# Patient Record
Sex: FEMALE | Race: WHITE | NOT HISPANIC OR LATINO | Employment: UNEMPLOYED | URBAN - METROPOLITAN AREA
[De-identification: names, ages, dates, MRNs, and addresses within clinical notes are randomized per-mention and may not be internally consistent; named-entity substitution may affect disease eponyms.]

---

## 2018-01-29 ENCOUNTER — TELEPHONE (OUTPATIENT)
Dept: ENDOCRINOLOGY | Facility: CLINIC | Age: 53
End: 2018-01-29

## 2018-01-29 NOTE — TELEPHONE ENCOUNTER
Reason for call:  Other   Patient called regarding (reason for call): appointment  Additional comments: She is wanting to know if she can get in sooner then April as all appointments are scheduled for 60 minutes, she was informed of this. This would be a 2 month follow up and is wondering if she would need a 60 minute appointment.    Phone number to reach patient:  Home number on file [phone] (home)    Best Time:  anytime    Can we leave a detailed message on this number?  YES

## 2018-01-29 NOTE — TELEPHONE ENCOUNTER
Message noted.  Let's move up her appt.  Since I have limited new patient appointment slots in next few weeks, I recommend using one of my Administrative slots (for a 30 min appt at 1pm) at [facility]... On a Monday, Tuesday, or Friday.... Next week or afterwards.  Thanks.  .

## 2018-02-16 NOTE — TELEPHONE ENCOUNTER
Called 2nd attempt pt lm to call and schedule appt with Dr Christianson. She does not have one already scheduled. See previous messages for approved time slots.

## 2018-02-20 ENCOUNTER — RECORDS - HEALTHEAST (OUTPATIENT)
Dept: LAB | Facility: HOSPITAL | Age: 53
End: 2018-02-20

## 2018-02-21 LAB
MEV IGG SER IA-ACNC: NORMAL
MUV IGG SER QL IA: NORMAL
RUBV IGG SERPL QL IA: NORMAL
VZV IGG SER QL IA: NORMAL

## 2018-02-23 LAB
QTF INTERPRETATION: NORMAL
QTF MITOGEN - NIL: >10 IU/ML
QTF NIL: 0.02 IU/ML
QTF RESULT: NEGATIVE
QTF TB ANTIGEN - NIL: 0.01 IU/ML

## 2018-03-07 NOTE — TELEPHONE ENCOUNTER
3rd attempt to contact pt.  Called pt unable to lm as vm was not allowing me to do so.  Closing encounter.

## 2018-03-09 RX ORDER — LEVOTHYROXINE, LIOTHYRONINE 38; 9 UG/1; UG/1
TABLET ORAL
Qty: 60 TABLET | Refills: 0 | OUTPATIENT
Start: 2018-03-09

## 2018-03-09 NOTE — TELEPHONE ENCOUNTER
"Requested Prescriptions   Pending Prescriptions Disp Refills     NP THYROID 60 MG tablet [Pharmacy Med Name: NP THYROID 60MG TABS] 60 tablet 0     Sig: TAKE 2 TABLETS BY MOUTH EVERY DAY    Thyroid Protocol Failed    3/9/2018  6:51 AM       Failed - Recent (12 mo) or future (30 days) visit within the authorizing provider's specialty    Patient had office visit in the last year or has a visit in the next 30 days with authorizing provider.  See \"Patient Info\" tab in inbasket, or \"Choose Columns\" in Meds & Orders section of the refill encounter.            Failed - Normal TSH on file in past 12 months    No lab results found.          Passed - Patient is 12 years or older       Passed - No active pregnancy on record    If patient is pregnant or has had a positive pregnancy test, please check TSH.         Passed - No positive pregnancy test in past 12 months    If patient is pregnant or has had a positive pregnancy test, please check TSH.            "

## 2018-03-09 NOTE — TELEPHONE ENCOUNTER
See telephone encounter dated 1/29.  Patient has not est care/made appt with Dr. faustin as of yet.  TC unable to reach patient after 2 attempts.  Alyssia Booth RN

## 2018-03-26 ENCOUNTER — OFFICE VISIT (OUTPATIENT)
Dept: ENDOCRINOLOGY | Facility: CLINIC | Age: 53
End: 2018-03-26
Payer: COMMERCIAL

## 2018-03-26 VITALS
WEIGHT: 246 LBS | SYSTOLIC BLOOD PRESSURE: 135 MMHG | DIASTOLIC BLOOD PRESSURE: 79 MMHG | HEIGHT: 64 IN | HEART RATE: 72 BPM | BODY MASS INDEX: 42 KG/M2

## 2018-03-26 DIAGNOSIS — E03.9 ACQUIRED HYPOTHYROIDISM: ICD-10-CM

## 2018-03-26 DIAGNOSIS — E11.9 TYPE 2 DIABETES MELLITUS WITHOUT COMPLICATION, WITHOUT LONG-TERM CURRENT USE OF INSULIN (H): Primary | ICD-10-CM

## 2018-03-26 PROCEDURE — 99214 OFFICE O/P EST MOD 30 MIN: CPT | Performed by: INTERNAL MEDICINE

## 2018-03-26 RX ORDER — GLIMEPIRIDE 4 MG/1
4 TABLET ORAL
Qty: 30 TABLET | Refills: 11 | Status: SHIPPED | OUTPATIENT
Start: 2018-03-26 | End: 2018-05-15

## 2018-03-26 RX ORDER — VALACYCLOVIR HYDROCHLORIDE 500 MG/1
500 TABLET, FILM COATED ORAL DAILY PRN
COMMUNITY
Start: 2018-01-16 | End: 2024-04-05

## 2018-03-26 RX ORDER — LEVOTHYROXINE SODIUM 25 UG/1
25 TABLET ORAL DAILY
Qty: 30 TABLET | Refills: 11 | Status: SHIPPED | OUTPATIENT
Start: 2018-03-26 | End: 2018-10-26

## 2018-03-26 RX ORDER — THYROID 90 MG/1
TABLET ORAL
Qty: 60 TABLET | Refills: 11 | Status: SHIPPED | OUTPATIENT
Start: 2018-03-26 | End: 2019-04-26

## 2018-03-26 RX ORDER — FLUCONAZOLE 100 MG/1
TABLET ORAL
Refills: 0 | Status: ON HOLD | COMMUNITY
Start: 2018-03-15 | End: 2018-10-10

## 2018-03-26 NOTE — MR AVS SNAPSHOT
"              After Visit Summary   3/26/2018    Sudheer Montiel    MRN: [id number]           Patient Information     Date Of Birth          1965        Visit Information        Provider Department      3/26/2018 12:30 PM Kirk Christianson MD [facility]        Today's Diagnoses     Type 2 diabetes mellitus without complication, without long-term current use of insulin (H)    -  1    Acquired hypothyroidism           Follow-ups after your visit        Follow-up notes from your care team     Return in about 2 months (around 2018).      Who to contact     If you have questions or need follow up information about today's clinic visit or your schedule please contact [facility] directly at [phone].  Normal or non-critical lab and imaging results will be communicated to you by MyChart, letter or phone within 4 business days after the clinic has received the results. If you do not hear from us within 7 days, please contact the clinic through [employer] or phone. If you have a critical or abnormal lab result, we will notify you by phone as soon as possible.  Submit refill requests through [employer] or call your pharmacy and they will forward the refill request to us. Please allow 3 business days for your refill to be completed.          Additional Information About Your Visit        MyChart Information     [employer] lets you send messages to your doctor, view your test results, renew your prescriptions, schedule appointments and more. To sign up, go to [email or web address] . Click on \"Log in\" on the left side of the screen, which will take you to the Welcome page. Then click on \"Sign up Now\" on the right side of the page.     You will be asked to enter the access code listed below, as well as some personal information. Please follow the directions to create your username and password.     Your access code is: [email or web address]  Expires: 2018  7:28 PM     Your access code will  in 90 " "days. If you need help or a new code, please call your [place] clinic or [phone].        Care EveryWhere ID     This is your Care EveryWhere ID. This could be used by other organizations to access your [place] medical records  [id number]        Your Vitals Were     Pulse Height BMI (Body Mass Index)             72 5' 4\" (1.626 m) 42.23 kg/m2          Blood Pressure from Last 3 Encounters:   03/26/18 135/79   10/21/15 137/83   11/04/14 128/84    Weight from Last 3 Encounters:   03/26/18 246 lb (111.6 kg)   10/21/15 250 lb (113.4 kg)   11/04/14 253 lb 6.4 oz (114.9 kg)              Today, you had the following     No orders found for display       Primary Care Provider Office Phone # Fax #    [facility] [phone]       [address] [place] [address], [id number]  [facility] [address]        Equal Access to Services     ANSELMO OBRIEN : Hadii aad ku hadasho Soomaali, waaxda luqadaha, qaybta kaalmada adeegyada, waxay chantellein mode sage . So [facility] [phone].    ATENCIÓN: Si habla español, tiene a morin disposición servicios gratuitos de asistencia lingüística. [facility] [phone].    We comply with applicable federal civil rights laws and [place] laws. We do not discriminate on the basis of race, color, national origin, age, disability, sex, sexual orientation, or gender identity.            Thank you!     Thank you for choosing [facility]  for your care. Our goal is always to provide you with excellent care. Hearing back from our patients is one way we can continue to improve our services. Please take a few minutes to complete the written survey that you may receive in the mail after your visit with us. Thank you!             Your Updated Medication List - Protect others around you: Learn how to safely use, store and throw away your medicines at www.disposemymeds.org.          This list is accurate as of 3/26/18  7:28 PM.  Always use your most recent med " list.                   Brand Name Dispense Instructions for use Diagnosis    ACCU-CHEK ROHITH Kit           ACCU-CHEK ROHITH test strip   Generic drug:  blood glucose monitoring           blood glucose calibration solution      1 Bottle.        blood glucose monitoring lancets           fluconazole 100 MG tablet    DIFLUCAN     TK 1 T PO D        glimepiride 2 MG tablet    AMARYL     Take 2 mg by mouth every morning (before breakfast)        metFORMIN 750 MG 24 hr tablet    GLUCOPHAGE-XR     Take 750 mg by mouth 2 times daily (with meals).        SYNTHROID 200 MCG tablet   Generic drug:  levothyroxine      Take  by mouth daily.        valACYclovir 500 MG tablet    VALTREX          VICTOZA SC

## 2018-03-26 NOTE — NURSING NOTE
"Chief Complaint   Patient presents with     Diabetes       Initial /79 (BP Location: Right arm, Cuff Size: Adult Regular)  Pulse 72  Ht 5' 4\" (1.626 m)  Wt 246 lb (111.6 kg)  BMI 42.23 kg/m2 Estimated body mass index is 42.23 kg/(m^2) as calculated from the following:    Height as of this encounter: 5' 4\" (1.626 m).    Weight as of this encounter: 246 lb (111.6 kg).  Medication Reconciliation: complete         Sona Best      "

## 2018-03-26 NOTE — PROGRESS NOTES
Name: Sudheer Montiel is a 52 year old, self-referred for evaluation of diabetes mellitus and hypothyroidism  Previously seen by me at my former clinic ([facility]), here to establish care with [facility].    HPI:  Recent issues:  Job change, now works for [employer] as   Reports having another thrush infection recently        Diabetes:  ~2010. Initial diagnosis approx age 44  ...Has taken several DM meds including metformin, Byetta, glimeparide, Invokana, and Victoza   Current DM meds:   Metformin 750 mg BID   Victoza 1.8 mg sq QAM   glimeparide 4 mg 1-tab po QAM   Jardiance 10 mg 1 po QAM  Previous problems with thrush and yeast infections  Has Accucheck meter, not available today.  No BG meter battery  DM Complications:  None known  Last eye exam 1 month ago, no DR      Hypothyroidism:  Had taken Synthroid  Changed to [place] thyroid medication, then [place] + levothyroxine combo dose plan  Had taken [place] thyroid 2-tablets daily... Possibly 60 mg 2-tabs QD  Early-mid 3/2018. Ran out of [place] thyroid medication  Current dose (verified by call to pharmacy):   NP Thyroid 60 mg 2-tabs po QAM   Levothyroxine 0.025 mg 1-tab QAM    Sees Beth CASTRO/[facility] for gen med evaluations  Also sees Dr. Frantz Guillen?/[facility]    PMH/PSH:  Past Medical History:   Diagnosis Date     DM (diabetes mellitus) (H) 2011     Hypothyroid 80's     Past Surgical History:   Procedure Laterality Date     C REPAIR CRUCIATE LIGAMENT,KNEE        SECTION       COLONOSCOPY N/A 10/21/2015    Procedure: COLONOSCOPY;  Surgeon: Jaky Arredondo MD;  [facility]      due to anemia       Family Hx:  No family history on file.      Social Hx:  Social History     Social History     Marital status:      Spouse name: N/A     Number of children: 2     Years of education: N/A     Occupational History     surgery  " [employer]      Social History Main Topics     Smoking status: Former Smoker     Quit date: 2/21/2011     Smokeless tobacco: Never Used     Alcohol use No     Drug use: No     Sexual activity: Not on file     Other Topics Concern     Not on file     Social History Narrative          MEDICATIONS:  has a current medication list which includes the following prescription(s): fluconazole, valacyclovir, liraglutide, glimepiride, metformin, levothyroxine, blood glucose calibration, accu-chek christophe, accu-chek christophe, and blood glucose monitoring.    ROS:     ROS: 10 point ROS neg other than the symptoms noted above in the HPI.    GENERAL: no weight changes, fatigue, fevers, chills, night sweats.   HEENT: inner mouth soreness; no dysphagia, odonophagia, diplopia, neck pain  THYROID:  no apparent hyper or hypothyroid symptoms  CV: no chest pain, pressure, palpitations  LUNGS: no SOB, HAWKINS, cough, wheezing   ABDOMEN: no diarrhea, indigestion, constipation, abdominal pain  EXTREMITIES: no rashes, ulcers, edema  NEUROLOGY: no headaches, denies changes in vision, tingling, extremitiy numbness   MSK: some low back pain; no muscle aches or pains, weakness  SKIN: no rashes or lesions  : no menses?!, nocturia 1x/night  PSYCH:  stable mood, no significant anxiety or depression  ENDOCRINE: no heat or cold intolerance      Physical Exam   VS: /79 (BP Location: Right arm, Cuff Size: Adult Regular)  Pulse 72  Ht 5' 4\" (1.626 m)  Wt 246 lb (111.6 kg)  BMI 42.23 kg/m2  GENERAL: AXOX3, NAD, well dressed, answering questions appropriately, appears stated age.  THYROID:  normal gland, no apparent nodules or goiter  HEENT: neck non-tender, no exopthalmous, EOMI  CV: RRR, no rubs, gallops, no murmurs  LUNGS: CTAB, no wheezes, rales, or ronchi  ABDOMEN: obese, soft, nontender, nondistended  EXTREMITIES: no edema, +pedal pulses, no lesions  NEUROLOGY: CN grossly intact, no tremors  MSK: grossly intact  SKIN: no rashes, no " lesions    LABS:    All pertinent notes, labs, and images personally reviewed by me.     A/P:  Encounter Diagnoses   Name Primary?     Type 2 diabetes mellitus without complication, without long-term current use of insulin (H) Yes     Acquired hypothyroidism        Comments:  Reviewed health issues, diabetes and thyroid management.  Difficult to assess glycemic control without BG meter information.  Fatigue and wt gain relates to low thyroid levels    Plan:  Discussed general issues with the diabetes diagnosis and management  We discussed the hgbA1c test which reflects previous overall glucose levels or control  Discussed the importance of blood glucose (BG) testing to assess glucose trends  Provided general overview of the diabetes medication options and medication treatment plan.    Recommend:  Continue current diabetes medication treatment plan at this time.  Goal target BG  mg/dl  Gave her sample Accucheck Guide meter for use  No lab tests ordered for today.  Keep focus on diet, exercise, weight management.  She will contact our office when ready for DM med Rx's.    Will update her glimeparide Rx     Patient's preferred pharmacy is [facility]    Need to clarify the names/doses of her thyroid medications.  I spoke with her [facility] pharmacy ([phone]) to confirm.  She needs to restart the thyroid medication plan ASAP  F/u with Beth CASTRO for the thrush problem    Addressed patient questions today    Labs ordered today: No orders of the defined types were placed in this encounter.    Radiology/Consults ordered today: None    More than 50% of the time spent with Ms. Montiel on counseling / coordinating her care.  Total appointment time was 25 minutes.    Follow-up:  2 mo    Kirk Christianson MD  Endocrinology  [place]    \

## 2018-05-15 ENCOUNTER — TELEPHONE (OUTPATIENT)
Dept: FAMILY MEDICINE | Facility: CLINIC | Age: 53
End: 2018-05-15

## 2018-05-15 DIAGNOSIS — E11.9 TYPE 2 DIABETES MELLITUS WITHOUT COMPLICATION, WITHOUT LONG-TERM CURRENT USE OF INSULIN (H): Primary | ICD-10-CM

## 2018-05-15 DIAGNOSIS — E11.9 TYPE 2 DIABETES MELLITUS WITHOUT COMPLICATION, WITHOUT LONG-TERM CURRENT USE OF INSULIN (H): ICD-10-CM

## 2018-05-15 NOTE — TELEPHONE ENCOUNTER
Please do not close this encounter until this has been addressed.  (prior auth approved/denied, prescriber refusal to complete prior auth or medication changed/discontinued)    +MUST USE PREFERRED BRANDS FARXIGA,  INVOKANA OR MED NECESSITY PA ONLY    Prior Authorization needed on: Jardiance 10 mg   Drug NDC: [address]-73     Insurance: [employer]  Member ID: [id number]   Insurance phone #: [phone]    Pharmacy NPI: [id number]  Pharmacy Phone #: [phone]  Pharmacy Fax #: [phone]    Please let us know if the PA gets approved or denied or if medication is changed    Thank You!  Kassi Armendariz  [facility]

## 2018-05-15 NOTE — TELEPHONE ENCOUNTER
metFORMIN (GLUCOPHAGE-XR) 750 MG 24 hr tablet     --      Sig: Take 750 mg by mouth 2 times daily (with meals).     Class: Historical     glimepiride (AMARYL) 4 MG tablet 30 tablet 11 3/26/2018     Liraglutide (VICTOZA SC)     --      Class: Historical     ACCU-CHEK ROHITH strip   3/23/2011  --      Class: Historical       Last office visit: 03/26/2018:     Future Office Visit:  Unknown    Requested Prescriptions   Pending Prescriptions Disp Refills     metFORMIN (GLUCOPHAGE-XR) 750 MG 24 hr tablet 30 tablet      Sig: Take 1 tablet (750 mg) by mouth 2 times daily (with meals)    Biguanide Agents Failed    5/15/2018  2:13 PM       Failed - Patient has documented LDL within the past 12 mos.    Recent Labs   Lab Test 03/10/11   LDL  137@            Failed - Patient has had a Microalbumin in the past 12 mos.    No lab results found.         Failed - Patient has documented A1c within the specified period of time.    If HgbA1C is 8 or greater, it needs to be on file within the past 3 months.  If less than 8, must be on file within the past 6 months.     Recent Labs   Lab Test 03/10/11   A1C  7.3@            Passed - Blood pressure less than 140/90 in past 6 months    BP Readings from Last 3 Encounters:   03/26/18 135/79   10/21/15 137/83   11/04/14 128/84                Passed - Patient is age 10 or older       Passed - Patient's CR is NOT>1.4 OR Patient's EGFR is NOT<45 within past 12 mos.    Recent Labs   Lab Test  06/28/13 1903   GFRESTIMATED  62   GFRESTBLACK  75       Recent Labs   Lab Test  06/28/13 1903   CR  0.96            Passed - Patient does NOT have a diagnosis of CHF.       Passed - Patient is not pregnant       Passed - Patient has not had a positive pregnancy test within the past 12 mos.        Passed - Recent (6 mo) or future (30 days) visit within the authorizing provider's specialty    Patient had office visit in the last 6 months or has a visit in the next 30 days with authorizing provider  "or within the authorizing provider's specialty.  See \"Patient Info\" tab in inbasket, or \"Choose Columns\" in Meds & Orders section of the refill encounter.            glimepiride (AMARYL) 4 MG tablet 30 tablet 11     Sig: Take 1 tablet (4 mg) by mouth every morning (before breakfast)    Sulfonylurea Agents Failed    5/15/2018  2:13 PM       Failed - Patient has documented LDL within the past 12 mos.    Recent Labs   Lab Test 03/10/11   LDL  137@            Failed - Patient has had a Microalbumin in the past 12 mos.    No lab results found.         Failed - Patient has documented A1c within the specified period of time.    If HgbA1C is 8 or greater, it needs to be on file within the past 3 months.  If less than 8, must be on file within the past 6 months.     Recent Labs   Lab Test 03/10/11   A1C  7.3@            Failed - Patient has a recent creatinine (normal) within the past 12 mos.    Recent Labs   Lab Test  06/28/13   1903   CR  0.96            Passed - Blood pressure less than 140/90 in past 6 months    BP Readings from Last 3 Encounters:   03/26/18 135/79   10/21/15 137/83   11/04/14 128/84                Passed - Patient is age 18 or older       Passed - No active pregnancy on record       Passed - Patient has not had a positive pregnancy test within the past 12 mos.       Passed - Recent (6 mo) or future (30 days) visit within the authorizing provider's specialty    Patient had office visit in the last 6 months or has a visit in the next 30 days with authorizing provider or within the authorizing provider's specialty.  See \"Patient Info\" tab in inbasket, or \"Choose Columns\" in Meds & Orders section of the refill encounter.            liraglutide (VICTOZA PEN) 18 MG/3ML soln      GLP-1 Agonists Protocol Failed    5/15/2018  2:13 PM       Failed - LDL on file in past 12 months    Recent Labs   Lab Test 03/10/11   LDL  137@            Failed - Microalbumin on file in past 12 months    No lab results found.      " "   Failed - HgbA1C in past 3 or 6 months    If HgbA1C is 8 or greater, it needs to be on file within the past 3 months.  If less than 8, must be on file within the past 6 months.     Recent Labs   Lab Test 03/10/11   A1C  7.3@            Failed - Normal serum creatinine on file in past 12 months    Recent Labs   Lab Test  06/28/13   1903   CR  0.96            Passed - Blood pressure less than 140/90 in past 6 months    BP Readings from Last 3 Encounters:   03/26/18 135/79   10/21/15 137/83   11/04/14 128/84                Passed - Patient is age 18 or older       Passed - No active pregnancy on record       Passed - No positive pregnancy test in past 12 months       Passed - Recent (6 mo) or future (30 days) visit within the authorizing provider's specialty    Patient had office visit in the last 6 months or has a visit in the next 30 days with authorizing provider.  See \"Patient Info\" tab in inbasket, or \"Choose Columns\" in Meds & Orders section of the refill encounter.            blood glucose monitoring (ACCU-CHEK ROHITH) test strip      Diabetic Supplies Protocol Passed    5/15/2018  2:13 PM       Passed - Patient is 18 years of age or older       Passed - Recent (6 mo) or future (30 days) visit within the authorizing provider's specialty    Patient had office visit in the last 6 months or has a visit in the next 30 days with authorizing provider.  See \"Patient Info\" tab in inbasket, or \"Choose Columns\" in Meds & Orders section of the refill encounter.              "

## 2018-05-16 NOTE — TELEPHONE ENCOUNTER
Central Prior Authorization Team   Phone: [phone]    In order for the PA team to do a PA we need a current prescription in patient's chart written by a [facility] provider.  If provider would like a PA done on this medication please route back to PA team once there is a script in patient's chart and we will start the PA.

## 2018-05-17 RX ORDER — METFORMIN HYDROCHLORIDE 750 MG/1
750 TABLET, EXTENDED RELEASE ORAL 2 TIMES DAILY WITH MEALS
Qty: 60 TABLET | Refills: 11 | Status: SHIPPED | OUTPATIENT
Start: 2018-05-17 | End: 2019-06-13

## 2018-05-17 RX ORDER — GLIMEPIRIDE 4 MG/1
4 TABLET ORAL
Qty: 30 TABLET | Refills: 11 | Status: SHIPPED | OUTPATIENT
Start: 2018-05-17 | End: 2019-06-13

## 2018-05-17 RX ORDER — LIRAGLUTIDE 6 MG/ML
1.8 INJECTION SUBCUTANEOUS DAILY
Qty: 9 ML | Refills: 11 | Status: ON HOLD | OUTPATIENT
Start: 2018-05-17 | End: 2018-10-10

## 2018-05-17 NOTE — TELEPHONE ENCOUNTER
Hi Dr Christianson,    Patient will need the Jardiance refilled through our [facility] system before our PA team can work on the insurance issue. I have a script pending for this. Please also see the 5/15/18 refill encounter for several other diabetic meds.    I actually just started the PA for the Jardiance 10 mg tablets from my end so that we will have an approval later today.        Thanks,    Junior Oleary, CMA

## 2018-05-17 NOTE — TELEPHONE ENCOUNTER
"To Dr. Christianson,  Please review refills for patient.  How often do you want patient to check BG?  Thank you.  Alyssia Booth RN    Copied from 3/26/2018 Office visit with Dr. Christianson  \"...Current DM meds:                        Metformin 750 mg BID                        Victoza 1.8 mg sq QAM                        glimeparide 4 mg 1-tab po QAM                        Jardiance 10 mg 1 po QAM...\"  "

## 2018-10-08 ENCOUNTER — TRANSFERRED RECORDS (OUTPATIENT)
Dept: HEALTH INFORMATION MANAGEMENT | Facility: CLINIC | Age: 53
End: 2018-10-08

## 2018-10-08 LAB — INR PPP: 1.5 (ref 1–1.2)

## 2018-10-09 ENCOUNTER — TELEPHONE (OUTPATIENT)
Dept: UROLOGY | Facility: CLINIC | Age: 53
End: 2018-10-09

## 2018-10-09 LAB
HBA1C MFR BLD: 9.4 % (ref 0–5.7)
TSH SERPL-ACNC: 0.04 UIU/ML (ref 0.36–3.74)

## 2018-10-09 NOTE — TELEPHONE ENCOUNTER
[facility] Call Center    Phone Message    May a detailed message be left on voicemail: yes    Reason for Call: Other: Pt is transferring from [facility] either today or tomorrow and needs to establish care with a Urologist prior to transfer.  Please call Alexandra back to help facilitate this as soon as possible.     Action Taken: Message routed to:  [facility] ([facility]

## 2018-10-09 NOTE — TELEPHONE ENCOUNTER
Patient is a hospital to hospital transfer.   On-call number given.    April Juarez, RN, BSN  Urology Patient Care Supervisor

## 2018-10-09 NOTE — TELEPHONE ENCOUNTER
Message routed to April Juarez clinic supervisor. Temitope Chen ,do you know which Urologist I can place this patient with (ASAP-per call). She saw Dr. Cooper in 2012 for incontinence.     Nico Garcia MA

## 2018-10-10 ENCOUNTER — HOSPITAL ENCOUNTER (INPATIENT)
Facility: CLINIC | Age: 53
LOS: 5 days | Discharge: HOME HEALTH | DRG: 674 | End: 2018-10-15
Attending: SURGERY | Admitting: SURGERY
Payer: COMMERCIAL

## 2018-10-10 DIAGNOSIS — E11.9 TYPE 2 DIABETES MELLITUS WITHOUT COMPLICATION, WITHOUT LONG-TERM CURRENT USE OF INSULIN (H): ICD-10-CM

## 2018-10-10 DIAGNOSIS — M79.89 NECROTIZING SOFT TISSUE INFECTION: Primary | ICD-10-CM

## 2018-10-10 PROBLEM — L08.9 SOFT TISSUE INFECTION: Status: ACTIVE | Noted: 2018-10-10

## 2018-10-10 LAB
ANION GAP SERPL CALCULATED.3IONS-SCNC: 8 MMOL/L (ref 3–14)
BUN SERPL-MCNC: 7 MG/DL (ref 7–30)
CALCIUM SERPL-MCNC: 7.6 MG/DL (ref 8.5–10.1)
CHLORIDE SERPL-SCNC: 105 MMOL/L (ref 94–109)
CO2 SERPL-SCNC: 25 MMOL/L (ref 20–32)
CREAT SERPL-MCNC: 0.57 MG/DL (ref 0.55–1.02)
CREAT SERPL-MCNC: 0.58 MG/DL (ref 0.52–1.04)
ERYTHROCYTE [DISTWIDTH] IN BLOOD BY AUTOMATED COUNT: 13.2 % (ref 10–15)
GFR SERPL CREATININE-BSD FRML MDRD: >60 ML/MIN/1.73M2
GFR SERPL CREATININE-BSD FRML MDRD: >90 ML/MIN/1.7M2
GLUCOSE BLDC GLUCOMTR-MCNC: 188 MG/DL (ref 70–99)
GLUCOSE SERPL-MCNC: 179 MG/DL (ref 70–99)
GLUCOSE SERPL-MCNC: 69 MG/DL (ref 74–106)
HCT VFR BLD AUTO: 36.1 % (ref 35–47)
HGB BLD-MCNC: 11.5 G/DL (ref 11.7–15.7)
MAGNESIUM SERPL-MCNC: 2.3 MG/DL (ref 1.6–2.3)
MCH RBC QN AUTO: 29.3 PG (ref 26.5–33)
MCHC RBC AUTO-ENTMCNC: 31.9 G/DL (ref 31.5–36.5)
MCV RBC AUTO: 92 FL (ref 78–100)
PHOSPHATE SERPL-MCNC: 2.9 MG/DL (ref 2.5–4.5)
PLATELET # BLD AUTO: 302 10E9/L (ref 150–450)
POTASSIUM SERPL-SCNC: 2.8 MMOL/L (ref 3.5–5.1)
POTASSIUM SERPL-SCNC: 3.6 MMOL/L (ref 3.4–5.3)
RBC # BLD AUTO: 3.92 10E12/L (ref 3.8–5.2)
SODIUM SERPL-SCNC: 139 MMOL/L (ref 133–144)
WBC # BLD AUTO: 9.3 10E9/L (ref 4–11)

## 2018-10-10 PROCEDURE — 85027 COMPLETE CBC AUTOMATED: CPT | Performed by: SURGERY

## 2018-10-10 PROCEDURE — 00000146 ZZHCL STATISTIC GLUCOSE BY METER IP

## 2018-10-10 PROCEDURE — 25000128 H RX IP 250 OP 636: Performed by: STUDENT IN AN ORGANIZED HEALTH CARE EDUCATION/TRAINING PROGRAM

## 2018-10-10 PROCEDURE — 12000008 ZZH R&B INTERMEDIATE UMMC

## 2018-10-10 PROCEDURE — 84100 ASSAY OF PHOSPHORUS: CPT | Performed by: SURGERY

## 2018-10-10 PROCEDURE — 83735 ASSAY OF MAGNESIUM: CPT | Performed by: SURGERY

## 2018-10-10 PROCEDURE — 80048 BASIC METABOLIC PNL TOTAL CA: CPT | Performed by: SURGERY

## 2018-10-10 PROCEDURE — 36415 COLL VENOUS BLD VENIPUNCTURE: CPT | Performed by: SURGERY

## 2018-10-10 RX ORDER — LEVOTHYROXINE SODIUM 100 UG/1
200 TABLET ORAL DAILY
Status: DISCONTINUED | OUTPATIENT
Start: 2018-10-10 | End: 2018-10-10

## 2018-10-10 RX ORDER — SODIUM CHLORIDE, SODIUM LACTATE, POTASSIUM CHLORIDE, CALCIUM CHLORIDE 600; 310; 30; 20 MG/100ML; MG/100ML; MG/100ML; MG/100ML
1000 INJECTION, SOLUTION INTRAVENOUS CONTINUOUS
Status: DISCONTINUED | OUTPATIENT
Start: 2018-10-10 | End: 2018-10-12

## 2018-10-10 RX ORDER — NICOTINE POLACRILEX 4 MG
15-30 LOZENGE BUCCAL
Status: DISCONTINUED | OUTPATIENT
Start: 2018-10-10 | End: 2018-10-13

## 2018-10-10 RX ORDER — ONDANSETRON 2 MG/ML
4 INJECTION INTRAMUSCULAR; INTRAVENOUS EVERY 6 HOURS PRN
Status: DISCONTINUED | OUTPATIENT
Start: 2018-10-10 | End: 2018-10-15 | Stop reason: HOSPADM

## 2018-10-10 RX ORDER — PIPERACILLIN SODIUM, TAZOBACTAM SODIUM 3; .375 G/15ML; G/15ML
3.38 INJECTION, POWDER, LYOPHILIZED, FOR SOLUTION INTRAVENOUS EVERY 6 HOURS
Status: DISCONTINUED | OUTPATIENT
Start: 2018-10-10 | End: 2018-10-13

## 2018-10-10 RX ORDER — LEVOTHYROXINE SODIUM 25 UG/1
25 TABLET ORAL DAILY
Status: DISCONTINUED | OUTPATIENT
Start: 2018-10-11 | End: 2018-10-15 | Stop reason: HOSPADM

## 2018-10-10 RX ORDER — HYDROMORPHONE HYDROCHLORIDE 1 MG/ML
.3-.5 INJECTION, SOLUTION INTRAMUSCULAR; INTRAVENOUS; SUBCUTANEOUS
Status: DISCONTINUED | OUTPATIENT
Start: 2018-10-10 | End: 2018-10-14

## 2018-10-10 RX ORDER — DEXTROSE MONOHYDRATE 25 G/50ML
25-50 INJECTION, SOLUTION INTRAVENOUS
Status: DISCONTINUED | OUTPATIENT
Start: 2018-10-10 | End: 2018-10-13

## 2018-10-10 RX ORDER — NALOXONE HYDROCHLORIDE 0.4 MG/ML
.1-.4 INJECTION, SOLUTION INTRAMUSCULAR; INTRAVENOUS; SUBCUTANEOUS
Status: DISCONTINUED | OUTPATIENT
Start: 2018-10-10 | End: 2018-10-15 | Stop reason: HOSPADM

## 2018-10-10 RX ORDER — ONDANSETRON 4 MG/1
4 TABLET, ORALLY DISINTEGRATING ORAL EVERY 6 HOURS PRN
Status: DISCONTINUED | OUTPATIENT
Start: 2018-10-10 | End: 2018-10-15 | Stop reason: HOSPADM

## 2018-10-10 RX ORDER — METFORMIN HYDROCHLORIDE 750 MG/1
750 TABLET, EXTENDED RELEASE ORAL 2 TIMES DAILY WITH MEALS
Status: DISCONTINUED | OUTPATIENT
Start: 2018-10-10 | End: 2018-10-10

## 2018-10-10 RX ORDER — ACETAMINOPHEN 325 MG/1
650 TABLET ORAL EVERY 4 HOURS PRN
Status: DISCONTINUED | OUTPATIENT
Start: 2018-10-10 | End: 2018-10-12

## 2018-10-10 RX ORDER — OXYCODONE HYDROCHLORIDE 5 MG/1
5-10 TABLET ORAL EVERY 4 HOURS PRN
Status: DISCONTINUED | OUTPATIENT
Start: 2018-10-10 | End: 2018-10-10

## 2018-10-10 RX ORDER — GLIMEPIRIDE 4 MG/1
4 TABLET ORAL
Status: DISCONTINUED | OUTPATIENT
Start: 2018-10-11 | End: 2018-10-13

## 2018-10-10 RX ADMIN — VANCOMYCIN HYDROCHLORIDE 1750 MG: 10 INJECTION, POWDER, LYOPHILIZED, FOR SOLUTION INTRAVENOUS at 18:48

## 2018-10-10 RX ADMIN — SODIUM CHLORIDE, POTASSIUM CHLORIDE, SODIUM LACTATE AND CALCIUM CHLORIDE 1000 ML: 600; 310; 30; 20 INJECTION, SOLUTION INTRAVENOUS at 17:12

## 2018-10-10 ASSESSMENT — PAIN DESCRIPTION - DESCRIPTORS: DESCRIPTORS: DULL

## 2018-10-10 ASSESSMENT — ACTIVITIES OF DAILY LIVING (ADL): ADLS_ACUITY_SCORE: 9

## 2018-10-10 NOTE — H&P
[facility]    History and Physical  General Surgery     Date of Admission:  10/10/2018    Assessment & Plan   Sudheer Montiel is a 53 year old female with h/o T2DM and hypothyroidism, who presents from OSH on POD#2 s/p I&D lower abdomen for abscess and necrotizing soft tissue infection tracking to previous bladder sling mesh. Abdominal wound is open, with a penrose drain, packed with Kerlix, and covered with ABD pads.     - Admit to general surgery service  - Consult urology  - NPO, IVF  - Vanco, Zosyn (started at OSH)  - BID wet-to-dry with Kerlix    Patient discussed with chief resident, Dr. Carmona, and staff, Dr. Aguilera.    Timbo Dumont MD (PGY-1)  General Surgery      Code Status   Full Code    Chief Complaint   Abdominal pain with associated fever/chills    History is obtained from the patient and from OSH records    History of Present Illness   Sudheer Montiel is a 53 year old female with h/o T2DM (non-insulin dependent), hypothyroidism, and a mesh bladder sling for urinary incontinence in 2004 who had 1-week of intermittent and increasing lower abdominal pain that progressed to constant pain two days ago. Associated fevers, chills, diaphoresis, loss of appetite, nausea. Skin overlying her lower abdomen became indurated but no skin changes present. She presented to [facility] on 10/8/18 with a WBC 23 and CT scan showed a necrotizing soft tissue infection with subfascial abscess tracking down to her bladder sling mesh. Wound and blood cultures pending at OSH. She was started on IV vancomycin and zosyn. Patient underwent an I&D of the inferior abdomen with washout, penrose drain placement, and packing. Her WBC decreased to 18.5-->11.2. Patient was transferred to [facility] on 10/10/18 for definitive surgical management with a multidisciplinary team including general surgery and urology. She arrived in stable condition. No chest pain, SOB, cough, nausea/vomiting,  diarrhea/constipation. She does endorse some urinary symptoms including dysuria and foul-smelling urine.    Past Medical History    T2DM  Hypothyroidism    Past Surgical History   Bladder sling    Myomectomy for fibroids  ACL repair    Prior to Admission Medications   Prior to Admission Medications   Prescriptions Last Dose Informant Patient Reported? Taking?   ACCU-CHEK MULTICLIX LANCETS MISC Unknown at Unknown time  Yes No   Blood Glucose Calibration (ACCU-CHEK ROHITH) SOLN Unknown at Unknown time  Yes No   Si Bottle.   Blood Glucose Monitoring Suppl (ACCU-CHEK ROHITH) KIT Unknown at Unknown time  Yes No   Thyroid (NP THYROID) 90 MG TABS Unknown at Unknown time  No No   Sig: Take 2-tablets by mouth each morning   blood glucose monitoring (ACCU-CHEK ROHITH) test strip Unknown at Unknown time  No No   Si strip by In Vitro route 2 times daily   empagliflozin (JARDIANCE) 10 MG TABS tablet Unknown at Unknown time  No No   Sig: Take 1 tablet (10 mg) by mouth daily   fluconazole (DIFLUCAN) 100 MG tablet Unknown at Unknown time  Yes No   Sig: TK 1 T PO D   glimepiride (AMARYL) 4 MG tablet Unknown at Unknown time  No No   Sig: Take 1 tablet (4 mg) by mouth every morning (before breakfast)   levothyroxine (SYNTHROID) 200 MCG tablet Unknown at Unknown time  Yes No   Sig: Take  by mouth daily.   levothyroxine (SYNTHROID/LEVOTHROID) 25 MCG tablet Unknown at Unknown time  No No   Sig: Take 1 tablet (25 mcg) by mouth daily   metFORMIN (GLUCOPHAGE-XR) 750 MG 24 hr tablet Unknown at Unknown time  No No   Sig: Take 1 tablet (750 mg) by mouth 2 times daily (with meals)   valACYclovir (VALTREX) 500 MG tablet Unknown at Unknown time  Yes No      Facility-Administered Medications: None     Allergies   Allergies   Allergen Reactions     Cephalexin      Morphine      Other reaction(s): Other  Irritability, disorientation     Penicillins Itching     face     Amoxicillin Rash and Itching     Cefprozil Rash     Ceftazidime  Itching and Rash     Cefzil Rash     Ciprofloxacin Rash     Ciprofloxacin Itching and Rash     Percocet [Oxycodone-Acetaminophen] Nausea and Vomiting       Social History   Quit smoking in 2011  No EtOH    Family History   No pertinent family history    Review of Systems   The 10 point Review of Systems is negative other than noted in the HPI.    Physical Exam   Temp: 97.2  F (36.2  C) Temp src: Oral BP: 117/50 Pulse: 83   Resp: 18 SpO2: 95 % O2 Device: Nasal cannula Oxygen Delivery: 2 LPM  Vital Signs with Ranges  Temp:  [97.2  F (36.2  C)] 97.2  F (36.2  C)  Pulse:  [83] 83  Resp:  [18] 18  BP: (117)/(50) 117/50  SpO2:  [90 %-95 %] 95 %  0 lbs 0 oz    Awake, Alert, Oriented, does not appear in any acute distress  nonlabored breathing on 2L NC  RRR, WWP  Abdomen soft, nondistended, very tender near lower abdominal wound  Wound is open, packed with Kerlix, penrose drain x1 (not seen); erythema surrounding wound  No edema    Data   Results for orders placed or performed during the hospital encounter of 10/10/18 (from the past 24 hour(s))   Glucose by meter   Result Value Ref Range    Glucose 188 (H) 70 - 99 mg/dL

## 2018-10-10 NOTE — IP AVS SNAPSHOT
[address] [address]    Phone:  [phone]                                       After Visit Summary   10/10/2018    Sudheer Montiel    MRN: [id number]           After Visit Summary Signature Page     I have received my discharge instructions, and my questions have been answered. I have discussed any challenges I see with this plan with the nurse or doctor.    ..........................................................................................................................................  Patient/Patient Representative Signature      ..........................................................................................................................................  Patient Representative Print Name and Relationship to Patient    ..................................................               ................................................  Date                                   Time    ..........................................................................................................................................  Reviewed by Signature/Title    ...................................................              ..............................................  Date                                               Time          22EPIC Rev 08/18

## 2018-10-10 NOTE — IP AVS SNAPSHOT
MRN:[id number]                      After Visit Summary   10/10/2018    Sudheer Montiel    MRN: [id number]           Thank you!     Thank you for choosing [facility] for your care. Our goal is always to provide you with excellent care. Hearing back from our patients is one way we can continue to improve our services. Please take a few minutes to complete the written survey that you may receive in the mail after you visit with us. Thank you!        Patient Information     Date Of Birth          1965        Designated Caregiver       Most Recent Value    Caregiver    Will someone help with your care after discharge? yes    Name of designated caregiver Becca [Mom]    Phone number of caregiver [phone]    Caregiver address [place]      About your hospital stay     You were admitted on:  October 10, 2018 You last received care in the:  [address] [facility]    You were discharged on:  October 15, 2018        Reason for your hospital stay       At an outside hospital, you were found to have a necrotizing soft tissue infection in your lower abdomen. When they initially operated to drain your abscess and clear out the infection, they thought your bladder mesh might be involved and so you were transferred to the [facility]. You were taken to the OR here and had your abdominal wound washed out, and there did not look to be any residual infected or dead tissue. A small piece of mesh was clipped and sent for culture. Urology evaluated your wound in the OR and determined that the mesh was not infected, and so no procedures from them were needed. Your infection was treated with IV antibiotics, and you were transitioned to oral antibiotics and had a vac placed over your wound.                  Who to Call     For medical emergencies, please call [phone].  For non-urgent questions about your medical care, please call your primary care provider or clinic, [phone]  For questions related to your surgery,  please call your surgery clinic        Attending Provider     Provider Specialty    Brendon Grimm MD General Surgery    Darlene Hogue MD General Surgery       Primary Care Provider Office Phone # Fax #    Beth Luis A Cantu PA-C [phone]      After Care Instructions     Activity       Your activity upon discharge: activity as tolerated            Diet       Follow this diet upon discharge: Orders Placed This Encounter      Regular Diet Adult                  Follow-up Appointments     Adult [facility] Follow-up and recommended labs and tests       Follow up with primary care provider, [facility], within 7 days to evaluate after surgery and for hospital follow- up.  No follow up labs or test are needed.    Follow up with Dr. Moore , with Urology, within 1-2 weeks  to evaluate after surgery and for hospital follow- up. No follow up labs or test are needed.    Appointments on [place] and/or [facility] (with [facility] or [facility] provider or service). Call [phone] if you haven't heard regarding these appointments within 7 days of discharge.                  Additional Services     Home care nursing referral       _______________________  [facility]  Phone  [phone]  Fax  [phone]  ______________________    RN skilled nursing visit. RN to assess vital signs and weight, respiratory and cardiac status, pain level and activity tolerance, incision for signs/symptoms of infection, hydration, nutrition and bowel status and home safety.  RN to teach medication management.  RN to provide wound vac dressing changes every M/W/F.    Your provider has ordered home care nursing services. If you have not been contacted within 2 days of your discharge please call the inpatient department phone number at [phone] .                  Pending Results     Date and Time Order Name Status Description    10/12/2018 1244 Miscellaneous Culture Aerobic  "Bacterial Preliminary     10/12/2018 1244 Anaerobic bacterial culture Preliminary     10/12/2018 1244 Surgical pathology exam In process             Admission Information     Date & Time Provider Department Dept. Phone    10/10/2018 Darlene Hogue MD [address] [facility] [phone]      Your Vitals Were     Blood Pressure Pulse Temperature Respirations Weight Pulse Oximetry    138/56 (BP Location: Left arm) 54 96.7  F (35.9  C) (Oral) 18 111.6 kg (246 lb) 95%    BMI (Body Mass Index)                   42.23 kg/m2           [employer] Information     [employer] lets you send messages to your doctor, view your test results, renew your prescriptions, schedule appointments and more. To sign up, go to [email or web address] . Click on \"Log in\" on the left side of the screen, which will take you to the Welcome page. Then click on \"Sign up Now\" on the right side of the page.     You will be asked to enter the access code listed below, as well as some personal information. Please follow the directions to create your username and password.     Your access code is: [email or web address]  Expires: 2019 11:06 AM     Your access code will  in 90 days. If you need help or a new code, please call your [facility] clinic or [phone].        Care EveryWhere ID     This is your Care EveryWhere ID. This could be used by other organizations to access your [facility] medical records  [id number]        Equal Access to Services     [facility] HospitalST[facility]IA : Hadii ryan ross hadasho Sobonitaali, waaxda luqadaha, qaybta kaalmada adeegyada, waxay idiin hayaan adeeg kharash la'aan . So [facility] [phone].    ATENCIÓN: Si habla español, tiene a morin disposición servicios gratuitos de asistencia lingüística. Llame al [phone].    We comply with applicable federal civil rights laws and [place] laws. We do not discriminate on the basis of race, color, national origin, age, disability, sex, sexual orientation, or gender identity.             "   Review of your medicines      START taking        Dose / Directions    acetaminophen 325 MG tablet   Commonly known as:  TYLENOL        Dose:  975 mg   Take 3 tablets (975 mg) by mouth every 8 hours as needed for mild pain   Quantity:  100 tablet   Refills:  0       clindamycin 300 MG capsule   Commonly known as:  CLEOCIN   Indication:  Infection of the Skin and/or Related Soft Tissue        Dose:  300 mg   Take 1 capsule (300 mg) by mouth every 6 hours for 5 days   Quantity:  20 capsule   Refills:  0       gabapentin 100 MG capsule   Commonly known as:  NEURONTIN        Dose:  100 mg   Take 1 capsule (100 mg) by mouth 2 times daily   Quantity:  90 capsule   Refills:  0       HYDROmorphone 2 MG tablet   Commonly known as:  DILAUDID        Dose:  2 mg   Take 1 tablet (2 mg) by mouth 3 times daily as needed for moderate to severe pain   Quantity:  6 tablet   Refills:  0       hydrOXYzine 10 MG tablet   Commonly known as:  ATARAX        Dose:  10 mg   Take 1 tablet (10 mg) by mouth 3 times daily as needed for itching   Quantity:  20 tablet   Refills:  0       sulfamethoxazole-trimethoprim 800-160 MG per tablet   Commonly known as:  BACTRIM DS/SEPTRA DS   Indication:  Infection of the Skin and/or Related Soft Tissue        Dose:  1 tablet   Take 1 tablet by mouth 2 times daily for 5 days   Quantity:  10 tablet   Refills:  0         CONTINUE these medicines which have NOT CHANGED        Dose / Directions    ACCU-CHEK ROHITH Kit        Refills:  0       blood glucose calibration solution        Dose:  1 Bottle   1 Bottle.   Refills:  0       blood glucose monitoring lancets        Refills:  0       blood glucose monitoring test strip   Commonly known as:  ACCU-CHEK ROHITH   Used for:  Type 2 diabetes mellitus without complication, without long-term current use of insulin (H)        Dose:  1 strip   1 strip by In Vitro route 2 times daily   Quantity:  200 strip   Refills:  3       glimepiride 4 MG tablet   Commonly known  as:  AMARYL   Used for:  Type 2 diabetes mellitus without complication, without long-term current use of insulin (H)        Dose:  4 mg   Take 1 tablet (4 mg) by mouth every morning (before breakfast)   Quantity:  30 tablet   Refills:  11       levothyroxine 25 MCG tablet   Commonly known as:  SYNTHROID/LEVOTHROID   Used for:  Acquired hypothyroidism        Dose:  25 mcg   Take 1 tablet (25 mcg) by mouth daily   Quantity:  30 tablet   Refills:  11       metFORMIN 750 MG 24 hr tablet   Commonly known as:  GLUCOPHAGE-XR   Used for:  Type 2 diabetes mellitus without complication, without long-term current use of insulin (H)        Dose:  750 mg   Take 1 tablet (750 mg) by mouth 2 times daily (with meals)   Quantity:  60 tablet   Refills:  11       Thyroid 90 MG Tabs   Commonly known as:  NP THYROID   Used for:  Acquired hypothyroidism        Take 2-tablets by mouth each morning   Quantity:  60 tablet   Refills:  11       valACYclovir 500 MG tablet   Commonly known as:  VALTREX        Dose:  500 mg   Take 500 mg by mouth daily   Refills:  0            Where to get your medicines      These medications were sent to [facility] [place], [place] - [address]  [address] [address]     Phone:  [phone]     acetaminophen 325 MG tablet    clindamycin 300 MG capsule    gabapentin 100 MG capsule    hydrOXYzine 10 MG tablet    sulfamethoxazole-trimethoprim 800-160 MG per tablet         Some of these will need a paper prescription and others can be bought over the counter. Ask your nurse if you have questions.     Bring a paper prescription for each of these medications     HYDROmorphone 2 MG tablet                Protect others around you: Learn how to safely use, store and throw away your medicines at www.disposemymeds.org.        ANTIBIOTIC INSTRUCTION     You've Been Prescribed an Antibiotic - Now What?  Your healthcare team thinks that you or your loved one might have an  infection. Some infections can be treated with antibiotics, which are powerful, life-saving drugs. Like all medications, antibiotics have side effects and should only be used when necessary. There are some important things you should know about your antibiotic treatment.      Your healthcare team may run tests before you start taking an antibiotic.    Your team may take samples (e.g., from your blood, urine or other areas) to run tests to look for bacteria. These test can be important to determine if you need an antibiotic at all and, if you do, which antibiotic will work best.      Within a few days, your healthcare team might change or even stop your antibiotic.    Your team may start you on an antibiotic while they are working to find out what is making you sick.    Your team might change your antibiotic because test results show that a different antibiotic would be better to treat your infection.    In some cases, once your team has more information, they learn that you do not need an antibiotic at all. They may find out that you don't have an infection, or that the antibiotic you're taking won't work against your infection. For example, an infection caused by a virus can't be treated with antibiotics. Staying on an antibiotic when you don't need it is more likely to be harmful than helpful.      You may experience side effects from your antibiotic.    Like all medications, antibiotics have side effects. Some of these can be serious.    Let you healthcare team know if you have any known allergies when you are admitted to the hospital.    One significant side effect of nearly all antibiotics is the risk of severe and sometimes deadly diarrhea caused by Clostridium difficile (C. Difficile). This occurs when a person takes antibiotics because some good germs are destroyed. Antibiotic use allows C. diificile to take over, putting patients at high risk for this serious infection.    As a patient or caregiver, it is  important to understand your or your loved one's antibiotic treatment. It is especially important for caregivers to speak up when patients can't speak for themselves. Here are some important questions to ask your healthcare team.    What infection is this antibiotic treating and how do you know I have that infection?    What side effects might occur from this antibiotic?    How long will I need to take this antibiotic?    Is it safe to take this antibiotic with other medications or supplements (e.g., vitamins) that I am taking?     Are there any special directions I need to know about taking this antibiotic? For example, should I take it with food?    How will I be monitored to know whether my infection is responding to the antibiotic?    What tests may help to make sure the right antibiotic is prescribed for me?      Information provided by:  www.cdc.gov/getsmart  U.S. Department of Health and Human Services  Centers for disease Control and Prevention  National Center for Emerging and Zoonotic Infectious Diseases  Division of Healthcare Quality Promotion        Information about OPIOIDS     PRESCRIPTION OPIOIDS: WHAT YOU NEED TO KNOW   We gave you an opioid (narcotic) pain medicine. It is important to manage your pain, but opioids are not always the best choice. You should first try all the other options your care team gave you. Take this medicine for as short a time (and as few doses) as possible.    Some activities can increase your pain, such as bandage changes or therapy sessions. It may help to take your pain medicine 30 to 60 minutes before these activities. Reduce your stress by getting enough sleep, working on hobbies you enjoy and practicing relaxation or meditation. Talk to your care team about ways to manage your pain beyond prescription opioids.    These medicines have risks:    DO NOT drive when on new or higher doses of pain medicine. These medicines can affect your alertness and reaction times, and  you could be arrested for driving under the influence (DUI). If you need to use opioids long-term, talk to your care team about driving.    DO NOT operate heavy machinery    DO NOT do any other dangerous activities while taking these medicines.    DO NOT drink any alcohol while taking these medicines.     If the opioid prescribed includes acetaminophen, DO NOT take with any other medicines that contain acetaminophen. Read all labels carefully. Look for the word  acetaminophen  or  Tylenol.  Ask your pharmacist if you have questions or are unsure.    You can get addicted to pain medicines, especially if you have a history of addiction (chemical, alcohol or substance dependence). Talk to your care team about ways to reduce this risk.    All opioids tend to cause constipation. Drink plenty of water and eat foods that have a lot of fiber, such as fruits, vegetables, prune juice, apple juice and high-fiber cereal. Take a laxative (Miralax, milk of magnesia, Colace, Senna) if you don t move your bowels at least every other day. Other side effects include upset stomach, sleepiness, dizziness, throwing up, tolerance (needing more of the medicine to have the same effect), physical dependence and slowed breathing.    Store your pills in a secure place, locked if possible. We will not replace any lost or stolen medicine. If you don t finish your medicine, please throw away (dispose) as directed by your pharmacist. The [place] Pollution Control Agency has more information about safe disposal: https://www.pca.[facility].[place].us/living-green/managing-unwanted-medications             Medication List: This is a list of all your medications and when to take them. Check marks below indicate your daily home schedule. Keep this list as a reference.      Medications           Morning Afternoon Evening Bedtime As Needed    ACCU-CHEK ROHITH Kit                                acetaminophen 325 MG tablet   Commonly known as:  TYLENOL   Take 3  tablets (975 mg) by mouth every 8 hours as needed for mild pain   Last time this was given:  975 mg on 10/15/2018  2:37 PM                                blood glucose calibration solution   1 Bottle.                                blood glucose monitoring lancets                                blood glucose monitoring test strip   Commonly known as:  ACCU-CHEK ROHITH   1 strip by In Vitro route 2 times daily                                clindamycin 300 MG capsule   Commonly known as:  CLEOCIN   Take 1 capsule (300 mg) by mouth every 6 hours for 5 days   Last time this was given:  300 mg on 10/15/2018 12:54 PM                                gabapentin 100 MG capsule   Commonly known as:  NEURONTIN   Take 1 capsule (100 mg) by mouth 2 times daily   Last time this was given:  200 mg on 10/15/2018  8:09 AM                                glimepiride 4 MG tablet   Commonly known as:  AMARYL   Take 1 tablet (4 mg) by mouth every morning (before breakfast)   Last time this was given:  4 mg on 10/13/2018  9:38 AM                                HYDROmorphone 2 MG tablet   Commonly known as:  DILAUDID   Take 1 tablet (2 mg) by mouth 3 times daily as needed for moderate to severe pain   Last time this was given:  2 mg on 10/15/2018 10:05 AM                                hydrOXYzine 10 MG tablet   Commonly known as:  ATARAX   Take 1 tablet (10 mg) by mouth 3 times daily as needed for itching                                levothyroxine 25 MCG tablet   Commonly known as:  SYNTHROID/LEVOTHROID   Take 1 tablet (25 mcg) by mouth daily   Last time this was given:  25 mcg on 10/15/2018  8:09 AM                                metFORMIN 750 MG 24 hr tablet   Commonly known as:  GLUCOPHAGE-XR   Take 1 tablet (750 mg) by mouth 2 times daily (with meals)                                sulfamethoxazole-trimethoprim 800-160 MG per tablet   Commonly known as:  BACTRIM DS/SEPTRA DS   Take 1 tablet by mouth 2 times daily for 5 days   Last  time this was given:  1 tablet on 10/15/2018  8:09 AM                                Thyroid 90 MG Tabs   Commonly known as:  NP THYROID   Take 2-tablets by mouth each morning                                valACYclovir 500 MG tablet   Commonly known as:  VALTREX   Take 500 mg by mouth daily

## 2018-10-10 NOTE — CONSULTS
Urology Consult    Name: Sudheer Montiel    MRN: [id number]   YOB: 1965               Chief Complaint:   Abdominal wall abscess, possible mesh infection     History is obtained from the patient and chart review          History of Present Illness:   Sudheer Montiel is a 53 year old female with PMH of MRSA, DMII and hypothyroidism who presents as direct admit to general surgery following and I&D performed on 10/8/18 at [facility]. Relevant PSH includes history of hysterectomy, pelvic mesh insertion and removal and autologous sling with mesh in 2004. The patient had originally presented that morning with a 4 day history of lower abdominal pain which worsened prompting her presentation. She had reported associated subjective fever, chills, diaphoresis and decreased appetite. Work up in the ED included CT which demonstrated subcutaneous and and fluid measuring up to 8.7 x 4.3 cm anterior to the rectus and an additional extraperitoneal fluid collection adjacent to the bladder. She also had associated leukocytosis to 23. Per discussion with Dr. Multani who performed the I&D, the fluid collection anterior to the rectus was washed out, packed with wet gauze and left open. They identified some of the mesh intra-operatively and made an opening in the mesh through which a penrose drain was passed in order to drain the fluid collection anterior to the bladder. The penrose drain is sutured in place per report.     The patient did well post-operatively and was  subsequently transferred to [facility] for complex cares. On presentation patient is in stable condition. She denies CP, SOB, cough, N/V however she continues to endorse dysuria and foul smelling urine. Her leukocytosis has resolved, she is afebrile and HDS. She remains on vanc/zosyn with OSH cultures pending. She presently reports moderate pain control.          Past Medical History:     Past Medical History:   Diagnosis Date     DM (diabetes mellitus) (H)  2011     Hypothyroid 80's            Past Surgical History:     Past Surgical History:   Procedure Laterality Date     C REPAIR CRUCIATE LIGAMENT,KNEE  1986      SECTION       COLONOSCOPY N/A 10/21/2015    Procedure: COLONOSCOPY;  Surgeon: Jaky Arredondo MD;  Location: [facility]      due to anemia            Social History:     Social History   Substance Use Topics     Smoking status: Former Smoker     Quit date: 2011     Smokeless tobacco: Never Used     Alcohol use No            Family History:   No family history on file.         Allergies:     Allergies   Allergen Reactions     Cephalexin      Morphine      Other reaction(s): Other  Irritability, disorientation     Penicillins Itching     face     Amoxicillin Rash and Itching     Cefprozil Rash     Ceftazidime Itching and Rash     Cefzil Rash     Ciprofloxacin Rash     Ciprofloxacin Itching and Rash     Percocet [Oxycodone-Acetaminophen] Nausea and Vomiting            Medications:     Current Facility-Administered Medications   Medication     acetaminophen (TYLENOL) tablet 650 mg     glucose gel 15-30 g    Or     dextrose 50 % injection 25-50 mL    Or     glucagon injection 1 mg     [START ON 10/11/2018] glimepiride (AMARYL) tablet 4 mg     lactated ringers infusion     [START ON 10/11/2018] levothyroxine (SYNTHROID/LEVOTHROID) tablet 25 mcg     metFORMIN (GLUCOPHAGE-XR) 24 hr tablet 750 mg     naloxone (NARCAN) injection 0.1-0.4 mg     ondansetron (ZOFRAN-ODT) ODT tab 4 mg    Or     ondansetron (ZOFRAN) injection 4 mg     piperacillin-tazobactam (ZOSYN) 3.375 g vial to attach to  mL bag     vancomycin (VANCOCIN) 1,750 mg in sodium chloride 0.9 % 500 mL intermittent infusion             Review of Systems:    ROS: 10 point ROS neg other than the symptoms noted above in the HPI           Physical Exam:   VS:  T: 97.2    HR: 83    BP: 117/50    RR: 18   GEN:  AOx3.  NAD.    CV:  RRR  LUNGS: Non-labored breathing.    BACK:  No midline or CVA tenderness.  ABD:  Soft. Open Horizontal incision across lower abdomen packed with wet gauze and covered with ABD. Exam limited by pain, unable to visualize penrose drain. Dressing changed this AM.   EXT:  Warm, well perfused.  No edema.  SKIN:  Warm.  Dry.  No rashes.  NEURO:  CN grossly intact.            Data:   All laboratory data reviewed, awaiting cultures from OSH    All pertinent imaging reviewed:         Impression and Plan:   Impression:   Sudheer Montiel is a 53 year old female with PMH of MRSA, DMII and hypothyroidism who presented to OSH with abdominal wall and extraperitoneal abscess who is POD # 2 s/p I&D. The patient has history of autologous sling placement with mesh which remains in place. She was transferred to [facility] for further cares and is currently doing well. Abdominal wall incision is open and packed with gauze. Exam was limited by pain. Urology consulted to provide recs re: retained mesh.       Plan:     - Based on length of time from mesh placement the etiology of her abscesses is unlikely to be related to mesh. She is currently clinically stable and as such there is no strong indication to remove mesh or further explore patient from Urology perspective. No urological intervention is indicated at this time.    - If general surgery decides to return to the OR for further exploration, Urology will be available to assist intra-operatively.     - Urology will continue to follow peripherally. Please contact resident/PA on call with any questions or concerns.         This patient's exam findings, labs, and imaging discussed with urology staff surgeon Dr. Onofre, who developed the treatment plan.    Rocky Griffith MD  Urology Resident         Patient was discussed with the resident and examined by me.  I agree with the plan of care

## 2018-10-10 NOTE — PHARMACY-VANCOMYCIN DOSING SERVICE
Pharmacy Vancomycin Initial Note  Date of Service October 10, 2018  Patient's  1965  53 year old, female    Indication: Abscess    Current estimated CrCl = CrCl cannot be calculated (Patient's most recent sCr result is older than the maximum 90 days allowed.).    Creatinine for last 3 days  No results found for requested labs within last 72 hours.    Recent Vancomycin Level(s) for last 3 days  No results found for requested labs within last 72 hours.      Vancomycin IV Administrations (past 72 hours)      No vancomycin orders with administrations in past 72 hours.                Nephrotoxins and other renal medications (Future)    Start     Dose/Rate Route Frequency Ordered Stop    10/10/18 1700  vancomycin (VANCOCIN) 1,750 mg in sodium chloride 0.9 % 500 mL intermittent infusion      1,750 mg  over 2 Hours Intravenous EVERY 12 HOURS 10/10/18 1607      10/10/18 1630  piperacillin-tazobactam (ZOSYN) 3.375 g vial to attach to  mL bag      3.375 g  over 30 Minutes Intravenous EVERY 6 HOURS 10/10/18 1607            Contrast Orders - past 72 hours     None                Plan:  1.  Patient was receiving 1500 mg IV q12h at [facility] with last dose given at 0550. A trough level was drawn before that dose and was 11.4. Will increase vancomycin to 1750 mg IV q12h.   2.  Goal Trough Level: 15-20 mg/L   3.  Pharmacy will check trough levels as appropriate in 1-3 Days.    4. Serum creatinine levels will be ordered daily for the first week of therapy and at least twice weekly for subsequent weeks.    5. [place]d method utilized to dose vancomycin therapy: Based on pharmacokinetics.    Matilde Stout, PharmD

## 2018-10-10 NOTE — PHARMACY-ADMISSION MEDICATION HISTORY
"Admission medication history interview status for the 10/10/2018 admission is complete. See Epic admission navigator for allergy information, pharmacy, prior to admission medications and immunization status.     Medication history interview sources:  Patient, [facility]    Changes made to PTA medication list (reason)  Added: none  Deleted:   -empagliflozin (not taking per patient, not on active medication list in [facility])  -fluconazole (not taking per patient, assume therapy complete)  -levothyroxine 200 mcg tablet (not on profile in [facility], not taking per patient)  Changed: valacyclovir 500 mg tablet --> added directions, take one tablet by mouth once daily (per patient and [facility])    Additional medication history information (including reliability of information, actions taken by pharmacist):  -Patient was resting and had her eyes closed during interview and did not know doses of all medications. Stated she takes \"the lowest dose\" of levothyroxine. Also stated she takes metformin 500 mg twice daily, however the only prescription active in [facility] is for metformin 750 mg XR tablets with instructions to take 750 mg by mouth twice daily. Patient stated she only fills with [facility] and no other pharmacies.      Prior to Admission medications    Medication Sig Last Dose Taking? Auth Provider   glimepiride (AMARYL) 4 MG tablet Take 1 tablet (4 mg) by mouth every morning (before breakfast) Past Week at Unknown time Yes Kirk Christianson MD   levothyroxine (SYNTHROID/LEVOTHROID) 25 MCG tablet Take 1 tablet (25 mcg) by mouth daily 10/9/2018 at Unknown time Yes Kirk Christianson MD   metFORMIN (GLUCOPHAGE-XR) 750 MG 24 hr tablet Take 1 tablet (750 mg) by mouth 2 times daily (with meals) Past Week at Unknown time Yes Kirk Christianson MD   Thyroid (NP THYROID) 90 MG TABS Take 2-tablets by mouth each morning Past Week at Unknown time Yes Kirk Christianson MD   valACYclovir (VALTREX) 500 MG " tablet Take 500 mg by mouth daily  Past Week at Unknown time Yes Reported, Patient   ACCU-CHEK MULTICLIX LANCETS MISC  Unknown at Unknown time  Reported, Patient   Blood Glucose Calibration (ACCU-CHEK ROHITH) SOLN 1 Bottle. Unknown at Unknown time  Reported, Patient   blood glucose monitoring (ACCU-CHEK ROHITH) test strip 1 strip by In Vitro route 2 times daily Unknown at Unknown time  Kirk Christianson MD   Blood Glucose Monitoring Suppl (ACCU-CHEK ROHITH) KIT  Unknown at Unknown time  Reported, Patient         Medication history completed by:   Taina Long, Pharm.D.  PGY-1 Pharmacy Practice Resident

## 2018-10-10 NOTE — PLAN OF CARE
Problem: Patient Care Overview  Goal: Plan of Care/Patient Progress Review  Outcome: No Change  VSS. 2 L NC. Patient arrived at 1440 via EMS. General surgery paged. Pt pain is tolerable. Admission almost completed. abd wound covered. To be looked at with oncoming RN. Crystal in place from  ml output. L PIV infusing zosyn from OSH. Pt able to turn.

## 2018-10-11 LAB
ALBUMIN SERPL-MCNC: 1.5 G/DL (ref 3.4–5)
ALP SERPL-CCNC: 184 U/L (ref 40–150)
ALT SERPL W P-5'-P-CCNC: 26 U/L (ref 0–50)
ANION GAP SERPL CALCULATED.3IONS-SCNC: 8 MMOL/L (ref 3–14)
AST SERPL W P-5'-P-CCNC: 45 U/L (ref 0–45)
BILIRUB SERPL-MCNC: 0.6 MG/DL (ref 0.2–1.3)
BUN SERPL-MCNC: 6 MG/DL (ref 7–30)
CALCIUM SERPL-MCNC: 7.8 MG/DL (ref 8.5–10.1)
CHLORIDE SERPL-SCNC: 107 MMOL/L (ref 94–109)
CO2 SERPL-SCNC: 25 MMOL/L (ref 20–32)
CREAT SERPL-MCNC: 0.58 MG/DL (ref 0.52–1.04)
ERYTHROCYTE [DISTWIDTH] IN BLOOD BY AUTOMATED COUNT: 13.4 % (ref 10–15)
GFR SERPL CREATININE-BSD FRML MDRD: >90 ML/MIN/1.7M2
GLUCOSE BLDC GLUCOMTR-MCNC: 140 MG/DL (ref 70–99)
GLUCOSE BLDC GLUCOMTR-MCNC: 176 MG/DL (ref 70–99)
GLUCOSE BLDC GLUCOMTR-MCNC: 93 MG/DL (ref 70–99)
GLUCOSE SERPL-MCNC: 96 MG/DL (ref 70–99)
HCT VFR BLD AUTO: 36.3 % (ref 35–47)
HGB BLD-MCNC: 11.5 G/DL (ref 11.7–15.7)
MAGNESIUM SERPL-MCNC: 2.1 MG/DL (ref 1.6–2.3)
MCH RBC QN AUTO: 29 PG (ref 26.5–33)
MCHC RBC AUTO-ENTMCNC: 31.7 G/DL (ref 31.5–36.5)
MCV RBC AUTO: 92 FL (ref 78–100)
PHOSPHATE SERPL-MCNC: 2.9 MG/DL (ref 2.5–4.5)
PLATELET # BLD AUTO: 268 10E9/L (ref 150–450)
POTASSIUM SERPL-SCNC: 3.6 MMOL/L (ref 3.4–5.3)
PROT SERPL-MCNC: 5.8 G/DL (ref 6.8–8.8)
RBC # BLD AUTO: 3.96 10E12/L (ref 3.8–5.2)
SODIUM SERPL-SCNC: 141 MMOL/L (ref 133–144)
WBC # BLD AUTO: 8.7 10E9/L (ref 4–11)

## 2018-10-11 PROCEDURE — 25000128 H RX IP 250 OP 636: Performed by: STUDENT IN AN ORGANIZED HEALTH CARE EDUCATION/TRAINING PROGRAM

## 2018-10-11 PROCEDURE — 25000132 ZZH RX MED GY IP 250 OP 250 PS 637: Performed by: STUDENT IN AN ORGANIZED HEALTH CARE EDUCATION/TRAINING PROGRAM

## 2018-10-11 PROCEDURE — 84100 ASSAY OF PHOSPHORUS: CPT | Performed by: SURGERY

## 2018-10-11 PROCEDURE — 36415 COLL VENOUS BLD VENIPUNCTURE: CPT | Performed by: SURGERY

## 2018-10-11 PROCEDURE — 83735 ASSAY OF MAGNESIUM: CPT | Performed by: SURGERY

## 2018-10-11 PROCEDURE — 85027 COMPLETE CBC AUTOMATED: CPT | Performed by: SURGERY

## 2018-10-11 PROCEDURE — 00000146 ZZHCL STATISTIC GLUCOSE BY METER IP

## 2018-10-11 PROCEDURE — 80053 COMPREHEN METABOLIC PANEL: CPT | Performed by: SURGERY

## 2018-10-11 PROCEDURE — 12000008 ZZH R&B INTERMEDIATE UMMC

## 2018-10-11 RX ORDER — HYDROMORPHONE HYDROCHLORIDE 1 MG/ML
0.3 INJECTION, SOLUTION INTRAMUSCULAR; INTRAVENOUS; SUBCUTANEOUS ONCE
Status: DISCONTINUED | OUTPATIENT
Start: 2018-10-11 | End: 2018-10-14 | Stop reason: CLARIF

## 2018-10-11 RX ORDER — IBUPROFEN 400 MG/1
400 TABLET, FILM COATED ORAL EVERY 6 HOURS PRN
Status: DISCONTINUED | OUTPATIENT
Start: 2018-10-11 | End: 2018-10-15 | Stop reason: HOSPADM

## 2018-10-11 RX ORDER — GABAPENTIN 100 MG/1
100 CAPSULE ORAL 2 TIMES DAILY
Status: DISCONTINUED | OUTPATIENT
Start: 2018-10-11 | End: 2018-10-14

## 2018-10-11 RX ADMIN — PIPERACILLIN SODIUM,TAZOBACTAM SODIUM 3.38 G: 3; .375 INJECTION, POWDER, FOR SOLUTION INTRAVENOUS at 09:44

## 2018-10-11 RX ADMIN — Medication 0.5 MG: at 13:58

## 2018-10-11 RX ADMIN — GLIMEPIRIDE 4 MG: 4 TABLET ORAL at 09:13

## 2018-10-11 RX ADMIN — Medication 0.5 MG: at 05:56

## 2018-10-11 RX ADMIN — PIPERACILLIN SODIUM,TAZOBACTAM SODIUM 3.38 G: 3; .375 INJECTION, POWDER, FOR SOLUTION INTRAVENOUS at 16:41

## 2018-10-11 RX ADMIN — GABAPENTIN 100 MG: 100 CAPSULE ORAL at 20:41

## 2018-10-11 RX ADMIN — PIPERACILLIN SODIUM,TAZOBACTAM SODIUM 3.38 G: 3; .375 INJECTION, POWDER, FOR SOLUTION INTRAVENOUS at 00:00

## 2018-10-11 RX ADMIN — LEVOTHYROXINE SODIUM 25 MCG: 25 TABLET ORAL at 09:13

## 2018-10-11 RX ADMIN — VANCOMYCIN HYDROCHLORIDE 1750 MG: 10 INJECTION, POWDER, LYOPHILIZED, FOR SOLUTION INTRAVENOUS at 17:41

## 2018-10-11 RX ADMIN — PIPERACILLIN SODIUM,TAZOBACTAM SODIUM 3.38 G: 3; .375 INJECTION, POWDER, FOR SOLUTION INTRAVENOUS at 05:04

## 2018-10-11 RX ADMIN — Medication 0.5 MG: at 21:36

## 2018-10-11 RX ADMIN — VANCOMYCIN HYDROCHLORIDE 1750 MG: 10 INJECTION, POWDER, LYOPHILIZED, FOR SOLUTION INTRAVENOUS at 05:56

## 2018-10-11 RX ADMIN — Medication 0.3 MG: at 00:14

## 2018-10-11 RX ADMIN — IBUPROFEN 400 MG: 400 TABLET ORAL at 09:44

## 2018-10-11 RX ADMIN — IBUPROFEN 400 MG: 400 TABLET ORAL at 18:00

## 2018-10-11 RX ADMIN — PIPERACILLIN SODIUM,TAZOBACTAM SODIUM 3.38 G: 3; .375 INJECTION, POWDER, FOR SOLUTION INTRAVENOUS at 23:10

## 2018-10-11 ASSESSMENT — ACTIVITIES OF DAILY LIVING (ADL)
ADLS_ACUITY_SCORE: 9
ADLS_ACUITY_SCORE: 9
ADLS_ACUITY_SCORE: 11
ADLS_ACUITY_SCORE: 11
ADLS_ACUITY_SCORE: 9
ADLS_ACUITY_SCORE: 11

## 2018-10-11 NOTE — PROGRESS NOTES
General Surgery Progress Note  October 11, 2018    S: Patient arrived from [place] last night. Afebrile. Sitting up in chair. Appears well. Had dressing change last night and this morning. Will change again this afternoon ~2pm. No pain at rest, but TTP and when moving from bed to chair. Regular diet started this morning.     O: Blood pressure 119/50, pulse 79, temperature 99.1  F (37.3  C), temperature source Oral, resp. rate 16, SpO2 94 %.    Labs reviewed:  WBC 8.7 (9.3 yesterday; 23 on admission at OSH 10/8)    A/P: 54 yo F with T2DM and h/o bladder sling mesh placement in 2004 with subsequent complications including erosion into the vagina, now POD#3 s/p I&D lower abdomen for NSTI and possible mesh infection, done at OSH. Abdominal wound is open, with penrose drain x1, packed with Kerlix.    - BID wet-to-dry with Kerlix; will having dressing change this afternoon ~2PM; if needed, will add on for I&D for OR tomorrow  - Regular diet for now  - Aleve and dilaudid (pt doesn't use Tylenol or oxy)  - Vancomycin, Zosyn    Patient seen with staff, Dr. Hogue.    Timbo Dumont MD (PGY-1)  General Surgery

## 2018-10-11 NOTE — PLAN OF CARE
/55 (BP Location: Left arm)  Pulse 74  Temp 97  F (36.1  C) (Oral)  Resp 16  SpO2 96%     Neuro: A&O X 4  Cardiac: VSS  Respiratory: lung sounds clear bilaterally, 96% on RA  GI/: AUOP from rose, 1 medium bm, +bs, +gas  Diet/Appetite: tolerating regular diet, NPO midnight tonight, no n/v reported  Skin: wet to dry dressing change completed with general surgery  LDA: peripheral IV infusing maintenance fluid @ 100 mL  Activity: ambulated in room with A X 1  Pain: dilaudid given prior to dressing change with some relief  Plan: OR tomorrow, pain control, progress towards baseline

## 2018-10-11 NOTE — PLAN OF CARE
Problem: Patient Care Overview  Goal: Plan of Care/Patient Progress Review  Outcome: No Change  /50 (BP Location: Left arm)  Pulse 79  Temp 99.1  F (37.3  C) (Oral)  Resp 16  SpO2 94%  Patient Afebrile, VSS. Patient on 2l of 02 sating 94. Patients pain controlled with dilaudid x 2. Patients topical dressing changed x2. Patients urine output adequate. Patient tolerate regular diet during evening shift. Patient declined dressing change due to no pain medication. Md called and pain med ordered. Patient NPO after midnight.Patient appear to rest between cares. Cont with POC.

## 2018-10-11 NOTE — PLAN OF CARE
"Problem: Patient Care Overview  Goal: Plan of Care/Patient Progress Review  Pt admitted to 7B from OSH, pt is here with soft tissue infection,\"POD#2 s/p I&D lower abdomen for abscess and necrotizing soft tissue infection tracking to previous bladder sling mesh\". Pt came with  abdominal wound covered with ABD, surgery team is going to look into wound.Urology consult is ordered. Pt is sleepy, but awakes with verbal stimulation. C/O abdominal pain which is tolerable and pt declined intervention so far.Pt came to floor with a rose from OSH, adequate output.LR started at 100 cc/hr, pt is currently receiving IV vancomycin.Pt will be NPO after MN.Continue with plan of care.      "

## 2018-10-11 NOTE — PHARMACY-CONSULT NOTE
Pharmacy Delirium Chart Review    Upon chart review, the following medications may contribute to possible patient delirium: valacyclovir (<1%).  Please consult unit pharmacist with further questions.    Gayle Gutierrez, LucyD

## 2018-10-12 ENCOUNTER — ANESTHESIA EVENT (OUTPATIENT)
Dept: SURGERY | Facility: CLINIC | Age: 53
DRG: 674 | End: 2018-10-12
Payer: COMMERCIAL

## 2018-10-12 ENCOUNTER — ANESTHESIA (OUTPATIENT)
Dept: SURGERY | Facility: CLINIC | Age: 53
DRG: 674 | End: 2018-10-12
Payer: COMMERCIAL

## 2018-10-12 LAB
ALBUMIN SERPL-MCNC: 1.7 G/DL (ref 3.4–5)
ALP SERPL-CCNC: 217 U/L (ref 40–150)
ALT SERPL W P-5'-P-CCNC: 27 U/L (ref 0–50)
ANION GAP SERPL CALCULATED.3IONS-SCNC: 7 MMOL/L (ref 3–14)
AST SERPL W P-5'-P-CCNC: 42 U/L (ref 0–45)
BASOPHILS # BLD AUTO: 0.1 10E9/L (ref 0–0.2)
BASOPHILS NFR BLD AUTO: 0.7 %
BILIRUB SERPL-MCNC: 0.7 MG/DL (ref 0.2–1.3)
BUN SERPL-MCNC: 4 MG/DL (ref 7–30)
CALCIUM SERPL-MCNC: 8.1 MG/DL (ref 8.5–10.1)
CHLORIDE SERPL-SCNC: 106 MMOL/L (ref 94–109)
CO2 SERPL-SCNC: 27 MMOL/L (ref 20–32)
CREAT SERPL-MCNC: 0.55 MG/DL (ref 0.52–1.04)
DIFFERENTIAL METHOD BLD: NORMAL
EOSINOPHIL # BLD AUTO: 0.2 10E9/L (ref 0–0.7)
EOSINOPHIL NFR BLD AUTO: 2.6 %
ERYTHROCYTE [DISTWIDTH] IN BLOOD BY AUTOMATED COUNT: 13.3 % (ref 10–15)
GFR SERPL CREATININE-BSD FRML MDRD: >90 ML/MIN/1.7M2
GLUCOSE BLDC GLUCOMTR-MCNC: 102 MG/DL (ref 70–99)
GLUCOSE BLDC GLUCOMTR-MCNC: 153 MG/DL (ref 70–99)
GLUCOSE BLDC GLUCOMTR-MCNC: 185 MG/DL (ref 70–99)
GLUCOSE BLDC GLUCOMTR-MCNC: 84 MG/DL (ref 70–99)
GLUCOSE SERPL-MCNC: 102 MG/DL (ref 70–99)
HCT VFR BLD AUTO: 43.5 % (ref 35–47)
HGB BLD-MCNC: 13.7 G/DL (ref 11.7–15.7)
IMM GRANULOCYTES # BLD: 0.2 10E9/L (ref 0–0.4)
IMM GRANULOCYTES NFR BLD: 2.8 %
LYMPHOCYTES # BLD AUTO: 1.2 10E9/L (ref 0.8–5.3)
LYMPHOCYTES NFR BLD AUTO: 13.6 %
MCH RBC QN AUTO: 29.5 PG (ref 26.5–33)
MCHC RBC AUTO-ENTMCNC: 31.5 G/DL (ref 31.5–36.5)
MCV RBC AUTO: 94 FL (ref 78–100)
MONOCYTES # BLD AUTO: 0.6 10E9/L (ref 0–1.3)
MONOCYTES NFR BLD AUTO: 7 %
NEUTROPHILS # BLD AUTO: 6.3 10E9/L (ref 1.6–8.3)
NEUTROPHILS NFR BLD AUTO: 73.3 %
NRBC # BLD AUTO: 0 10*3/UL
NRBC BLD AUTO-RTO: 0 /100
PLATELET # BLD AUTO: 318 10E9/L (ref 150–450)
POTASSIUM SERPL-SCNC: 3.5 MMOL/L (ref 3.4–5.3)
PROT SERPL-MCNC: 6.5 G/DL (ref 6.8–8.8)
RBC # BLD AUTO: 4.64 10E12/L (ref 3.8–5.2)
SODIUM SERPL-SCNC: 140 MMOL/L (ref 133–144)
WBC # BLD AUTO: 8.6 10E9/L (ref 4–11)

## 2018-10-12 PROCEDURE — 87075 CULTR BACTERIA EXCEPT BLOOD: CPT | Performed by: SURGERY

## 2018-10-12 PROCEDURE — 27210794 ZZH OR GENERAL SUPPLY STERILE: Performed by: SURGERY

## 2018-10-12 PROCEDURE — 25000128 H RX IP 250 OP 636: Performed by: STUDENT IN AN ORGANIZED HEALTH CARE EDUCATION/TRAINING PROGRAM

## 2018-10-12 PROCEDURE — 87186 SC STD MICRODIL/AGAR DIL: CPT | Performed by: SURGERY

## 2018-10-12 PROCEDURE — 25000132 ZZH RX MED GY IP 250 OP 250 PS 637: Performed by: STUDENT IN AN ORGANIZED HEALTH CARE EDUCATION/TRAINING PROGRAM

## 2018-10-12 PROCEDURE — 37000009 ZZH ANESTHESIA TECHNICAL FEE, EACH ADDTL 15 MIN: Performed by: SURGERY

## 2018-10-12 PROCEDURE — 36000059 ZZH SURGERY LEVEL 3 EA 15 ADDTL MIN UMMC: Performed by: SURGERY

## 2018-10-12 PROCEDURE — 37000008 ZZH ANESTHESIA TECHNICAL FEE, 1ST 30 MIN: Performed by: SURGERY

## 2018-10-12 PROCEDURE — 80053 COMPREHEN METABOLIC PANEL: CPT | Performed by: STUDENT IN AN ORGANIZED HEALTH CARE EDUCATION/TRAINING PROGRAM

## 2018-10-12 PROCEDURE — 12000008 ZZH R&B INTERMEDIATE UMMC

## 2018-10-12 PROCEDURE — 25000125 ZZHC RX 250: Performed by: STUDENT IN AN ORGANIZED HEALTH CARE EDUCATION/TRAINING PROGRAM

## 2018-10-12 PROCEDURE — 87077 CULTURE AEROBIC IDENTIFY: CPT | Performed by: SURGERY

## 2018-10-12 PROCEDURE — 87070 CULTURE OTHR SPECIMN AEROBIC: CPT | Performed by: SURGERY

## 2018-10-12 PROCEDURE — 88300 SURGICAL PATH GROSS: CPT | Performed by: SURGERY

## 2018-10-12 PROCEDURE — 40000171 ZZH STATISTIC PRE-PROCEDURE ASSESSMENT III: Performed by: SURGERY

## 2018-10-12 PROCEDURE — 00000146 ZZHCL STATISTIC GLUCOSE BY METER IP

## 2018-10-12 PROCEDURE — 40000556 ZZH STATISTIC PERIPHERAL IV START W US GUIDANCE

## 2018-10-12 PROCEDURE — 36415 COLL VENOUS BLD VENIPUNCTURE: CPT | Performed by: STUDENT IN AN ORGANIZED HEALTH CARE EDUCATION/TRAINING PROGRAM

## 2018-10-12 PROCEDURE — 0W9J0ZZ DRAINAGE OF PELVIC CAVITY, OPEN APPROACH: ICD-10-PCS | Performed by: SURGERY

## 2018-10-12 PROCEDURE — 25000125 ZZHC RX 250: Performed by: SURGERY

## 2018-10-12 PROCEDURE — 85025 COMPLETE CBC W/AUTO DIFF WBC: CPT | Performed by: STUDENT IN AN ORGANIZED HEALTH CARE EDUCATION/TRAINING PROGRAM

## 2018-10-12 PROCEDURE — 36000057 ZZH SURGERY LEVEL 3 1ST 30 MIN - UMMC: Performed by: SURGERY

## 2018-10-12 PROCEDURE — 71000014 ZZH RECOVERY PHASE 1 LEVEL 2 FIRST HR: Performed by: SURGERY

## 2018-10-12 RX ORDER — SCOLOPAMINE TRANSDERMAL SYSTEM 1 MG/1
PATCH, EXTENDED RELEASE TRANSDERMAL PRN
Status: DISCONTINUED | OUTPATIENT
Start: 2018-10-12 | End: 2018-10-12

## 2018-10-12 RX ORDER — ACETAMINOPHEN 325 MG/1
975 TABLET ORAL EVERY 8 HOURS
Status: DISCONTINUED | OUTPATIENT
Start: 2018-10-12 | End: 2018-10-15 | Stop reason: HOSPADM

## 2018-10-12 RX ORDER — FENTANYL CITRATE 50 UG/ML
INJECTION, SOLUTION INTRAMUSCULAR; INTRAVENOUS PRN
Status: DISCONTINUED | OUTPATIENT
Start: 2018-10-12 | End: 2018-10-12

## 2018-10-12 RX ORDER — SODIUM CHLORIDE, SODIUM LACTATE, POTASSIUM CHLORIDE, CALCIUM CHLORIDE 600; 310; 30; 20 MG/100ML; MG/100ML; MG/100ML; MG/100ML
INJECTION, SOLUTION INTRAVENOUS CONTINUOUS
Status: DISCONTINUED | OUTPATIENT
Start: 2018-10-12 | End: 2018-10-12 | Stop reason: HOSPADM

## 2018-10-12 RX ORDER — SODIUM CHLORIDE, SODIUM LACTATE, POTASSIUM CHLORIDE, CALCIUM CHLORIDE 600; 310; 30; 20 MG/100ML; MG/100ML; MG/100ML; MG/100ML
INJECTION, SOLUTION INTRAVENOUS CONTINUOUS PRN
Status: DISCONTINUED | OUTPATIENT
Start: 2018-10-12 | End: 2018-10-12

## 2018-10-12 RX ORDER — ONDANSETRON 2 MG/ML
INJECTION INTRAMUSCULAR; INTRAVENOUS PRN
Status: DISCONTINUED | OUTPATIENT
Start: 2018-10-12 | End: 2018-10-12

## 2018-10-12 RX ORDER — MAGNESIUM HYDROXIDE 1200 MG/15ML
LIQUID ORAL PRN
Status: DISCONTINUED | OUTPATIENT
Start: 2018-10-12 | End: 2018-10-12 | Stop reason: HOSPADM

## 2018-10-12 RX ORDER — PROPOFOL 10 MG/ML
INJECTION, EMULSION INTRAVENOUS CONTINUOUS PRN
Status: DISCONTINUED | OUTPATIENT
Start: 2018-10-12 | End: 2018-10-12

## 2018-10-12 RX ORDER — OXYCODONE HYDROCHLORIDE 5 MG/1
5-10 TABLET ORAL EVERY 4 HOURS PRN
Status: DISCONTINUED | OUTPATIENT
Start: 2018-10-12 | End: 2018-10-14 | Stop reason: ALTCHOICE

## 2018-10-12 RX ORDER — LIDOCAINE 40 MG/G
CREAM TOPICAL
Status: DISCONTINUED | OUTPATIENT
Start: 2018-10-12 | End: 2018-10-12 | Stop reason: HOSPADM

## 2018-10-12 RX ADMIN — GABAPENTIN 100 MG: 100 CAPSULE ORAL at 20:33

## 2018-10-12 RX ADMIN — ONDANSETRON 4 MG: 2 INJECTION INTRAMUSCULAR; INTRAVENOUS at 12:49

## 2018-10-12 RX ADMIN — FENTANYL CITRATE 25 MCG: 50 INJECTION, SOLUTION INTRAMUSCULAR; INTRAVENOUS at 12:14

## 2018-10-12 RX ADMIN — PIPERACILLIN SODIUM,TAZOBACTAM SODIUM 3.38 G: 3; .375 INJECTION, POWDER, FOR SOLUTION INTRAVENOUS at 06:03

## 2018-10-12 RX ADMIN — SODIUM CHLORIDE, POTASSIUM CHLORIDE, SODIUM LACTATE AND CALCIUM CHLORIDE: 600; 310; 30; 20 INJECTION, SOLUTION INTRAVENOUS at 11:36

## 2018-10-12 RX ADMIN — Medication 0.3 MG: at 09:11

## 2018-10-12 RX ADMIN — PIPERACILLIN SODIUM,TAZOBACTAM SODIUM 3.38 G: 3; .375 INJECTION, POWDER, FOR SOLUTION INTRAVENOUS at 12:38

## 2018-10-12 RX ADMIN — FENTANYL CITRATE 50 MCG: 50 INJECTION, SOLUTION INTRAMUSCULAR; INTRAVENOUS at 12:20

## 2018-10-12 RX ADMIN — PIPERACILLIN SODIUM,TAZOBACTAM SODIUM 3.38 G: 3; .375 INJECTION, POWDER, FOR SOLUTION INTRAVENOUS at 18:29

## 2018-10-12 RX ADMIN — GABAPENTIN 100 MG: 100 CAPSULE ORAL at 08:57

## 2018-10-12 RX ADMIN — FENTANYL CITRATE 25 MCG: 50 INJECTION, SOLUTION INTRAMUSCULAR; INTRAVENOUS at 12:09

## 2018-10-12 RX ADMIN — VANCOMYCIN HYDROCHLORIDE 1750 MG: 10 INJECTION, POWDER, LYOPHILIZED, FOR SOLUTION INTRAVENOUS at 16:13

## 2018-10-12 RX ADMIN — LEVOTHYROXINE SODIUM 25 MCG: 25 TABLET ORAL at 08:57

## 2018-10-12 RX ADMIN — VANCOMYCIN HYDROCHLORIDE 1750 MG: 10 INJECTION, POWDER, LYOPHILIZED, FOR SOLUTION INTRAVENOUS at 07:00

## 2018-10-12 RX ADMIN — FENTANYL CITRATE 50 MCG: 50 INJECTION, SOLUTION INTRAMUSCULAR; INTRAVENOUS at 12:28

## 2018-10-12 RX ADMIN — SCOPALAMINE 1 PATCH: 1 PATCH, EXTENDED RELEASE TRANSDERMAL at 11:36

## 2018-10-12 RX ADMIN — MIDAZOLAM 2 MG: 1 INJECTION INTRAMUSCULAR; INTRAVENOUS at 11:36

## 2018-10-12 RX ADMIN — FENTANYL CITRATE 50 MCG: 50 INJECTION, SOLUTION INTRAMUSCULAR; INTRAVENOUS at 11:59

## 2018-10-12 RX ADMIN — PROPOFOL 50 MCG/KG/MIN: 10 INJECTION, EMULSION INTRAVENOUS at 11:49

## 2018-10-12 ASSESSMENT — ACTIVITIES OF DAILY LIVING (ADL)
ADLS_ACUITY_SCORE: 11

## 2018-10-12 NOTE — ANESTHESIA PREPROCEDURE EVALUATION
Anesthesia Evaluation     . Pt has had prior anesthetic. Type: General           ROS/MED HX    ENT/Pulmonary:     (+)RON risk factors obese, , . .    Neurologic:  - neg neurologic ROS     Cardiovascular:  - neg cardiovascular ROS       METS/Exercise Tolerance:     Hematologic:  - neg hematologic  ROS       Musculoskeletal:  - neg musculoskeletal ROS       GI/Hepatic:  - neg GI/hepatic ROS       Renal/Genitourinary:  - ROS Renal section negative       Endo:         Psychiatric:  - neg psychiatric ROS       Infectious Disease:   (+) Recent Fever, MRSA,       Malignancy:      - no malignancy   Other:                     Physical Exam  Normal systems: cardiovascular, pulmonary and dental    Airway   Mallampati: II  TM distance: >3 FB  Neck ROM: full    Dental     Cardiovascular       Pulmonary                     Anesthesia Plan      History & Physical Review  History and physical reviewed and following examination; no interval change.    ASA Status:  2 .    NPO Status:  > 8 hours    Plan for MAC with Propofol induction. Maintenance will be TIVA.  Reason for MAC:  Deep or markedly invasive procedure (G8)  PONV prophylaxis:  Ondansetron (or other 5HT-3) and Scopolamine patch       Postoperative Care  Postoperative pain management:  IV analgesics.      Consents  Anesthetic plan, risks, benefits and alternatives discussed with:  Patient and Parent (Mother and/or Father).  Use of blood products discussed: Yes.   Use of blood products discussed with Patient and Parent (Mother and/or Father).  Consented to blood products.  .                          .

## 2018-10-12 NOTE — PLAN OF CARE
Problem: Patient Care Overview  Goal: Individualization & Mutuality  Outcome: No Change  Vitals:    10/12/18 1405 10/12/18 1420 10/12/18 1435 10/12/18 1450   BP: 133/64 136/45 134/53 120/61   BP Location: Left arm      Pulse:       Resp: 26      Temp: 99  F (37.2  C)      TempSrc: Oral      SpO2: 92% 95% 96% 99%     TMax 100.6, other VSS, pain managed with current regime & denies N/V. Crystal with good urine OP & last BM this morning. Pt on vancomycin & zosyn. Post procedure, abd dressing intact & VSS. A&o times 4. Family by bedside. Continue POC.

## 2018-10-12 NOTE — ANESTHESIA CARE TRANSFER NOTE
Patient: Sudheer Montiel    Procedure(s):  Irrigation and Debridement of Lower Abdomen, removal of piece of mesh. - Wound Class: III-Contaminated    Diagnosis: Necrotizing Tissue Infection   Diagnosis Additional Information: No value filed.    Anesthesia Type:   No value filed.     Note:  Airway :Face Mask  Patient transferred to:PACU  Handoff Report: Identifed the Patient, Identified the Reponsible Provider, Reviewed the pertinent medical history, Discussed the surgical course, Reviewed Intra-OP anesthesia mangement and issues during anesthesia, Set expectations for post-procedure period and Allowed opportunity for questions and acknowledgement of understanding      Vitals: (Last set prior to Anesthesia Care Transfer)    CRNA VITALS  10/12/2018 1225 - 10/12/2018 1306      10/12/2018             Pulse: 86    SpO2: 96 %                Electronically Signed By: Zehra Weaver MD  October 12, 2018  1:06 PM

## 2018-10-12 NOTE — PROGRESS NOTES
POST OP CHECK     Pain well controlled, denies CP/SOB. Denies nausea, flatus, BM. Rose catheter in, producing adequate light yellow urine.      Vitals:    10/12/18 1420 10/12/18 1435 10/12/18 1450 10/12/18 1549   BP: 136/45 134/53 120/61 126/61   BP Location:    Left arm   Pulse:       Resp:    30   Temp:    97.5  F (36.4  C)   TempSrc:    Oral   SpO2: 95% 96% 99% 92%       I/O last 3 completed shifts:  In: 3975 [P.O.:425; I.V.:3550]  Out: 1640 [Urine:1625; Blood:15]      Alert and oriented  Non labored breathing   Non cyanotic   Soft abdomen, dressing on lower abdomen wound, surrounding skin without erythema, appropriately tender to palpation in lower abdomen, no peritonitic signs  Warm BLE, no edema     A/P:   - TWICE A DAY wet to dry wound dressing with kerlex  - continue gabapentin, tylenol, advil, dilaudid for pain control  - Continue plan of cares   - plan for possible OR on Mon/Tues for wound re-exploration  - continue IV vanc zosyn tonight  - regular diet as tolerated  - continue rose tonight    Ayaz Pruitt DO

## 2018-10-12 NOTE — BRIEF OP NOTE
[facility], [place]    Brief Operative Note    Pre-operative diagnosis: Abdominal wound   Post-operative diagnosis Same  Procedure: Procedure(s):  Irrigation and Debridement of Lower Abdomen, removal of piece of mesh. - Wound Class: III-Contaminated  Surgeon: Surgeon(s) and Role:     * Darlene Hogue MD - Primary     * Jenny Carmona MD - Resident - Assisting     * Amy Onofre MD - Assisting        Júnior Archer MD - Assisting  Anesthesia: None   Estimated blood loss: Less than 10 ml  Drains: None  Specimens:   ID Type Source Tests Collected by Time Destination   1 : Mesh In Superpubic area Other (specify in comments) Other ANAEROBIC BACTERIAL CULTURE, MISCELLANEOUS CULTURE AEROBIC BACTERIAL Darlene Hogue MD 10/12/2018 12:42 PM    A : Mesh in Superapubic area Other (specify in comments) Other SURGICAL PATHOLOGY EXAM Darlene Hogue MD 10/12/2018 12:31 PM      Findings:   Loosely adherent piece of mesh. Wound with fibrinous exudate, no pus. Minimal necrotic tissue that was debrided. Wound was packed with 2 pieces of kerlix and covered with an abd.  Complications: None.  Implants: None.    --  Kelvin Archer  General Surgery PGY2

## 2018-10-12 NOTE — PROGRESS NOTES
Antimicrobial Stewardship Team Note    Antimicrobial Stewardship Program - A joint venture between [facility] and  Physicians to optimize antibiotic management.  NOT a formal consult - Restricted Antimicrobial Review     Patient: Sudheer Montiel  MRN: [id number]  Allergies: Cephalexin; Morphine; Penicillins; Amoxicillin; Cefprozil; Ceftazidime; Cefzil; Ciprofloxacin; Ciprofloxacin; and Percocet [oxycodone-acetaminophen]    Brief Summary:   Sudheer Montiel is a 53 year old woman with prior medical history of type II diabetes, hypothyroidism, and mesh bladder sling for urinary incontinence (in 2004) who presents from outside hospital to [facility] on 10/10 for further management, she is post-op day 2 after I&D with drain placement of a lower abdomen abscess and necrotizing soft tissue infection tracking to bladder sling mesh. The patient reported 1 week of intermittent and increased lower abdomen pain before OSH presentation. The patient's WBC on presentation was 23.6 and subsequently decreased to 11.2.  The CT at the OSH showed necrotizing soft tissue infection with subfascial abscess tracking down to the bladder sling mesh. Vancomycin, piperacillin-tazobactam, and clindamycin were started at the OSH on 10/9.    The patient arrives to [facility] on 10/10 with no chest pain, shortness of breath, nausea/vomiting/ diarrhea/constipation, but does complain of foul smelling urine and dysuria. The patient has been afebrile and normotensive with a WBC of 9.3. There has been no imaging done here to date. The patient continues to be on vancomycin and piperacillin-tazobactam. Urology was consulted and was not concerned about performing a urological intervention at this time as the etiology of the abscesses is likely unrelated to the mesh. OR is planned for today for repeat I&D. Per the surgical notes, there was minimal necrotic tissue present, and intra-operative cultures were obtained.     Per microbiology lab from the  OSH, blood cultures from 10/8 are still negative growth to date. The gram stain from the wound drainage culture (10/8) reported many gram negative bacilli, few gram negative cocci and the occasional gram positive cocci. The only organism that was isolated (on the anaerobic plate) was Strep intermedius and sensitivities are not reported for this organism.           Active Anti-infective Medications                Start     Stop      10/10/18 1700  vancomycin (VANCOCIN) injection  1,750 mg,   Intravenous,   EVERY 12 HOURS     Abscess, Skin and Soft Tissue Infection        --    10/10/18 1630  piperacillin-tazobactam  3.375 g,   Intravenous,   EVERY 6 HOURS     Abscess, Skin and Soft Tissue Infection        --        Clindamycin (10/8-10/10)  Vanco  (10/9-  piperacillin-tazobactam (10/9-    Assessment: Post operative infection with subcutaneous abscess with possible necrotizing SSTI tracking to mesh  The patient has been improving from an infectious standpoint and is currently afebrile with a normalizing white count. The patient is undergoing procedures for more adequate source control. Due to radiographic and culture information, therapy could  be narrowed to target individual organisms (Strep intermedius) using ceftriaxone. The patient does report many allergies to antibiotics, however, only rash and itching with no notes suggesting anaphylaxis. Due to low cross reactivity, use of ceftriaxone would be appropriate. Due to the likely presence of anaerobes, coverage should still include anaerobic organisms until culture results can be obtained, either using broad coverage with piperacillin-tazobactam or the addition of metronidazole (if choose to use ceftriaxone). However, due to culture obtainment today, it would be reasonable to continue broad spectrum antibiotics until specific culture data is present. Due to the lack of necrotizing tissue found, and the improving clinical status of the patient, clindamycin does not  need to be re-started unless the patient becomes clinically unstable or there are positive necrotizing findings in the OR. The patient does not have a history of multi drug resistant organisms or recent IV antibiotic use/hospitalization leading to decreased risk of possible MRSA infection. De-escalation of broad spectrum antibiotics would be appropriate in 48 hours with no growth on intra-operative cultures.    Recommendations:  1. Continue vancomycin and piperacillin-tazobactam and follow culture data to narrow as appropriate.       Discussed with ID Staff: Eva Toscano, LucyD and Dr. Sherri Dunham, PharmD student    Vital Signs/Clinical Features:  Vitals       10/10 0700  -  10/11 0659 10/11 0700  -  10/12 0659 10/12 0700  -  10/12 1440   Most Recent    Temp ( F) 96.9 -  99.7    97 -  99.8    99 -  100.6     99 (37.2)    Pulse 71 -  83    74 -  85      79     79    Heart Rate     73 -  82     74    Resp 16 -  18      16    16 -  26     26    /54 -  126/66    114/48 -  145/61    122/71 -  142/67     134/53    SpO2 (%) 90 -  95    94 -  97    92 -  99     96          Labs  CrCl cannot be calculated (Unknown ideal weight.).  Recent Labs   Lab Test  04/18/12   0650  04/20/12   0700  06/28/13   1903  10/10/18   1715  10/11/18   0736  10/12/18   0729   CR  0.65  0.65  0.96  0.58  0.58  0.55       Recent Labs   Lab Test  04/17/12   2150  04/18/12   0650  06/28/13   1903  10/10/18   1715  10/11/18   0736  10/12/18   0729   WBC  7.1  6.1  6.9  9.3  8.7  8.6   ANEU  3.9  3.7  4.0   --    --   6.3   ALYM  2.3  1.7  1.9   --    --   1.2   GERMAN  0.6  0.5  0.8   --    --   0.6   AEOS  0.2  0.2  0.2   --    --   0.2   HGB  14.5  14.6  13.9  11.5*  11.5*  13.7   HCT  42.8  43.4  41.4  36.1  36.3  43.5   MCV  87  87  88  92  92  94   PLT  234  237  213  302  268  318       Recent Labs   Lab Test  10/11/18   0736  10/12/18   0729   BILITOTAL  0.6  0.7   ALKPHOS  184*  217*   ALBUMIN  1.5*  1.7*   AST  45  42    ALT  26  27       Recent Labs   Lab Test  04/17/12   2150   CRP  17.2*   SED  11       Recent Labs   Lab Test  04/19/12   0646   VANCOMYCIN  9.9       Culture Results:  7-Day Micro Results     Procedure Component Value Units Date/Time    Anaerobic bacterial culture [[id number]] Collected:  10/12/18 1242    Order Status:  Completed Lab Status:  In process Updated:  10/12/18 1332    Specimen:  Other (specify in comments) from Other     Miscellaneous Culture Aerobic Bacterial [[id number]] Collected:  10/12/18 1242    Order Status:  Completed Lab Status:  In process Updated:  10/12/18 1332    Specimen:  Other (specify in comments) from Other           Recent Labs   Lab Test  08/27/12   1312   URINEPH  5.5   NITRITE  Negative   LEUKEST  Large*   WBCU  25-50*                         Imaging: No results found.

## 2018-10-12 NOTE — PLAN OF CARE
Problem: Patient Care Overview  Goal: Plan of Care/Patient Progress Review  Outcome: No Change  /48 (BP Location: Left arm)  Pulse 85  Temp 99.8  F (37.7  C) (Oral)  Resp 16  SpO2 95%  Patient afebrile, VSS. Patients A&O x4. Pts 02 Sats 95% 2L NC. Pt c/o abdominal pain but decline pain meds. NPO after midnite.LR infusing at 100ml/hr. Iv abx infused per order. Abdominal dressing  ABD reinforced. Crystal with adequate UOP. +Flatus. No BM this shift. Plan for OR today. Patient appear to rest between cares.. Continue POC

## 2018-10-12 NOTE — PROGRESS NOTES
zachery seen in preop, with her mother at bedside.  I attempted to examine wound at bedside yesterday, was not able to adequately assess due to pain.  Therefore plan for exam under MAC today, possible conversion to general anesthesia.  I have discussed case with Dr. Multani of [facility] as well as Dr. Bennett of Urology.  Dr. Hermosillo of Urology spoke with patient last night regarding her mesh.  At this time, will plan on exam under anesthesia and debridement of any necrotic tissue.  Will assess the mesh if possible and Urology will be available to help with any operative decisions about the mesh.  She is understanding of discussion and agreeable to proceed.

## 2018-10-12 NOTE — ANESTHESIA POSTPROCEDURE EVALUATION
Patient: Sudheer Montiel    Procedure(s):  Irrigation and Debridement of Lower Abdomen, removal of piece of mesh. - Wound Class: III-Contaminated    Diagnosis:Necrotizing Tissue Infection   Diagnosis Additional Information: No value filed.    Anesthesia Type:  No value filed.    Note:  Anesthesia Post Evaluation    Patient location during evaluation: PACU  Patient participation: Able to fully participate in evaluation  Level of consciousness: awake and alert  Pain management: satisfactory to patient  Airway patency: patent  Cardiovascular status: blood pressure returned to baseline and hemodynamically stable  Respiratory status: room air  Hydration status: euvolemic  PONV: none     Anesthetic complications: None          Last vitals:  Vitals:    10/12/18 0801 10/12/18 1300 10/12/18 1315   BP: 142/67 130/67 130/67   Pulse: 79     Resp: 16 22 24   Temp: 37.3  C (99.1  F) 38.1  C (100.6  F)    SpO2: 92% 99% 96%         Electronically Signed By: Idris Carl MD  October 12, 2018  1:26 PM

## 2018-10-12 NOTE — PROGRESS NOTES
Care Coordinator Progress Note    Admission Date/Time:  10/10/2018  Attending MD:  Darlene Hogue*    Data  Chart reviewed, discussed with interdisciplinary team.   Patient was admitted for: Data Unavailable.    Concerns with insurance coverage for discharge needs: None.  Current Living Situation: Patient lives with an roommate.  Support System: Supportive and Involved  Services Involved: no services involved prior to admission  Transportation at Discharge: TBD  Transportation to Medical Appointments:   - TBD  Barriers to Discharge: medical clearance    Assessment  Patient is a 53 year old female with past medical history of type 2 diabetes, and hypothyroidism, who is admitted with lower abdominal abscess and necrotizing soft tissue infection.  Patient is s/p I&D and currently has wet to dry dressing changes BID and is on IV abx.  PT/OT consulted, awaiting recommendations.  Patients daughter, Jessie, and mom, Becca, requesting to speak with the CC/SW to discuss discharge planning.   Patient gave verbal consent for this writer to talk with her family.  Patients momBecca, is in town from [place], [place] and she plans on returning home Monday.  Patients daughter lives in [place].  Patient lives in a house in [place], [place] with a male roommate.  She does not have anyone who would be available to be her caregiver at time of discharge to assist with the dressing changes.  Due to where the incision is located patient would not be able to do the dressing change independently.  Per MD team patient will continue on IV abx for now, discharge abx plan is unknown.  Explained to the patients mom and daughter that based on discharge needs and what is recommended will determine where we will recommend she discharges to.  RNCC/SW will f/u and continue to assist with discharge planning.         Plan  Anticipated Discharge Date: TBD   Anticipated Discharge Plan:  TBD    JOSEPH Rayo  [phone]

## 2018-10-13 PROBLEM — M79.89 NECROTIZING SOFT TISSUE INFECTION: Status: ACTIVE | Noted: 2018-10-10

## 2018-10-13 LAB
ANION GAP SERPL CALCULATED.3IONS-SCNC: 8 MMOL/L (ref 3–14)
BASOPHILS # BLD AUTO: 0 10E9/L (ref 0–0.2)
BASOPHILS NFR BLD AUTO: 0.4 %
BUN SERPL-MCNC: 5 MG/DL (ref 7–30)
CALCIUM SERPL-MCNC: 7.7 MG/DL (ref 8.5–10.1)
CHLORIDE SERPL-SCNC: 105 MMOL/L (ref 94–109)
CO2 SERPL-SCNC: 27 MMOL/L (ref 20–32)
CREAT SERPL-MCNC: 0.62 MG/DL (ref 0.52–1.04)
DIFFERENTIAL METHOD BLD: ABNORMAL
EOSINOPHIL # BLD AUTO: 0.2 10E9/L (ref 0–0.7)
EOSINOPHIL NFR BLD AUTO: 1.7 %
ERYTHROCYTE [DISTWIDTH] IN BLOOD BY AUTOMATED COUNT: 13.6 % (ref 10–15)
GFR SERPL CREATININE-BSD FRML MDRD: >90 ML/MIN/1.7M2
GLUCOSE BLDC GLUCOMTR-MCNC: 142 MG/DL (ref 70–99)
GLUCOSE BLDC GLUCOMTR-MCNC: 148 MG/DL (ref 70–99)
GLUCOSE BLDC GLUCOMTR-MCNC: 151 MG/DL (ref 70–99)
GLUCOSE BLDC GLUCOMTR-MCNC: 158 MG/DL (ref 70–99)
GLUCOSE SERPL-MCNC: 153 MG/DL (ref 70–99)
HCT VFR BLD AUTO: 35.6 % (ref 35–47)
HGB BLD-MCNC: 11.2 G/DL (ref 11.7–15.7)
IMM GRANULOCYTES # BLD: 0.4 10E9/L (ref 0–0.4)
IMM GRANULOCYTES NFR BLD: 3.2 %
LYMPHOCYTES # BLD AUTO: 1.7 10E9/L (ref 0.8–5.3)
LYMPHOCYTES NFR BLD AUTO: 16 %
MCH RBC QN AUTO: 28.9 PG (ref 26.5–33)
MCHC RBC AUTO-ENTMCNC: 31.5 G/DL (ref 31.5–36.5)
MCV RBC AUTO: 92 FL (ref 78–100)
MONOCYTES # BLD AUTO: 1.2 10E9/L (ref 0–1.3)
MONOCYTES NFR BLD AUTO: 11.5 %
NEUTROPHILS # BLD AUTO: 7.2 10E9/L (ref 1.6–8.3)
NEUTROPHILS NFR BLD AUTO: 67.2 %
NRBC # BLD AUTO: 0 10*3/UL
NRBC BLD AUTO-RTO: 0 /100
PLATELET # BLD AUTO: 284 10E9/L (ref 150–450)
POTASSIUM SERPL-SCNC: 3.1 MMOL/L (ref 3.4–5.3)
POTASSIUM SERPL-SCNC: 3.7 MMOL/L (ref 3.4–5.3)
RBC # BLD AUTO: 3.88 10E12/L (ref 3.8–5.2)
SODIUM SERPL-SCNC: 139 MMOL/L (ref 133–144)
VANCOMYCIN SERPL-MCNC: 13.8 MG/L
WBC # BLD AUTO: 10.8 10E9/L (ref 4–11)

## 2018-10-13 PROCEDURE — 25000128 H RX IP 250 OP 636: Performed by: STUDENT IN AN ORGANIZED HEALTH CARE EDUCATION/TRAINING PROGRAM

## 2018-10-13 PROCEDURE — 80048 BASIC METABOLIC PNL TOTAL CA: CPT | Performed by: SURGERY

## 2018-10-13 PROCEDURE — 25000131 ZZH RX MED GY IP 250 OP 636 PS 637: Performed by: STUDENT IN AN ORGANIZED HEALTH CARE EDUCATION/TRAINING PROGRAM

## 2018-10-13 PROCEDURE — 85025 COMPLETE CBC W/AUTO DIFF WBC: CPT | Performed by: SURGERY

## 2018-10-13 PROCEDURE — 80202 ASSAY OF VANCOMYCIN: CPT | Performed by: SURGERY

## 2018-10-13 PROCEDURE — 36415 COLL VENOUS BLD VENIPUNCTURE: CPT | Performed by: SURGERY

## 2018-10-13 PROCEDURE — 25000132 ZZH RX MED GY IP 250 OP 250 PS 637: Performed by: STUDENT IN AN ORGANIZED HEALTH CARE EDUCATION/TRAINING PROGRAM

## 2018-10-13 PROCEDURE — 84132 ASSAY OF SERUM POTASSIUM: CPT | Performed by: SURGERY

## 2018-10-13 PROCEDURE — 12000008 ZZH R&B INTERMEDIATE UMMC

## 2018-10-13 PROCEDURE — 00000146 ZZHCL STATISTIC GLUCOSE BY METER IP

## 2018-10-13 RX ORDER — LIDOCAINE 40 MG/G
CREAM TOPICAL
Status: DISCONTINUED | OUTPATIENT
Start: 2018-10-13 | End: 2018-10-15 | Stop reason: HOSPADM

## 2018-10-13 RX ORDER — POTASSIUM CHLORIDE 7.45 MG/ML
10 INJECTION INTRAVENOUS
Status: DISCONTINUED | OUTPATIENT
Start: 2018-10-13 | End: 2018-10-15

## 2018-10-13 RX ORDER — CLINDAMYCIN HCL 300 MG
300 CAPSULE ORAL EVERY 6 HOURS SCHEDULED
Status: DISCONTINUED | OUTPATIENT
Start: 2018-10-13 | End: 2018-10-15 | Stop reason: HOSPADM

## 2018-10-13 RX ORDER — POTASSIUM CL/LIDO/0.9 % NACL 10MEQ/0.1L
10 INTRAVENOUS SOLUTION, PIGGYBACK (ML) INTRAVENOUS
Status: DISCONTINUED | OUTPATIENT
Start: 2018-10-13 | End: 2018-10-13 | Stop reason: RX

## 2018-10-13 RX ORDER — BENZOCAINE/MENTHOL 6 MG-10 MG
LOZENGE MUCOUS MEMBRANE 2 TIMES DAILY
Status: DISCONTINUED | OUTPATIENT
Start: 2018-10-13 | End: 2018-10-15 | Stop reason: HOSPADM

## 2018-10-13 RX ORDER — POTASSIUM CHLORIDE 750 MG/1
20-40 TABLET, EXTENDED RELEASE ORAL
Status: DISCONTINUED | OUTPATIENT
Start: 2018-10-13 | End: 2018-10-15

## 2018-10-13 RX ORDER — SULFAMETHOXAZOLE/TRIMETHOPRIM 800-160 MG
1 TABLET ORAL 2 TIMES DAILY
Status: DISCONTINUED | OUTPATIENT
Start: 2018-10-13 | End: 2018-10-15 | Stop reason: HOSPADM

## 2018-10-13 RX ORDER — POTASSIUM CHLORIDE 1.5 G/1.58G
20-40 POWDER, FOR SOLUTION ORAL
Status: DISCONTINUED | OUTPATIENT
Start: 2018-10-13 | End: 2018-10-15

## 2018-10-13 RX ORDER — DEXTROSE MONOHYDRATE 25 G/50ML
25-50 INJECTION, SOLUTION INTRAVENOUS
Status: DISCONTINUED | OUTPATIENT
Start: 2018-10-13 | End: 2018-10-15 | Stop reason: HOSPADM

## 2018-10-13 RX ORDER — GABAPENTIN 100 MG/1
100 CAPSULE ORAL 2 TIMES DAILY
Qty: 90 CAPSULE | Refills: 0 | Status: SHIPPED | OUTPATIENT
Start: 2018-10-13 | End: 2019-07-09

## 2018-10-13 RX ORDER — ACETAMINOPHEN 325 MG/1
975 TABLET ORAL EVERY 8 HOURS PRN
Qty: 100 TABLET | Refills: 0 | Status: SHIPPED | OUTPATIENT
Start: 2018-10-13 | End: 2019-02-01

## 2018-10-13 RX ORDER — POTASSIUM CHLORIDE 29.8 MG/ML
20 INJECTION INTRAVENOUS
Status: DISCONTINUED | OUTPATIENT
Start: 2018-10-13 | End: 2018-10-15

## 2018-10-13 RX ORDER — NICOTINE POLACRILEX 4 MG
15-30 LOZENGE BUCCAL
Status: DISCONTINUED | OUTPATIENT
Start: 2018-10-13 | End: 2018-10-15 | Stop reason: HOSPADM

## 2018-10-13 RX ADMIN — CLINDAMYCIN HYDROCHLORIDE 300 MG: 300 CAPSULE ORAL at 18:24

## 2018-10-13 RX ADMIN — GLIMEPIRIDE 4 MG: 4 TABLET ORAL at 09:38

## 2018-10-13 RX ADMIN — OXYCODONE HYDROCHLORIDE 5 MG: 5 TABLET ORAL at 14:12

## 2018-10-13 RX ADMIN — ENOXAPARIN SODIUM 40 MG: 40 INJECTION SUBCUTANEOUS at 12:04

## 2018-10-13 RX ADMIN — Medication 0.5 MG: at 20:35

## 2018-10-13 RX ADMIN — GABAPENTIN 100 MG: 100 CAPSULE ORAL at 09:38

## 2018-10-13 RX ADMIN — VANCOMYCIN HYDROCHLORIDE 1750 MG: 10 INJECTION, POWDER, LYOPHILIZED, FOR SOLUTION INTRAVENOUS at 04:20

## 2018-10-13 RX ADMIN — GABAPENTIN 100 MG: 100 CAPSULE ORAL at 20:23

## 2018-10-13 RX ADMIN — PIPERACILLIN SODIUM,TAZOBACTAM SODIUM 3.38 G: 3; .375 INJECTION, POWDER, FOR SOLUTION INTRAVENOUS at 00:24

## 2018-10-13 RX ADMIN — PIPERACILLIN SODIUM,TAZOBACTAM SODIUM 3.38 G: 3; .375 INJECTION, POWDER, FOR SOLUTION INTRAVENOUS at 11:45

## 2018-10-13 RX ADMIN — IBUPROFEN 400 MG: 400 TABLET ORAL at 00:36

## 2018-10-13 RX ADMIN — INSULIN ASPART 1 UNITS: 100 INJECTION, SOLUTION INTRAVENOUS; SUBCUTANEOUS at 13:04

## 2018-10-13 RX ADMIN — INSULIN ASPART 1 UNITS: 100 INJECTION, SOLUTION INTRAVENOUS; SUBCUTANEOUS at 18:23

## 2018-10-13 RX ADMIN — POTASSIUM CHLORIDE 20 MEQ: 750 TABLET, EXTENDED RELEASE ORAL at 11:45

## 2018-10-13 RX ADMIN — ACETAMINOPHEN 975 MG: 325 TABLET, FILM COATED ORAL at 13:06

## 2018-10-13 RX ADMIN — Medication 0.5 MG: at 12:04

## 2018-10-13 RX ADMIN — Medication 0.5 MG: at 06:27

## 2018-10-13 RX ADMIN — POTASSIUM CHLORIDE 40 MEQ: 750 TABLET, EXTENDED RELEASE ORAL at 09:38

## 2018-10-13 RX ADMIN — LEVOTHYROXINE SODIUM 25 MCG: 25 TABLET ORAL at 09:38

## 2018-10-13 RX ADMIN — SULFAMETHOXAZOLE AND TRIMETHOPRIM 1 TABLET: 800; 160 TABLET ORAL at 20:24

## 2018-10-13 RX ADMIN — IBUPROFEN 400 MG: 400 TABLET ORAL at 06:34

## 2018-10-13 RX ADMIN — CLINDAMYCIN HYDROCHLORIDE 300 MG: 300 CAPSULE ORAL at 14:12

## 2018-10-13 RX ADMIN — PIPERACILLIN SODIUM,TAZOBACTAM SODIUM 3.38 G: 3; .375 INJECTION, POWDER, FOR SOLUTION INTRAVENOUS at 07:02

## 2018-10-13 ASSESSMENT — ACTIVITIES OF DAILY LIVING (ADL)
ADLS_ACUITY_SCORE: 11

## 2018-10-13 NOTE — PROGRESS NOTES
Brief Urology Note    Discussed further management plan with patient. She will need to follow up with Dr Cooper after 1 month discharge for discussion of further surgery for complete mesh removal. Our clinic will contact her to schedule this appointment. She does not need any further interventions or surgical care from urology in the interim. Patient is in agreement with the plan.     Elaine Arriaza MD  Urology PGY2    Attestation:  This patient was seen and evaluated by me, with the house staff team or resident(s).  I have reviewed the note above and agree.      Dawit Obrien MD  Department of Urology  [facility]

## 2018-10-13 NOTE — PLAN OF CARE
/65 (BP Location: Left arm)  Pulse 66  Temp 97.2  F (36.2  C) (Oral)  Resp 18  Wt 111.6 kg (246 lb 1.6 oz)  SpO2 93%  BMI 42.24 kg/m2     Neuro: A&O X 4  Cardiac: VSS  Respiratory: lung sounds clear bilaterally, 93% on RA  GI/: rose removed, AUOP not saving, bm early am, +gas, +bs  Diet/Appetite: tolerating regular diet, BG monitoring  Skin: dressing reinforced by RN midday, MD changed dressing this am  Activity: ambulated in room with SBA  Pain: c/o. Dilaudid, tylenol, ibuprofen, gabapentin provided with relief  Plan: possible OR on Monday for washout/wound vac placement, abx, continue to monitor and follow POC    K replaced for K of 3.1.  Rechecks scheduled.

## 2018-10-13 NOTE — PLAN OF CARE
Problem: Patient Care Overview  Goal: Plan of Care/Patient Progress Review    Pt had irrigation and debridement of lower abdomen, removal of piece of mesh today; abdominal dressing is covered,dressing on left lower abdomen is moist with serous fluid, dressing reinforced.Order is in place that first dressing change to be done by surgery Md. A&O x4. T max 100.6 this morning.Pt sat on a chair for few hours and pt went back to bed with assist of one around 9 pm. Pt reported pain is controlled comfortably, declined scheduled tylenol stating that it does not help.Crystal with adequate output.Plan for possible OR on Mon/Tues for wound re-exploration per surgery Md note.

## 2018-10-13 NOTE — PLAN OF CARE
Problem: Patient Care Overview  Goal: Individualization & Mutuality  Outcome: Improving  Pt up in chair since surgery, loose BM this afternoon, denies pain. Vancomycin & zosyn per order. Good PO intake. Continue POC.

## 2018-10-13 NOTE — PHARMACY-VANCOMYCIN DOSING SERVICE
Pharmacy Vancomycin Note  Date of Service 2018  Patient's  1965   53 year old, female    Indication: Abscess  Goal Trough Level: 15-20 mg/L  Day of Therapy: 6  Current Vancomycin regimen: 1750 mg IV q12h    Current estimated CrCl = Estimated Creatinine Clearance: 126.9 mL/min (based on Cr of 0.62).  Weight = 111.6 kg    Creatinine for last 3 days  10/10/2018:  5:15 PM Creatinine 0.58 mg/dL  10/11/2018:  7:36 AM Creatinine 0.58 mg/dL  10/12/2018:  7:29 AM Creatinine 0.55 mg/dL  10/13/2018:  3:03 AM Creatinine 0.62 mg/dL    Recent Vancomycin Levels (past 3 days)  10/13/2018:  3:03 AM Vancomycin Level 13.8 mg/L - 10.8-hour trough     Vancomycin IV Administrations (past 72 hours)                   vancomycin (VANCOCIN) 1,750 mg in sodium chloride 0.9 % 500 mL intermittent infusion (mg) 1,750 mg New Bag 10/13/18 0420     1,750 mg New Bag 10/12/18 1613     1,750 mg New Bag  0700     1,750 mg New Bag 10/11/18 1741     1,750 mg New Bag  0556     1,750 mg New Bag 10/10/18 1848                Nephrotoxins and other renal medications (Future)    Start     Dose/Rate Route Frequency Ordered Stop    10/11/18 0913  ibuprofen (ADVIL/MOTRIN) tablet 400 mg      400 mg Oral EVERY 6 HOURS PRN 10/11/18 0914      10/10/18 1700  vancomycin (VANCOCIN) 1,750 mg in sodium chloride 0.9 % 500 mL intermittent infusion      1,750 mg  over 2 Hours Intravenous EVERY 12 HOURS 10/10/18 1607      10/10/18 1630  piperacillin-tazobactam (ZOSYN) 3.375 g vial to attach to  mL bag      3.375 g  over 30 Minutes Intravenous EVERY 6 HOURS 10/10/18 1607               Contrast Orders - past 72 hours     None          Interpretation of levels and current regimen:  Trough level is  Subtherapeutic    Has serum creatinine changed > 50% in last 72 hours: No    Urine output:  unable to determine    Renal Function: Stable    Plan:  1.  Increase Dose to 2000 mg (~18 mg/kg) IV q12h  2.  Pharmacy will check trough levels as appropriate in 1-3  Days.    3. Serum creatinine levels will be ordered daily for the first week of therapy and at least twice weekly for subsequent weeks.      Savannah Lang, PharmD

## 2018-10-13 NOTE — OP NOTE
General Surgery Operative note:     DOS: 10/12/2018    Pre-operative diagnosis: abdominal wound  Post-operative diagnosis: same    Procedure:   Irrigation and debridement of lower abdominal wound   Partial excision of mesh     Surgeon: Darlene Hogue MD  Intra-operative consulting surgeon: Dr. Onofre    Residents: Jenny Carmona MD and Kelvin Archer MD PhD    Anesthesia: MAC  EBL: 10cc.     Indications: Sudheer Montiel is a 53 year-old female who had necrotizing soft tissue infection of her lower abdominal wall. She was debrided at an outside hospital, where upon mesh from a prior bladder sling was found in the abdominal wound. She was transferred to the [facility] for further urologic cares. She was recommended to have the above stated procedure in the operating room due to the need for closer inspection of the deep abdominal wound with associated intra-abdominal cavity. The risks, benefits and alternative of the procedure were discussed with the patient, and she elected to proceed.     Finding: There was mesh protruding from the deep intra-abdominal cavity. This was excised. No further pockets of purulent drainage.     Details: The patient was brought to the operating room and placed in supine position. After successful induction of MAC, her abdomen was prepped and draped in the usual sterile fashion with PCMX. Her abdominal wound was inspected and there was no areas of further necrotizing soft tissue infection. There was no remaining pockets of purulent drainage. We noted that there were previously placed prolene stitches and a penrose drain that marked that area where surgeons at the outside hospital noted intra-abdominal mesh. At this point, our Urology colleagues were consulted. Please see their note for details. With their assistance, the intra-abdominal mesh was partly excised, having pulled the mesh up as much as possible and cutting it at its entrance into deeper tissues.     Her wound was  then washed with PCMX, and irrigated with 1L normal saline. A wet Kerlix dressing was placed, and ABD pads were secured.     She was awoken and transferred to PACU in stable condition. There were no immediate complications. All sponge and needle counts were reported as corrected.     Jenny Carmona MD  PGY-5    Attending attestation:  I was present for the entirety of the procedure.  I agree with the note.

## 2018-10-13 NOTE — DISCHARGE SUMMARY
[facility] Discharge Summary    Sudheer Montiel MRN: [id number]   YOB: 1965 Age: 53 year old     Date of Admission:  10/10/2018  Date of Discharge::  ***  Admitting Physician:  Darlene Hogue MD  Discharge Physician:    Primary Care Physician:         [facility]          Discharge Diagnosis:   Necrotizing soft tissue infection         Procedures:   I&D with washout  I&D with washout  Wound vac placement ***          Consultations:   PHARMACY TO [facility]  UROLOGY IP CONSULT  MEDICATION HISTORY IP PHARMACY CONSULT  PHARMACY IP CONSULT  VASCULAR ACCESS CARE ADULT IP CONSULT  WOUND OSTOMY CONTINENCE NURSE  IP CONSULT          HPI (from H&P):   Sudheer Montiel is a 53 year old female with h/o T2DM (non-insulin dependent), hypothyroidism, and a mesh bladder sling for urinary incontinence in 2004 who had 1-week of intermittent and increasing lower abdominal pain that progressed to constant pain two days ago. Associated fevers, chills, diaphoresis, loss of appetite, nausea. Skin overlying her lower abdomen became indurated but no skin changes present. She presented to [facility] on 10/8/18 with a WBC 23 and CT scan showed a necrotizing soft tissue infection with subfascial abscess tracking down to her bladder sling mesh. Wound and blood cultures pending at OSH. She was started on IV vancomycin and zosyn. Patient underwent an I&D of the inferior abdomen with washout, penrose drain placement, and packing. Her WBC decreased to 18.5-->11.2. Patient was transferred to [facility] on 10/10/18 for definitive surgical management with a multidisciplinary team including general surgery and urology. She arrived in stable condition. No chest pain, SOB, cough, nausea/vomiting, diarrhea/constipation. She does endorse some urinary symptoms including dysuria and foul-smelling urine.           Hospital Course:   The patient underwent listed procedure(s) above, which she  tolerated without complications. A small piece of bladder mesh was removed and submitted for culture. Urology came in during the initial I&D on 10/12 and determined no further obvious mesh involvement, and that no further urologic procedures were required on this admission. They determined she was appropriate to follow up with urology as an outpatient upon discharge. On 10/15 the patient returned to the OR for repeat I&D with washout, and placement of a wound vac. She tolerated this without complication. She was managed on IV Vanc and zosyn, and POD#1 from her first I&D at [facility] she was transitioned to PO antibiotics, as her leukocytosis had resolved. Overall unremarkable hospitalization.  At the time of discharge, she was tolerating PO intake, ambulating, voiding spontaneously without difficulty, and pain was controlled with oral pain medications. The patient was discharged home*** in stable and improved condition.         Final Pathology Result:   Pending at time of discharge         Pertinent Imaging Results:   CT ABD PELVIS WITH ORAL AND IV CONTRAST   IMPRESSION:     1.  Marked inflammatory changes within the subcutaneous tissues of the right lower abdomen both anterior and posterior to the right inferior rectus abdominis muscle. Unencapsulated subcutaneous air and fluid measuring up to 8.7 x 4.3 cm anterior to the rectus abdominis.  2.  Collection appears to be extraperitoneal anterior to the right aspect of the bladder measuring 5.6 x 5.2 cm.  3.  While loops of bowel approach this area there is no clear source of infection such as diverticulitis or incarcerated small bowel.  4.  There is fairly extensive sigmoid diverticulosis.  5.  Normal appendix.  6.  Gallbladder is distended without localized inflammation.  7.  Limited window for percutaneous drainage of deep collection.  8.  Splenomegaly.    Results were called to Dr. Acosta on 10/8/2018 1:59 PM.    REPORT SIGNED BY DR. Idris Vallejo          Medications Prior to Admission:     Prescriptions Prior to Admission   Medication Sig Dispense Refill Last Dose     glimepiride (AMARYL) 4 MG tablet Take 1 tablet (4 mg) by mouth every morning (before breakfast) 30 tablet 11 Past Week at Unknown time     levothyroxine (SYNTHROID/LEVOTHROID) 25 MCG tablet Take 1 tablet (25 mcg) by mouth daily 30 tablet 11 10/9/2018 at Unknown time     metFORMIN (GLUCOPHAGE-XR) 750 MG 24 hr tablet Take 1 tablet (750 mg) by mouth 2 times daily (with meals) 60 tablet 11 Past Week at Unknown time     Thyroid (NP THYROID) 90 MG TABS Take 2-tablets by mouth each morning 60 tablet 11 Past Week at Unknown time     valACYclovir (VALTREX) 500 MG tablet Take 500 mg by mouth daily    Past Week at Unknown time     ACCU-CHEK MULTICLIX LANCETS MISC    Unknown at Unknown time     Blood Glucose Calibration (ACCU-CHEK ROHITH) SOLN 1 Bottle.   Unknown at Unknown time     blood glucose monitoring (ACCU-CHEK ROHITH) test strip 1 strip by In Vitro route 2 times daily 200 strip 3 Unknown at Unknown time     Blood Glucose Monitoring Suppl (ACCU-CHEK ROHITH) KIT    Unknown at Unknown time            Discharge Medications:     Current Discharge Medication List      START taking these medications    Details   acetaminophen (TYLENOL) 325 MG tablet Take 3 tablets (975 mg) by mouth every 8 hours as needed for mild pain  Qty: 100 tablet, Refills: 0    Associated Diagnoses: Necrotizing soft tissue infection      gabapentin (NEURONTIN) 100 MG capsule Take 1 capsule (100 mg) by mouth 2 times daily  Qty: 90 capsule, Refills: 0    Associated Diagnoses: Necrotizing soft tissue infection         CONTINUE these medications which have NOT CHANGED    Details   glimepiride (AMARYL) 4 MG tablet Take 1 tablet (4 mg) by mouth every morning (before breakfast)  Qty: 30 tablet, Refills: 11    Associated Diagnoses: Type 2 diabetes mellitus without complication, without long-term current use of insulin (H)      levothyroxine  (SYNTHROID/LEVOTHROID) 25 MCG tablet Take 1 tablet (25 mcg) by mouth daily  Qty: 30 tablet, Refills: 11    Associated Diagnoses: Acquired hypothyroidism      metFORMIN (GLUCOPHAGE-XR) 750 MG 24 hr tablet Take 1 tablet (750 mg) by mouth 2 times daily (with meals)  Qty: 60 tablet, Refills: 11    Associated Diagnoses: Type 2 diabetes mellitus without complication, without long-term current use of insulin (H)      Thyroid (NP THYROID) 90 MG TABS Take 2-tablets by mouth each morning  Qty: 60 tablet, Refills: 11    Associated Diagnoses: Acquired hypothyroidism      valACYclovir (VALTREX) 500 MG tablet Take 500 mg by mouth daily       ACCU-CHEK MULTICLIX LANCETS MISC       Blood Glucose Calibration (ACCU-CHEK ROHITH) SOLN 1 Bottle.      blood glucose monitoring (ACCU-CHEK ROHITH) test strip 1 strip by In Vitro route 2 times daily  Qty: 200 strip, Refills: 3    Associated Diagnoses: Type 2 diabetes mellitus without complication, without long-term current use of insulin (H)      Blood Glucose Monitoring Suppl (ACCU-CHEK ROHITH) KIT                   Day of Discharge Physical Exam:   Temp:  [96.4  F (35.8  C)-100.5  F (38.1  C)] 97.2  F (36.2  C)  Pulse:  [66-68] 66  Heart Rate:  [62-80] 80  Resp:  [16-30] 18  BP: (116-136)/(45-72) 135/65  SpO2:  [92 %-99 %] 93 %  General: A&O, NAD, lying comfortably in bed  Chest: NLB on RA  Abd: Soft, moderately tender ***, ND, low transverse abdominal wound with clean edges, no exudate or necrosis, wound vac in place ***  Extremities: WWP  Neuro: Moving all extremities spontaneously without apparent deficit         Discharge Instructions and Follow-Up:     Reason for your hospital stay   At an outside hospital, you were found to have a necrotizing soft tissue infection in your lower abdomen. When they initially operated to drain your abscess and clear out the infection, they thought your bladder mesh might be involved and so you were transferred to the [facility]. You were taken to the OR here and  had your abdominal wound washed out, and there did not look to be any residual infected or dead tissue. A small piece of mesh was clipped and sent for culture. Urology evaluated your wound in the OR and determined that the mesh was not infected, and so no procedures from them were needed. Your infection was treated with IV antibiotics, and you were transitioned to oral antibiotics and had a vac placed over your wound.     Adult [facility] Follow-up and recommended labs and tests   Follow up with primary care provider, [facility], within 7 days to evaluate after surgery and for hospital follow- up.  No follow up labs or test are needed.    Follow up with Dr. Moore , with Urology, within 1-2 weeks  to evaluate after surgery and for hospital follow- up. No follow up labs or test are needed.    Appointments on [place] and/or [facility] (with [facility] or [facility] provider or service). Call [phone] if you haven't heard regarding these appointments within 7 days of discharge.     Activity   Your activity upon discharge: activity as tolerated     Full Code     Diet   Follow this diet upon discharge: Orders Placed This Encounter     Regular Diet Adult         Condition at discharge: Stable      - - - - - - - - - - - - - - - - - -  ***

## 2018-10-13 NOTE — PROGRESS NOTES
General Surgery Progress Note  October 13, 2018    S: Pain under better control with dilaudid after surgery yesterday. No fevers, shortness of breath. Tolerating regular diet. BM x2 yesterday. Crystal catheter in place.    O:    Temp:  [96.4  F (35.8  C)-100.6  F (38.1  C)] 96.6  F (35.9  C)  Pulse:  [67-68] 68  Heart Rate:  [62-82] 80  Resp:  [16-30] 18  BP: (116-136)/(45-72) 130/65  SpO2:  [92 %-99 %] 93 %      Intake/Output Summary (Last 24 hours) at 10/13/18 0941  Last data filed at 10/13/18 0700   Gross per 24 hour   Intake             2341 ml   Output             2690 ml   Net             -349 ml       Gen: NAD, lying comfortably in bed  Chest: nonlabored breathing  Abdomen: soft, nondistended, severely tender but able to tolerate manipulation of her abdomen, low transverse wound clean with minimal drainage, no necrotic areas  Extremities: without cyanosis or edema    10/13 Labs:  Lab Results   Component Value Date    WBC 10.8 10/13/2018     Lab Results   Component Value Date    RBC 3.88 10/13/2018     Lab Results   Component Value Date    HGB 11.2 10/13/2018     Lab Results   Component Value Date    HCT 35.6 10/13/2018     Lab Results   Component Value Date    MCV 92 10/13/2018     Lab Results   Component Value Date    MCH 28.9 10/13/2018     Lab Results   Component Value Date    MCHC 31.5 10/13/2018     Lab Results   Component Value Date    RDW 13.6 10/13/2018     Lab Results   Component Value Date     10/13/2018       Last Basic Metabolic Panel:  Lab Results   Component Value Date     10/13/2018      Lab Results   Component Value Date    POTASSIUM 3.1 10/13/2018     Lab Results   Component Value Date    CHLORIDE 105 10/13/2018     Lab Results   Component Value Date    JOANN 7.7 10/13/2018     Lab Results   Component Value Date    CO2 27 10/13/2018     Lab Results   Component Value Date    BUN 5 10/13/2018     Lab Results   Component Value Date    CR 0.62 10/13/2018     Lab Results   Component Value  Date     10/13/2018         A/P:  54 yo F with T2DM and h/o bladder sling mesh placement in 2004 with subsequent complications including erosion into the vagina, now POD#5 s/p I&D at OSH lower abdomen for NSTI and possible mesh infection. Now POD#1 s/p I&D with partial mesh excision.    - appreciate urology recs re: bladder mesh management this hospitalization  - continue IV abx  - possible return to the OR Mon/Tues for irrigation and wound vac placement if no further urology management  - continue dressing changes bid  - regular diet  - holding oral diabetic meds, sliding scale insulin for now  - lovenox ppx, SCDs    Seen with chief , discussed with staff Dr. Ora Pruitt,

## 2018-10-13 NOTE — PLAN OF CARE
Problem: Pain, Acute (Adult)  Goal: Acceptable Pain Control/Comfort Level  Patient will demonstrate the desired outcomes by discharge/transition of care.   Outcome: No Change  Vitals:    10/12/18 1549 10/12/18 2047 10/13/18 0011 10/13/18 0400   BP: 126/61  130/72 116/49   BP Location: Left arm  Left arm Left arm   Pulse:    67   Resp: 30  20 16   Temp: 97.5  F (36.4  C)  97.8  F (36.6  C) 96.4  F (35.8  C)   TempSrc: Oral  Oral Oral   SpO2: 92%  93% 93%   Weight:  111.6 kg (246 lb 1.6 oz)     Patient with moderate amt serosanguinous drainage from abd wound, reinforced dressing.  MD to do first post op dressing change this am.  Abd pain relieved with ibuprofen and dilaudid x1.  Lungs clear.  UV per rose 1700cc this shift.  Given zosyn and vanco per orders.  Glc check 151.  Denies nausea.  Good po intake.  Continue with POC.

## 2018-10-14 LAB
ANION GAP SERPL CALCULATED.3IONS-SCNC: 6 MMOL/L (ref 3–14)
BASOPHILS # BLD AUTO: 0 10E9/L (ref 0–0.2)
BASOPHILS NFR BLD AUTO: 0.4 %
BUN SERPL-MCNC: 5 MG/DL (ref 7–30)
CALCIUM SERPL-MCNC: 7.9 MG/DL (ref 8.5–10.1)
CHLORIDE SERPL-SCNC: 106 MMOL/L (ref 94–109)
CO2 SERPL-SCNC: 28 MMOL/L (ref 20–32)
CREAT SERPL-MCNC: 0.9 MG/DL (ref 0.52–1.04)
DIFFERENTIAL METHOD BLD: ABNORMAL
EOSINOPHIL # BLD AUTO: 0.4 10E9/L (ref 0–0.7)
EOSINOPHIL NFR BLD AUTO: 4 %
ERYTHROCYTE [DISTWIDTH] IN BLOOD BY AUTOMATED COUNT: 13.5 % (ref 10–15)
GFR SERPL CREATININE-BSD FRML MDRD: 66 ML/MIN/1.7M2
GLUCOSE BLDC GLUCOMTR-MCNC: 105 MG/DL (ref 70–99)
GLUCOSE BLDC GLUCOMTR-MCNC: 107 MG/DL (ref 70–99)
GLUCOSE BLDC GLUCOMTR-MCNC: 115 MG/DL (ref 70–99)
GLUCOSE BLDC GLUCOMTR-MCNC: 122 MG/DL (ref 70–99)
GLUCOSE BLDC GLUCOMTR-MCNC: 154 MG/DL (ref 70–99)
GLUCOSE BLDC GLUCOMTR-MCNC: 91 MG/DL (ref 70–99)
GLUCOSE SERPL-MCNC: 99 MG/DL (ref 70–99)
HCT VFR BLD AUTO: 38.2 % (ref 35–47)
HGB BLD-MCNC: 11.8 G/DL (ref 11.7–15.7)
IMM GRANULOCYTES # BLD: 0.3 10E9/L (ref 0–0.4)
IMM GRANULOCYTES NFR BLD: 2.9 %
LYMPHOCYTES # BLD AUTO: 1.5 10E9/L (ref 0.8–5.3)
LYMPHOCYTES NFR BLD AUTO: 14.9 %
MCH RBC QN AUTO: 29.2 PG (ref 26.5–33)
MCHC RBC AUTO-ENTMCNC: 30.9 G/DL (ref 31.5–36.5)
MCV RBC AUTO: 95 FL (ref 78–100)
MONOCYTES # BLD AUTO: 0.7 10E9/L (ref 0–1.3)
MONOCYTES NFR BLD AUTO: 7.1 %
NEUTROPHILS # BLD AUTO: 7.2 10E9/L (ref 1.6–8.3)
NEUTROPHILS NFR BLD AUTO: 70.7 %
NRBC # BLD AUTO: 0 10*3/UL
NRBC BLD AUTO-RTO: 0 /100
PLATELET # BLD AUTO: 293 10E9/L (ref 150–450)
POTASSIUM SERPL-SCNC: 3.6 MMOL/L (ref 3.4–5.3)
RBC # BLD AUTO: 4.04 10E12/L (ref 3.8–5.2)
SODIUM SERPL-SCNC: 139 MMOL/L (ref 133–144)
WBC # BLD AUTO: 10.1 10E9/L (ref 4–11)

## 2018-10-14 PROCEDURE — 25000132 ZZH RX MED GY IP 250 OP 250 PS 637: Performed by: STUDENT IN AN ORGANIZED HEALTH CARE EDUCATION/TRAINING PROGRAM

## 2018-10-14 PROCEDURE — 36415 COLL VENOUS BLD VENIPUNCTURE: CPT | Performed by: STUDENT IN AN ORGANIZED HEALTH CARE EDUCATION/TRAINING PROGRAM

## 2018-10-14 PROCEDURE — 85025 COMPLETE CBC W/AUTO DIFF WBC: CPT | Performed by: STUDENT IN AN ORGANIZED HEALTH CARE EDUCATION/TRAINING PROGRAM

## 2018-10-14 PROCEDURE — 25000128 H RX IP 250 OP 636: Performed by: STUDENT IN AN ORGANIZED HEALTH CARE EDUCATION/TRAINING PROGRAM

## 2018-10-14 PROCEDURE — 12000008 ZZH R&B INTERMEDIATE UMMC

## 2018-10-14 PROCEDURE — 00000146 ZZHCL STATISTIC GLUCOSE BY METER IP

## 2018-10-14 PROCEDURE — 80048 BASIC METABOLIC PNL TOTAL CA: CPT | Performed by: STUDENT IN AN ORGANIZED HEALTH CARE EDUCATION/TRAINING PROGRAM

## 2018-10-14 RX ORDER — HYDROMORPHONE HYDROCHLORIDE 2 MG/1
2 TABLET ORAL 3 TIMES DAILY PRN
Status: DISCONTINUED | OUTPATIENT
Start: 2018-10-14 | End: 2018-10-15 | Stop reason: HOSPADM

## 2018-10-14 RX ORDER — DIPHENHYDRAMINE HCL 25 MG
25 CAPSULE ORAL EVERY 6 HOURS PRN
Status: DISCONTINUED | OUTPATIENT
Start: 2018-10-14 | End: 2018-10-15 | Stop reason: HOSPADM

## 2018-10-14 RX ORDER — GABAPENTIN 100 MG/1
200 CAPSULE ORAL 2 TIMES DAILY
Status: DISCONTINUED | OUTPATIENT
Start: 2018-10-14 | End: 2018-10-15 | Stop reason: HOSPADM

## 2018-10-14 RX ORDER — HYDROXYZINE HYDROCHLORIDE 10 MG/1
10 TABLET, FILM COATED ORAL 3 TIMES DAILY PRN
Status: DISCONTINUED | OUTPATIENT
Start: 2018-10-14 | End: 2018-10-15 | Stop reason: HOSPADM

## 2018-10-14 RX ORDER — HYDROMORPHONE HYDROCHLORIDE 1 MG/ML
.3-.5 INJECTION, SOLUTION INTRAMUSCULAR; INTRAVENOUS; SUBCUTANEOUS
Status: DISCONTINUED | OUTPATIENT
Start: 2018-10-14 | End: 2018-10-15

## 2018-10-14 RX ADMIN — SULFAMETHOXAZOLE AND TRIMETHOPRIM 1 TABLET: 800; 160 TABLET ORAL at 07:58

## 2018-10-14 RX ADMIN — SULFAMETHOXAZOLE AND TRIMETHOPRIM 1 TABLET: 800; 160 TABLET ORAL at 19:56

## 2018-10-14 RX ADMIN — HYDROCORTISONE: 10 CREAM TOPICAL at 00:19

## 2018-10-14 RX ADMIN — LEVOTHYROXINE SODIUM 25 MCG: 25 TABLET ORAL at 07:58

## 2018-10-14 RX ADMIN — CLINDAMYCIN HYDROCHLORIDE 300 MG: 300 CAPSULE ORAL at 18:19

## 2018-10-14 RX ADMIN — Medication 0.5 MG: at 06:34

## 2018-10-14 RX ADMIN — ACETAMINOPHEN 975 MG: 325 TABLET, FILM COATED ORAL at 22:23

## 2018-10-14 RX ADMIN — ACETAMINOPHEN 975 MG: 325 TABLET, FILM COATED ORAL at 13:15

## 2018-10-14 RX ADMIN — HYDROMORPHONE HYDROCHLORIDE 2 MG: 2 TABLET ORAL at 12:01

## 2018-10-14 RX ADMIN — CLINDAMYCIN HYDROCHLORIDE 300 MG: 300 CAPSULE ORAL at 00:20

## 2018-10-14 RX ADMIN — GABAPENTIN 100 MG: 100 CAPSULE ORAL at 07:58

## 2018-10-14 RX ADMIN — CLINDAMYCIN HYDROCHLORIDE 300 MG: 300 CAPSULE ORAL at 12:01

## 2018-10-14 RX ADMIN — HYDROMORPHONE HYDROCHLORIDE 2 MG: 2 TABLET ORAL at 19:56

## 2018-10-14 RX ADMIN — IBUPROFEN 400 MG: 400 TABLET ORAL at 14:39

## 2018-10-14 RX ADMIN — ENOXAPARIN SODIUM 40 MG: 40 INJECTION SUBCUTANEOUS at 00:20

## 2018-10-14 RX ADMIN — HYDROCORTISONE: 10 CREAM TOPICAL at 08:00

## 2018-10-14 RX ADMIN — CLINDAMYCIN HYDROCHLORIDE 300 MG: 300 CAPSULE ORAL at 23:49

## 2018-10-14 RX ADMIN — CLINDAMYCIN HYDROCHLORIDE 300 MG: 300 CAPSULE ORAL at 06:34

## 2018-10-14 RX ADMIN — ENOXAPARIN SODIUM 40 MG: 40 INJECTION SUBCUTANEOUS at 11:00

## 2018-10-14 RX ADMIN — IBUPROFEN 400 MG: 400 TABLET ORAL at 07:58

## 2018-10-14 RX ADMIN — GABAPENTIN 200 MG: 100 CAPSULE ORAL at 19:56

## 2018-10-14 RX ADMIN — IBUPROFEN 400 MG: 400 TABLET ORAL at 00:35

## 2018-10-14 RX ADMIN — ENOXAPARIN SODIUM 40 MG: 40 INJECTION SUBCUTANEOUS at 22:23

## 2018-10-14 ASSESSMENT — ACTIVITIES OF DAILY LIVING (ADL)
ADLS_ACUITY_SCORE: 11

## 2018-10-14 ASSESSMENT — PAIN DESCRIPTION - DESCRIPTORS: DESCRIPTORS: SORE

## 2018-10-14 NOTE — PROGRESS NOTES
General Surgery Progress Note  October 14, 2018    S: tolerating regular diet, pain well controlled. Itching red rash with hives on back from oxycodone, slight relief with hydrocortisone cream last night. Tolerating regular diet, BM and flatus  Mesh cx from 10/12 negative thus far.    O:    Temp:  [95.7  F (35.4  C)-99  F (37.2  C)] 96.4  F (35.8  C)  Pulse:  [60-95] 60  Resp:  [16-18] 16  BP: (100-137)/(47-65) 109/56  SpO2:  [91 %-94 %] 94 %      Intake/Output Summary (Last 24 hours) at 10/14/18 0906  Last data filed at 10/14/18 0751   Gross per 24 hour   Intake             1980 ml   Output             3100 ml   Net            -1120 ml       Gen: NAD, lying comfortably in bed  Chest: nonlabored breathing  CV: RRR  Abdomen: soft, nondistended, severly tender lower abdomen around wound. Wound with granulation tissue present, minimal exudate, no necrosis.   Extremities: without cyanosis or edema    10/14Labs:  Lab Results   Component Value Date    WBC 10.1 10/14/2018     Lab Results   Component Value Date    RBC 4.04 10/14/2018     Lab Results   Component Value Date    HGB 11.8 10/14/2018     Lab Results   Component Value Date    HCT 38.2 10/14/2018     Lab Results   Component Value Date    MCV 95 10/14/2018     Lab Results   Component Value Date    MCH 29.2 10/14/2018     Lab Results   Component Value Date    MCHC 30.9 10/14/2018     Lab Results   Component Value Date    RDW 13.5 10/14/2018     Lab Results   Component Value Date     10/14/2018       Last Basic Metabolic Panel:  Lab Results   Component Value Date     10/14/2018      Lab Results   Component Value Date    POTASSIUM 3.6 10/14/2018     Lab Results   Component Value Date    CHLORIDE 106 10/14/2018     Lab Results   Component Value Date    JOANN 7.9 10/14/2018     Lab Results   Component Value Date    CO2 28 10/14/2018     Lab Results   Component Value Date    BUN 5 10/14/2018     Lab Results   Component Value Date    CR 0.90 10/14/2018     Lab  Results   Component Value Date    GLC 99 10/14/2018         A/P: 52 yo F with T2DM and h/o bladder sling mesh placement in 2004 with subsequent complications including erosion into the vagina, now POD#6 s/p I&D at OSH lower abdomen for NSTI and possible mesh infection. Now POD#2 s/p I&D with partial mesh excision.    - continue po bactrim and clinda for 10 day course  - continue bid dressing changes  - plan for return to or for washout and wound vac placement tomorrow  - wound vac education needed for discharge  - likely discharge Monday if continued progress  - regular adult diet  - bowel reg  - lovenox  - added as needed atarax and benadryl for oxycodone rash    Pain:  - transition iv dilaudid to 2 mg dilaudid po tid as needed, to be used to pre-medicate one hour before dressing changes. Will leave 1 dose daily as needed 0.3-0.5 as needed dilaudid as breakthrough just in case. Will likely need discharge on oral opioids for dressing changes at least.  - increase gabapentin to 200 mg bid   - continue tylenol and ibuprofen doses as is      Seen with chief , discussed with staff Dr. Ora Pruitt, DO

## 2018-10-14 NOTE — PLAN OF CARE
Problem: Pain, Acute (Adult)  Goal: Acceptable Pain Control/Comfort Level  Patient will demonstrate the desired outcomes by discharge/transition of care.   Outcome: No Change  Vitals:    10/13/18 1536 10/13/18 1930 10/13/18 2208 10/14/18 0349   BP: 103/57 108/47 137/65 100/57   BP Location: Left arm Left arm Left arm Left arm   Pulse: 95 67 66 63   Resp: 18 18 18 16   Temp: 97.8  F (36.6  C) 96.6  F (35.9  C) 99  F (37.2  C) 95.7  F (35.4  C)   TempSrc: Oral Oral Oral Axillary   SpO2: 91% 94% 93% 94%   Weight:       Patient with abd dressing-CDI.  Voiding large amts urine -over 2000cc this shift.  Good po intake, tolerating regular diet.  Abd soft, positive bowel sounds and flatus, no bm this shift.  Lungs clear/diminished, had some HAWKINS with ambulation.  On po cleocin.  Taking ibuprofen for pain, dilaudid before wound changes with adequate relief.  Glc 115.  Sleeping on and off.  Continue with POC.

## 2018-10-14 NOTE — PLAN OF CARE
Problem: Patient Care Overview  Goal: Plan of Care/Patient Progress Review  Outcome: Improving  Pt up ambulating in cat with family an tolerating well. Mentioned rash on back that was itching--pin point and over most of back. On-call surgery notified and hydrocortisone cream ordered. Abdominal dressing moist with serosanguinous drainage. Medicated with Dilaudid 0.5mg prior to dressing change. S.L. Oral antibiotics.

## 2018-10-14 NOTE — PLAN OF CARE
/55 (BP Location: Right arm)  Pulse 62  Temp 96.8  F (36  C) (Oral)  Resp 16  Wt 111.6 kg (246 lb 1.6 oz)  SpO2 93%  BMI 42.24 kg/m2     Neuro: A&O X 4  Cardiac: VSS except for one hypotensive episode of116/35.  Recheck 101/55  Respiratory: lung sounds clear bilaterally, 93% on RA  GI/: AUOP not saving, no bm, +gas, +bs  Diet/Appetite: tolerating regular diet, good intake, BG 91 and 105.  No coverage needed  Skin: LE edema, encouraged elevation of LE and ambulation.  ABD pads on wound reinforced.  Dressing changed this am with MD.  Rash on back, hydrocortisone applied.  Benadryl available but not needed  Activity: ambulated in room independently  Pain: c/o pain, dilaudid PO, tylenol, gabapentin, ibuprofen given with relief  Plan: OR tomorrow for wound, continue to monitor and follow POC

## 2018-10-15 VITALS
BODY MASS INDEX: 42.23 KG/M2 | DIASTOLIC BLOOD PRESSURE: 56 MMHG | WEIGHT: 246 LBS | RESPIRATION RATE: 18 BRPM | SYSTOLIC BLOOD PRESSURE: 138 MMHG | TEMPERATURE: 96.7 F | OXYGEN SATURATION: 95 % | HEART RATE: 54 BPM

## 2018-10-15 LAB
BASOPHILS # BLD AUTO: 0.1 10E9/L (ref 0–0.2)
BASOPHILS NFR BLD AUTO: 0.7 %
DIFFERENTIAL METHOD BLD: ABNORMAL
EOSINOPHIL # BLD AUTO: 0.3 10E9/L (ref 0–0.7)
EOSINOPHIL NFR BLD AUTO: 4 %
ERYTHROCYTE [DISTWIDTH] IN BLOOD BY AUTOMATED COUNT: 13.6 % (ref 10–15)
GLUCOSE BLDC GLUCOMTR-MCNC: 107 MG/DL (ref 70–99)
GLUCOSE BLDC GLUCOMTR-MCNC: 118 MG/DL (ref 70–99)
GLUCOSE BLDC GLUCOMTR-MCNC: 97 MG/DL (ref 70–99)
HCT VFR BLD AUTO: 39.2 % (ref 35–47)
HGB BLD-MCNC: 12.1 G/DL (ref 11.7–15.7)
IMM GRANULOCYTES # BLD: 0.4 10E9/L (ref 0–0.4)
IMM GRANULOCYTES NFR BLD: 4.3 %
LYMPHOCYTES # BLD AUTO: 1.7 10E9/L (ref 0.8–5.3)
LYMPHOCYTES NFR BLD AUTO: 20.1 %
MCH RBC QN AUTO: 29.1 PG (ref 26.5–33)
MCHC RBC AUTO-ENTMCNC: 30.9 G/DL (ref 31.5–36.5)
MCV RBC AUTO: 94 FL (ref 78–100)
MONOCYTES # BLD AUTO: 0.6 10E9/L (ref 0–1.3)
MONOCYTES NFR BLD AUTO: 6.8 %
NEUTROPHILS # BLD AUTO: 5.2 10E9/L (ref 1.6–8.3)
NEUTROPHILS NFR BLD AUTO: 64.1 %
NRBC # BLD AUTO: 0 10*3/UL
NRBC BLD AUTO-RTO: 0 /100
PLATELET # BLD AUTO: 282 10E9/L (ref 150–450)
RBC # BLD AUTO: 4.16 10E12/L (ref 3.8–5.2)
WBC # BLD AUTO: 8.2 10E9/L (ref 4–11)

## 2018-10-15 PROCEDURE — 25000132 ZZH RX MED GY IP 250 OP 250 PS 637: Performed by: STUDENT IN AN ORGANIZED HEALTH CARE EDUCATION/TRAINING PROGRAM

## 2018-10-15 PROCEDURE — 85025 COMPLETE CBC W/AUTO DIFF WBC: CPT | Performed by: STUDENT IN AN ORGANIZED HEALTH CARE EDUCATION/TRAINING PROGRAM

## 2018-10-15 PROCEDURE — 97605 NEG PRS WND THER DME<=50SQCM: CPT

## 2018-10-15 PROCEDURE — 25000128 H RX IP 250 OP 636: Performed by: STUDENT IN AN ORGANIZED HEALTH CARE EDUCATION/TRAINING PROGRAM

## 2018-10-15 PROCEDURE — E2402 NEG PRESS WOUND THERAPY PUMP: HCPCS

## 2018-10-15 PROCEDURE — 90682 RIV4 VACC RECOMBINANT DNA IM: CPT | Performed by: SURGERY

## 2018-10-15 PROCEDURE — 00000146 ZZHCL STATISTIC GLUCOSE BY METER IP

## 2018-10-15 PROCEDURE — 25000128 H RX IP 250 OP 636: Performed by: SURGERY

## 2018-10-15 PROCEDURE — 36415 COLL VENOUS BLD VENIPUNCTURE: CPT | Performed by: STUDENT IN AN ORGANIZED HEALTH CARE EDUCATION/TRAINING PROGRAM

## 2018-10-15 RX ORDER — CLINDAMYCIN HCL 300 MG
300 CAPSULE ORAL EVERY 6 HOURS
Qty: 20 CAPSULE | Refills: 0 | Status: SHIPPED | OUTPATIENT
Start: 2018-10-15 | End: 2019-02-01

## 2018-10-15 RX ORDER — PANTOPRAZOLE SODIUM 40 MG/1
40 TABLET, DELAYED RELEASE ORAL
Status: DISCONTINUED | OUTPATIENT
Start: 2018-10-15 | End: 2018-10-15 | Stop reason: HOSPADM

## 2018-10-15 RX ORDER — SULFAMETHOXAZOLE/TRIMETHOPRIM 800-160 MG
1 TABLET ORAL 2 TIMES DAILY
Qty: 10 TABLET | Refills: 0 | Status: SHIPPED | OUTPATIENT
Start: 2018-10-15 | End: 2019-02-01

## 2018-10-15 RX ORDER — HYDROXYZINE HYDROCHLORIDE 10 MG/1
10 TABLET, FILM COATED ORAL 3 TIMES DAILY PRN
Qty: 20 TABLET | Refills: 0 | Status: SHIPPED | OUTPATIENT
Start: 2018-10-15 | End: 2019-07-09

## 2018-10-15 RX ORDER — HYDROMORPHONE HYDROCHLORIDE 1 MG/ML
0.5 INJECTION, SOLUTION INTRAMUSCULAR; INTRAVENOUS; SUBCUTANEOUS ONCE
Status: COMPLETED | OUTPATIENT
Start: 2018-10-15 | End: 2018-10-15

## 2018-10-15 RX ORDER — HYDROMORPHONE HYDROCHLORIDE 2 MG/1
2 TABLET ORAL 3 TIMES DAILY PRN
Qty: 6 TABLET | Refills: 0 | Status: SHIPPED | OUTPATIENT
Start: 2018-10-15 | End: 2018-10-17

## 2018-10-15 RX ADMIN — CLINDAMYCIN HYDROCHLORIDE 300 MG: 300 CAPSULE ORAL at 12:54

## 2018-10-15 RX ADMIN — ENOXAPARIN SODIUM 40 MG: 40 INJECTION SUBCUTANEOUS at 10:05

## 2018-10-15 RX ADMIN — ACETAMINOPHEN 975 MG: 325 TABLET, FILM COATED ORAL at 14:37

## 2018-10-15 RX ADMIN — INFLUENZA A VIRUS A/MICHIGAN/45/2015 (H1N1) RECOMBINANT HEMAGGLUTININ ANTIGEN, INFLUENZA A VIRUS A/SINGAPORE/INFIMH-16-0019/2016 (H3N2) RECOMBINANT HEMAGGLUTININ ANTIGEN, INFLUENZA B VIRUS B/MARYLAND/15/2016 RECOMBINANT HEMAGGLUTININ ANTIGEN, AND INFLUENZA B VIRUS B/PHUKET/3073/2013 RECOMBINANT HEMAGGLUTININ ANTIGEN 0.5 ML: 45; 45; 45; 45 INJECTION INTRAMUSCULAR at 16:41

## 2018-10-15 RX ADMIN — IBUPROFEN 400 MG: 400 TABLET ORAL at 08:09

## 2018-10-15 RX ADMIN — GABAPENTIN 200 MG: 100 CAPSULE ORAL at 08:09

## 2018-10-15 RX ADMIN — SULFAMETHOXAZOLE AND TRIMETHOPRIM 1 TABLET: 800; 160 TABLET ORAL at 08:09

## 2018-10-15 RX ADMIN — Medication 0.5 MG: at 11:03

## 2018-10-15 RX ADMIN — HYDROMORPHONE HYDROCHLORIDE 2 MG: 2 TABLET ORAL at 10:05

## 2018-10-15 RX ADMIN — ACETAMINOPHEN 975 MG: 325 TABLET, FILM COATED ORAL at 06:25

## 2018-10-15 RX ADMIN — CLINDAMYCIN HYDROCHLORIDE 300 MG: 300 CAPSULE ORAL at 06:25

## 2018-10-15 RX ADMIN — LEVOTHYROXINE SODIUM 25 MCG: 25 TABLET ORAL at 08:09

## 2018-10-15 RX ADMIN — PANTOPRAZOLE SODIUM 40 MG: 40 TABLET, DELAYED RELEASE ORAL at 14:34

## 2018-10-15 ASSESSMENT — ACTIVITIES OF DAILY LIVING (ADL)
ADLS_ACUITY_SCORE: 11

## 2018-10-15 NOTE — PROGRESS NOTES
"[facility] Nurse Inpatient Wound Assessment with Negative Pressure Wound Therapy     Assessment   Initial Assessment of wound(s) on pt's: inferior abdomen surgical wound     Status:Initial assessment     Treatment Plan  Abdominal wound: Negative Pressure Wound Therapy (NPWT) to be changed by [facility] RN every Mon/Wed/Fri with medium black foam.   Staff RN to assess pump settings set to -125 and dressing integrity every shift.     Remove foam dressing and replace with BID normal saline moist gauze dressing if:  -a dressing failure which cannot be repaired within 2 hours  -patient is discharging to home without a home pump  -patient is discharging to a facility outside the local area  -if a dressing is a \"Silver Foam\", remove before Radiation Therapy or MRI    Nursing to notify the Provider(s) and re-consult the [facility] Nurse if wound(s) deteriorate(s) or if necessary to reevaluate the plan.      Patient History    According to medical record: 54 y/o F w/ pmhx of T2DM and h/o bladder sling mesh placement in  with subsequent complications including erosion into the vagina, now POD#7 s/p I&D at OSH lower abdomen for NSTI and possible mesh infection. Now POD#3 s/p I&D with partial mesh excision    Objective Data  Current support surface: Standard , Atmos Air mattress  Current diet/nutrition:   Active Diet Order      Regular Diet Adult      Diet  Output: I/O last 3 completed shifts:  In: 1970 [P.O.:1970]  Out: 2300 [Urine:2300]  David: David Score  Av.1  Min: 18  Max: 21  Labs:   Recent Labs   Lab Test  10/15/18   0627   10/12/18   0729   12   2150 03/10/11   ALBUMIN   --    --   1.7*   < >   --    --    HGB  12.1   < >  13.7   < >  14.5   --    WBC  8.2   < >  8.6   < >  7.1   --    A1C   --    --    --    --    --   7.3@   CRP   --    --    --    --   17.2*   --     < > = values in this interval not displayed.       Physical Exam  Wound Location: abdomen, immediately " superior to pannus fold         Wound History: POD#7 s/p I&D at OSH lower abdomen for NSTI and possible mesh infection. Now POD#3 (10/12/18) s/p I&D with partial mesh excision    Surgical date: 10/12/18  Date Negative Pressure Wound Therapy initiated: 10/15/18  Measurements: (length x width x depth in cm): 0.5 x 29.5 x 5.8 cm  Tunneling: N/A  Undermining: up to 6.5 cm On left superior side of wound     Wound Base: 40% moist adherent yellow fibrin in base, 60% red granulation, majority on wound walls   Palpation of the wound bed:  normal  Periwound Skin: intact and erythema on right side   Temperature  normal   Drainage: Amount: light  Color: serosanguinous   Odor: mild  Pain:  Moderate, majority is when palpating right side of wound    Was patient premedicated prior to dressing change? Yes   Medication(s) used:  Hydromorphone 2 mg PO.  1 x IV dose of hydromorphone given during dressing change after pt requested additional pain medication       Intervention:     Visual inspection of wound(s):  Wound was assessed.  Wound Care: was done:  Cleansed with microklenz spray and gauze.  No-sting to periwound.  Deficit packed with black granufoam sponge   Teaching:  Taught pt how to patch dressing if leaks found.    Also teaching on troubleshooting  Orders:  reviewed  Supplies:  Ordered NPWT pump, black granufoam and canister  Discussed plan of care with: pt, family, RN, Care coordinator

## 2018-10-15 NOTE — PROGRESS NOTES
General Surgery Progress Note    Pain is adequately controlled. Tolerating regular diet. No n/v, f/c. Passing flatus, last BM 10/13.    /48 (BP Location: Right arm)  Pulse 54  Temp 97.2  F (36.2  C) (Oral)  Resp 16  Wt 111.6 kg (246 lb)  SpO2 97%  BMI 42.23 kg/m2    Well appearing in nad  Nlb on ra  Abd s/nt/nd  Wound has good granulation tissue, no pus or erythema. Minimal discharge.    CBC wnl    UOP 3600 / 24 hrs    A/P: 52 y/o F w/ pmhx of T2DM and h/o bladder sling mesh placement in 2004 with subsequent complications including erosion into the vagina, now POD#7 s/p I&D at OSH lower abdomen for NSTI and possible mesh infection. Now POD#3 s/p I&D with partial mesh excision. Had minimal necrotic tissue and wound has looked good on dressing changes.  - Plan to place a wound vac today and discharge home with outpatient urology follow up for possible mesh explantation in future  - C/w clindamycin and bactrim for 10 day course  - Scheduled tylenol, PO dilaudid for breakthrough pain and dressing changes. Will stop IV dilaudid.  - Anticipate discharge issues with wound vac given no insurance on file. Likely discharge tomorrow but medically ready today    Evaluated w/ chief, Dr Richey    --  Kelvin Archer  General Surgery PGY2

## 2018-10-15 NOTE — PROGRESS NOTES
[facility]  Met with pt to discuss plans for HC.  Pt to be discharged home 10/15 or 10/16  and has agreed to have [facility] follow with services of SN and wound nurse. Patient care support center processing referral.  Pt verbalized understanding that initial visit is scheduled for  Wednesday 10/17.   Pt has 24 hour phone number for [facility] for any questions or concerns.    Thank you  Kelley Joyner RN, BSN  [facility] Liaison  [facility]  [phone]

## 2018-10-15 NOTE — PLAN OF CARE
Problem: Pain, Acute (Adult)  Goal: Acceptable Pain Control/Comfort Level  Patient will demonstrate the desired outcomes by discharge/transition of care.   Outcome: No Change  Vitals:    10/14/18 1555 10/14/18 2005 10/14/18 2254 10/15/18 0400   BP:  108/65 111/58 110/55   BP Location:  Right arm Right arm Right arm   Pulse:  58 58 54   Resp:  16 16 16   Temp:  96.6  F (35.9  C) 96.7  F (35.9  C) 96.3  F (35.7  C)   TempSrc:  Oral Oral Oral   SpO2:  94% 92% 95%   Weight: 111.6 kg (246 lb)      Patient denies need for pain medication.  Abd wound packed, intact.  LS clear,sats 90's on room air.  Voiding adequate amts urine.  Positive bowel sounds and flatus, no stool since 10/13.  Patient has been NPO for possible washout/wound vac placement today.  Has BLE edema, have been elevating legs on pillows when in bed.  Sleeping off and on.  Glc check 107.  Continue with POC.

## 2018-10-15 NOTE — PROGRESS NOTES
Care Coordinator - Discharge Planning    Admission Date/Time:  10/10/2018  Attending MD:  Darlene Hogue*     Data  Date of initial CC assessment:  10/12/2018  Chart reviewed, discussed with interdisciplinary team.   Patient was admitted for:   1. Necrotizing soft tissue infection    2. Type 2 diabetes mellitus without complication, without long-term current use of insulin (H)         Assessment   Full assessment completed in previous note    Coordination of Care and Referrals: Provided patient/family with options for Home Care.    Per MD team wound vac to be placed today and then she is medically ready for discharge to home.  Met with patient, daughter Jsesie, and Mom to discuss discharge planning.  Patient interested in having home care arranged for wound vac dressing changes.  After giving choice of agency patient chose [facility](P: [phone], F: [phone]), referral made and orders placed.  Application for home wound vac completed on [employer] and was approved.  [employer] home vac delivered to the patient.  Patients daughter, Jessie, plans to transport her to home.  Patient had no outstanding questions or concerns.        Plan  Anticipated Discharge Date:  10/15/2018  Anticipated Discharge Plan:  Home with [facility] for skilled RN visits to assist with wound vac dressing changes    Mohini Rivas, RNCC  [phone]

## 2018-10-15 NOTE — DISCHARGE SUMMARY
General Surgery Discharge Summary    Sudheer Montiel MRN# [id number]   YOB: 1965 Age: 53 year old     Date of Admission:  10/10/2018  Date of Discharge::  10/15/2018  5:36 PM  Admitting Physician:  Darlene Hogue MD  Discharge Physician:  Dolores Julien MD  Primary Care Physician:        Beth Cantu          Admission Diagnoses:   necrotizing fasciitis  Soft tissue infection  Necrotizing Tissue Infection           Discharge Diagnosis:   Same         Procedures:   Irrigation and debridement with a small amount of mesh explantation by Dr. Hogue. Intraoperative consult with urology.        Non-operative procedures:   Wound VAC placement          Consultations:   PHARMACY TO DOSE VANCO  UROLOGY IP CONSULT  MEDICATION HISTORY IP PHARMACY CONSULT  PHARMACY IP CONSULT  VASCULAR ACCESS CARE ADULT IP CONSULT  WOUND OSTOMY CONTINENCE NURSE  IP CONSULT           Medications Prior to Admission:     No prescriptions prior to admission.            Discharge Medications:      Sudheer Montiel   Home Medication Instructions LUÍS:[id number]    Printed on:10/15/18 0681   Medication Information                      ACCU-CHEK MULTICLIX LANCETS MISC               acetaminophen (TYLENOL) 325 MG tablet  Take 3 tablets (975 mg) by mouth every 8 hours as needed for mild pain             Blood Glucose Calibration (ACCU-CHEK ROHITH) SOLN  1 Bottle.             blood glucose monitoring (ACCU-CHEK ROHITH) test strip  1 strip by In Vitro route 2 times daily             Blood Glucose Monitoring Suppl (ACCU-CHEK ROHITH) KIT               clindamycin (CLEOCIN) 300 MG capsule  Take 1 capsule (300 mg) by mouth every 6 hours for 5 days             gabapentin (NEURONTIN) 100 MG capsule  Take 1 capsule (100 mg) by mouth 2 times daily             glimepiride (AMARYL) 4 MG tablet  Take 1 tablet (4 mg) by mouth every morning (before breakfast)             HYDROmorphone (DILAUDID) 2 MG tablet  Take 1 tablet (2 mg) by  "mouth 3 times daily as needed for moderate to severe pain             hydrOXYzine (ATARAX) 10 MG tablet  Take 1 tablet (10 mg) by mouth 3 times daily as needed for itching             levothyroxine (SYNTHROID/LEVOTHROID) 25 MCG tablet  Take 1 tablet (25 mcg) by mouth daily             metFORMIN (GLUCOPHAGE-XR) 750 MG 24 hr tablet  Take 1 tablet (750 mg) by mouth 2 times daily (with meals)             sulfamethoxazole-trimethoprim (BACTRIM DS/SEPTRA DS) 800-160 MG per tablet  Take 1 tablet by mouth 2 times daily for 5 days             Thyroid (NP THYROID) 90 MG TABS  Take 2-tablets by mouth each morning             valACYclovir (VALTREX) 500 MG tablet  Take 500 mg by mouth daily                        Day of Discharge Exam   /56 (BP Location: Left arm)  Pulse 54  Temp 96.7  F (35.9  C) (Oral)  Resp 18  Wt 111.6 kg (246 lb)  SpO2 95%  BMI 42.23 kg/m2    General:  A&Ox3, NAD  Cardio:   RRR  Chest:   Non labored breathing on RA  Abd:   Soft, non-distended, appropriately TTP, incision c/d/i.  Wound has good granulation tissue, no pus or erythema. Minimal discharge.  Ext:   WWP          Brief History of Illness:   \"Sudheer Montiel is a 53 year old female with h/o T2DM (non-insulin dependent), hypothyroidism, and a mesh bladder sling for urinary incontinence in 2004 who had 1-week of intermittent and increasing lower abdominal pain that progressed to constant pain two days ago. Associated fevers, chills, diaphoresis, loss of appetite, nausea. Skin overlying her lower abdomen became indurated but no skin changes present. She presented to [facility] on 10/8/18 with a WBC 23 and CT scan showed a necrotizing soft tissue infection with subfascial abscess tracking down to her bladder sling mesh. Wound and blood cultures pending at OSH. She was started on IV vancomycin and zosyn. Patient underwent an I&D of the inferior abdomen with washout, penrose drain placement, and packing. Her WBC decreased to " "18.5-->11.2. Patient was transferred to [facility] on 10/10/18 for definitive surgical management with a multidisciplinary team including general surgery and urology. She arrived in stable condition. No chest pain, SOB, cough, nausea/vomiting, diarrhea/constipation. She does endorse some urinary symptoms including dysuria and foul-smelling urine.\"           Hospital Course:   The patient was transferred from [facility] after I&D of a suspected necrotizing soft tissue infection. The decision was made to take the patient back to the operating room on hospital day 2 for exam under anesthesia. There was some concern for infection of mesh put in place for a urologic sling procedure. An intraoperative urology consult was placed. They believed that some mesh not accessible through the current wound would need to be explanted on an elective basis. Post operatively the patient had dressing changes with wet to dries and was discharged on hospital day 5 after having a wound vac placed. She will take a 10 day course of antibiotics, discharged on bactrim and clindamycin due to drug allergies. She will follow up outpatient with urology.          Antibiotics Prescribed at Discharge:   Clindamycin and bactrim, Duration: 5 days         Imaging Studies:   None performed  Results for orders placed or performed in visit on 10/02/15   XR Knee Bilateral 3 vw    Narrative    Exam: Bilateral knees, 3 views each. 10/2/2015.    Comparison: None.    Clinical history: Pain.    Findings: AP, lateral, and tangential views of bilateral knees were  obtained. Post surgical changes of a surgical screw in the left distal  femur. Osteoarthrosis in the bilateral knees, greatest in the  patellofemoral and medial femorotibial joint compartments, where there  is complete joint space narrowing in the medial femorotibial joint  compartment.      Impression    Impression: Tricompartmental osteoarthrosis in the bilateral knees,  greatest in the medial " femorotibial and patellofemoral joint  compartments.    WILLIAM SEWELL MD            Final Pathology Result:   Pending at time of discharge         Discharge Instructions:     - Do not shower and get the wound vac wet  - Please call [facility] at  if you have questions and ask for the General Surgery or the Urology resident on call  - You will follow up with Urology         Follow-Up:     Urology        Home Health Care:   Not needed           Discharge Disposition:   Discharged to home      Condition at discharge: Stable      --  Kelvin Archer  General Surgery PGY2

## 2018-10-15 NOTE — PLAN OF CARE
"Problem: Patient Care Overview  Goal: Plan of Care/Patient Progress Review  Outcome: Improving  Pt concerned with increased swelling in lower legs, ankles and feet. \"The tops of my feet hurt from the pressure\".  Weight tonight is the same as admission. No dyspnea noted. Pt walked in cat without shortness of breath. Pt also stated that she was very thirsty and drank about 2 L of H2O today. Voiding in good amounts. General surgery team notified about new edema that started yesterday and worse today. Encourage to keep feet elevated.       "

## 2018-10-15 NOTE — PLAN OF CARE
/56 (BP Location: Left arm)  Pulse 54  Temp 96.7  F (35.9  C) (Oral)  Resp 18  Wt 111.6 kg (246 lb)  SpO2 95%  BMI 42.23 kg/m2     Neuro: A&O X 4  Cardiac: VSS  Respiratory: lung sounds clear bilaterally, 95% on RA  GI/: AUOP not saving, no bm, +gas, +bs  Diet/Appetite: tolerating regular diet, no n/v reported  Activity: ambulated in room independently  Pain: c/o heart burn, protonix given with relief, c/o pain, dilaudid IV X 1 given before vac placement, and dilaudid PO given with relief  Plan: Pt discharge to home with home care for wound vac changes.  Home vac set up and connected to pt.  IV removed.  Discharge paperwork discussed.  Pt verbalized understanding.

## 2018-10-16 ENCOUNTER — TELEPHONE (OUTPATIENT)
Dept: SURGERY | Facility: CLINIC | Age: 53
End: 2018-10-16

## 2018-10-16 ENCOUNTER — TELEPHONE (OUTPATIENT)
Dept: UROLOGY | Facility: CLINIC | Age: 53
End: 2018-10-16

## 2018-10-16 ENCOUNTER — CARE COORDINATION (OUTPATIENT)
Dept: SURGERY | Facility: CLINIC | Age: 53
End: 2018-10-16

## 2018-10-16 LAB
BACTERIA SPEC CULT: ABNORMAL
SPECIMEN SOURCE: ABNORMAL

## 2018-10-16 NOTE — TELEPHONE ENCOUNTER
Spoke with Crissy at  Home Care. Given verbal orders for Home Care (per discontinue Summary and orders in EPIC).

## 2018-10-16 NOTE — PROGRESS NOTES
Care Coordinator - Discharge Planning    Admission Date/Time:  10/10/2018  Attending MD:  No att. providers found     Data  Date of initial CC assessment:    Chart reviewed, discussed with interdisciplinary team.   Patient was admitted for:   1. Necrotizing soft tissue infection    2. Type 2 diabetes mellitus without complication, without long-term current use of insulin (H)         Notified by KEVIN Benson Liajam that final discharge orders were not signed off by provider.  Reviewed discharge orders.  Noted final approval statement not completed.  Text paged Dr. Dolores Julien to review/sign discharge orders.      1430: Received email f/u from KEVIN Benson Liaison that final discharge order has not yet been signed.  Reviewed discharged orders in the system.  Paged Dr. Pruitt, Resident requesting orders be signed.   1510: Dr. Kelvin Archer, reviewed and signed final discharge order for this patient.  [facility] updated via email.    Joanne Gonzalez RN BSN, PHSHANNAN Monahan RN Care Coordinator covering for JOSEPH Rayo  [email or web address]  Pager [phone]  10/16/2018 10:43 AM

## 2018-10-16 NOTE — TELEPHONE ENCOUNTER
Health Call Center    Phone Message    May a detailed message be left on voicemail: yes    Reason for Call: Other: Crissy would like a call back with a verbal authorization from Dr. Julien or a member of her care team to start [facility]. The orders that were sent with the Pt was not signed. Please call Crissy back.     Action Taken: Message routed to:  [facility] ([facility]):  general surger

## 2018-10-16 NOTE — TELEPHONE ENCOUNTER
[facility] Call Center    Phone Message    May a detailed message be left on voicemail: yes    Reason for Call: Other: Ashly from the surgery office calling. Pt needs a 3-4 week hospital follow up. She was discharged yesterday and was told to follow up with Dr. Cooper for a wound vac and infected mesh. Please call pt.      Action Taken: Message routed to:  [facility] ([facility]): Urology

## 2018-10-16 NOTE — PROGRESS NOTES
RN Post-Op/Post-Discharge Care Coordination Note    Ms. Sudheer Montiel is a 53 year old female who underwent wound debridement/VAC with discharge from the hospital yesterday. Spoke with Patient.    Support  Home care- wound VAC     Health Status  Fevers/chills: Patient denies any fever or chills.  Nausea/Vomiting: Patient denies nausea/vomiting.  Eating/drinking: Patient is able to eat and drink without any complaints.  Bowel habits: Patient reports having a normal bowel movement.  Drains (BRAD): N/A  Incisions: Patient denies any signs and symptoms of infection..  Wound closure:  VAC   Pain: Improving and manageable with Ibuprofen per package instructions.  Patient plans to take Dilaudid 30-45 minutes prior to wound VAC change.  New Medications:  Dilaudid, Bactrim/Cleocin (taking)    Activity/Restrictions  Other No lifting in excess of 10 pounds until further notice    Equipment  Wound VAC    Forms/Letters  Yes- completed and await review/signature.  Dr. Alvarado will complete forms until care is transferred to Dr. Cooper    All of her questions were answered.  She will call this office if she has any further questions and/or concerns.      Post discharge appointment not needed at this time unless patient/home care has concerns.  Patient is to see Dr. Cooper in Urology in 3-4 weeks and she will then transfer care to that team.  General Surgery will follow patient concerns/forms until seen by Urology.  Call placed to Dr. Cooper's office to arrange follow up; however, no appointments available until December.  Asked that they contact the patient directly for appointment in 3-4 weeks.  Patient will call this office if she is not contacted within next 72 hours.    Whom and When to Call  Patient acknowledges understanding of how to manage any medication changes and   when to seek medical care.     Patient advised that if after hour medical concerns arise to please call [phone] and choose option 4 to speak to the  physician on call.     A copy of this note was routed to the primary surgeon.

## 2018-10-17 ENCOUNTER — TELEPHONE (OUTPATIENT)
Dept: SURGERY | Facility: CLINIC | Age: 53
End: 2018-10-17

## 2018-10-17 DIAGNOSIS — M79.89 NECROTIZING SOFT TISSUE INFECTION: ICD-10-CM

## 2018-10-17 RX ORDER — HYDROMORPHONE HYDROCHLORIDE 2 MG/1
2 TABLET ORAL 3 TIMES DAILY PRN
Qty: 12 TABLET | Refills: 0 | Status: SHIPPED | OUTPATIENT
Start: 2018-10-17 | End: 2019-02-01

## 2018-10-17 NOTE — TELEPHONE ENCOUNTER
[facility] Call Center    Phone Message    May a detailed message be left on voicemail: yes    Reason for Call: Other: Skilled Nursing 2x a week for 1 week, 3x a week for 8 weeks, 3 PRN.  Per [facility] PT was sent home with pain medications     Action Taken: Message routed to:  [facility] General Surgery

## 2018-10-17 NOTE — TELEPHONE ENCOUNTER
Called and spoke with [facility] and provided VO for skilled nursing care. [facility] states patient did NOT receive pain medications upon discharge. Per her med list, a script was printed out for Dilaudid for the patient. Per discharge pharmacy they didn't fill a script and never had one tubed to them. RN will need to speak with Dr. Julien about providing patient a new script. She will have to  the script from the [facility] or have a family member pick it up. Will call patient with information once available.

## 2018-10-17 NOTE — TELEPHONE ENCOUNTER
Spoke with Dr. Julien and she has agreed to signing a script for 12 pills of Dilaudid. Called and spoke with patient and informed her of this. She states she will be able to get a ride to the [facility] pharmacy to  the prescription. She is aware the prescription won't be ready until around 4 pm today. She is aware to bring her ID to  the prescription. She will call with any further questions.

## 2018-10-18 ENCOUNTER — TELEPHONE (OUTPATIENT)
Dept: UROLOGY | Facility: CLINIC | Age: 53
End: 2018-10-18

## 2018-10-18 LAB — COPATH REPORT: NORMAL

## 2018-10-18 NOTE — TELEPHONE ENCOUNTER
----- Message from MATTHEW Thakur sent at 10/15/2018  8:39 AM CDT -----  Regarding: mesh infection  This patient developed abdominal necrotizing fasciitis and was taken to the OR for exploration.  A portion of her mesh sling was removed.      Can she get an appt with Dr. Cooper in 1 month to followup?  Dr. Cooper is aware of this patient.     Thanks!  Emma

## 2018-10-19 LAB
BACTERIA SPEC CULT: NORMAL
Lab: NORMAL
SPECIMEN SOURCE: NORMAL

## 2018-11-01 ENCOUNTER — PRE VISIT (OUTPATIENT)
Dept: UROLOGY | Facility: CLINIC | Age: 53
End: 2018-11-01

## 2018-11-01 NOTE — TELEPHONE ENCOUNTER
Patient with history of abdominal necrotizing fasciitis and was taken to the OR for exploration.  A portion of her mesh sling was removed. Patient coming in for follow up after this. Patient chart reviewed, no need for call, all records available and ready for appointment.

## 2018-11-05 ENCOUNTER — PATIENT OUTREACH (OUTPATIENT)
Dept: SURGERY | Facility: CLINIC | Age: 53
End: 2018-11-05

## 2018-11-05 DIAGNOSIS — S31.109D OPEN WOUND OF ABDOMINAL WALL, SUBSEQUENT ENCOUNTER: Primary | ICD-10-CM

## 2018-11-05 NOTE — PROGRESS NOTES
Patient calling the office regarding open abdominal wound.  She needs disability paperwork updated- she will have papers faxed to this office.  Home Care changing VAC three times a week.  By report, wound healing and estimating 3 weeks remaining with VAC.    Appointment arranged with Dr. Hogue and [facility] 11/21 at 1330.  Patient will bring VAC supplies.

## 2018-11-06 ENCOUNTER — DOCUMENTATION ONLY (OUTPATIENT)
Dept: CARE COORDINATION | Facility: CLINIC | Age: 53
End: 2018-11-06

## 2018-11-06 NOTE — PROGRESS NOTES
[facility] now requests orders and shares plan of care/discharge summaries for some patients through [facility].  Please REPLY TO THIS MESSAGE OR ROUTE BACK TO THE AUTHOR in order to give authorization for orders when needed.  This is considered a verbal order, you will still receive a faxed copy of orders for signature.  Thank you for your assistance in improving collaboration for our patients.    ORDER    Wound nurse assessment, as vac causing trauma to periwound skin.  Due to wound improving significantly and no longer any depth to pack in majority of wound, black foam migrating off wound tissue, and nursing feels wound no longer benefiting from wound vac, as majority of wound has now granulated up to surface, epithelialization occurring in center of wound.  Pt does cont to have 4cm of very narrow tunneling at 9 oclock area, improving.  Recommend moist gauze packing daily to cont to aid in moist wound healing.  Pt would also benefit from daily SN visits for 1 week, then 3x/wk once CG has been educated on wound care by nursing.  Please call primary nurse, Felecia, with verbal orders at [phone] if possible.  Thank you,   Bonnie Robles, RN, CWON

## 2018-11-07 ENCOUNTER — TELEPHONE (OUTPATIENT)
Dept: UROLOGY | Facility: CLINIC | Age: 53
End: 2018-11-07

## 2018-11-07 ENCOUNTER — OFFICE VISIT (OUTPATIENT)
Dept: UROLOGY | Facility: CLINIC | Age: 53
End: 2018-11-07
Payer: COMMERCIAL

## 2018-11-07 VITALS
OXYGEN SATURATION: 96 % | DIASTOLIC BLOOD PRESSURE: 75 MMHG | BODY MASS INDEX: 33.32 KG/M2 | HEIGHT: 65 IN | WEIGHT: 200 LBS | SYSTOLIC BLOOD PRESSURE: 131 MMHG | HEART RATE: 73 BPM

## 2018-11-07 DIAGNOSIS — K65.9 ABDOMINAL INFECTION (H): Primary | ICD-10-CM

## 2018-11-07 DIAGNOSIS — R35.0 FREQUENCY OF URINATION: ICD-10-CM

## 2018-11-07 DIAGNOSIS — R39.15 URINARY URGENCY: ICD-10-CM

## 2018-11-07 DIAGNOSIS — N95.2 ATROPHIC VAGINITIS: ICD-10-CM

## 2018-11-07 DIAGNOSIS — N39.3 STRESS INCONTINENCE: ICD-10-CM

## 2018-11-07 DIAGNOSIS — N39.41 URGE INCONTINENCE OF URINE: ICD-10-CM

## 2018-11-07 RX ORDER — ESTRADIOL 0.1 MG/G
2 CREAM VAGINAL
Status: DISCONTINUED | OUTPATIENT
Start: 2018-11-07 | End: 2018-11-07 | Stop reason: CLARIF

## 2018-11-07 RX ORDER — ESTRADIOL 0.1 MG/G
2 CREAM VAGINAL
Qty: 42.5 G | Refills: 11 | Status: SHIPPED | OUTPATIENT
Start: 2018-11-08 | End: 2020-09-18

## 2018-11-07 ASSESSMENT — PAIN SCALES - GENERAL: PAINLEVEL: NO PAIN (0)

## 2018-11-07 NOTE — NURSING NOTE
Chief Complaint   Patient presents with     Surgical Followup     wound check -mesh follow up   .    Nico Garcia MA

## 2018-11-07 NOTE — PROGRESS NOTES
Reason for Visit:  F/u on abdominal debridement on 10/8/18 and then again on 10/12/18.    Clinical Data:  Ms. Weems is a 52 yo female with a hx of a BioArc sling using her rectus fascia along with a hysterectomy in June of 2003.  Shortly after that she had an extrusion in the vaginal area and this was trimmed by me on 8/27/12 in the office.  It was a segment less than 1 cm squared.  The patient was to use estrogen cream and f/u for an exam.  She did not f/u.    Of note The BioArc utilizes a polypropylene mesh for fixation and a biologic for the suburethral support. Tensioning and loosening sutures maintain mesh integrity during placement and allow for intra-operative tensioning refinement    She then presented to [facility] with necrotizing fascitis of the abdomen and was debrided all the way down to her sling.  She was then transferred to the [facility] where she underwent additional debridement where Dr. Onofre removed any visible mesh in the abdomen.      On exam:  The suprapubic wound is dressed and surrounding tissues are without erythema  Vaginal exam shows no extrusion.  We could not see anything on speculum examination and nothing could be felt on palpation.    A/P:  54 y/o female with necrotizing fascitis of the lower abdomen throught to have been secondary to mesh sling in the area although it was 15 years it may have been a small strand extruding into the vagina that could have contaminated the right arm.  She has never had any problems with the left arm.  We discussed observation as well as adding estrogen vaginal cream.  We also discussed options to treat her mixed incontinence but she would like to hold off for now.  -start estrogen cream  -could consider medcation or pelvic floor PT in the future  -f/u in 3 months to reassess.

## 2018-11-07 NOTE — MR AVS SNAPSHOT
After Visit Summary   11/7/2018    Sudheer Montiel    MRN: [id number]           Patient Information     Date Of Birth          1965        Visit Information        Provider Department      11/7/2018 1:15 PM Chloe Cooper MD [facility] Urology and [facility] for Prostate and Urologic Cancers        Today's Diagnoses     Abdominal infection (H)    -  1    Stress incontinence        Urinary urgency        Urge incontinence of urine        Frequency of urination        Atrophic vaginitis          Care Instructions    Follow up with Dr. Cooper in three months in [place].      It was a pleasure meeting with you today.  Thank you for allowing me and my team the privilege of caring for you today.  YOU are the reason we are here, and I truly hope we provided you with the excellent service you deserve.  Please let us know if there is anything else we can do for you so that we can be sure you are leaving completely satisfied with your care experience.                  Follow-ups after your visit        Follow-up notes from your care team     Return in about 3 months (around 2/7/2019).      Your next 10 appointments already scheduled     Nov 21, 2018  1:30 PM CST   (Arrive by 1:15 PM)   Return Visit with Darlene Hogue MD   [facility] General Surgery ([facility])    [address] [place] [address] [phone]            Nov 21, 2018  1:30 PM CST   NEW WOUND NURSE VISIT with Yanique Flores RN   [facility] Wound Ostomy ([facility])    [address] [place] [address] [phone]            Feb 04, 2019  3:30 PM CST   Return Visit with Chloe Cooper MD   [facility] ([facility])    [address] [address] [place] [address] [phone]              Who to contact     Please call your clinic at [phone] to:    Ask questions about your  "health    Make or cancel appointments    Discuss your medicines    Learn about your test results    Speak to your doctor            Additional Information About Your Visit        Care EveryWhere ID     This is your Care EveryWhere ID. This could be used by other organizations to access your [facility] medical records  [id number]        Your Vitals Were     Pulse Height Pulse Oximetry BMI (Body Mass Index)          73 1.651 m (5' 5\") 96% 33.28 kg/m2         Blood Pressure from Last 3 Encounters:   11/07/18 131/75   10/15/18 138/56   03/26/18 135/79    Weight from Last 3 Encounters:   11/07/18 90.7 kg (200 lb)   10/14/18 111.6 kg (246 lb)   03/26/18 111.6 kg (246 lb)              Today, you had the following     No orders found for display       Primary Care Provider Office Phone # Fax #    Beth Luis A Cantu PA-C [phone]       [facility] [address]  [facility] [address]        Equal Access to Services     MILENA [employer]ACIA : Hadii aad ku hadasho Soomaali, waaxda luqadaha, qaybta kaalmada adeegyada, waxay chantellein hayjaci sage . So [facility] [phone].    ATENCIÓN: Si habla español, tiene a morin disposición servicios gratuitos de asistencia lingüística. Wallyame al [phone].    We comply with applicable federal civil rights laws and [place] laws. We do not discriminate on the basis of race, color, national origin, age, disability, sex, sexual orientation, or gender identity.            Thank you!     Thank you for choosing [facility] UROLOGY AND [facility] FOR PROSTATE AND UROLOGIC CANCERS  for your care. Our goal is always to provide you with excellent care. Hearing back from our patients is one way we can continue to improve our services. Please take a few minutes to complete the written survey that you may receive in the mail after your visit with us. Thank you!             Your Updated Medication List - Protect others around you: Learn how to safely use, store and throw " away your medicines at www.disposemymeds.org.          This list is accurate as of 11/7/18  2:19 PM.  Always use your most recent med list.                   Brand Name Dispense Instructions for use Diagnosis    ACCU-CHEK ROHITH Kit           acetaminophen 325 MG tablet    TYLENOL    100 tablet    Take 3 tablets (975 mg) by mouth every 8 hours as needed for mild pain    Necrotizing soft tissue infection       blood glucose calibration solution      1 Bottle.        blood glucose monitoring lancets           blood glucose monitoring test strip    ACCU-CHEK ROHITH    200 strip    1 strip by In Vitro route 2 times daily    Type 2 diabetes mellitus without complication, without long-term current use of insulin (H)       gabapentin 100 MG capsule    NEURONTIN    90 capsule    Take 1 capsule (100 mg) by mouth 2 times daily    Necrotizing soft tissue infection       glimepiride 4 MG tablet    AMARYL    30 tablet    Take 1 tablet (4 mg) by mouth every morning (before breakfast)    Type 2 diabetes mellitus without complication, without long-term current use of insulin (H)       HYDROmorphone 2 MG tablet    DILAUDID    12 tablet    Take 1 tablet (2 mg) by mouth 3 times daily as needed for moderate to severe pain    Necrotizing soft tissue infection       hydrOXYzine 10 MG tablet    ATARAX    20 tablet    Take 1 tablet (10 mg) by mouth 3 times daily as needed for itching    Necrotizing soft tissue infection       levothyroxine 25 MCG tablet    SYNTHROID/LEVOTHROID    30 tablet    TAKE ONE TABLET BY MOUTH EVERY DAY    Acquired hypothyroidism       metFORMIN 750 MG 24 hr tablet    GLUCOPHAGE-XR    60 tablet    Take 1 tablet (750 mg) by mouth 2 times daily (with meals)    Type 2 diabetes mellitus without complication, without long-term current use of insulin (H)       Thyroid 90 MG Tabs    NP THYROID    60 tablet    Take 2-tablets by mouth each morning    Acquired hypothyroidism       valACYclovir 500 MG tablet    VALTREX     Take  500 mg by mouth daily

## 2018-11-07 NOTE — PATIENT INSTRUCTIONS
Follow up with Dr. Cooper in three months in [place].      It was a pleasure meeting with you today.  Thank you for allowing me and my team the privilege of caring for you today.  YOU are the reason we are here, and I truly hope we provided you with the excellent service you deserve.  Please let us know if there is anything else we can do for you so that we can be sure you are leaving completely satisfied with your care experience.

## 2018-11-07 NOTE — TELEPHONE ENCOUNTER
[facility] Prior Authorization Team Request    Medication: Estrace 0.1mg/gm Cream  Dosing: Place 2 grams vaginally twice weekly  NDC (required for Medicaid members):     Insurance   BIN: [id number]  PCN:[id number]  Grp:  ID:[id number]    CoverMyMeds Key (if applicable):     Additional documentation:     Filling [facility]: [facility]  Phone Number:[phone]  Contact: ANNITA PHARM JAMEL PA (P [id number]) please send all responses to this pool.  Pharmacy NPI (required for Medicaid members):

## 2018-11-07 NOTE — LETTER
11/7/2018       RE: Sudheer Montiel  [address] [address]     Dear Colleague,    Thank you for referring your patient, Sudheer Montiel, to the [facility] UROLOGY AND INST FOR PROSTATE AND UROLOGIC CANCERS at [facility]. Please see a copy of my visit note below.    Reason for Visit:  F/u on abdominal debridement on 10/8/18 and then again on 10/12/18.    Clinical Data:  Ms. Weems is a 54 yo female with a hx of a BioArc sling using her rectus fascia along with a hysterectomy in June of 2003.  Shortly after that she had an extrusion in the vaginal area and this was trimmed by me on 8/27/12 in the office.  It was a segment less than 1 cm squared.  The patient was to use estrogen cream and f/u for an exam.  She did not f/u.    Of note The BioArc utilizes a polypropylene mesh for fixation and a biologic for the suburethral support. Tensioning and loosening sutures maintain mesh integrity during placement and allow for intra-operative tensioning refinement    She then presented to [facility] with necrotizing fascitis of the abdomen and was debrided all the way down to her sling.  She was then transferred to the [facility] where she underwent additional debridement where Dr. Onofre removed any visible mesh in the abdomen.      On exam:  The suprapubic wound is dressed and surrounding tissues are without erythema  Vaginal exam shows no extrusion.  We could not see anything on speculum examination and nothing could be felt on palpation.    A/P:  54 y/o female with necrotizing fascitis of the lower abdomen throught to have been secondary to mesh sling in the area although it was 15 years it may have been a small strand extruding into the vagina that could have contaminated the right arm.  She has never had any problems with the left arm.  We discussed observation as well as adding estrogen vaginal cream.  We also discussed options to treat her mixed incontinence but she  would like to hold off for now.  -start estrogen cream  -could consider medcation or pelvic floor PT in the future  -f/u in 3 months to reassess.    Again, thank you for allowing me to participate in the care of your patient.      Sincerely,    Chloe Cooper MD

## 2018-11-08 ENCOUNTER — TELEPHONE (OUTPATIENT)
Dept: SURGERY | Facility: CLINIC | Age: 53
End: 2018-11-08

## 2018-11-08 ENCOUNTER — PATIENT OUTREACH (OUTPATIENT)
Dept: SURGERY | Facility: CLINIC | Age: 53
End: 2018-11-08

## 2018-11-08 NOTE — TELEPHONE ENCOUNTER
[facility] Call Center    Phone Message    May a detailed message be left on voicemail: yes    Reason for Call: Other: Felecia calling to say that she is taking over the pt care while Bonnie is out on Maternity leave. Bonnie left a note in pt chart for orders. Please call Felecia to discuss further. she needs orders soon as PRN's are going quickly.     Action Taken: Message routed to:  [facility]): General Surgery

## 2018-11-08 NOTE — TELEPHONE ENCOUNTER
Spoke with DOUGIE Izquierdo.  Orders approved.      Ideally, Dr. Hogue would like BID wet to dry dressing changes, if possible.    Wound nurse assessment, as vac causing trauma to periwound skin.  Due to wound improving significantly and no longer any depth to pack in majority of wound, black foam migrating off wound tissue, and nursing feels wound no longer benefiting from wound vac, as majority of wound has now granulated up to surface, epithelialization occurring in center of wound.  Pt does cont to have 4cm of very narrow tunneling at 9 oclock area, improving.  Recommend moist gauze packing daily to cont to aid in moist wound healing.  Pt would also benefit from daily SN visits for 1 week, then 3x/wk once CG has been educated on wound care by nursing.

## 2018-11-08 NOTE — PROGRESS NOTES
Received a call from [facility] RN, Felecia. She states patient recently received orders for BID wet/dry dressing changes now that her wvac has been removed. She wanted to know if it was ok for patient to shower. Discussed with Felecia that patient may shower, ok to get wound wet with soap and water. The dressing should be removed prior to showering. Felecia was appreciative of the information.

## 2018-11-12 NOTE — TELEPHONE ENCOUNTER
PA Initiation    Medication: Estrace 0.1mg/gm Cream -   Insurance Company: [employer] - Phone [phone] Fax [phone]  Pharmacy Filling the Rx: [facility] [place], [place] - [address] [place] [address] [phone]  Filling Pharmacy Phone: [phone]  Filling Pharmacy Fax:    Start Date: 11/12/2018    Tracking Number: [id number]

## 2018-11-13 ENCOUNTER — PATIENT OUTREACH (OUTPATIENT)
Dept: SURGERY | Facility: CLINIC | Age: 53
End: 2018-11-13

## 2018-11-13 NOTE — PROGRESS NOTES
Spoke with M nurse and wound is healing.  They would like orders to extend SN visits daily for another 2 weeks.  Orders approved.  Will fax orders to Dr. oHgue for signature.

## 2018-11-14 NOTE — TELEPHONE ENCOUNTER
Prior Authorization Not Needed per Insurance    Medication: Estrace 0.1mg/gm Cream - NOT NEEDED  Insurance Company: [employer] - Phone [phone] Fax [phone]  Expected CoPay:      Pharmacy Filling the Rx: [facility] [place], [place] - [address] [place] [address] [id number]  Pharmacy Notified: Yes  Patient Notified: Comment:  **Pharmacy will notify patient when script is ready for .**

## 2018-11-19 ENCOUNTER — PATIENT OUTREACH (OUTPATIENT)
Dept: CARE COORDINATION | Facility: CLINIC | Age: 53
End: 2018-11-19

## 2018-11-20 ENCOUNTER — TELEPHONE (OUTPATIENT)
Dept: SURGERY | Facility: CLINIC | Age: 53
End: 2018-11-20

## 2018-11-20 NOTE — TELEPHONE ENCOUNTER
Established Patient Telephone Reminder Call    Date of call:  11/20/18  Phone numbers:  Home number on file [phone] (home)    Reached patient/confirmed appointment:  Yes  Appointment with:   Dr. Darlene Hogue  Reason for visit:  Post op  Other: Confirmed WOCN appointment with patient.

## 2018-11-21 ENCOUNTER — OFFICE VISIT (OUTPATIENT)
Dept: SURGERY | Facility: CLINIC | Age: 53
End: 2018-11-21
Payer: COMMERCIAL

## 2018-11-21 ENCOUNTER — OFFICE VISIT (OUTPATIENT)
Dept: WOUND CARE | Facility: CLINIC | Age: 53
End: 2018-11-21
Payer: COMMERCIAL

## 2018-11-21 VITALS
DIASTOLIC BLOOD PRESSURE: 79 MMHG | BODY MASS INDEX: 37.05 KG/M2 | TEMPERATURE: 98.1 F | OXYGEN SATURATION: 96 % | WEIGHT: 222.4 LBS | SYSTOLIC BLOOD PRESSURE: 135 MMHG | HEART RATE: 71 BPM | HEIGHT: 65 IN

## 2018-11-21 DIAGNOSIS — Z09 FOLLOW-UP EXAMINATION FOLLOWING SURGERY: Primary | ICD-10-CM

## 2018-11-21 DIAGNOSIS — S31.109D OPEN WOUND OF ABDOMINAL WALL, SUBSEQUENT ENCOUNTER: ICD-10-CM

## 2018-11-21 RX ORDER — LIRAGLUTIDE 6 MG/ML
INJECTION SUBCUTANEOUS
COMMUNITY
Start: 2017-10-27 | End: 2019-02-02

## 2018-11-21 ASSESSMENT — PAIN SCALES - GENERAL: PAINLEVEL: NO PAIN (0)

## 2018-11-21 NOTE — MR AVS SNAPSHOT
"              After Visit Summary   2018    Sudheer Montiel    MRN: [id number]           Patient Information     Date Of Birth          1965        Visit Information        Provider Department      2018 1:30 PM Darlene Hogue MD [facility] General Surgery        Care Instructions    You met with Dr. Darlene Hogue.      Today's visit instructions:    Return to see Dr. Hogue on an as needed basis.  Please follow up with Yanique as recommended.      If you have questions please contact Steve RN or Ashly RN during regular clinic hours, Monday through Friday 7:30 AM - 4:00 PM, or you can contact us via Preggers at anytime.       If you have urgent needs after-hours, weekends, or holidays please call the hospital at [phone] and ask to speak with our on-call General Surgery Team.    Appointment [phone], option #1   Nurse Advice (Steve or Ashly): [phone]   Surgery Scheduler ([place]): [phone]  Fax: [phone]                      Follow-ups after your visit        Your next 10 appointments already scheduled     2019  3:30 PM CST   Return Visit with Chloe Cooper MD   [facility]    [address] [place] [address] [phone]              Who to contact     Please call your clinic at [phone] to:    Ask questions about your health    Make or cancel appointments    Discuss your medicines    Learn about your test results    Speak to your doctor            Additional Information About Your Visit        Care EveryWhere ID     This is your Care EveryWhere ID. This could be used by other organizations to access your [place] medical records  [id number]        Your Vitals Were     Pulse Temperature Height Pulse Oximetry BMI (Body Mass Index)       71 98.1  F (36.7  C) (Oral) 1.651 m (5' 5\") 96% 37.01 kg/m2        Blood Pressure from Last 3 Encounters:   18 135/79   18 " 131/75   10/15/18 138/56    Weight from Last 3 Encounters:   11/21/18 100.9 kg (222 lb 6.4 oz)   11/07/18 90.7 kg (200 lb)   10/14/18 111.6 kg (246 lb)              Today, you had the following     No orders found for display       Primary Care Provider Office Phone # Fax #    Beth Luis A Cantu PA-C [phone]       [facility] [address] [facility] [address]  [facility] [address]        Equal Access to Services     [facility]: Hadii aad ku hadasho Soomaali, waaxda luqadaha, qaybta kaalmada adeegyada, waxay issac hayirvingn rubi sage . So [facility] [phone].    ATENCIÓN: Si habla español, tiene a morin disposición servicios gratuitos de asistencia lingüística. [facility] [phone].    We comply with applicable federal civil rights laws and [place] laws. We do not discriminate on the basis of race, color, national origin, age, disability, sex, sexual orientation, or gender identity.            Thank you!     Thank you for choosing [facility] SURGERY  for your care. Our goal is always to provide you with excellent care. Hearing back from our patients is one way we can continue to improve our services. Please take a few minutes to complete the written survey that you may receive in the mail after your visit with us. Thank you!             Your Updated Medication List - Protect others around you: Learn how to safely use, store and throw away your medicines at www.disposemymeds.org.          This list is accurate as of 11/21/18  2:04 PM.  Always use your most recent med list.                   Brand Name Dispense Instructions for use Diagnosis    ACCU-CHEK ROHITH Kit           acetaminophen 325 MG tablet    TYLENOL    100 tablet    Take 3 tablets (975 mg) by mouth every 8 hours as needed for mild pain    Necrotizing soft tissue infection       blood glucose calibration solution      1 Bottle.        blood glucose monitoring lancets           blood glucose monitoring test strip     ACCU-CHEK ROHITH    200 strip    1 strip by In Vitro route 2 times daily    Type 2 diabetes mellitus without complication, without long-term current use of insulin (H)       estradiol 0.1 MG/GM cream    ESTRACE VAGINAL    42.5 g    Place 2 g vaginally twice a week    Atrophic vaginitis       gabapentin 100 MG capsule    NEURONTIN    90 capsule    Take 1 capsule (100 mg) by mouth 2 times daily    Necrotizing soft tissue infection       glimepiride 4 MG tablet    AMARYL    30 tablet    Take 1 tablet (4 mg) by mouth every morning (before breakfast)    Type 2 diabetes mellitus without complication, without long-term current use of insulin (H)       HYDROmorphone 2 MG tablet    DILAUDID    12 tablet    Take 1 tablet (2 mg) by mouth 3 times daily as needed for moderate to severe pain    Necrotizing soft tissue infection       hydrOXYzine 10 MG tablet    ATARAX    20 tablet    Take 1 tablet (10 mg) by mouth 3 times daily as needed for itching    Necrotizing soft tissue infection       levothyroxine 25 MCG tablet    SYNTHROID/LEVOTHROID    30 tablet    TAKE ONE TABLET BY MOUTH EVERY DAY    Acquired hypothyroidism       metFORMIN 750 MG 24 hr tablet    GLUCOPHAGE-XR    60 tablet    Take 1 tablet (750 mg) by mouth 2 times daily (with meals)    Type 2 diabetes mellitus without complication, without long-term current use of insulin (H)       Thyroid 90 MG Tabs    NP THYROID    60 tablet    Take 2-tablets by mouth each morning    Acquired hypothyroidism       valACYclovir 500 MG tablet    VALTREX     Take 500 mg by mouth daily        VICTOZA PEN 18 MG/3ML solution   Generic drug:  liraglutide

## 2018-11-21 NOTE — PROGRESS NOTES
"Patient comes to wound clinic for wound assessment per request of dr. Hogue. She has history of a Open surgical wound due to Irrigation and debridement of lower abdominal wound and Partial excision of mesh  On 10/12/18  Clean gloves are donned and current dressing removed. Previous treatment includes: dressing changed  1 dayago    Objective findings:      Location: pfannenstiel incision line wounds on the abdomen    Measured.: L 3 cm x, W 0.1 cm x, D 0.1  cm, left side 1 cm x 1 cm 1 0.5 cm  right side 3 cm x 1 cm x 0.1 cm     Wound description: no sign of infection    Tenderness:no    Odor: none     Drainage is scant, serosanguinous     Tunneling:  N/A      Undermining: N/A    Wound Base: moist granulation    Periwound Skin: intact,      Color: normal and consistent with surrounding tissue     Subjective finding:     Pt states: glad not to have wound vac    Patient is assessed for discomfort which is: absent    Today's status of the wound: granulating    Treatment: Removal of existing dressing, Visual inspection, Cleansing with Microklenz spray, Moist gauze and tape TREATMENT CHANGE:[id number]::\"No changes to treatment at this time\"}        Pt received the following instructions:    Cleansing with Microklenz. Primary Dressing moist gauze . Secure with: Tape. Frequency:Twice daily.   Signs and symptoms of infection taught.  Patient needs to be seen prn. Dr. Root saw pt and  was available for supervision of care if needed or if questions should arise and regarding plan of care. Yanique Flores RN, CWON                "

## 2018-11-21 NOTE — PATIENT INSTRUCTIONS
You met with Dr. Darlene Hogue.      Today's visit instructions:    Return to see Dr. Hogue on an as needed basis.       If you have questions please contact Steve RN or Ashly RN during regular clinic hours, Monday through Friday 7:30 AM - 4:00 PM, or you can contact us via Flythegap at anytime.       If you have urgent needs after-hours, weekends, or holidays please call the hospital at [phone] and ask to speak with our on-call General Surgery Team.    Appointment [phone], option #1   Nurse Advice (Steve or Ashly): [phone]   Surgery Scheduler ([place]): [phone]  Fax: [phone]

## 2018-11-21 NOTE — PROGRESS NOTES
patinet here for f/u.  Doing well overall.  Wounds examined with Beatris Flores, wound care RN, please see her note for full details.  Wounds appear healthy and shallow.  No sign of active infection.  Continue current wound management.  Expect the wounds to heal in next few weeks.  F/u with us PRN.  F/u with urology with respect to further discussion of the mesh.

## 2018-11-21 NOTE — LETTER
November 21, 2018      TO: Sudheer Montiel  [address] [facility] [address]         To Whom It May Concern,    Sudheer Montiel is under my professional care after undergoing an unplanned surgical procedure for a soft tissue infection on October 12, 2018. Ms. Montiel was hospitalized October 10 - 15, 2018.  She required a period of recovery and the assistance of her family member for support.        Sincerely,          Darlene Hogue MD

## 2018-11-21 NOTE — NURSING NOTE
"Chief Complaint   Patient presents with     Consult     Open Wound Vac       Vitals:    11/21/18 1337   BP: 135/79   Pulse: 71   Temp: 98.1  F (36.7  C)   TempSrc: Oral   SpO2: 96%   Weight: 222 lb 6.4 oz   Height: 5' 5\"       Body mass index is 37.01 kg/(m^2).    Nakul Damico on 11/21/2018 at 1:42 PM                          "

## 2018-11-21 NOTE — MR AVS SNAPSHOT
After Visit Summary   11/21/2018    Sudheer Montiel    MRN: [id number]           Patient Information     Date Of Birth          1965        Visit Information        Provider Department      11/21/2018 1:30 PM Yanique Flores RN [facility] Wound Ostomy        Today's Diagnoses     Open wound of abdominal wall, subsequent encounter           Follow-ups after your visit        Your next 10 appointments already scheduled     Feb 04, 2019  3:30 PM CST   [facility]   [facility]    [address] [place] [address] [phone]              Who to contact     Please call your clinic at [phone] to:    Ask questions about your health    Make or cancel appointments    Discuss your medicines    Learn about your test results    Speak to your doctor            Additional Information About Your Visit        Care EveryWhere ID     This is your Care EveryWhere ID. This could be used by other organizations to access your [place] medical records  [id number]         Blood Pressure from Last 3 Encounters:   11/21/18 135/79   11/07/18 131/75   10/15/18 138/56    Weight from Last 3 Encounters:   11/21/18 100.9 kg (222 lb 6.4 oz)   11/07/18 90.7 kg (200 lb)   10/14/18 111.6 kg (246 lb)              We Performed the Following     WOUND CARE REFERRAL        Primary Care Provider Office Phone # Fax #    Beth Luis A Cantu PA-C [phone]       [facility] [address] [facility] [address]  [facility] [address]        Equal Access to Services     [facility] AH: Hadii aad ku hadasho Soomaali, waaxda luqadaha, qaybta kaalmada adeegyada, waxay chantellein hayirvingn rubi whittaker'jaci . So [facility] [phone].    ATENCIÓN: Si habla español, tiene a morin disposición servicios gratuitos de asistencia lingüística. Llame al [phone].    We comply with applicable federal civil rights laws and [place] laws. We do not  discriminate on the basis of race, color, national origin, age, disability, sex, sexual orientation, or gender identity.            Thank you!     Thank you for choosing [facility] WOUND OSTOMY  for your care. Our goal is always to provide you with excellent care. Hearing back from our patients is one way we can continue to improve our services. Please take a few minutes to complete the written survey that you may receive in the mail after your visit with us. Thank you!             Your Updated Medication List - Protect others around you: Learn how to safely use, store and throw away your medicines at www.disposemymeds.org.          This list is accurate as of 11/21/18  3:14 PM.  Always use your most recent med list.                   Brand Name Dispense Instructions for use Diagnosis    ACCU-CHEK ROHITH Kit           acetaminophen 325 MG tablet    TYLENOL    100 tablet    Take 3 tablets (975 mg) by mouth every 8 hours as needed for mild pain    Necrotizing soft tissue infection       blood glucose calibration solution      1 Bottle.        blood glucose monitoring lancets           blood glucose monitoring test strip    ACCU-CHEK ROHITH    200 strip    1 strip by In Vitro route 2 times daily    Type 2 diabetes mellitus without complication, without long-term current use of insulin (H)       estradiol 0.1 MG/GM cream    ESTRACE VAGINAL    42.5 g    Place 2 g vaginally twice a week    Atrophic vaginitis       gabapentin 100 MG capsule    NEURONTIN    90 capsule    Take 1 capsule (100 mg) by mouth 2 times daily    Necrotizing soft tissue infection       glimepiride 4 MG tablet    AMARYL    30 tablet    Take 1 tablet (4 mg) by mouth every morning (before breakfast)    Type 2 diabetes mellitus without complication, without long-term current use of insulin (H)       HYDROmorphone 2 MG tablet    DILAUDID    12 tablet    Take 1 tablet (2 mg) by mouth 3 times daily as needed for moderate to severe pain    Necrotizing soft  tissue infection       hydrOXYzine 10 MG tablet    ATARAX    20 tablet    Take 1 tablet (10 mg) by mouth 3 times daily as needed for itching    Necrotizing soft tissue infection       levothyroxine 25 MCG tablet    SYNTHROID/LEVOTHROID    30 tablet    TAKE ONE TABLET BY MOUTH EVERY DAY    Acquired hypothyroidism       metFORMIN 750 MG 24 hr tablet    GLUCOPHAGE-XR    60 tablet    Take 1 tablet (750 mg) by mouth 2 times daily (with meals)    Type 2 diabetes mellitus without complication, without long-term current use of insulin (H)       Thyroid 90 MG Tabs    NP THYROID    60 tablet    Take 2-tablets by mouth each morning    Acquired hypothyroidism       valACYclovir 500 MG tablet    VALTREX     Take 500 mg by mouth daily        VICTOZA PEN 18 MG/3ML solution   Generic drug:  liraglutide

## 2018-11-27 ENCOUNTER — PATIENT OUTREACH (OUTPATIENT)
Dept: SURGERY | Facility: CLINIC | Age: 53
End: 2018-11-27

## 2018-11-27 NOTE — PROGRESS NOTES
Patient returned call. She states she returned to work today, 11/27. Updated patients paperwork and faxed to the number provided.

## 2018-11-27 NOTE — PROGRESS NOTES
Received paperwork for patients STD. Attempted to contact patient to see when she is planning on returning to work. LM asking patient to call to discuss when able. GS number provided.

## 2018-12-12 ENCOUNTER — OFFICE VISIT (OUTPATIENT)
Dept: WOUND CARE | Facility: CLINIC | Age: 53
End: 2018-12-12
Payer: COMMERCIAL

## 2018-12-12 DIAGNOSIS — S31.109D OPEN WOUND OF ABDOMINAL WALL, SUBSEQUENT ENCOUNTER: Primary | ICD-10-CM

## 2018-12-12 NOTE — PROGRESS NOTES
Patient comes to wound clinic for wound assessment per request of dr. Hogue. She has history of a Open surgical wound due to Irrigation and debridement of lower abdominal wound and Partial excision of mesh  On 10/12/18  Clean gloves are donned and current dressing removed. Previous treatment includes: dressing last changed 4 days ago. No dressing since then    Objective findings: Wound healed  Subjective finding:     Pt states: Home care had asked her to go to clinic for final chaeck. Pt states she is doing great: Very appreciative of Dr Hogue (She has been very impressed with his kindness) and his team Volodymyr.     Patient is assessed for discomfort which is: absent    Today's status of the wound: healed    Treatment: Assessment of the area revealed a well healed scar  Pt received the following instructions:     Dr. Lawrence  was available for supervision of care if needed or if questions should arise and regarding plan of care. Yanique Flores RN, CWON

## 2019-02-01 ENCOUNTER — OFFICE VISIT (OUTPATIENT)
Dept: ENDOCRINOLOGY | Facility: CLINIC | Age: 54
End: 2019-02-01
Payer: COMMERCIAL

## 2019-02-01 VITALS
WEIGHT: 221.7 LBS | DIASTOLIC BLOOD PRESSURE: 65 MMHG | SYSTOLIC BLOOD PRESSURE: 123 MMHG | BODY MASS INDEX: 36.94 KG/M2 | HEART RATE: 79 BPM | HEIGHT: 65 IN

## 2019-02-01 DIAGNOSIS — E11.65 UNCONTROLLED TYPE 2 DIABETES MELLITUS WITH HYPERGLYCEMIA (H): ICD-10-CM

## 2019-02-01 DIAGNOSIS — E66.01 MORBID OBESITY (H): ICD-10-CM

## 2019-02-01 DIAGNOSIS — E03.9 ACQUIRED HYPOTHYROIDISM: ICD-10-CM

## 2019-02-01 DIAGNOSIS — E11.9 TYPE 2 DIABETES MELLITUS WITHOUT COMPLICATION, WITHOUT LONG-TERM CURRENT USE OF INSULIN (H): Primary | ICD-10-CM

## 2019-02-01 LAB
ALT SERPL W P-5'-P-CCNC: 27 U/L (ref 0–50)
ANION GAP SERPL CALCULATED.3IONS-SCNC: 4 MMOL/L (ref 3–14)
BUN SERPL-MCNC: 10 MG/DL (ref 7–30)
CALCIUM SERPL-MCNC: 9.3 MG/DL (ref 8.5–10.1)
CHLORIDE SERPL-SCNC: 104 MMOL/L (ref 94–109)
CO2 SERPL-SCNC: 28 MMOL/L (ref 20–32)
CREAT SERPL-MCNC: 0.56 MG/DL (ref 0.52–1.04)
GFR SERPL CREATININE-BSD FRML MDRD: >90 ML/MIN/{1.73_M2}
GLUCOSE SERPL-MCNC: 219 MG/DL (ref 70–99)
HBA1C MFR BLD: 8.9 % (ref 0–5.6)
POTASSIUM SERPL-SCNC: 3.9 MMOL/L (ref 3.4–5.3)
SODIUM SERPL-SCNC: 136 MMOL/L (ref 133–144)
T3FREE SERPL-MCNC: 8.9 PG/ML (ref 2.3–4.2)
T4 FREE SERPL-MCNC: 1.67 NG/DL (ref 0.76–1.46)
TSH SERPL DL<=0.005 MIU/L-ACNC: <0.01 MU/L (ref 0.4–4)

## 2019-02-01 PROCEDURE — 84439 ASSAY OF FREE THYROXINE: CPT | Performed by: INTERNAL MEDICINE

## 2019-02-01 PROCEDURE — 36415 COLL VENOUS BLD VENIPUNCTURE: CPT | Performed by: INTERNAL MEDICINE

## 2019-02-01 PROCEDURE — 99215 OFFICE O/P EST HI 40 MIN: CPT | Performed by: INTERNAL MEDICINE

## 2019-02-01 PROCEDURE — 80048 BASIC METABOLIC PNL TOTAL CA: CPT | Performed by: INTERNAL MEDICINE

## 2019-02-01 PROCEDURE — 84481 FREE ASSAY (FT-3): CPT | Performed by: INTERNAL MEDICINE

## 2019-02-01 PROCEDURE — 84443 ASSAY THYROID STIM HORMONE: CPT | Performed by: INTERNAL MEDICINE

## 2019-02-01 PROCEDURE — 84460 ALANINE AMINO (ALT) (SGPT): CPT | Performed by: INTERNAL MEDICINE

## 2019-02-01 PROCEDURE — 83036 HEMOGLOBIN GLYCOSYLATED A1C: CPT | Performed by: INTERNAL MEDICINE

## 2019-02-01 RX ORDER — CELECOXIB 200 MG/1
200 CAPSULE ORAL DAILY
Refills: 1 | Status: ON HOLD | COMMUNITY
Start: 2018-12-18 | End: 2023-08-20

## 2019-02-01 RX ORDER — OMEPRAZOLE 10 MG/1
10 CAPSULE, DELAYED RELEASE ORAL DAILY
COMMUNITY
Start: 2016-03-19 | End: 2024-04-05

## 2019-02-01 ASSESSMENT — MIFFLIN-ST. JEOR: SCORE: 1606.5

## 2019-02-01 NOTE — PROGRESS NOTES
Name: Sudheer Montiel      HPI:  Recent issues:  Here for f/u diabetes and thyroid evaluation  Had abdominal abcess illness 10/2018, hospitalization and abdominal surgery.    Feeling better now, but recent symptoms with frequent urination, increased thirst   Success with weight loss; denies abdominal pain, nausea, fever        Diabetes:  ~4/2010. Initial diagnosis approx age 44  ...Has taken several DM meds including metformin, Byetta, glimeparide, Invokana, and Victoza   In 2018, she had been taking metformin, Victoza, glimeparide, and Jardiance  She stopped Jardiance, also ran out of Victoza last year  10/2018. Abdominal abcess illness, hospitalizations at [facility] and then [facility]   2-surgeries for abcess, thought related to the hysterectomy bladder sling mesh from prior surgery    Current DM meds:   Metformin 750 mg BID   glimeparide 4 mg 1-tab po QAM    Has Accucheck meter, also another cheaper? Meter   No recent BG testing, no data available today  Recent [facility] labs include:  Lab Results   Component Value Date    A1C 9.4 (H) 10/09/2018     10/14/2018    POTASSIUM 3.6 10/14/2018    CHLORIDE 106 10/14/2018    CO2 28 10/14/2018    ANIONGAP 6 10/14/2018    GLC 99 10/14/2018    BUN 5 (L) 10/14/2018    CR 0.90 10/14/2018    GFRESTIMATED 66 10/14/2018    GFRESTBLACK 79 10/14/2018    JOANN 7.9 (L) 10/14/2018    CHOL 210@ 03/10/2011    TRIG 127@ 03/10/2011    HDL 48@ 03/10/2011    @ 03/10/2011     Last eye exam 2018?, results not available  DM Complications:  None known        Hypothyroidism:  Had taken Synthroid  Changed to Hestand thyroid medication, then Hestand + levothyroxine combo dose plan  Had taken Hestand thyroid 2-tablets daily... Possibly 60 mg 2-tabs QD  Early-mid 3/2018. 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 Frantz Guillen?/[facility]    PMH/PSH:  Past Medical History:   Diagnosis Date     DM (diabetes mellitus) (H) 2011     Hypothyroid 80's     Necrotizing fasciitis (H)      Soft tissue infection      Past Surgical History:   Procedure Laterality Date     C REPAIR CRUCIATE LIGAMENT,KNEE        SECTION       COLONOSCOPY N/A 10/21/2015    Procedure: COLONOSCOPY;  Surgeon: Jaky Arredondo MD;  Location:  GI     IRRIGATION AND DEBRIDEMENT ABDOMEN WASHOUT, COMBINED N/A 10/12/2018    Procedure: COMBINED IRRIGATION AND DEBRIDEMENT ABDOMEN WASHOUT;  Irrigation and Debridement of Lower Abdomen, removal of piece of mesh.;  Surgeon: Darlene Hogue MD;  Location:  OR     marisol/bso      due to anemia       Family Hx:  No family history on file.      Social Hx:  Social History     Socioeconomic History     Marital status:      Spouse name: Not on file     Number of children: 2     Years of education: Not on file     Highest education level: Not on file   Social Needs     Financial resource strain: Not on file     Food insecurity - worry: Not on file     Food insecurity - inability: Not on file     Transportation needs - medical: Not on file     Transportation needs - non-medical: Not on file   Occupational History     Occupation: surgery scheduler [employer]   Tobacco Use     Smoking status: Former Smoker     Last attempt to quit: 2011     Years since quittin.9     Smokeless tobacco: Never Used   Substance and Sexual Activity     Alcohol use: No     Drug use: No     Sexual activity: Not on file   Other Topics Concern     Not on file   Social History Narrative     Not on file          MEDICATIONS:  has a current medication list which includes the following prescription(s): celecoxib, estradiol, glimepiride, insulin degludec,  "insulin pen needle, levothyroxine, metformin, omeprazole, thyroid, valacyclovir, blood glucose monitoring, blood glucose calibration, blood glucose, accu-chek christophe, gabapentin, hydroxyzine, and liraglutide.    ROS:     ROS: 10 point ROS neg other than the symptoms noted above in the HPI.    GENERAL: some fatigue, wt loss 25#/10 mo; denies fevers, chills, night sweats.   HEENT:  no dysphagia, odonophagia, diplopia, neck pain  THYROID:  no apparent hyper or hypothyroid symptoms  CV: no chest pain, pressure, palpitations  LUNGS: no SOB, HAWKINS, cough, wheezing   ABDOMEN: no abdominal pain; denies diarrhea, indigestion, constipation  EXTREMITIES: no rashes, ulcers, edema  NEUROLOGY: no headaches, denies changes in vision, tingling, extremitiy numbness   MSK: occasional low back pain; no muscle aches or pains, weakness  SKIN: no rashes or lesions  : increased thirst, frequent urination; no menses?!  PSYCH:  stable mood, no significant anxiety or depression  ENDOCRINE: no heat or cold intolerance      Physical Exam   VS: /65   Pulse 79   Ht 1.651 m (5' 5\")   Wt 100.6 kg (221 lb 11.2 oz)   BMI 36.89 kg/m    GENERAL: AXOX3, NAD, well dressed, answering questions appropriately, appears stated age.  THYROID:  normal gland, no apparent nodules or goiter  HEENT: neck non-tender, no exopthalmous, EOMI  CV: RRR, no rubs, gallops, no murmurs  LUNGS: CTAB, no wheezes, rales, or ronchi  ABDOMEN: obese, soft, nontender, nondistended  EXTREMITIES: no edema, no lesions  NEUROLOGY: CN grossly intact, no tremors  MSK: grossly intact  SKIN: vertical hypogastrium and low abd horizontal scars; no rashes, no lesions    LABS:    All pertinent notes, labs, and images personally reviewed by me.     A/P:  Encounter Diagnoses   Name Primary?     Type 2 diabetes mellitus without complication, without long-term current use of insulin (H) Yes     Uncontrolled type 2 diabetes mellitus with hyperglycemia (H)      Acquired hypothyroidism      " Morbid obesity (H)        Comments:  Reviewed health issues, diabetes and thyroid management.  Difficult to assess glycemic control without BG meter information, though uncontrolled T2DM  Unclear what health issues reviewed at her PCP evaluation Fall 2018... No records available here    Plan:  Discussed general issues with the diabetes diagnosis and management  We discussed the hgbA1c test which reflects previous overall glucose levels or control  Discussed the importance of blood glucose (BG) testing to assess glucose trends  Provided general overview of the diabetes medication options and medication treatment plan.    Recommend:  Needs more aggressive diabetes med treatment and more frequent f/u evaluations  She is open-minded to using insulin, as used in the hospital Fall 2018  Plan to continue the current metformin and glimeparide at this time   Add basal insulin daily and plan to add rapid-acting insulin correction scale soon   Discussed use of Tresiba, plan to start Tresiba 15U subcutaneous daily   New Tresiba pen and pen needle Rx's sent to pharmacy  Goal target BG  mg/dl  Resume testing F[facility] daily  Needs to see CDE, placed order for [facility] Diabetes Education Referral   Review recent BG trends, use of basal insulin medication   Start Novolog or Humalog correction scale   Consider dose increase with glimeparide   Discuss, start LibrePersonal CGMS   Remind her to schedule eye exam, likely overdue    Keep focus on diet, exercise, weight management.  Continue current thyroid med dose plan    Addressed patient questions today    Patient Instructions   Need more aggressive diabetes management plan    Continue current metformin 750 mg twice per day, glimeparide 4 mg once per day  Add long-acting insulin as Tresiba 15 units daily with pen injection   New Rx sent to pharmacy, though do not  Rx if not covered by insurance   Resume use of BG meter with fasting testing daily    Plan to see [place] Diabetes  Educator (GIOVANA), at [facility] or [facility] diabetes education  will call to arrange the appointment   Bring BG meter to appointment   Discuss use of shortacting insulin as correction scale, also LibrePersonal glucose sensor    Continue current [place] thyroid and levothyroxine dose plan at this time.  See Dr. Christianson in 4-6 weeks for f/u evaluation      Labs ordered today:   Orders Placed This Encounter   Procedures     TSH     T3 Free     T4 free     Hemoglobin A1c     Basic metabolic panel     ALT     DIABETES EDUCATOR REFERRAL     Radiology/Consults ordered today: DIABETES EDUCATOR REFERRAL    More than 50% of the time spent with Ms. Montiel on counseling / coordinating her care.  Total appointment time was 45 minutes.    Follow-up:  6 weeks    Kirk Christianson MD  Endocrinology  [facility]/[place]    \

## 2019-02-01 NOTE — PATIENT INSTRUCTIONS
Need more aggressive diabetes management plan    Continue current metformin 750 mg twice per day, glimeparide 4 mg once per day  Add long-acting insulin as Tresiba 15 units daily with pen injection   New Rx sent to pharmacy, though do not  Rx if not covered by insurance   Resume use of BG meter with fasting testing daily    Plan to see [place] Diabetes Educator (CDE), at [facility] or [facility] diabetes education  will call to arrange the appointment   Bring BG meter to appointment   Discuss use of shortacting insulin as correction scale, also LibrePersonal glucose sensor    Continue current [place] thyroid and levothyroxine dose plan at this time.  See Dr. Christianson in 4-6 weeks for f/u evaluation

## 2019-02-07 ENCOUNTER — TELEPHONE (OUTPATIENT)
Dept: ENDOCRINOLOGY | Facility: CLINIC | Age: 54
End: 2019-02-07

## 2019-02-07 NOTE — TELEPHONE ENCOUNTER
Reason for call:  Patient reporting a symptom    Symptom or request: BS are consistently running at 185 or higher.  Yesterday were over 200 all day.    Duration (how long have symptoms been present): over a week.    Have you been treated for this before? Yes    Additional comments: patient recently started on insulin, but BS still running high.  Wondering if she should increase her dosage of:  insulin degludec (TRESIBA) 100 UNIT/ML pen?    Please call patient to advise.    Phone Number patient can be reached at:  Home number on file [phone] (home)    Best Time:  asap    Can we leave a detailed message on this number:  YES    Call taken on 2/7/2019 at 8:33 AM by Rahel Lora.t

## 2019-02-07 NOTE — TELEPHONE ENCOUNTER
Message noted.  This patient was recently started on Tresiba insulin 15 units daily, also advised to see the  Diabetes Educator (though hasn't made appt yet).    I recommend she raise the Tresiba dose to 18 units daily dose.  She should ALSO call to make her Diab Ed appt... call their scheduling office at [phone] to make an appointment.  Please relay message, thanks.    LAKESHIA Christianson MD  Endocrinology   [facility]

## 2019-02-07 NOTE — TELEPHONE ENCOUNTER
Pt notified.    States the Diabetic edu - have not reached out.    # given to pt.    Will call back if still having high BG's.    Sushma Samuel MA

## 2019-06-13 DIAGNOSIS — E03.9 ACQUIRED HYPOTHYROIDISM: ICD-10-CM

## 2019-06-13 DIAGNOSIS — E11.9 TYPE 2 DIABETES MELLITUS WITHOUT COMPLICATION, WITHOUT LONG-TERM CURRENT USE OF INSULIN (H): ICD-10-CM

## 2019-06-13 NOTE — TELEPHONE ENCOUNTER
metFORMIN (GLUCOPHAGE-XR) 750 MG 24 hr tablet  Last Written Prescription Date:  5/17/18  Last Fill Quantity: 60,  # refills: 11   Last office visit: 2/1/2019 with prescribing provider:  Ramin    Future Office Visit:   Next 5 appointments (look out 90 days)    Jul 09, 2019 11:30 AM CDT  [facility] [address] [place] [id number]  [phone]         levothyroxine (SYNTHROID/LEVOTHROID) 25 MCG tablet  Last Written Prescription Date:  10/28/18  Last Fill Quantity: 30,  # refills: 1   Last office visit: 2/1/2019 with prescribing provider:  Ramin    Future Office Visit:   Next 5 appointments (look out 90 days)    Jul 09, 2019 11:30 AM CDT  [facility] [address] [place] [id number]  [phone]           glimepiride (AMARYL) 4 MG tablet  Last Written Prescription Date:  5/17/18  Last Fill Quantity: 30,  # refills: 11  Last office visit: 2/1/2019 with prescribing provider:  ramin   Future Office Visit:   Next 5 appointments (look out 90 days)    Jul 09, 2019 11:30 AM CDT  [facility] [address] [place] [id number]  [phone]                 Requested Prescriptions   Pending Prescriptions Disp Refills     metFORMIN (GLUCOPHAGE-XR) 750 MG 24 hr tablet [Pharmacy Med Name: METFORMIN HCL ER 750MG TB24] 60 tablet 3     Sig: TAKE ONE TABLET BY MOUTH TWICE A DAY WITH MEALS       Biguanide Agents Failed - 6/13/2019  1:32 PM        Failed - Patient has documented LDL within the past 12 mos.     No lab results found.          Failed - Patient has had a Microalbumin in the past 15 mos.     No lab results found.          Failed - Patient has documented A1c within the specified period of time.     If HgbA1C is 8 or greater,  "it needs to be on file within the past 3 months.  If less than 8, must be on file within the past 6 months.     Recent Labs   Lab Test 02/01/19  0938   A1C 8.9*             Passed - Blood pressure less than 140/90 in past 6 months     BP Readings from Last 3 Encounters:   02/01/19 123/65   11/21/18 135/79   11/07/18 131/75                 Passed - Patient is age 10 or older        Passed - Patient's CR is NOT>1.4 OR Patient's EGFR is NOT<45 within past 12 mos.     Recent Labs   Lab Test 02/01/19  0938   GFRESTIMATED >90   GFRESTBLACK >90       Recent Labs   Lab Test 02/01/19  0938   CR 0.56             Passed - Patient does NOT have a diagnosis of CHF.        Passed - Medication is active on med list        Passed - Patient is not pregnant        Passed - Patient has not had a positive pregnancy test within the past 12 mos.         Passed - Recent (6 mo) or future (30 days) visit within the authorizing provider's specialty     Patient had office visit in the last 6 months or has a visit in the next 30 days with authorizing provider or within the authorizing provider's specialty.  See \"Patient Info\" tab in inbasket, or \"Choose Columns\" in Meds & Orders section of the refill encounter.            levothyroxine (SYNTHROID/LEVOTHROID) 25 MCG tablet [Pharmacy Med Name: LEVOTHYROXINE SODIUM 25MCG TABS] 30 tablet 1     Sig: TAKE ONE TABLET BY MOUTH EVERY DAY       Thyroid Protocol Failed - 6/13/2019  1:32 PM        Failed - Normal TSH on file in past 12 months     Recent Labs   Lab Test 02/01/19  0938   TSH <0.01*              Passed - Patient is 12 years or older        Passed - Recent (12 mo) or future (30 days) visit within the authorizing provider's specialty     Patient had office visit in the last 12 months or has a visit in the next 30 days with authorizing provider or within the authorizing provider's specialty.  See \"Patient Info\" tab in inbasket, or \"Choose Columns\" in Meds & Orders section of the refill " "encounter.              Passed - Medication is active on med list        Passed - No active pregnancy on record     If patient is pregnant or has had a positive pregnancy test, please check TSH.          Passed - No positive pregnancy test in past 12 months     If patient is pregnant or has had a positive pregnancy test, please check TSH.          glimepiride (AMARYL) 4 MG tablet [Pharmacy Med Name: GLIMEPIRIDE 4MG TABS] 30 tablet 3     Sig: TAKE ONE TABLET BY MOUTH EVERY MORNING BEFORE BREAKFAST       Sulfonylurea Agents Failed - 6/13/2019  1:32 PM        Failed - Patient has documented LDL within the past 12 mos.     No lab results found.          Failed - Patient has had a Microalbumin in the past 15 mos.     No lab results found.          Failed - Patient has documented A1c within the specified period of time.     If HgbA1C is 8 or greater, it needs to be on file within the past 3 months.  If less than 8, must be on file within the past 6 months.     Recent Labs   Lab Test 02/01/19  0938   A1C 8.9*             Passed - Blood pressure less than 140/90 in past 6 months     BP Readings from Last 3 Encounters:   02/01/19 123/65   11/21/18 135/79   11/07/18 131/75                 Passed - Medication is active on med list        Passed - Patient is age 18 or older        Passed - No active pregnancy on record        Passed - Patient has a recent creatinine (normal) within the past 12 mos.     Recent Labs   Lab Test 02/01/19  0938   CR 0.56             Passed - Patient has not had a positive pregnancy test within the past 12 mos.        Passed - Recent (6 mo) or future (30 days) visit within the authorizing provider's specialty     Patient had office visit in the last 6 months or has a visit in the next 30 days with authorizing provider or within the authorizing provider's specialty.  See \"Patient Info\" tab in inbasket, or \"Choose Columns\" in Meds & Orders section of the refill encounter.              "

## 2019-06-14 RX ORDER — LEVOTHYROXINE SODIUM 25 UG/1
TABLET ORAL
Qty: 30 TABLET | Refills: 0 | Status: SHIPPED | OUTPATIENT
Start: 2019-06-14 | End: 2019-07-17

## 2019-06-14 RX ORDER — METFORMIN HYDROCHLORIDE 750 MG/1
TABLET, EXTENDED RELEASE ORAL
Qty: 60 TABLET | Refills: 0 | Status: SHIPPED | OUTPATIENT
Start: 2019-06-14 | End: 2019-07-17

## 2019-06-14 RX ORDER — GLIMEPIRIDE 4 MG/1
TABLET ORAL
Qty: 30 TABLET | Refills: 0 | Status: SHIPPED | OUTPATIENT
Start: 2019-06-14 | End: 2019-07-17

## 2019-07-09 ENCOUNTER — OFFICE VISIT (OUTPATIENT)
Dept: ENDOCRINOLOGY | Facility: CLINIC | Age: 54
End: 2019-07-09
Payer: COMMERCIAL

## 2019-07-09 VITALS
DIASTOLIC BLOOD PRESSURE: 75 MMHG | BODY MASS INDEX: 36.39 KG/M2 | SYSTOLIC BLOOD PRESSURE: 120 MMHG | HEIGHT: 65 IN | WEIGHT: 218.4 LBS | HEART RATE: 86 BPM

## 2019-07-09 DIAGNOSIS — E03.9 ACQUIRED HYPOTHYROIDISM: ICD-10-CM

## 2019-07-09 DIAGNOSIS — E11.9 TYPE 2 DIABETES MELLITUS WITHOUT COMPLICATION, WITHOUT LONG-TERM CURRENT USE OF INSULIN (H): Primary | ICD-10-CM

## 2019-07-09 DIAGNOSIS — E66.01 MORBID OBESITY (H): ICD-10-CM

## 2019-07-09 PROCEDURE — 99214 OFFICE O/P EST MOD 30 MIN: CPT | Performed by: INTERNAL MEDICINE

## 2019-07-09 ASSESSMENT — MIFFLIN-ST. JEOR: SCORE: 1586.54

## 2019-07-09 NOTE — PROGRESS NOTES
Name: Sudheer Montiel      HPI:  Recent issues:  Here for f/u diabetes and thyroid evaluation  Had abdominal abcess illness 10/2018, hospitalization and abdominal surgery.    Following a ketogenic diet, some wt loss  Did not followup with CDE evaluation as planned        Diabetes:  ~4/2010. Initial diagnosis approx age 44  ...Has taken several DM meds including metformin, Byetta, glimeparide, Invokana, and Victoza   In 2018, she had been taking metformin, Victoza, glimeparide, and Jardiance  She stopped Jardiance, also ran out of Victoza last year  10/2018. Abdominal abcess illness, hospitalizations at [facility] and then [facility]   2-surgeries for abcess, thought related to the hysterectomy bladder sling mesh from prior surgery    Current DM meds:   Metformin 750 mg   Morning and evening   glimeparide 4 mg 1-tab  Morning   Tresiba 18U    Morning (daily)    Has Glucocard Vital BG meter   Infrequent BG testing, 239 mg/dl this morning    Recent [facility] labs include:  Lab Results   Component Value Date    A1C 8.9 (H) 02/01/2019     02/01/2019    POTASSIUM 3.9 02/01/2019    CHLORIDE 104 02/01/2019    CO2 28 02/01/2019    ANIONGAP 4 02/01/2019     (H) 02/01/2019    BUN 10 02/01/2019    CR 0.56 02/01/2019    GFRESTIMATED >90 02/01/2019    GFRESTBLACK >90 02/01/2019    JOANN 9.3 02/01/2019    CHOL 210@ 03/10/2011    TRIG 127@ 03/10/2011    HDL 48@ 03/10/2011    @ 03/10/2011     Last eye exam 2018?, results not available  DM Complications:  None known        Hypothyroidism:  Had taken Synthroid  Changed to [place] thyroid medication, then [place] + levothyroxine combo dose plan  Had taken [place] thyroid 2-tablets daily... Possibly 60 mg 2-tabs QD  Early-mid 3/2018. 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 Frantz Bacon/[facility]    PMH/PSH:  Past Medical History:   Diagnosis Date     Hypothyroid 80's     Necrotizing fasciitis (H)      Soft tissue infection      Type 2 diabetes mellitus (H)      Past Surgical History:   Procedure Laterality Date     C REPAIR CRUCIATE LIGAMENT,KNEE        SECTION       COLONOSCOPY N/A 10/21/2015    Procedure: COLONOSCOPY;  Surgeon: Jaky Arredondo MD;  Location:  GI     IRRIGATION AND DEBRIDEMENT ABDOMEN WASHOUT, COMBINED N/A 10/12/2018    Procedure: COMBINED IRRIGATION AND DEBRIDEMENT ABDOMEN WASHOUT;  Irrigation and Debridement of Lower Abdomen, removal of piece of mesh.;  Surgeon: Darlene Hogue MD;  Location:  OR     marisol/bso      due to anemia       Family Hx:  No family history on file.      Social Hx:  Social History     Socioeconomic History     Marital status:      Spouse name: Not on file     Number of children: 2     Years of education: Not on file     Highest education level: Not on file   Occupational History     Occupation: surgery scheduler [employer]   Social Needs     Financial resource strain: Not on file     Food insecurity:     Worry: Not on file     Inability: Not on file     Transportation needs:     Medical: Not on file     Non-medical: Not on file   Tobacco Use     Smoking status: Former Smoker     Last attempt to quit: 2011     Years since quittin.3     Smokeless tobacco: Never Used   Substance and Sexual Activity     Alcohol use: No     Drug use: No     Sexual activity: Not on file   Lifestyle     Physical activity:     Days per week: Not on file     Minutes per session: Not on file     Stress: Not on file   Relationships     Social connections:     Talks on phone: Not on file     Gets together: Not on file     Attends  "Buddhism service: Not on file     Active member of club or organization: Not on file     Attends meetings of clubs or organizations: Not on file     Relationship status: Not on file     Intimate partner violence:     Fear of current or ex partner: Not on file     Emotionally abused: Not on file     Physically abused: Not on file     Forced sexual activity: Not on file   Other Topics Concern     Not on file   Social History Narrative     Not on file          MEDICATIONS:  has a current medication list which includes the following prescription(s): celecoxib, estradiol, glimepiride, insulin degludec, insulin pen needle, levothyroxine, metformin, omeprazole, thyroid, and valacyclovir.    ROS:     ROS: 10 point ROS neg other than the symptoms noted above in the HPI.    GENERAL: some fatigue, wt loss; denies fevers, chills, night sweats.   HEENT:  no dysphagia, odonophagia, diplopia, neck pain  THYROID:  no apparent hyper or hypothyroid symptoms  CV:  Denies chest pain, pressure or palpitations  LUNGS:  Denies SOB, dyspnea, cough, wheezing  ABDOMEN: no abdominal pain; denies diarrhea, indigestion, constipation  EXTREMITIES: no rashes, ulcers, edema  NEUROLOGY: no headaches, denies changes in vision, tingling, extremitiy numbness   MSK: occasional low back pain; no muscle aches or pains, weakness  SKIN: no rashes or lesions  : increased thirst, frequent urination; no menses?!  PSYCH:  stable mood, no significant anxiety or depression  ENDOCRINE: no heat or cold intolerance      Physical Exam   VS: /75   Pulse 86   Ht 1.651 m (5' 5\")   Wt 99.1 kg (218 lb 6.4 oz)   BMI 36.34 kg/m    GENERAL: AXOX3, NAD, eyeglasses, well dressed, answering questions appropriately, appears stated age.  THYROID:  normal gland, no apparent nodules or goiter  ABDOMEN: obese, soft, nontender, nondistended  EXTREMITIES: no edema, no lesions  NEUROLOGY: CN grossly intact, no tremors  MSK: grossly intact  SKIN: vertical hypogastrium and " low abd horizontal scars; no rashes, no lesions    LABS:    All pertinent notes, labs, and images personally reviewed by me.     A/P:  Encounter Diagnoses   Name Primary?     Type 2 diabetes mellitus without complication, without long-term current use of insulin (H) Yes     Acquired hypothyroidism      Morbid obesity (H)        Comments:  Reviewed health issues, diabetes and thyroid management.  Difficult to assess glycemic control without BG meter information, though uncontrolled T2DM    Plan:  Discussed general issues with the diabetes diagnosis and management  We discussed the hgbA1c test which reflects previous overall glucose levels or control  Discussed the importance of blood glucose (BG) testing to assess glucose trends  Provided general overview of the diabetes medication options and medication treatment plan.    Recommend:  Needs more aggressive diabetes med treatment and more frequent f/u evaluations  Continue metformin, glimeparide, Tresiba med dosing, as noted  Discussed Tresiba dose titration strategy, also idea of rapid acting insulin sscale    Goal target -150 mg/dl  Resume testing FBG daily  Consider [facility] Diabetes Education Referral  Request copy of most recent eye exam report to be faxed to our office  Keep focus on diet, exercise, weight management.  Continue current thyroid medication dose plan  Repeat lab tests next visit    Addressed patient questions today    Patient Instructions   Continue current diabetes meds as:   Metformin 750 mg   Morning and evening   glimeparide 4 mg 1-tab  Morning   Tresiba 24U    Morning (daily)    Use Glucocard Vital meter, test morning glucose level    Daily or every other day, goal target 100-150 mg/dl    Change dose of Tresiba every 5 days, depending on fasting glucose:   If glucose >150 mg/dl, raise dose by 2 units   If glucose 100-150 mg/dl, keep same dose    Continue current thyroid med dosing  Message Dr. Christianson by [facility] in 1-2 weeks  Followup  evaluation in 4-6 weeks      Labs ordered today:   No orders of the defined types were placed in this encounter.    Radiology/Consults ordered today: None    More than 50% of the time spent with Ms. Montiel on counseling / coordinating her care.  Total appointment time was 25 minutes.    Follow-up:  4-6 weeks    Kirk Christianson MD  Endocrinology  [place] [facility]    \

## 2019-07-09 NOTE — PATIENT INSTRUCTIONS
Continue current diabetes meds as:   Metformin 750 mg   Morning and evening   glimeparide 4 mg 1-tab  Morning   Tresiba 24U    Morning (daily)    Use Glucocard Vital meter, test morning glucose level    Daily or every other day, goal target 100-150 mg/dl    Change dose of Tresiba every 5 days, depending on fasting glucose:   If glucose >150 mg/dl, raise dose by 2 units   If glucose 100-150 mg/dl, keep same dose    Continue current thyroid med dosing  Message Dr. Christianson by [facility] in 1-2 weeks  Followup evaluation in 4-6 weeks

## 2019-07-17 DIAGNOSIS — E11.9 TYPE 2 DIABETES MELLITUS WITHOUT COMPLICATION, WITHOUT LONG-TERM CURRENT USE OF INSULIN (H): ICD-10-CM

## 2019-07-17 DIAGNOSIS — E03.9 ACQUIRED HYPOTHYROIDISM: ICD-10-CM

## 2019-07-18 RX ORDER — GLIMEPIRIDE 4 MG/1
TABLET ORAL
Qty: 90 TABLET | Refills: 0 | Status: SHIPPED | OUTPATIENT
Start: 2019-07-18 | End: 2019-10-30

## 2019-07-18 RX ORDER — LEVOTHYROXINE SODIUM 25 UG/1
TABLET ORAL
Qty: 30 TABLET | Refills: 0 | Status: SHIPPED | OUTPATIENT
Start: 2019-07-18 | End: 2019-08-12 | Stop reason: DRUGHIGH

## 2019-07-18 RX ORDER — METFORMIN HYDROCHLORIDE 750 MG/1
TABLET, EXTENDED RELEASE ORAL
Qty: 180 TABLET | Refills: 0 | Status: SHIPPED | OUTPATIENT
Start: 2019-07-18 | End: 2019-10-30

## 2019-07-18 NOTE — TELEPHONE ENCOUNTER
glimepiride (AMARYL) 4 MG tablet  Last Written Prescription Date:  6/14/19  Last Fill Quantity: 30 tablet,  # refills: 0   Last office visit: 7/9/2019 with prescribing provider:  Jah Barrera Office Visit:     levothyroxine (SYNTHROID/LEVOTHROID) 25 MCG tablet  Last Written Prescription Date:  6/14/19  Last Fill Quantity: 30 tablet,  # refills: 0   Last office visit: 7/9/2019 with prescribing provider:  Jah Barrera Office Visit:     metFORMIN (GLUCOPHAGE-XR) 750 MG 24 hr tablet  Last Written Prescription Date:  6/14/19  Last Fill Quantity: 60 tablet,  # refills: 0   Last office visit: 7/9/2019 with prescribing provider:  Jah Barrera Office Visit:   Next 5 appointments (look out 90 days)    Aug 12, 2019  1:00 PM CDT  Return Visit with Kirk Christianson MD  [facility] ([address] [address] [phone]           Requested Prescriptions   Pending Prescriptions Disp Refills     glimepiride (AMARYL) 4 MG tablet [Pharmacy Med Name: GLIMEPIRIDE 4MG TABS] 30 tablet 3     Sig: TAKE ONE TABLET BY MOUTH EVERY MORNING BEFORE BREAKFAST       Sulfonylurea Agents Failed - 7/17/2019 12:39 PM        Failed - Patient has documented LDL within the past 12 mos.     No lab results found.          Failed - Patient has had a Microalbumin in the past 15 mos.     No lab results found.          Failed - Patient has documented A1c within the specified period of time.     If HgbA1C is 8 or greater, it needs to be on file within the past 3 months.  If less than 8, must be on file within the past 6 months.     Recent Labs   Lab Test 02/01/19  0938   A1C 8.9*             Passed - Blood pressure less than 140/90 in past 6 months     BP Readings from Last 3 Encounters:   07/09/19 120/75   02/01/19 123/65   11/21/18 135/79                 Passed - Medication is active on med list        Passed - Patient is age 18 or older        Passed - No active pregnancy on  "record        Passed - Patient has a recent creatinine (normal) within the past 12 mos.     Recent Labs   Lab Test 02/01/19  0938   CR 0.56             Passed - Patient has not had a positive pregnancy test within the past 12 mos.        Passed - Recent (6 mo) or future (30 days) visit within the authorizing provider's specialty     Patient had office visit in the last 6 months or has a visit in the next 30 days with authorizing provider or within the authorizing provider's specialty.  See \"Patient Info\" tab in inbasket, or \"Choose Columns\" in Meds & Orders section of the refill encounter.            levothyroxine (SYNTHROID/LEVOTHROID) 25 MCG tablet [Pharmacy Med Name: LEVOTHYROXINE SODIUM 25MCG TABS] 30 tablet 0     Sig: TAKE ONE TABLET BY MOUTH ONCE DAILY       Thyroid Protocol Failed - 7/17/2019 12:39 PM        Failed - Normal TSH on file in past 12 months     Recent Labs   Lab Test 02/01/19  0938   TSH <0.01*              Passed - Patient is 12 years or older        Passed - Recent (12 mo) or future (30 days) visit within the authorizing provider's specialty     Patient had office visit in the last 12 months or has a visit in the next 30 days with authorizing provider or within the authorizing provider's specialty.  See \"Patient Info\" tab in inbasket, or \"Choose Columns\" in Meds & Orders section of the refill encounter.              Passed - Medication is active on med list        Passed - No active pregnancy on record     If patient is pregnant or has had a positive pregnancy test, please check TSH.          Passed - No positive pregnancy test in past 12 months     If patient is pregnant or has had a positive pregnancy test, please check TSH.          metFORMIN (GLUCOPHAGE-XR) 750 MG 24 hr tablet [Pharmacy Med Name: METFORMIN HCL ER 750MG TB24] 60 tablet 0     Sig: TAKE ONE TABLET BY MOUTH TWICE A DAY WITH MEALS       Biguanide Agents Failed - 7/17/2019 12:39 PM        Failed - Patient has documented LDL " "within the past 12 mos.     No lab results found.          Failed - Patient has had a Microalbumin in the past 15 mos.     No lab results found.          Failed - Patient has documented A1c within the specified period of time.     If HgbA1C is 8 or greater, it needs to be on file within the past 3 months.  If less than 8, must be on file within the past 6 months.     Recent Labs   Lab Test 02/01/19  0938   A1C 8.9*             Passed - Blood pressure less than 140/90 in past 6 months     BP Readings from Last 3 Encounters:   07/09/19 120/75   02/01/19 123/65   11/21/18 135/79                 Passed - Patient is age 10 or older        Passed - Patient's CR is NOT>1.4 OR Patient's EGFR is NOT<45 within past 12 mos.     Recent Labs   Lab Test 02/01/19  0938   GFRESTIMATED >90   GFRESTBLACK >90       Recent Labs   Lab Test 02/01/19  0938   CR 0.56             Passed - Patient does NOT have a diagnosis of CHF.        Passed - Medication is active on med list        Passed - Patient is not pregnant        Passed - Patient has not had a positive pregnancy test within the past 12 mos.         Passed - Recent (6 mo) or future (30 days) visit within the authorizing provider's specialty     Patient had office visit in the last 6 months or has a visit in the next 30 days with authorizing provider or within the authorizing provider's specialty.  See \"Patient Info\" tab in inbasket, or \"Choose Columns\" in Meds & Orders section of the refill encounter.              "

## 2019-07-26 ENCOUNTER — TRANSFERRED RECORDS (OUTPATIENT)
Dept: HEALTH INFORMATION MANAGEMENT | Facility: CLINIC | Age: 54
End: 2019-07-26

## 2019-07-26 LAB
ALT SERPL-CCNC: 15 IU/L (ref 0–32)
AST SERPL-CCNC: 23 IU/L (ref 0–40)
CHOLEST SERPL-MCNC: 195 MG/DL (ref 100–199)
CREAT SERPL-MCNC: 0.77 MG/DL (ref 0.57–1)
GFR SERPL CREATININE-BSD FRML MDRD: 87 ML/MIN/1.73
GLUCOSE SERPL-MCNC: 187 MG/DL (ref 65–99)
HBA1C MFR BLD: 9.3 % (ref 4–6)
HDLC SERPL-MCNC: 43 MG/DL
LDLC SERPL CALC-MCNC: 122 MG/DL (ref 0–99)
POTASSIUM SERPL-SCNC: 4.2 MMOL/L (ref 3.5–5.2)
TRIGL SERPL-MCNC: 152 MG/DL (ref 0–149)
TSH SERPL-ACNC: 0.01 UIU/ML (ref 0.45–4.5)

## 2019-08-02 ENCOUNTER — HOSPITAL ENCOUNTER (OUTPATIENT)
Dept: MAMMOGRAPHY | Facility: CLINIC | Age: 54
Discharge: HOME | End: 2019-08-02
Attending: PHYSICIAN ASSISTANT | Admitting: PHYSICIAN ASSISTANT
Payer: COMMERCIAL

## 2019-08-02 ENCOUNTER — HOSPITAL ENCOUNTER (OUTPATIENT)
Dept: MAMMOGRAPHY | Facility: CLINIC | Age: 54
End: 2019-08-02
Attending: PHYSICIAN ASSISTANT
Payer: COMMERCIAL

## 2019-08-02 DIAGNOSIS — N63.0 BREAST LUMP: ICD-10-CM

## 2019-08-02 PROCEDURE — 77066 DX MAMMO INCL CAD BI: CPT

## 2019-08-02 PROCEDURE — 76642 ULTRASOUND BREAST LIMITED: CPT | Mod: RT

## 2019-08-02 PROCEDURE — G0279 TOMOSYNTHESIS, MAMMO: HCPCS

## 2019-08-12 ENCOUNTER — OFFICE VISIT (OUTPATIENT)
Dept: ENDOCRINOLOGY | Facility: CLINIC | Age: 54
End: 2019-08-12
Payer: COMMERCIAL

## 2019-08-12 VITALS
HEIGHT: 65 IN | BODY MASS INDEX: 36.46 KG/M2 | DIASTOLIC BLOOD PRESSURE: 66 MMHG | HEART RATE: 71 BPM | WEIGHT: 218.8 LBS | SYSTOLIC BLOOD PRESSURE: 118 MMHG

## 2019-08-12 DIAGNOSIS — E66.01 MORBID OBESITY (H): ICD-10-CM

## 2019-08-12 DIAGNOSIS — E03.9 ACQUIRED HYPOTHYROIDISM: ICD-10-CM

## 2019-08-12 DIAGNOSIS — E11.9 TYPE 2 DIABETES MELLITUS WITHOUT COMPLICATION, WITHOUT LONG-TERM CURRENT USE OF INSULIN (H): Primary | ICD-10-CM

## 2019-08-12 PROCEDURE — 99214 OFFICE O/P EST MOD 30 MIN: CPT | Performed by: INTERNAL MEDICINE

## 2019-08-12 RX ORDER — FLUCONAZOLE 100 MG/1
TABLET ORAL
COMMUNITY
Start: 2019-03-13 | End: 2020-09-18

## 2019-08-12 RX ORDER — THYROID 90 MG/1
TABLET ORAL
Qty: 30 TABLET | Refills: 5 | Status: SHIPPED | OUTPATIENT
Start: 2019-08-12 | End: 2020-05-21

## 2019-08-12 RX ORDER — LEVOTHYROXINE SODIUM 175 UG/1
175 TABLET ORAL DAILY
Qty: 30 TABLET | Refills: 5 | Status: SHIPPED | OUTPATIENT
Start: 2019-08-12 | End: 2019-09-21

## 2019-08-12 ASSESSMENT — MIFFLIN-ST. JEOR: SCORE: 1588.35

## 2019-08-12 NOTE — PROGRESS NOTES
Name: Sudheer Montiel      HPI:  Recent issues:  Here for f/u diabetes and thyroid evaluation  Following a ketogenic diet, some wt loss  Had recent fasting labs done at her primary care clinic, though management details not available        Diabetes:  ~4/2010. Initial diagnosis approx age 44  ...Has taken several DM meds including metformin, Byetta, glimeparide, Invokana, and Victoza   In 2018, she had been taking metformin, Victoza, glimeparide, and Jardiance  She stopped Jardiance, also ran out of Victoza last year  10/2018. Abdominal abcess illness, hospitalizations at [facility] and then [facility]   2-surgeries for abcess, thought related to the hysterectomy bladder sling mesh from prior surgery    Current DM meds:   Metformin 750 mg   Morning and evening   glimeparide 4 mg 1-tab  Morning   Tresiba 18U    Morning (daily)    Has Glucocard Vital BG meter   Infrequent BG testing   No recent glucose trend data available    Recent [facility] labs include:  7/26/19 hgbA1c 9.3%, t.chol 195, , , FT4 1.46, FT3 8.7, Cr 0.77    Recent [facility] labs include:  Lab Results   Component Value Date    A1C 9.3 (A) 07/26/2019     02/01/2019    POTASSIUM 4.2 07/26/2019    CHLORIDE 104 02/01/2019    CO2 28 02/01/2019    ANIONGAP 4 02/01/2019     (A) 07/26/2019    BUN 10 02/01/2019    CR 0.77 07/26/2019    GFRESTIMATED 87 07/26/2019    GFRESTBLACK 101 07/26/2019    JOANN 9.3 02/01/2019    CHOL 195 07/26/2019    TRIG 152 (A) 07/26/2019    HDL 43 07/26/2019     (A) 07/26/2019     Last eye exam 2018?, results not available  DM Complications:  None known      Hypothyroidism:  Had taken Synthroid  Changed to [place] thyroid medication, then [place] + levothyroxine combo dose plan  Had taken [place] thyroid 2-tablets daily... Possibly 60 mg 2-tabs QD  Early-mid 3/2018. Ran out of [place] thyroid medication  Lab Results   Component Value Date    TSH 0.006 (A) 07/26/2019    T4 1.67 (H) 02/01/2019    FT3 8.9  (H) 2019     Current dose (verified by call to pharmacy):   [place] thyroid 90 mg 2-tabs po QAM   Levothyroxine 0.025 mg 1-tab QAM      Single, works as HE/FV triage    Sees Beth CASTRO/KHARI for gen med evaluations  Also sees Dr. Frantz Guillen?/[facility]    PMH/PSH:  Past Medical History:   Diagnosis Date     Hypothyroid 80's     Necrotizing fasciitis (H)      Soft tissue infection      Type 2 diabetes mellitus (H)      Past Surgical History:   Procedure Laterality Date     C REPAIR CRUCIATE LIGAMENT,KNEE        SECTION       COLONOSCOPY N/A 10/21/2015    Procedure: COLONOSCOPY;  Surgeon: Jaky Arredondo MD;  Location:  GI     IRRIGATION AND DEBRIDEMENT ABDOMEN WASHOUT, COMBINED N/A 10/12/2018    Procedure: COMBINED IRRIGATION AND DEBRIDEMENT ABDOMEN WASHOUT;  Irrigation and Debridement of Lower Abdomen, removal of piece of mesh.;  Surgeon: Darlene Hogue MD;  Location: [facility] OR     marisol/bso      due to anemia       Family Hx:  No family history on file.      Social Hx:  Social History     Socioeconomic History     Marital status:      Spouse name: Not on file     Number of children: 2     Years of education: Not on file     Highest education level: Not on file   Occupational History     Occupation: surgery scheduler [employer]   Social Needs     Financial resource strain: Not on file     Food insecurity:     Worry: Not on file     Inability: Not on file     Transportation needs:     Medical: Not on file     Non-medical: Not on file   Tobacco Use     Smoking status: Former Smoker     Last attempt to quit: 2011     Years since quittin.4     Smokeless tobacco: Never Used   Substance and Sexual Activity     Alcohol use: No     Drug use: No     Sexual activity: Not on file   Lifestyle     Physical activity:     Days per week: Not on file     Minutes per session: Not on file     Stress: Not on file   Relationships     Social  "connections:     Talks on phone: Not on file     Gets together: Not on file     Attends Yazidi service: Not on file     Active member of club or organization: Not on file     Attends meetings of clubs or organizations: Not on file     Relationship status: Not on file     Intimate partner violence:     Fear of current or ex partner: Not on file     Emotionally abused: Not on file     Physically abused: Not on file     Forced sexual activity: Not on file   Other Topics Concern     Not on file   Social History Narrative     Not on file          MEDICATIONS:  has a current medication list which includes the following prescription(s): calcium, celecoxib, estradiol, fluconazole, glimepiride, insulin degludec, levothyroxine, metformin, omeprazole, thyroid, valacyclovir, and insulin pen needle.    ROS:     ROS: 10 point ROS neg other than the symptoms noted above in the HPI.    GENERAL: some fatigue, wt loss; denies fevers, chills, night sweats.   HEENT:  no dysphagia, odonophagia, diplopia, neck pain  THYROID:  no apparent hyper or hypothyroid symptoms  CV:  Denies chest pain, pressure or palpitations  LUNGS:  Denies SOB, dyspnea, cough, wheezing  ABDOMEN: no abdominal pain; denies diarrhea, indigestion, constipation  EXTREMITIES: no rashes, ulcers, edema  NEUROLOGY: no headaches, denies changes in vision, tingling, extremitiy numbness   MSK: occasional low back pain; no muscle aches or pains, weakness  SKIN: no rashes or lesions  : increased thirst, frequent urination; no menses?!  PSYCH:  stable mood, no significant anxiety or depression  ENDOCRINE: no heat or cold intolerance      Physical Exam   VS: /66   Pulse 71   Ht 1.651 m (5' 5\")   Wt 99.2 kg (218 lb 12.8 oz)   BMI 36.41 kg/m    GENERAL: AXOX3, NAD, eyeglasses, well dressed, answering questions appropriately, appears stated age.  THYROID:  normal gland, no apparent nodules or goiter  CV:  RRR without murmurs  LUNGS:  CTA posteriorly  ABDOMEN: obese, " soft, nontender, nondistended  EXTREMITIES: no edema, no lesions  NEUROLOGY: CN grossly intact, no tremors  MSK: grossly intact  SKIN: vertical hypogastrium and low abd horizontal scars; no rashes, no lesions    LABS:    All pertinent notes, labs, and images personally reviewed by me.     A/P:  Encounter Diagnoses   Name Primary?     Type 2 diabetes mellitus without complication, without long-term current use of insulin (H) Yes     Acquired hypothyroidism      Morbid obesity (H)        Comments:  Reviewed health issues, diabetes and thyroid management.  Difficult to assess glycemic control without BG meter information, though uncontrolled T2DM    Plan:  Discussed general issues with the diabetes diagnosis and management  We discussed the hgbA1c test which reflects previous overall glucose levels or control  Discussed the importance of blood glucose (BG) testing to assess glucose trends  Provided general overview of the diabetes medication options and medication treatment plan.    Recommend:  Continue metformin, glimeparide, Tresiba med dosing, as noted  Needs more aggressive diabetes med treatment and more frequent f/u evaluations  Discussed basal insulin vs retrying GLP1RA medication, also idea of rapid acting insulin sscale  Goal target -150 mg/dl  Resume testing FBG daily  Ordered another  Diabetes Education Referral, reminded her to call to confirm the appt  Keep focus on diet, exercise, weight management.  Needs f/u on the mild hypercholesterolemia, consider adding statin medication for treatment    Change thyroid medication by lowering [place] thyroid, raising levothyroxine doses, as noted  Addressed patient questions today    Patient Instructions   Adjust the diabetes medications as:   Metformin 750 mg   2- tabs each morning   Glimeparide 4 mg   1-tab each morning   Tresiba 23 units  Daily    Goal target blood glucose  mg/dl  Plan to see the  Diabetes Educator at the  [facility]  Call  [phone] to make an appointment   Wear the diagnostic Chandni sensor, then f/u visit with them   Consider adjustments with diabetes medications also    Adjust thyroid medications:   [place] thyroid 90 mg  1-tab daily (not 2)   Levothyroxine 0.175 mg 1-tab daily    Will check with Beth about the cholesterol testing, med use  Arrange another followup evaluation with Dr. Christianson in 4 weeks    Patient Instructions   Adjust the diabetes medications as:   Metformin 750 mg   2- tabs each morning   Glimeparide 4 mg   1-tab each morning   Tresiba 23 units  Daily    Goal target blood glucose  mg/dl  Plan to see the  Diabetes Educator at the [facility]  Call [phone] to make an appointment   Wear the diagnostic Chandni sensor, then f/u visit with them   Consider adjustments with diabetes medications also    Adjust thyroid medications:   [place] thyroid 90 mg  1-tab daily (not 2)   Levothyroxine 0.175 mg 1-tab daily    Will check with Beth about the cholesterol testing, med use  Arrange another followup evaluation with Dr. Christianson in 4 weeks      Labs ordered today:   Orders Placed This Encounter   Procedures     DIABETES EDUCATOR REFERRAL     Radiology/Consults ordered today: DIABETES EDUCATOR REFERRAL    More than 50% of the time spent with Ms. Montiel on counseling / coordinating her care.  Total appointment time was 30 minutes.    Follow-up:  4-6 weeks    Kirk Christianson MD  [facility]

## 2019-08-12 NOTE — PATIENT INSTRUCTIONS
Adjust the diabetes medications as:   Metformin 750 mg   2- tabs each morning   Glimeparide 4 mg   1-tab each morning   Tresiba 23 units  Daily    Goal target blood glucose  mg/dl  Plan to see the  Diabetes Educator at the  [facility]  Call [phone] to make an appointment   Wear the diagnostic Chandni sensor, then f/u visit with them   Consider adjustments with diabetes medications also    Adjust thyroid medications:   [place] thyroid 90 mg  1-tab daily (not 2)   Levothyroxine 0.175 mg 1-tab daily    Will check with Beth about the cholesterol testing, med use  Arrange another followup evaluation with Dr. Christianson in 4 weeks

## 2019-08-13 ENCOUNTER — TELEPHONE (OUTPATIENT)
Dept: FAMILY MEDICINE | Facility: CLINIC | Age: 54
End: 2019-08-13

## 2019-08-13 NOTE — TELEPHONE ENCOUNTER
Diabetes Education Scheduling Outreach #1:    Call to patient to schedule. Left message with phone number to call to schedule.    Plan for 2nd outreach attempt within 1 week.    Tamara Hernandez  [facility]  Diabetes and Nutrition Scheduling

## 2019-08-19 DIAGNOSIS — E11.9 TYPE 2 DIABETES MELLITUS WITHOUT COMPLICATION, WITHOUT LONG-TERM CURRENT USE OF INSULIN (H): ICD-10-CM

## 2019-08-20 RX ORDER — GLIMEPIRIDE 4 MG/1
TABLET ORAL
Qty: 30 TABLET | Refills: 3 | OUTPATIENT
Start: 2019-08-20

## 2019-08-20 NOTE — TELEPHONE ENCOUNTER
glimepiride (AMARYL) 4 MG tablet 90 tablet 0 7/18/2019     Last Written Prescription Date:  7/18/19  Last Fill Quantity: 90,  # refills: 0   Last office visit: 8/12/2019 with prescribing provider:  Edita   Future Office Visit:   Next 5 appointments (look out 90 days)    Sep 09, 2019  3:00 PM CDT  Return Visit with Kirk Christianson MD  [facility] ([address] [address] [phone]         Requested Prescriptions   Pending Prescriptions Disp Refills     glimepiride (AMARYL) 4 MG tablet [Pharmacy Med Name: GLIMEPIRIDE 4MG TABS] 30 tablet 3     Sig: TAKE ONE TABLET BY MOUTH EVERY MORNING BEFORE BREAKFAST       Sulfonylurea Agents Failed - 8/19/2019  3:26 PM        Failed - Patient has had a Microalbumin in the past 15 mos.     No lab results found.          Passed - Blood pressure less than 140/90 in past 6 months     BP Readings from Last 3 Encounters:   08/12/19 118/66   07/09/19 120/75   02/01/19 123/65                 Passed - Patient has documented LDL within the past 12 mos.     Recent Labs   Lab Test 07/26/19   *             Passed - Patient has documented A1c within the specified period of time.     If HgbA1C is 8 or greater, it needs to be on file within the past 3 months.  If less than 8, must be on file within the past 6 months.     Recent Labs   Lab Test 07/26/19   A1C 9.3*             Passed - Medication is active on med list        Passed - Patient is age 18 or older        Passed - No active pregnancy on record        Passed - Patient has a recent creatinine (normal) within the past 12 mos.     Recent Labs   Lab Test 07/26/19   CR 0.77             Passed - Patient has not had a positive pregnancy test within the past 12 mos.        Passed - Recent (6 mo) or future (30 days) visit within the authorizing provider's specialty     Patient had office visit in the last 6 months or has a visit in the next 30 days with authorizing  "provider or within the authorizing provider's specialty.  See \"Patient Info\" tab in inbasket, or \"Choose Columns\" in Meds & Orders section of the refill encounter.            No flowsheet data found.    "

## 2019-08-21 NOTE — TELEPHONE ENCOUNTER
Diabetes Education Scheduling Outreach #2:    Call to patient to schedule. No answer/busy.    [facility]  Diabetes and Nutrition Scheduling

## 2019-09-09 ENCOUNTER — OFFICE VISIT (OUTPATIENT)
Dept: ENDOCRINOLOGY | Facility: CLINIC | Age: 54
End: 2019-09-09
Payer: COMMERCIAL

## 2019-09-09 VITALS
HEIGHT: 65 IN | WEIGHT: 222.8 LBS | HEART RATE: 81 BPM | BODY MASS INDEX: 37.12 KG/M2 | DIASTOLIC BLOOD PRESSURE: 77 MMHG | SYSTOLIC BLOOD PRESSURE: 115 MMHG

## 2019-09-09 DIAGNOSIS — E03.9 ACQUIRED HYPOTHYROIDISM: ICD-10-CM

## 2019-09-09 DIAGNOSIS — E11.9 TYPE 2 DIABETES MELLITUS WITHOUT COMPLICATION, WITHOUT LONG-TERM CURRENT USE OF INSULIN (H): Primary | ICD-10-CM

## 2019-09-09 DIAGNOSIS — E66.01 MORBID OBESITY (H): ICD-10-CM

## 2019-09-09 LAB — T3FREE SERPL-MCNC: 3.9 PG/ML (ref 2.3–4.2)

## 2019-09-09 PROCEDURE — 84443 ASSAY THYROID STIM HORMONE: CPT | Performed by: INTERNAL MEDICINE

## 2019-09-09 PROCEDURE — 99213 OFFICE O/P EST LOW 20 MIN: CPT | Performed by: INTERNAL MEDICINE

## 2019-09-09 PROCEDURE — 84439 ASSAY OF FREE THYROXINE: CPT | Performed by: INTERNAL MEDICINE

## 2019-09-09 PROCEDURE — 84481 FREE ASSAY (FT-3): CPT | Performed by: INTERNAL MEDICINE

## 2019-09-09 PROCEDURE — 36415 COLL VENOUS BLD VENIPUNCTURE: CPT | Performed by: INTERNAL MEDICINE

## 2019-09-09 ASSESSMENT — MIFFLIN-ST. JEOR: SCORE: 1606.49

## 2019-09-09 NOTE — PROGRESS NOTES
Name: Sudheer Montiel      HPI:  Recent issues:  Here for f/u diabetes and thyroid evaluation  Has fatigue symptom, but working extra house  Difficulty following the (ketogenic) diet plan, some wt loss.  Less hair loss noted.        Diabetes:  ~4/2010. Initial diagnosis approx age 44  ...Has taken several DM meds including metformin, Byetta, glimeparide, Invokana, and Victoza   In 2018, she had been taking metformin, Victoza, glimeparide, and Jardiance  She stopped Jardiance, also ran out of Victoza last year  10/2018. Abdominal abcess illness, hospitalizations at [facility] and then [facility]   2-surgeries for abcess, thought related to the hysterectomy bladder sling mesh from prior surgery    Current DM meds:   Metformin 750 mg   2-tabs each morning   glimeparide 4 mg 1-tab  1-tab each morning    Tresiba 23U    Subcutaneous morning (daily)    Has Glucocard Vital BG meter   Infrequent BG testing, recent levels 145-201 mg/dl    Recent [facility] labs include:  7/26/19 hgbA1c 9.3%, t.chol 195, , , FT4 1.46, FT3 8.7, Cr 0.77    Recent [facility] labs include:  Lab Results   Component Value Date    A1C 9.3 (A) 07/26/2019     02/01/2019    POTASSIUM 4.2 07/26/2019    CHLORIDE 104 02/01/2019    CO2 28 02/01/2019    ANIONGAP 4 02/01/2019     (A) 07/26/2019    BUN 10 02/01/2019    CR 0.77 07/26/2019    GFRESTIMATED 87 07/26/2019    GFRESTBLACK 101 07/26/2019    JONAN 9.3 02/01/2019    CHOL 195 07/26/2019    TRIG 152 (A) 07/26/2019    HDL 43 07/26/2019     (A) 07/26/2019     Last eye exam 2018?, results not available  DM Complications:  None known      Hypothyroidism:  Had taken Synthroid  Changed to [place] thyroid medication, then [place] + levothyroxine combo dose plan  Had taken [place] thyroid 2-tablets daily... Possibly 60 mg 2-tabs QD  Early-mid 3/2018. Ran out of [place] thyroid medication  Lab Results   Component Value Date    TSH 0.006 (A) 07/26/2019    T4 1.67 (H) 02/01/2019    FT3  8.9 (H) 2019     Previously took [place] thyroid 90 mg 2-tabs daily and low dose levothyroxine 0.025 mg daily  2019. 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 Frantz Guillen?/[facility]    PMH/PSH:  Past Medical History:   Diagnosis Date     Hypothyroid 80's     Necrotizing fasciitis (H)      Soft tissue infection      Type 2 diabetes mellitus (H)      Past Surgical History:   Procedure Laterality Date     C REPAIR CRUCIATE LIGAMENT,KNEE        SECTION       COLONOSCOPY N/A 10/21/2015    Procedure: COLONOSCOPY;  Surgeon: Jaky Arredondo MD;  Location:  GI     IRRIGATION AND DEBRIDEMENT ABDOMEN WASHOUT, COMBINED N/A 10/12/2018    Procedure: COMBINED IRRIGATION AND DEBRIDEMENT ABDOMEN WASHOUT;  Irrigation and Debridement of Lower Abdomen, removal of piece of mesh.;  Surgeon: Darlene Hogue MD;  Location:  OR     marisol/bso      due to anemia       Family Hx:  No family history on file.      Social Hx:  Social History     Socioeconomic History     Marital status:      Spouse name: Not on file     Number of children: 2     Years of education: Not on file     Highest education level: Not on file   Occupational History     Occupation: surgery scheduler [employer]   Social Needs     Financial resource strain: Not on file     Food insecurity:     Worry: Not on file     Inability: Not on file     Transportation needs:     Medical: Not on file     Non-medical: Not on file   Tobacco Use     Smoking status: Former Smoker     Last attempt to quit: 2011     Years since quittin.5     Smokeless tobacco: Never Used   Substance and Sexual Activity     Alcohol use: No     Drug use: No     Sexual activity: Not on file  "  Lifestyle     Physical activity:     Days per week: Not on file     Minutes per session: Not on file     Stress: Not on file   Relationships     Social connections:     Talks on phone: Not on file     Gets together: Not on file     Attends Anabaptism service: Not on file     Active member of club or organization: Not on file     Attends meetings of clubs or organizations: Not on file     Relationship status: Not on file     Intimate partner violence:     Fear of current or ex partner: Not on file     Emotionally abused: Not on file     Physically abused: Not on file     Forced sexual activity: Not on file   Other Topics Concern     Not on file   Social History Narrative     Not on file          MEDICATIONS:  has a current medication list which includes the following prescription(s): calcium, celecoxib, cyanocobalamin, estradiol, glimepiride, insulin degludec, levothyroxine, metformin, omeprazole, thyroid, fluconazole, insulin pen needle, and valacyclovir.    ROS:     ROS: 10 point ROS neg other than the symptoms noted above in the HPI.    GENERAL: some fatigue, wt loss; denies fevers, chills, night sweats.   HEENT:  no dysphagia, odonophagia, diplopia, neck pain  THYROID:  no apparent hyper or hypothyroid symptoms  CV:  Denies chest pain, pressure or palpitations  LUNGS:  denies SOB, dyspnea, cough, wheezing  ABDOMEN: no abdominal pain; denies diarrhea, indigestion, constipation  EXTREMITIES: no rashes, ulcers, edema  NEUROLOGY: no headaches, denies changes in vision, tingling, extremitiy numbness   MSK: occasional low back pain; no muscle aches or pains, weakness  SKIN: no rashes or lesions  : increased thirst, frequent urination; no menses?!  PSYCH:  stable mood, no significant anxiety or depression  ENDOCRINE: no heat or cold intolerance      Physical Exam   VS: /77   Pulse 81   Ht 1.651 m (5' 5\")   Wt 101.1 kg (222 lb 12.8 oz)   BMI 37.08 kg/m    GENERAL: AXOX3, NAD, eyeglasses, well dressed, " answering questions appropriately, appears stated age.  THYROID:  normal gland, no apparent nodules or goiter  ABDOMEN: obese, soft, nontender, nondistended  EXTREMITIES: no edema, no lesions  NEUROLOGY: CN grossly intact, no tremors  MSK: grossly intact  SKIN: vertical hypogastrium and low abd horizontal scars; no rashes, no lesions    LABS:    All pertinent notes, labs, and images personally reviewed by me.     A/P:  Encounter Diagnoses   Name Primary?     Type 2 diabetes mellitus without complication, without long-term current use of insulin (H) Yes     Acquired hypothyroidism      Morbid obesity (H)        Comments:  Reviewed health issues, diabetes and thyroid management.  Difficult to assess glycemic control without BG meter information, though uncontrolled T2DM  Cause for the sweet craving unclear    Plan:  Discussed general issues with the diabetes diagnosis and management  We discussed the hgbA1c test which reflects previous overall glucose levels or control  Discussed the importance of blood glucose (BG) testing to assess glucose trends  Provided general overview of the diabetes medication options and medication treatment plan.    Recommend:  Needs more aggressive diabetes med treatment and more frequent f/u evaluations  Continue metformin, glimeparide, Tresiba med dosing, as noted   Raise Tresiba as 25U daily   Continue current glimeparide and metformin medications  Consider restarting a GLP1RA medication, such as a weekly injection with Ozempic vs Trulicity  She has not been successful obtaining a Diab Ed appt yet   I decided to help her schedule this appt, placed another [facility] Diabetes Education Referral   Spoke with  Tamara today by phone, we scheduled the appt for  [place] office 9/25/19 with TE   Plan to wear diagnostic Chandni CGMS, review insulin dosing and med use... Also patient dietary questions  Goal target -150 mg/dl  Resume testing FBG daily  Keep focus on diet, exercise, weight  management.  Needs f/u on the mild hypercholesterolemia, consider adding statin medication for treatment    Continue the [place] thyroid + levothyroxine dose plan  Check thyroid levels today  Addressed patient questions today    Labs ordered today:   Orders Placed This Encounter   Procedures     TSH     T4 free     T3 Free     Basic metabolic panel     ALT     DIABETES EDUCATOR REFERRAL     Radiology/Consults ordered today: DIABETES EDUCATOR REFERRAL    More than 50% of the time spent with Ms. Montiel on counseling / coordinating her care.  Total appointment time was 20 minutes.    Follow-up:  3 mo.    Kirk Christianson MD  [facility]

## 2019-09-10 LAB
T4 FREE SERPL-MCNC: 1.88 NG/DL (ref 0.76–1.46)
TSH SERPL DL<=0.005 MIU/L-ACNC: <0.01 MU/L (ref 0.4–4)

## 2019-09-19 ENCOUNTER — MYC MEDICAL ADVICE (OUTPATIENT)
Dept: ENDOCRINOLOGY | Facility: CLINIC | Age: 54
End: 2019-09-19

## 2019-09-21 DIAGNOSIS — E03.9 ACQUIRED HYPOTHYROIDISM: ICD-10-CM

## 2019-09-21 RX ORDER — LEVOTHYROXINE SODIUM 150 UG/1
150 TABLET ORAL DAILY
Qty: 30 TABLET | Refills: 5 | Status: SHIPPED | OUTPATIENT
Start: 2019-09-21 | End: 2020-04-30

## 2019-09-25 ENCOUNTER — ALLIED HEALTH/NURSE VISIT (OUTPATIENT)
Dept: EDUCATION SERVICES | Facility: CLINIC | Age: 54
End: 2019-09-25
Payer: COMMERCIAL

## 2019-09-25 DIAGNOSIS — E11.9 TYPE 2 DIABETES MELLITUS WITHOUT COMPLICATION, WITHOUT LONG-TERM CURRENT USE OF INSULIN (H): Primary | ICD-10-CM

## 2019-09-25 PROCEDURE — 95250 CONT GLUC MNTR PHYS/QHP EQP: CPT

## 2019-09-25 PROCEDURE — G0108 DIAB MANAGE TRN  PER INDIV: HCPCS

## 2019-09-25 NOTE — PROGRESS NOTES
Diabetes Self-Management Education & Support      Diabetes Self-Management Education & Support - Professional CGM Insertion    SUBJECTIVE/OBJECTIVE  Presents for: Individual review  Accompanied by: Self  Focus of Visit: CGM, Healthy Eating  Diabetes type: Type 2  Date of diagnosis: about 9 years ago  How confident are you filling out medical forms by yourself:: Not Assessed  Diabetes management related comments/concerns: Wants to lose weight to improve her diabetes.   Transportation concerns: No  Other concerns:: None  Cultural Influences/Ethnic Background:  American    Patient seen today for Professional CGM Insertion:    Professional CGM Insertion  Sensor Type: LibrePro  Lot #: [id number]  Serial #: [id number]  Expiration Date: 01/31/20  Indication(s) for CGM Study: Difficult to manage hypoglycemia and/or hyperglycemia       Healthy Eating  Healthy Eating Assessed Today: Yes  Cultural/Presybeterian diet restrictions?: No  Meals include: Breakfast, Lunch, Dinner, Snacks  Breakfast: coffee with creamer and sun flower seeds  Lunch: egg salad sandwich and chips  Dinner: chicken and dumplings    Code Red diet for ~4 weeks and lost ~15 lbs about 2 months ago.     Being Active  Being Active Assessed Today: Yes  Exercise:: Currently not exercising    Monitoring  Monitoring Assessed Today: No    Taking Medications  Diabetes Medication(s)     Biguanides       metFORMIN (GLUCOPHAGE-XR) 750 MG 24 hr tablet    TAKE ONE TABLET BY MOUTH TWICE A DAY WITH MEALS    Insulin       insulin degludec (TRESIBA) 100 UNIT/ML pen    Inject 15 Units Subcutaneous daily     Patient taking differently:  Inject 18 Units Subcutaneous daily     Sulfonylureas       glimepiride (AMARYL) 4 MG tablet    TAKE ONE TABLET BY MOUTH EVERY MORNING BEFORE BREAKFAST          Taking Medication Assessed Today: Yes  Current Treatments: Insulin Injections, Oral Agent (dual therapy)  Problems taking diabetes medications regularly?: No  Diabetes medication side  effects?: No  Treatment Compliance: All of the time    Problem Solving  Problem Solving Assessed Today: No    Reducing Risks  Reducing Risks Assessed Today: No    Healthy Coping  Healthy Coping Assessed Today: Yes  Emotional response to diabetes: Ready to learn, Concern for health and well-being  Stage of change: PREPARATION (Decided to change - considering how)  Patient Activation Measure Survey Score:  No flowsheet data found.      ASSESSMENT  Pt is open to the librepro placement today.  Wants to improve her blood sugars and wants to continue to lose weight. Reports she had some success with a keto like diet for a month but then regained the weight the past month which is frustrating. Might benefit from seeing the  weight loss clinic which we discussed today as they have multiple weight loss options (surgery, meds, diet follow up). She will think about this.       INTERVENTION:   Diabetes knowledge and skills assessment:   Patient is knowledgeable in diabetes management concepts related to: Healthy Eating, Being Active, Monitoring, Problem Solving, Reducing Risks and Healthy Coping  Patient needs further education on the following diabetes management concepts: Taking Medication  Based on learning assessment above, most appropriate setting for further diabetes education would be: Individual setting.    Education provided today on:  AADE Self-Care Behaviors:  Healthy Eating: weight reduction and portion control  Taking Medication: Discussed option of potential GLP1's to try such as Ozempic and benefits of these (weight loss, less risk for hypoglycemia).     WRITTEN AND VERBAL INFORMATION GIVEN TO SUPPORT UNDERSTANDING OF:   LibrePro CGM: Sensor insertion, intention of monitoring for 14 days. Keep records of BG, food intake, exercise, and medication dosing during wear.     Opportunities for ongoing education and support in diabetes-self management were discussed.    Pt verbalized understanding of concepts discussed  and recommendations provided today.       Education Materials Provided:  Food Log      PLAN  See Patient Instructions for co-developed, patient-stated behavior change goals.  Consider bariatric referral at next visit. Pt will think about this.   Sudheer will keep a food log while wearing her sensor the next 2 weeks.   Follow up arranged in 3 weeks to review her evelyn data.   AVS printed and provided to patient today. See Follow-Up section for recommended follow-up.    ALLIE Benson CDE    Time spent in DSMT: 30 minutes  Time spent in CGM insertion: 10 minutes    Encounter Type: Individual    Any diabetes medication dose changes were made via the CDE Protocol and Collaborative Practice Agreement with the patient's endocrinology provider. A copy of this encounter was shared with the provider.

## 2019-09-25 NOTE — PATIENT INSTRUCTIONS
1. Plan to wear the LibrePro sensor for 14 days. It is okay to shower, bathe, and swim (up to 3 feet deep for 30 minutes)    2. Continue with your usual diabetes care plan - check blood sugars and take medicines, as prescribed.    3. Keep a log of what you eat and drink, when you take your medications and how much you take, and exercise you do while you are wearing the sensor.    3. Do not cover the sensor with extra adhesive (the small hole in the center of the sensor must remain uncovered)    4. Use a little extra care, especially when getting dressed or exercising, to avoid accidentally loosening or removing the sensor.     5. Remove the sensor if you need to have an MRI or CT scan.       [facility] for the [facility] Area:  For Your Diabetes Education and Nutrition Appointments Call:  [phone]   For Diabetes Education or Nutrition Related Questions:   Phone: [phone]  E-mail: [email or web address]  Fax: [phone]   If you need a medication refill please contact your pharmacy. Please allow 3 business days for your refills to be completed.    Instructions for emailing the Diabetes Educators    If you need to communicate a non-urgent message to a Diabetes Educator via email, please send to [email or web address].    Please follow the following email guidelines:    Subject line: Secure: your clinic name (example: Secure: [facility])  In the email please include: First name, middle initial, last name and date of birth.    We will be in touch with you within one (1) business day.

## 2019-11-06 ENCOUNTER — HEALTH MAINTENANCE LETTER (OUTPATIENT)
Age: 54
End: 2019-11-06

## 2019-11-12 ENCOUNTER — ALLIED HEALTH/NURSE VISIT (OUTPATIENT)
Dept: EDUCATION SERVICES | Facility: CLINIC | Age: 54
End: 2019-11-12
Payer: COMMERCIAL

## 2019-11-12 DIAGNOSIS — E11.9 TYPE 2 DIABETES MELLITUS WITHOUT COMPLICATION, WITHOUT LONG-TERM CURRENT USE OF INSULIN (H): Primary | ICD-10-CM

## 2019-11-12 PROCEDURE — G0108 DIAB MANAGE TRN  PER INDIV: HCPCS

## 2019-11-12 NOTE — PROGRESS NOTES
Diabetes Self-Management Education & Support      Diabetes Self-Management Education & Support - Professional CGM Insertion (not enough data (<72 hrs) received on previous sensor so replaced the sensor today)    SUBJECTIVE/OBJECTIVE  Presents for: Follow-up  Accompanied by: Self  Focus of Visit: CGM, Healthy Eating  Diabetes type: Type 2  Date of diagnosis: about 9 years ago  How confident are you filling out medical forms by yourself:: Not Assessed  Diabetes management related comments/concerns: Here for her evelyn follow up. Reports she snagged it though and then taped over it.  Transportation concerns: No  Other concerns:: None  Cultural Influences/Ethnic Background:  American    Patient seen today for Professional CGM Insertion:    Professional CGM Insertion  Sensor Type: LibrePro  Lot #: [id number]  Serial #: [id number]  Expiration Date: 20  Indication(s) for CGM Study: Difficult to manage hypoglycemia and/or hyperglycemia       Healthy Eating  Healthy Eating Assessed Today: Yes  Cultural/Mu-ism diet restrictions?: No  Meals include: Breakfast, Lunch, Dinner, Snacks  Breakfast: coffee with creamer and sun flower seeds  Lunch: egg salad sandwich and chips  Dinner: chicken and dumplings  Trying to limit potatoes, rice, bread. Reports when she works she cheats sometimes (candy, chips, popcorn, etc).  Finds aiming for 25 gm carb unrealistic for her and is wondering how many carbs she should eat per meal. Would like to lose weight.     Being Active  Being Active Assessed Today: Yes  Exercise:: Currently not exercising    Monitoring  Monitoring Assessed Today: Yes  Did patient bring glucose meter to appointment? : Yes  Home Glucose (Sugar) Monitorin-2 times per week, has not checked her BG since our last visit on .     Taking Medications  Diabetes Medication(s)     Biguanides       metFORMIN (GLUCOPHAGE-XR) 750 MG 24 hr tablet    TAKE ONE TABLET BY MOUTH TWICE A DAY, WITH MEALS    Insulin        "insulin degludec (TRESIBA) 100 UNIT/ML pen    Inject 15 Units Subcutaneous daily     Patient taking differently:  Inject 18 Units Subcutaneous daily     Sulfonylureas       glimepiride (AMARYL) 4 MG tablet    TAKE ONE TABLET BY MOUTH EVERY MORNING, BEFORE BREAKFAST          Taking Medication Assessed Today: Yes  Current Treatments: Insulin Injections, Oral Agent (dual therapy)  Problems taking diabetes medications regularly?: No  Diabetes medication side effects?: No  Treatment Compliance: All of the time    Problem Solving  Problem Solving Assessed Today: No    Reducing Risks  Reducing Risks Assessed Today: No    Healthy Coping  Healthy Coping Assessed Today: Yes  Emotional response to diabetes: Ready to learn, Concern for health and well-being  Stage of change: PREPARATION (Decided to change - considering how)  Patient Activation Measure Survey Score:  No flowsheet data found.      ASSESSMENT  Pt brought in the sensor but there was not enough data to review and interpret.  Therefore, placed a new sensor today and pt will keep a food log.     She reports she \"cheats\" when she eats sometimes and is wondering how many carbs to eat at meals.       INTERVENTION:   Diabetes knowledge and skills assessment:   Patient is knowledgeable in diabetes management concepts related to: Being Active, Monitoring, Taking Medication, Problem Solving and Reducing Risks  Patient needs further education on the following diabetes management concepts: Healthy Eating and Healthy Coping  Based on learning assessment above, most appropriate setting for further diabetes education would be: Individual setting.    Education provided today on:  AADE Self-Care Behaviors:  Healthy Eating: carbohydrate counting, consistency in amount, composition, and timing of food intake, weight reduction and portion control. Educated on aiming for 30-45 grams of carb per meal (as a general guideline for weight loss). If that is too restrictive, discussed aiming " for 45-60 gm carb at meals.     WRITTEN AND VERBAL INFORMATION GIVEN TO SUPPORT UNDERSTANDING OF:   LibrePro CGM: Sensor insertion, intention of monitoring for 14 days. Keep records of BG, food intake, exercise, and medication dosing during wear.     Opportunities for ongoing education and support in diabetes-self management were discussed.    Pt verbalized understanding of concepts discussed and recommendations provided today.       Education Materials Provided:  No new materials provided today      PLAN  See Patient Instructions for co-developed, patient-stated behavior change goals.  Chandni pro sensor placed today (replaced the previous one as not enough data to interpret).  Will f/u in 2 weeks for chandni review.   Pt to keep a food log while wearing the sensor.   AVS printed and provided to patient today. See Follow-Up section for recommended follow-up.    ALLIE Benson CDE    Time Spent: 30 minutes  Encounter Type: Individual    Any diabetes medication dose changes were made via the CDE Protocol and Collaborative Practice Agreement with the patient's endocrinology provider. A copy of this encounter was shared with the provider.

## 2020-02-16 ENCOUNTER — HEALTH MAINTENANCE LETTER (OUTPATIENT)
Age: 55
End: 2020-02-16

## 2020-03-30 ENCOUNTER — TELEPHONE (OUTPATIENT)
Dept: ENDOCRINOLOGY | Facility: CLINIC | Age: 55
End: 2020-03-30

## 2020-03-30 DIAGNOSIS — E11.9 TYPE 2 DIABETES MELLITUS WITHOUT COMPLICATION, WITHOUT LONG-TERM CURRENT USE OF INSULIN (H): ICD-10-CM

## 2020-03-30 RX ORDER — GLIMEPIRIDE 4 MG/1
4 TABLET ORAL
Qty: 30 TABLET | Refills: 1 | Status: SHIPPED | OUTPATIENT
Start: 2020-03-30 | End: 2020-06-03

## 2020-03-30 NOTE — TELEPHONE ENCOUNTER
Reason for Call:  Medication or medication refill:    Do you use a [facility]?  Name of the pharmacy and phone number for the current request:       [facility] [place], [place] - [address]      Name of the medication requested:   glimepiride (AMARYL) 4 MG tablet  30 tablet  1  12/13/2019   No    Sig - Route: Take 1 tablet (4 mg) by mouth every morning (before breakfast) - Oral    Sent to pharmacy as: glimepiride (AMARYL) 4 MG tablet    Class: E-Prescribe    Order: [id number]    E-Prescribing Status: Receipt confirmed by pharmacy (12/13/2019  4:56 PM CST)      insulin degludec (TRESIBA) 100 UNIT/ML pen  15 mL  11  2/1/2019   --    Sig - Route: Inject 15 Units Subcutaneous daily - Subcutaneous    Patient taking differently: Inject 18 Units Subcutaneous daily         Sent to pharmacy as: insulin degludec (TRESIBA) 100 UNIT/ML pen    Class: E-Prescribe    Order: [id number]    E-Prescribing Status: Receipt confirmed by pharmacy (2/1/2019  9:42 AM CST)          Other request:   Pt is scheduled for an appt via telephone on 4/06/2020.  She is requesting these two fills.  The others will be mentioned at appt.      Can we leave a detailed message on this number? Yes      Phone number patient can be reached at: Home number on file [phone] (home)    Best Time: Any     Call taken on 3/30/2020 at 1:24 PM by Edilia Rodriguez

## 2020-04-06 NOTE — PROGRESS NOTES
"Sudheer Montiel is a 54 year old female who is being evaluated via a billable video visit.      The patient has been notified of following:     \"This video visit will be conducted via a call between you and your physician/provider. We have found that certain health care needs can be provided without the need for an in-person physical exam.  This service lets us provide the care you need with a video conversation.  If a prescription is necessary we can send it directly to your pharmacy.  If lab work is needed we can place an order for that and you can then stop by our lab to have the test done at a later time.    If during the course of the call the physician/provider feels a video visit is not appropriate, you will not be charged for this service.\"     Patient has given verbal consent for Video visit? Yes    Patient would like the video invitation sent by: Text to cell phone: [phone]    Video Start Time: 12:40 pm    Sudheer Montiel complains of    Chief Complaint   Patient presents with     Diabetes     I have reviewed and updated the patient's Past Medical History, Social History, Family History and Medication List.    ALLERGIES  Cephalexin; Morphine; Penicillins; Amoxicillin; Cefprozil; Ceftazidime; Cefzil; Ciprofloxacin; Ciprofloxacin; and Percocet [oxycodone-acetaminophen]    Additional provider notes:       Recent issues:  Followup diabetes and thyroid evaluation  Ran out of the Tresiba, then restarted 4 days ago  Patient feeling well overall, reports following virus precautions           Diabetes:  ~4/2010. Initial diagnosis approx age 44  ...Has taken several DM meds including metformin, Byetta, glimeparide, Invokana, and Victoza   In 2018, she had been taking metformin, Victoza, glimeparide, and Jardiance  She stopped Jardiance, also ran out of Victoza last year  10/2018. Abdominal abcess illness, hospitalizations at [facility] and then [facility] hospital              2-surgeries for abcess, " thought related to the hysterectomy bladder sling mesh from prior surgery     Current DM meds:  Metformin 750 mg                   2-tabs each morning  Glimeparide 4 mg  1-tab each morning  Tresiba 28U                            Subcutaneous morning (daily)     Has Glucocard Vital BG meter              Infrequent BG testing, recent near 150 mg/dl     Recent [facility] labs include:  7/26/19 hgbA1c 9.3%, t.chol 195, , , FT4 1.46, FT3 8.7, Cr 0.77     Recent [facility] labs include:  Lab Results   Component Value Date    A1C 9.3 (A) 07/26/2019     02/01/2019    POTASSIUM 4.2 07/26/2019    CHLORIDE 104 02/01/2019    CO2 28 02/01/2019    ANIONGAP 4 02/01/2019     (A) 07/26/2019    BUN 10 02/01/2019    CR 0.77 07/26/2019    GFRESTIMATED 87 07/26/2019    GFRESTBLACK 101 07/26/2019    JOANN 9.3 02/01/2019    CHOL 195 07/26/2019    TRIG 152 (A) 07/26/2019    HDL 43 07/26/2019     (A) 07/26/2019     Last eye exam 2018?, results not available  DM Complications:  None known        Hypothyroidism:  Had taken Synthroid  Changed to [place] thyroid medication, then [place] + levothyroxine combo dose plan  Had taken [place] thyroid 2-tablets daily... Possibly 60 mg 2-tabs QD  Early-mid 3/2018. Ran out of [place] thyroid medication  Previously took [place] thyroid 90 mg 2-tabs daily and low dose levothyroxine 0.025 mg daily  8/2019. 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 Frantz Guillen?/[facility]    ROS:      ROS: 10 point ROS neg other than the symptoms noted above in the HPI.     GENERAL: some fatigue,  wt loss; denies fevers, chills, night sweats.   HEENT:  no dysphagia, odonophagia, diplopia, neck pain  THYROID:  no apparent hyper or hypothyroid symptoms  CV:  Denies chest pain, pressure or palpitations  LUNGS:  denies SOB, dyspnea, cough, wheezing  ABDOMEN: no abdominal pain; denies diarrhea, indigestion, constipation  EXTREMITIES: no rashes, ulcers, edema  NEUROLOGY: no headaches, denies changes in vision, tingling, extremitiy numbness   MSK: occasional low back pain; no muscle aches or pains, weakness  SKIN: no rashes or lesions  : increased thirst, frequent urination; no menses?!  PSYCH:  stable mood, no significant anxiety or depression  ENDOCRINE: no heat or cold intolerance        Physical Exam (visual exam)  VS:  no vital signs taken for video visit  GENERAL: AXOX3, NAD, eyeglasses, well dressed, answering questions appropriately, appears stated age.  THYROID:  no apparent nodules or goiter  HEENT: no exopthalmous, no proptosis, EOMI  CV: deferred  LUNGS: deferred  ABDOMEN: obese abdomen  EXTREMITIES: not examined  NEUROLOGY: CN grossly intact, no tremors  MSK: grossly intact  SKIN: no skin exam       LABS:     All pertinent notes, labs, and images personally reviewed by me.      A/P:       Encounter Diagnoses   Name Primary?     Type 2 diabetes mellitus without complication, without long-term current use of insulin (H) Yes     Acquired hypothyroidism       Morbid obesity (H)          Comments:  Reviewed health issues, diabetes and thyroid management.  Difficult to assess glycemic control without BG meter information, though uncontrolled T2DM     Plan:  Continue metformin, glimeparide, Tresiba med dose plan  Discussed lag effect with Tresiba since recent re-start  Consider restarting a GLP1RA medication, such as a weekly injection with Ozempic vs Trulicity  Goal target -150 mg/dl  Consider wearing LibrePro vs LibrePersonal CGMS  Keep focus on diet, exercise, weight management.  Needs f/u on the  mild hypercholesterolemia, consider adding statin medication for treatment     Continue the [place] thyroid + levothyroxine dose plan  F/u evaluation 5/13/20, and non-fasting preappt labs 1-week before    Addressed patient questions today        More than 50% of the time spent with Ms. Montiel on counseling / coordinating her care.  Total appointment time was 15 minutes.     Follow-up:  5/13/20     Kirk Christianson MD  [facility]        Video-Visit Details    Type of service:  Video Visit    Video End Time (time video stopped): 12:55 pm    Originating Location (pt. Location): Home    Distant Location (provider location):  [facility]     Mode of Communication:  Video Conference via [employer]

## 2020-04-07 ENCOUNTER — VIRTUAL VISIT (OUTPATIENT)
Dept: ENDOCRINOLOGY | Facility: CLINIC | Age: 55
End: 2020-04-07
Payer: COMMERCIAL

## 2020-04-07 DIAGNOSIS — E03.9 ACQUIRED HYPOTHYROIDISM: Primary | ICD-10-CM

## 2020-04-07 DIAGNOSIS — E11.9 TYPE 2 DIABETES MELLITUS WITHOUT COMPLICATION, WITHOUT LONG-TERM CURRENT USE OF INSULIN (H): ICD-10-CM

## 2020-04-07 PROCEDURE — 99213 OFFICE O/P EST LOW 20 MIN: CPT | Mod: GT | Performed by: INTERNAL MEDICINE

## 2020-04-07 RX ORDER — FLURBIPROFEN SODIUM 0.3 MG/ML
SOLUTION/ DROPS OPHTHALMIC
Qty: 100 EACH | Refills: 3 | Status: SHIPPED | OUTPATIENT
Start: 2020-04-07

## 2020-04-30 ENCOUNTER — TELEPHONE (OUTPATIENT)
Dept: ENDOCRINOLOGY | Facility: CLINIC | Age: 55
End: 2020-04-30

## 2020-04-30 DIAGNOSIS — E03.9 ACQUIRED HYPOTHYROIDISM: ICD-10-CM

## 2020-04-30 RX ORDER — LEVOTHYROXINE SODIUM 150 UG/1
150 TABLET ORAL DAILY
Qty: 30 TABLET | Refills: 1 | Status: SHIPPED | OUTPATIENT
Start: 2020-04-30 | End: 2020-08-25

## 2020-04-30 NOTE — TELEPHONE ENCOUNTER
Reason for Call:  Medication or medication refill:    Do you use a [facility]?  Name of the pharmacy and phone number for the current request:       [facility] [place], [place] - [address]      Name of the medication requested:   levothyroxine (SYNTHROID/LEVOTHROID) 150 MCG tablet  30 tablet          Other request: pharmacy called to say initial request for medication denied because doctor not affiliated with clinic    Can we leave a detailed message on this number? YES    Phone number patient can be reached at: Other phone number:  [phone]    Best Time: any      Call taken on 4/30/2020 at 3:21 PM by Ember Schaefer

## 2020-05-20 DIAGNOSIS — E03.9 ACQUIRED HYPOTHYROIDISM: ICD-10-CM

## 2020-05-21 RX ORDER — THYROID,PORK 90 MG
TABLET ORAL
Qty: 30 TABLET | Refills: 5 | OUTPATIENT
Start: 2020-05-21

## 2020-05-21 NOTE — TELEPHONE ENCOUNTER
Routing refill request to provider for review/approval because:  Labs out of range:  TSH  Please authorize if appropriate.  Thanks,  Sanjuana Bronson RN

## 2020-07-20 ENCOUNTER — TELEPHONE (OUTPATIENT)
Dept: ENDOCRINOLOGY | Facility: CLINIC | Age: 55
End: 2020-07-20

## 2020-07-20 DIAGNOSIS — E03.9 ACQUIRED HYPOTHYROIDISM: ICD-10-CM

## 2020-07-20 NOTE — TELEPHONE ENCOUNTER
"ARMOUR THYROID 90 MG tablet    Last Written Prescription Date:  5/21/2020  Last Fill Quantity: 60,  # refills: 0   Last office visit: 4/7/2020 with prescribing provider:  Sammy   Future Office Visit: unknown       Requested Prescriptions   Pending Prescriptions Disp Refills     ARMOUR THYROID 90 MG tablet [Pharmacy Med Name: ARMOUR THYROID 90MG TABS] 60 tablet 0     Sig: TAKE TWO TABLETS BY MOUTH EVERY MORNING       Thyroid Protocol Failed - 7/20/2020  1:31 PM        Failed - Normal TSH on file in past 12 months     Recent Labs   Lab Test 09/09/19  1512   TSH <0.01*              Passed - Patient is 12 years or older        Passed - Recent (12 mo) or future (30 days) visit within the authorizing provider's specialty     Patient has had an office visit with the authorizing provider or a provider within the authorizing providers department within the previous 12 mos or has a future within next 30 days. See \"Patient Info\" tab in inbasket, or \"Choose Columns\" in Meds & Orders section of the refill encounter.              Passed - Medication is active on med list        Passed - No active pregnancy on record     If patient is pregnant or has had a positive pregnancy test, please check TSH.          Passed - No positive pregnancy test in past 12 months     If patient is pregnant or has had a positive pregnancy test, please check TSH.               "

## 2020-07-22 RX ORDER — THYROID,PORK 90 MG
TABLET ORAL
Qty: 30 TABLET | Refills: 2 | Status: SHIPPED | OUTPATIENT
Start: 2020-07-22 | End: 2020-10-29

## 2020-07-22 NOTE — TELEPHONE ENCOUNTER
Routing refill request to provider for review/approval because:  Labs out of range:  TSH    Please review and authorize if appropriate.    Thank you,   Rom CONROY RN

## 2020-07-24 DIAGNOSIS — E11.9 TYPE 2 DIABETES MELLITUS WITHOUT COMPLICATION, WITHOUT LONG-TERM CURRENT USE OF INSULIN (H): ICD-10-CM

## 2020-07-24 NOTE — TELEPHONE ENCOUNTER
Medication Question or Refill  Who is calling: Kelley from [facility]  What medication are you calling about (include dose and sig)?: TRESIBA FLEXTOUCH 100 UNIT/ML pen  Controlled Substance Agreement on file:   Who prescribed the medication?: Dr. Christianson  Do you need a refill? Yes: TRESIBA FLEXTOUCH 100 UNIT/ML pen  When did you use the medication last? Last refill 6/10  Patient offered an appointment? No  Do you have any questions or concerns?  No  Requested Pharmacy:    [place], [place] - [address]  Okay to leave a detailed message?: Yes at Other phone number:  [phone]

## 2020-07-24 NOTE — TELEPHONE ENCOUNTER
Kelley from [facility] called to check on this prescription. She is concerned about when the refill will be completed because she has given the PT an emergency supply to last them until this is filled.

## 2020-07-27 NOTE — TELEPHONE ENCOUNTER
Routing refill request to provider for review/approval because:  Labs not current:  A1c    Please review and authorize if appropriate.    Thank you,   Rom CONROY RN

## 2020-08-09 DIAGNOSIS — E03.9 ACQUIRED HYPOTHYROIDISM: ICD-10-CM

## 2020-08-09 DIAGNOSIS — E11.9 TYPE 2 DIABETES MELLITUS WITHOUT COMPLICATION, WITHOUT LONG-TERM CURRENT USE OF INSULIN (H): Primary | ICD-10-CM

## 2020-08-19 DIAGNOSIS — E11.9 TYPE 2 DIABETES MELLITUS WITHOUT COMPLICATION, WITHOUT LONG-TERM CURRENT USE OF INSULIN (H): ICD-10-CM

## 2020-08-20 NOTE — TELEPHONE ENCOUNTER
TC's   Patient due for appt with Dr Christianson  Please schedule    Once scheduled, please route this TE back to CS Refill  Thank you,  Corie CHRISTINA RN

## 2020-08-22 DIAGNOSIS — E03.9 ACQUIRED HYPOTHYROIDISM: ICD-10-CM

## 2020-08-25 RX ORDER — LEVOTHYROXINE SODIUM 150 UG/1
150 TABLET ORAL DAILY
Qty: 30 TABLET | Refills: 1 | Status: SHIPPED | OUTPATIENT
Start: 2020-08-25 | End: 2020-11-27

## 2020-08-25 NOTE — TELEPHONE ENCOUNTER
Routing refill request to provider for review/approval because:  Labs out of range:  TSH    Tamara ANDERSON RN

## 2020-08-26 ENCOUNTER — TRANSFERRED RECORDS (OUTPATIENT)
Dept: HEALTH INFORMATION MANAGEMENT | Facility: CLINIC | Age: 55
End: 2020-08-26

## 2020-08-26 LAB — HBA1C MFR BLD: 11.4 % (ref 4–6)

## 2020-08-27 DIAGNOSIS — E11.9 TYPE 2 DIABETES MELLITUS WITHOUT COMPLICATION, WITHOUT LONG-TERM CURRENT USE OF INSULIN (H): ICD-10-CM

## 2020-08-28 RX ORDER — GLIMEPIRIDE 4 MG/1
TABLET ORAL
Qty: 30 TABLET | Refills: 1 | Status: SHIPPED | OUTPATIENT
Start: 2020-08-28 | End: 2020-10-12

## 2020-08-28 RX ORDER — GLIMEPIRIDE 4 MG/1
TABLET ORAL
Qty: 30 TABLET | Refills: 1 | OUTPATIENT
Start: 2020-08-28

## 2020-08-28 NOTE — TELEPHONE ENCOUNTER
Routing refill request to provider for review/approval because:  Medication is reported/historical    Please review and authorize if appropriate.    Thank you,   Rom CONROY RN

## 2020-08-29 RX ORDER — FLUCONAZOLE 100 MG/1
TABLET ORAL
OUTPATIENT
Start: 2020-08-29

## 2020-08-29 NOTE — TELEPHONE ENCOUNTER
This medication should be prescribed by her PCP or gynecologist.  She has seen Beth CASTRO/[facility] and Dr. Franzt Bacon/[facility].    JOJO Christianson MD, MS  Endocrinology  [facility]

## 2020-09-11 ENCOUNTER — RESULTS ONLY (OUTPATIENT)
Dept: LAB | Age: 55
End: 2020-09-11

## 2020-09-11 ENCOUNTER — OFFICE VISIT (OUTPATIENT)
Dept: URGENT CARE | Facility: URGENT CARE | Age: 55
End: 2020-09-11
Payer: COMMERCIAL

## 2020-09-11 DIAGNOSIS — Z53.9 ERRONEOUS ENCOUNTER--DISREGARD: Primary | ICD-10-CM

## 2020-09-12 ENCOUNTER — NURSE TRIAGE (OUTPATIENT)
Dept: NURSING | Facility: CLINIC | Age: 55
End: 2020-09-12

## 2020-09-12 LAB
SARS-COV-2 RNA SPEC QL NAA+PROBE: NOT DETECTED
SPECIMEN SOURCE: NORMAL

## 2020-09-12 NOTE — TELEPHONE ENCOUNTER
Requesting COVID test results from 9/11. Advised results still in-process. Reviewed recommendations for self-isolation.    Margo Vizcarra, RN  [facility]      Additional Information    General information question, no triage required and triager able to answer question    Protocols used: INFORMATION ONLY CALL-A-AH

## 2020-09-18 ENCOUNTER — VIRTUAL VISIT (OUTPATIENT)
Dept: ENDOCRINOLOGY | Facility: CLINIC | Age: 55
End: 2020-09-18
Payer: COMMERCIAL

## 2020-09-18 DIAGNOSIS — E11.65 UNCONTROLLED TYPE 2 DIABETES MELLITUS WITH HYPERGLYCEMIA (H): Primary | ICD-10-CM

## 2020-09-18 DIAGNOSIS — E03.9 ACQUIRED HYPOTHYROIDISM: ICD-10-CM

## 2020-09-18 PROCEDURE — 99214 OFFICE O/P EST MOD 30 MIN: CPT | Mod: 95 | Performed by: INTERNAL MEDICINE

## 2020-09-18 NOTE — PROGRESS NOTES
"Sudheer Montiel is a 55 year old female who is being evaluated via a billable video visit.      The patient has been notified of following:     \"This video visit will be conducted via a call between you and your physician/provider. We have found that certain health care needs can be provided without the need for an in-person physical exam.  This service lets us provide the care you need with a video conversation.  If a prescription is necessary we can send it directly to your pharmacy.  If lab work is needed we can place an order for that and you can then stop by our lab to have the test done at a later time.    Video visits are billed at different rates depending on your insurance coverage.  Please reach out to your insurance provider with any questions.    If during the course of the call the physician/provider feels a video visit is not appropriate, you will not be charged for this service.\"    Patient has given verbal consent for Video visit? Yes  How would you like to obtain your AVS? Verbally Reviewed   If you are dropped from the video visit, the video invite should be resent to: Text to cell phone: [phone]  Will anyone else be joining your video visit? No          Recent issues:  Followup diabetes and thyroid evaluation  Had difficulty getting levothyroxine medication from the [facility] pharmacy for approx 1 month, restarted it 2 weeks ago  Success with weight loss (recently stable near 218#), but recent [facility] labs showed very high              Diabetes:  ~4/2010. Initial diagnosis approx age 44  ...Has taken several DM meds including metformin, Byetta, glimeparide, Invokana, and Victoza   In 2018, she had been taking metformin, Victoza, glimeparide, and Jardiance  She stopped Jardiance, also ran out of Victoza last year  10/2018. Abdominal abcess illness, hospitalizations at [facility] and then [facility] hospital              2-surgeries for abcess, thought related to the hysterectomy bladder " sling mesh from prior surgery     Current DM meds:  Metformin 750 mg                   2-tabs each morning  Glimeparide 4 mg                   1-tab each morning  Tresiba 40U                            Subcutaneous morning (daily)     Has Glucocard Vital BG meter              Infrequent BG testing, last test approx 230 mg/dl     Recent [facility] labs include:  7/26/19 hgbA1c 9.3%, t.chol 195, , TSH 0.06, FT4 1.46, FT3 8.7, Cr 0.77  8/26/20 hgbA1c 11.4%     Recent [facility] labs include:  Lab Results   Component Value Date    A1C 11.4 (A) 08/26/2020     02/01/2019    POTASSIUM 4.2 07/26/2019    CHLORIDE 104 02/01/2019    CO2 28 02/01/2019    ANIONGAP 4 02/01/2019     (A) 07/26/2019    BUN 10 02/01/2019    CR 0.77 07/26/2019    GFRESTIMATED 87 07/26/2019    GFRESTBLACK 101 07/26/2019    JOANN 9.3 02/01/2019    CHOL 195 07/26/2019    TRIG 152 (A) 07/26/2019    HDL 43 07/26/2019     (A) 07/26/2019     Last eye exam 2018?, results not available  DM Complications:  None known        Hypothyroidism:  Had taken Synthroid  Changed to [place] thyroid medication, then [place] + levothyroxine combo dose plan  Had taken [place] thyroid 2-tablets daily... Possibly 60 mg 2-tabs QD  Early-mid 3/2018. Ran out of Pre[place] thyroid medication  Previously took [place] thyroid 90 mg 2-tabs daily and low dose levothyroxine 0.025 mg daily  8/2019. 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 Frantz Guillen?/[facility]        ROS: 10 point ROS neg other than the symptoms noted above in the  HPI.     GENERAL: some fatigue, wt loss; denies fevers, chills, night sweats.   HEENT:  no dysphagia, odonophagia, diplopia, neck pain  THYROID:  no apparent hyper or hypothyroid symptoms  CV:  Denies chest pain, pressure or palpitations  LUNGS:  denies SOB, dyspnea, cough, wheezing  ABDOMEN: no abdominal pain; denies diarrhea, indigestion, constipation  EXTREMITIES: no rashes, ulcers, edema  NEUROLOGY: no headaches, denies changes in vision, tingling, extremitiy numbness   MSK: occasional low back pain; no muscle aches or pains, weakness  SKIN: no rashes or lesions  : increased thirst, frequent urination; no menses?!  PSYCH:  stable mood, no significant anxiety or depression  ENDOCRINE: no heat or cold intolerance        Physical Exam (visual exam)  VS:  no vital signs taken for video visit  GENERAL: AXOX3, NAD, eyeglasses, well dressed, answering questions appropriately, appears stated age.  THYROID:  no apparent nodules or goiter  HEENT: no exopthalmous, no proptosis, EOMI  CV: deferred  LUNGS: deferred  ABDOMEN: obese abdomen  EXTREMITIES: not examined  NEUROLOGY: CN grossly intact, no tremors  MSK: grossly intact  SKIN: no skin exam        LABS:     All pertinent notes, labs, and images personally reviewed by me.      A/P:          Encounter Diagnoses   Name      Type 2 diabetes mellitus without complication, without long-term current use of insulin (H)      Acquired hypothyroidism      Morbid obesity (H)          Comments:  Reviewed health issues, diabetes and thyroid management.  Difficult to assess glycemic control without BG meter information, though uncontrolled T2DM     Plan:  Continue metformin, glimeparide, Tresiba med dose plan at this time  Discussed need for mealtime insulin with treatment plan  Discussed options of VGo patch vs insulin pumps- Tandem, Medtronic, or OmniPod  Advised new plan to stop Tresiba and switch to the VGo40 after CDE appt  Goal target -150 mg/dl  Recommend use of  Freestyle Chandni CGMS sensor, Rx sent  Needs CDE appt to review the Chandni start and the VGo start   Skip Tresiba day before VGo start   Start wit mealtime 6U (3 clicks) and correction scale 2U per 50 mg/dl over 200 mg/dl   Will check to see if sample VGo40 pods available   Diabetes Education Referral placed, OK to do video visit  Keep focus on diet, exercise, weight management.  Needs f/u on the mild hypercholesterolemia, consider adding statin medication for treatment     Continue the [place] thyroid + levothyroxine dose plan, with regular daily dosing  F/u thyroid lab tests in early 11/2020.     Addressed patient questions today        More than 50% of the time spent with Ms. Montiel on counseling / coordinating her care.  Total appointment time was 25 minutes.     Follow-up:  2 mo.     JOJO Christianson MD, MS  Endocrinology  [facility]        Video-Visit Details    Type of service:  Video Visit    Video Start Time: 1:10 pm  Video End Time: 1:35 pm    Originating Location (pt. Location): workplace    Distant Location (provider location):  [facility]/[place]     Platform used for Video Visit: Desktime

## 2020-09-18 NOTE — LETTER
"    9/18/2020         RE: Sudheer Montiel  [address]  [place] [address]        Dear Colleague,    Thank you for referring your patient, Sudheer Montiel, to the [facility]. Please see a copy of my visit note below.    Sudheer Montiel is a 55 year old female who is being evaluated via a billable video visit.      The patient has been notified of following:     \"This video visit will be conducted via a call between you and your physician/provider. We have found that certain health care needs can be provided without the need for an in-person physical exam.  This service lets us provide the care you need with a video conversation.  If a prescription is necessary we can send it directly to your pharmacy.  If lab work is needed we can place an order for that and you can then stop by our lab to have the test done at a later time.    Video visits are billed at different rates depending on your insurance coverage.  Please reach out to your insurance provider with any questions.    If during the course of the call the physician/provider feels a video visit is not appropriate, you will not be charged for this service.\"    Patient has given verbal consent for Video visit? Yes  How would you like to obtain your AVS? Verbally Reviewed   If you are dropped from the video visit, the video invite should be resent to: Text to cell phone: [phone]  Will anyone else be joining your video visit? No          Recent issues:  Followup diabetes and thyroid evaluation  Had difficulty getting levothyroxine medication from the [facility] pharmacy for approx 1 month, restarted it 2 weeks ago  Success with weight loss (recently stable near 218#), but recent [facility] labs showed very high              Diabetes:  ~4/2010. Initial diagnosis approx age 44  ...Has taken several DM meds including metformin, Byetta, glimeparide, Invokana, and Victoza   In 2018, she had been taking metformin, Victoza, glimeparide, and " Jardiance  She stopped Jardiance, also ran out of [employer] last year  10/2018. Abdominal abcess illness, hospitalizations at [facility] and then [facility] hospital              2-surgeries for abcess, thought related to the hysterectomy bladder sling mesh from prior surgery     Current DM meds:  Metformin 750 mg                   2-tabs each morning  Glimeparide 4 mg                   1-tab each morning  Tresiba 40U                            Subcutaneous morning (daily)     Has Glucocard Vital BG meter              Infrequent BG testing, last test approx 230 mg/dl     Recent [facility] labs include:  7/26/19 hgbA1c 9.3%, t.chol 195, , TSH 0.06, FT4 1.46, FT3 8.7, Cr 0.77  8/26/20 hgbA1c 11.4%     Recent [facility] labs include:  Lab Results   Component Value Date    A1C 11.4 (A) 08/26/2020     02/01/2019    POTASSIUM 4.2 07/26/2019    CHLORIDE 104 02/01/2019    CO2 28 02/01/2019    ANIONGAP 4 02/01/2019     (A) 07/26/2019    BUN 10 02/01/2019    CR 0.77 07/26/2019    GFRESTIMATED 87 07/26/2019    GFRESTBLACK 101 07/26/2019    JOANN 9.3 02/01/2019    CHOL 195 07/26/2019    TRIG 152 (A) 07/26/2019    HDL 43 07/26/2019     (A) 07/26/2019     Last eye exam 2018?, results not available  DM Complications:  None known        Hypothyroidism:  Had taken Synthroid  Changed to [place] thyroid medication, then [place] + levothyroxine combo dose plan  Had taken [place] thyroid 2-tablets daily... Possibly 60 mg 2-tabs QD  Early-mid 3/2018. Ran out of [place] thyroid medication  Previously took [place] thyroid 90 mg 2-tabs daily and low dose levothyroxine 0.025 mg daily  8/2019. 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 Frantz Guillen?/[facility]        ROS: 10 point ROS neg other than the symptoms noted above in the HPI.     GENERAL: some fatigue, wt loss; denies fevers, chills, night sweats.   HEENT:  no dysphagia, odonophagia, diplopia, neck pain  THYROID:  no apparent hyper or hypothyroid symptoms  CV:  Denies chest pain, pressure or palpitations  LUNGS:  denies SOB, dyspnea, cough, wheezing  ABDOMEN: no abdominal pain; denies diarrhea, indigestion, constipation  EXTREMITIES: no rashes, ulcers, edema  NEUROLOGY: no headaches, denies changes in vision, tingling, extremitiy numbness   MSK: occasional low back pain; no muscle aches or pains, weakness  SKIN: no rashes or lesions  : increased thirst, frequent urination; no menses?!  PSYCH:  stable mood, no significant anxiety or depression  ENDOCRINE: no heat or cold intolerance        Physical Exam (visual exam)  VS:  no vital signs taken for video visit  GENERAL: AXOX3, NAD, eyeglasses, well dressed, answering questions appropriately, appears stated age.  THYROID:  no apparent nodules or goiter  HEENT: no exopthalmous, no proptosis, EOMI  CV: deferred  LUNGS: deferred  ABDOMEN: obese abdomen  EXTREMITIES: not examined  NEUROLOGY: CN grossly intact, no tremors  MSK: grossly intact  SKIN: no skin exam        LABS:     All pertinent notes, labs, and images personally reviewed by me.      A/P:          Encounter Diagnoses   Name      Type 2 diabetes mellitus without complication, without long-term current use of insulin (H)      Acquired hypothyroidism      Morbid obesity (H)          Comments:  Reviewed health issues, diabetes and thyroid management.  Difficult to assess glycemic control without BG meter information, though uncontrolled T2DM     Plan:  Continue metformin, glimeparide, Tresiba med dose plan at this time  Discussed need for  mealtime insulin with treatment plan  Discussed options of VGo patch vs insulin pumps- Tandem, Medtronic, or OmniPod  Advised new plan to stop Tresiba and switch to the VGo40 after CDE appt  Goal target -150 mg/dl  Recommend use of Freestyle Chandni CGMS sensor, Rx sent  Needs CDE appt to review the Chandni start and the VGo start   Skip Tresiba day before VGo start   Start wit mealtime 6U (3 clicks) and correction scale 2U per 50 mg/dl over 200 mg/dl   Will check to see if sample VGo40 pods available   Diabetes Education Referral placed, OK to do video visit  Keep focus on diet, exercise, weight management.  Needs f/u on the mild hypercholesterolemia, consider adding statin medication for treatment     Continue the [place] thyroid + levothyroxine dose plan, with regular daily dosing  F/u thyroid lab tests in early 11/2020.     Addressed patient questions today        More than 50% of the time spent with Ms. Montiel on counseling / coordinating her care.  Total appointment time was 25 minutes.     Follow-up:  2 mo.     JJOO Christianson MD, MS  Endocrinology  [facility]        Video-Visit Details    Type of service:  Video Visit    Video Start Time: 1:10 pm  Video End Time: 1:35 pm    Originating Location (pt. Location): workplace    Distant Location (provider location):  [facility]/home     Platform used for Video Visit: Adan        Again, thank you for allowing me to participate in the care of your patient.        Sincerely,        Kirk Christianson MD

## 2020-09-19 RX ORDER — FLASH GLUCOSE SENSOR
KIT MISCELLANEOUS
Qty: 2 EACH | Refills: 11 | Status: SHIPPED | OUTPATIENT
Start: 2020-09-19 | End: 2021-08-05

## 2020-09-19 RX ORDER — FLASH GLUCOSE SENSOR
1 KIT MISCELLANEOUS CONTINUOUS PRN
Qty: 1 DEVICE | Refills: 0 | Status: SHIPPED | OUTPATIENT
Start: 2020-09-19 | End: 2020-10-01

## 2020-09-30 ENCOUNTER — MYC MEDICAL ADVICE (OUTPATIENT)
Dept: ENDOCRINOLOGY | Facility: CLINIC | Age: 55
End: 2020-09-30

## 2020-10-01 ENCOUNTER — TELEPHONE (OUTPATIENT)
Dept: EDUCATION SERVICES | Facility: CLINIC | Age: 55
End: 2020-10-01

## 2020-10-01 DIAGNOSIS — E11.9 TYPE 2 DIABETES MELLITUS WITHOUT COMPLICATION, WITHOUT LONG-TERM CURRENT USE OF INSULIN (H): Primary | ICD-10-CM

## 2020-10-01 DIAGNOSIS — E11.65 UNCONTROLLED TYPE 2 DIABETES MELLITUS WITH HYPERGLYCEMIA (H): ICD-10-CM

## 2020-10-01 RX ORDER — FLASH GLUCOSE SENSOR
1 KIT MISCELLANEOUS CONTINUOUS PRN
Qty: 1 DEVICE | Refills: 0 | Status: ON HOLD | OUTPATIENT
Start: 2020-10-01 | End: 2023-08-12

## 2020-10-01 NOTE — TELEPHONE ENCOUNTER
Hi  ,     Please note if you approve of this plan or indicate an alternate plan.     1.  Send VGO 40 monthly prescription to [facility]     2.  Switch Novolog prescription to 3 vials for next fill.  VGO 40 ends up at 2.28vials/month, so FDA labeling allows 3 vials for one copay.  She will sign up for the Novolog copay card      3.  Do you want her to continue on the 4mg of glimepiride with starting the VGO?     4. For the correction scale of 1 click for every 50 > 200.    Is 3 tiers okay?  I gave her:   200-251: 1 click  251-300: 2 clicks   301+: 3 clicks.       Thanks!  Gina Reyez RD, LD, CDE  Diabetes Education

## 2020-10-01 NOTE — TELEPHONE ENCOUNTER
Dr Garcia -see below     Pt requesting insulin to go with his V-Go device    Please advise     His med list only has Olivia ANDERSON RN

## 2020-10-01 NOTE — TELEPHONE ENCOUNTER
Message noted.  I spoke with patient at 11am today and then sent the (Novolog vial) Rx to her local [facility] pharmacy.    JOJO Christianson MD, MS  Endocrinology  [facility]

## 2020-10-01 NOTE — TELEPHONE ENCOUNTER
Writer spoke with Dr. Christianson on the phone.  Hold the Glimepiride tomorrow morning.  Will update prescriptions to send to [facility].  Continue with current correction scale. Mychart sent to patient.     Novolog will need to be updated to 30mL, 3 vials per FDA labeling.  Will update once pharmacy is determined.     Nevin eRyez RD, LD, CDE  Diabetes Education

## 2020-10-02 ENCOUNTER — MYC MEDICAL ADVICE (OUTPATIENT)
Dept: EDUCATION SERVICES | Facility: CLINIC | Age: 55
End: 2020-10-02

## 2020-10-02 ENCOUNTER — TELEPHONE (OUTPATIENT)
Dept: EDUCATION SERVICES | Facility: CLINIC | Age: 55
End: 2020-10-02

## 2020-10-02 NOTE — TELEPHONE ENCOUNTER
Hi Dr. Christianson,     I called Sudheer this morning and she had no issues with putting the VGO on.   She did not read her mychart so she took the 4mg Glimepiride today.  She will hold it from now on.  Is aware to scan the evelyn frequently and watch for low blood sugars and set an alarm overnight to check blood sugar.      Today woke up early at 190, which increased to 360 without eating.  Took 5 clicks with breakfast and now is trending back down to 269.     I advised her to update you later today to check in.     Thanks!  Gina Reyez RD, LD, CDE   Diabetes Education

## 2020-10-05 ENCOUNTER — MYC MEDICAL ADVICE (OUTPATIENT)
Dept: ENDOCRINOLOGY | Facility: CLINIC | Age: 55
End: 2020-10-05

## 2020-10-06 ENCOUNTER — MYC MEDICAL ADVICE (OUTPATIENT)
Dept: EDUCATION SERVICES | Facility: CLINIC | Age: 55
End: 2020-10-06

## 2020-10-06 NOTE — TELEPHONE ENCOUNTER
Diabetes Follow-up    Subjective/Objective:    Called out to Sudheer Montiel 3:45 PM 10/6/2020 to review blood sugars    Diabetes is being managed with   Lifestyle (diet/activity), Diabetes Medications   Diabetes Medication(s)     Biguanides       metFORMIN (GLUCOPHAGE-XR) 750 MG 24 hr tablet    TAKE ONE TABLET BY MOUTH TWICE A DAY WITH MEALS    Insulin       insulin aspart (NOVOLOG VIAL) 100 UNITS/ML vial    Use with VGo insulin patch device, basal insulin and 2-8 units subcutaneous with meals, total daily dose approx 55 units     TRESIBA FLEXTOUCH 100 UNIT/ML pen    INJECT 18 UNITS UNDER THE SKIN ONCE DAILY    Sulfonylureas       glimepiride (AMARYL) 4 MG tablet    TAKE ONE TABLET BY MOUTH EVERY MORNING BEFORE BREAKFAST          Assessment/Plan/Response:  100s 4-5am, spikes up to 200 and rising after the shower (has had a strong ebony phenomenon)    Last night was 93 going to bed and then at 11pm was 91 took carbohydrates, woke up low 100s.   Today was running 121 to 128 all day, 143 this afternoon .   Had 3 clicks with breakfast and lunch.   Re added glimeperide over the weekend     [place] the weekend was horrible, feels like she ate really high carbohydrate and lots of different foods, that caused blood sugars to spike more.  Had not been checking blood sugars prior and now was seeing them all on the chandni.   Feels much better being in the 100s now.     Recommend making sure dinner has carbohydrates and hs snack with carbohydrates; keeping blood sugars mid 100s is fine for right now overnight, continue to set alarm and check blood sugar overnight.  Patient went very low carbohydrate, recommend increasing carbohydrates at meals, discussed a post prandial rise is expected.  Continue with experiment with small snacks with carbohydrates in between meals to see if they effect the sensor glucose graph.   Discussed novolog action in relation to clicks and comparing the IOB to the FreeStyle Chandni sensor glucose arrows.      Could try reducing the Glimepiride to 2mg instead of cutting out completely, Spoke with Dr. Christianson 4:23 PM who is in agreement with this plan and updated on all of the above.  Sent mychart to patient.  She will set up librToolWire and librelink tonight.     Nevin Reyez RD, LD, CDE  Diabetes Education

## 2020-10-07 NOTE — TELEPHONE ENCOUNTER
Diabetes Education follow up     Called out to Sudheer Montiel 10:36 AM 10/7/2020; added carbohydrates back in 30gm for dinner last night instead of eliminating carbohydrates.  130 at bed and woke up at 159 --> 186 ebony phenomenon and then after beakfast 179.  Will try 2mg of Glimepiride tomorrow.  No concerns, feeling better, things are going well.  Will set up libreview tonight.     Nevin Reyez RD, LD, CDE  Diabetes Education

## 2020-10-08 ENCOUNTER — PATIENT OUTREACH (OUTPATIENT)
Dept: EDUCATION SERVICES | Facility: CLINIC | Age: 55
End: 2020-10-08
Payer: COMMERCIAL

## 2020-10-08 DIAGNOSIS — E11.9 TYPE 2 DIABETES MELLITUS WITHOUT COMPLICATION, WITHOUT LONG-TERM CURRENT USE OF INSULIN (H): ICD-10-CM

## 2020-10-08 PROCEDURE — 98967 PH1 ASSMT&MGMT NQHP 11-20: CPT | Mod: TEL | Performed by: FAMILY MEDICINE

## 2020-10-08 NOTE — PROGRESS NOTES
Diabetes Follow-up    Subjective/Objective:  Patient verbally consented to the telephone visit service today: yes    Diabetes is being managed with   Lifestyle (diet/activity), Diabetes Medications   Diabetes Medication(s)     Biguanides       metFORMIN (GLUCOPHAGE-XR) 750 MG 24 hr tablet    TAKE ONE TABLET BY MOUTH TWICE A DAY WITH MEALS    Insulin       insulin aspart (NOVOLOG VIAL) 100 UNITS/ML vial    Use with VGo insulin patch device, basal insulin and 2-8 units subcutaneous with meals, total daily dose approx 55 units     TRESIBA FLEXTOUCH 100 UNIT/ML pen    INJECT 18 UNITS UNDER THE SKIN ONCE DAILY    Sulfonylureas       glimepiride (AMARYL) 4 MG tablet    TAKE ONE TABLET BY MOUTH EVERY MORNING BEFORE BREAKFAST        VGO 40   3 clicks per meal  (changed to 1-3 clicks today for meals depending on meal size)     1 click for every 50>200 up to 3 clicks max  (rarely using now being < 200 for most meals)    2mg amaryl     Tresiba is for back up to VGO      Assessment/Plan/Response:  Today is going very well on the VGO per Suhdeer, only take 2mg of the Glimepiride now.      Last night was shaky at 81 after dinner (was on 4mg on Glimepiride yesterday).  Took vanilla wafers 21gm carbohydrate after dinner to increase blood sugars.   Same scenario at after lunch yesterday, dropped to 110 and then was shaky. Reviewed options - add in more carbohydrate to the meal and or start reducing clicks if desiring to eat a smaller meal (try 1 or 2 clicks depending on meal size).      10pm: 181 at bedtime, then 123 waking up and then up to 160.  174 before lunch now.      Blood sugars much improved and discussed time in target and goal ranges.  Reviewed running a little higher at night and while driving for safety. Reviewed carbohydrate sources to keep with at all times, hypoglycemia treatment etc.   Going to bed 150-200 is OK for now until overnight patterns and dinner clicks can be reviewed in more detail on the continuous glucose  monitor reports.      Sudheer will set up Libreview and endocrinology is reaching out to patient to set up a follow up as well.     Nevin Reyez RD, LD, CDE  Diabetes Education  Time spent: 19 minutes

## 2020-10-09 ENCOUNTER — TELEPHONE (OUTPATIENT)
Dept: ENDOCRINOLOGY | Facility: CLINIC | Age: 55
End: 2020-10-09

## 2020-10-09 NOTE — TELEPHONE ENCOUNTER
Ms MEJIA just started the VGo insulin patch (pod) and the Chandni sensor.  Would you please help her create a followup video visit appt with me in about 10-14 days?  Thanks.

## 2020-10-09 NOTE — TELEPHONE ENCOUNTER
Left vm - please help pt schedule appt (10-14 after starting Chandni Sensor)    Ok to use work in slot 10am or 3pm     Thanks!    Sushma Samuel MA

## 2020-10-12 ENCOUNTER — MYC MEDICAL ADVICE (OUTPATIENT)
Dept: EDUCATION SERVICES | Facility: CLINIC | Age: 55
End: 2020-10-12

## 2020-10-12 DIAGNOSIS — E11.9 TYPE 2 DIABETES MELLITUS WITHOUT COMPLICATION, WITHOUT LONG-TERM CURRENT USE OF INSULIN (H): ICD-10-CM

## 2020-10-12 RX ORDER — GLIMEPIRIDE 4 MG/1
TABLET ORAL
Qty: 30 TABLET | Refills: 1 | Status: SHIPPED | OUTPATIENT
Start: 2020-10-12 | End: 2020-11-27

## 2020-10-28 DIAGNOSIS — E11.9 TYPE 2 DIABETES MELLITUS WITHOUT COMPLICATION, WITHOUT LONG-TERM CURRENT USE OF INSULIN (H): ICD-10-CM

## 2020-10-28 RX ORDER — METFORMIN HYDROCHLORIDE 750 MG/1
750 TABLET, EXTENDED RELEASE ORAL 2 TIMES DAILY WITH MEALS
Qty: 180 TABLET | Refills: 3 | Status: SHIPPED | OUTPATIENT
Start: 2020-10-28 | End: 2021-11-02

## 2020-11-02 ENCOUNTER — MYC MEDICAL ADVICE (OUTPATIENT)
Dept: EDUCATION SERVICES | Facility: CLINIC | Age: 55
End: 2020-11-02

## 2020-11-02 NOTE — TELEPHONE ENCOUNTER
Patient plans to call today to make a follow-up with Dr. Christianson.    Glenys Maharaj RN, [facility]

## 2020-11-16 ENCOUNTER — TELEPHONE (OUTPATIENT)
Dept: ENDOCRINOLOGY | Facility: CLINIC | Age: 55
End: 2020-11-16

## 2020-11-16 ENCOUNTER — MYC MEDICAL ADVICE (OUTPATIENT)
Dept: ENDOCRINOLOGY | Facility: CLINIC | Age: 55
End: 2020-11-16

## 2020-11-16 NOTE — TELEPHONE ENCOUNTER
Reason for Call:  Medication or medication refill:    Do you use a [facility]?  Name of the pharmacy and phone number for the current request:       [facility] [place], [place] - [address]      Name of the medication requested: insulin aspart (NOVOLOG VIAL) 100 UNITS/ML vial [77280] (Order [id number])      Other request: pt is running out early of this again and needs early refill approved. She wants to increase the amount on the Rx so that she doesn't keep running into the issue with the pharmacy not giving her more when she is out    Can we leave a detailed message on this number? YES    Phone number patient can be reached at: Cell number on file:    Telephone Information:   Mobile [phone]       Best Time: any    Call taken on 11/16/2020 at 11:01 AM by John Mcclelland

## 2020-11-29 ENCOUNTER — HEALTH MAINTENANCE LETTER (OUTPATIENT)
Age: 55
End: 2020-11-29

## 2020-12-02 ENCOUNTER — VIRTUAL VISIT (OUTPATIENT)
Dept: ENDOCRINOLOGY | Facility: CLINIC | Age: 55
End: 2020-12-02
Payer: COMMERCIAL

## 2020-12-02 DIAGNOSIS — E03.9 ACQUIRED HYPOTHYROIDISM: ICD-10-CM

## 2020-12-02 DIAGNOSIS — E11.9 TYPE 2 DIABETES MELLITUS WITHOUT COMPLICATION, WITHOUT LONG-TERM CURRENT USE OF INSULIN (H): Primary | ICD-10-CM

## 2020-12-02 PROCEDURE — 99214 OFFICE O/P EST MOD 30 MIN: CPT | Mod: TEL | Performed by: INTERNAL MEDICINE

## 2020-12-02 NOTE — PROGRESS NOTES
"Sudheer Montiel is a 55 year old female who is being evaluated via a billable telephone visit.      The patient has been notified of following:     \"This telephone visit will be conducted via a call between you and your physician/provider. We have found that certain health care needs can be provided without the need for a physical exam.  This service lets us provide the care you need with a short phone conversation.  If a prescription is necessary we can send it directly to your pharmacy.  If lab work is needed we can place an order for that and you can then stop by our lab to have the test done at a later time.    Telephone visits are billed at different rates depending on your insurance coverage. During this emergency period, for some insurers they may be billed the same as an in-person visit.  Please reach out to your insurance provider with any questions.    If during the course of the call the physician/provider feels a telephone visit is not appropriate, you will not be charged for this service.\"    Patient has given verbal consent for Telephone visit?  Yes, since unable to do video visit    What phone number would you like to be contacted at? Cell phone    How would you like to obtain your AVS? Reviewed verbally        Recent issues:  Followup diabetes and thyroid evaluation  Now using the VGo pod device and the Freestyle Chandni sensor  Reports feeling much better and dramatic improvement with glucose levels           Diabetes:  ~4/2010. Initial diagnosis approx age 44  ...Has taken several DM meds including metformin, Byetta, glimeparide, Invokana, and Victoza   In 2018, she had been taking metformin, Victoza, glimeparide, and Jardiance  She stopped Jardiance, also ran out of Victoza last year  10/2018. Abdominal abcess illness, hospitalizations at [facility] and then [facility] hospital              2-surgeries for abcess, thought related to the hysterectomy bladder sling mesh from prior " surgery     Previously taking metformin 750 mg BID, glimeparide 4 mg every day, Tresiba 40U every day   10/2020. Changed to VGo40 pods management  Continue low dose glimeparide, but low SG levels and glimeparide discontinued, then restarted   Current DM meds:   Novolog   in VGo      Small carb meal 2 clicks      Large carb meal 3 clicks     Novolog sscale: -250 1-click      -300 2 clicks   Metformin 750 mg 1-tab morning and evening   Glimeparide 4 mg 1/2-tab daily    Has Glucocard Vital BG meter              Infrequent BG testing  10/2020. Started [employer]Mee CGMS with [place]    Recent SG  mg/dl    7d avg 133 mg/dl     Recent [facility] labs include:  7/26/19 hgbA1c 9.3%, t.chol 195, , TSH 0.06, FT4 1.46, FT3 8.7, Cr 0.77  8/26/20 hgbA1c 11.4%     Recent [facility] labs include:          Lab Results   Component Value Date    A1C 11.4 (A) 08/26/2020     02/01/2019    POTASSIUM 4.2 07/26/2019    CHLORIDE 104 02/01/2019    CO2 28 02/01/2019    ANIONGAP 4 02/01/2019     (A) 07/26/2019    BUN 10 02/01/2019    CR 0.77 07/26/2019    GFRESTIMATED 87 07/26/2019    GFRESTBLACK 101 07/26/2019    JOANN 9.3 02/01/2019    CHOL 195 07/26/2019    TRIG 152 (A) 07/26/2019    HDL 43 07/26/2019     (A) 07/26/2019     Last eye exam 2018?, results not available  DM Complications:  None known        Hypothyroidism:  Had taken Synthroid  Changed to [place] thyroid medication, then [place] + levothyroxine combo dose plan  Had taken [place] thyroid 2-tablets daily... Possibly 60 mg 2-tabs QD  Early-mid 3/2018. Ran out of [place] thyroid medication  Previously took [place] thyroid 90 mg 2-tabs daily and low dose levothyroxine 0.025 mg daily  8/2019. 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 Frantz Guillen?/[facility]        ROS: 10 point ROS neg other than the symptoms noted above in the HPI.     GENERAL: some fatigue, wt loss; denies fevers, chills, night sweats.   HEENT:  no dysphagia, odonophagia, diplopia, neck pain  THYROID:  no apparent hyper or hypothyroid symptoms  CV:  Denies chest pain, pressure or palpitations  LUNGS:  denies SOB, dyspnea, cough, wheezing  ABDOMEN: no abdominal pain; denies diarrhea, indigestion, constipation  EXTREMITIES: no rashes, ulcers, edema  NEUROLOGY: no headaches, denies changes in vision, tingling, extremitiy numbness   MSK: occasional low back pain; no muscle aches or pains, weakness  SKIN: no rashes or lesions  : increased thirst, frequent urination; no menses?!  PSYCH:  stable mood, no significant anxiety or depression  ENDOCRINE: no heat or cold intolerance     Physical Exam (visual exam)  VS:  no vital signs taken for video visit  CONSTITUTIONAL: healthy, alert and NAD, well dressed, answering questions appropriately  ENT: no nose swelling or nasal discharge, mouth redness or gum changes.  EYES: eyes grossly normal to inspection, conjunctivae and sclerae normal, no exophthalmos or proptosis  THYROID:  no apparent nodules or goiter  LUNGS: no audible wheeze, cough or visible cyanosis, no visible retractions or increased work of breathing  ABDOMEN: abdomen obese  EXTREMITIES: no hand tremors, limited exam  NEUROLOGY: CN grossly intact, mentation intact and speech normal   SKIN:  no apparent skin lesions, rash, or edema with visualized skin appearance  PSYCH: mentation appears normal, affect normal/bright, judgement and insight intact,   normal speech and appearance well groomed          LABS:     All pertinent notes, labs,  and images personally reviewed by me.      A/P:          Encounter Diagnoses   Name       Type 2 diabetes mellitus without complication, without long-term current use of insulin (H)       Acquired hypothyroidism       Morbid obesity (H)           Comments:  Reviewed health issues, diabetes and thyroid management.  Recent glucose control much better since changing to the VGo pod device     Plan:  Continue the current VGo40, metformin, and glimeparide plan at this time  Reviewed dosing options using the VGo clicks for small and large carb meals  Updated her Novolog vial Rx, needs 3-vials/month quantity  Goal target -150 mg/dl  Continue use of the 14-day Freestyle Chandni CGMS sensor   Scan sensor 3-4x/day  Plan repeat lab testing in late 12/2020   Check diabetes and thyroid tests   Consider lab appt at [facility] or clinic, lab orders placed  Keep focus on diet, exercise, weight management.  Needs f/u on the mild hypercholesterolemia, consider adding statin medication for treatment  Continue the [place] thyroid + levothyroxine dose plan, with regular daily dosing     Addressed patient questions today       Total time of call between patient and provider was 25 minutes     This telephone visit chart note is the equivalent of a 95998 office visit billing code      JOJO Christianson MD, MS  Endocrinology  [facility]

## 2021-01-05 DIAGNOSIS — E03.9 ACQUIRED HYPOTHYROIDISM: ICD-10-CM

## 2021-01-05 DIAGNOSIS — E11.9 TYPE 2 DIABETES MELLITUS WITHOUT COMPLICATION, WITHOUT LONG-TERM CURRENT USE OF INSULIN (H): ICD-10-CM

## 2021-01-05 LAB
ANION GAP SERPL CALCULATED.3IONS-SCNC: 7 MMOL/L (ref 3–14)
BUN SERPL-MCNC: 13 MG/DL (ref 7–30)
CALCIUM SERPL-MCNC: 9.2 MG/DL (ref 8.5–10.1)
CHLORIDE SERPL-SCNC: 108 MMOL/L (ref 94–109)
CHOLEST SERPL-MCNC: 151 MG/DL
CO2 SERPL-SCNC: 28 MMOL/L (ref 20–32)
CREAT SERPL-MCNC: 0.52 MG/DL (ref 0.52–1.04)
CREAT UR-MCNC: 86 MG/DL
GFR SERPL CREATININE-BSD FRML MDRD: >90 ML/MIN/{1.73_M2}
GLUCOSE SERPL-MCNC: 74 MG/DL (ref 70–99)
HBA1C MFR BLD: 7.2 % (ref 0–5.6)
HDLC SERPL-MCNC: 41 MG/DL
LDLC SERPL CALC-MCNC: 76 MG/DL
MICROALBUMIN UR-MCNC: 19 MG/L
MICROALBUMIN/CREAT UR: 22.31 MG/G CR (ref 0–25)
NONHDLC SERPL-MCNC: 110 MG/DL
POTASSIUM SERPL-SCNC: 3.6 MMOL/L (ref 3.4–5.3)
SODIUM SERPL-SCNC: 143 MMOL/L (ref 133–144)
T3 SERPL-MCNC: 196 NG/DL (ref 60–181)
T4 SERPL-MCNC: 21.9 UG/DL (ref 4.5–13.9)
TRIGL SERPL-MCNC: 171 MG/DL
TSH SERPL DL<=0.005 MIU/L-ACNC: <0.01 MU/L (ref 0.4–4)

## 2021-01-05 PROCEDURE — 83036 HEMOGLOBIN GLYCOSYLATED A1C: CPT | Performed by: INTERNAL MEDICINE

## 2021-01-05 PROCEDURE — 84436 ASSAY OF TOTAL THYROXINE: CPT | Performed by: INTERNAL MEDICINE

## 2021-01-05 PROCEDURE — 84443 ASSAY THYROID STIM HORMONE: CPT | Performed by: INTERNAL MEDICINE

## 2021-01-05 PROCEDURE — 36415 COLL VENOUS BLD VENIPUNCTURE: CPT | Performed by: INTERNAL MEDICINE

## 2021-01-05 PROCEDURE — 80048 BASIC METABOLIC PNL TOTAL CA: CPT | Performed by: INTERNAL MEDICINE

## 2021-01-05 PROCEDURE — 80061 LIPID PANEL: CPT | Performed by: INTERNAL MEDICINE

## 2021-01-05 PROCEDURE — 82043 UR ALBUMIN QUANTITATIVE: CPT | Performed by: INTERNAL MEDICINE

## 2021-01-05 PROCEDURE — 84480 ASSAY TRIIODOTHYRONINE (T3): CPT | Performed by: INTERNAL MEDICINE

## 2021-03-05 DIAGNOSIS — E11.9 TYPE 2 DIABETES MELLITUS WITHOUT COMPLICATION, WITHOUT LONG-TERM CURRENT USE OF INSULIN (H): ICD-10-CM

## 2021-03-05 DIAGNOSIS — E03.9 ACQUIRED HYPOTHYROIDISM: ICD-10-CM

## 2021-03-05 RX ORDER — GLIMEPIRIDE 4 MG/1
TABLET ORAL
Qty: 30 TABLET | Refills: 1 | Status: SHIPPED | OUTPATIENT
Start: 2021-03-05 | End: 2021-05-06

## 2021-03-05 RX ORDER — THYROID,PORK 90 MG
TABLET ORAL
Qty: 30 TABLET | Refills: 1 | Status: SHIPPED | OUTPATIENT
Start: 2021-03-05 | End: 2021-05-13

## 2021-05-06 DIAGNOSIS — E11.9 TYPE 2 DIABETES MELLITUS WITHOUT COMPLICATION, WITHOUT LONG-TERM CURRENT USE OF INSULIN (H): ICD-10-CM

## 2021-05-06 RX ORDER — GLIMEPIRIDE 4 MG/1
TABLET ORAL
Qty: 30 TABLET | Refills: 0 | Status: SHIPPED | OUTPATIENT
Start: 2021-05-06 | End: 2021-06-02

## 2021-05-30 ENCOUNTER — TELEPHONE (OUTPATIENT)
Dept: ENDOCRINOLOGY | Facility: CLINIC | Age: 56
End: 2021-05-30

## 2021-05-30 DIAGNOSIS — E11.9 TYPE 2 DIABETES MELLITUS WITHOUT COMPLICATION, WITHOUT LONG-TERM CURRENT USE OF INSULIN (H): ICD-10-CM

## 2021-06-02 RX ORDER — GLIMEPIRIDE 4 MG/1
TABLET ORAL
Qty: 30 TABLET | Refills: 0 | Status: SHIPPED | OUTPATIENT
Start: 2021-06-02 | End: 2021-07-16

## 2021-07-14 ENCOUNTER — TELEPHONE (OUTPATIENT)
Dept: ENDOCRINOLOGY | Facility: CLINIC | Age: 56
End: 2021-07-14

## 2021-07-14 DIAGNOSIS — E11.9 TYPE 2 DIABETES MELLITUS WITHOUT COMPLICATION, WITHOUT LONG-TERM CURRENT USE OF INSULIN (H): ICD-10-CM

## 2021-07-14 DIAGNOSIS — E03.9 ACQUIRED HYPOTHYROIDISM: ICD-10-CM

## 2021-07-15 RX ORDER — THYROID,PORK 90 MG
TABLET ORAL
Qty: 30 TABLET | Refills: 1 | OUTPATIENT
Start: 2021-07-15

## 2021-07-15 RX ORDER — GLIMEPIRIDE 4 MG/1
TABLET ORAL
Qty: 30 TABLET | Refills: 0 | OUTPATIENT
Start: 2021-07-15

## 2021-07-16 ENCOUNTER — TELEPHONE (OUTPATIENT)
Dept: ENDOCRINOLOGY | Facility: CLINIC | Age: 56
End: 2021-07-16

## 2021-07-16 DIAGNOSIS — E03.9 ACQUIRED HYPOTHYROIDISM: ICD-10-CM

## 2021-07-16 DIAGNOSIS — E11.9 TYPE 2 DIABETES MELLITUS WITHOUT COMPLICATION, WITHOUT LONG-TERM CURRENT USE OF INSULIN (H): ICD-10-CM

## 2021-07-16 RX ORDER — GLIMEPIRIDE 4 MG/1
TABLET ORAL
Qty: 30 TABLET | Refills: 0 | Status: SHIPPED | OUTPATIENT
Start: 2021-07-16 | End: 2022-01-23

## 2021-07-16 RX ORDER — THYROID,PORK 90 MG
1 TABLET ORAL DAILY
Qty: 30 TABLET | Refills: 0 | Status: SHIPPED | OUTPATIENT
Start: 2021-07-16 | End: 2021-08-17

## 2021-07-16 NOTE — TELEPHONE ENCOUNTER
[facility] Call Center    Phone Message    May a detailed message be left on voicemail: yes     Reason for Call: Medication Refill Request    Has the patient contacted the pharmacy for the refill? Yes   Name of medication being requested:glimepiride (AMARYL) 4 MG tablet & ARMOUR THYROID 90 MG tablet   Provider who prescribed the medication: Sammy  Pharmacy: [place], [place] - [address]  Date medication is needed:Pt scheduled next available appt with Dr. Christianson.  Please submit refills.  Call pt when sent please.     Action Taken: Message routed to:  Other: CS ENDO    Travel Screening: Not Applicable

## 2021-07-25 ENCOUNTER — HEALTH MAINTENANCE LETTER (OUTPATIENT)
Age: 56
End: 2021-07-25

## 2021-07-28 ENCOUNTER — VIRTUAL VISIT (OUTPATIENT)
Dept: ENDOCRINOLOGY | Facility: CLINIC | Age: 56
End: 2021-07-28
Payer: COMMERCIAL

## 2021-07-28 ENCOUNTER — TELEPHONE (OUTPATIENT)
Dept: ENDOCRINOLOGY | Facility: CLINIC | Age: 56
End: 2021-07-28

## 2021-07-28 DIAGNOSIS — E11.9 TYPE 2 DIABETES MELLITUS WITHOUT COMPLICATION, WITHOUT LONG-TERM CURRENT USE OF INSULIN (H): Primary | ICD-10-CM

## 2021-07-28 DIAGNOSIS — E66.01 MORBID OBESITY (H): ICD-10-CM

## 2021-07-28 DIAGNOSIS — E03.9 ACQUIRED HYPOTHYROIDISM: ICD-10-CM

## 2021-07-28 PROCEDURE — 99214 OFFICE O/P EST MOD 30 MIN: CPT | Mod: 95 | Performed by: INTERNAL MEDICINE

## 2021-07-28 RX ORDER — ROSUVASTATIN CALCIUM 10 MG/1
10 TABLET, COATED ORAL DAILY
COMMUNITY
Start: 2021-06-22 | End: 2022-05-26

## 2021-07-28 NOTE — LETTER
7/28/2021         RE: Sudheer Montiel  [address]  [place] [address]        Dear Colleague,    Thank you for referring your patient, Sudheer Montiel, to the [facility]. Please see a copy of my visit note below.    Patient is being evaluated via a billable video visit.      How would you like to obtain your AVS? Reviewed verbally  If the video visit is dropped, the invitation should be resent by cell phone  Will anyone else be joining your video visit? no      Video Start Time: 8:10 am    Video-Visit Details    Type of service:  Video Visit    Video End Time: 8:35 am    Originating Location (pt. Location): home    Distant Location (provider location):  [facility]/home    Platform used for Video Visit:  [employer]        Recent issues:  Followup diabetes and thyroid evaluation  Using the VGo pod device and the Freestyle Chandni sensor, reports higher glucose levels and stress eating  Taking the lower thyroid med doses of [place] 90 mg and levothyroxine 0.15 mg 1/2-tab daily           Diabetes:  ~4/2010. Initial diagnosis approx age 44  ...Has taken several DM meds including metformin, Byetta, glimeparide, Invokana, and Victoza   In 2018, she had been taking metformin, Victoza, glimeparide, and Jardiance  She stopped Jardiance, also ran out of Victoza last year  10/2018. Abdominal abcess illness, hospitalizations at [facility] and then [facility]              2-surgeries for abcess, thought related to the hysterectomy bladder sling mesh from prior surgery     Previously taking metformin 750 mg BID, glimeparide 4 mg every day, Tresiba 40U every day   10/2020. Changed to VGo40 pods management  Continue low dose glimeparide, but low SG levels and glimeparide discontinued, then restarted   Current DM meds:  Novolog in VGo40      Small carb meal 3 clicks      Large carb meal 4 clicks    Novolog sscale:  -250  1-click      -300  2  clicks  Metformin 750 mg 1-tab morning and evening  Glimeparide 4 mg 1-tab in morning    Has Glucocard Vital BG meter              Infrequent BG testing  10/2020. Started [place] Chandni CGMS with [place]    Recent SG trends 120-223 mg/dl    7d avg 208 mg/dl     Recent [facility] labs include:  7/26/19 hgbA1c 9.3%, t.chol 195, , TSH 0.06, FT4 1.46, FT3 8.7, Cr 0.77  8/26/20 hgbA1c 11.4%     Recent [facility] labs include:          Lab Results   Component Value Date    A1C 7.2 (H) 01/05/2021     01/05/2021    POTASSIUM 3.6 01/05/2021    CHLORIDE 108 01/05/2021    CO2 28 01/05/2021    ANIONGAP 7 01/05/2021    GLC 74 01/05/2021    BUN 13 01/05/2021    CR 0.52 01/05/2021    GFRESTIMATED >90 01/05/2021    GFRESTBLACK >90 01/05/2021    JOANN 9.2 01/05/2021    CHOL 151 01/05/2021    TRIG 171 (H) 01/05/2021    HDL 41 (L) 01/05/2021    LDL 76 01/05/2021    NHDL 110 01/05/2021    UCRR 86 01/05/2021    MICROL 19 01/05/2021    UMALCR 22.31 01/05/2021     Last eye exam 2018?, results not available  DM Complications:  None known        Hypothyroidism:  Had taken Synthroid  Changed to [place] thyroid medication, then [place] + levothyroxine combo dose plan  Had taken [place] thyroid 2-tablets daily... Possibly 60 mg 2-tabs QD  Early-mid 3/2018. Ran out of [place] thyroid medication  Previously took [place] thyroid 90 mg 2-tabs daily and low dose levothyroxine 0.025 mg daily  8/2019. 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 Frantz Guillen?/[facility]        ROS: 10 point ROS neg other than the symptoms noted above in the HPI.     GENERAL: some fatigue, wt gain; denies fevers, chills, night sweats.   HEENT:  no dysphagia, odonophagia, diplopia, neck pain  THYROID:  no apparent hyper or hypothyroid symptoms  CV:  Denies chest pain, pressure or palpitations  LUNGS:  denies SOB, dyspnea, cough, wheezing  ABDOMEN: no abdominal pain; denies diarrhea, indigestion, constipation  EXTREMITIES: no rashes, ulcers, edema  NEUROLOGY: no headaches, denies changes in vision, tingling, extremitiy numbness   MSK: occasional low back pain; no muscle aches or pains, weakness  SKIN: no rashes or lesions  : increased thirst, frequent urination; no menses?!  PSYCH:  stable mood, no significant anxiety or depression  ENDOCRINE: no heat or cold intolerance     Physical Exam (visual exam)  VS:  no vital signs taken for video visit  CONSTITUTIONAL: healthy, alert and NAD, well dressed, answering questions appropriately  ENT: no nose swelling or nasal discharge, mouth redness or gum changes.  EYES: eyes grossly normal to inspection, conjunctivae and sclerae normal, no exophthalmos or proptosis  THYROID:  no apparent nodules or goiter  LUNGS: no audible wheeze, cough or visible cyanosis, no visible retractions or increased work of breathing  ABDOMEN: abdomen obese  EXTREMITIES: no hand tremors, limited exam  NEUROLOGY: CN grossly intact, mentation intact and speech normal   SKIN:  no apparent skin lesions, rash, or edema with visualized skin appearance  PSYCH: mentation appears normal, affect normal/bright, judgement and insight intact,   normal speech and appearance well groomed     LABS:     All pertinent notes, labs, and images personally reviewed by me.      A/P:  Encounter Diagnoses   Name Primary?     Type 2 diabetes mellitus without complication, without long-term current use of insulin (H) Yes     Acquired hypothyroidism      Morbid obesity (H)         Comments:  Reviewed health issues, diabetes and thyroid management.  Recent glucose levels higher per patient  Reviewed and interpreted tests that I previously ordered.   Ordered appropriate tests for the endocrinology disease management.    Management options discussed and implemented after shared medical decision making with the patient.  T2DM problem is chronic with exacerbation progression      Plan:  Continue the current VGo40, metformin, and glimeparide plan at this time  Discussed idea of restarting a GLP1RA medication, using a long-acting med   Advised starting Ozempic, discussed this med, pen dosing, potential benefits and SE's   New Ozempic Rx sent to pharmacy, start 0.25 mg subcutaneous weekly   When Ozempic med started, discontinue use of the glimeparide medication    Reviewed dosing options using the VGo clicks for small and large carb meals  Goal target -150 mg/dl  Continue use of the 14-day Freest3Nod Chandni CGMS sensor   Scan sensor 3-4x/day  Plan repeat non-fasting lab tests soon   Check diabetes and thyroid tests   Consider lab appt at [facility] or clinic, lab orders placed  Keep focus on diet, exercise, weight management.  Needs f/u on the mild hypercholesterolemia, consider adding statin medication for treatment  Continue the [place] thyroid + levothyroxine dose plan, with regular daily dosing  Will offer additional feedback on the thyroid med dosing after reviewing the repeat thyroid tests soon     Addressed patient questions today     There are no Patient Instructions on file for this visit.    Future labs ordered today:   Orders Placed This Encounter   Procedures     Hemoglobin A1c     Basic metabolic panel     TSH     T4 free     T3 Free     Radiology/Consults ordered today: None    Total time spent in with the patient evaluation:  20 min  Additional time spent reviewing pertinent lab tests and chart notes, and documentation:  10 min    Follow-up:  11/2021    JOJO Christianson  MD, MS  Endocrinology  [facility]                        Again, thank you for allowing me to participate in the care of your patient.        Sincerely,        Kirk Christianson MD

## 2021-07-28 NOTE — PROGRESS NOTES
Patient is being evaluated via a billable video visit.      How would you like to obtain your AVS? Reviewed verbally  If the video visit is dropped, the invitation should be resent by cell phone  Will anyone else be joining your video visit? no      Video Start Time: 8:10 am    Video-Visit Details    Type of service:  Video Visit    Video End Time: 8:35 am    Originating Location (pt. Location): home    Distant Location (provider location):  [facility] [place]    Platform used for Video Visit:  [facility]        Recent issues:  Followup diabetes and thyroid evaluation  Using the VGo pod device and the Freestyle Chandni sensor, reports higher glucose levels and stress eating  Taking the lower thyroid med doses of [place] 90 mg and levothyroxine 0.15 mg 1/2-tab daily           Diabetes:  ~4/2010. Initial diagnosis approx age 44  ...Has taken several DM meds including metformin, Byetta, glimeparide, Invokana, and Victoza   In 2018, she had been taking metformin, Victoza, glimeparide, and Jardiance  She stopped Jardiance, also ran out of Victoza last year  10/2018. Abdominal abcess illness, hospitalizations at [facility] and then [facility]              2-surgeries for abcess, thought related to the hysterectomy bladder sling mesh from prior surgery     Previously taking metformin 750 mg BID, glimeparide 4 mg every day, Tresiba 40U every day   10/2020. Changed to VGo40 pods management  Continue low dose glimeparide, but low SG levels and glimeparide discontinued, then restarted   Current DM meds:  Novolog in VGo40      Small carb meal 3 clicks      Large carb meal 4 clicks    Novolog sscale:  -250  1-click      -300  2 clicks  Metformin 750 mg 1-tab morning and evening  Glimeparide 4 mg 1-tab in morning    Has Glucocard Vital BG meter              Infrequent BG testing  10/2020. Started Freestyle Chandni CGMS with [place]    Recent SG trends 120-223 mg/dl    7d avg 208  mg/dl     Recent [facility] labs include:  7/26/19 hgbA1c 9.3%, t.chol 195, , TSH 0.06, FT4 1.46, FT3 8.7, Cr 0.77  8/26/20 hgbA1c 11.4%     Recent [facility] labs include:          Lab Results   Component Value Date    A1C 7.2 (H) 01/05/2021     01/05/2021    POTASSIUM 3.6 01/05/2021    CHLORIDE 108 01/05/2021    CO2 28 01/05/2021    ANIONGAP 7 01/05/2021    GLC 74 01/05/2021    BUN 13 01/05/2021    CR 0.52 01/05/2021    GFRESTIMATED >90 01/05/2021    GFRESTBLACK >90 01/05/2021    JOANN 9.2 01/05/2021    CHOL 151 01/05/2021    TRIG 171 (H) 01/05/2021    HDL 41 (L) 01/05/2021    LDL 76 01/05/2021    NHDL 110 01/05/2021    UCRR 86 01/05/2021    MICROL 19 01/05/2021    UMALCR 22.31 01/05/2021     Last eye exam 2018?, results not available  DM Complications:  None known        Hypothyroidism:  Had taken Synthroid  Changed to [place] thyroid medication, then [place] + levothyroxine combo dose plan  Had taken [place] thyroid 2-tablets daily... Possibly 60 mg 2-tabs QD  Early-mid 3/2018. Ran out of [place] thyroid medication  Previously took [place] thyroid 90 mg 2-tabs daily and low dose levothyroxine 0.025 mg daily  8/2019. 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 Frantz Guillen?/[facility]        ROS: 10 point ROS neg other than the symptoms noted above in the HPI.     GENERAL: some fatigue, wt gain; denies fevers, chills, night sweats.   HEENT:  no dysphagia, odonophagia, diplopia, neck pain  THYROID:  no apparent hyper or  hypothyroid symptoms  CV:  Denies chest pain, pressure or palpitations  LUNGS:  denies SOB, dyspnea, cough, wheezing  ABDOMEN: no abdominal pain; denies diarrhea, indigestion, constipation  EXTREMITIES: no rashes, ulcers, edema  NEUROLOGY: no headaches, denies changes in vision, tingling, extremitiy numbness   MSK: occasional low back pain; no muscle aches or pains, weakness  SKIN: no rashes or lesions  : increased thirst, frequent urination; no menses?!  PSYCH:  stable mood, no significant anxiety or depression  ENDOCRINE: no heat or cold intolerance     Physical Exam (visual exam)  VS:  no vital signs taken for video visit  CONSTITUTIONAL: healthy, alert and NAD, well dressed, answering questions appropriately  ENT: no nose swelling or nasal discharge, mouth redness or gum changes.  EYES: eyes grossly normal to inspection, conjunctivae and sclerae normal, no exophthalmos or proptosis  THYROID:  no apparent nodules or goiter  LUNGS: no audible wheeze, cough or visible cyanosis, no visible retractions or increased work of breathing  ABDOMEN: abdomen obese  EXTREMITIES: no hand tremors, limited exam  NEUROLOGY: CN grossly intact, mentation intact and speech normal   SKIN:  no apparent skin lesions, rash, or edema with visualized skin appearance  PSYCH: mentation appears normal, affect normal/bright, judgement and insight intact,   normal speech and appearance well groomed     LABS:     All pertinent notes, labs, and images personally reviewed by me.      A/P:  Encounter Diagnoses   Name Primary?     Type 2 diabetes mellitus without complication, without long-term current use of insulin (H) Yes     Acquired hypothyroidism      Morbid obesity (H)        Comments:  Reviewed health issues, diabetes and thyroid management.  Recent glucose levels higher per patient  Reviewed and interpreted tests that I previously ordered.   Ordered appropriate tests for the endocrinology disease management.    Management options  discussed and implemented after shared medical decision making with the patient.  T2DM problem is chronic with exacerbation progression      Plan:  Continue the current VGo40, metformin, and glimeparide plan at this time  Discussed idea of restarting a GLP1RA medication, using a long-acting med   Advised starting Ozempic, discussed this med, pen dosing, potential benefits and SE's   New Ozempic Rx sent to pharmacy, start 0.25 mg subcutaneous weekly   When Ozempic med started, discontinue use of the glimeparide medication    Reviewed dosing options using the VGo clicks for small and large carb meals  Goal target -150 mg/dl  Continue use of the 14-day Freestyle [place] CGMS sensor   Scan sensor 3-4x/day  Plan repeat non-fasting lab tests soon   Check diabetes and thyroid tests   Consider lab appt at [facility] or clinic, lab orders placed  Keep focus on diet, exercise, weight management.  Needs f/u on the mild hypercholesterolemia, consider adding statin medication for treatment  Continue the [place] thyroid + levothyroxine dose plan, with regular daily dosing  Will offer additional feedback on the thyroid med dosing after reviewing the repeat thyroid tests soon     Addressed patient questions today     There are no Patient Instructions on file for this visit.    Future labs ordered today:   Orders Placed This Encounter   Procedures     Hemoglobin A1c     Basic metabolic panel     TSH     T4 free     T3 Free     Radiology/Consults ordered today: None    Total time spent in with the patient evaluation:  20 min  Additional time spent reviewing pertinent lab tests and chart notes, and documentation:  10 min    Follow-up:  11/2021    JOJO Christianson MD, MS  Endocrinology  [facility]

## 2021-07-28 NOTE — TELEPHONE ENCOUNTER
I called the pharmacy, they are unsure why someone called, as they have the medication and the pt's insurance covers the medication.

## 2021-07-28 NOTE — TELEPHONE ENCOUNTER
[facility] Call Center    Phone Message    May a detailed message be left on voicemail: no    Reason for Call: Medication Question or concern regarding medication   Prescription Clarification  Name of Medication: Semaglutide,0.25 or 0.5MG/DOS, 2 MG/1.5ML SOPN     Prescribing Provider: Dr. Christianson   Pharmacy: [facility]   What on the order needs clarification? Pharmacy called and stated they do not carry this medication, requesting call back to discuss alternative or if it should be sent to another pharmacy          Action Taken: Message routed to:  [facility] francisco

## 2021-08-04 ENCOUNTER — TELEPHONE (OUTPATIENT)
Dept: ENDOCRINOLOGY | Facility: CLINIC | Age: 56
End: 2021-08-04

## 2021-08-04 DIAGNOSIS — E03.9 ACQUIRED HYPOTHYROIDISM: ICD-10-CM

## 2021-08-04 NOTE — TELEPHONE ENCOUNTER
Pt requesting refill of levothyroxine. Last filled 5/7/21 for a 90 day supply.    Dee Armendariz PharmD  [facility]  Phone: [phone]

## 2021-08-05 ENCOUNTER — TELEPHONE (OUTPATIENT)
Dept: ENDOCRINOLOGY | Facility: CLINIC | Age: 56
End: 2021-08-05

## 2021-08-05 DIAGNOSIS — E11.65 UNCONTROLLED TYPE 2 DIABETES MELLITUS WITH HYPERGLYCEMIA (H): ICD-10-CM

## 2021-08-05 RX ORDER — FLASH GLUCOSE SENSOR
KIT MISCELLANEOUS
Qty: 6 EACH | Refills: 1 | Status: SHIPPED | OUTPATIENT
Start: 2021-08-05 | End: 2022-01-19

## 2021-08-05 RX ORDER — LEVOTHYROXINE SODIUM 75 UG/1
75 TABLET ORAL DAILY
Qty: 90 TABLET | Refills: 1 | Status: SHIPPED | OUTPATIENT
Start: 2021-08-05 | End: 2022-01-23

## 2021-08-05 NOTE — TELEPHONE ENCOUNTER
Left a detailed message for patient to call ([phone]) to schedule F/U visit in November with Dr Christianson and get labs done prior to visit.

## 2021-08-09 NOTE — TELEPHONE ENCOUNTER
Left 2nd message for patient to call ([phone]) to schedule F/U visit in November with Dr Christianson and get labs done prior to visit.

## 2021-08-13 NOTE — TELEPHONE ENCOUNTER
Left 3rd and final message for patient to call ([phone]) to schedule F/U visit with Dr Christianson in November and get Labs done prior to visit.

## 2021-09-09 ENCOUNTER — TELEPHONE (OUTPATIENT)
Dept: ENDOCRINOLOGY | Facility: CLINIC | Age: 56
End: 2021-09-09

## 2021-09-09 NOTE — TELEPHONE ENCOUNTER
PA Initiation    Medication: wearable insulin delivery device (V-GO 40) kit   Insurance Company: [employer] - Phone [phone] Fax [phone]  Pharmacy Filling the Rx: [facility] - [place], [place] - [facility] [address]  Filling Pharmacy Phone: [phone]  Filling Pharmacy Fax: [phone]  Start Date: 9/9/2021

## 2021-09-14 NOTE — TELEPHONE ENCOUNTER
Contacted insurance plan to follow up on request.  Per insurance rep she doesn't see a case for it and states it appears no PA is needed. She said they typically ignore cases that are sent in that do not need a PA.  Will send the request in 1 more time to make sure it doesn't actually need a PA

## 2021-09-14 NOTE — TELEPHONE ENCOUNTER
Prior Authorization Not Needed per Insurance    Medication: wearable insulin delivery device (V-GO 40) kit--NO PA NEEDED  Insurance Company: [employer] - Phone [phone] Fax [phone]  Expected CoPay:      Pharmacy Filling the Rx: [facility] - [place], [place] - [address] [place] [address] AVE   Pharmacy Notified: Yes  Patient Notified: Yes **Instructed pharmacy to notify patient when script is ready to /ship.**    Received phone call from [employer] that code  doesn't require a PA.  They will cancel case.

## 2021-09-19 ENCOUNTER — HEALTH MAINTENANCE LETTER (OUTPATIENT)
Age: 56
End: 2021-09-19

## 2021-10-05 ENCOUNTER — APPOINTMENT (OUTPATIENT)
Dept: URGENT CARE | Facility: URGENT CARE | Age: 56
End: 2021-10-05
Payer: COMMERCIAL

## 2021-10-05 ENCOUNTER — LAB REQUISITION (OUTPATIENT)
Dept: LAB | Facility: CLINIC | Age: 56
End: 2021-10-05

## 2021-10-05 PROCEDURE — U0005 INFEC AGEN DETEC AMPLI PROBE: HCPCS | Performed by: INTERNAL MEDICINE

## 2021-10-06 LAB — SARS-COV-2 RNA RESP QL NAA+PROBE: NEGATIVE

## 2021-10-13 DIAGNOSIS — E11.9 TYPE 2 DIABETES MELLITUS WITHOUT COMPLICATION, WITHOUT LONG-TERM CURRENT USE OF INSULIN (H): ICD-10-CM

## 2021-10-13 NOTE — TELEPHONE ENCOUNTER
Routing refill request to provider for review/approval because:  Drug not on the [facility], [facility] or [facility] refill protocol or controlled substance

## 2021-10-14 ENCOUNTER — VIRTUAL VISIT (OUTPATIENT)
Dept: ENDOCRINOLOGY | Facility: CLINIC | Age: 56
End: 2021-10-14
Payer: COMMERCIAL

## 2021-10-14 DIAGNOSIS — E03.9 ACQUIRED HYPOTHYROIDISM: ICD-10-CM

## 2021-10-14 DIAGNOSIS — E11.9 TYPE 2 DIABETES MELLITUS WITHOUT COMPLICATION, WITHOUT LONG-TERM CURRENT USE OF INSULIN (H): Primary | ICD-10-CM

## 2021-10-14 PROCEDURE — 99214 OFFICE O/P EST MOD 30 MIN: CPT | Mod: 95 | Performed by: INTERNAL MEDICINE

## 2021-10-14 NOTE — LETTER
10/14/2021         RE: Sudheer Montiel  [address]  [place] [address]        Dear Colleague,    Thank you for referring your patient, Sudheer Montiel, to the [facility]. Please see a copy of my visit note below.    Patient is being evaluated via a billable video visit.      How would you like to obtain your AVS? Reviewed verbally  If the video visit is dropped, the invitation should be resent by cell phone  Will anyone else be joining your video visit? no      Video Start Time:  3:10 pm    Video-Visit Details    Type of service:  Video Visit    Video End Time: 3:35 pm    Originating Location (pt. Location): home    Distant Location (provider location):  [facility]/home    Platform used for Video Visit:  [employer]        Recent issues:  Followup diabetes and thyroid evaluation  Using the VGo pod device and the Freestyle Chandni sensor, reports higher glucose levels and stress eating  Ran out of thyroid meds for few weeks, then restarted [place] 90 mg and levothyroxine 0.075 mg daily past 1 weeks  Continues on Ozempic medication, now on 0.5 mg subcutaneous weekly and tolerating well now  Insurance issues with her VGO40 pods also, none for 2-3 weeks in 8/2021 then restarted approx 1 mo ago           Diabetes:  ~4/2010. Initial diagnosis approx age 44  ...Has taken several DM meds including metformin, Byetta, glimeparide, Invokana, and Victoza   In 2018, she had been taking metformin, Victoza, glimeparide, and Jardiance  She stopped Jardiance, also ran out of Victoza last year  10/2018. Abdominal abcess illness, hospitalizations at [facility] and then [facility] hospital              2-surgeries for abcess, thought related to the hysterectomy bladder sling mesh from prior surgery     Previously taking metformin 750 mg BID, glimeparide 4 mg every day, Tresiba 40U every day   10/2020. Changed to VGo40 pods management  Continue low dose glimeparide,  but low SG levels and glimeparide discontinued, then restarted   Current DM meds:  Novolog in VGo40      Small carb meal 2 clicks      Large carb meal 2 clicks    Novolog sscale:  -250  1-click      -300  2 clicks  Metformin 750 mg 1-tab morning and evening  Ozempic 0.5 mg  Subcutaneous weekly    Has Glucocard Vital BG meter              Infrequent BG testing  10/2020. Started [employer] Chandni CGMS with [place]    Data not available today    Reports range 77- 187 mg/dl     Recent [facility] labs include:  7/26/19 hgbA1c 9.3%, t.chol 195, , TSH 0.06, FT4 1.46, FT3 8.7, Cr 0.77  8/26/20 hgbA1c 11.4%     Recent [facility] labs include:          Lab Results   Component Value Date    A1C 7.2 (H) 01/05/2021     01/05/2021    POTASSIUM 3.6 01/05/2021    CHLORIDE 108 01/05/2021    CO2 28 01/05/2021    ANIONGAP 7 01/05/2021    GLC 74 01/05/2021    BUN 13 01/05/2021    CR 0.52 01/05/2021    GFRESTIMATED >90 01/05/2021    GFRESTBLACK >90 01/05/2021    JOANN 9.2 01/05/2021    CHOL 151 01/05/2021    TRIG 171 (H) 01/05/2021    HDL 41 (L) 01/05/2021    LDL 76 01/05/2021    NHDL 110 01/05/2021    UCRR 86 01/05/2021    MICROL 19 01/05/2021    UMALCR 22.31 01/05/2021     Last eye exam 2018?, results not available  DM Complications:  None known        Hypothyroidism:  Had taken Synthroid  Changed to [place] thyroid medication, then [place] + levothyroxine combo dose plan  Had taken [place] thyroid 2-tablets daily... Possibly 60 mg 2-tabs QD  Early-mid 3/2018. Ran out of [place] thyroid medication  Previously took [place] thyroid 90 mg 2-tabs daily and low dose levothyroxine 0.025 mg daily  8/2019. 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 Frantz Guillen?/[facility]        ROS: 10 point ROS neg other than the symptoms noted above in the HPI.     GENERAL: some fatigue, wt loss?; denies fevers, chills, night sweats.   HEENT:  no dysphagia, odonophagia, diplopia, neck pain  THYROID:  no apparent hyper or hypothyroid symptoms  CV:  some palpitations; denies chest pain, pressure  LUNGS:  denies SOB, dyspnea, cough, wheezing  ABDOMEN: no abdominal pain; denies diarrhea, indigestion, constipation  EXTREMITIES: no rashes, ulcers, edema  NEUROLOGY: no headaches, denies changes in vision, tingling, extremitiy numbness   MSK: occasional low back pain; no muscle aches or pains, weakness  SKIN: no rashes or lesions  : no increased thirst and urination, nocturia; no menses since hysterectomy ~age 49  PSYCH:  stable mood, no significant anxiety or depression  ENDOCRINE: no heat or cold intolerance     Physical Exam (visual exam)  VS:  no vital signs taken for video visit  CONSTITUTIONAL: healthy, alert and NAD, well dressed, answering questions appropriately  ENT: no nose swelling or nasal discharge, mouth redness or gum changes.  EYES: eyes grossly normal to inspection, conjunctivae and sclerae normal, no exophthalmos or proptosis  THYROID:  no apparent nodules or goiter  LUNGS: no audible wheeze, cough or visible cyanosis, no visible retractions or increased work of breathing  ABDOMEN: abdomen obese  EXTREMITIES: no hand tremors, limited exam  NEUROLOGY: CN grossly intact, mentation intact and speech normal   SKIN:  no apparent skin lesions, rash, or edema with visualized skin appearance  PSYCH: mentation appears normal, affect normal/bright, judgement and insight intact,   normal speech and appearance well groomed     LABS:     All pertinent notes, labs, and images  personally reviewed by me.      A/P:  Encounter Diagnoses   Name Primary?     Type 2 diabetes mellitus without complication, without long-term current use of insulin (H) Yes     Acquired hypothyroidism      Comments:  Reviewed health issues, diabetes and thyroid management.  Recent glucose levels good per patient  Reviewed and interpreted tests that I previously ordered.   Ordered appropriate tests for the endocrinology disease management.    Management options discussed and implemented after shared medical decision making with the patient.  T2DM and hypothyroidism problems are chronic-stable     Plan:  Continue the current VGo40, metformin, and Ozempic med plan at this time  Reviewed dosing options using the VGo clicks for small and large carb meals  Consider doing some pre-clicks to VGo pod before filling, to shrink the reservoir and utilize less insulin/day  Goal target -150 mg/dl  Continue use of the 14-day [employer] CGMS sensor    Scan sensor 3-4x/day    Track the overnight SG levels, may need change to VGo30 if nocturnal decline    Use SG value for final decision on mealtime VGo clicks  Plan repeat non-fasting lab tests soon    Check diabetes and thyroid tests    Consider lab appt at [facility] or clinic    Lab orders placed  Keep focus on diet, exercise, weight management.  Needs f/u on the mild hypercholesterolemia, consider adding statin medication for treatment  Continue the [place] thyroid + levothyroxine dose plan, with regular daily dosing    Reviewed recent issues with the med Rx refills done by our clinic staff    Consider dose reduction of levothyroxine if elevated FT4 and/or low TSH levels  Will offer additional feedback on the thyroid med dosing after reviewing the repeat lab tests soon     Addressed patient questions today     There are no Patient Instructions on file for this visit.    Future labs ordered today:   Orders Placed This Encounter   Procedures     T4 free     TSH      Hemoglobin A1c     Radiology/Consults ordered today: None    Total time spent in with the patient evaluation:  25 min  Additional time spent reviewing pertinent lab tests and chart notes, and documentation:  5 min    Follow-up:  2/2022    JOJO Christianson MD, MS  Endocrinology  [facility]                                      Again, thank you for allowing me to participate in the care of your patient.        Sincerely,        Kirk Christianson MD

## 2021-10-14 NOTE — PROGRESS NOTES
Patient is being evaluated via a billable video visit.      How would you like to obtain your AVS? Reviewed verbally  If the video visit is dropped, the invitation should be resent by cell phone  Will anyone else be joining your video visit? no      Video Start Time:  3:10 pm    Video-Visit Details    Type of service:  Video Visit    Video End Time: 3:35 pm    Originating Location (pt. Location): home    Distant Location (provider location):  [facility] [place]    Platform used for Video Visit:  F[employer]        Recent issues:  Followup diabetes and thyroid evaluation  Using the VGo pod device and the Freestyle Chandni sensor, reports higher glucose levels and stress eating  Ran out of thyroid meds for few weeks, then restarted [place] 90 mg and levothyroxine 0.075 mg daily past 1 weeks  Continues on Ozempic medication, now on 0.5 mg subcutaneous weekly and tolerating well now  Insurance issues with her VGO40 pods also, none for 2-3 weeks in 8/2021 then restarted approx 1 mo ago           Diabetes:  ~4/2010. Initial diagnosis approx age 44  ...Has taken several DM meds including metformin, Byetta, glimeparide, Invokana, and Victoza   In 2018, she had been taking metformin, Victoza, glimeparide, and Jardiance  She stopped Jardiance, also ran out of Victoza last year  10/2018. Abdominal abcess illness, hospitalizations at [facility] and then [facility]              2-surgeries for abcess, thought related to the hysterectomy bladder sling mesh from prior surgery     Previously taking metformin 750 mg BID, glimeparide 4 mg every day, Tresiba 40U every day   10/2020. Changed to VGo40 pods management  Continue low dose glimeparide, but low SG levels and glimeparide discontinued, then restarted   Current DM meds:  Novolog in VGo40      Small carb meal 2 clicks      Large carb meal 2 clicks    Novolog sscale:  -250  1-click      -300  2 clicks  Metformin 750 mg 1-tab morning  and evening  Ozempic 0.5 mg  Subcutaneous weekly    Has Glucocard Vital BG meter              Infrequent BG testing  10/2020. Started [employer] [place] [employer] CGMS with [place]    Data not available today    Reports range 77- 187 mg/dl     Recent [facility] labs include:  7/26/19 hgbA1c 9.3%, t.chol 195, , TSH 0.06, FT4 1.46, FT3 8.7, Cr 0.77  8/26/20 hgbA1c 11.4%     Recent  labs include:          Lab Results   Component Value Date    A1C 7.2 (H) 01/05/2021     01/05/2021    POTASSIUM 3.6 01/05/2021    CHLORIDE 108 01/05/2021    CO2 28 01/05/2021    ANIONGAP 7 01/05/2021    GLC 74 01/05/2021    BUN 13 01/05/2021    CR 0.52 01/05/2021    GFRESTIMATED >90 01/05/2021    GFRESTBLACK >90 01/05/2021    JOANN 9.2 01/05/2021    CHOL 151 01/05/2021    TRIG 171 (H) 01/05/2021    HDL 41 (L) 01/05/2021    LDL 76 01/05/2021    NHDL 110 01/05/2021    UCRR 86 01/05/2021    MICROL 19 01/05/2021    UMALCR 22.31 01/05/2021     Last eye exam 2018?, results not available  DM Complications:  None known        Hypothyroidism:  Had taken Synthroid  Changed to [place] thyroid medication, then [place] + levothyroxine combo dose plan  Had taken [place] thyroid 2-tablets daily... Possibly 60 mg 2-tabs QD  Early-mid 3/2018. Ran out of [place] thyroid medication  Previously took [place] thyroid 90 mg 2-tabs daily and low dose levothyroxine 0.025 mg daily  8/2019. 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 Frantz Guillen?/[facility]        ROS: 10 point ROS neg  other than the symptoms noted above in the HPI.     GENERAL: some fatigue, wt loss?; denies fevers, chills, night sweats.   HEENT:  no dysphagia, odonophagia, diplopia, neck pain  THYROID:  no apparent hyper or hypothyroid symptoms  CV:  some palpitations; denies chest pain, pressure  LUNGS:  denies SOB, dyspnea, cough, wheezing  ABDOMEN: no abdominal pain; denies diarrhea, indigestion, constipation  EXTREMITIES: no rashes, ulcers, edema  NEUROLOGY: no headaches, denies changes in vision, tingling, extremitiy numbness   MSK: occasional low back pain; no muscle aches or pains, weakness  SKIN: no rashes or lesions  : no increased thirst and urination, nocturia; no menses since hysterectomy ~age 49  PSYCH:  stable mood, no significant anxiety or depression  ENDOCRINE: no heat or cold intolerance     Physical Exam (visual exam)  VS:  no vital signs taken for video visit  CONSTITUTIONAL: healthy, alert and NAD, well dressed, answering questions appropriately  ENT: no nose swelling or nasal discharge, mouth redness or gum changes.  EYES: eyes grossly normal to inspection, conjunctivae and sclerae normal, no exophthalmos or proptosis  THYROID:  no apparent nodules or goiter  LUNGS: no audible wheeze, cough or visible cyanosis, no visible retractions or increased work of breathing  ABDOMEN: abdomen obese  EXTREMITIES: no hand tremors, limited exam  NEUROLOGY: CN grossly intact, mentation intact and speech normal   SKIN:  no apparent skin lesions, rash, or edema with visualized skin appearance  PSYCH: mentation appears normal, affect normal/bright, judgement and insight intact,   normal speech and appearance well groomed     LABS:     All pertinent notes, labs, and images personally reviewed by me.      A/P:  Encounter Diagnoses   Name Primary?     Type 2 diabetes mellitus without complication, without long-term current use of insulin (H) Yes     Acquired hypothyroidism      Comments:  Reviewed health issues, diabetes and  thyroid management.  Recent glucose levels good per patient  Reviewed and interpreted tests that I previously ordered.   Ordered appropriate tests for the endocrinology disease management.    Management options discussed and implemented after shared medical decision making with the patient.  T2DM and hypothyroidism problems are chronic-stable     Plan:  Continue the current VGo40, metformin, and Ozempic med plan at this time  Reviewed dosing options using the VGo clicks for small and large carb meals  Consider doing some pre-clicks to VGo pod before filling, to shrink the reservoir and utilize less insulin/day  Goal target -150 mg/dl  Continue use of the 14-day Freestyle Chandni CGMS sensor    Scan sensor 3-4x/day    Track the overnight SG levels, may need change to VGo30 if nocturnal decline    Use SG value for final decision on mealtime VGo clicks  Plan repeat non-fasting lab tests soon    Check diabetes and thyroid tests    Consider [facility] appt at [facility] or clinic    Lab orders placed  Keep focus on diet, exercise, weight management.  Needs f/u on the mild hypercholesterolemia, consider adding statin medication for treatment  Continue the [place] thyroid + levothyroxine dose plan, with regular daily dosing    Reviewed recent issues with the med Rx refills done by our clinic staff    Consider dose reduction of levothyroxine if elevated FT4 and/or low TSH levels  Will offer additional feedback on the thyroid med dosing after reviewing the repeat lab tests soon     Addressed patient questions today     There are no Patient Instructions on file for this visit.    Future labs ordered today:   Orders Placed This Encounter   Procedures     T4 free     TSH     Hemoglobin A1c     Radiology/Consults ordered today: None    Total time spent in with the patient evaluation:  25 min  Additional time spent reviewing pertinent lab tests and chart notes, and documentation:  5 min    Follow-up:  2/2022    JOJO  Sammy PUGA, MS  Endocrinology  [facility]

## 2021-10-15 ENCOUNTER — TRANSFERRED RECORDS (OUTPATIENT)
Dept: HEALTH INFORMATION MANAGEMENT | Facility: CLINIC | Age: 56
End: 2021-10-15

## 2021-10-15 LAB
ALBUMIN (URINE) MG/SPEC: 1.6 MG/DL
CHOLESTEROL (EXTERNAL): 130 MG/DL
HBA1C MFR BLD: 6.6 % (ref 4–6)
HDLC SERPL-MCNC: 43 MG/DL
HPV ABSTRACT: ABNORMAL
LDL CHOLESTEROL (EXTERNAL): 60 MG/DL
NON HDL CHOLESTEROL (EXTERNAL): 87 MG/DL
PAP-ABSTRACT: ABNORMAL
TRIGLYCERIDES (EXTERNAL): 194 MG/DL

## 2021-10-22 ENCOUNTER — TELEPHONE (OUTPATIENT)
Dept: ENDOCRINOLOGY | Facility: CLINIC | Age: 56
End: 2021-10-22

## 2021-10-22 DIAGNOSIS — E11.9 TYPE 2 DIABETES MELLITUS WITHOUT COMPLICATION, WITHOUT LONG-TERM CURRENT USE OF INSULIN (H): ICD-10-CM

## 2021-10-22 NOTE — TELEPHONE ENCOUNTER
[facility] Call Center    Phone Message    May a detailed message be left on voicemail: yes     Reason for Call: Other: [facility] pharmacy called regarding mutual patient, they state they will need an urgent call back to discuss mutual patient. Please call [phone]     Action Taken: Message routed to:  [facility] ([facility] Endo    Travel Screening: Not Applicable

## 2021-11-02 DIAGNOSIS — E11.9 TYPE 2 DIABETES MELLITUS WITHOUT COMPLICATION, WITHOUT LONG-TERM CURRENT USE OF INSULIN (H): ICD-10-CM

## 2021-11-02 RX ORDER — METFORMIN HYDROCHLORIDE 750 MG/1
TABLET, EXTENDED RELEASE ORAL
Qty: 180 TABLET | Refills: 1 | Status: SHIPPED | OUTPATIENT
Start: 2021-11-02 | End: 2022-02-24

## 2021-11-05 DIAGNOSIS — E03.9 ACQUIRED HYPOTHYROIDISM: ICD-10-CM

## 2021-11-05 RX ORDER — THYROID,PORK 90 MG
TABLET ORAL
Qty: 30 TABLET | Refills: 0 | Status: SHIPPED | OUTPATIENT
Start: 2021-11-05 | End: 2022-01-23

## 2021-11-05 NOTE — TELEPHONE ENCOUNTER
Requested Prescriptions   Pending Prescriptions Disp Refills     thyroid (ARMOUR THYROID) 90 MG tablet 30 tablet 0       There is no refill protocol information for this order          Last Written Prescription Date:  10/04/21  Last Fill Quantity: 30,  # refills: 0   Last office visit: Visit date not found with prescribing provider:  Dr. Christianson   Future Office Visit:  02/14/22

## 2021-11-14 ENCOUNTER — HEALTH MAINTENANCE LETTER (OUTPATIENT)
Age: 56
End: 2021-11-14

## 2021-11-14 DIAGNOSIS — E11.9 TYPE 2 DIABETES MELLITUS WITHOUT COMPLICATION, WITHOUT LONG-TERM CURRENT USE OF INSULIN (H): ICD-10-CM

## 2021-12-10 DIAGNOSIS — E03.9 ACQUIRED HYPOTHYROIDISM: ICD-10-CM

## 2021-12-13 RX ORDER — THYROID,PORK 90 MG
TABLET ORAL
Qty: 30 TABLET | Refills: 0 | OUTPATIENT
Start: 2021-12-13

## 2022-01-09 ENCOUNTER — HEALTH MAINTENANCE LETTER (OUTPATIENT)
Age: 57
End: 2022-01-09

## 2022-01-19 DIAGNOSIS — E11.65 UNCONTROLLED TYPE 2 DIABETES MELLITUS WITH HYPERGLYCEMIA (H): ICD-10-CM

## 2022-01-19 RX ORDER — FLASH GLUCOSE SENSOR
KIT MISCELLANEOUS
Qty: 2 EACH | Refills: 1 | Status: SHIPPED | OUTPATIENT
Start: 2022-01-19 | End: 2022-03-15

## 2022-01-21 ENCOUNTER — MYC MEDICAL ADVICE (OUTPATIENT)
Dept: ENDOCRINOLOGY | Facility: CLINIC | Age: 57
End: 2022-01-21
Payer: COMMERCIAL

## 2022-01-21 DIAGNOSIS — E11.9 TYPE 2 DIABETES MELLITUS WITHOUT COMPLICATION, WITHOUT LONG-TERM CURRENT USE OF INSULIN (H): ICD-10-CM

## 2022-01-21 DIAGNOSIS — E03.9 ACQUIRED HYPOTHYROIDISM: ICD-10-CM

## 2022-01-23 RX ORDER — THYROID,PORK 90 MG
TABLET ORAL
Qty: 30 TABLET | Refills: 1 | Status: SHIPPED | OUTPATIENT
Start: 2022-01-23 | End: 2022-02-24

## 2022-01-23 RX ORDER — LEVOTHYROXINE SODIUM 75 UG/1
75 TABLET ORAL DAILY
Qty: 30 TABLET | Refills: 1 | Status: SHIPPED | OUTPATIENT
Start: 2022-01-23 | End: 2022-02-24

## 2022-01-24 ENCOUNTER — TELEPHONE (OUTPATIENT)
Dept: ENDOCRINOLOGY | Facility: CLINIC | Age: 57
End: 2022-01-24
Payer: COMMERCIAL

## 2022-01-24 DIAGNOSIS — E11.9 TYPE 2 DIABETES MELLITUS WITHOUT COMPLICATION, WITHOUT LONG-TERM CURRENT USE OF INSULIN (H): ICD-10-CM

## 2022-01-24 NOTE — TELEPHONE ENCOUNTER
Since we do not generally change insulin to a different insulin . Shall we PA this prescription for Novolog?

## 2022-01-24 NOTE — TELEPHONE ENCOUNTER
Spoke with pharmacist - says directions on Ozempic does not make sense ?    Per insurance Novolog no longer covered -preferred covered insulin is Humalog.     please advise.    Sushma Samuel MA

## 2022-01-24 NOTE — TELEPHONE ENCOUNTER
Health Call Center    Phone Message    May a detailed message be left on voicemail: yes     Reason for Call: Medication Question or concern regarding medication   Prescription Clarification  Name of Medication: insulin aspart (NOVOLOG VIAL) 100 UNITS/ML vial    semaglutide (OZEMPIC) 2 MG/1.5ML SOPN pen  Prescribing Provider: Sammy   Pharmacy: [place], [place] - [address]   What on the order needs clarification?     Steve called from pharmacy asking if they can switch Novolog to Humalog, since that is what insurance would cover. Also states they need a new prescription for Ozempic that the directions do not make sense. Requesting new prescriptions. Gave phone #[phone] for questions or concerns.           Action Taken: Other: Endo    Travel Screening: Not Applicable

## 2022-01-24 NOTE — TELEPHONE ENCOUNTER
Message noted, called the [facility] pharmacy and spoke with pharmacist.  She told me the Novolog vial insulin not covered by insurance but the Humalog or Lyumjev are covered. There were no issues with the Ozempic Rx.    I thanked her for the update and then sent a new eRx for the Humalog vial insulin.    JOJO Christianson MD, MS  Endocrinology  [facility]

## 2022-02-14 ENCOUNTER — VIRTUAL VISIT (OUTPATIENT)
Dept: ENDOCRINOLOGY | Facility: CLINIC | Age: 57
End: 2022-02-14
Payer: COMMERCIAL

## 2022-02-14 DIAGNOSIS — E11.9 TYPE 2 DIABETES MELLITUS WITHOUT COMPLICATION, WITHOUT LONG-TERM CURRENT USE OF INSULIN (H): Primary | ICD-10-CM

## 2022-02-14 DIAGNOSIS — E03.9 ACQUIRED HYPOTHYROIDISM: ICD-10-CM

## 2022-02-14 PROCEDURE — 99214 OFFICE O/P EST MOD 30 MIN: CPT | Mod: 95 | Performed by: INTERNAL MEDICINE

## 2022-02-14 NOTE — PROGRESS NOTES
Patient is being evaluated via a billable video visit.      How would you like to obtain your AVS? Reviewed verbally  If the video visit is dropped, the invitation should be resent by cell phone  Will anyone else be joining your video visit? no      Video Start Time:  8:02 am    Video-Visit Details    Type of service:  Video Visit    Video End Time: 8:22 am    Originating Location (pt. Location): home    Distant Location (provider location):  [facility] [place]/home    Platform used for Video Visit:  [employer]        Recent issues:  Followup diabetes and thyroid evaluation  Using the metformin, Ozempic, VGo pod device with Novolog and the Freestyle Chandni sensor  Ozempic helpful to decreased appetite and lower/better glucose levels.  Unable to access her Chandni data today however.  Ran out of the [place] thyroid meds from mid 12/2021 to early 1/2022, then restarted.  Hoping to have (right) knee surgery this year           Diabetes:  ~4/2010. Initial diagnosis approx age 44  ...Has taken several DM meds including metformin, Byetta, glimeparide, Invokana, and Victoza   In 2018, she had been taking metformin, Victoza, glimeparide, and Jardiance  She stopped Jardiance, also ran out of Victoza last year  10/2018. Abdominal abcess illness, hospitalizations at [facility] and then [facility] hospital              2-surgeries for abcess, thought related to the hysterectomy bladder sling mesh from prior surgery     Previously taking metformin 750 mg BID, glimeparide 4 mg every day, Tresiba 40U every day   10/2020. Changed to VGo40 pods management  Continue low dose glimeparide, but low SG levels and glimeparide discontinued, then restarted   Current DM meds:  Novolog in VGo40      Small carb meal 1-2 clicks      Large carb meal 2 clicks    Novolog sscale:  -250  1-click      -300  2 clicks  Metformin 750 mg 1-tab morning and evening  Ozempic 0.5 mg  Subcutaneous weekly    Has Glucocard Vital  BG meter              Infrequent BG testing  10/2020. Started Freestyle Chandni CGM with [place]    Data not available today    Reports range 77- 187 mg/dl     Recent [facility] labs include:  7/26/19 hgbA1c 9.3%, t.chol 195, , TSH 0.06, FT4 1.46, FT3 8.7, Cr 0.77  8/26/20 hgbA1c 11.4%  10/15/21 hgbA1c 6.6%, LDL 60 mg/dl     Recent [facility] labs include:          Lab Results   Component Value Date    A1C 7.2 (H) 01/05/2021     01/05/2021    POTASSIUM 3.6 01/05/2021    CHLORIDE 108 01/05/2021    CO2 28 01/05/2021    ANIONGAP 7 01/05/2021    GLC 74 01/05/2021    BUN 13 01/05/2021    CR 0.52 01/05/2021    GFRESTIMATED >90 01/05/2021    GFRESTBLACK >90 01/05/2021    JOANN 9.2 01/05/2021    CHOL 151 01/05/2021    TRIG 171 (H) 01/05/2021    HDL 41 (L) 01/05/2021    LDL 76 01/05/2021    NHDL 110 01/05/2021    UCRR 86 01/05/2021    MICROL 19 01/05/2021    UMALCR 22.31 01/05/2021     Last eye exam 2018?, results not available  DM Complications:  None known        Hypothyroidism:  Had taken Synthroid  Changed to [place] thyroid medication, then [place] + levothyroxine combo dose plan  Had taken [place] thyroid 2-tablets daily... Possibly 60 mg 2-tabs QD  Early-mid 3/2018. Ran out of [place] thyroid medication  Previously took [place] thyroid 90 mg 2-tabs daily and low dose levothyroxine 0.025 mg daily  8/2019. Changed to lower dose [place] and higher dose levothyroxine  Subsequent dose reductions with levothyroxine medication  Mid 12/2021 to early 1/2022. 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 Landry  Arleen/[facility]       PMH/PSH:  Past Medical History:   Diagnosis Date     Hypothyroid 80's     Necrotizing fasciitis (H)      Primary osteoarthritis of both knees      Soft tissue infection      Type 2 diabetes mellitus (H)      Past Surgical History:   Procedure Laterality Date      SECTION       COLONOSCOPY N/A 10/21/2015    Procedure: COLONOSCOPY;  Surgeon: Jaky Arredondo MD;  Location:  GI     IRRIGATION AND DEBRIDEMENT ABDOMEN WASHOUT, COMBINED N/A 10/12/2018    Procedure: COMBINED IRRIGATION AND DEBRIDEMENT ABDOMEN WASHOUT;  Irrigation and Debridement of Lower Abdomen, removal of piece of mesh.;  Surgeon: Darlene Hogue MD;  Location: [facility] OR     marisol/bso      due to anemia     ZZC REPAIR CRUCIATE LIGAMENT,KNEE         Family Hx:  No family history on file.      Social Hx:  Social History     Socioeconomic History     Marital status:      Spouse name: Not on file     Number of children: 2     Years of education: Not on file     Highest education level: Not on file   Occupational History     Occupation: surgery scheduler [employer]   Tobacco Use     Smoking status: Former Smoker     Quit date: 2011     Years since quitting: 10.9     Smokeless tobacco: Never Used   Substance and Sexual Activity     Alcohol use: No     Drug use: No     Sexual activity: Not on file   Other Topics Concern     Not on file   Social History Narrative     Not on file     Social Determinants of Health     Financial Resource Strain: Not on file   Food Insecurity: Not on file   Transportation Needs: Not on file   Physical Activity: Not on file   Stress: Not on file   Social Connections: Not on file   Intimate Partner Violence: Not on file   Housing Stability: Not on file          MEDICATIONS:  has a current medication list which includes the following prescription(s): celecoxib, freestyle evelyn reader, freestyle evelyn 14 day sensor, insulin lispro, levothyroxine,  metformin, omeprazole, rosuvastatin, semaglutide, armour thyroid, valacyclovir, wearable insulin delivery device, blood glucose, b-d u/f, and tresiba flextouch.     ROS: 10 point ROS neg other than the symptoms noted above in the HPI.     GENERAL: some fatigue, wt loss?; denies fevers, chills, night sweats.   HEENT:  no dysphagia, odonophagia, diplopia, neck pain  THYROID:  no apparent hyper or hypothyroid symptoms  CV:  some palpitations; denies chest pain, pressure  LUNGS:  denies SOB, dyspnea, cough, wheezing  ABDOMEN: no abdominal pain; denies diarrhea, indigestion, constipation  EXTREMITIES: no rashes, ulcers, edema  NEUROLOGY: no headaches, denies changes in vision, tingling, extremitiy numbness   MSK: knee and some low back pain; no muscle aches or pains, weakness  SKIN: no rashes or lesions  : no increased thirst and urination, nocturia; no menses since hysterectomy ~age 49  PSYCH:  stable mood, no significant anxiety or depression  ENDOCRINE: no heat or cold intolerance     Physical Exam (visual exam)  VS:  no vital signs taken for video visit  CONSTITUTIONAL: healthy, alert and NAD, well dressed, answering questions appropriately  ENT: no nose swelling or nasal discharge, mouth redness or gum changes.  EYES: eyes grossly normal to inspection, conjunctivae and sclerae normal, no exophthalmos or proptosis  THYROID:  no apparent nodules or goiter  LUNGS: no audible wheeze, cough or visible cyanosis, no visible retractions or increased work of breathing  ABDOMEN: abdomen obese  EXTREMITIES: no hand tremors, limited exam  NEUROLOGY: CN grossly intact, mentation intact and speech normal   SKIN:  no apparent skin lesions, rash, or edema with visualized skin appearance  PSYCH: mentation appears normal, affect normal/bright, judgement and insight intact,   normal speech and appearance well groomed     LABS:     All pertinent notes, labs, and images personally reviewed by me.      A/P:  Encounter Diagnoses   Name  Primary?     Type 2 diabetes mellitus without complication, without long-term current use of insulin (H) Yes     Acquired hypothyroidism        Comments:  Reviewed health issues, diabetes and thyroid management.  Recent glucose levels good per patient  Reviewed and interpreted tests that I previously ordered.   Ordered appropriate tests for the endocrinology disease management.    Management options discussed and implemented after shared medical decision making with the patient.  T2DM and hypothyroidism problems are chronic-stable     Plan:  Continue the current VGo40, metformin, and Ozempic med plan at this time  We have reviewed dosing options using the VGo clicks for small and large carb meals  Consider doing some pre-clicks to VGo pod before filling, to shrink the reservoir and utilize less insulin/day  Goal target -150 mg/dl  Continue use of the 14-day Freestyle Chandni CGM sensor    Scan sensor 3-4x/day    Use SG value for final decision on mealtime VGo clicks    Important to share or download Chandni data for endocrinology appointments  Plan repeat non-fasting lab tests soon    Check diabetes and thyroid tests    Consider lab appt at [facility] or clinic    Lab orders placed  Keep focus on diet, exercise, weight management.  Needs f/u on the mild hypercholesterolemia, consider adding statin medication for treatment  Continue the [place] thyroid + levothyroxine dose plan, with regular daily dosing  Plan to have the diabetes and thyroid tests done at a [facility] facility, not the (non-[facility]) primary care clinic     Addressed patient questions today     There are no Patient Instructions on file for this visit.    Future labs ordered today:   Orders Placed This Encounter   Procedures     Hemoglobin A1c     Basic metabolic panel     TSH     T4 free     T3 Free     Radiology/Consults ordered today: None    Total time spent in with the patient evaluation:  20 min  Additional time spent reviewing pertinent lab  tests and chart notes, and documentation:  11 min    Follow-up:  2/24/22 at 9am, Return    JOJO Christianson MD, MS  Endocrinology  [facility]

## 2022-02-14 NOTE — LETTER
2/14/2022         RE: Sudheer Montiel  [address]  [place] [address]        Dear Colleague,    Thank you for referring your patient, Sudheer Montiel, to the [facility]. Please see a copy of my visit note below.    Patient is being evaluated via a billable video visit.      How would you like to obtain your AVS? Reviewed verbally  If the video visit is dropped, the invitation should be resent by cell phone  Will anyone else be joining your video visit? no      Video Start Time:  8:02 am    Video-Visit Details    Type of service:  Video Visit    Video End Time: 8:22 am    Originating Location (pt. Location): home    Distant Location (provider location):  [facility]/home    Platform used for Video Visit:  [employer]        Recent issues:  Followup diabetes and thyroid evaluation  Using the metformin, Ozempic, VGo pod device with Novolog and the Freestyle Chandni sensor  Ozempic helpful to decreased appetite and lower/better glucose levels.  Unable to access her Chandni data today however.  Ran out of the [place] thyroid meds from mid 12/2021 to early 1/2022, then restarted.  Hoping to have (right) knee surgery this year           Diabetes:  ~4/2010. Initial diagnosis approx age 44  ...Has taken several DM meds including metformin, Byetta, glimeparide, Invokana, and Victoza   In 2018, she had been taking metformin, Victoza, glimeparide, and Jardiance  She stopped Jardiance, also ran out of Victoza last year  10/2018. Abdominal abcess illness, hospitalizations at [facility] and then [facility] hospital              2-surgeries for abcess, thought related to the hysterectomy bladder sling mesh from prior surgery     Previously taking metformin 750 mg BID, glimeparide 4 mg every day, Tresiba 40U every day   10/2020. Changed to VGo40 pods management  Continue low dose glimeparide, but low SG levels and glimeparide discontinued, then restarted   Current DM  meds:  Novolog in VGo40      Small carb meal 1-2 clicks      Large carb meal 2 clicks    Novolog sscale:  -250  1-click      -300  2 clicks  Metformin 750 mg 1-tab morning and evening  Ozempic 0.5 mg  Subcutaneous weekly    Has Glucocard Vital BG meter              Infrequent BG testing  10/2020. Started Freestyle Chandni CGM with [place]    Data not available today    Reports range 77- 187 mg/dl     Recent [facility] labs include:  7/26/19 hgbA1c 9.3%, t.chol 195, , TSH 0.06, FT4 1.46, FT3 8.7, Cr 0.77  8/26/20 hgbA1c 11.4%  10/15/21 hgbA1c 6.6%, LDL 60 mg/dl     Recent  labs include:          Lab Results   Component Value Date    A1C 7.2 (H) 01/05/2021     01/05/2021    POTASSIUM 3.6 01/05/2021    CHLORIDE 108 01/05/2021    CO2 28 01/05/2021    ANIONGAP 7 01/05/2021    GLC 74 01/05/2021    BUN 13 01/05/2021    CR 0.52 01/05/2021    GFRESTIMATED >90 01/05/2021    GFRESTBLACK >90 01/05/2021    JOANN 9.2 01/05/2021    CHOL 151 01/05/2021    TRIG 171 (H) 01/05/2021    HDL 41 (L) 01/05/2021    LDL 76 01/05/2021    NHDL 110 01/05/2021    UCRR 86 01/05/2021    MICROL 19 01/05/2021    UMALCR 22.31 01/05/2021     Last eye exam 2018?, results not available  DM Complications:  None known        Hypothyroidism:  Had taken Synthroid  Changed to [place] thyroid medication, then [place] + levothyroxine combo dose plan  Had taken [place] thyroid 2-tablets daily... Possibly 60 mg 2-tabs QD  Early-mid 3/2018. Ran out of [place] thyroid medication  Previously took [place] thyroid 90 mg 2-tabs daily and low dose levothyroxine 0.025 mg daily  8/2019. Changed to lower dose [place] and higher dose levothyroxine  Subsequent dose reductions with levothyroxine medication  Mid 12/2021 to early 1/2022. 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 Frantz Guillen?/[facility]       PMH/PSH:  Past Medical History:   Diagnosis Date     Hypothyroid 80's     Necrotizing fasciitis (H)      Primary osteoarthritis of both knees      Soft tissue infection      Type 2 diabetes mellitus (H)      Past Surgical History:   Procedure Laterality Date      SECTION       COLONOSCOPY N/A 10/21/2015    Procedure: COLONOSCOPY;  Surgeon: Jaky Arredondo MD;  Location:  GI     IRRIGATION AND DEBRIDEMENT ABDOMEN WASHOUT, COMBINED N/A 10/12/2018    Procedure: COMBINED IRRIGATION AND DEBRIDEMENT ABDOMEN WASHOUT;  Irrigation and Debridement of Lower Abdomen, removal of piece of mesh.;  Surgeon: Darlene Hogue MD;  Location: [facility] OR     marisol/bso      due to anemia     ZZC REPAIR CRUCIATE LIGAMENT,KNEE         Family Hx:  No family history on file.      Social Hx:  Social History     Socioeconomic History     Marital status:      Spouse name: Not on file     Number of children: 2     Years of education: Not on file     Highest education level: Not on file   Occupational History     Occupation: surgery scheduler [employer]   Tobacco Use     Smoking status: Former Smoker     Quit date: 2011     Years since quitting: 10.9     Smokeless tobacco: Never Used   Substance and Sexual Activity     Alcohol use: No     Drug use: No     Sexual activity: Not on file   Other Topics Concern     Not on file   Social History Narrative     Not on file     Social Determinants of Health     Financial Resource Strain: Not on file   Food Insecurity: Not on file   Transportation Needs: Not on file   Physical Activity: Not on file   Stress: Not on file   Social Connections: Not on file   Intimate Partner  Violence: Not on file   Housing Stability: Not on file          MEDICATIONS:  has a current medication list which includes the following prescription(s): celecoxib, freestyle evelyn reader, freestyle evelyn 14 day sensor, insulin lispro, levothyroxine, metformin, omeprazole, rosuvastatin, semaglutide, armour thyroid, valacyclovir, wearable insulin delivery device, blood glucose, b-d u/f, and tresiba flextouch.     ROS: 10 point ROS neg other than the symptoms noted above in the HPI.     GENERAL: some fatigue, wt loss?; denies fevers, chills, night sweats.   HEENT:  no dysphagia, odonophagia, diplopia, neck pain  THYROID:  no apparent hyper or hypothyroid symptoms  CV:  some palpitations; denies chest pain, pressure  LUNGS:  denies SOB, dyspnea, cough, wheezing  ABDOMEN: no abdominal pain; denies diarrhea, indigestion, constipation  EXTREMITIES: no rashes, ulcers, edema  NEUROLOGY: no headaches, denies changes in vision, tingling, extremitiy numbness   MSK: knee and some low back pain; no muscle aches or pains, weakness  SKIN: no rashes or lesions  : no increased thirst and urination, nocturia; no menses since hysterectomy ~age 49  PSYCH:  stable mood, no significant anxiety or depression  ENDOCRINE: no heat or cold intolerance     Physical Exam (visual exam)  VS:  no vital signs taken for video visit  CONSTITUTIONAL: healthy, alert and NAD, well dressed, answering questions appropriately  ENT: no nose swelling or nasal discharge, mouth redness or gum changes.  EYES: eyes grossly normal to inspection, conjunctivae and sclerae normal, no exophthalmos or proptosis  THYROID:  no apparent nodules or goiter  LUNGS: no audible wheeze, cough or visible cyanosis, no visible retractions or increased work of breathing  ABDOMEN: abdomen obese  EXTREMITIES: no hand tremors, limited exam  NEUROLOGY: CN grossly intact, mentation intact and speech normal   SKIN:  no apparent skin lesions, rash, or edema with visualized skin  appearance  PSYCH: mentation appears normal, affect normal/bright, judgement and insight intact,   normal speech and appearance well groomed     LABS:     All pertinent notes, labs, and images personally reviewed by me.      A/P:  Encounter Diagnoses   Name Primary?     Type 2 diabetes mellitus without complication, without long-term current use of insulin (H) Yes     Acquired hypothyroidism        Comments:  Reviewed health issues, diabetes and thyroid management.  Recent glucose levels good per patient  Reviewed and interpreted tests that I previously ordered.   Ordered appropriate tests for the endocrinology disease management.    Management options discussed and implemented after shared medical decision making with the patient.  T2DM and hypothyroidism problems are chronic-stable     Plan:  Continue the current VGo40, metformin, and Ozempic med plan at this time  We have reviewed dosing options using the VGo clicks for small and large carb meals  Consider doing some pre-clicks to VGo pod before filling, to shrink the reservoir and utilize less insulin/day  Goal target -150 mg/dl  Continue use of the 14-day Freestyle Chandni CGM sensor    Scan sensor 3-4x/day    Use SG value for final decision on mealtime VGo clicks    Important to share or download Chandni data for endocrinology appointments  Plan repeat non-fasting lab tests soon    Check diabetes and thyroid tests    Consider lab appt at [facility] or clinic    Lab orders placed  Keep focus on diet, exercise, weight management.  Needs f/u on the mild hypercholesterolemia, consider adding statin medication for treatment  Continue the [place] thyroid + levothyroxine dose plan, with regular daily dosing  Plan to have the diabetes and thyroid tests done at a [facility] facility, not the (non-[facility]) primary care clinic     Addressed patient questions today     There are no Patient Instructions on file for this visit.    Future labs ordered today: No orders of  the defined types were placed in this encounter.    Radiology/Consults ordered today: None    Total time spent in with the patient evaluation:  20 min  Additional time spent reviewing pertinent lab tests and chart notes, and documentation:  11 min    Follow-up:  2/24/22 at 9am, Return    JOJO Christianson MD, [facility]                                        Again, thank you for allowing me to participate in the care of your patient.        Sincerely,        Kirk Christianson MD

## 2022-02-24 ENCOUNTER — OFFICE VISIT (OUTPATIENT)
Dept: ENDOCRINOLOGY | Facility: CLINIC | Age: 57
End: 2022-02-24
Payer: COMMERCIAL

## 2022-02-24 VITALS
SYSTOLIC BLOOD PRESSURE: 144 MMHG | HEART RATE: 78 BPM | BODY MASS INDEX: 39.41 KG/M2 | DIASTOLIC BLOOD PRESSURE: 86 MMHG | WEIGHT: 236.8 LBS

## 2022-02-24 DIAGNOSIS — E11.9 TYPE 2 DIABETES MELLITUS WITHOUT COMPLICATION, WITHOUT LONG-TERM CURRENT USE OF INSULIN (H): Primary | ICD-10-CM

## 2022-02-24 DIAGNOSIS — E03.9 ACQUIRED HYPOTHYROIDISM: ICD-10-CM

## 2022-02-24 PROCEDURE — 95251 CONT GLUC MNTR ANALYSIS I&R: CPT | Performed by: INTERNAL MEDICINE

## 2022-02-24 PROCEDURE — 99214 OFFICE O/P EST MOD 30 MIN: CPT | Performed by: INTERNAL MEDICINE

## 2022-02-24 RX ORDER — METFORMIN HYDROCHLORIDE 750 MG/1
750 TABLET, EXTENDED RELEASE ORAL 2 TIMES DAILY WITH MEALS
Qty: 180 TABLET | Refills: 1 | Status: SHIPPED | OUTPATIENT
Start: 2022-02-24 | End: 2022-12-23

## 2022-02-24 RX ORDER — LEVOTHYROXINE SODIUM 75 UG/1
75 TABLET ORAL DAILY
Qty: 30 TABLET | Refills: 1 | Status: SHIPPED | OUTPATIENT
Start: 2022-02-24 | End: 2022-06-23

## 2022-02-24 RX ORDER — THYROID,PORK 90 MG
TABLET ORAL
Qty: 30 TABLET | Refills: 1 | Status: SHIPPED | OUTPATIENT
Start: 2022-02-24 | End: 2022-04-27

## 2022-02-24 NOTE — PROGRESS NOTES
Recent issues:  Followup diabetes and thyroid evaluation  Using the metformin, Ozempic, VGo pod device with Novolog and the Freestyle Chandni sensor  Taking the [place] thyroid and levothyroxine meds regularly  Hoping to have (right) knee surgery this year           Diabetes:  ~4/2010. Initial diagnosis approx age 44  ...Has taken several DM meds including metformin, Byetta, glimeparide, Invokana, and Victoza   In 2018, she had been taking metformin, Victoza, glimeparide, and Jardiance  She stopped Jardiance, also ran out of Victoza last year  10/2018. Abdominal abcess illness, hospitalizations at [facility] and then [facility]              2-surgeries for abcess, thought related to the hysterectomy bladder sling mesh from prior surgery     Previously taking metformin 750 mg BID, glimeparide 4 mg every day, Tresiba 40U every day   10/2020. Changed to VGo40 pods management  Continue low dose glimeparide, but low SG levels and glimeparide discontinued, then restarted   Current DM meds:  Novolog in VGo40      Small carb meal 1-2 clicks      Large carb meal 2 clicks    Novolog    sscale:  -250  1-click      -300  2 clicks  Metformin 750 mg 1-tab morning and evening  Ozempic 0.5 mg  Subcutaneous weekly    Has Glucocard Vital BG meter              Infrequent BG testing  10/2020. Started Freestyle Chandni CGM with [employer]  Recent Chandni data:           Recent [facility] labs include:  7/26/19 hgbA1c 9.3%, t.chol 195, , TSH 0.06, FT4 1.46, FT3 8.7, Cr 0.77  8/26/20 hgbA1c 11.4%  10/15/21 hgbA1c 6.6%, LDL 60 mg/dl     Recent [facility] labs include:  Lab Results   Component Value Date    A1C 7.2 (H) 01/05/2021     01/05/2021    POTASSIUM 3.6 01/05/2021    CHLORIDE 108 01/05/2021    CO2 28 01/05/2021    ANIONGAP 7 01/05/2021    GLC 74 01/05/2021    BUN 13 01/05/2021    CR 0.52 01/05/2021    GFRESTIMATED >90 01/05/2021    GFRESTBLACK >90 01/05/2021    JOANN 9.2 01/05/2021    CHOL 151 01/05/2021    TRIG  171 (H) 2021    HDL 41 (L) 2021    LDL 76 2021    NHDL 110 2021    UCRR 86 2021    MICROL 19 2021    UMALCR 22.31 2021     Last eye exam ?, results not available  DM Complications:  None known        Hypothyroidism:  Had taken Synthroid  Changed to [place] thyroid medication, then [place] + levothyroxine combo dose plan  Had taken [place] thyroid 2-tablets daily... Possibly 60 mg 2-tabs QD  Early-mid 3/2018. Ran out of [place] thyroid medication  Previously took [place] thyroid 90 mg 2-tabs daily and low dose levothyroxine 0.025 mg daily  2019. Changed to lower dose [place] and higher dose levothyroxine  Subsequent dose reductions with levothyroxine medication  Mid 2021 to early 2022. 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 Frantz Guillen?/[facility]       PMH/PSH:  Past Medical History:   Diagnosis Date     Hypothyroid 80's     Necrotizing fasciitis (H)      Primary osteoarthritis of both knees      Soft tissue infection      Type 2 diabetes mellitus (H)      Past Surgical History:   Procedure Laterality Date      SECTION       COLONOSCOPY N/A 10/21/2015    Procedure: COLONOSCOPY;  Surgeon: Jaky Arredondo MD;  Location:  GI     IRRIGATION AND DEBRIDEMENT ABDOMEN WASHOUT, COMBINED N/A 10/12/2018    Procedure: COMBINED IRRIGATION AND DEBRIDEMENT ABDOMEN WASHOUT;  Irrigation and Debridement of Lower Abdomen, removal of piece of mesh.;  Surgeon: Darlene Hogue MD;  Location:  OR     marisol/bso       due to anemia     ZZC REPAIR CRUCIATE LIGAMENT,KNEE         Family Hx:  No family history on file.      Social Hx:  Social History     Socioeconomic History     Marital status:      Spouse name: Not on file     Number of children: 2     Years of education: Not on file     Highest education level: Not on file   Occupational History     Occupation: surgery scheduler [employer]   Tobacco Use     Smoking status: Former Smoker     Quit date: 2011     Years since quittin.0     Smokeless tobacco: Never Used   Substance and Sexual Activity     Alcohol use: No     Drug use: No     Sexual activity: Not on file   Other Topics Concern     Not on file   Social History Narrative     Not on file     Social Determinants of Health     Financial Resource Strain: Not on file   Food Insecurity: Not on file   Transportation Needs: Not on file   Physical Activity: Not on file   Stress: Not on file   Social Connections: Not on file   Intimate Partner Violence: Not on file   Housing Stability: Not on file          MEDICATIONS:  has a current medication list which includes the following prescription(s): celecoxib, freestyle evelyn reader, freestyle evelyn 14 day sensor, insulin lispro, levothyroxine, metformin, omeprazole, rosuvastatin, semaglutide, armour thyroid, valacyclovir, wearable insulin delivery device, blood glucose, and b-d u/f.     ROS: 10 point ROS neg other than the symptoms noted above in the HPI.     GENERAL: some fatigue, wt loss?; denies fevers, chills, night sweats.   HEENT:  no dysphagia, odonophagia, diplopia, neck pain  THYROID:  no apparent hyper or hypothyroid symptoms  CV:  some palpitations; denies chest pain, pressure  LUNGS:  denies SOB, dyspnea, cough, wheezing  ABDOMEN: no abdominal pain; denies diarrhea, indigestion, constipation  EXTREMITIES: no rashes, ulcers, edema  NEUROLOGY: no headaches, denies changes in vision, tingling, extremitiy numbness   MSK: knee and some low back pain;  no muscle aches or pains, weakness  SKIN: no rashes or lesions  : no increased thirst and urination, nocturia; no menses since hysterectomy ~age 49  PSYCH:  stable mood, no significant anxiety or depression  ENDOCRINE: no heat or cold intolerance     Physical Exam (visual exam)  VS:  no vital signs taken for video visit  CONSTITUTIONAL: healthy, alert and NAD, well dressed, answering questions appropriately  ENT: no nose swelling or nasal discharge, mouth redness or gum changes.  EYES: eyes grossly normal to inspection, conjunctivae and sclerae normal, no exophthalmos or proptosis  THYROID:  no apparent nodules or goiter  LUNGS: no audible wheeze, cough or visible cyanosis, no visible retractions or increased work of breathing  ABDOMEN: abdomen obese  EXTREMITIES: no hand tremors, limited exam  NEUROLOGY: CN grossly intact, mentation intact and speech normal   SKIN:  no apparent skin lesions, rash, or edema with visualized skin appearance  PSYCH: mentation appears normal, affect normal/bright, judgement and insight intact,   normal speech and appearance well groomed     LABS:     All pertinent notes, labs, and images personally reviewed by me.      A/P:  Encounter Diagnoses   Name Primary?     Type 2 diabetes mellitus without complication, without long-term current use of insulin (H) Yes     Acquired hypothyroidism        Comments:  Reviewed health issues, diabetes and thyroid management.  Recent glucose levels good per patient  Reviewed and interpreted tests that I previously ordered.   Ordered appropriate tests for the endocrinology disease management.    Management options discussed and implemented after shared medical decision making with the patient.  T2DM and hypothyroidism problems are chronic-stable    Plan:  Discussed general issues with the diabetes diagnosis and management  We discussed the hgbA1c test which reflects previous overall glucose levels or control  Discussed the importance of blood glucose  (BG) testing to assess glucose trends  Provided general overview of the diabetes medication options and medication treatment plan  Reviewed recent Chandni CGM glucose trend data, in detail.    Recommend:  Continue the current VGo40, metformin, and Ozempic med plan at this time  We have reviewed dosing options using the VGo clicks for small and large carb meals  Consider change to the OmniPod pump, discussed    Gave her OmniPod pamphlet, application form, and local rep (PP) phone number  Goal target -150 mg/dl  Continue use of the 14-day Freestyle Chandni CGM sensor    Scan sensor 3-4x/day... more often    Use SG value for final decision on mealtime VGo clicks    Important to share or download Chandni data for endocrinology appointments  Plan repeat non-fasting lab tests ASAP    Check diabetes and thyroid tests    Consider lab appt at [facility] or clinic    Lab orders have been  Keep focus on diet, exercise, weight management.  Needs f/u on the mild hypercholesterolemia, consider adding statin medication for treatment  Continue the [place] thyroid + levothyroxine dose plan, with regular daily dosing  Plan to have the diabetes and thyroid tests done at a [facility] facility, not the (non-[facility]) primary care clinic     Addressed patient questions today     There are no Patient Instructions on file for this visit.    Future labs ordered today:   Orders Placed This Encounter   Procedures     GLUCOSE MONITOR, 72 HOUR, PHYS INTERP     Radiology/Consults ordered today: None    Total time spent in with the patient evaluation:  16 min  Additional time spent reviewing pertinent lab tests and chart notes, and documentation:  5 min    Follow-up:  6/2022, Return    JOJO Christianson MD, MS  Endocrinology  [facility]

## 2022-02-24 NOTE — LETTER
2/24/2022         RE: Sudheer Montiel  [address]  [place] [address]        Dear Colleague,    Thank you for referring your patient, Sudheer Montiel, to the [facility] [place]. Please see a copy of my visit note below.        Recent issues:  Followup diabetes and thyroid evaluation  Using the metformin, Ozempic, VGo pod device with Novolog and the Freestyle Chandni sensor  Taking the [place] thyroid and levothyroxine meds regularly  Hoping to have (right) knee surgery this year           Diabetes:  ~4/2010. Initial diagnosis approx age 44  ...Has taken several DM meds including metformin, Byetta, glimeparide, Invokana, and Victoza   In 2018, she had been taking metformin, Victoza, glimeparide, and Jardiance  She stopped Jardiance, also ran out of Victoza last year  10/2018. Abdominal abcess illness, hospitalizations at [facility] and then [facility]              2-surgeries for abcess, thought related to the hysterectomy bladder sling mesh from prior surgery     Previously taking metformin 750 mg BID, glimeparide 4 mg every day, Tresiba 40U every day   10/2020. Changed to VGo40 pods management  Continue low dose glimeparide, but low SG levels and glimeparide discontinued, then restarted   Current DM meds:  Novolog in VGo40      Small carb meal 1-2 clicks      Large carb meal 2 clicks    Novolog    sscale:  -250  1-click      -300  2 clicks  Metformin 750 mg 1-tab morning and evening  Ozempic 0.5 mg  Subcutaneous weekly    Has Glucocard Vital BG meter              Infrequent BG testing  10/2020. Started Freestyle Chandni CGM with [place]  Recent Chandni data:           Recent [facility] labs include:  7/26/19 hgbA1c 9.3%, t.chol 195, , TSH 0.06, FT4 1.46, FT3 8.7, Cr 0.77  8/26/20 hgbA1c 11.4%  10/15/21 hgbA1c 6.6%, LDL 60 mg/dl     Recent  labs include:  Lab Results   Component Value Date    A1C 7.2 (H) 01/05/2021     01/05/2021    POTASSIUM 3.6  2021    CHLORIDE 108 2021    CO2 28 2021    ANIONGAP 7 2021    GLC 74 2021    BUN 13 2021    CR 0.52 2021    GFRESTIMATED >90 2021    GFRESTBLACK >90 2021    JOANN 9.2 2021    CHOL 151 2021    TRIG 171 (H) 2021    HDL 41 (L) 2021    LDL 76 2021    NHDL 110 2021    UCRR 86 2021    MICROL 19 2021    UMALCR 22.31 2021     Last eye exam 2018?, results not available  DM Complications:  None known        Hypothyroidism:  Had taken Synthroid  Changed to [place] thyroid medication, then [place] + levothyroxine combo dose plan  Had taken [place] thyroid 2-tablets daily... Possibly 60 mg 2-tabs QD  Early-mid 3/2018. Ran out of [place] thyroid medication  Previously took [place] thyroid 90 mg 2-tabs daily and low dose levothyroxine 0.025 mg daily  2019. Changed to lower dose [place] and higher dose levothyroxine  Subsequent dose reductions with levothyroxine medication  Mid 2021 to early 2022. 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 Frantz Guillen?/[facility]       PMH/PSH:  Past Medical History:   Diagnosis Date     Hypothyroid 80's     Necrotizing fasciitis (H)      Primary osteoarthritis of both knees      Soft tissue infection      Type 2 diabetes mellitus (H)      Past Surgical History:   Procedure Laterality Date      SECTION       COLONOSCOPY N/A 10/21/2015    Procedure: COLONOSCOPY;  Surgeon: Jaky Arredondo MD;  Location:  GI     IRRIGATION AND DEBRIDEMENT  ABDOMEN WASHOUT, COMBINED N/A 10/12/2018    Procedure: COMBINED IRRIGATION AND DEBRIDEMENT ABDOMEN WASHOUT;  Irrigation and Debridement of Lower Abdomen, removal of piece of mesh.;  Surgeon: Darlene Hogue MD;  Location:  OR     marisol/o  2004    due to anemia     ZZC REPAIR CRUCIATE LIGAMENT,KNEE  1986       Family Hx:  No family history on file.      Social Hx:  Social History     Socioeconomic History     Marital status:      Spouse name: Not on file     Number of children: 2     Years of education: Not on file     Highest education level: Not on file   Occupational History     Occupation: surgery scheduler [employer]   Tobacco Use     Smoking status: Former Smoker     Quit date: 2011     Years since quittin.0     Smokeless tobacco: Never Used   Substance and Sexual Activity     Alcohol use: No     Drug use: No     Sexual activity: Not on file   Other Topics Concern     Not on file   Social History Narrative     Not on file     Social Determinants of Health     Financial Resource Strain: Not on file   Food Insecurity: Not on file   Transportation Needs: Not on file   Physical Activity: Not on file   Stress: Not on file   Social Connections: Not on file   Intimate Partner Violence: Not on file   Housing Stability: Not on file          MEDICATIONS:  has a current medication list which includes the following prescription(s): celecoxib, freestyle evelyn reader, freestyle evelyn 14 day sensor, insulin lispro, levothyroxine, metformin, omeprazole, rosuvastatin, semaglutide, armour thyroid, valacyclovir, wearable insulin delivery device, blood glucose, and b-d u/f.     ROS: 10 point ROS neg other than the symptoms noted above in the HPI.     GENERAL: some fatigue, wt loss?; denies fevers, chills, night sweats.   HEENT:  no dysphagia, odonophagia, diplopia, neck pain  THYROID:  no apparent hyper or hypothyroid symptoms  CV:  some palpitations; denies chest pain,  pressure  LUNGS:  denies SOB, dyspnea, cough, wheezing  ABDOMEN: no abdominal pain; denies diarrhea, indigestion, constipation  EXTREMITIES: no rashes, ulcers, edema  NEUROLOGY: no headaches, denies changes in vision, tingling, extremitiy numbness   MSK: knee and some low back pain; no muscle aches or pains, weakness  SKIN: no rashes or lesions  : no increased thirst and urination, nocturia; no menses since hysterectomy ~age 49  PSYCH:  stable mood, no significant anxiety or depression  ENDOCRINE: no heat or cold intolerance     Physical Exam (visual exam)  VS:  no vital signs taken for video visit  CONSTITUTIONAL: healthy, alert and NAD, well dressed, answering questions appropriately  ENT: no nose swelling or nasal discharge, mouth redness or gum changes.  EYES: eyes grossly normal to inspection, conjunctivae and sclerae normal, no exophthalmos or proptosis  THYROID:  no apparent nodules or goiter  LUNGS: no audible wheeze, cough or visible cyanosis, no visible retractions or increased work of breathing  ABDOMEN: abdomen obese  EXTREMITIES: no hand tremors, limited exam  NEUROLOGY: CN grossly intact, mentation intact and speech normal   SKIN:  no apparent skin lesions, rash, or edema with visualized skin appearance  PSYCH: mentation appears normal, affect normal/bright, judgement and insight intact,   normal speech and appearance well groomed     LABS:     All pertinent notes, labs, and images personally reviewed by me.      A/P:  Encounter Diagnoses   Name Primary?     Type 2 diabetes mellitus without complication, without long-term current use of insulin (H) Yes     Acquired hypothyroidism        Comments:  Reviewed health issues, diabetes and thyroid management.  Recent glucose levels good per patient  Reviewed and interpreted tests that I previously ordered.   Ordered appropriate tests for the endocrinology disease management.    Management options discussed and implemented after shared medical decision  making with the patient.  T2DM and hypothyroidism problems are chronic-stable    Plan:  Discussed general issues with the diabetes diagnosis and management  We discussed the hgbA1c test which reflects previous overall glucose levels or control  Discussed the importance of blood glucose (BG) testing to assess glucose trends  Provided general overview of the diabetes medication options and medication treatment plan  Reviewed recent Chandni CGM glucose trend data, in detail.    Recommend:  Continue the current VGo40, metformin, and Ozempic med plan at this time  We have reviewed dosing options using the VGo clicks for small and large carb meals  Consider change to the OmniPod pump, discussed    Gave her OmniPod pamphlet, application form, and local rep (PP) phone number  Goal target -150 mg/dl  Continue use of the 14-day Freestyle Chandni CGM sensor    Scan sensor 3-4x/day... more often    Use SG value for final decision on mealtime VGo clicks    Important to share or download Chandni data for endocrinology appointments  Plan repeat non-fasting lab tests ASAP    Check diabetes and thyroid tests    Consider lab appt at [facility] or clinic    Lab orders have been  Keep focus on diet, exercise, weight management.  Needs f/u on the mild hypercholesterolemia, consider adding statin medication for treatment  Continue the [place] thyroid + levothyroxine dose plan, with regular daily dosing  Plan to have the diabetes and thyroid tests done at a [facility] facility, not the (non-[facility]) primary care clinic     Addressed patient questions today     There are no Patient Instructions on file for this visit.    Future labs ordered today:   Orders Placed This Encounter   Procedures     GLUCOSE MONITOR, 72 HOUR, PHYS INTERP     Radiology/Consults ordered today: None    Total time spent in with the patient evaluation:  16 min  Additional time spent reviewing pertinent lab tests and chart notes, and documentation:  5  min    Follow-up:  6/2022, Return    JOJO Christianson MD, MS  Endocrinology  [facility]                                          Again, thank you for allowing me to participate in the care of your patient.        Sincerely,        Kirk Christianson MD

## 2022-03-14 DIAGNOSIS — E11.65 UNCONTROLLED TYPE 2 DIABETES MELLITUS WITH HYPERGLYCEMIA (H): ICD-10-CM

## 2022-03-15 RX ORDER — FLASH GLUCOSE SENSOR
KIT MISCELLANEOUS
Qty: 2 EACH | Refills: 1 | Status: SHIPPED | OUTPATIENT
Start: 2022-03-15 | End: 2022-10-14

## 2022-04-03 NOTE — LETTER
11/21/2018       RE: Sudheer Montiel  [address] [address]     Dear Colleague,    Thank you for referring your patient, Sudheer Montiel, to the [facility] at [facility]. Please see a copy of my visit note below.    patinet here for f/u.  Doing well overall.  Wounds examined with Beatris Flores, wound care RN, please see her note for full details.  Wounds appear healthy and shallow.  No sign of active infection.  Continue current wound management.  Expect the wounds to heal in next few weeks.  F/u with us PRN.  F/u with urology with respect to further discussion of the mesh.      Again, thank you for allowing me to participate in the care of your patient.      Sincerely,    Darlene Hogue MD       Low Risk (score 7-11)

## 2022-04-27 DIAGNOSIS — E03.9 ACQUIRED HYPOTHYROIDISM: ICD-10-CM

## 2022-04-27 RX ORDER — THYROID,PORK 90 MG
TABLET ORAL
Qty: 30 TABLET | Refills: 1 | Status: SHIPPED | OUTPATIENT
Start: 2022-04-27 | End: 2022-06-23 | Stop reason: ALTCHOICE

## 2022-05-01 ENCOUNTER — HEALTH MAINTENANCE LETTER (OUTPATIENT)
Age: 57
End: 2022-05-01

## 2022-05-25 ENCOUNTER — APPOINTMENT (OUTPATIENT)
Dept: ULTRASOUND IMAGING | Facility: CLINIC | Age: 57
End: 2022-05-25
Attending: PHYSICIAN ASSISTANT
Payer: COMMERCIAL

## 2022-05-25 ENCOUNTER — HOSPITAL ENCOUNTER (OUTPATIENT)
Facility: CLINIC | Age: 57
Setting detail: OBSERVATION
Discharge: HOME | End: 2022-05-26
Attending: PHYSICIAN ASSISTANT | Admitting: HOSPITALIST
Payer: COMMERCIAL

## 2022-05-25 ENCOUNTER — APPOINTMENT (OUTPATIENT)
Dept: CT IMAGING | Facility: CLINIC | Age: 57
End: 2022-05-25
Attending: PHYSICIAN ASSISTANT
Payer: COMMERCIAL

## 2022-05-25 DIAGNOSIS — K81.0 ACUTE CHOLECYSTITIS: ICD-10-CM

## 2022-05-25 LAB
ALBUMIN SERPL-MCNC: 3 G/DL (ref 3.4–5)
ALP SERPL-CCNC: 188 U/L (ref 40–150)
ALT SERPL W P-5'-P-CCNC: 33 U/L (ref 0–50)
ANION GAP SERPL CALCULATED.3IONS-SCNC: 5 MMOL/L (ref 3–14)
AST SERPL W P-5'-P-CCNC: 54 U/L (ref 0–45)
BASOPHILS # BLD AUTO: 0 10E3/UL (ref 0–0.2)
BASOPHILS NFR BLD AUTO: 1 %
BILIRUB SERPL-MCNC: 0.9 MG/DL (ref 0.2–1.3)
BUN SERPL-MCNC: 15 MG/DL (ref 7–30)
CALCIUM SERPL-MCNC: 8.9 MG/DL (ref 8.5–10.1)
CHLORIDE BLD-SCNC: 106 MMOL/L (ref 94–109)
CO2 SERPL-SCNC: 27 MMOL/L (ref 20–32)
CREAT SERPL-MCNC: 0.5 MG/DL (ref 0.52–1.04)
EOSINOPHIL # BLD AUTO: 0.1 10E3/UL (ref 0–0.7)
EOSINOPHIL NFR BLD AUTO: 2 %
ERYTHROCYTE [DISTWIDTH] IN BLOOD BY AUTOMATED COUNT: 13 % (ref 10–15)
GFR SERPL CREATININE-BSD FRML MDRD: >90 ML/MIN/1.73M2
GLUCOSE BLD-MCNC: 151 MG/DL (ref 70–99)
GLUCOSE BLDC GLUCOMTR-MCNC: 234 MG/DL (ref 70–99)
HCT VFR BLD AUTO: 45.6 % (ref 35–47)
HGB BLD-MCNC: 15.1 G/DL (ref 11.7–15.7)
IMM GRANULOCYTES # BLD: 0 10E3/UL
IMM GRANULOCYTES NFR BLD: 0 %
LIPASE SERPL-CCNC: 237 U/L (ref 73–393)
LYMPHOCYTES # BLD AUTO: 1.1 10E3/UL (ref 0.8–5.3)
LYMPHOCYTES NFR BLD AUTO: 22 %
MCH RBC QN AUTO: 30.4 PG (ref 26.5–33)
MCHC RBC AUTO-ENTMCNC: 33.1 G/DL (ref 31.5–36.5)
MCV RBC AUTO: 92 FL (ref 78–100)
MONOCYTES # BLD AUTO: 0.5 10E3/UL (ref 0–1.3)
MONOCYTES NFR BLD AUTO: 9 %
NEUTROPHILS # BLD AUTO: 3.4 10E3/UL (ref 1.6–8.3)
NEUTROPHILS NFR BLD AUTO: 66 %
NRBC # BLD AUTO: 0 10E3/UL
NRBC BLD AUTO-RTO: 0 /100
PLATELET # BLD AUTO: 116 10E3/UL (ref 150–450)
POTASSIUM BLD-SCNC: 3.5 MMOL/L (ref 3.4–5.3)
PROT SERPL-MCNC: 7.3 G/DL (ref 6.8–8.8)
RADIOLOGIST FLAGS: NORMAL
RBC # BLD AUTO: 4.97 10E6/UL (ref 3.8–5.2)
SARS-COV-2 RNA RESP QL NAA+PROBE: NEGATIVE
SODIUM SERPL-SCNC: 138 MMOL/L (ref 133–144)
TROPONIN I SERPL HS-MCNC: 6 NG/L
WBC # BLD AUTO: 5.2 10E3/UL (ref 4–11)

## 2022-05-25 PROCEDURE — 99285 EMERGENCY DEPT VISIT HI MDM: CPT | Mod: 25

## 2022-05-25 PROCEDURE — 250N000011 HC RX IP 250 OP 636: Performed by: PHYSICIAN ASSISTANT

## 2022-05-25 PROCEDURE — 99220 PR INITIAL OBSERVATION CARE,LEVEL III: CPT | Performed by: PHYSICIAN ASSISTANT

## 2022-05-25 PROCEDURE — 96368 THER/DIAG CONCURRENT INF: CPT

## 2022-05-25 PROCEDURE — G0378 HOSPITAL OBSERVATION PER HR: HCPCS

## 2022-05-25 PROCEDURE — 250N000012 HC RX MED GY IP 250 OP 636 PS 637: Performed by: PHYSICIAN ASSISTANT

## 2022-05-25 PROCEDURE — 74177 CT ABD & PELVIS W/CONTRAST: CPT

## 2022-05-25 PROCEDURE — 96372 THER/PROPH/DIAG INJ SC/IM: CPT | Performed by: PHYSICIAN ASSISTANT

## 2022-05-25 PROCEDURE — 96365 THER/PROPH/DIAG IV INF INIT: CPT | Mod: 59

## 2022-05-25 PROCEDURE — U0003 INFECTIOUS AGENT DETECTION BY NUCLEIC ACID (DNA OR RNA); SEVERE ACUTE RESPIRATORY SYNDROME CORONAVIRUS 2 (SARS-COV-2) (CORONAVIRUS DISEASE [COVID-19]), AMPLIFIED PROBE TECHNIQUE, MAKING USE OF HIGH THROUGHPUT TECHNOLOGIES AS DESCRIBED BY CMS-2020-01-R: HCPCS | Performed by: PHYSICIAN ASSISTANT

## 2022-05-25 PROCEDURE — 36415 COLL VENOUS BLD VENIPUNCTURE: CPT | Performed by: PHYSICIAN ASSISTANT

## 2022-05-25 PROCEDURE — 80053 COMPREHEN METABOLIC PANEL: CPT | Performed by: PHYSICIAN ASSISTANT

## 2022-05-25 PROCEDURE — 93005 ELECTROCARDIOGRAM TRACING: CPT

## 2022-05-25 PROCEDURE — 83690 ASSAY OF LIPASE: CPT | Performed by: PHYSICIAN ASSISTANT

## 2022-05-25 PROCEDURE — 84484 ASSAY OF TROPONIN QUANT: CPT | Performed by: PHYSICIAN ASSISTANT

## 2022-05-25 PROCEDURE — 76705 ECHO EXAM OF ABDOMEN: CPT

## 2022-05-25 PROCEDURE — 85004 AUTOMATED DIFF WBC COUNT: CPT | Performed by: PHYSICIAN ASSISTANT

## 2022-05-25 PROCEDURE — 96366 THER/PROPH/DIAG IV INF ADDON: CPT

## 2022-05-25 PROCEDURE — 82962 GLUCOSE BLOOD TEST: CPT

## 2022-05-25 PROCEDURE — C9803 HOPD COVID-19 SPEC COLLECT: HCPCS

## 2022-05-25 RX ORDER — NICOTINE POLACRILEX 4 MG
15-30 LOZENGE BUCCAL
Status: DISCONTINUED | OUTPATIENT
Start: 2022-05-25 | End: 2022-05-26 | Stop reason: HOSPADM

## 2022-05-25 RX ORDER — SODIUM CHLORIDE, SODIUM LACTATE, POTASSIUM CHLORIDE, CALCIUM CHLORIDE 600; 310; 30; 20 MG/100ML; MG/100ML; MG/100ML; MG/100ML
INJECTION, SOLUTION INTRAVENOUS CONTINUOUS
Status: DISCONTINUED | OUTPATIENT
Start: 2022-05-26 | End: 2022-05-26 | Stop reason: HOSPADM

## 2022-05-25 RX ORDER — HYDROXYZINE HYDROCHLORIDE 50 MG/1
50 TABLET, FILM COATED ORAL EVERY 6 HOURS PRN
Status: DISCONTINUED | OUTPATIENT
Start: 2022-05-25 | End: 2022-05-26 | Stop reason: HOSPADM

## 2022-05-25 RX ORDER — IOPAMIDOL 755 MG/ML
500 INJECTION, SOLUTION INTRAVASCULAR ONCE
Status: COMPLETED | OUTPATIENT
Start: 2022-05-25 | End: 2022-05-25

## 2022-05-25 RX ORDER — HYDROXYZINE HYDROCHLORIDE 25 MG/1
25 TABLET, FILM COATED ORAL EVERY 6 HOURS PRN
Status: DISCONTINUED | OUTPATIENT
Start: 2022-05-25 | End: 2022-05-26 | Stop reason: HOSPADM

## 2022-05-25 RX ORDER — LEVOFLOXACIN 5 MG/ML
500 INJECTION, SOLUTION INTRAVENOUS EVERY 24 HOURS
Status: DISCONTINUED | OUTPATIENT
Start: 2022-05-25 | End: 2022-05-26 | Stop reason: HOSPADM

## 2022-05-25 RX ORDER — METRONIDAZOLE 500 MG/100ML
500 INJECTION, SOLUTION INTRAVENOUS EVERY 8 HOURS
Status: DISCONTINUED | OUTPATIENT
Start: 2022-05-25 | End: 2022-05-26 | Stop reason: HOSPADM

## 2022-05-25 RX ORDER — DEXTROSE MONOHYDRATE 25 G/50ML
25-50 INJECTION, SOLUTION INTRAVENOUS
Status: DISCONTINUED | OUTPATIENT
Start: 2022-05-25 | End: 2022-05-26 | Stop reason: HOSPADM

## 2022-05-25 RX ORDER — OXYCODONE HYDROCHLORIDE 5 MG/1
5 TABLET ORAL EVERY 4 HOURS PRN
Status: DISCONTINUED | OUTPATIENT
Start: 2022-05-25 | End: 2022-05-26 | Stop reason: HOSPADM

## 2022-05-25 RX ORDER — ONDANSETRON 2 MG/ML
4 INJECTION INTRAMUSCULAR; INTRAVENOUS EVERY 6 HOURS PRN
Status: DISCONTINUED | OUTPATIENT
Start: 2022-05-25 | End: 2022-05-26 | Stop reason: HOSPADM

## 2022-05-25 RX ORDER — ACETAMINOPHEN 650 MG/1
650 SUPPOSITORY RECTAL EVERY 6 HOURS PRN
Status: DISCONTINUED | OUTPATIENT
Start: 2022-05-25 | End: 2022-05-26 | Stop reason: HOSPADM

## 2022-05-25 RX ORDER — KETOROLAC TROMETHAMINE 30 MG/ML
30 INJECTION, SOLUTION INTRAMUSCULAR; INTRAVENOUS EVERY 6 HOURS PRN
Status: DISCONTINUED | OUTPATIENT
Start: 2022-05-25 | End: 2022-05-26 | Stop reason: HOSPADM

## 2022-05-25 RX ORDER — ONDANSETRON 4 MG/1
4 TABLET, ORALLY DISINTEGRATING ORAL EVERY 6 HOURS PRN
Status: DISCONTINUED | OUTPATIENT
Start: 2022-05-25 | End: 2022-05-26 | Stop reason: HOSPADM

## 2022-05-25 RX ORDER — ACETAMINOPHEN 325 MG/1
650 TABLET ORAL EVERY 6 HOURS PRN
Status: DISCONTINUED | OUTPATIENT
Start: 2022-05-25 | End: 2022-05-26 | Stop reason: HOSPADM

## 2022-05-25 RX ADMIN — INSULIN ASPART 1 UNITS: 100 INJECTION, SOLUTION INTRAVENOUS; SUBCUTANEOUS at 23:28

## 2022-05-25 RX ADMIN — IOPAMIDOL 100 ML: 755 INJECTION, SOLUTION INTRAVENOUS at 18:10

## 2022-05-25 RX ADMIN — INSULIN HUMAN 8 UNITS: 100 INJECTION, SUSPENSION SUBCUTANEOUS at 23:28

## 2022-05-25 RX ADMIN — LEVOFLOXACIN 500 MG: 500 INJECTION, SOLUTION INTRAVENOUS at 23:10

## 2022-05-25 ASSESSMENT — ENCOUNTER SYMPTOMS
NAUSEA: 1
DIARRHEA: 1
ABDOMINAL PAIN: 1
APPETITE CHANGE: 1

## 2022-05-25 NOTE — ED PROVIDER NOTES
History     Chief Complaint:  Abdominal Pain       HPI   Sudheer Montiel is a 56 year old female who presents to the ED for evaluation of abdominal pain.  Patient reports onset of RUQ/epigastric abdominal pain that began about 1 month ago.  He has been intermittent since onset, however worsening over the last 2 days.  She describes it as sharp and stabbing when present, and more recently lasting up to 4-5 hours in duration.  This pain is nonradiating.  Currently she rates her pain as 8/10 in severity.  She is not taking anything for her symptoms and declines anything here at this time.  She notes that eating seems to make her pain worse, specifically eating fatty foods.  She notes associated nausea, decreased appetite, diarrhea.  She denies any fevers, chills, chest pain, dyspnea, vomiting, urinary symptoms, rhinorrhea, congestion, sore throat, cough.    ROS:  Review of Systems   Constitutional: Positive for appetite change.   Gastrointestinal: Positive for abdominal pain, diarrhea and nausea.   All other systems reviewed and are negative.      Allergies:  Cephalexin  Morphine  Penicillins  Amoxicillin  Cefprozil  Ceftazidime  Cefzil  Ciprofloxacin  Ciprofloxacin  Percocet [Oxycodone-Acetaminophen]     Medications:    blood glucose (NO BRAND SPECIFIED) test strip  celecoxib (CELEBREX) 200 MG capsule  Continuous Blood Gluc  (FREESTYLE DIMITRIOS READER) BEBE  Continuous Blood Gluc Sensor (FREESTYLE DIMITRIOS 14 DAY SENSOR) MISC  insulin lispro (HUMALOG VIAL) 100 UNIT/ML vial  insulin pen needle (B-D U/F) 31G X 5 MM miscellaneous  levothyroxine (SYNTHROID/LEVOTHROID) 75 MCG tablet  metFORMIN (GLUCOPHAGE-XR) 750 MG 24 hr tablet  omeprazole (PRILOSEC) 10 MG DR capsule  rosuvastatin (CRESTOR) 10 MG tablet  semaglutide (OZEMPIC) 2 MG/1.5ML SOPN pen  thyroid (ARMOUR THYROID) 90 MG tablet  valACYclovir (VALTREX) 500 MG tablet  wearable insulin delivery device (V-GO 40) kit        Past Medical History:    Past Medical  History:   Diagnosis Date     Hypothyroid 80's     Necrotizing fasciitis (H)      Primary osteoarthritis of both knees      Soft tissue infection      Type 2 diabetes mellitus (H)        Past Surgical History:    Past Surgical History:   Procedure Laterality Date      SECTION       COLONOSCOPY N/A 10/21/2015    Procedure: COLONOSCOPY;  Surgeon: Jaky Arredondo MD;  Location:  GI     IRRIGATION AND DEBRIDEMENT ABDOMEN WASHOUT, COMBINED N/A 10/12/2018    Procedure: COMBINED IRRIGATION AND DEBRIDEMENT ABDOMEN WASHOUT;  Irrigation and Debridement of Lower Abdomen, removal of piece of mesh.;  Surgeon: Darlene Hogue MD;  Location: [facility] OR     marisol/bso      due to anemia     ZZC REPAIR CRUCIATE LIGAMENT,KNEE        Social History:   reports that she quit smoking about 11 years ago. She has never used smokeless tobacco. She reports that she does not drink alcohol and does not use drugs.    PCP: Beth Cantu     Physical Exam     Patient Vitals for the past 24 hrs:   BP Temp Temp src Pulse Resp SpO2 Weight   22 2149 116/42 97.5  F (36.4  C) Oral 74 20 92 % --   22 1830 121/66 -- -- 73 -- 94 % --   22 1815 116/62 -- -- -- -- -- --   22 1745 -- -- -- 71 21 95 % --   22 1730 115/88 -- -- 71 -- 97 % --   22 1645 133/73 -- -- 71 14 97 % --   22 1615 -- -- -- 71 -- 96 % --   22 1600 118/71 -- -- 71 15 97 % --   22 1545 122/75 -- -- -- -- -- --   22 1416 129/77 97.5  F (36.4  C) Temporal 73 18 97 % 98.9 kg (218 lb)        Physical Exam  Constitutional: Pleasant. Cooperative.   Eyes: Pupils equally round and reactive  HENT: Head is normal in appearance. Oropharynx is normal with moist mucus membranes.  Cardiovascular: Regular rate and rhythm and without murmurs.  Respiratory: Normal respiratory effort, lungs are clear bilaterally.  GI: RUQ/epigastric TTP, otherwise non-tender, soft, non-distended. No guarding, rebound, or  rigidity.  Musculoskeletal: No asymmetry of the lower extremities, no tenderness to palpation.   Skin: Normal, without rash.  Neurologic: Cranial nerves grossly intact, normal cognition, no focal deficits. Alert and oriented x 3.   Psychiatric: Normal affect.  Nursing notes and vital signs reviewed.      Emergency Department Course   ECG:  ECG results from 05/25/22   EKG 12 lead     Value    Systolic Blood Pressure     Diastolic Blood Pressure     Ventricular Rate 68    Atrial Rate 68    IL Interval 128    QRS Duration 88        QTc 465    P Axis     R AXIS -2    T Axis 58    Interpretation ECG      Sinus rhythm  Normal ECG  No previous ECGs available         Imaging:  CT Abdomen Pelvis w Contrast   Final Result   IMPRESSION:    1.  Distended gallbladder with mild gallbladder wall thickening and pericholecystic stranding, suspicious for acute cholecystitis. Consider HIDA as clinically warranted.      2.  Morphologic changes of cirrhosis. Few subcentimeter hepatic lesions are indeterminate. Given underlying liver disease, consider liver protocol MRI in 3-6 months for further evaluation.      3.  Splenomegaly. No ascites.         [Consider Follow Up: Few subcentimeter indeterminate hepatic lesions]      This report will be copied to the [facility] to ensure a provider acknowledges the finding.       US Abdomen Limited (RUQ)   Final Result   IMPRESSION:   1.  Hepatic steatosis which limits sonographic visualization of the   liver.   2.  No gallstones or evidence for cholecystitis. No biliary ductal   dilatation.      JENNYFER ROCA MD            SYSTEM ID:  [id number]         Laboratory:  Labs Ordered and Resulted from Time of ED Arrival to Time of ED Departure   COMPREHENSIVE METABOLIC PANEL - Abnormal       Result Value    Sodium 138      Potassium 3.5      Chloride 106      Carbon Dioxide (CO2) 27      Anion Gap 5      Urea Nitrogen 15      Creatinine 0.50 (*)     Calcium 8.9      Glucose 151 (*)      Alkaline Phosphatase 188 (*)     AST 54 (*)     ALT 33      Protein Total 7.3      Albumin 3.0 (*)     Bilirubin Total 0.9      GFR Estimate >90     CBC WITH PLATELETS AND DIFFERENTIAL - Abnormal    WBC Count 5.2      RBC Count 4.97      Hemoglobin 15.1      Hematocrit 45.6      MCV 92      MCH 30.4      MCHC 33.1      RDW 13.0      Platelet Count 116 (*)     % Neutrophils 66      % Lymphocytes 22      % Monocytes 9      % Eosinophils 2      % Basophils 1      % Immature Granulocytes 0      NRBCs per 100 WBC 0      Absolute Neutrophils 3.4      Absolute Lymphocytes 1.1      Absolute Monocytes 0.5      Absolute Eosinophils 0.1      Absolute Basophils 0.0      Absolute Immature Granulocytes 0.0      Absolute NRBCs 0.0     GLUCOSE BY METER - Abnormal    GLUCOSE BY METER POCT 234 (*)    LIPASE - Normal    Lipase 237     TROPONIN I - Normal    Troponin I High Sensitivity 6     COVID-19 VIRUS (CORONAVIRUS) BY PCR - Normal    SARS CoV2 PCR Negative     GLUCOSE MONITOR NURSING POCT   GLUCOSE MONITOR NURSING POCT   LAB BLOOD MORPHOLOGY PATHOLOGIST REVIEW        Emergency Department Course:    Reviewed:  I reviewed nursing notes, vitals, past medical history and Care Everywhere    Assessments:   I obtained history and examined the patient as noted above.    I rechecked the patient and explained findings.     Consults:   Discussed case with hospitalist.    Interventions:  Patient declined    Disposition:  The patient was discharged to home.     Impression & Plan      Medical Decision Making:  Sudheer Montiel is a 56 year old female who presents to the ED for evaluation of RUQ/abdominal pain.  This has been ongoing for the past month, however worsening over the past few days.  It is only associated with eating fatty foods.  See HPI as above for additional details.  Vitals and physical exam as above.  Differential was broad and included biliary etiology, pancreatitis, PUD, GERD, atypical ACS, SBO, perforated viscus, among  others.  Strong suspicion for biliary etiology.  Initially, ultrasound and lab work obtained as above.  Ultrasound negative for acute abnormality, thus CT obtained as above.  CT with evidence concerning for cholecystitis.  In setting of pain consistent with biliary source as well as CT showing evidence for cholecystitis, felt that admission was indicated for IV antibiotics and general surgery consultation.  Given patient's multiple allergies, held on antibiotics until discussion with hospitalist.  Patient agreement for admission.  I discussed the case with the hospitalist, who agreed to admit the patient.  Hospitalist to initiate antibiotics. Patient declined pain medications throughout stay in the ED.  All questions answered.  Patient admitted in stable condition.    Diagnosis:    ICD-10-CM    1. Acute cholecystitis  K81.0         5/25/2022   Aj Lee PA-C     This record was created at least in part using electronic voice recognition software, so please excuse any typographical errors.       Aj Lee PA-C  05/25/22 [id number]       Aj Lee PA-C  05/25/22 234

## 2022-05-25 NOTE — ED TRIAGE NOTES
RUQ pain that began yesterday, associated with nausea and diarrhea. Denies chest pain or SOB. Pt still has gallbladder. ABCs intact. A&OX4.      Triage Assessment     Row Name 05/25/22 1416       Triage Assessment (Adult)    Airway WDL WDL       Respiratory WDL    Respiratory WDL WDL       Skin Circulation/Temperature WDL    Skin Circulation/Temperature WDL WDL       Cardiac WDL    Cardiac WDL WDL       Peripheral/Neurovascular WDL    Peripheral Neurovascular WDL WDL       Cognitive/Neuro/Behavioral WDL    Cognitive/Neuro/Behavioral WDL WDL

## 2022-05-26 ENCOUNTER — APPOINTMENT (OUTPATIENT)
Dept: NUCLEAR MEDICINE | Facility: CLINIC | Age: 57
End: 2022-05-26
Attending: INTERNAL MEDICINE
Payer: COMMERCIAL

## 2022-05-26 VITALS
OXYGEN SATURATION: 94 % | WEIGHT: 233.5 LBS | HEART RATE: 60 BPM | SYSTOLIC BLOOD PRESSURE: 117 MMHG | TEMPERATURE: 97.6 F | BODY MASS INDEX: 39.86 KG/M2 | HEIGHT: 64 IN | DIASTOLIC BLOOD PRESSURE: 56 MMHG | RESPIRATION RATE: 20 BRPM

## 2022-05-26 LAB
ALBUMIN SERPL-MCNC: 2.8 G/DL (ref 3.4–5)
ALP SERPL-CCNC: 181 U/L (ref 40–150)
ALT SERPL W P-5'-P-CCNC: 32 U/L (ref 0–50)
ANION GAP SERPL CALCULATED.3IONS-SCNC: 6 MMOL/L (ref 3–14)
AST SERPL W P-5'-P-CCNC: 54 U/L (ref 0–45)
ATRIAL RATE - MUSE: 68 BPM
BASOPHILS # BLD AUTO: 0 10E3/UL (ref 0–0.2)
BASOPHILS NFR BLD AUTO: 1 %
BILIRUB SERPL-MCNC: 1.2 MG/DL (ref 0.2–1.3)
BUN SERPL-MCNC: 15 MG/DL (ref 7–30)
CALCIUM SERPL-MCNC: 8.7 MG/DL (ref 8.5–10.1)
CHLORIDE BLD-SCNC: 105 MMOL/L (ref 94–109)
CO2 SERPL-SCNC: 24 MMOL/L (ref 20–32)
CREAT SERPL-MCNC: 0.57 MG/DL (ref 0.52–1.04)
DIASTOLIC BLOOD PRESSURE - MUSE: NORMAL MMHG
EOSINOPHIL # BLD AUTO: 0.1 10E3/UL (ref 0–0.7)
EOSINOPHIL NFR BLD AUTO: 1 %
ERYTHROCYTE [DISTWIDTH] IN BLOOD BY AUTOMATED COUNT: 13.2 % (ref 10–15)
ERYTHROCYTE [DISTWIDTH] IN BLOOD BY AUTOMATED COUNT: 13.2 % (ref 10–15)
GFR SERPL CREATININE-BSD FRML MDRD: >90 ML/MIN/1.73M2
GLUCOSE BLD-MCNC: 192 MG/DL (ref 70–99)
GLUCOSE BLDC GLUCOMTR-MCNC: 180 MG/DL (ref 70–99)
GLUCOSE BLDC GLUCOMTR-MCNC: 187 MG/DL (ref 70–99)
GLUCOSE BLDC GLUCOMTR-MCNC: 223 MG/DL (ref 70–99)
HCT VFR BLD AUTO: 45.3 % (ref 35–47)
HCT VFR BLD AUTO: 47.9 % (ref 35–47)
HGB BLD-MCNC: 14.7 G/DL (ref 11.7–15.7)
HGB BLD-MCNC: 15.2 G/DL (ref 11.7–15.7)
IMM GRANULOCYTES # BLD: 0 10E3/UL
IMM GRANULOCYTES NFR BLD: 1 %
INR PPP: 1.22 (ref 0.85–1.15)
INTERPRETATION ECG - MUSE: NORMAL
IRON SATN MFR SERPL: 30 % (ref 15–46)
IRON SERPL-MCNC: 78 UG/DL (ref 35–180)
LYMPHOCYTES # BLD AUTO: 1 10E3/UL (ref 0.8–5.3)
LYMPHOCYTES NFR BLD AUTO: 23 %
MCH RBC QN AUTO: 30.2 PG (ref 26.5–33)
MCH RBC QN AUTO: 30.6 PG (ref 26.5–33)
MCHC RBC AUTO-ENTMCNC: 31.7 G/DL (ref 31.5–36.5)
MCHC RBC AUTO-ENTMCNC: 32.5 G/DL (ref 31.5–36.5)
MCV RBC AUTO: 93 FL (ref 78–100)
MCV RBC AUTO: 97 FL (ref 78–100)
MONOCYTES # BLD AUTO: 0.4 10E3/UL (ref 0–1.3)
MONOCYTES NFR BLD AUTO: 8 %
NEUTROPHILS # BLD AUTO: 2.8 10E3/UL (ref 1.6–8.3)
NEUTROPHILS NFR BLD AUTO: 66 %
NRBC # BLD AUTO: 0 10E3/UL
NRBC BLD AUTO-RTO: 0 /100
P AXIS - MUSE: NORMAL DEGREES
PLATELET # BLD AUTO: 106 10E3/UL (ref 150–450)
PLATELET # BLD AUTO: 117 10E3/UL (ref 150–450)
POTASSIUM BLD-SCNC: 3.8 MMOL/L (ref 3.4–5.3)
PR INTERVAL - MUSE: 128 MS
PROT SERPL-MCNC: 7 G/DL (ref 6.8–8.8)
QRS DURATION - MUSE: 88 MS
QT - MUSE: 438 MS
QTC - MUSE: 465 MS
R AXIS - MUSE: -2 DEGREES
RBC # BLD AUTO: 4.86 10E6/UL (ref 3.8–5.2)
RBC # BLD AUTO: 4.96 10E6/UL (ref 3.8–5.2)
RETICS # AUTO: 0.11 10E6/UL (ref 0.03–0.1)
RETICS/RBC NFR AUTO: 2.3 % (ref 0.5–2)
SODIUM SERPL-SCNC: 135 MMOL/L (ref 133–144)
SYSTOLIC BLOOD PRESSURE - MUSE: NORMAL MMHG
T AXIS - MUSE: 58 DEGREES
TIBC SERPL-MCNC: 260 UG/DL (ref 240–430)
VENTRICULAR RATE- MUSE: 68 BPM
WBC # BLD AUTO: 4.2 10E3/UL (ref 4–11)
WBC # BLD AUTO: 5.4 10E3/UL (ref 4–11)

## 2022-05-26 PROCEDURE — 82390 ASSAY OF CERULOPLASMIN: CPT | Performed by: PHYSICIAN ASSISTANT

## 2022-05-26 PROCEDURE — 85027 COMPLETE CBC AUTOMATED: CPT | Performed by: PHYSICIAN ASSISTANT

## 2022-05-26 PROCEDURE — 99217 PR OBSERVATION CARE DISCHARGE: CPT | Performed by: INTERNAL MEDICINE

## 2022-05-26 PROCEDURE — 87340 HEPATITIS B SURFACE AG IA: CPT | Performed by: PHYSICIAN ASSISTANT

## 2022-05-26 PROCEDURE — 250N000011 HC RX IP 250 OP 636: Performed by: PHYSICIAN ASSISTANT

## 2022-05-26 PROCEDURE — G0378 HOSPITAL OBSERVATION PER HR: HCPCS

## 2022-05-26 PROCEDURE — 86706 HEP B SURFACE ANTIBODY: CPT | Performed by: PHYSICIAN ASSISTANT

## 2022-05-26 PROCEDURE — 250N000013 HC RX MED GY IP 250 OP 250 PS 637: Performed by: PHYSICIAN ASSISTANT

## 2022-05-26 PROCEDURE — 36415 COLL VENOUS BLD VENIPUNCTURE: CPT | Mod: TC | Performed by: PHYSICIAN ASSISTANT

## 2022-05-26 PROCEDURE — 36415 COLL VENOUS BLD VENIPUNCTURE: CPT | Performed by: PHYSICIAN ASSISTANT

## 2022-05-26 PROCEDURE — 86256 FLUORESCENT ANTIBODY TITER: CPT | Performed by: PHYSICIAN ASSISTANT

## 2022-05-26 PROCEDURE — 85045 AUTOMATED RETICULOCYTE COUNT: CPT | Performed by: PHYSICIAN ASSISTANT

## 2022-05-26 PROCEDURE — 96376 TX/PRO/DX INJ SAME DRUG ADON: CPT | Mod: 59

## 2022-05-26 PROCEDURE — 86038 ANTINUCLEAR ANTIBODIES: CPT | Performed by: PHYSICIAN ASSISTANT

## 2022-05-26 PROCEDURE — 99204 OFFICE O/P NEW MOD 45 MIN: CPT | Performed by: SURGERY

## 2022-05-26 PROCEDURE — 86704 HEP B CORE ANTIBODY TOTAL: CPT | Performed by: PHYSICIAN ASSISTANT

## 2022-05-26 PROCEDURE — 83550 IRON BINDING TEST: CPT | Performed by: PHYSICIAN ASSISTANT

## 2022-05-26 PROCEDURE — 82962 GLUCOSE BLOOD TEST: CPT

## 2022-05-26 PROCEDURE — 258N000003 HC RX IP 258 OP 636: Performed by: PHYSICIAN ASSISTANT

## 2022-05-26 PROCEDURE — 343N000001 HC RX 343: Performed by: PHYSICIAN ASSISTANT

## 2022-05-26 PROCEDURE — 80053 COMPREHEN METABOLIC PANEL: CPT | Performed by: PHYSICIAN ASSISTANT

## 2022-05-26 PROCEDURE — 86381 MITOCHONDRIAL ANTIBODY EACH: CPT | Performed by: PHYSICIAN ASSISTANT

## 2022-05-26 PROCEDURE — A9537 TC99M MEBROFENIN: HCPCS | Performed by: PHYSICIAN ASSISTANT

## 2022-05-26 PROCEDURE — 86709 HEPATITIS A IGM ANTIBODY: CPT | Performed by: PHYSICIAN ASSISTANT

## 2022-05-26 PROCEDURE — 96361 HYDRATE IV INFUSION ADD-ON: CPT

## 2022-05-26 PROCEDURE — 86015 ACTIN ANTIBODY EACH: CPT | Performed by: PHYSICIAN ASSISTANT

## 2022-05-26 PROCEDURE — 78227 HEPATOBIL SYST IMAGE W/DRUG: CPT

## 2022-05-26 PROCEDURE — 85610 PROTHROMBIN TIME: CPT | Performed by: PHYSICIAN ASSISTANT

## 2022-05-26 PROCEDURE — 86803 HEPATITIS C AB TEST: CPT | Performed by: PHYSICIAN ASSISTANT

## 2022-05-26 RX ORDER — KIT FOR THE PREPARATION OF TECHNETIUM TC 99M MEBROFENIN 45 MG/10ML
6 INJECTION, POWDER, LYOPHILIZED, FOR SOLUTION INTRAVENOUS ONCE
Status: COMPLETED | OUTPATIENT
Start: 2022-05-26 | End: 2022-05-26

## 2022-05-26 RX ORDER — ACETAMINOPHEN 325 MG/1
650 TABLET ORAL EVERY 8 HOURS PRN
COMMUNITY
Start: 2022-05-26 | End: 2024-04-17

## 2022-05-26 RX ADMIN — METRONIDAZOLE 500 MG: 500 INJECTION, SOLUTION INTRAVENOUS at 00:36

## 2022-05-26 RX ADMIN — SINCALIDE 2.1 MCG: 5 INJECTION, POWDER, LYOPHILIZED, FOR SOLUTION INTRAVENOUS at 10:49

## 2022-05-26 RX ADMIN — ACETAMINOPHEN 650 MG: 325 TABLET, FILM COATED ORAL at 02:21

## 2022-05-26 RX ADMIN — METRONIDAZOLE 500 MG: 500 INJECTION, SOLUTION INTRAVENOUS at 06:42

## 2022-05-26 RX ADMIN — MEBROFENIN 6.5 MILLICURIE: 45 INJECTION, POWDER, LYOPHILIZED, FOR SOLUTION INTRAVENOUS at 09:49

## 2022-05-26 RX ADMIN — SODIUM CHLORIDE, POTASSIUM CHLORIDE, SODIUM LACTATE AND CALCIUM CHLORIDE: 600; 310; 30; 20 INJECTION, SOLUTION INTRAVENOUS at 06:34

## 2022-05-26 NOTE — PROGRESS NOTES
Patient discharged home via private car, transported via wheelchair with volunteer services.  Patient verbalized and received copy of discharge instructions.  Patient received medications filled by discharge Pharmacy.  Personal belongings gathered and sent with patient.  VSS. IV removed prior to discharge.

## 2022-05-26 NOTE — PLAN OF CARE
"Goal Outcome Evaluation:    PRIMARY DIAGNOSIS: \"GENERIC\" NURSING Cholecystitis  OUTPATIENT/OBSERVATION GOALS TO BE MET BEFORE DISCHARGE:  ADLs back to baseline: Yes    Activity and level of assistance: Ambulating independently.    Pain status: Pain free.    Return to near baseline physical activity: Yes     Discharge Planner Nurse   Safe discharge environment identified: Yes  Barriers to discharge: Yes, pt. To have general surgery consult today.        Entered by: Rosa Pagan RN 05/26/2022 7:36 AM     Please review provider order for any additional goals.   Nurse to notify provider when observation goals have been met and patient is ready for discharge.    Pt. A&Ox4, up independently, Lung sounds clear, room air sat's at 95%. NPO at this time for general surgery consult. Pt. Given tylenol for headache that resolved. Abx for the cholecysitis. Pt. Denies any needs at this time. Will continue with POC.                    "

## 2022-05-26 NOTE — PROGRESS NOTES
PRIMARY DIAGNOSIS:  ACUTE CHOLECYSTITIS  OUTPATIENT/OBSERVATION GOALS TO BE MET BEFORE DISCHARGE:     1. Pain status: Improved-controlled with oral pain medications.  2. Stable vital signs and labs (if performed) at disposition: Yes  3. Tolerating adequate PO diet: Yes  4. Successful cholecystectomy or clear follow up plan with General Surgery team if immediate surgery not performed No  5. ADLs back to baseline?  Yes  6. Activity and level of assistance: Ambulating independently.  7. Barriers to discharge noted Yes     Discharge Planner Nurse   Safe discharge environment identified: Yes  Barriers to discharge: Yes       Entered by: Sarita Jensen RN 05/26/2022 12:42 AM   Pt. A&O, independent for mobility, denied pain, voiding adequately, BG-234, covered with insulin.    /42   Pulse 74   Temp 97.5  F (36.4  C) (Oral)   Resp 20   Wt 98.9 kg (218 lb)   SpO2 92%   BMI 36.28 kg/m       Please review provider order for any additional goals.   Nurse to notify provider when observation goals have been met and patient is ready for discharge.

## 2022-05-26 NOTE — H&P
[facility] Hospitalist Admission Note  Name: Sudheer Montiel    MRN: [id number]  YOB: 1965    Age: 56 year old  Date of admission: 5/25/2022  Primary care provider: Beth Cantu      Assessment & Plan   Sudheer Montiel is a 56 year old female with PMhx significant for Type II DM, hypothyroidism, osteoarthritis, I&D of abdominal abscess, who was admitted on 5/25/2022 after presenting with RUQ abdominal pain concerning for biliary colic.     Biliary Colic  Pt with intermittent sharp right upper quadrant abdominal pain with associated nausea episodic over the past month.  Associated with greasy foods. Suspicious for early cholecystitis. No visualized stones.  No fever or leukocytosis. Alk phos, AST mildly elevated with normal bilirubin. Distended gallbladder with mild gallbladder wall thickening and pericholecystic stranding by CT. No biliary dilation. RUQ US was negative common duct measuring 4 mm.  Remains uncomfortable but would like to avoid taking pain medication. Hx of abdominal abscess in 2018 found to have infected sling from bladder mesh and total abdominal hysterectomy in the 90s requiring surgical I&D.   No medical concerns at this time preventing surgery if indicated.  - Request general surgery consultation  - N.p.o. after midnight  - Given ongoing right upper quadrant tenderness we will start empiric antibiotics with Flagyl, levofloxacin after reviewing intolerances with patient she states that she tolerates Levaquin but not cipro.   - Trend labs   - will need Liver MRI to further eval lesions/nodular hepatic surface without ascites, liver MRI 3-6 mos    Type II DM  Last A1c of 6.6  Taking metformin, Ozempic, continuous short acting insulin via Vgo patch device and the Freestyle Chandni CGM  Blood sugars have been in the 150s  She reduced her continuous lispro insulin device as she was not eating much today, device has since been removed for CT scan and her next insulin pod is at  home  - cover with some basal insulin while short acting insulin device patches removed, will order about 8 units NPH for tonight to offer some coverage. This will need to be addressed in the AM  - sliding scale insulin prn   - POCT glucose checks     Hypothyroidism  - resume PTA [place] [employer] thyroid medication, levothyroxine     Splenomegaly  Incidental on CT scan.   Unknown etiology, asymptomatic  - add peripheral blood smear, further outpatient work up recommended  - consider avoid contact sports and weight-lifting to decrease the risk of splenic rupture.    Chronic appearing T12 compression Fracture  Multilevel DGD    DVT Prophylaxis: Pneumatic Compression Devices  Code Status: Full Code after discussion with patient   Expected Discharge/Location: 5/26/2022 to home       Su Simpson PA-C    Primary Care Physician   Beth Cantu    Chief Complaint   Abdominal pain    History is obtained from the patient   Services Used: No    History of Present Illness   Sudheer Montiel is a 56 year old female who presents with abdominal pain.   She awoke this morning noticing sharp abdominal pain located in the right upper quadrant. She had associated nausea with her pain and low appetite. Did not eat throughout the day with some improvement in her symptoms.  Presented for evaluation today due to persistence.  She had not taken any medications at home to try and treat her pain.   Yesterday she had similar abdominal pain while at work from 7:30 AM ~noon that was less severe but accompanied by loose stools. She had [employer] for dinner the night before her symptoms became severe.  Ms. Montiel has had similar episodes of abdominal pain intermittently over 1 month. One episode was associated with emesis.  She has not had fever or chills.  She had abdominal abscess in 2018 suspected secondary to a hysterectomy in the 90s with bladder sling.  She denies other abdominal surgeries.  Denies any history of blood clots or  bleeding problems. she has no reported cardiac diagnoses.     In the ED on presentation vital signs are stable, afebrile. CBC is notable for slight thrombocytopenia. High sensitivity troponin is not elevated.  Alk phos is elevated at 188, AST elevated 54 is greater than ALT, bilirubin is within normal range as is renal function and electrolytes.  Glucose 151.  EKG with NSR.   Imaging was pursued with an abdominal ultrasound that showed hepatic steatosis, normal gallbladder without fluid or wall thickening common duct measuring 4 mm.  CT abdomen pelvis was then pursued showing gb distention with mild gallbladder wall thickening and trace cholecystic stranding suspicious for acute cholecystitis, nodular hepatic surface without ascites, splenomegaly, left simple renal cyst, multilevel DGD of the spine with chronic appearing T12 compression fracture.     Discussed with Aj Lee PA-C in the ED, full chart review including lab work, imaging, and vital signs were reviewed. She received no treatments in the ED. Admission was requested to observation for further cares and monitoring.     She currently reports her pain is improving by not eating today but is now quite hungry.     Past Medical History    I have reviewed this patient's medical history and updated it with pertinent information if needed.   Past Medical History:   Diagnosis Date     Hypothyroid 80's     Necrotizing fasciitis (H)      Primary osteoarthritis of both knees      Soft tissue infection      Type 2 diabetes mellitus (H)        Past Surgical History   I have reviewed this patient's surgical history and updated it with pertinent information if needed.  Past Surgical History:   Procedure Laterality Date      SECTION       COLONOSCOPY N/A 10/21/2015    Procedure: COLONOSCOPY;  Surgeon: Jaky Arredondo MD;  Location:  GI     IRRIGATION AND DEBRIDEMENT ABDOMEN WASHOUT, COMBINED N/A 10/12/2018    Procedure: COMBINED IRRIGATION AND  DEBRIDEMENT ABDOMEN WASHOUT;  Irrigation and Debridement of Lower Abdomen, removal of piece of mesh.;  Surgeon: Darlene Hogue MD;  Location: [facility] OR     marisol/bso      due to anemia     ZZC REPAIR CRUCIATE LIGAMENT,KNEE         Prior to Admission Medications   Prior to Admission Medications   Prescriptions Last Dose Informant Patient Reported? Taking?   Continuous Blood Gluc  (FREESTYLE DIMITRIOS READER) BEBE   No No   Si Device continuous prn (replace annually if needed) Use to read blood glucose levels per  instructions   Continuous Blood Gluc Sensor (FREESTYLE DIMITRIOS 14 DAY SENSOR) [facility]   No No   Sig: APPLY ONE SENSOR TO THE SKIN AND CHANGE EVERY 14 DAYS AS DIRECTED   blood glucose (NO BRAND SPECIFIED) test strip   No No   Sig: Use to test blood sugar 1 times daily or as directed. Glucocard Vital   celecoxib (CELEBREX) 200 MG capsule   Yes No   Sig: TK 1 C PO D   insulin lispro (HUMALOG VIAL) 100 UNIT/ML vial   No No   Sig: Use with VGo patch device, total daily dose approx 75 units   insulin pen needle (B-D U/F) 31G X 5 MM miscellaneous   No No   Sig: Use 1 pen needles daily or as directed.   levothyroxine (SYNTHROID/LEVOTHROID) 75 MCG tablet   No No   Sig: Take 1 tablet (75 mcg) by mouth daily   metFORMIN (GLUCOPHAGE-XR) 750 MG 24 hr tablet   No No   Sig: Take 1 tablet (750 mg) by mouth 2 times daily (with meals)   omeprazole (PRILOSEC) 10 MG DR capsule   Yes No   Sig: Take 10 mg by mouth   rosuvastatin (CRESTOR) 10 MG tablet   Yes No   Sig: Take 10 mg by mouth daily   semaglutide (OZEMPIC) 2 MG/1.5ML SOPN pen   No No   Sig: Inject 0.5 mg weekly   thyroid (ARMOUR THYROID) 90 MG tablet   No No   Sig: TAKE ONE TABLET BY MOUTH EVERY MORNING AS DIRECTED   valACYclovir (VALTREX) 500 MG tablet   Yes No   Sig: Take 500 mg by mouth daily    wearable insulin delivery device (V-GO 40) kit   No No   Sig: INSULIN DELIVERY DEVICE TO BE CHANGED EVERY 24 HOURS      Facility-Administered  Medications: None     Allergies   Allergies   Allergen Reactions     Cephalexin      Morphine      Other reaction(s): Other  Irritability, disorientation     Penicillins Itching     face     Amoxicillin Rash and Itching     Cefprozil Rash     Ceftazidime Itching and Rash     Cefzil Rash     Ciprofloxacin Rash     Ciprofloxacin Itching and Rash     Percocet [Oxycodone-Acetaminophen] Nausea and Vomiting       Social History   I have reviewed this patient's social history and updated it with pertinent information if needed. 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 She  reports that she quit smoking about 11 years ago. She has never used smokeless tobacco. She reports that she does not drink alcohol and does not use drugs.    Family History   Family history of CVA.    Review of Systems   The 10 point Review of Systems is negative other than noted in the HPI or here.     Physical Exam   Temp: 97.5  F (36.4  C) Temp src: Temporal BP: 121/66 Pulse: 73   Resp: 21 SpO2: 94 % O2 Device: None (Room air)    Vital Signs with Ranges  Temp:  [97.5  F (36.4  C)] 97.5  F (36.4  C)  Pulse:  [71-73] 73  Resp:  [14-21] 21  BP: (115-133)/(62-88) 121/66  SpO2:  [94 %-97 %] 94 %  218 lbs 0 oz    Constitutional: Awake, alert,  no apparent distress.  Eyes: Conjunctiva and pupils examined and normal.  HEENT: Non traumatic. Moist mucous membranes, normal dentition.  Respiratory: Clear to auscultation bilaterally, no crackles or wheezing.  Cardiovascular: Regular rate and rhythm, normal S1 and S2, and no murmur noted.  GI: Soft, non-distended, tender to light palpation of right upper quadrant, bowel sounds present. No rebound tenderness or guarding.  Lymph/Hematologic: No anterior cervical or supraclavicular adenopathy.  Skin: Warm, dry. No edema.  Musculoskeletal: No gross deformities noted.  No erythema or tenderness. Moving all extremities.  Neurologic: No tremor. Speech is clear. Moving all extremities with  symmetrical strength. CN 2-12 grossly intact.  Coordination and sensation intact.   Psychiatric: Appropriate affect.    Data   Data reviewed today:      Imaging:   Recent Results (from the past 24 hour(s))   US Abdomen Limited (RUQ)    Narrative    US ABDOMEN LIMITED 5/25/2022 4:46 PM    CLINICAL HISTORY: RUQ pain  TECHNIQUE: Limited abdominal ultrasound.    COMPARISON: 2/11/2009    FINDINGS:    GALLBLADDER: The gallbladder is normal. No gallstones, wall  thickening, or pericholecystic fluid. Negative sonographic Grayson's  sign.    BILE DUCTS: There is no biliary dilatation. The common duct measures  4mm.    LIVER: Difficulty in sonographic penetration and visualization the  liver, consistent with hepatic steatosis..    RIGHT KIDNEY: No hydronephrosis.    PANCREAS: The visualized portions of the pancreas are normal.      Impression    IMPRESSION:  1.  Hepatic steatosis which limits sonographic visualization of the  liver.  2.  No gallstones or evidence for cholecystitis. No biliary ductal  dilatation.    JENNYFER ROCA MD         SYSTEM ID:  [id number]   CT Abdomen Pelvis w Contrast   Result Value    Radiologist flags Few subcentimeter indeterminate hepatic lesions    Narrative    EXAM: CT ABDOMEN PELVIS W CONTRAST  LOCATION: [facility]  DATE/TIME: 5/25/2022 5:56 PM    INDICATION: Right upper quadrant epigastric abdominal pain.  COMPARISON: 02/11/2009  TECHNIQUE: CT scan of the abdomen and pelvis was performed following injection of IV contrast. Multiplanar reformats were obtained. Dose reduction techniques were used.  CONTRAST: 100 mL Isovue 370    FINDINGS:   LOWER CHEST: Normal.    HEPATOBILIARY: Nodular hepatic surface contour. Few subcentimeter low-attenuation lesions, too small to characterize. Distended gallbladder with mild gallbladder wall thickening and pericholecystic stranding. No biliary dilatation.    PANCREAS: Normal.    SPLEEN: Splenomegaly measuring 16.2 cm in craniocaudal  dimension.    ADRENAL GLANDS: Normal.    KIDNEYS/BLADDER: Left renal midpole simple cyst measuring 1.1 cm; no follow-up required. Kidneys otherwise appear unremarkable. No hydronephrosis. Urinary bladder appears unremarkable.    BOWEL: Mild colonic diverticulosis. No obstruction or inflammatory change. Normal appendix. No evidence of acute appendicitis.    LYMPH NODES: No lymphadenopathy.    VASCULATURE: Moderate atherosclerotic calcifications. No abdominal aortic aneurysm.    PELVIC ORGANS: Status post hysterectomy.    MUSCULOSKELETAL: Multilevel degenerative changes of the spine. Chronic appearing T12 compression fracture.      Impression    IMPRESSION:   1.  Distended gallbladder with mild gallbladder wall thickening and pericholecystic stranding, suspicious for acute cholecystitis. Consider HIDA as clinically warranted.    2.  Morphologic changes of cirrhosis. Few subcentimeter hepatic lesions are indeterminate. Given underlying liver disease, consider liver protocol MRI in 3-6 months for further evaluation.    3.  Splenomegaly. No ascites.      [Consider Follow Up: Few subcentimeter indeterminate hepatic lesions]    This report will be copied to the [facility] to ensure a provider acknowledges the finding.        Recent Labs   Lab 05/25/22  1545   WBC 5.2   HGB 15.1   MCV 92   *      POTASSIUM 3.5   CHLORIDE 106   CO2 27   BUN 15   CR 0.50*   ANIONGAP 5   JOANN 8.9   *   ALBUMIN 3.0*   PROTTOTAL 7.3   BILITOTAL 0.9   ALKPHOS 188*   ALT 33   AST 54*   LIPASE 237       Su Simpson PA-C on 5/25/2022 at 7:34 PM

## 2022-05-26 NOTE — ED NOTES
RECEIVING UNIT ED HANDOFF REVIEW    Above ED Nurse Handoff Report was reviewed: Yes  Reviewed by: Elva Cifuentes RN on May 25, 2022 at 9:11 PM   [facility]  ED Nurse Handoff Report    Sudheer Montiel is a 56 year old female   ED Chief complaint: Abdominal Pain  . ED Diagnosis:   Final diagnoses:   Acute cholecystitis     Allergies:   Allergies   Allergen Reactions     Cephalexin      Morphine      Other reaction(s): Other  Irritability, disorientation     Penicillins Itching     face     Amoxicillin Rash and Itching     Cefprozil Rash     Ceftazidime Itching and Rash     Cefzil Rash     Ciprofloxacin Rash     Ciprofloxacin Itching and Rash     Percocet [Oxycodone-Acetaminophen] Nausea and Vomiting       Code Status: Full Code  Activity level - Baseline/Home:  Independent. Activity Level - Current:   Independent. Lift room needed: No. Bariatric: No   Needed: No   Isolation: No. Infection: Not Applicable.     Vital Signs:   Vitals:    05/25/22 1730 05/25/22 1745 05/25/22 1815 05/25/22 1830   BP: 115/88  116/62 121/66   Pulse: 71 71  73   Resp:  21     Temp:       TempSrc:       SpO2: 97% 95%  94%   Weight:           Cardiac Rhythm:  ,      Pain level:    Patient confused: No. Patient Falls Risk: No.   Elimination Status: Has voided   Patient Report - Initial Complaint: c/o RUQ since yesterday. Worse when eating fatty foods. Focused Assessment: CT showing possible acute cholecystitis.   Tests Performed:   Labs Ordered and Resulted from Time of ED Arrival to Time of ED Departure   COMPREHENSIVE METABOLIC PANEL - Abnormal       Result Value    Sodium 138      Potassium 3.5      Chloride 106      Carbon Dioxide (CO2) 27      Anion Gap 5      Urea Nitrogen 15      Creatinine 0.50 (*)     Calcium 8.9      Glucose 151 (*)     Alkaline Phosphatase 188 (*)     AST 54 (*)     ALT 33      Protein Total 7.3      Albumin 3.0 (*)     Bilirubin Total 0.9      GFR Estimate >90     CBC WITH PLATELETS AND  DIFFERENTIAL - Abnormal    WBC Count 5.2      RBC Count 4.97      Hemoglobin 15.1      Hematocrit 45.6      MCV 92      MCH 30.4      MCHC 33.1      RDW 13.0      Platelet Count 116 (*)     % Neutrophils 66      % Lymphocytes 22      % Monocytes 9      % Eosinophils 2      % Basophils 1      % Immature Granulocytes 0      NRBCs per 100 WBC 0      Absolute Neutrophils 3.4      Absolute Lymphocytes 1.1      Absolute Monocytes 0.5      Absolute Eosinophils 0.1      Absolute Basophils 0.0      Absolute Immature Granulocytes 0.0      Absolute NRBCs 0.0     LIPASE - Normal    Lipase 237     TROPONIN I - Normal    Troponin I High Sensitivity 6     COVID-19 VIRUS (CORONAVIRUS) BY PCR     CT Abdomen Pelvis w Contrast   Final Result   IMPRESSION:    1.  Distended gallbladder with mild gallbladder wall thickening and pericholecystic stranding, suspicious for acute cholecystitis. Consider HIDA as clinically warranted.      2.  Morphologic changes of cirrhosis. Few subcentimeter hepatic lesions are indeterminate. Given underlying liver disease, consider liver protocol MRI in 3-6 months for further evaluation.      3.  Splenomegaly. No ascites.         [Consider Follow Up: Few subcentimeter indeterminate hepatic lesions]      This report will be copied to the [facility] to ensure a provider acknowledges the finding.       US Abdomen Limited (RUQ)   Final Result   IMPRESSION:   1.  Hepatic steatosis which limits sonographic visualization of the   liver.   2.  No gallstones or evidence for cholecystitis. No biliary ductal   dilatation.      JENNYFER ROCA MD            SYSTEM ID:  [id number]        . Abnormal Results: see above results.   Treatments provided: iv, labs, US, CT  Family Comments: family gone home for noc  OBS brochure/video discussed/provided to patient:  Yes  ED Medications:   Medications   sodium chloride (PF) 0.9% PF flush 100 mL (65 mLs Intravenous Given 5/25/22 1810)   iopamidol (ISOVUE-370) solution  500 mL (100 mLs Intravenous Given 5/25/22 1810)     Drips infusing:  No  For the majority of the shift, the patient's behavior Green. Interventions performed were n/a.    Sepsis treatment initiated: No     Patient tested for COVID 19 prior to admission: YES    ED Nurse Name/Phone Number: Sameera Jimenez RN,   8:29 PM    RECEIVING UNIT ED HANDOFF REVIEW    Above ED Nurse Handoff Report was reviewed: Yes  Reviewed by: Rosa Pagan RN on May 26, 2022 at 1:35 AM

## 2022-05-26 NOTE — CONSULTS
Chief complaint:  Abdominal pain, epigastric    HPI:  This patient is a 56 year old female who presents with episodic abdominal pain over the last month or so.  She has had some nausea as well and developed an episode of fairly severe diarrhea yesterday.  This was followed by worsening abdominal pain, which prompted her to come to the emergency room.  Ultrasound in the emergency room revealed a normal-appearing gallbladder.  A CT scan was then performed, which revealed questionable gallbladder wall thickening, a dilated gallbladder and findings concerning for cirrhosis.  The patient has not previously been known to have liver disease.  The patient is feeling significantly better at this time, though she continues to have some discomfort in the right upper abdomen.  The patient has a history of an intra-abdominal abscess apparently related to a mesh sling placed many years ago.    Past Medical History:   has a past medical history of Hypothyroid ('s), Necrotizing fasciitis (H), Primary osteoarthritis of both knees, Soft tissue infection, and Type 2 diabetes mellitus (H).    Past Surgical History:  Past Surgical History:   Procedure Laterality Date      SECTION       COLONOSCOPY N/A 10/21/2015    Procedure: COLONOSCOPY;  Surgeon: Jaky Arredondo MD;  Location:  GI     IRRIGATION AND DEBRIDEMENT ABDOMEN WASHOUT, COMBINED N/A 10/12/2018    Procedure: COMBINED IRRIGATION AND DEBRIDEMENT ABDOMEN WASHOUT;  Irrigation and Debridement of Lower Abdomen, removal of piece of mesh.;  Surgeon: Darlene Hogue MD;  Location:  OR     marisol/bso      due to anemia     ZZC REPAIR CRUCIATE LIGAMENT,KNEE          Social History:  Social History     Socioeconomic History     Marital status:      Spouse name: Not on file     Number of children: 2     Years of education: Not on file     Highest education level: Not on file   Occupational History     Occupation: surgery scheduler [employer]   Tobacco Use     Smoking status: Former Smoker     Quit date: 2011     Years since quittin.2     Smokeless tobacco: Never Used   Substance and Sexual Activity     Alcohol use: No     Drug use: No     Sexual activity: Not on file   Other Topics Concern     Not on file   Social History Narrative     Not on file     Social Determinants of Health     Financial Resource Strain: Not on file   Food Insecurity: Not on file   Transportation Needs: Not on file   Physical Activity: Not on file   Stress: Not on file   Social Connections: Not on file   Intimate Partner Violence: Not on file   Housing Stability: Not on file        Family History:  No family history on file.    Review of Systems:  The 10 point Review of Systems is negative other than noted in the HPI and above.    Physical Exam:  General - This is a well developed, well nourished female in no apparent distress.  HEENT - Normocephalic, atraumatic.  No scleral icterus.  Neck - supple without masses  Lungs - clear to ascultation.    Heart - Regular rate & rhythm without murmur  Abdomen:   soft, non-distended with mild to moderate tenderness noted across the right upper abdomen.  This extends all the way from the lateral aspect to the epigastrium. no masses palpated. normal bowel sounds.  Extremities - warm without edema  Neurologic - nonfocal    Relevant labs:  Liver function test revealed total bilirubin of 1.2, alkaline phosphatase of 181, ALT of 32 and AST of 54.  Platelet count is 117.    Imaging:  CT scan shows a distended gallbladder with mild gallbladder wall thickening.  There is a nodular appearing liver consistent with cirrhosis.  There is splenomegaly.  Ultrasound shows a normal-appearing gallbladder.  HIDA scan was obtained which revealed rapid filling of the gallbladder.  An ejection fraction was obtained, which is of questionable utility in this circumstance.  This was moderately decreased at 22%.    Assessment and Plan:  This is a  patient with new liver changes consistent with cirrhosis.  She has had some pain in the right upper abdomen, but this is fairly broadly based and may be more consistent with liver tenderness.  The ultrasound showed no obvious abnormalities of the gallbladder.  The distention and mild wall thickening could be related to a liver issue as well.  At this point, I would be hesitant to recommend cholecystectomy.  The patient has been seen by the GI service and will be undergoing a liver work-up.  I would favor completing that before any further intervention.  With the finding of cirrhosis, it would probably make sense for the patient to be seen at the [facility] for any surgical intervention, given the potential for intraoperative difficulties related to cirrhosis.  No surgery is planned at this time.    Cristo Samuels MD  [facility], MATTHEW Samuels MD  [facility]

## 2022-05-26 NOTE — PROGRESS NOTES
PRIMARY DIAGNOSIS: ACUTE PAIN  OUTPATIENT/OBSERVATION GOALS TO BE MET BEFORE DISCHARGE:  1. Pain Status: Pain free.    2. Return to near baseline physical activity: Yes    3. Cleared for discharge by consultants (if involved): No    Discharge Planner Nurse   Safe discharge environment identified: Yes  Barriers to discharge: Yes Awaiting discharge plan       Entered by: Kirk Golden RN 05/26/2022 12:54 PM     Please review provider order for any additional goals.   Nurse to notify provider when observation goals have been met and patient is ready for discharge.

## 2022-05-26 NOTE — PLAN OF CARE
"PRIMARY DIAGNOSIS: \"GENERIC\" NURSING, Cholecystitis  OUTPATIENT/OBSERVATION GOALS TO BE MET BEFORE DISCHARGE:  ADLs back to baseline: Yes    Activity and level of assistance: Ambulating independently.    Pain status: Pain free.    Return to near baseline physical activity: Yes     Discharge Planner Nurse   Safe discharge environment identified: No  Barriers to discharge: Yes, pt. To have general surgery consult.        Entered by: Rosa Pagan RN 05/26/2022 5:05 AM     Please review provider order for any additional goals.   Nurse to notify provider when observation goals have been met and patient is ready for discharge.  Goal Outcome Evaluation:  Pt. To have general surgery consult in AM, continues on Flagyl and levaquin. Pt. Denies any needs at this time. Will continue with POC                    "

## 2022-05-26 NOTE — PHARMACY-ADMISSION MEDICATION HISTORY
Admission medication history interview status for this patient is complete. See [facility] admission navigator for allergy information, prior to admission medications and immunization status.     Medication history interview done, indicate source(s): Patient @ [phone]  Medication history resources (including written lists, pill bottles, clinic record):None      Changes made to PTA medication list:  Added: prilosec  Changed: metformin XR  Reported as Not Taking: none  Removed: none    Actions taken by pharmacist (provider contacted, etc):None     Additional medication history information:None    Medication reconciliation/reorder completed by provider prior to medication history?  n   (Y/N)     For patients on insulin therapy:   Patient is using a device called ([facility] 40)    Tried obtaining the settings of the device with no luck and patient unsure either :(      Prior to Admission medications    Medication Sig Last Dose Taking? Auth Provider   celecoxib (CELEBREX) 200 MG capsule TK 1 C PO D 5/25/2022 at am Yes Reported, Patient   insulin lispro (HUMALOG VIAL) 100 UNIT/ML vial Use with VGo patch device, total daily dose approx 75 units 5/25/2022 at am Yes Kirk Christianson MD   levothyroxine (SYNTHROID/LEVOTHROID) 75 MCG tablet Take 1 tablet (75 mcg) by mouth daily 5/25/2022 at am Yes Kirk Christianson MD   metFORMIN (GLUCOPHAGE-XR) 750 MG 24 hr tablet Take 1 tablet (750 mg) by mouth 2 times daily (with meals)  Patient taking differently: Take 1,500 mg by mouth every morning 5/25/2022 at Unknown time Yes Kirk Christianson MD   omeprazole (PRILOSEC) 10 MG DR capsule Take 10 mg by mouth daily 5/25/2022 at Unknown time Yes Reported, Patient   rosuvastatin (CRESTOR) 10 MG tablet Take 10 mg by mouth daily 5/25/2022 at Unknown time Yes Reported, Patient   semaglutide (OZEMPIC) 2 MG/1.5ML SOPN pen Inject 0.5 mg weekly Past Week at mon Yes Kirk Christianson MD   thyroid (ARMOUR THYROID) 90 MG tablet TAKE ONE TABLET BY MOUTH  EVERY MORNING AS DIRECTED 5/25/2022 at am Yes Kirk Christianson MD   valACYclovir (VALTREX) 500 MG tablet Take 500 mg by mouth daily as needed  Yes Reported, Patient   wearable insulin delivery device (V-GO 40) kit INSULIN DELIVERY DEVICE TO BE CHANGED EVERY 24 HOURS 5/25/2022 at am Yes Kirk Christianson MD   blood glucose (NO BRAND SPECIFIED) test strip Use to test blood sugar 1 times daily or as directed. Glucocard Vital   Kirk Christianson MD   Continuous Blood Gluc  (FREESTYLE DIMITRIOS READER) BEBE 1 Device continuous prn (replace annually if needed) Use to read blood glucose levels per  instructions   Kirk Christianson MD   Continuous Blood Gluc Sensor (FREESTYLE DIMITRIOS 14 DAY SENSOR) MISC APPLY ONE SENSOR TO THE SKIN AND CHANGE EVERY 14 DAYS AS DIRECTED   Kirk Christianson MD   insulin pen needle (B-D U/F) 31G X 5 MM miscellaneous Use 1 pen needles daily or as directed.   Kirk Christianson MD

## 2022-05-26 NOTE — CONSULTS
GASTROENTEROLOGY CONSULTATION      Sudheer Montiel  [address]  [place] [address]  56 year old female     Admission Date/Time: 5/25/2022  Primary Care Provider: Beth Cantu     We were asked to see the patient in consultation by Dr. Samuels for evaluation of liver cirrhosis.    CC: right upper quadrant abdominal pain     HPI:  Sudheer Montiel is a 56 year old female with past medical history significant for type 2 DM, hypothyroidism, osteoarthritis, admitted May 25 with right upper quadrant abdominal pain.    Patient reports that about a month ago she began having intermittent sharp right upper quadrant abdominal pain with radiation along her right flank.  She has had associated nonbloody diarrhea, which she described as a couple loose stools a day mainly after fatty meal.  Pain seems to be aggravated by eating and has been progressively worsening over the last month.  She denies any nausea, vomiting, fevers, chills, weight loss.  She has never had diarrhea issues in the past.    She denies alcohol use.  She denies any prior history of any knowledge of either elevated liver tests or liver disease.  She thinks her mother may have had some abnormalities with her liver but is unsure about any clear diagnosis.  She had a blood transfusion about 10 years ago.  She denies IV drug use or tattoos.    Labs on admission showed elevated alkaline phosphatase at 188, elevated AST at 54, normal total bilirubin, ALT, lipase.  CBC showed normal white blood cell count, hemoglobin, however platelets were low at 116.    Right upper quadrant ultrasound showed a normal gallbladder without any gallstones, or signs of cholecystitis, bile duct appeared normal in caliber at 4 mm, hepatic steatosis noted.  No other concerning liver lesions.    CT abdomen pelvis with IV contrast showed a nodular appearing surface contour of the liver, a few subcentimeter low attenuating lesions in the liver too small to characterize with  findings concerning for liver cirrhosis, splenomegaly, no ascites.  Gallbladder appeared distended with mild gallbladder wall thickening and pericholecystic stranding suspicious for acute cholecystitis.     She has been placed on IV antibiotics and a HIDA scan has been ordered.  She has not had any diarrhea today.  She remains afebrile and pain has improved.     PAST MEDICAL HISTORY:  Patient Active Problem List    Diagnosis Date Noted     Acute cholecystitis 05/25/2022     Priority: Medium     Obesity (BMI 35.0-39.9) with comorbidity (H) 02/01/2019     Priority: Medium     Atrophic vaginitis 11/07/2018     Priority: Medium     Frequency of urination 11/07/2018     Priority: Medium     Urge incontinence of urine 11/07/2018     Priority: Medium     Urinary urgency 11/07/2018     Priority: Medium     Stress incontinence 11/07/2018     Priority: Medium     Necrotizing soft tissue infection 10/10/2018     Priority: Medium     Type 2 diabetes mellitus without complication, without long-term current use of insulin (H) 03/26/2018     Priority: Medium     Obesity 09/02/2014     Priority: Medium     Problem list name updated by automated process. Provider to review       CARDIOVASCULAR SCREENING; LDL GOAL LESS THAN 100 01/21/2012     Priority: Medium     Hypothyroid      Priority: Medium          ROS: A comprehensive ten point review of systems was negative aside from those in mentioned in the HPI.       MEDICATIONS:   Prior to Admission medications    Medication Sig Start Date End Date Taking? Authorizing Provider   celecoxib (CELEBREX) 200 MG capsule TK 1 C PO D 12/18/18  Yes Reported, Patient   insulin lispro (HUMALOG VIAL) 100 UNIT/ML vial Use with VGo patch device, total daily dose approx 75 units 1/24/22  Yes Kirk Christianson MD   levothyroxine (SYNTHROID/LEVOTHROID) 75 MCG tablet Take 1 tablet (75 mcg) by mouth daily 2/24/22  Yes Kirk Christianson MD   metFORMIN (GLUCOPHAGE-XR) 750 MG 24 hr tablet Take 1 tablet  (750 mg) by mouth 2 times daily (with meals)  Patient taking differently: Take 1,500 mg by mouth every morning 2/24/22  Yes Kirk Christianson MD   omeprazole (PRILOSEC) 10 MG DR capsule Take 10 mg by mouth daily 3/19/16  Yes Reported, Patient   rosuvastatin (CRESTOR) 10 MG tablet Take 10 mg by mouth daily 6/22/21  Yes Reported, Patient   semaglutide (OZEMPIC) 2 MG/1.5ML SOPN pen Inject 0.5 mg weekly 2/24/22  Yes Kirk Christianson MD   thyroid (ARMOUR THYROID) 90 MG tablet TAKE ONE TABLET BY MOUTH EVERY MORNING AS DIRECTED 4/27/22  Yes Kirk Christianson MD   valACYclovir (VALTREX) 500 MG tablet Take 500 mg by mouth daily as needed 1/16/18  Yes Reported, Patient   wearable insulin delivery device (V-GO 40) kit INSULIN DELIVERY DEVICE TO BE CHANGED EVERY 24 HOURS 11/12/21  Yes Kirk Christianson MD   blood glucose (NO BRAND SPECIFIED) test strip Use to test blood sugar 1 times daily or as directed. Glucocard Vital 4/7/20   Kirk Christianson MD   Continuous Blood Gluc  (FREESTYLE DIMITRIOS READER) BEBE 1 Device continuous prn (replace annually if needed) Use to read blood glucose levels per  instructions 10/1/20   Kirk Christianson MD   Continuous Blood Gluc Sensor (FREESTYLE DIMITRIOS 14 DAY SENSOR) MISC APPLY ONE SENSOR TO THE SKIN AND CHANGE EVERY 14 DAYS AS DIRECTED 3/15/22   Kirk Christianson MD   insulin pen needle (B-D U/F) 31G X 5 MM miscellaneous Use 1 pen needles daily or as directed. 4/7/20   Kirk Christianson MD        ALLERGIES:   Allergies   Allergen Reactions     Cephalexin      Morphine      Other reaction(s): Other  Irritability, disorientation     Penicillins Itching     face     Amoxicillin Rash and Itching     Cefprozil Rash     Ceftazidime Itching and Rash     Cefzil Rash     Ciprofloxacin Rash     Ciprofloxacin Itching and Rash     Tolerates levaquin      Percocet [Oxycodone-Acetaminophen] Nausea and Vomiting        SOCIAL HISTORY:  Social History     Tobacco Use     Smoking  "status: Former Smoker     Quit date: 2011     Years since quittin.2     Smokeless tobacco: Never Used   Substance Use Topics     Alcohol use: No     Drug use: No        FAMILY HISTORY:  No family history on file.     PHYSICAL EXAM:   /56 (BP Location: Left arm)   Pulse 60   Temp 97.6  F (36.4  C) (Oral)   Resp 20   Ht 1.626 m (5' 4\")   Wt 105.9 kg (233 lb 8 oz)   SpO2 94%   BMI 40.08 kg/m       PHYSICAL EXAM:  General: alert, oriented, NAD  SKIN: no suspicious lesions, rashes, jaundice, or spider angiomas  HEAD: Normocephalic. No masses, lesions, tenderness or abnormalities  NECK: Neck supple. No adenopathy. Thyroid symmetric, normal size.  EYES: No scleral icterus  ENT: ENT exam normal, no neck nodes or sinus tenderness  RESPIRATORY: negative, Good diaphragmatic excursion. Lungs clear  CARDIOVASCULAR: negative, PMI normal. No lifts, heaves, or thrills. RRR. No murmurs, clicks gallops or rub  GASTROINTESTINAL: +BS, soft, NT, ND, no HSM, no masses/guarding/rebound  JOINT/EXTREMITIES: extremities normal- no gross deformities noted, gait normal and normal muscle tone  NEURO: Reflexes grossly normal and symmetric. Sensation grossly WNL.  PSYCH: no abnormal anxiety/depression  LYMPH: No anterior cervical, posterior cervical, or supraclavicular adenopathy     LABS:  I reviewed the patient's new clinical lab test results.   Recent Labs   Lab Test 2258 22  1545 10/15/18  0627 10/10/18  1715 10/08/18  0000   WBC 5.4 5.2 8.2   < >  --    HGB 15.2 15.1 12.1   < >  --    MCV 97 92 94   < >  --    * 116* 282   < >  --    INR  --   --   --   --  1.5*    < > = values in this interval not displayed.     Recent Labs   Lab Test 22  0658 22  1545 21  1420    138 143   POTASSIUM 3.8 3.5 3.6   CHLORIDE 105 106 108   CO2 24 27 28   BUN 15 15 13   ANIONGAP 6 5 7   JOANN 8.7 8.9 9.2     Recent Labs   Lab Test 22  0658 22  1545 19  0000 19  0938 " 10/12/18  0729   ALBUMIN 2.8* 3.0*  --   --  1.7*   BILITOTAL 1.2 0.9  --   --  0.7   ALT 32 33 15   < > 27   AST 54* 54* 23  --  42   ALKPHOS 181* 188*  --   --  217*   LIPASE  --  237  --   --   --     < > = values in this interval not displayed.        IMAGING  I personally reviewed the patient's new imaging results.    EXAM: CT ABDOMEN PELVIS W CONTRAST  LOCATION: [facility]  DATE/TIME: 5/25/2022 5:56 PM     INDICATION: Right upper quadrant epigastric abdominal pain.  COMPARISON: 02/11/2009  TECHNIQUE: CT scan of the abdomen and pelvis was performed following injection of IV contrast. Multiplanar reformats were obtained. Dose reduction techniques were used.  CONTRAST: 100 mL Isovue 370     FINDINGS:   LOWER CHEST: Normal.     HEPATOBILIARY: Nodular hepatic surface contour. Few subcentimeter low-attenuation lesions, too small to characterize. Distended gallbladder with mild gallbladder wall thickening and pericholecystic stranding. No biliary dilatation.     PANCREAS: Normal.     SPLEEN: Splenomegaly measuring 16.2 cm in craniocaudal dimension.     ADRENAL GLANDS: Normal.     KIDNEYS/BLADDER: Left renal midpole simple cyst measuring 1.1 cm; no follow-up required. Kidneys otherwise appear unremarkable. No hydronephrosis. Urinary bladder appears unremarkable.     BOWEL: Mild colonic diverticulosis. No obstruction or inflammatory change. Normal appendix. No evidence of acute appendicitis.     LYMPH NODES: No lymphadenopathy.     VASCULATURE: Moderate atherosclerotic calcifications. No abdominal aortic aneurysm.     PELVIC ORGANS: Status post hysterectomy.     MUSCULOSKELETAL: Multilevel degenerative changes of the spine. Chronic appearing T12 compression fracture.                                                                      IMPRESSION:   1.  Distended gallbladder with mild gallbladder wall thickening and pericholecystic stranding, suspicious for acute cholecystitis. Consider HIDA as  clinically warranted.     2.  Morphologic changes of cirrhosis. Few subcentimeter hepatic lesions are indeterminate. Given underlying liver disease, consider liver protocol MRI in 3-6 months for further evaluation.     3.  Splenomegaly. No ascites.     US ABDOMEN LIMITED 5/25/2022 4:46 PM     CLINICAL HISTORY: RUQ pain  TECHNIQUE: Limited abdominal ultrasound.     COMPARISON: 2/11/2009     FINDINGS:     GALLBLADDER: The gallbladder is normal. No gallstones, wall  thickening, or pericholecystic fluid. Negative sonographic Grayson's  sign.     BILE DUCTS: There is no biliary dilatation. The common duct measures  4mm.     LIVER: Difficulty in sonographic penetration and visualization the  liver, consistent with hepatic steatosis..     RIGHT KIDNEY: No hydronephrosis.     PANCREAS: The visualized portions of the pancreas are normal.                                                                      IMPRESSION:  1.  Hepatic steatosis which limits sonographic visualization of the  liver.  2.  No gallstones or evidence for cholecystitis. No biliary ductal  dilatation.    CONSULTATION ASSESSMENT AND PLAN:    56 year old female with past medical history significant for type 2 DM, hypothyroidism, osteoarthritis, admitted May 25 with right upper quadrant abdominal pain. Labs show elevated alk phos and AST with CT showing findings concerning for liver cirrhosis, distended gall bladder with pericholecystic stranding concerning for acute cholecystitis, no gall stones.     1. RUQ pain. Presenting symptoms most consistent with biliary colic with possible cholecystitis although no gall stones. Bile ducts appear normal with normal bili therefore not consistent with choledocholithiasis. No signs of gall stone pancreatitis. AST and alk phos stable today. On IV abx. No fevers. WBC normal.   --Surgery consulted and HIDA scan ordered.     2. Abnormal liver imaging. CT scan findings of nodular liver, low platelets, splenomegaly, and  elevated liver tests concerning for liver cirrhosis. Small liver lesions noted. Alk phos is elevated but bile ducts appear normal on imaging. Liver cirrhosis most likely secondary to nonalcoholic fatty liver disease and appears well compensated without ascites. She has risk factors for NAFLD with type 2 DM and central adiposity. No significant alcohol history.   --Will check for underlying causes for liver cirrhosis with hepatitis serologies, autoimmune hepatitis (VADIM, Factin), iron studies (hold off on ferritin as acute phase reactant), ceruloplasmin, and AMA.   --Check INR.   --Follow LFTs.  --Will need follow up imaging on liver lesions with liver MRI 3-6 months.   --Will arrange outpatient liver clinic follow up.     Will discuss further with Dr. Caldera.    Total time spent:  Approximately, 35 minutes was spent providing patient care, including patient evaluation, reviewing documentation/test results, and . Thank you for asking us to participate in the care of this patient.      Emely Jeffers, PAC  [facility]

## 2022-05-27 LAB
ANA PAT SER IF-IMP: ABNORMAL
ANA SER QL IF: POSITIVE
ANA TITR SER IF: ABNORMAL {TITER}
HAV IGM SERPL QL IA: NONREACTIVE
HBV CORE AB SERPL QL IA: NONREACTIVE
HBV SURFACE AB SERPL IA-ACNC: 0.04 M[IU]/ML
HBV SURFACE AG SERPL QL IA: NONREACTIVE
HCV AB SERPL QL IA: NONREACTIVE
MITOCHONDRIA M2 IGG SER-ACNC: 2 U/ML
PATH REPORT.COMMENTS IMP SPEC: NORMAL
PATH REPORT.FINAL DX SPEC: NORMAL
PATH REPORT.MICROSCOPIC SPEC OTHER STN: NORMAL
PATH REPORT.MICROSCOPIC SPEC OTHER STN: NORMAL

## 2022-05-27 PROCEDURE — 85060 BLOOD SMEAR INTERPRETATION: CPT | Performed by: PATHOLOGY

## 2022-05-28 LAB — SMA IGG SER-ACNC: 20 UNITS

## 2022-05-29 LAB — SMOOTH MUSCLE IGG TITR SER: ABNORMAL {TITER}

## 2022-06-02 LAB — CERULOPLASMIN SERPL-MCNC: 29 MG/DL (ref 20–60)

## 2022-06-09 NOTE — DISCHARGE SUMMARY
Physician Discharge Summary           [facility]  Hospitalist Discharge Summary-[facility]    Name: Sudheer Montiel    MRN: [id number]     YOB: 1965    Age: 57 year old                                                     Primary care provider: Beth Cantu    Admit date:  2022    Discharge date and time: 2022  2:19 PM    Discharge Physician: Sameer Briggs M.D., M.B.A.       Primary Discharge Diagnosis      Abdominal pain   Abnormal CT - Liver cirrhosis on CT                         - Splenomegaly   Suspected REYES     Secondary Diagnosis /chronic medical conditions     Past Medical History:   Diagnosis Date     Hypothyroid 80's     Necrotizing fasciitis (H)      Primary osteoarthritis of both knees      Soft tissue infection      Type 2 diabetes mellitus (H)      Past Surgical History:  Past Surgical History:   Procedure Laterality Date      SECTION       COLONOSCOPY N/A 10/21/2015    Procedure: COLONOSCOPY;  Surgeon: Jaky Arredondo MD;  Location:  GI     IRRIGATION AND DEBRIDEMENT ABDOMEN WASHOUT, COMBINED N/A 10/12/2018    Procedure: COMBINED IRRIGATION AND DEBRIDEMENT ABDOMEN WASHOUT;  Irrigation and Debridement of Lower Abdomen, removal of piece of mesh.;  Surgeon: Darlene Hogue MD;  Location: [facility] OR     marisol/bso  2004    due to anemia     ZZC REPAIR CRUCIATE LIGAMENT,KNEE  1986           Brief Summary of Hospital stay :       Please refer to  Admission H&P note  and subsequent progress notes in EMR for full details of patient care.    Reason for Hospitalization(C/C,HPI and brief patient summary):Abdominal pain       Significant findings(Primary diagnosis )Procedures and treatments provided(Hospital course ,consults, procedures):Please see below for details    Sudheer Montiel is a 56 year old female with PMhx significant for Type II DM, hypothyroidism, osteoarthritis, I&D of abdominal abscess, who was admitted on 2022  "after presenting with RUQ abdominal pain concerning for biliary colic.     Pt with intermittent sharp right upper quadrant abdominal pain with associated nausea episodic over the past month.  Associated with greasy foods. Suspicious for early cholecystitis. No visualized stones.  No fever or leukocytosis. Alk phos, AST mildly elevated with normal bilirubin. Distended gallbladder with mild gallbladder wall thickening and pericholecystic stranding by CT. No biliary dilation. RUQ US was negative common duct measuring 4 mm.  - General surgery consultation obtained and patient was seen by Dr Samuels who recommended liver disease evaluation rather than gall bladder disease.Patient was aware of the abnormal findings and recommendations to follow with GI( outpatient [facility] follow up  )as outpatient.She was also seen in house and she was suspected to have REYES cirrhosis based on clinical picture and medical history, which appears well compensated.     Type II DM  Last A1c of 6.6  Taking metformin, Ozempic, continuous short acting insulin via Vgo patch device and the Freestyle Chandni CGM     Hypothyroidism  - resume PTA [place] thyroid medication, levothyroxine    Chronic appearing T12 compression Fracture  Multilevel DGD      Consultations during hospital stay:       SURGERY GENERAL IP CONSULT  GASTROENTEROLOGY IP CONSULT      Patient discharge Condition:     stable    /56 (BP Location: Left arm)   Pulse 60   Temp 97.6  F (36.4  C) (Oral)   Resp 20   Ht 1.626 m (5' 4\")   Wt 105.9 kg (233 lb 8 oz)   SpO2 94%   BMI 40.08 kg/m       Discharge Instructions:       Patient/family instructions: Written discharge instruction given to patient/family    Discharge Medications:       Review of your medicines      START taking      Dose / Directions   acetaminophen 325 MG tablet  Commonly known as: TYLENOL      Dose: 650 mg  Take 2 tablets (650 mg) by mouth every 8 hours as needed for mild pain or other (and " adjunct with moderate or severe pain or per patient request)  Refills: 0        CONTINUE these medicines which may have CHANGED, or have new prescriptions. If we are uncertain of the size of tablets/capsules you have at home, strength may be listed as something that might have changed.      Dose / Directions   metFORMIN 750 MG 24 hr tablet  Commonly known as: GLUCOPHAGE-XR  This may have changed:     how much to take    when to take this  Used for: Type 2 diabetes mellitus without complication, without long-term current use of insulin (H)      Dose: 750 mg  Take 1 tablet (750 mg) by mouth 2 times daily (with meals)  Quantity: 180 tablet  Refills: 1        CONTINUE these medicines which have NOT CHANGED      Dose / Directions   Kayode Thyroid 90 MG tablet  Used for: Acquired hypothyroidism  Generic drug: thyroid      TAKE ONE TABLET BY MOUTH EVERY MORNING AS DIRECTED  Quantity: 30 tablet  Refills: 1     B-D U/F 31G X 5 MM miscellaneous  Used for: Type 2 diabetes mellitus without complication, without long-term current use of insulin (H)  Generic drug: insulin pen needle      Use 1 pen needles daily or as directed.  Quantity: 100 each  Refills: 3     blood glucose test strip  Commonly known as: NO BRAND SPECIFIED  Used for: Type 2 diabetes mellitus without complication, without long-term current use of insulin (H)      Use to test blood sugar 1 times daily or as directed. Glucocard Vital  Quantity: 200 strip  Refills: 3     celecoxib 200 MG capsule  Commonly known as: celeBREX      TK 1 C PO D  Refills: 1     FreeStyle Chandni 14 Day Sensor Misc  Used for: Uncontrolled type 2 diabetes mellitus with hyperglycemia (H)      APPLY ONE SENSOR TO THE SKIN AND CHANGE EVERY 14 DAYS AS DIRECTED  Quantity: 2 each  Refills: 1     FreeStyle Chandni [place] Arleen  Used for: Type 2 diabetes mellitus without complication, without long-term current use of insulin (H)      Dose: 1 Device  1 Device continuous prn (replace annually if needed)  Use to read blood glucose levels per  instructions  Quantity: 1 Device  Refills: 0     insulin lispro 100 UNIT/ML vial  Commonly known as: HumaLOG VIAL  Used for: Type 2 diabetes mellitus without complication, without long-term current use of insulin (H)      Use with VGo patch device, total daily dose approx 75 units  Quantity: 30 mL  Refills: 5     levothyroxine 75 MCG tablet  Commonly known as: SYNTHROID/LEVOTHROID  Used for: Acquired hypothyroidism      Dose: 75 mcg  Take 1 tablet (75 mcg) by mouth daily  Quantity: 30 tablet  Refills: 1     omeprazole 10 MG DR capsule  Commonly known as: priLOSEC      Dose: 10 mg  Take 10 mg by mouth daily  Refills: 0     semaglutide 2 MG/1.5ML Sopn pen  Commonly known as: OZEMPIC  Used for: Type 2 diabetes mellitus without complication, without long-term current use of insulin (H)      Inject 0.5 mg weekly  Quantity: 4.5 mL  Refills: 1     valACYclovir 500 MG tablet  Commonly known as: VALTREX      Dose: 500 mg  Take 500 mg by mouth daily as needed  Refills: 0     wearable insulin delivery device kit  Used for: Type 2 diabetes mellitus without complication, without long-term current use of insulin (H)      INSULIN DELIVERY DEVICE TO BE CHANGED EVERY 24 HOURS  Quantity: 30 each  Refills: 5        STOP taking    rosuvastatin 10 MG tablet  Commonly known as: CRESTOR                Discharge diet:Orders Placed This Encounter      Diet    diabetic diet      Discharge activity:Activity as tolerated      Discharge follow-up:    Follow up with primary care provider in 7 days or earlier if symptoms return or gets worse.    Follow up with consultant as instructed  with GI in next two weeks       Other instructions:    We discussed with patient/family about detail discharge instructions as well as discharge medications above including potential risks,side effects and benefits.Patient/family understood benefits and potential serious side effects of taking these medications and  need to follow up with PCP if the patient develops complications.  Patient is also advised to see a doctor immediately for severe symptoms.        Major procedure performed/  Significant Diagnostic Studies:     Results for orders placed or performed during the hospital encounter of 05/25/22   US Abdomen Limited (RUQ)    Narrative    US ABDOMEN LIMITED 5/25/2022 4:46 PM    CLINICAL HISTORY: RUQ pain  TECHNIQUE: Limited abdominal ultrasound.    COMPARISON: 2/11/2009    FINDINGS:    GALLBLADDER: The gallbladder is normal. No gallstones, wall  thickening, or pericholecystic fluid. Negative sonographic Grayson's  sign.    BILE DUCTS: There is no biliary dilatation. The common duct measures  4mm.    LIVER: Difficulty in sonographic penetration and visualization the  liver, consistent with hepatic steatosis..    RIGHT KIDNEY: No hydronephrosis.    PANCREAS: The visualized portions of the pancreas are normal.      Impression    IMPRESSION:  1.  Hepatic steatosis which limits sonographic visualization of the  liver.  2.  No gallstones or evidence for cholecystitis. No biliary ductal  dilatation.    JENNYFER ROCA MD         SYSTEM ID:  [id number]   CT Abdomen Pelvis w Contrast     Value    Radiologist flags Few subcentimeter indeterminate hepatic lesions    Narrative    EXAM: CT ABDOMEN PELVIS W CONTRAST  LOCATION: [facility]  DATE/TIME: 5/25/2022 5:56 PM    INDICATION: Right upper quadrant epigastric abdominal pain.  COMPARISON: 02/11/2009  TECHNIQUE: CT scan of the abdomen and pelvis was performed following injection of IV contrast. Multiplanar reformats were obtained. Dose reduction techniques were used.  CONTRAST: 100 mL Isovue 370    FINDINGS:   LOWER CHEST: Normal.    HEPATOBILIARY: Nodular hepatic surface contour. Few subcentimeter low-attenuation lesions, too small to characterize. Distended gallbladder with mild gallbladder wall thickening and pericholecystic stranding. No biliary  dilatation.    PANCREAS: Normal.    SPLEEN: Splenomegaly measuring 16.2 cm in craniocaudal dimension.    ADRENAL GLANDS: Normal.    KIDNEYS/BLADDER: Left renal midpole simple cyst measuring 1.1 cm; no follow-up required. Kidneys otherwise appear unremarkable. No hydronephrosis. Urinary bladder appears unremarkable.    BOWEL: Mild colonic diverticulosis. No obstruction or inflammatory change. Normal appendix. No evidence of acute appendicitis.    LYMPH NODES: No lymphadenopathy.    VASCULATURE: Moderate atherosclerotic calcifications. No abdominal aortic aneurysm.    PELVIC ORGANS: Status post hysterectomy.    MUSCULOSKELETAL: Multilevel degenerative changes of the spine. Chronic appearing T12 compression fracture.      Impression    IMPRESSION:   1.  Distended gallbladder with mild gallbladder wall thickening and pericholecystic stranding, suspicious for acute cholecystitis. Consider HIDA as clinically warranted.    2.  Morphologic changes of cirrhosis. Few subcentimeter hepatic lesions are indeterminate. Given underlying liver disease, consider liver protocol MRI in 3-6 months for further evaluation.    3.  Splenomegaly. No ascites.      [Consider Follow Up: Few subcentimeter indeterminate hepatic lesions]    This report will be copied to the [facility] to ensure a provider acknowledges the finding.    NM Hepatobiliary Scan w GB EF    Narrative    NUCLEAR MEDICINE HEPATOBILIARY SCAN WITH GALLBLADDER EJECTION FRACTION  5/26/2022 11:41 AM     HISTORY: Abdominal pain, upper, chronic, assess gallbladder motility.  Pain.    COMPARISON: None.    TECHNIQUE: The patient received Tc-99m mebrofenin intravenously.  Cholecystokinin injected to evaluate the gallbladder ejection  fraction.    MEDICATIONS AND/OR DOSE: 6.5mCi Tc99m Mebrofenin. 2.1mcg CCK.    FINDINGS: There is prompt clearance of the radionuclide from the blood  pool into the liver. Promptly there is clear visualization of the  intrahepatic ducts as  well as the upper common bile duct. Within the  normal time frame there is visualization of the gallbladder, distal  common bile duct, and the small bowel.     The gallbladder ejection fraction is  22%, abnormally low.      Impression    IMPRESSION: Normal hepatobiliary scan without evidence for acute  cholecystitis. Abnormal low gallbladder ejection fraction suggestive  of biliary dyskinesia.    PRABHAKAR ZACARIAS MD         SYSTEM ID:  [id number]       No results for input(s): WBC, HGB, HCT, MCV, PLT in the last 168 hours.  No results for input(s): CULT in the last 168 hours.  No results for input(s): NA, POTASSIUM, CHLORIDE, CO2, ANIONGAP, GLC, BUN, CR, GFRESTIMATED, GFRESTBLACK, JOANN, MAG, PHOS, PROTTOTAL, ALBUMIN, BILITOTAL, ALKPHOS, AST, ALT in the last 168 hours.        Pending Results:       Unresulted Labs Ordered in the Past 30 Days of this Admission     No orders found from 4/25/2022 to 5/26/2022.             Patient Allergies:       Allergies   Allergen Reactions     Cephalexin      Morphine      Other reaction(s): Other  Irritability, disorientation     Penicillins Itching     face     Amoxicillin Rash and Itching     Cefprozil Rash     Ceftazidime Itching and Rash     Cefzil Rash     Ciprofloxacin Rash     Ciprofloxacin Itching and Rash     Tolerates levaquin      Percocet [Oxycodone-Acetaminophen] Nausea and Vomiting         Disposition:     Disposition: home        I saw and evaluated the patient on day of discharge and  discharge instructions reviewed  and  all the patient's questions and concerns addressed. Over 30 minutes spent on discharge and coordination of discharge process for this patient.      Disclaimer: This note consists of symbols derived from keyboarding, dictation and/or voice recognition software. As a result, there may be errors in the script that have gone undetected. Please consider this when interpreting information found in this chart

## 2022-06-15 ENCOUNTER — TRANSFERRED RECORDS (OUTPATIENT)
Dept: HEALTH INFORMATION MANAGEMENT | Facility: CLINIC | Age: 57
End: 2022-06-15

## 2022-06-16 ENCOUNTER — PATIENT OUTREACH (OUTPATIENT)
Dept: CARE COORDINATION | Facility: CLINIC | Age: 57
End: 2022-06-16

## 2022-06-16 DIAGNOSIS — Z65.9 PSYCHOSOCIAL PROBLEM: Primary | ICD-10-CM

## 2022-06-16 NOTE — LETTER
[facility]        June 29, 2022    Sudheer Montiel  [address]  [place] [address]   [phone]          Dear Sudheer,    I am a clinic care coordinator who works with Beth Cantu PA-C at [facility]. I wanted to thank you for spending the time to talk with me.  Below is a description of clinic care coordination and how I can further assist you.     The goal of clinic care coordination is to help you manage your health and improve access to the health care system in the most efficient manner. The team can assist you in meeting your health care goals by providing education, coordinating services, strengthening the communication among your providers  and supporting you with any resource needs.    Please feel free to contact me at [phone], with any questions or concerns. We at [facility] are focused on providing you with the highest-quality healthcare experience possible and that all starts with you.     Sincerely,     AG[facility] Sunshine

## 2022-06-16 NOTE — LETTER
Patient Centered Plan of Care  About Me:        Patient Name:  Sudheer Montiel    YOB: 1965  Age:         57 year old   [facility] MRN:    [id number] Telephone Information:  Home Phone [phone]   Mobile [phone]   Home Phone [phone]       Address:  [address] [place] [address] [place] [address] Email address:  [email or web address]      Emergency Contact(s)    Name Relationship Lgl Grd Work Phone Home Phone Mobile Phone   1. FERNANDO RAMIREZ Daughter No  [phone]    2. IMANI FERREIRA Mother   [phone]   3. DEEPIKA MARCELINO* Other   [phone]   4. PAUL RAMIREZ Other   [phone]           Primary language:  English     needed? No   [facility] [employer]:  [phone] op. 1  Other communication barriers:None    Preferred Method of Communication:  Phone  Current living arrangement: I live in a private home    Mobility Status/ Medical Equipment: Independent        Health Maintenance  Health Maintenance Reviewed: Up to date      My Access Plan  Medical Emergency 911   Primary Clinic Line [facility] - [phone]   24 Hour Appointment Line [phone] or  [phone]) (toll-free)   24 Hour Nurse Line [phone] (toll-free)   Preferred Urgent Care Other     Preferred Hospital [facility]  [phone]     Preferred Pharmacy [facility] [place], [place] - [address], [address]     Behavioral Health Crisis Line The National Suicide Prevention Lifeline at 1-936.398.7915 or 911             My Care Team Members  Patient Care Team       Relationship Specialty Notifications Start End    Beth Cantu PA-C PCP - General Physician Assistant  10/10/18     Per pt.    Phone: [phone] Fax: [phone]         [facility] [address] [place] [address]    Renate Conner, RD Diabetes Educator Dietitian, Registered  9/25/19      Phone: [phone] Fax: [phone]         [facility] [address] [place] [address]    Kirk Christianson MD Assigned Endocrinology Provider   10/23/20     Phone: [phone] Fax: [phone] [address] [place] [address]    Neelam Hu LSW Lead Care Coordinator   6/16/22     Phone: [phone]                 My Care Plans  Self Management and Treatment Plan  Goals and (Comments)   Goals        General     1. Psychosocial (pt-stated)      Notes - Note created  6/29/2022  8:46 AM by Neelam Hu LSW     Goal Statement: I, along with my family, will access community resources over the next 6 months to ensure my wellbeing.   Date Goal set: 6/16/22  Barriers: unsure what benefits/services I may qualify for  Strengths: supportive family  Date to Achieve By: 12/22  Patient expressed understanding of goal: yes  Action steps to achieve this goal:  1. SW will email resources  2. I, and my family, will review and apply for benefits as able  3. SW will follow up monthly for check in                Action Plans on File:                       Advance Care Plans/Directives Type:   No data recorded    My Medical and Care Information  Problem List   Patient Active Problem List   Diagnosis     Hypothyroid     CARDIOVASCULAR SCREENING; LDL GOAL LESS THAN 100     Obesity     Type 2 diabetes mellitus without complication, without long-term current use of insulin (H)     Necrotizing soft tissue infection     Atrophic vaginitis     Frequency of urination     Urge incontinence of urine     Urinary urgency     Stress incontinence     Obesity (BMI 35.0-39.9) with comorbidity (H)     Acute cholecystitis      Current Medications and Allergies:  See printed Medication Report.    Care Coordination Start Date: No linked episodes   Frequency of Care Coordination: monthly     Form Last Updated: 06/29/2022

## 2022-06-23 ENCOUNTER — OFFICE VISIT (OUTPATIENT)
Dept: ENDOCRINOLOGY | Facility: CLINIC | Age: 57
End: 2022-06-23
Payer: COMMERCIAL

## 2022-06-23 VITALS
SYSTOLIC BLOOD PRESSURE: 136 MMHG | DIASTOLIC BLOOD PRESSURE: 81 MMHG | HEART RATE: 73 BPM | WEIGHT: 234.2 LBS | BODY MASS INDEX: 40.2 KG/M2

## 2022-06-23 DIAGNOSIS — E11.9 TYPE 2 DIABETES MELLITUS WITHOUT COMPLICATION, WITHOUT LONG-TERM CURRENT USE OF INSULIN (H): Primary | ICD-10-CM

## 2022-06-23 DIAGNOSIS — E03.9 ACQUIRED HYPOTHYROIDISM: ICD-10-CM

## 2022-06-23 DIAGNOSIS — E66.01 MORBID OBESITY (H): ICD-10-CM

## 2022-06-23 PROCEDURE — 99214 OFFICE O/P EST MOD 30 MIN: CPT | Performed by: INTERNAL MEDICINE

## 2022-06-23 RX ORDER — THYROID 60 MG/1
TABLET ORAL
Qty: 30 TABLET | Refills: 5 | Status: SHIPPED | OUTPATIENT
Start: 2022-06-23 | End: 2023-02-14

## 2022-06-23 RX ORDER — LEVOTHYROXINE SODIUM 88 UG/1
88 TABLET ORAL DAILY
Qty: 30 TABLET | Refills: 5 | Status: SHIPPED | OUTPATIENT
Start: 2022-06-23 | End: 2023-02-14

## 2022-06-23 NOTE — LETTER
6/23/2022         RE: Sudheer Montiel  [address]  [place] [address]        Dear Colleague,    Thank you for referring your patient, Sudheer Montiel, to the [facility] [place]. Please see a copy of my visit note below.        Recent issues:  Followup diabetes and thyroid evaluation, with daughter Jessie  Recent health issues with right abdomen pain, some skin itching, then hospitalized at [facility] 5/2022,    She reports being diagnosed with liver problem and suspicion for PBC, GI eval with Dr. Zuniga    Had head MRI, plan for future abdomen MRI and liver biopsy  Using the metformin, Ozempic, VGo pod device with Novolog and the Freestyle Chandni sensor  Taking the [place] thyroid and levothyroxine meds regularly  No longer employed, loses insurance 7/1/22?           Diabetes:  ~4/2010. Initial diagnosis approx age 44  ...Has taken several DM meds including metformin, Byetta, glimeparide, Invokana, and Victoza   In 2018, she had been taking metformin, Victoza, glimeparide, and Jardiance  She stopped Jardiance, also ran out of Victoza last year  10/2018. Abdominal abcess illness, hospitalizations at [facility] and then [facility] hospital              2-surgeries for abcess, thought related to the hysterectomy bladder sling mesh from prior surgery     Previously taking metformin 750 mg BID, glimeparide 4 mg every day, Tresiba 40U every day   10/2020. Changed to VGo40 pods management  Continue low dose glimeparide, but low SG levels and glimeparide discontinued, then restarted   Current DM meds:  Novolog in VGo40      Small carb meal 1-2 clicks      Large carb meal 2 clicks    Novolog    sscale:  -250  1-click      -300  2 clicks  Metformin 750 mg 2-tabs in morning  Ozempic 0.5 mg  Subcutaneous weekly    Has Glucocard Vital BG meter              Infrequent BG testing  10/2020. Started Freestyle Chandni CGM with [place]  Recent Chandni data: none available    Recent [facility] labs  include:  7/26/19 hgbA1c 9.3%, t.chol 195, , TSH 0.06, FT4 1.46, FT3 8.7, Cr 0.77  8/26/20 hgbA1c 11.4%  10/15/21 hgbA1c 6.6%, LDL 60 mg/dl     Recent [facility] labs include:  Lab Results   Component Value Date    A1C 7.2 (H) 01/05/2021     05/26/2022    POTASSIUM 3.8 05/26/2022    CHLORIDE 105 05/26/2022    CO2 24 05/26/2022    ANIONGAP 6 05/26/2022     (H) 05/26/2022    BUN 15 05/26/2022    CR 0.57 05/26/2022    GFRESTIMATED >90 05/26/2022    GFRESTBLACK >90 01/05/2021    JOANN 8.7 05/26/2022    CHOL 151 01/05/2021    TRIG 194 (A) 10/15/2021    HDL 41 (L) 01/05/2021    LDL 76 01/05/2021    NHDL 110 01/05/2021    UCRR 86 01/05/2021    MICROL 19 01/05/2021    UMALCR 22.31 01/05/2021     Last eye exam 2018?, results not available  DM Complications:  None known        Thyroid:  Had taken Synthroid  Changed to [place] thyroid medication, then [place] + levothyroxine combo dose plan  Had taken [place] thyroid 2-tablets daily... Possibly 60 mg 2-tabs QD  Early-mid 3/2018. Ran out of [place] thyroid medication  Previously took [place] thyroid 90 mg 2-tabs daily and low dose levothyroxine 0.025 mg daily  8/2019. Changed to lower dose [place] and higher dose levothyroxine  Subsequent dose reductions with levothyroxine medication  Mid 12/2021 to early 1/2022. 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 Frantz Guillen?/[facility]       PMH/PSH:  Past Medical History:   Diagnosis Date     Hypothyroid  80's     Necrotizing fasciitis (H)      Primary osteoarthritis of both knees      Soft tissue infection      Type 2 diabetes mellitus (H)      Past Surgical History:   Procedure Laterality Date      SECTION       COLONOSCOPY N/A 10/21/2015    Procedure: COLONOSCOPY;  Surgeon: Jaky Arredondo MD;  Location:  GI     IRRIGATION AND DEBRIDEMENT ABDOMEN WASHOUT, COMBINED N/A 10/12/2018    Procedure: COMBINED IRRIGATION AND DEBRIDEMENT ABDOMEN WASHOUT;  Irrigation and Debridement of Lower Abdomen, removal of piece of mesh.;  Surgeon: Darlene Hogue MD;  Location: [facility] OR     marisol/bso      due to anemia     ZZC REPAIR CRUCIATE LIGAMENT,KNEE         Family Hx:  No family history on file.      Social Hx:  Social History     Socioeconomic History     Marital status:      Spouse name: Not on file     Number of children: 2     Years of education: Not on file     Highest education level: Not on file   Occupational History     Occupation: surgery scheduler [employer]   Tobacco Use     Smoking status: Former Smoker     Quit date: 2011     Years since quittin.3     Smokeless tobacco: Never Used   Substance and Sexual Activity     Alcohol use: No     Drug use: No     Sexual activity: Not on file   Other Topics Concern     Not on file   Social History Narrative     Not on file     Social Determinants of Health     Financial Resource Strain: Not on file   Food Insecurity: Not on file   Transportation Needs: Not on file   Physical Activity: Not on file   Stress: Not on file   Social Connections: Not on file   Intimate Partner Violence: Not on file   Housing Stability: Not on file          MEDICATIONS:  has a current medication list which includes the following prescription(s): celecoxib, insulin lispro, levothyroxine, metformin, omeprazole, semaglutide, thyroid, valacyclovir, wearable insulin delivery device, acetaminophen, blood glucose, freestyle evelyn reader, freestyle  evelyn 14 day sensor, b-d u/f, and [DISCONTINUED] rosuvastatin.     ROS: 10 point ROS neg other than the symptoms noted above in the HPI.     GENERAL: some fatigue, wt loss?; denies fevers, chills, night sweats.   HEENT:  no dysphagia, odonophagia, diplopia, neck pain  THYROID:  no apparent hyper or hypothyroid symptoms  CV:  some palpitations; denies chest pain, pressure  LUNGS:  denies SOB, dyspnea, cough, wheezing  ABDOMEN:  Intermittent right abdomen pains; denies diarrhea, indigestion, constipation  EXTREMITIES: no rashes, ulcers, edema  NEUROLOGY: forgetfulness; no headaches, denies changes in vision, tingling, extremitiy numbness   MSK: knee and some low back pain; no muscle aches or pains, weakness  SKIN: no rashes or lesions  : no increased thirst and urination, nocturia; no menses since hysterectomy ~age 49  PSYCH:  stable mood, no significant anxiety or depression  ENDOCRINE: no heat or cold intolerance      Physical Exam   VS: /81   Pulse 73   Wt 106.2 kg (234 lb 3.2 oz)   BMI 40.20 kg/m    GENERAL: AXOX3, NAD, well dressed, answering questions appropriately, appears stated age.  ENT: no nose swelling or nasal discharge, mouth redness or gum changes.  EYES: eyes grossly normal to inspection, conjunctivae and sclerae normal, no exophthalmos or proptosis  THYROID:  no apparent nodules or goiter  LUNGS: no audible wheeze, cough or visible cyanosis, or increased work of breathing  ABDOMEN: abdomen size  EXTREMITIES: no edema noted  NEUROLOGY: CN grossly intact, no tremors  MSK: grossly intact  SKIN:  no apparent skin lesions, rash, or edema with visualized skin appearance  PSYCH: mentation appears normal, affect normal/bright, judgement and insight intact,   normal speech and appearance well groomed     LABS:     All pertinent notes, labs, and images personally reviewed by me.      A/P:  Encounter Diagnoses   Name Primary?     Type 2 diabetes mellitus without complication, without long-term current  use of insulin (H) Yes     Acquired hypothyroidism      Morbid obesity (H)        Comments:  Reviewed health issues, diabetes and thyroid management.  Difficult to assess glycemic control without BGM or SG data  Reviewed recent available lab results from [facility] clinic  Reviewed and interpreted tests that I previously ordered.   Ordered appropriate tests for the endocrinology disease management.    Management options discussed and implemented after shared medical decision making with the patient.  T2DM and hypothyroidism problems are chronic-stable    Plan:  Discussed general issues with the diabetes diagnosis and management  We discussed the hgbA1c test which reflects previous overall glucose levels or control  Discussed the importance of blood glucose (BG) testing to assess glucose trends  Provided general overview of the diabetes medication options and medication treatment plan  Reviewed recent Chandni CGM glucose trend data, in detail.    Recommend:  Continue the current VGo40, metformin, and Ozempic med plan at this time  We have reviewed dosing options using the VGo clicks for small and large carb meals  Consider change to the OmniPod pump alternative, if affordable  Goal target -150 mg/dl  Contact me if cost issues with Novolog, VGo, Ozempic, or other endo meds  Continue use of the 14-day Freestyle Chandni CGM sensor    Scan sensor 3-4x/day... more often    Use SG value for final decision on mealtime VGo clicks  Lab testing:    No lab tests ordered at this time    Needs repeat thyroid and diabetes lab testing in 8/2022 or 9/2022  Keep focus on diet, exercise, weight management.  Change to NP-Thyroid 60 mg daily and levothyroxine 88 daily dose plan    Sent updated Rx's to [facility]  Needs f/u on the mild hypercholesterolemia, consider adding statin medication for treatment  Keep focus on diet, exercise, weight management.  Advise having fasting lipid panel testing and dilated eye examination, at  least annually    Addressed patient questions today     There are no Patient Instructions on file for this visit.    Future labs ordered today: No orders of the defined types were placed in this encounter.    Radiology/Consults ordered today: None    Total time spent in with the patient evaluation:  25 min  Additional time spent reviewing pertinent lab tests and chart notes, and documentation:  10 min    Follow-up:  10/2022    JOJO Christianson MD, MS  [facility]                                            Again, thank you for allowing me to participate in the care of your patient.        Sincerely,        Kirk Christianson MD

## 2022-06-24 ENCOUNTER — TRANSFERRED RECORDS (OUTPATIENT)
Dept: HEALTH INFORMATION MANAGEMENT | Facility: CLINIC | Age: 57
End: 2022-06-24

## 2022-06-27 ENCOUNTER — MEDICAL CORRESPONDENCE (OUTPATIENT)
Dept: HEALTH INFORMATION MANAGEMENT | Facility: CLINIC | Age: 57
End: 2022-06-27

## 2022-06-27 ENCOUNTER — TRANSFERRED RECORDS (OUTPATIENT)
Dept: HEALTH INFORMATION MANAGEMENT | Facility: CLINIC | Age: 57
End: 2022-06-27

## 2022-06-28 ENCOUNTER — TRANSCRIBE ORDERS (OUTPATIENT)
Dept: OTHER | Age: 57
End: 2022-06-28

## 2022-06-28 DIAGNOSIS — R90.89 ABNORMAL BRAIN MRI: ICD-10-CM

## 2022-06-28 DIAGNOSIS — R41.3 MEMORY LOSS: Primary | ICD-10-CM

## 2022-06-29 NOTE — PROGRESS NOTES
Clinic Care Coordination Contact    Clinic Care Coordination Contact  OUTREACH    Referral Information:  Referral Source: PCP  Primary Diagnosis: Psychosocial - pt recently lost her job due to performance, however, she is also struggling with memory issues. PCP requested EMILY CLARK outreach to pt's daughter with resources for insurance following the loss of pt's job, as well as assess for any additional needs.    Chief Complaint   Patient presents with     Clinic Care Coordination - Initial        Universal Utilization:   Clinic Utilization: Primary Care with [facility].  Difficulty keeping appointments: No  Compliance Concerns: No  No-Show Concerns: No  No PCP office visit in Past Year: No  Utilization    Hospital Admissions  1             ED Visits  1             No Show Count (past year)  0                Current as of: 6/24/2022  3:48 AM            Clinical Concerns:  Current Medical Concerns:  Pt was recently hospitalized at [facility] with a diagnosis of acute cholecystitis. Pt also has a past medical history significant for Type II DM, hypothyroidism, osteoarthritis as well as others. Pt also recently reporting concerns of memory loss to PCP. Pt did not have symptoms but found to have UTI at PCP visit. PCP will treat but also feels an MRI and follow up on needed.     Current Behavioral Concerns: No specific concerns reported, however, per PCP and pt's daughter, pt lost her job due to performance issues likely tied to her new onset memory loss.     Education Provided to patient: EMILY CLARK spoke with pt's daughter. Identified role of EMILY CLARK and provided resources.     Medication Management:  Medication review status: Medications reviewed and no changes reported per patient.           Functional Status:  Dependent ADLs: Independent  Bed or wheelchair confined: No  Mobility Status: Independent    Living Situation:  Current living arrangement: I live in a private home    Lifestyle &  Psychosocial Needs:    Social Determinants of Health     Tobacco Use: Medium Risk     Smoking Tobacco Use: Former Smoker     Smokeless Tobacco Use: Never Used   Alcohol Use: Not on file   Financial Resource Strain: Not on file   Food Insecurity: Not on file   Transportation Needs: Not on file   Physical Activity: Not on file   Stress: Not on file   Social Connections: Not on file   Intimate Partner Violence: Not on file   Depression: Not on file   Housing Stability: Not on file        Transportation means: Regular car     Informal Support system: Family        Resources and Interventions:  Current Resources: EMILY CC discussed resources with pt's daughter Jessie. EMILY CC offered to send an email to Jessie with options.   Hi Jessie,     Thanks so much for talking with me today and hopefully some of these resources will be a good starting place for your Mom.     MNSure Application - [facility] / [facility]    [facility] - Making health care services possible for all [facility] Benefits Application - Landing Page ([place].gov)  (presentation with instructions/info - How To Apply For Benefits ([place]olph.[place].us)    Social Security Disability Info - Apply Online for Disability Benefits (ssa.gov)      Community resources that may help with more immediate financial needs -   Community Resources - [employer] ([employer])  [employer] -  [employer] in [place]  United [facility] [employer]      Please feel free to call or email with any questions!   ThanksNeelam   Social Work Care Coordinator   [phone]    Supplies Currently Used at Home: None  Equipment Currently Used at Home: none  Employment Status: unemployed     Referrals Placed: Community Resources       Goals:    Goals        General     1. Psychosocial (pt-stated)      Notes - Note created  6/29/2022  8:46 AM by Neelam Hu, ANTOINETTEW     Goal Statement: I, along with my family, will access community resources  over the next 6 months to ensure my wellbeing.   Date Goal set: 6/16/22  Barriers: unsure what benefits/services I may qualify for  Strengths: supportive family  Date to Achieve By: 12/22  Patient expressed understanding of goal: yes  Action steps to achieve this goal:  1. SW will email resources  2. I, and my family, will review and apply for benefits as able  3. SW will follow up monthly for check in               Patient/Caregiver understanding: Pt's daughter reports understanding and denies any additional questions or concerns at this times. SW CC engaged in AIDET communication during encounter.    Outreach Frequency: monthly      Plan: Pt's daughter, Jessie, will review resources with pt and family. They will reach out to options as needed. SW will follow up in 2-3 weeks for check in.     AGA Mijares   Social Work Care Coordinator  [phone]

## 2022-06-30 DIAGNOSIS — E11.9 TYPE 2 DIABETES MELLITUS WITHOUT COMPLICATION, WITHOUT LONG-TERM CURRENT USE OF INSULIN (H): ICD-10-CM

## 2022-07-01 NOTE — TELEPHONE ENCOUNTER
Last Written Prescription Date:  11/12/2021  Last Fill Quantity: 30,  # refills: 5   Last office visit: 6/23/2022 with prescribing provider:  Dr. Christianson    Future Office Visit:

## 2022-07-07 ENCOUNTER — PRE VISIT (OUTPATIENT)
Dept: NEUROLOGY | Facility: CLINIC | Age: 57
End: 2022-07-07

## 2022-07-07 ENCOUNTER — OFFICE VISIT (OUTPATIENT)
Dept: NEUROLOGY | Facility: CLINIC | Age: 57
End: 2022-07-07
Attending: PSYCHIATRY & NEUROLOGY
Payer: MEDICAID

## 2022-07-07 ENCOUNTER — TELEPHONE (OUTPATIENT)
Dept: NEUROLOGY | Facility: CLINIC | Age: 57
End: 2022-07-07

## 2022-07-07 VITALS
OXYGEN SATURATION: 100 % | HEIGHT: 64 IN | BODY MASS INDEX: 39.91 KG/M2 | WEIGHT: 233.8 LBS | HEART RATE: 86 BPM | SYSTOLIC BLOOD PRESSURE: 157 MMHG | DIASTOLIC BLOOD PRESSURE: 74 MMHG

## 2022-07-07 DIAGNOSIS — R41.89 COGNITIVE IMPAIRMENT: ICD-10-CM

## 2022-07-07 DIAGNOSIS — R90.82 WHITE MATTER ABNORMALITY ON MRI OF BRAIN: Primary | ICD-10-CM

## 2022-07-07 DIAGNOSIS — I63.9 SUBCORTICAL INFARCTION (H): ICD-10-CM

## 2022-07-07 PROCEDURE — 99205 OFFICE O/P NEW HI 60 MIN: CPT | Performed by: PSYCHIATRY & NEUROLOGY

## 2022-07-07 PROCEDURE — G0463 HOSPITAL OUTPT CLINIC VISIT: HCPCS

## 2022-07-07 ASSESSMENT — PAIN SCALES - GENERAL: PAINLEVEL: NO PAIN (0)

## 2022-07-07 NOTE — TELEPHONE ENCOUNTER
[facility] Call Center    Phone Message    May a detailed message be left on voicemail: yes     Reason for Call: Other: patient called to say someone from clinic called her today to offer appt with Dr. Rosario for today. Writer did no see anything in the chart notes. She has an upcoming appt with Dr. Moore in November. If someone did call, please contact patient again for sooner appt.     Action Taken: Message routed to:  [facility] neurology    Travel Screening: Not Applicable

## 2022-07-07 NOTE — LETTER
"7/7/2022       RE: Sudheer Montiel  [address] [address]     Dear Ms. Cantu,    Thank you for referring your patient, Sudheer Montiel, [facility]. Please see a copy of my visit note below.    Referral source:  Beth Cantu PA-C  [address], [address]  [place] [address]     Chief complaint: Memory loss and abnormal MRI scan of the brain.    History of the Present Illness: Ms. Sudheer Montiel is a 57-year-old right-handed woman who is evaluated in the Multiple Sclerosis Clinic today for an opinion regarding the etiology of the above issues.  It was suggested that the patient be seen in this clinic after a recent MRI was interpreted as suggesting demyelinating disease.    The patient's history, however, is primarily that of cognitive impairment.  The patient is accompanied by her daughter today, who provides collateral history.      Cognitively, a year ago her daughter relates that she would have perceived the patient's cognitive status as normal.  However, over the past several months, she has developed problems with forgetfulness and change in personality.  Her daughter relates that she has forgotten \"whole conversations.\" She seems more timid and scared than previously.  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  She was apparently forgetting to complete tasks.  This worsened to a point that she was actually dismissed from her job last month.    The patient's daughter notes that her mental status seems to fluctuate, at times seeming relatively normal and at other times clearly impaired.    I should also note that the patient was recently found to have imaging findings suggestive of cirrhosis of the liver.  She is being seen in an outside GI clinic, and the possibility of primary " biliary cirrhosis has been raised, apparently.  Nonalcoholic steatohepatitis was also felt to be within the differential diagnosis, from what I can tell.    Because of her cognitive symptoms, the patient's primary care provider ordered MRI scans of the brain that were completed initially on 2022 with contrast studies added on 2022.  I did review the images from these studies and the following is my independent interpretation of these images.  There are 3 discrete lesions in the bilateral frontal white matter with T2 hyperintensity surrounding a rim of T2 and T1 hypointensity, that to my eye appear at or near isointense to CSF.  These lesions do not directly contact the ventricular surface.  One of the lesions in the right posterior frontal lobe is bright on diffusion weighted sequences, although does not have corresponding signal abnormality on ADC map.  There may be subtle contrast enhancement of this lesion as well.  I find these lesions most suggestive of subcortical subacute to chronic cerebral infarctions of likely somewhat varying ages.      On neurologic review of systems, the patient denies any history of optic neuritis, transverse myelitis or other episodic neurologic symptoms suggestive of prior exacerbation of MS.  She does relate that many years ago, she did have an odd episode where she seemed to have a visual obscuration in the lower field of vision in both eyes that lasted for about a day and a half.  She endorses some difficulty with gait, which she primarily attributes to knee pathology.    Past Medical History:   1.  Hypothyroidism.  2.  Diabetes mellitus.  3.  Degenerative joint disease.  4.  Status post  section.  5.  Cirrhosis of the liver.    Medications:  1.  Insulin lispro via insulin pump.  2.  Metformin 750 mg extended release, 2 tablets in the morning.  3.  Levothyroxine 88 mcg p.o. daily.  4.  [place] thyroid 60 mg p.o. daily.  5.  Semaglutide 0.5 mg injected  subcutaneously weekly.    Family History: There is a family history of premature onset cerebrovascular disease with the patient's father dying of a stroke at age 48.  Her daughter also had a transient ischemic attack, apparently in the setting of COVID-19 infection.    Social History: The patient is a current cigarette smoker, currently smoking 1/2 pack of cigarettes per day.    PHYSICAL EXAMINATION:    VITAL SIGNS:  Blood pressure 157/74; pulse 86; oxygen saturation 100%; weight 106.1 kg; height 1.63 meters.  GENERAL:  Morbidly obese woman who presents to the examination accompanied by her daughter, awake and alert and in no acute distress.    MENTAL STATUS:  I administered the [place] Short Test of Mental Status to this 57 year old woman who has completed an Associate's degree, with scores on the sub-components as follows:  Orientation:  8/8.  Attention:  6/7 (best digit span is 6 forward).    Immediate recall:  4/4.    General Information:  4/4.  Abstraction:  3/3.  Calculation: 1/4.  Construction:  4/4  Delayed recall:  2/4 (4/4 with category prompts).    Total score on the [place] Short Test of Mental status is 31/38, with scores in this range most commonly associated with a mild cognitive impairment phenotype.    LANGUAGE: The patient did not have any gross impairment of spontaneous speech, although at times I get the sense that she might be having some difficulty coming up with a word.  She generally followed 2-step commands well, self-correcting an error on one occasion.  There was a minor error on repetition of a complex phrase.    CRANIAL NERVES:  Visual fields are full to confrontation.  Extraocular movements are intact with no internuclear ophthalmoplegia.  Facial strength is normal.  Palate elevation and tongue protrusion are normal.  POWER:  Strength is within normal limits in proximal and distal muscles in the upper and lower limbs throughout.  REFLEXES: Reflexes are symmetric and within normal limits  in the arms.  Reflexes are generally diminished in the lower limbs.  MOTOR AND CEREBELLAR:  There is no appendicular ataxia on finger-to-nose testing.  Rapid alternating movements are within normal limits in the hands and fingers.  There is no pronator drift in the arms.  GAIT:  The patient is able to ambulate independently on a flat, level surface with no gross loss of postural stability.    Assessment/plan:     1.  White matter abnormalities on MRI scan of the brain, suspect subacute to chronic ischemic strokes  The MRI imaging was interpreted by an outside radiologist as being suggestive of demyelinating disease.  I doubt this.  The patient does not present any history of episodic neurologic symptoms suggestive of MS relapse, nor does she have a progressive motor syndrome suggestive of primary progressive multiple sclerosis, which would be the more likely MS presentation in a patient in her age group.  A demyelinating process cannot be strictly excluded, but in the presence of numerous vascular risk factors including family history of premature onset cerebrovascular disease, ongoing cigarette smoking, and diabetes mellitus, I think it is likely that the lesions seen are due to cerebral ischemia.    Of interest, the patient was recently taken off of rosuvastatin during a hospitalization for right upper quadrant pain during which her liver disease was discovered, apparently due to a concern that this might contribute to liver dysfunction.    I do think that the patient should have a comprehensive stroke workup and I have placed orders for a transthoracic echocardiogram, CT angiogram of the head and neck and prolonged cardiac monitoring (ZioPatch) for 14 days.    Regarding treatment, I instructed her to take four 81 mg aspirin tablets today and then begin taking aspirin 81 mg daily.  I did consider dual-antiplatelet therapy, but given the indeterminate age of the lesions seen, I am not aware of any evidence that  would strongly justify such use in this specific context.    I also instructed her to speak with her gastroenterologist about whether it is safe for her to resume a statin.  If this is not felt to be contraindicated I would recommend treatment with a high-potency statin, such as rosuvastatin 20 mg daily, to reduce the risk of future cardiac and cerebrovascular events.    2.  Cognitive impairment  The history that I am getting is that of relatively abrupt onset of cognitive impairment within the past year.  This could also be secondary to a vascular etiology, although neurodegenerative and (significantly less likely) inflammatory neurologic conditions should be considered as well.  I think that a neuropsychological evaluation would be helpful in trying to sort out these various possibilities and have made that referral for her today.      I will see the patient back for review at the conclusion of the above studies to go over the results and determine whether further investigations or consultations are needed.    I spent 78 minutes on patient care activities related to this encounter on the date of service, including time spent in reviewing the chart, performing independent review of neuroimaging, obtaining collateral history from the patient's daughter, obtaining history and examination from the patient, and in counseling.    Again, thank you for allowing me to participate in the care of your patient.      Sincerely,    Nic Harrington MD   of Neurology  [facility]    Cc:  Kirk Christianson MD (Endocrinology)  Laz Johnson MD (Gastroenterology)  Patient

## 2022-07-07 NOTE — PATIENT INSTRUCTIONS
We will start aspirin 81 mg  Today-take four 81 mg tablets  Then, take one tablet by mouth daily    2. I want you to ask your gastroenterologist if it is safe for you to take rosuvastatin 20 mg daily    3. We will arrange the following tests to investigate for possible contributors to stroke:  -Transthoracic echocardiogram  -CT angiogram of the head and neck  -Prolonged cardiac monitor (ZioPatch)    4. We will arrange a neuropsychological evaluation to provide a baseline and hopefully shed some additional light on the cause of your memory issues

## 2022-07-07 NOTE — TELEPHONE ENCOUNTER
RECORDS RECEIVED FROM: external   REASON FOR VISIT: MS   Date of Appt: 7/7/22   NOTES (FOR ALL VISITS) STATUS DETAILS   OFFICE NOTE from referring provider Received Beth CASTRO @ [facility]:  6/24/22   MEDICATION LIST Internal    IMAGING  (FOR ALL VISITS)     MRI (HEAD, NECK, SPINE) Received [facility]:  MRI Brain 6/27/22  MRI Brain 6/22/22

## 2022-07-08 ENCOUNTER — ANCILLARY PROCEDURE (OUTPATIENT)
Dept: CT IMAGING | Facility: CLINIC | Age: 57
End: 2022-07-08
Attending: PSYCHIATRY & NEUROLOGY
Payer: MEDICAID

## 2022-07-08 DIAGNOSIS — I63.9 SUBCORTICAL INFARCTION (H): ICD-10-CM

## 2022-07-08 LAB
CREAT BLD-MCNC: 0.5 MG/DL (ref 0.5–1)
GFR SERPL CREATININE-BSD FRML MDRD: >60 ML/MIN/1.73M2

## 2022-07-08 PROCEDURE — 70496 CT ANGIOGRAPHY HEAD: CPT

## 2022-07-08 PROCEDURE — 255N000002 HC RX 255 OP 636: Performed by: PSYCHIATRY & NEUROLOGY

## 2022-07-08 PROCEDURE — 82565 ASSAY OF CREATININE: CPT

## 2022-07-08 RX ADMIN — IOHEXOL 75 ML: 350 INJECTION, SOLUTION INTRAVENOUS at 14:42

## 2022-07-08 NOTE — PROGRESS NOTES
"Date of service: July 7, 2022    Referral source:  Beth Cantu PA-C  [address], [address]  [place] [address]     Chief complaint: Memory loss and abnormal MRI scan of the brain.    History of the Present Illness: Ms. Sudheer Montiel is a 57-year-old right-handed woman who is evaluated in the Multiple Sclerosis Clinic today for an opinion regarding the etiology of the above issues.  It was suggested that the patient be seen in this clinic after a recent MRI was interpreted as suggesting demyelinating disease.    The patient's history, however, is primarily that of cognitive impairment.  The patient is accompanied by her daughter today, who provides collateral history.      Cognitively, a year ago her daughter relates that she would have perceived the patient's cognitive status as normal.  However, over the past several months, she has developed problems with forgetfulness and change in personality.  Her daughter relates that she has forgotten \"whole conversations.\" She seems more timid and scared than previously.  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  She was apparently forgetting to complete tasks.  This worsened to a point that she was actually dismissed from her job last month.    The patient's daughter notes that her mental status seems to fluctuate, at times seeming relatively normal and at other times clearly impaired.    I should also note that the patient was recently found to have imaging findings suggestive of cirrhosis of the liver.  She is being seen in an outside GI clinic, and the possibility of primary biliary cirrhosis has been raised, apparently.  Nonalcoholic steatohepatitis was also felt to be within the differential diagnosis, from what I can tell.    Because of her cognitive symptoms, the patient's primary care provider ordered MRI scans of the brain that were completed " initially on 2022 with contrast studies added on 2022.  I did review the images from these studies and the following is my independent interpretation of these images.  There are 3 discrete lesions in the bilateral frontal white matter with T2 hyperintensity surrounding a rim of T2 and T1 hypointensity, that to my eye appear at or near isointense to CSF.  These lesions do not directly contact the ventricular surface.  One of the lesions in the right posterior frontal lobe is bright on diffusion weighted sequences, although does not have corresponding signal abnormality on ADC map.  There may be subtle contrast enhancement of this lesion as well.  I find these lesions most suggestive of subcortical subacute to chronic cerebral infarctions of likely somewhat varying ages.      On neurologic review of systems, the patient denies any history of optic neuritis, transverse myelitis or other episodic neurologic symptoms suggestive of prior exacerbation of MS.  She does relate that many years ago, she did have an odd episode where she seemed to have a visual obscuration in the lower field of vision in both eyes that lasted for about a day and a half.  She endorses some difficulty with gait, which she primarily attributes to knee pathology.    Past Medical History:   1.  Hypothyroidism.  2.  Diabetes mellitus.  3.  Degenerative joint disease.  4.  Status post  section.  5.  Cirrhosis of the liver.    Medications:  1.  Insulin lispro via insulin pump.  2.  Metformin 750 mg extended release, 2 tablets in the morning.  3.  Levothyroxine 88 mcg p.o. daily.  4.  [place] thyroid 60 mg p.o. daily.  5.  Semaglutide 0.5 mg injected subcutaneously weekly.    Family History: There is a family history of premature onset cerebrovascular disease with the patient's father dying of a stroke at age 48.  Her daughter also had a transient ischemic attack, apparently in the setting of COVID-19 infection.    Social History: The  patient is a current cigarette smoker, currently smoking 1/2 pack of cigarettes per day.    PHYSICAL EXAMINATION:    VITAL SIGNS:  Blood pressure 157/74; pulse 86; oxygen saturation 100%; weight 106.1 kg; height 1.63 meters.  GENERAL:  Morbidly obese woman who presents to the examination accompanied by her daughter, awake and alert and in no acute distress.    MENTAL STATUS:  I administered the [place] Short Test of Mental Status to this 57 year old woman who has completed an Associate's degree, with scores on the sub-components as follows:  Orientation:  8/8.  Attention:  6/7 (best digit span is 6 forward).    Immediate recall:  4/4.    General Information:  4/4.  Abstraction:  3/3.  Calculation: 1/4.  Construction:  4/4  Delayed recall:  2/4 (4/4 with category prompts).    Total score on the [place] Short Test of Mental status is 31/38, with scores in this range most commonly associated with a mild cognitive impairment phenotype.    LANGUAGE: The patient did not have any gross impairment of spontaneous speech, although at times I get the sense that she might be having some difficulty coming up with a word.  She generally followed 2-step commands well, self-correcting an error on one occasion.  There was a minor error on repetition of a complex phrase.    CRANIAL NERVES:  Visual fields are full to confrontation.  Extraocular movements are intact with no internuclear ophthalmoplegia.  Facial strength is normal.  Palate elevation and tongue protrusion are normal.  POWER:  Strength is within normal limits in proximal and distal muscles in the upper and lower limbs throughout.  REFLEXES: Reflexes are symmetric and within normal limits in the arms.  Reflexes are generally diminished in the lower limbs.  MOTOR AND CEREBELLAR:  There is no appendicular ataxia on finger-to-nose testing.  Rapid alternating movements are within normal limits in the hands and fingers.  There is no pronator drift in the arms.  GAIT:  The patient  is able to ambulate independently on a flat, level surface with no gross loss of postural stability.    Assessment/plan:     1.  White matter abnormalities on MRI scan of the brain, suspect subacute to chronic ischemic strokes  The MRI imaging was interpreted by an outside radiologist as being suggestive of demyelinating disease.  I doubt this.  The patient does not present any history of episodic neurologic symptoms suggestive of MS relapse, nor does she have a progressive motor syndrome suggestive of primary progressive multiple sclerosis, which would be the more likely MS presentation in a patient in her age group.  A demyelinating process cannot be strictly excluded, but in the presence of numerous vascular risk factors including family history of premature onset cerebrovascular disease, ongoing cigarette smoking, and diabetes mellitus, I think it is likely that the lesions seen are due to cerebral ischemia.    Of interest, the patient was recently taken off of rosuvastatin during a hospitalization for right upper quadrant pain during which her liver disease was discovered, apparently due to a concern that this might contribute to liver dysfunction.    I do think that the patient should have a comprehensive stroke workup and I have placed orders for a transthoracic echocardiogram, CT angiogram of the head and neck and prolonged cardiac monitoring (ZioPatch) for 14 days.    Regarding treatment, I instructed her to take four 81 mg aspirin tablets today and then begin taking aspirin 81 mg daily.  I did consider dual-antiplatelet therapy, but given the indeterminate age of the lesions seen, I am not aware of any evidence that would strongly justify such use in this specific context.    I also instructed her to speak with her gastroenterologist about whether it is safe for her to resume a statin.  If this is not felt to be contraindicated I would recommend treatment with a high-potency statin, such as rosuvastatin 20  mg daily, to reduce the risk of future cardiac and cerebrovascular events.    2.  Cognitive impairment  The history that I am getting is that of relatively abrupt onset of cognitive impairment within the past year.  This could also be secondary to a vascular etiology, although neurodegenerative and (significantly less likely) inflammatory neurologic conditions should be considered as well.  I think that a neuropsychological evaluation would be helpful in trying to sort out these various possibilities and have made that referral for her today.      I will see the patient back for review at the conclusion of the above studies to go over the results and determine whether further investigations or consultations are needed.    I spent 78 minutes on patient care activities related to this encounter on the date of service, including time spent in reviewing the chart, performing independent review of neuroimaging, obtaining collateral history from the patient's daughter, obtaining history and examination from the patient, and in counseling.    ADDENDUM (2022): It was brought to my attention that the patient recently had an ammonia level drawn that was resulted at 121 umol/L. This was felt to be potentially spurious given recent cigarette smoking, but in light of known cirrhosis it is possible that this is genuine. If that is the case, some or all of the patient's cognitive complaints could relate to hepatic encephalopathy. We will recheck this value along with INR, basic metabolic panel, and hepatic panel at the patient's next visit.     Of additional note, CT angiogram of the head and neck last week was normal.    Nic Harrington MD        D: 2022   T: 2022   MT: ANTHONY    Name:     BHUPINDER LEES  MRN:      [id number]        Account:      [id number]   :      1965           Service Date: 2022       Document: [id number]

## 2022-07-13 DIAGNOSIS — K70.30 ALCOHOLIC CIRRHOSIS, UNSPECIFIED WHETHER ASCITES PRESENT (H): Primary | ICD-10-CM

## 2022-07-13 DIAGNOSIS — K74.60 HEPATIC CIRRHOSIS, UNSPECIFIED HEPATIC CIRRHOSIS TYPE, UNSPECIFIED WHETHER ASCITES PRESENT (H): ICD-10-CM

## 2022-07-20 ENCOUNTER — HOSPITAL ENCOUNTER (OUTPATIENT)
Dept: CARDIOLOGY | Facility: CLINIC | Age: 57
Discharge: HOME | End: 2022-07-20
Attending: PHYSICIAN ASSISTANT | Admitting: PHYSICIAN ASSISTANT
Payer: MEDICAID

## 2022-07-20 ENCOUNTER — PATIENT OUTREACH (OUTPATIENT)
Dept: CARE COORDINATION | Facility: CLINIC | Age: 57
End: 2022-07-20

## 2022-07-20 DIAGNOSIS — I63.9 SUBCORTICAL INFARCTION (H): ICD-10-CM

## 2022-07-20 PROCEDURE — 93248 EXT ECG>7D<15D REV&INTERPJ: CPT | Performed by: INTERNAL MEDICINE

## 2022-07-20 PROCEDURE — 93246 EXT ECG>7D<15D RECORDING: CPT

## 2022-07-20 NOTE — PROGRESS NOTES
Clinic Care Coordination Contact    Follow Up Progress Note      Assessment: EMILY CLARK reached pt's daughter, Jessie, for follow up. Jessie shared that they were able to get pt on [place] [employer] for insurance and have been able to follow up with recommended appointments without interruption. Pt has also applied for county benefits but they have not heard if pt will qualify for not. Discussed that even if pt qualified it's likely the amount will be small and pt will still need to access other community supports to ensure financial stability. Jessie confirms she received [facility] email with resources and can utilize them as needed.       Plan: Jessie denied any additional needs at this time. Jessie has [facility] email and phone number and was encouraged to reach out with any questions or concerns. No further outreaches planned.     AGA Mijares   Social Work Care Coordinator  [phone]

## 2022-08-03 ENCOUNTER — TELEPHONE (OUTPATIENT)
Dept: NEUROLOGY | Facility: CLINIC | Age: 57
End: 2022-08-03

## 2022-08-03 NOTE — TELEPHONE ENCOUNTER
Left M with Sudheer's daughter Cassie re Sudheer's follow up.  Sudheer needs and echo and labs drawn and neither have been completed. Asked Cassie to call back the clinic.  Also attempted to call Sudheer but was unable to get through/leave a message.    Clotilde Guerrero RN

## 2022-08-19 ENCOUNTER — HOSPITAL ENCOUNTER (OUTPATIENT)
Dept: CARDIOLOGY | Facility: CLINIC | Age: 57
Discharge: HOME | End: 2022-08-19
Attending: PSYCHIATRY & NEUROLOGY | Admitting: PSYCHIATRY & NEUROLOGY
Payer: MEDICAID

## 2022-08-19 DIAGNOSIS — I63.9 SUBCORTICAL INFARCTION (H): ICD-10-CM

## 2022-08-19 LAB — LVEF ECHO: NORMAL

## 2022-08-19 PROCEDURE — 93306 TTE W/DOPPLER COMPLETE: CPT | Mod: 26 | Performed by: INTERNAL MEDICINE

## 2022-08-19 PROCEDURE — 255N000002 HC RX 255 OP 636: Performed by: PSYCHIATRY & NEUROLOGY

## 2022-08-19 PROCEDURE — 999N000208 ECHOCARDIOGRAM COMPLETE

## 2022-08-19 RX ADMIN — HUMAN ALBUMIN MICROSPHERES AND PERFLUTREN 9 ML: 10; .22 INJECTION, SOLUTION INTRAVENOUS at 09:58

## 2022-08-29 ENCOUNTER — HOSPITAL ENCOUNTER (OUTPATIENT)
Dept: MRI IMAGING | Facility: CLINIC | Age: 57
Discharge: HOME | End: 2022-08-29
Payer: MEDICAID

## 2022-08-29 DIAGNOSIS — R93.2 ABNORMAL LIVER CT: ICD-10-CM

## 2022-08-29 DIAGNOSIS — K74.60 DIFFUSE NODULAR CIRRHOSIS OF LIVER (H): ICD-10-CM

## 2022-08-29 DIAGNOSIS — R10.13 DYSPEPSIA: ICD-10-CM

## 2022-08-29 PROCEDURE — A9585 GADOBUTROL INJECTION: HCPCS

## 2022-08-29 PROCEDURE — 74183 MRI ABD W/O CNTR FLWD CNTR: CPT

## 2022-08-29 PROCEDURE — 255N000002 HC RX 255 OP 636

## 2022-08-29 RX ORDER — GADOBUTROL 604.72 MG/ML
10 INJECTION INTRAVENOUS ONCE
Status: COMPLETED | OUTPATIENT
Start: 2022-08-29 | End: 2022-08-29

## 2022-08-29 RX ADMIN — GADOBUTROL 10 ML: 604.72 INJECTION INTRAVENOUS at 07:35

## 2022-10-03 DIAGNOSIS — E11.9 TYPE 2 DIABETES MELLITUS WITHOUT COMPLICATION, WITHOUT LONG-TERM CURRENT USE OF INSULIN (H): ICD-10-CM

## 2022-10-03 NOTE — TELEPHONE ENCOUNTER
LOV 6- TL, follow up labs due August/September (not done), follow up office visit October  No future OV scheduled    Pharm note given    Specialty : please contact patient or daughter to schedule return visit with Dr Christianson with repeat labs one week prior    [facility] 6- daughter Jessie     Mamie Wang, RT (R)

## 2022-10-03 NOTE — TELEPHONE ENCOUNTER
LVM for daughter Alen to call [phone] to schedule f/u appt with  Dr. Christianson, needs to be scheduled for refills.

## 2022-10-14 DIAGNOSIS — E11.65 UNCONTROLLED TYPE 2 DIABETES MELLITUS WITH HYPERGLYCEMIA (H): ICD-10-CM

## 2022-10-14 RX ORDER — FLASH GLUCOSE SENSOR
KIT MISCELLANEOUS
Qty: 2 EACH | Refills: 5 | Status: ON HOLD | OUTPATIENT
Start: 2022-10-14 | End: 2023-08-12

## 2022-11-07 ENCOUNTER — VIRTUAL VISIT (OUTPATIENT)
Dept: ENDOCRINOLOGY | Facility: CLINIC | Age: 57
End: 2022-11-07
Payer: COMMERCIAL

## 2022-11-07 DIAGNOSIS — E03.9 ACQUIRED HYPOTHYROIDISM: ICD-10-CM

## 2022-11-07 DIAGNOSIS — E11.9 TYPE 2 DIABETES MELLITUS WITHOUT COMPLICATION, WITHOUT LONG-TERM CURRENT USE OF INSULIN (H): Primary | ICD-10-CM

## 2022-11-07 PROCEDURE — 99214 OFFICE O/P EST MOD 30 MIN: CPT | Mod: 95 | Performed by: INTERNAL MEDICINE

## 2022-11-07 NOTE — Clinical Note
11/7/2022         RE: Sudheer Montiel  [address]  [place] [address]        Dear Colleague,    Thank you for referring your patient, Sudheer Montiel, to the [facility] [place]. Please see a copy of my visit note below.    Patient is being evaluated via a billable video visit.      How would you like to obtain your AVS? Verbally Reviewed  If the video visit is dropped, the invitation should be resent by: Cellphone  Will anyone else be joining your video visit? No  {If patient encounters technical issues they should call [phone] :[id number]}      Video-Visit Details    Video Start Time: 12:08 pm    Type of service:  Video Visit    Video End Time:   12:25 pm    Originating Location (pt. Location):  [place]    PROVIDER LOCATION On-site vs Off-site    Distant Location (provider location):  Home    Platform used for Video Visit: [employer]        Recent issues:  Followup diabetes and thyroid evaluation  Previous right abdomen pain, some skin itching, hospitalized at [facility] 5/2022,    diagnosed with liver problem suspicion for PBC, GI eval with Dr. Johnson  Had head MRI scan, apparent evidence for 5 previous strokes, then evaluation with Dr. YURIDIA Harrington/neuro  Using the metformin, Ozempic, VGo pod device with Novolog and the Freestyle Chandni sensor,     recent -150 mg/dl and 7d avg 189 mg/dl  Taking the [place] thyroid and levothyroxine meds regularly           Diabetes:  ~4/2010. Initial diagnosis approx age 44  ...Has taken several DM meds including metformin, Byetta, glimeparide, Invokana, and Victoza   In 2018, she had been taking metformin, Victoza, glimeparide, and Jardiance  She stopped Jardiance, also ran out of Victoza last year  10/2018. Abdominal abcess illness, hospitalizations at [facility] and then [facility] hospital              2-surgeries for abcess, thought related to the hysterectomy bladder sling mesh from prior surgery     Previously taking metformin 750  mg BID, glimeparide 4 mg every day, Tresiba 40U every day   10/2020. Changed to VGo40 pods management  Continue low dose glimeparide, but low SG levels and glimeparide discontinued, then restarted   Current DM meds:  Novolog in VGo40   Small carb meal 1-2 clicks (2-4U)   Large carb meal 2-3 clicks (4-6U)    Novolog    sscale:      -250 1-click    -300 2-clicks  Metformin 750 mg 2-tabs in morning  Ozempic 0.5 mg  Subcutaneous weekly    Has Glucocard Vital BG meter              Infrequent BG testing  10/2020. Started Freestyle Chandni CGM with [place]  Recent Chandni data:     none available, no glucose testing recently    Recent [facility] labs include:  7/26/19 hgbA1c 9.3%, t.chol 195, , TSH 0.06, FT4 1.46, FT3 8.7, Cr 0.77  8/26/20 hgbA1c 11.4%  10/15/21 hgbA1c 6.6%, LDL 60 mg/dl     Recent  labs include:  Lab Results   Component Value Date    A1C 7.2 (H) 01/05/2021     05/26/2022    POTASSIUM 3.8 05/26/2022    CHLORIDE 105 05/26/2022    CO2 24 05/26/2022    ANIONGAP 6 05/26/2022     (H) 05/26/2022    BUN 15 05/26/2022    CR 0.5 07/08/2022    GFRESTIMATED >60 07/08/2022    GFRESTBLACK >90 01/05/2021    JOANN 8.7 05/26/2022    CHOL 151 01/05/2021    TRIG 194 (A) 10/15/2021    HDL 41 (L) 01/05/2021    LDL 76 01/05/2021    NHDL 110 01/05/2021    UCRR 86 01/05/2021    MICROL 19 01/05/2021    UMALCR 22.31 01/05/2021     Last eye exam 2018?, results not available  DM Complications:  None known        Thyroid:  Had taken Synthroid  Changed to [place] thyroid medication, then [place] + levothyroxine combo dose plan  Had taken [place] thyroid 2-tablets daily... Possibly 60 mg 2-tabs QD  Early-mid 3/2018. Ran out of [place] thyroid medication  Previously took [place] thyroid 90 mg 2-tabs daily and low dose levothyroxine 0.025 mg daily  8/2019. Changed to lower dose [place] and higher dose levothyroxine  Subsequent dose reductions with levothyroxine medication  Mid 12/2021 to early 1/2022. 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 Frantz Guillen?/[facility]       PMH/PSH:  Past Medical History:   Diagnosis Date     Abnormal liver CT      Hypothyroid 80's     Necrotizing fasciitis (H)      Primary osteoarthritis of both knees      Soft tissue infection      Subcortical infarction (H)      Type 2 diabetes mellitus (H)      Past Surgical History:   Procedure Laterality Date      SECTION       COLONOSCOPY N/A 10/21/2015    Procedure: COLONOSCOPY;  Surgeon: Jaky Arredondo MD;  Location:  GI     IRRIGATION AND DEBRIDEMENT ABDOMEN WASHOUT, COMBINED N/A 10/12/2018    Procedure: COMBINED IRRIGATION AND DEBRIDEMENT ABDOMEN WASHOUT;  Irrigation and Debridement of Lower Abdomen, removal of piece of mesh.;  Surgeon: Darlene Hogue MD;  Location:  OR     marisol/bso      due to anemia     ZZC REPAIR CRUCIATE LIGAMENT,KNEE  1986       Family Hx:  No family history on file.      Social Hx:  Social History     Socioeconomic History     Marital status:      Spouse name: Not on file     Number of children: 2     Years of education: Not on file     Highest education level: Not on file   Occupational History     Occupation: surgery scheduler [employer]   Tobacco Use     Smoking status: Former     Types: Cigarettes     Quit date: 2011     Years since quittin.7     Smokeless tobacco: Never   Substance and Sexual Activity     Alcohol use: No     Drug use: No     Sexual  activity: Not on file   Other Topics Concern     Not on file   Social History Narrative     Not on file     Social Determinants of Health     Financial Resource Strain: Not on file   Food Insecurity: Not on file   Transportation Needs: Not on file   Physical Activity: Not on file   Stress: Not on file   Social Connections: Not on file   Intimate Partner Violence: Not on file   Housing Stability: Not on file          MEDICATIONS:  has a current medication list which includes the following prescription(s): acetaminophen, aspirin, celecoxib, freestyle evelyn reader, freestyle evelyn 14 day sensor, insulin lispro, levothyroxine, metformin, omeprazole, semaglutide, thyroid, valacyclovir, wearable insulin delivery device, blood glucose, b-d u/f, and [DISCONTINUED] rosuvastatin.     ROS: 10 point ROS neg other than the symptoms noted above in the HPI.     GENERAL: some fatigue, wt stable; denies fevers, chills, night sweats.   HEENT:  no dysphagia, odonophagia, diplopia, neck pain  THYROID:  no apparent hyper or hypothyroid symptoms  CV:  some palpitations; denies chest pain, pressure  LUNGS:  denies SOB, dyspnea, cough, wheezing  ABDOMEN:  Intermittent right abdomen pains; denies diarrhea, indigestion, constipation  EXTREMITIES: no rashes, ulcers, edema  NEUROLOGY: forgetfulness; no headaches, denies changes in vision, tingling, extremitiy numbness   MSK: knee and some low back pain; no muscle aches or pains, weakness  SKIN: no rashes or lesions  : no increased thirst and urination, nocturia; no menses since hysterectomy ~age 49  PSYCH:  stable mood, no significant anxiety or depression  ENDOCRINE: no heat or cold intolerance    Physical Exam (visual exam)  VS:  no vital signs taken for video visit  CONSTITUTIONAL: healthy, alert and NAD, well dressed, answering questions appropriately  ENT: no nose swelling or nasal discharge, mouth redness or gum changes.  EYES: eyes grossly normal to inspection, conjunctivae and  sclerae normal, no exophthalmos or proptosis  THYROID:  no apparent nodules or goiter  LUNGS: no audible wheeze, cough or visible cyanosis, no visible retractions or increased work of breathing  ABDOMEN: abdomen not evaluated  EXTREMITIES: no hand tremors, limited exam  NEUROLOGY: CN grossly intact, mentation intact and speech normal   SKIN:  no apparent skin lesions, rash, or edema with visualized skin appearance  PSYCH: mentation appears normal, affect normal/bright, judgement and insight intact,   normal speech and appearance well groomed       LABS:     All pertinent notes, labs, and images personally reviewed by me.      A/P:  Encounter Diagnoses   Name Primary?     Type 2 diabetes mellitus without complication, without long-term current use of insulin (H) Yes     Acquired hypothyroidism      Comments:  Reviewed health issues, diabetes and thyroid management.  Difficult to assess glycemic control without BGM or SG data  Reviewed and interpreted tests that I previously ordered.   Ordered appropriate tests for the endocrinology disease management.    Management options discussed and implemented after shared medical decision making with the patient.  T2DM and hypothyroidism problems are chronic-stable    Plan:  Discussed general issues with the diabetes diagnosis and management  We discussed the hgbA1c test which reflects previous overall glucose levels or control  Discussed the importance of blood glucose (BG) testing to assess glucose trends  Provided general overview of the diabetes medication options and medication treatment plan  Reviewed recent Chandni CGM glucose trend data, in detail.    Recommend:  Continue the current VGo40, metformin, and Ozempic med plan at this time  We have reviewed dosing options using the VGo clicks for small and large carb meals  Consider change to the OmniPod pump alternative, if affordable  Goal target -150 mg/dl  Contact me if cost issues with Novolog, VGo, Ozempic, or  other endo meds  Continue use of the 14-day Freestyle Chandni CGM sensor    Scan sensor 3-4x/day... more often    Use SG value for final decision on mealtime VGo clicks  Lab testing:    No lab tests ordered at this time    Needs repeat thyroid and diabetes lab testing in 8/2022 or 9/2022  Keep focus on diet, exercise, weight management.  Continue the NP-Thyroid 60 mg daily and levothyroxine 88 daily dose plan  Plan lab appointment soon    Lab orders placed  Needs f/u on the mild hypercholesterolemia, consider adding statin medication for treatment  Keep focus on diet, exercise, weight management.  Advise having fasting lipid panel testing and dilated eye examination, at least annually    Keep scheduled followup GI, neurology, and PCP appointments  Addressed patient questions today     There are no Patient Instructions on file for this visit.    Future labs ordered today:   Orders Placed This Encounter   Procedures     Hemoglobin A1c     Basic metabolic panel     TSH     T4 free     T3 Free     Albumin Random Urine Quantitative with Creat Ratio     Radiology/Consults ordered today: None    Total time spent on day of encounter:  ***    Follow-up:  2/2023 or 3/2023    JOJO Christianson MD, MS  Endocrinology  [facility]    CC: Palmer Johnson and YURIDIA Harrington                                              Again, thank you for allowing me to participate in the care of your patient.        Sincerely,        Kirk Christianson MD

## 2022-11-07 NOTE — PROGRESS NOTES
Patient is being evaluated via a billable video visit.      How would you like to obtain your AVS? Verbally Reviewed  If the video visit is dropped, the invitation should be resent by: Cellphone  Will anyone else be joining your video visit? No        Video-Visit Details    Video Start Time: 12:08 pm    Type of service:  Video Visit    Video End Time:   12:25 pm    Originating Location (pt. Location):  [place], [place]    PROVIDER LOCATION On-site vs Off-site    Distant Location (provider location):  Home    Platform used for Video Visit: [employer]        Recent issues:  Followup diabetes and thyroid evaluation  Previous right abdomen pain, some skin itching, hospitalized at [facility] 5/2022,    diagnosed with liver problem suspicion for PBC, GI eval with Dr. Johnson  Had head MRI scan, apparent evidence for 5 previous strokes, then evaluation with Dr. YURIDIA Harrington/neuro  Using the metformin, Ozempic, VGo pod device with Novolog and the Freestyle Chandni sensor,     recent -150 mg/dl and 7d avg 189 mg/dl  Taking the [place] thyroid and levothyroxine meds regularly           Diabetes:  ~4/2010. Initial diagnosis approx age 44  ...Has taken several DM meds including metformin, Byetta, glimeparide, Invokana, and Victoza   In 2018, she had been taking metformin, Victoza, glimeparide, and Jardiance  She stopped Jardiance, also ran out of Victoza last year  10/2018. Abdominal abcess illness, hospitalizations at [facility] and then [facility] hospital              2-surgeries for abcess, thought related to the hysterectomy bladder sling mesh from prior surgery     Previously taking metformin 750 mg BID, glimeparide 4 mg every day, Tresiba 40U every day   10/2020. Changed to VGo40 pods management  Continue low dose glimeparide, but low SG levels and glimeparide discontinued, then restarted   Current DM meds:  Novolog in VGo40   Small carb meal 1-2 clicks (2-4U)   Large carb meal 2-3 clicks  (4-6U)    Novolog    sscale:      -250 1-click    -300 2-clicks  Metformin 750 mg 2-tabs in morning  Ozempic 0.5 mg  Subcutaneous weekly    Has Glucocard Vital BG meter              Infrequent BG testing  10/2020. Started Freestyle Chandni CGM with [place]  Recent Chandni data:     none available, no glucose testing recently    Recent [facility] labs include:  7/26/19 hgbA1c 9.3%, t.chol 195, , TSH 0.06, FT4 1.46, FT3 8.7, Cr 0.77  8/26/20 hgbA1c 11.4%  10/15/21 hgbA1c 6.6%, LDL 60 mg/dl     Recent  labs include:  Lab Results   Component Value Date    A1C 7.2 (H) 01/05/2021     05/26/2022    POTASSIUM 3.8 05/26/2022    CHLORIDE 105 05/26/2022    CO2 24 05/26/2022    ANIONGAP 6 05/26/2022     (H) 05/26/2022    BUN 15 05/26/2022    CR 0.5 07/08/2022    GFRESTIMATED >60 07/08/2022    GFRESTBLACK >90 01/05/2021    JOANN 8.7 05/26/2022    CHOL 151 01/05/2021    TRIG 194 (A) 10/15/2021    HDL 41 (L) 01/05/2021    LDL 76 01/05/2021    NHDL 110 01/05/2021    UCRR 86 01/05/2021    MICROL 19 01/05/2021    UMALCR 22.31 01/05/2021     Last eye exam 2018?, results not available  DM Complications:  None known        Thyroid:  Had taken Synthroid  Changed to [place] thyroid medication, then [place] + levothyroxine combo dose plan  Had taken [place] thyroid 2-tablets daily... Possibly 60 mg 2-tabs QD  Early-mid 3/2018. Ran out of [place] thyroid medication  Previously took [place] thyroid 90 mg 2-tabs daily and low dose levothyroxine 0.025 mg daily  8/2019. Changed to lower dose [place] and higher dose levothyroxine  Subsequent dose reductions with levothyroxine medication  Mid 12/2021 to early 1/2022. 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 Frantz Guillen?/[facility]       PMH/PSH:  Past Medical History:   Diagnosis Date     Abnormal liver CT      Hypothyroid 80's     Necrotizing fasciitis (H)      Primary osteoarthritis of both knees      Soft tissue infection      Subcortical infarction (H)      Type 2 diabetes mellitus (H)      Past Surgical History:   Procedure Laterality Date      SECTION       COLONOSCOPY N/A 10/21/2015    Procedure: COLONOSCOPY;  Surgeon: Jaky Arredondo MD;  Location:  GI     IRRIGATION AND DEBRIDEMENT ABDOMEN WASHOUT, COMBINED N/A 10/12/2018    Procedure: COMBINED IRRIGATION AND DEBRIDEMENT ABDOMEN WASHOUT;  Irrigation and Debridement of Lower Abdomen, removal of piece of mesh.;  Surgeon: Darlene Hogue MD;  Location: [facility] OR     marisol/bso      due to anemia     ZZC REPAIR CRUCIATE LIGAMENT,KNEE         Family Hx:  No family history on file.      Social Hx:  Social History     Socioeconomic History     Marital status:      Spouse name: Not on file     Number of children: 2     Years of education: Not on file     Highest education level: Not on file   Occupational History     Occupation: surgery scheduler [employer]   Tobacco Use     Smoking status: Former     Types: Cigarettes     Quit date: 2011     Years since quittin.7     Smokeless tobacco: Never   Substance and Sexual Activity     Alcohol use: No     Drug use: No     Sexual activity: Not on file   Other Topics Concern     Not on file   Social History Narrative     Not on file     Social Determinants of Health     Financial Resource Strain: Not on file   Food Insecurity: Not on file   Transportation Needs: Not on file   Physical Activity: Not on file   Stress: Not on file    Social Connections: Not on file   Intimate Partner Violence: Not on file   Housing Stability: Not on file          MEDICATIONS:  has a current medication list which includes the following prescription(s): acetaminophen, aspirin, celecoxib, freestyle evelyn reader, freestyle evelyn 14 day sensor, insulin lispro, levothyroxine, metformin, omeprazole, semaglutide, thyroid, valacyclovir, wearable insulin delivery device, blood glucose, b-d u/f, and [DISCONTINUED] rosuvastatin.     ROS: 10 point ROS neg other than the symptoms noted above in the HPI.     GENERAL: some fatigue, wt stable; denies fevers, chills, night sweats.   HEENT:  no dysphagia, odonophagia, diplopia, neck pain  THYROID:  no apparent hyper or hypothyroid symptoms  CV:  some palpitations; denies chest pain, pressure  LUNGS:  denies SOB, dyspnea, cough, wheezing  ABDOMEN:  Intermittent right abdomen pains; denies diarrhea, indigestion, constipation  EXTREMITIES: no rashes, ulcers, edema  NEUROLOGY: forgetfulness; no headaches, denies changes in vision, tingling, extremitiy numbness   MSK: knee and some low back pain; no muscle aches or pains, weakness  SKIN: no rashes or lesions  : no increased thirst and urination, nocturia; no menses since hysterectomy ~age 49  PSYCH:  stable mood, no significant anxiety or depression  ENDOCRINE: no heat or cold intolerance    Physical Exam (visual exam)  VS:  no vital signs taken for video visit  CONSTITUTIONAL: healthy, alert and NAD, well dressed, answering questions appropriately  ENT: no nose swelling or nasal discharge, mouth redness or gum changes.  EYES: eyes grossly normal to inspection, conjunctivae and sclerae normal, no exophthalmos or proptosis  THYROID:  no apparent nodules or goiter  LUNGS: no audible wheeze, cough or visible cyanosis, no visible retractions or increased work of breathing  ABDOMEN: abdomen not evaluated  EXTREMITIES: no hand tremors, limited exam  NEUROLOGY: CN grossly intact,  mentation intact and speech normal   SKIN:  no apparent skin lesions, rash, or edema with visualized skin appearance  PSYCH: mentation appears normal, affect normal/bright, judgement and insight intact,   normal speech and appearance well groomed       LABS:     All pertinent notes, labs, and images personally reviewed by me.      A/P:  Encounter Diagnoses   Name Primary?     Type 2 diabetes mellitus without complication, without long-term current use of insulin (H) Yes     Acquired hypothyroidism      Comments:  Reviewed health issues, diabetes and thyroid management.  Difficult to assess glycemic control without BGM or SG data  Reviewed and interpreted tests that I previously ordered.   Ordered appropriate tests for the endocrinology disease management.    Management options discussed and implemented after shared medical decision making with the patient.  T2DM and hypothyroidism problems are chronic-stable    Plan:  Discussed general issues with the diabetes diagnosis and management  We discussed the hgbA1c test which reflects previous overall glucose levels or control  Discussed the importance of blood glucose (BG) testing to assess glucose trends  Provided general overview of the diabetes medication options and medication treatment plan  Reviewed recent Chandni CGM glucose trend data, in detail.    Recommend:  Continue the current VGo40, metformin, and Ozempic med plan at this time  We have reviewed dosing options using the VGo clicks for small and large carb meals  Consider change to the OmniPod pump alternative, if affordable  Goal target -150 mg/dl  Contact me if cost issues with Novolog, VGo, Ozempic, or other endo meds  Continue use of the 14-day Freestyle Chandni CGM sensor    Scan sensor 3-4x/day... more often    Use SG value for final decision on mealtime VGo clicks  Lab testing:    No lab tests ordered at this time    Needs repeat thyroid and diabetes lab testing in 8/2022 or 9/2022  Keep focus on  diet, exercise, weight management.  Continue the NP-Thyroid 60 mg daily and levothyroxine 88 daily dose plan  Plan lab appointment soon    Lab orders placed  Needs f/u on the mild hypercholesterolemia, consider adding statin medication for treatment  Keep focus on diet, exercise, weight management.  Advise having fasting lipid panel testing and dilated eye examination, at least annually    Keep scheduled followup GI, neurology, and PCP appointments  Addressed patient questions today     There are no Patient Instructions on file for this visit.    Future labs ordered today:   Orders Placed This Encounter   Procedures     Hemoglobin A1c     Basic metabolic panel     TSH     T4 free     T3 Free     Albumin Random Urine Quantitative with Creat Ratio     Radiology/Consults ordered today: None    Total time spent on day of encounter:  32 min    Follow-up:  2/2023 or 3/2023    JOJO Christianson MD, MS  Endocrinology  [facility]    CC: Palmer Johnson and YURIDIA Harrington

## 2022-11-21 ENCOUNTER — HEALTH MAINTENANCE LETTER (OUTPATIENT)
Age: 57
End: 2022-11-21

## 2022-12-23 DIAGNOSIS — E11.9 TYPE 2 DIABETES MELLITUS WITHOUT COMPLICATION, WITHOUT LONG-TERM CURRENT USE OF INSULIN (H): ICD-10-CM

## 2022-12-23 NOTE — TELEPHONE ENCOUNTER
Last Written Prescription Date: 7/1/22  Last Fill Quantity: 30,  # refills: 5   Last office visit: 11/7/2022 with prescribing provider: Dr. Christianson  Future Office Visit: 3/27/22    Next 5 appointments (look out 90 days)    Jan 12, 2023  3:00 PM  (Arrive by 2:45 PM)  Return Visit with Nic Harrington MD  [facility] ([facility] ) [address] [place] [id number] [phone]               Requested Prescriptions   Pending Prescriptions Disp Refills     wearable insulin delivery device (V-GO 40) kit [Pharmacy Med Name: V-GO 40  KIT]  5     Sig: CHANGE EVERY 24 HOURS       There is no refill protocol information for this order        Refills sent  Samantha Marie RN

## 2023-01-06 ENCOUNTER — OFFICE VISIT (OUTPATIENT)
Dept: NEUROPSYCHOLOGY | Facility: CLINIC | Age: 58
End: 2023-01-06
Attending: PSYCHIATRY & NEUROLOGY
Payer: COMMERCIAL

## 2023-01-06 DIAGNOSIS — F41.9 ANXIETY DISORDER, UNSPECIFIED TYPE: ICD-10-CM

## 2023-01-06 DIAGNOSIS — R41.844 EXECUTIVE FUNCTION DEFICIT: Primary | ICD-10-CM

## 2023-01-06 PROCEDURE — 96133 NRPSYC TST EVAL PHYS/QHP EA: CPT

## 2023-01-06 PROCEDURE — 96138 PSYCL/NRPSYC TECH 1ST: CPT

## 2023-01-06 PROCEDURE — 90791 PSYCH DIAGNOSTIC EVALUATION: CPT

## 2023-01-06 PROCEDURE — 96132 NRPSYC TST EVAL PHYS/QHP 1ST: CPT

## 2023-01-06 PROCEDURE — 96139 PSYCL/NRPSYC TST TECH EA: CPT

## 2023-01-06 NOTE — LETTER
2023       RE: Sudheer Montiel  [address]  [place] [address]     Dear Colleague,    Thank you for referring your patient, Sudheer Montiel, to the [facility] [place] at [facility]. Please see a copy of my visit note below.    The patient was seen for neuropsychological evaluation at the request of Nic Harrington MD for the purposes of diagnostic clarification and treatment planning. 240 minutes of test administration and scoring were provided by this writer. Please see Dr. Maricruz Vazquez's report for a full interpretation of the findings.    Myrna Smith   Psychometrist      NEUROPSYCHOLOGICAL EVALUATION  **CONFIDENTIAL**    Name: Sudheer Montiel Education: Associate degree (14 years)    (age): 1965 (57 years) HAWKINS:  2023   Referral: Nic Harrington MD  Department of Neurology Handedness:  MRN (Epic): Right  [id number]     IDENTIFYING INFORMATION AND REASON FOR REFERRAL   Ms. Montiel is a 57-year-old, right-handed, White female with a history of type 2 diabetes, hypothyroidism, subacute chronic cerebral infarctions, liver cirrhosis, and elevated ammonia level (2022) possibly suggestive of hepatic encephalopathy. This evaluation was requested to provide a comprehensive assessment of her current neuropsychological status to inform treatment planning.     IMPRESSION  See below for relevant background information and detailed test results. See separate abstract for supporting documentation including a list of neuropsychological measures and test scores.    Ms. Montiel s neuropsychological profile revealed impaired scores on tests of immediate auditory attention, processing speed, letter-cue verbal fluency, mental flexibility, and planning/organization. Variability was noted on tests of verbal learning and memory with best performance within expectation. Specifically, encoding of rote, unstructured  verbal information (i.e., word list) was exceptionally low with subsequent below average retrieval; however, she was able to recall more words after a delay than initially encoded with average recognition. In contrast, encoding and retrieval of contextualized verbal information in the form of short stories were average with high average recognition. Performance was within expectation on tests of visual learning and memory, naming, semantic verbal fluency, working memory, cognitive inhibition, abstract reasoning, and knowledge of health and safety. Assessment of current psychological and emotional status revealed severe symptoms of anxiety and the absence of clinically significant depressive mood symptoms.    Overall, Ms. Montiel exhibited impaired scores on select tests of attentional/executive functioning and processing speed, suggesting some frontal-subcortical dysfunction. In considering her history and presentation, cerebrovascular factors and history of cerebral infarctions are likely primary etiologic contributors to observed difficulties on cognitive testing; however, lab findings indicative of possible hepatic encephalopathy suggests this may also be an etiologic contributor to observed difficulties on cognitive testing. Severe anxiety may also exacerbate any cognitive difficulties. Observed cognitive findings appear generally mild and occur in the setting of other well preserved cognitive abilities. Her current neurocognitive profile is not suggestive of any underlying neurodegenerative process.     RECOMMENDATIONS  1. Ms. Montiel reported severe anxiety. Therefore, interventions to assist with coping with these mental health symptoms likely would be helpful. [facility] can be reached at [phone]. Additionally, a number of therapists can be found in your local community through [email or web address].  2. She may find the following book helpful: The Mindful Way through Anxiety: Break  Free from Chronic Worry and Reclaim Your Life by Neha Blandon, Ph.D. and Alondra Irene, Ph.D.  3. Ms. Montiel is encouraged to live a healthy lifestyle that promotes brain health including getting appropriate exercise, as advised by her healthcare providers, practicing good sleep hygiene, and eating healthy.    4. Ms. Montiel is encouraged to utilize the following behavioral strategies to maximize cognitive functioning in her daily life:   -Eliminate distraction. Eliminating environmental distracters can facilitate attention and memory performance. For example, turning off music or the television while having a conversation, so that all of your attention is focused on the task at hand.  -Work on decreasing interference from intrusive thoughts. When intrusive thoughts begin to interfere with your thinking, do not concentrate on them. Instead, observe them passively and calmly redirect your attention back to task.   -Engage in calming activities that increase focus on the present. Incorporating mindfulness techniques such as meditation, relaxation, yoga, and moderate cardiovascular exercise, into your daily routine will help keep you present-oriented and consequently improve attention and memory.  -Pay mindful attention. You may find that you need to make a conscious effort to pay attention to conversations or when learning new information. When attention is not focused, it is difficult for the brain to learn new information. Thus, making a mindful effort to pay attention as you go through daily tasks will assist and facilitate memory recall later on.  -Compartmentalize problem solving.  Executive function difficulties may interfere with your ability to make decisions and reason through complex and/or abstract situations in daily life. Break large overwhelming decisions into small manageable problems, and do not go on to the next step before accomplishing the previous one. Use written outlines to track your  progress.   -Allow for time. Take the time needed to learn and recall information. Some people tend to worry if they can't immediately recall something. Instead, you should take time to express a thought or complete a task rather than be critical or harsh on yourself.  -Use external aids to increase structure in daily life. Ongoing use of compensatory strategies such as notes, lists, and a centralized calendar are supported. Tools such as smart phones with alarms and reminders are helpful in bringing structure to daily activities.   5. The current evaluation will serve as an objective cognitive baseline against which results of future evaluations may be compared.  Ms. Montiel may be referred for a follow-up neuropsychological evaluation in 12-24 months to objectively assess neurocognitive change.     Thank you for the opportunity to participate in Ms. Montiel s care.  These findings and recommendations were reviewed with the patient in a separate feedback session on 1/10/2023 and all her questions were answered. If you have any questions regarding this evaluation, please do not hesitate to contact me.       Maricruz Vazquez, Ph.D.,   Clinical Neuropsychologist    RELEVANT BACKGROUND INFORMATION AND SUPPORTING DOCUMENTATION  Gathered from a clinical interview with the patient and reviews of electronic medical record.    History of Presenting Problem(s)  Ms. Montiel presented with a history of type 2 diabetes, liver cirrhosis, hypothyroidism, and subacute chronic cerebral infarctions. Per neurology note dated 7/8/2022, ammonia level was found to be elevated. This was felt to be potentially spurious given recent cigarette smoking, but in light of known cirrhosis it is possible that this is genuine. If that is the case, some or all of the patient's cognitive complaints could relate to hepatic encephalopathy. She reported cognitive complaints for the past 1-2 years characterized by difficulty completing tasks at  work resulting in losing her job in June of 2022. She described difficulties recalling details of conversations, repeating questions and conversations with no memory of the prior discussions, difficulty with focus/concentration, losing her train of thought, word finding difficulties, needing to reread things, trouble with multitasking, organization, and planning, as well as trouble with spatial orientation. She reported some difficulties managing medical appointments and specified although she writes appointments on her calendar, she may forget about them if she does not receive e-mail reminders. She reported forgetting to take her medications approximately once per week. She stated she previously enjoyed cooking, but she has been having more difficulties preparing complex meals and baking. For example, when doubling a batch of rum cake recently she forgot several ingredients. She denied difficulty preparing very simple meals for herself. She reported some difficulties with parking but otherwise denied any trouble with driving. Her daughter oversees management of her finances, but she denied making any mistakes. She denied any trouble with basic self-care but reported she often gets sidetracked when cleaning and doing other tasks around her home. She reported balance difficulties and shakiness in her hands when holding things, but she denied other physical complaints or sensorimotor disturbances such as falls, resting tremor, weakness, numbness, or tingling. She described more difficulty with hearing and stated her vision has not been checked in quite some time. Emotionally, she stated,  I think I feel fine.  She admitted to occasionally feeling down, depressed, and sad but stated she has more good days than bad. She reported significant anxiety that is worse now than in the past. She reported impulsiveness in terms of  edginess  and being  sharp with her words  as well as reduced socialization due to anxiety about  her cognitive complaints, but she denied other personality or behavioral changes.    Neurodiagnostic Findings   ? Brain MRI w/wo contrast (6/27/2022) INTERPRETATION: post contrast images demonstrate subtle suggestion of enhancement along the inferior component of the more anterior lesion on the right series nine image 21 no enhancement of the other two lesions which also demonstrate central T1 hypointense signal sagittal T2 flare post contrast demonstrates additional foci of T2/T2 flair signal abnormality better visualized on today's examination in the right frontal lobe left posterior temporal lobe series seven image 10 and 27 conclusion small focus of enhancement along the inferior margin of the right anterior corona radiata lesion in conjunction with non contrast MRI finding from 620 to 2022 given the small focus of enhancement findings are concerning for demyelinating process late subacute infarct of the more anterior position lesion and chronic lacunar infarcts of the other nonenhancing lesions could be an alternate consideration but is less favored recommend neurological consultation and follow up imaging to assess for stability slash change.   ? Per neurology note dated 7/8/2022 There are 3 discrete lesions in the bilateral frontal white matter with T2 hyperintensity surrounding a rim of T2 and T1 hypointensity, that to my eye appear at or near isointense to CSF.  These lesions do not directly contact the ventricular surface.  One of the lesions in the right posterior frontal lobe is bright on diffusion weighted sequences, although does not have corresponding signal abnormality on ADC map.  There may be subtle contrast enhancement of this lesion as well.  I find these lesions most suggestive of subcortical subacute to chronic cerebral infarctions of likely somewhat varying ages  A demyelinating process cannot be strictly excluded, but in the presence of numerous vascular risk factors including family history  of premature onset cerebrovascular disease, ongoing cigarette smoking, and diabetes mellitus, I think it is likely that the lesions seen are due to cerebral ischemia.  ? ADDENDUM (7-): It was brought to my attention that the patient recently had an ammonia level drawn that was resulted at 121 umol/L. This was felt to be potentially spurious given recent cigarette smoking, but in light of known cirrhosis it is possible that this is genuine. If that is the case, some or all of the patient's cognitive complaints could relate to hepatic encephalopathy. We will recheck this value along with INR, basic metabolic panel, and hepatic panel at the patient's next visit.    Medical History  ? Type 2 diabetes (A1c=6.6 on 10/15/2021)  ? Liver cirrhosis  ? Hypothyroidism  ? Subcortical subacute to chronic cerebral infarctions as noted above    Health Behaviors   ? Sleep: ~10 hours (9pm-7am) of cumulative sleep nightly; infrequent delay in onset; frequent waking to use the restroom; denied snoring, gasping arousals, or parasomnic behaviors  ? Exercise: None  ? Pain: Occasional sharp pain related to cysts on her liver; denied pain at the time of this evaluation    Psychiatric History  ? When asked about current mood Ms. Montiel stated,  I think I feel fine . She reported reduced interest in activities previously enjoyed because they are more difficult for her now (e.g., reading and cooking); she denied anhedonia and stated she has more good days than bad days  ? She reported anxiety characterized by excessive, difficult to control worry, feeling nervous, feeling as if she is hyperventilating, edginess, tension, and sleep disturbance. She described longstanding anxiety that is worse now.  ? She denied alteration of sensory perceptual processes or disordered thought.  ? Suicidality/ Self-harm: She reported history of passive death thoughts ~4 years ago; she denied any recent thoughts of suicide and denied any history of  suicidal ideation, intent, or past attempts.   ? Psychiatric treatment: None currently or in the past    Substance Use History  ? Alcohol: None  ? Nicotine: 1/2ppd   ? Cannabis: None  ? Problematic Substance Use: Denied    Current Medications (per EMR)    acetaminophen (TYLENOL) 325 MG tablet     ASPIRIN 81 PO     blood glucose (NO BRAND SPECIFIED) test strip     celecoxib (CELEBREX) 200 MG capsule     Continuous Blood Gluc  (FREESTYLE DIMITRIOS READER) BEBE     Continuous Blood Gluc Sensor (FREESTYLE DIMITRIOS 14 DAY SENSOR) MISC     insulin lispro (HUMALOG VIAL) 100 UNIT/ML vial     insulin pen needle (B-D U/F) 31G X 5 MM miscellaneous     levothyroxine (SYNTHROID/LEVOTHROID) 88 MCG tablet     metFORMIN (GLUCOPHAGE-XR) 750 MG 24 hr tablet     omeprazole (PRILOSEC) 10 MG DR capsule     semaglutide (OZEMPIC) 2 MG/1.5ML SOPN pen     thyroid (ARMOUR) 60 MG tablet     valACYclovir (VALTREX) 500 MG tablet     wearable insulin delivery device (V-GO 40) kit     Developmental, Educational, & Occupational History  ? Gestational: Full-term  ?  complications: None reported  ? Developmental milestones: Met at expected ages  ? Native Language: English  ? Education: Reportedly struggled with math and was enrolled in special education for math; graduated high school on time; obtained associate degree  ? Occupation: Patient coordinator    Psychosocial History  ? Born and raised in [place], [place]  ? Marital:   ? Children: 2 adult daughters  ? Housing: Renting the basement of her friend s home  ? Psychosocial support: Strong social support network of friends and family    Family History  ? Dementia (maternal grandfather, maternal grandmother, paternal grandmother - onset in late 70s/early 80s)  ? Strokes (father, uncle, aunt); family history of premature onset cerebrovascular disease (father); per EMR father  of stroke at age 48    RESULTS  See separate abstract for list of neuropsychological measures  and test scores. Descriptive ranges are based on American Academy of Clinical Neuropsychology (2020) consensus guidelines, or test manuals where appropriate.    PRE-MORBID ABILITY: Premorbid abilities were estimated within the average range based on single word reading ability and demographic factors including sex, ethnicity, education, occupation, and geographic region.  GENERAL COGNITIVE STATUS: Orientation was within expectation for general personal information, time, place, and cultural information. Within the practical domain, performance was low average on a measure of conceptual understanding of knowledge of health and safety. Her score was consistent with individuals functioning at a moderate level of independence in the community.   ATTENTION/EXECUTIVE FUNCTIONS: Immediate auditory attention and working memory performances were below average. Verbal abstract reasoning performance was low average. Visual reasoning through pattern identification was low average. Performances on a test of set-shifting/cognitive flexibility was below average with 2 errors. Response inhibition performance was below average but average when considering slowed verbal processing speed. Copy of a complex figure was exceptionally low and notable for a dysexecutive, piecemeal approach with poor planning and organization and inattention to detail.  Psychomotor processing speed performances were below average.  LANGUAGE: Confrontation naming performance was average. Letter-cue verbal fluency performance was exceptionally low. Semantic verbal fluency performance was low average.   VISUOSPATIAL PROCESSING: Performance on a spatial perception task requiring judgement of angled lines was average. Copy of increasingly intricate figures was below average. Copy of a complex figure was exceptionally low and notable for poor planning and inattention to detail characterized by extra lines, misplaced elements, and running out of space on the right  side of the page.  There was no evidence of spatial neglect.   LEARNING AND MEMORY: Initial encoding of a word list over multiple learning trials was exceptionally low. Delayed recall of the list was below average; however, she was able to recall more words than initially encoded. Recognition of the word list was average. Initial encoding of contextualized verbal information in the form of short stories was average and delayed retrieval was average. Recognition of story details was high average. Encoding of visual information was high average and retrieval was average. Recognition among distractors was average.    PSYCHOLOGICAL AND BEHAVIORAL: She endorsed severe symptoms of anxiety and minimal symptoms of depression on self-report questionnaires.   PERFORMANCE VALIDITY: Performance on neuropsychological tests is dependent upon a number of factors, including sufficient engagement and motivation, to reliably establish an examinee s level of cognitive functioning.  Based upon observations made throughout the evaluation, the patient did not appear to deliberately perform in a suboptimal manner and demonstrated good frustration tolerance on cognitively challenging tasks. Score on one imbedded index of performance validity was below established cutoffs; however, scores on dedicated indices of performance validity were in the valid range. Overall, test results are believed to accurately represent the patient s current neurocognitive status.    BEHAVIORAL OBSERVATIONS  ? Alert, oriented to self, time, place, and circumstance  ? Distractible while undergoing testing  ? Appearance: Well-groomed, casually dressed  ? Gait/Posture: No abnormalities noted  ? Sensorimotor: No abnormalities noted  ? Behavior: Impulsive at times during testing; following a restroom break she went to the lobby to get herself coffee instead of returning to the testing room and indicated she forgot she could not get back in on her own; cooperative and  pleasant  ? Speech: Fluent, articulate, normal rate, prosody, and volume; no conversational word finding difficulty  ? Thought process: Logical, linear, and goal-directed; often required task instructions repeated  ? 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   ? Affect: Empty; good eye contact  ? Mood: Anxious  ? Insight and Judgment: Intact  ? Approach to testing: Slow/sluggish, impulsive, doubtful; good frustration on cognitively demanding tasks  ? Rapport: Easily established and maintained      Activities included in this evaluation: CPT Code #Units   Psychiatric diagnostic interview [id number]   Test evaluation services by professional; first hour. [id number]   Test evaluation services by professional, additional hour (+) [id number]   Test administration and scoring by technician, first 30 mins [id number]   Test administration and scoring by technician, additional 30 mins (+) [id number]   Dx: R41.844; F41.9      Again, thank you for allowing me to participate in the care of your patient.      Sincerely,    ERNIE TIDWELL, PhD

## 2023-01-09 NOTE — PROGRESS NOTES
The patient was seen for neuropsychological evaluation at the request of Nic Harrington MD for the purposes of diagnostic clarification and treatment planning. 240 minutes of test administration and scoring were provided by this writer. Please see Dr. Maricruz Vazquez's report for a full interpretation of the findings.    Myrna Smith   Psychometrist

## 2023-01-10 NOTE — PROGRESS NOTES
Provider: CE      Tech: OK      Patient Name: Sudheer Montiel     : 63      MRN: [id number]      HAWKINS: 23      Age: 57      Education: 14      Ethnicity: C      Handedness: Right      Station: OP             NEUROPSYCHOLOGICAL TESTS RAW SCORE STANDARDIZED SCORE* DESCRIPTIVE RANGE**   PREMORBID ABILITY       WAIS-IV ACS Test of Premorbid Functioning (Estimated FSIQ) 43 SS= 101 Average     Estimated FSIQ = 103 Average          ATTENTION AND EXECUTIVE FUNCTIONS RAW SCORE STANDARDIZED SCORE* DESCRIPTIVE RANGE**   Wechsler Adult Intelligence Scale - 4th Edition (WAIS-IV)      Digit Span Forward 5 ss= 3 Exceptionally Low   Digit Span Backward 5 ss= 6 Low Average   Digit Span Sequencing 5 ss= 6 Low Average   Digit Span Total 15 ss= 4 Below Average   Coding 31 ss= 5 Below Average   Similarities 18 ss= 7 Low Average   Matrix Reasoning 8 ss= 6 Low Average   Trail Making Test (Time/errors)        Part A s,r,e 48/0 TS= 34 Below Average   Part B s,r,e 152/2 TS= 31 Below Average   Stroop       Word e 53 TS= 16 Exceptionally Low   Color e 40 TS= 21 Exceptionally Low   Color/Word e 23 TS= 34 Below Average   Interference e 23 TS= 51 Average   Independent Living Scales (ILS)       Health and Safety 31 TS= 39 Moderate          LANGUAGE RAW SCORE STANDARDIZED SCORE* DESCRIPTIVE RANGE**   Convent Naming Test s,r,e 56 TS= 47 Average   Letter-Cue Verbal Fluency s,r,e 7-4-5 (16) TS= 25 Exceptionally Low   Animal Fluency s,r,e 15 TS=  39 Low Average          VISUOSPATIAL PROCESSING RAW SCORE STANDARDIZED SCORE* DESCRIPTIVE RANGE**   Keenan Complex Figure Test (RCFT) Copy 26 %ile= <1 Exceptionally Low   RBANS; Form B       Line Orientation 18 %ile= 51-75 Average   WMS-IV Visual Reproduction Copy 39 %= 3-9 Below Average          LEARNING & MEMORY RAW SCORE STANDARDIZED SCORE* DESCRIPTIVE RANGE**   Wechsler Memory Scale-4th Edition (WMS-IV)      Logical Memory I 22 ss= 9 Average   Logical Memory II 21 ss= 10 Average   Logical Memory  Recognition 26 %= >75 High Average   Visual Reproduction I 38 ss= 12 High Average   Visual Reproduction II 19 ss= 9 Average   Visual Reproduction Recognition 5 %= 26-50 Average   Delaney Verbal Learning Test - Revised (HVLT-R; Form 1)     Total Trials 1-3 5-6-5 (16) TS= 23 Exceptionally Low   Delayed Recall 7 TS= 35 Below Average   Retention (%) 117% TS= 67 Above Average   Recognition Hits 12 --     Recognition False Alarms 1 --     Recognition Discriminability 11 TS= 52 Average          PSYCHOLOGICAL & BEHAVIORAL SYMPTOMS RAW SCORE STANDARDIZED SCORE* DESCRIPTIVE RANGE**   Ivan Anxiety Inventory (ARIANNA) 49 --  SEVERE   Ivan Depression Inventory - 2nd Edition (BDI-II) 12 --  MINIMAL          PERFORMANCE VALIDITY RAW SCORE STANDARDIZED SCORE* DESCRIPTIVE RANGE**   DCT  18 --  Valid Range   RDS 5   Indeterminate Range   ACS Word Choice 50 --  Valid Range          * All standardized scores are adjusted for age. Superscripts denote additional adjustment for (s)ex, (r)ace/ethnicity, (l)anguage, and/or (e)ducation.   ** Descriptive ranges are based on American Academy of Clinical Neuropsychology (2020) consensus guidelines, or test manuals where appropriate.    WNL = within normal limits. DC = discontinued due to patient s inability to complete the test.     Standardized scores: TS = T-score; mean = 50, standard deviation =10; z = z-score; mean = 0, standard deviation = 1; ss = scaled score; mean = 10, standard deviation = 3; MAS = Harrold Older Adult Normative Study age adjusted scaled score; mean = 10, standard deviation = 3; SS = standard score; mean = 100, standard deviation = 15.

## 2023-01-10 NOTE — PROGRESS NOTES
NEUROPSYCHOLOGICAL EVALUATION  **CONFIDENTIAL**    Name: Sudheer Montiel Education: Associate degree (14 years)    (age): 1965 (57 years) HAWKINS:  2023   Referral: Nic Harrington MD  Department of Neurology Handedness:  MRN (Epic): Right  [id number]     IDENTIFYING INFORMATION AND REASON FOR REFERRAL   Ms. Montiel is a 57-year-old, right-handed, White female with a history of type 2 diabetes, hypothyroidism, subacute chronic cerebral infarctions, liver cirrhosis, and elevated ammonia level (2022) possibly suggestive of hepatic encephalopathy. This evaluation was requested to provide a comprehensive assessment of her current neuropsychological status to inform treatment planning.     IMPRESSION  See below for relevant background information and detailed test results. See separate abstract for supporting documentation including a list of neuropsychological measures and test scores.    Ms. Montiel s neuropsychological profile revealed impaired scores on tests of immediate auditory attention, processing speed, letter-cue verbal fluency, mental flexibility, and planning/organization. Variability was noted on tests of verbal learning and memory with best performance within expectation. Specifically, encoding of rote, unstructured verbal information (i.e., word list) was exceptionally low with subsequent below average retrieval; however, she was able to recall more words after a delay than initially encoded with average recognition. In contrast, encoding and retrieval of contextualized verbal information in the form of short stories were average with high average recognition. Performance was within expectation on tests of visual learning and memory, naming, semantic verbal fluency, working memory, cognitive inhibition, abstract reasoning, and knowledge of health and safety. Assessment of current psychological and emotional status revealed severe symptoms of anxiety and the absence of clinically significant  depressive mood symptoms.    Overall, Ms. Montiel exhibited impaired scores on select tests of attentional/executive functioning and processing speed, suggesting some frontal-subcortical dysfunction. In considering her history and presentation, cerebrovascular factors and history of cerebral infarctions are likely primary etiologic contributors to observed difficulties on cognitive testing; however, lab findings indicative of possible hepatic encephalopathy suggests this may also be an etiologic contributor to observed difficulties on cognitive testing. Severe anxiety may also exacerbate any cognitive difficulties. Observed cognitive findings appear generally mild and occur in the setting of other well preserved cognitive abilities. Her current neurocognitive profile is not suggestive of any underlying neurodegenerative process.     RECOMMENDATIONS  1. Ms. Montiel reported severe anxiety. Therefore, interventions to assist with coping with these mental health symptoms likely would be helpful. [facility] can be reached at [phone]. Additionally, a number of therapists can be found in your local community through [email or web address].  2. She may find the following book helpful: The Mindful Way through Anxiety: Break Free from Chronic Worry and Reclaim Your Life by Neha Blandon, Ph.D. and Alondra Irene, Ph.D.  3. Ms. Montiel is encouraged to live a healthy lifestyle that promotes brain health including getting appropriate exercise, as advised by her healthcare providers, practicing good sleep hygiene, and eating healthy.    4. Ms. Montiel is encouraged to utilize the following behavioral strategies to maximize cognitive functioning in her daily life:   -Eliminate distraction. Eliminating environmental distracters can facilitate attention and memory performance. For example, turning off music or the television while having a conversation, so that all of your attention is focused on the  task at hand.  -Work on decreasing interference from intrusive thoughts. When intrusive thoughts begin to interfere with your thinking, do not concentrate on them. Instead, observe them passively and calmly redirect your attention back to task.   -Engage in calming activities that increase focus on the present. Incorporating mindfulness techniques such as meditation, relaxation, yoga, and moderate cardiovascular exercise, into your daily routine will help keep you present-oriented and consequently improve attention and memory.  -Pay mindful attention. You may find that you need to make a conscious effort to pay attention to conversations or when learning new information. When attention is not focused, it is difficult for the brain to learn new information. Thus, making a mindful effort to pay attention as you go through daily tasks will assist and facilitate memory recall later on.  -Compartmentalize problem solving.  Executive function difficulties may interfere with your ability to make decisions and reason through complex and/or abstract situations in daily life. Break large overwhelming decisions into small manageable problems, and do not go on to the next step before accomplishing the previous one. Use written outlines to track your progress.   -Allow for time. Take the time needed to learn and recall information. Some people tend to worry if they can't immediately recall something. Instead, you should take time to express a thought or complete a task rather than be critical or harsh on yourself.  -Use external aids to increase structure in daily life. Ongoing use of compensatory strategies such as notes, lists, and a centralized calendar are supported. Tools such as smart phones with alarms and reminders are helpful in bringing structure to daily activities.   5. The current evaluation will serve as an objective cognitive baseline against which results of future evaluations may be compared.  Ms. Montiel may  be referred for a follow-up neuropsychological evaluation in 12-24 months to objectively assess neurocognitive change.     Thank you for the opportunity to participate in Ms. Montiel s care.  These findings and recommendations were reviewed with the patient in a separate feedback session on 1/10/2023 and all her questions were answered. If you have any questions regarding this evaluation, please do not hesitate to contact me.       Maricruz Vazquez, Ph.D.,   Clinical Neuropsychologist    RELEVANT BACKGROUND INFORMATION AND SUPPORTING DOCUMENTATION  Gathered from a clinical interview with the patient and reviews of electronic medical record.    History of Presenting Problem(s)  Ms. Montiel presented with a history of type 2 diabetes, liver cirrhosis, hypothyroidism, and subacute chronic cerebral infarctions. Per neurology note dated 7/8/2022, ammonia level was found to be elevated. This was felt to be potentially spurious given recent cigarette smoking, but in light of known cirrhosis it is possible that this is genuine. If that is the case, some or all of the patient's cognitive complaints could relate to hepatic encephalopathy. She reported cognitive complaints for the past 1-2 years characterized by difficulty completing tasks at work resulting in losing her job in June of 2022. She described difficulties recalling details of conversations, repeating questions and conversations with no memory of the prior discussions, difficulty with focus/concentration, losing her train of thought, word finding difficulties, needing to reread things, trouble with multitasking, organization, and planning, as well as trouble with spatial orientation. She reported some difficulties managing medical appointments and specified although she writes appointments on her calendar, she may forget about them if she does not receive e-mail reminders. She reported forgetting to take her medications approximately once per week. She stated  she previously enjoyed cooking, but she has been having more difficulties preparing complex meals and baking. For example, when doubling a batch of rum cake recently she forgot several ingredients. She denied difficulty preparing very simple meals for herself. She reported some difficulties with parking but otherwise denied any trouble with driving. Her daughter oversees management of her finances, but she denied making any mistakes. She denied any trouble with basic self-care but reported she often gets sidetracked when cleaning and doing other tasks around her home. She reported balance difficulties and shakiness in her hands when holding things, but she denied other physical complaints or sensorimotor disturbances such as falls, resting tremor, weakness, numbness, or tingling. She described more difficulty with hearing and stated her vision has not been checked in quite some time. Emotionally, she stated,  I think I feel fine.  She admitted to occasionally feeling down, depressed, and sad but stated she has more good days than bad. She reported significant anxiety that is worse now than in the past. She reported impulsiveness in terms of  edginess  and being  sharp with her words  as well as reduced socialization due to anxiety about her cognitive complaints, but she denied other personality or behavioral changes.    Neurodiagnostic Findings   ? Brain MRI w/wo contrast (6/27/2022) INTERPRETATION: post contrast images demonstrate subtle suggestion of enhancement along the inferior component of the more anterior lesion on the right series nine image 21 no enhancement of the other two lesions which also demonstrate central T1 hypointense signal sagittal T2 flare post contrast demonstrates additional foci of T2/T2 flair signal abnormality better visualized on today's examination in the right frontal lobe left posterior temporal lobe series seven image 10 and 27 conclusion small focus of enhancement along the inferior  margin of the right anterior corona radiata lesion in conjunction with non contrast MRI finding from 620 to 2022 given the small focus of enhancement findings are concerning for demyelinating process late subacute infarct of the more anterior position lesion and chronic lacunar infarcts of the other nonenhancing lesions could be an alternate consideration but is less favored recommend neurological consultation and follow up imaging to assess for stability slash change.   ? Per neurology note dated 7/8/2022 There are 3 discrete lesions in the bilateral frontal white matter with T2 hyperintensity surrounding a rim of T2 and T1 hypointensity, that to my eye appear at or near isointense to CSF.  These lesions do not directly contact the ventricular surface.  One of the lesions in the right posterior frontal lobe is bright on diffusion weighted sequences, although does not have corresponding signal abnormality on ADC map.  There may be subtle contrast enhancement of this lesion as well.  I find these lesions most suggestive of subcortical subacute to chronic cerebral infarctions of likely somewhat varying ages  A demyelinating process cannot be strictly excluded, but in the presence of numerous vascular risk factors including family history of premature onset cerebrovascular disease, ongoing cigarette smoking, and diabetes mellitus, I think it is likely that the lesions seen are due to cerebral ischemia.  ? ADDENDUM (7-): It was brought to my attention that the patient recently had an ammonia level drawn that was resulted at 121 umol/L. This was felt to be potentially spurious given recent cigarette smoking, but in light of known cirrhosis it is possible that this is genuine. If that is the case, some or all of the patient's cognitive complaints could relate to hepatic encephalopathy. We will recheck this value along with INR, basic metabolic panel, and hepatic panel at the patient's next visit.    Medical  History  ? Type 2 diabetes (A1c=6.6 on 10/15/2021)  ? Liver cirrhosis  ? Hypothyroidism  ? Subcortical subacute to chronic cerebral infarctions as noted above    Health Behaviors   ? Sleep: ~10 hours (9pm-7am) of cumulative sleep nightly; infrequent delay in onset; frequent waking to use the restroom; denied snoring, gasping arousals, or parasomnic behaviors  ? Exercise: None  ? Pain: Occasional sharp pain related to cysts on her liver; denied pain at the time of this evaluation    Psychiatric History  ? When asked about current mood Ms. Montiel stated,  I think I feel fine . She reported reduced interest in activities previously enjoyed because they are more difficult for her now (e.g., reading and cooking); she denied anhedonia and stated she has more good days than bad days  ? She reported anxiety characterized by excessive, difficult to control worry, feeling nervous, feeling as if she is hyperventilating, edginess, tension, and sleep disturbance. She described longstanding anxiety that is worse now.  ? She denied alteration of sensory perceptual processes or disordered thought.  ? Suicidality/ Self-harm: She reported history of passive death thoughts ~4 years ago; she denied any recent thoughts of suicide and denied any history of suicidal ideation, intent, or past attempts.   ? Psychiatric treatment: None currently or in the past    Substance Use History  ? Alcohol: None  ? Nicotine: 1/2ppd   ? Cannabis: None  ? Problematic Substance Use: Denied    Current Medications (per EMR)    acetaminophen (TYLENOL) 325 MG tablet     ASPIRIN 81 PO     blood glucose (NO BRAND SPECIFIED) test strip     celecoxib (CELEBREX) 200 MG capsule     Continuous Blood Gluc  (FREESTYLE DIMITRIOS READER) BEBE     Continuous Blood Gluc Sensor (FREESTYLE DIMITRIOS 14 DAY SENSOR) MISC     insulin lispro (HUMALOG VIAL) 100 UNIT/ML vial     insulin pen needle (B-D U/F) 31G X 5 MM miscellaneous     levothyroxine (SYNTHROID/LEVOTHROID) 88  MCG tablet     metFORMIN (GLUCOPHAGE-XR) 750 MG 24 hr tablet     omeprazole (PRILOSEC) 10 MG DR capsule     semaglutide (OZEMPIC) 2 MG/1.5ML SOPN pen     thyroid (ARMOUR) 60 MG tablet     valACYclovir (VALTREX) 500 MG tablet     wearable insulin delivery device (V-GO 40) kit     Developmental, Educational, & Occupational History  ? Gestational: Full-term  ?  complications: None reported  ? Developmental milestones: Met at expected ages  ? Native Language: English  ? Education: Reportedly struggled with math and was enrolled in special education for math; graduated high school on time; obtained associate degree  ? Occupation: Patient coordinator    Psychosocial History  ? Born and raised in [place], [place]  ? Marital:   ? Children: 2 adult daughters  ? Housing: Renting the basement of her friend s home  ? Psychosocial support: Strong social support network of friends and family    Family History  ? Dementia (maternal grandfather, maternal grandmother, paternal grandmother - onset in late 70s/early 80s)  ? Strokes (father, uncle, aunt); family history of premature onset cerebrovascular disease (father); per EMR father  of stroke at age 48    RESULTS  See separate abstract for list of neuropsychological measures and test scores. Descriptive ranges are based on American Academy of Clinical Neuropsychology (2020) consensus guidelines, or test manuals where appropriate.    PRE-MORBID ABILITY: Premorbid abilities were estimated within the average range based on single word reading ability and demographic factors including sex, ethnicity, education, occupation, and geographic region.  GENERAL COGNITIVE STATUS: Orientation was within expectation for general personal information, time, place, and cultural information. Within the practical domain, performance was low average on a measure of conceptual understanding of knowledge of health and safety. Her score was consistent with individuals  functioning at a moderate level of independence in the community.   ATTENTION/EXECUTIVE FUNCTIONS: Immediate auditory attention and working memory performances were below average. Verbal abstract reasoning performance was low average. Visual reasoning through pattern identification was low average. Performances on a test of set-shifting/cognitive flexibility was below average with 2 errors. Response inhibition performance was below average but average when considering slowed verbal processing speed. Copy of a complex figure was exceptionally low and notable for a dysexecutive, piecemeal approach with poor planning and organization and inattention to detail.  Psychomotor processing speed performances were below average.  LANGUAGE: Confrontation naming performance was average. Letter-cue verbal fluency performance was exceptionally low. Semantic verbal fluency performance was low average.   VISUOSPATIAL PROCESSING: Performance on a spatial perception task requiring judgement of angled lines was average. Copy of increasingly intricate figures was below average. Copy of a complex figure was exceptionally low and notable for poor planning and inattention to detail characterized by extra lines, misplaced elements, and running out of space on the right side of the page.  There was no evidence of spatial neglect.   LEARNING AND MEMORY: Initial encoding of a word list over multiple learning trials was exceptionally low. Delayed recall of the list was below average; however, she was able to recall more words than initially encoded. Recognition of the word list was average. Initial encoding of contextualized verbal information in the form of short stories was average and delayed retrieval was average. Recognition of story details was high average. Encoding of visual information was high average and retrieval was average. Recognition among distractors was average.    PSYCHOLOGICAL AND BEHAVIORAL: She endorsed severe symptoms of  anxiety and minimal symptoms of depression on self-report questionnaires.   PERFORMANCE VALIDITY: Performance on neuropsychological tests is dependent upon a number of factors, including sufficient engagement and motivation, to reliably establish an examinee s level of cognitive functioning.  Based upon observations made throughout the evaluation, the patient did not appear to deliberately perform in a suboptimal manner and demonstrated good frustration tolerance on cognitively challenging tasks. Score on one imbedded index of performance validity was below established cutoffs; however, scores on dedicated indices of performance validity were in the valid range. Overall, test results are believed to accurately represent the patient s current neurocognitive status.    BEHAVIORAL OBSERVATIONS  ? Alert, oriented to self, time, place, and circumstance  ? Distractible while undergoing testing  ? Appearance: Well-groomed, casually dressed  ? Gait/Posture: No abnormalities noted  ? Sensorimotor: No abnormalities noted  ? Behavior: Impulsive at times during testing; following a restroom break she went to the lobby to get herself coffee instead of returning to the testing room and indicated she forgot she could not get back in on her own; cooperative and pleasant  ? Speech: Fluent, articulate, normal rate, prosody, and volume; no conversational word finding difficulty  ? Thought process: Logical, linear, and goal-directed; often required task instructions repeated  ? 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   ? Affect: Empty; good eye contact  ? Mood: Anxious  ? Insight and Judgment: Intact  ? Approach to testing: Slow/sluggish, impulsive, doubtful; good frustration on cognitively demanding tasks  ? Rapport: Easily established and maintained      Activities included in this evaluation: CPT Code #Units    Psychiatric diagnostic interview [id number]   Test evaluation services by professional; first hour. [id number] 1   Test evaluation services by professional, additional hour (+) [id number] 2   Test administration and scoring by technician, first 30 mins [id number] 1   Test administration and scoring by technician, additional 30 mins (+) [id number]   Dx: R41.844; F41.9

## 2023-01-12 ENCOUNTER — OFFICE VISIT (OUTPATIENT)
Dept: NEUROLOGY | Facility: CLINIC | Age: 58
End: 2023-01-12
Attending: PSYCHIATRY & NEUROLOGY
Payer: COMMERCIAL

## 2023-01-12 ENCOUNTER — LAB (OUTPATIENT)
Dept: LAB | Facility: CLINIC | Age: 58
End: 2023-01-12
Payer: COMMERCIAL

## 2023-01-12 VITALS
DIASTOLIC BLOOD PRESSURE: 74 MMHG | OXYGEN SATURATION: 96 % | HEART RATE: 69 BPM | SYSTOLIC BLOOD PRESSURE: 133 MMHG | WEIGHT: 240.5 LBS | BODY MASS INDEX: 41.28 KG/M2

## 2023-01-12 DIAGNOSIS — E78.5 HYPERLIPIDEMIA LDL GOAL <70: ICD-10-CM

## 2023-01-12 DIAGNOSIS — R90.82 WHITE MATTER ABNORMALITY ON MRI OF BRAIN: ICD-10-CM

## 2023-01-12 DIAGNOSIS — Z79.4 TYPE 2 DIABETES MELLITUS WITHOUT COMPLICATION, WITH LONG-TERM CURRENT USE OF INSULIN (H): ICD-10-CM

## 2023-01-12 DIAGNOSIS — R90.82 WHITE MATTER ABNORMALITY ON MRI OF BRAIN: Primary | ICD-10-CM

## 2023-01-12 DIAGNOSIS — E11.9 TYPE 2 DIABETES MELLITUS WITHOUT COMPLICATION, WITH LONG-TERM CURRENT USE OF INSULIN (H): ICD-10-CM

## 2023-01-12 DIAGNOSIS — F41.9 ANXIETY: ICD-10-CM

## 2023-01-12 DIAGNOSIS — I77.810 AORTIC ROOT DILATION (H): ICD-10-CM

## 2023-01-12 DIAGNOSIS — R41.89 COGNITIVE DECLINE: ICD-10-CM

## 2023-01-12 DIAGNOSIS — K74.60 CIRRHOSIS OF LIVER WITHOUT ASCITES, UNSPECIFIED HEPATIC CIRRHOSIS TYPE (H): ICD-10-CM

## 2023-01-12 LAB
ALBUMIN SERPL BCG-MCNC: 3.5 G/DL (ref 3.5–5.2)
ALP SERPL-CCNC: 191 U/L (ref 35–104)
ALT SERPL W P-5'-P-CCNC: 22 U/L (ref 10–35)
AMMONIA PLAS-SCNC: 71 UMOL/L (ref 11–51)
ANION GAP SERPL CALCULATED.3IONS-SCNC: 9 MMOL/L (ref 7–15)
AST SERPL W P-5'-P-CCNC: ABNORMAL U/L
BILIRUB DIRECT SERPL-MCNC: 0.38 MG/DL (ref 0–0.3)
BILIRUB SERPL-MCNC: 1 MG/DL
BUN SERPL-MCNC: 14.4 MG/DL (ref 6–20)
CALCIUM SERPL-MCNC: 8.9 MG/DL (ref 8.6–10)
CHLORIDE SERPL-SCNC: 103 MMOL/L (ref 98–107)
CHOLEST SERPL-MCNC: 216 MG/DL
CREAT SERPL-MCNC: 0.55 MG/DL (ref 0.51–0.95)
CREAT UR-MCNC: 131 MG/DL
DEPRECATED HCO3 PLAS-SCNC: 25 MMOL/L (ref 22–29)
GFR SERPL CREATININE-BSD FRML MDRD: >90 ML/MIN/1.73M2
GLUCOSE SERPL-MCNC: 243 MG/DL (ref 70–99)
HBA1C MFR BLD: 8.8 %
HDLC SERPL-MCNC: 48 MG/DL
INR PPP: 1.21 (ref 0.85–1.15)
LDLC SERPL CALC-MCNC: 136 MG/DL
MICROALBUMIN UR-MCNC: 15.8 MG/L
MICROALBUMIN/CREAT UR: 12.06 MG/G CR (ref 0–25)
NONHDLC SERPL-MCNC: 168 MG/DL
POTASSIUM SERPL-SCNC: 3.9 MMOL/L (ref 3.4–5.3)
PROT SERPL-MCNC: 7.4 G/DL (ref 6.4–8.3)
SODIUM SERPL-SCNC: 137 MMOL/L (ref 136–145)
T3FREE SERPL-MCNC: 2.7 PG/ML (ref 2–4.4)
T4 FREE SERPL-MCNC: 1.15 NG/DL (ref 0.9–1.7)
TRIGL SERPL-MCNC: 159 MG/DL
TSH SERPL DL<=0.005 MIU/L-ACNC: 9.76 UIU/ML (ref 0.3–4.2)

## 2023-01-12 PROCEDURE — 82140 ASSAY OF AMMONIA: CPT | Performed by: PATHOLOGY

## 2023-01-12 PROCEDURE — 82040 ASSAY OF SERUM ALBUMIN: CPT | Performed by: PATHOLOGY

## 2023-01-12 PROCEDURE — 83036 HEMOGLOBIN GLYCOSYLATED A1C: CPT | Performed by: PSYCHIATRY & NEUROLOGY

## 2023-01-12 PROCEDURE — 84075 ASSAY ALKALINE PHOSPHATASE: CPT | Performed by: PATHOLOGY

## 2023-01-12 PROCEDURE — 84443 ASSAY THYROID STIM HORMONE: CPT | Performed by: PATHOLOGY

## 2023-01-12 PROCEDURE — 84155 ASSAY OF PROTEIN SERUM: CPT | Performed by: PATHOLOGY

## 2023-01-12 PROCEDURE — 84460 ALANINE AMINO (ALT) (SGPT): CPT | Performed by: PATHOLOGY

## 2023-01-12 PROCEDURE — 36415 COLL VENOUS BLD VENIPUNCTURE: CPT | Performed by: PATHOLOGY

## 2023-01-12 PROCEDURE — 84439 ASSAY OF FREE THYROXINE: CPT | Performed by: PATHOLOGY

## 2023-01-12 PROCEDURE — 80061 LIPID PANEL: CPT | Performed by: PATHOLOGY

## 2023-01-12 PROCEDURE — 82247 BILIRUBIN TOTAL: CPT | Performed by: PATHOLOGY

## 2023-01-12 PROCEDURE — 85610 PROTHROMBIN TIME: CPT | Performed by: PATHOLOGY

## 2023-01-12 PROCEDURE — 99214 OFFICE O/P EST MOD 30 MIN: CPT | Performed by: PSYCHIATRY & NEUROLOGY

## 2023-01-12 PROCEDURE — 84481 FREE ASSAY (FT-3): CPT | Performed by: PSYCHIATRY & NEUROLOGY

## 2023-01-12 PROCEDURE — G0463 HOSPITAL OUTPT CLINIC VISIT: HCPCS

## 2023-01-12 PROCEDURE — 80048 BASIC METABOLIC PNL TOTAL CA: CPT | Performed by: PATHOLOGY

## 2023-01-12 PROCEDURE — 82248 BILIRUBIN DIRECT: CPT | Performed by: PATHOLOGY

## 2023-01-12 PROCEDURE — 82570 ASSAY OF URINE CREATININE: CPT | Performed by: PSYCHIATRY & NEUROLOGY

## 2023-01-12 RX ORDER — ROSUVASTATIN CALCIUM 20 MG/1
20 TABLET, COATED ORAL DAILY
Qty: 30 TABLET | Refills: 5 | Status: SHIPPED | OUTPATIENT
Start: 2023-01-12 | End: 2023-02-22 | Stop reason: DRUGHIGH

## 2023-01-12 RX ORDER — ESCITALOPRAM OXALATE 10 MG/1
10 TABLET ORAL DAILY
Qty: 30 TABLET | Refills: 5 | Status: SHIPPED | OUTPATIENT
Start: 2023-01-12 | End: 2023-12-12 | Stop reason: DRUGHIGH

## 2023-01-12 ASSESSMENT — PAIN SCALES - GENERAL: PAINLEVEL: NO PAIN (0)

## 2023-01-12 NOTE — NURSING NOTE
Chief Complaint   Patient presents with     MS     RECHECK     MS follow up      Vitals were taken and medications were reconciled.   Franko De La Rosa, EMT  2:57 PM

## 2023-01-12 NOTE — LETTER
1/12/2023      RE: Sudheer Montiel  [address] [facility] [address]     Referral source: Established patient    Chief complaint: White matter abnormalities on MRI scan of the brain.    History of the Present Illness: Ms. Sudheer Montiel is a 57-year-old woman who returns to the [facility] today to review the results of a stroke work-up and neuropsychological evaluation.    This is a patient whom I saw on one previous occasion in July of last year.  She had been referred to this clinic with suspicion of a demyelinating disease by her primary care provider based on MRI findings.  However, in talking to the patient and her daughter, I did not elicit any history that was suggestive of multiple sclerosis or other demyelinating process.  Her primary complaint was, rather, that of significant cognitive change over the past year, which was severe enough that the patient had actually been dismissed from her job due to performance concerns.  Potential contributing factors included incidental finding of cirrhosis of the liver with an elevated ammonia level.    As above, the patient had undergone MRI scans of the brain that were completed for investigation of her cognitive difficulties.  I noted 3 discrete lesions in the bilateral frontal white matter that I felt were most suggestive of subacute to chronic cerebral infarctions.  The patient did have multiple risk factors for stroke including diabetes mellitus and ongoing cigarette smoking.  I recommended that she undergo a stroke workup including a transthoracic echocardiogram, CT angiogram of the head and neck, and a prolonged cardiac monitor.    I also recommended a neuropsychological evaluation to better document her cognitive difficulties.  Additionally, I had recommended laboratory investigations including a basic metabolic panel, hepatic panel, INR and repeat ammonia level, but these were not completed prior to the appointment today.    Regarding  "management, I started the patient on aspirin at 81 mg daily.  I advised her to talk with her gastroenterologist about potential addition of a high-potency statin.  I advised her to quit smoking.    The patient returns today to review the above investigations.  She relates that she is doing, \"I think overall well\".  She has had somewhat frequent headaches over the past week.    She has not started a statin yet.  She does, unfortunately, continue to smoke cigarettes.    PHYSICAL EXAMINATION:    VITAL SIGNS:  Blood pressure 133/74; pulse 69; oxygen saturation 96%; weight 109.1 kg.  GENERAL:  Obese woman who presents to the evaluation accompanied by her daughter, awake and alert and in no acute distress.    Assessment/plan:     1.  White-matter abnormalities on MRI scan of the brain, suspect cerebral ischemia  I reviewed the results of work-up for secondary causes of stroke, including CT angiogram performed on 07/08/2022, which did not demonstrate any significant occlusion of the major arterial vessels in the head and neck.      An echocardiogram completed on 08/19/2022 showed normal left ventricular systolic function, no significant regional wall motion abnormalities, and no color Doppler evidence of an atrial shunt with negative bubble study.  The aortic root and ascending aorta were mildly dilated at 4.2 cm and 4.0 cm, respectively.      Zio Patch cardiac monitor was negative for any atrial fibrillation.  There were rare runs of supraventricular tachycardia that were perceived by the patient.    At this time, the patient will continue antiplatelet therapy with aspirin, having found no indication for anticoagulation.  I also again advised her to discuss with her gastroenterologist whether we can start her on a high-potency statin, and I did prescribe her rosuvastatin to begin at 20 mg daily as long as this is fine with her gastroenterologist.  We will need to follow her liver function tests after adding this " medication.    2.  Cognitive decline  I reviewed the results of the neuropsychological evaluation performed under the supervision of Dr. Maricruz Vazquez on 01/06/2023.  Overall, this demonstrated impairment on some tests of attention and executive functioning as well as processing speed, suggestive of subcortical frontal dysfunction.  This would correlate reasonably well with the findings seen on MRI, which again I find most consistent with a cerebrovascular etiology.  Other contributing factors include, possibly, a component of hepatic encephalopathy as well as reported severe anxiety symptoms, as discussed further below.    I discussed with the patient and her daughter that I think that her cognitive symptoms are likely multifactorial.  They asked about her potentially returning to work versus seeking disability.  I told them that I am concerned that she would have similar difficulties as before were she to attempt to re-engage in a competitive employment situation.  At the present time, I would concentrate on trying to optimize management of her liver disease and treat underlying anxiety symptoms.    I will obtain the laboratory studies previously recommended today and also add a lipid panel for future reference.    3.  Anxiety  The patient does feel that she is much more anxious than usual, as noted on the neuropsychological evaluation.  We discussed medications as well as a counseling referral.  She did accept a behavioral health referral for counseling and is also willing to start an SSRI medication.  I provided her with a prescription for escitalopram 10 mg daily.    4.  Cirrhosis of the liver  As above, we will check an ammonia level.  If this remains substantially elevated, we may consider addition of some lactulose to see if this is beneficial for her cognition.    5.  Aortic root dilation  At present, this is not of a severity for which any operative intervention would be considered but will need to be  followed regularly with imaging over time, initially on roughly annual basis.    I spent a total of 36 minutes on patient care activities related to this encounter on the date of service, including time spent in reviewing the chart, obtaining history from and in counseling the patient and her daughter, and in coordination of care via the electronic medical record.    ADDENDUM (1-): I reviewed the results of blood tests performed per orders above.     The patient's random glucose level was considerably elevated at 243 mg/dL, and hemoglobin A1c was 8.8% (up from 7.2% in January 2021), consistent with poorly controlled diabetes mellitus. She should follow up with her primary care provider to discuss treatment options to improve blood sugar control, which is a significant risk factor for vascular events.    Her ammonia level was moderately elevated at 71 umol/L. INR was minimally elevated at 1.21, which could reflect some degree of abnormality in liver synthetic function. I will not start lactulose therapy at this time but would have a low threshold for considering this if her mental status changes worsen.    Lipid panel showed elevated total and LDL cholesterol levels at 216 and 136 mg/dL, respectively. The patient communicated to us that her gastroenterologist recommended starting rosuvastatin at 10 mg only in light of her liver disease. Although high potency statin therapy (e.g., rosuvastatin >=20 mg/day or atorvastatin >=40 mg/day) is recommended for secondary stroke prevention, I think that it is reasonable to start at a lower dose. We will recheck her liver function tests in one month to make sure that she is tolerating the addition of statin. In the long term, the goal is an LDL level of 70 or less.    Nic Harrington MD   of Neurology  [facility]     Cc:  Beth Cantu PA-C (PCP)  Kirk Christianson MD (Endocrinology)  Laz Johnson MD  (Gastroenterology)  Patient

## 2023-01-12 NOTE — Clinical Note
1/12/2023       RE: Sudheer Montiel  [address]  [place] [address]     Dear Colleague,    Thank you for referring your patient, Sudheer Montiel, to the [facility] [place] at [facility]. Please see a copy of my visit note below.    Date of service: January 12, 2023    Referral source: Established patient    Chief complaint: White matter abnormalities on MRI scan of the brain.    History of the Present Illness: Ms. Sudheer Montiel is a 57-year-old woman who returns to the [facility] today to review the results of a stroke work-up and neuropsychological evaluation.    This is a patient whom I saw on one previous occasion in July of last year.  She had been referred to this clinic with suspicion of a demyelinating disease by her primary care provider based on MRI findings.  However, in talking to the patient and her daughter, I did not elicit any history that was suggestive of multiple sclerosis or other demyelinating process.  Her primary complaint was, rather, that of significant cognitive change over the past year, which was severe enough that the patient had actually been dismissed from her job due to performance concerns.  Potential contributing factors included incidental finding of cirrhosis of the liver with an elevated ammonia level.    As above, the patient had undergone MRI scans of the brain that were completed for investigation of her cognitive difficulties.  I noted 3 discrete lesions in the bilateral frontal white matter that I felt were most suggestive of subacute to chronic cerebral infarctions.  The patient did have multiple risk factors for stroke including diabetes mellitus and ongoing cigarette smoking.  I recommended that she undergo a stroke workup including a transthoracic echocardiogram, CT angiogram of the head and neck, and a prolonged cardiac monitor.    I also recommended a  "neuropsychological evaluation to better document her cognitive difficulties.  Additionally, I had recommended laboratory investigations including a basic metabolic panel, hepatic panel, INR and repeat ammonia level, but these were not completed prior to the appointment today.    Regarding management, I started the patient on aspirin at 81 mg daily.  I advised her to talk with her gastroenterologist about potential addition of a high-potency statin.  I advised her to quit smoking.    The patient returns today to review the above investigations.  She relates that she is doing, \"I think overall well\".  She has had somewhat frequent headaches over the past week.    She has not started a statin yet.  She does, unfortunately, continue to smoke cigarettes.    PHYSICAL EXAMINATION:    VITAL SIGNS:  Blood pressure 133/74; pulse 69; oxygen saturation 96%; weight 109.1 kg.  GENERAL:  Obese woman who presents to the evaluation accompanied by her daughter, awake and alert and in no acute distress.    Assessment/plan:     1.  White-matter abnormalities on MRI scan of the brain, suspect cerebral ischemia  I reviewed the results of work-up for secondary causes of stroke, including CT angiogram performed on 07/08/2022, which did not demonstrate any significant occlusion of the major arterial vessels in the head and neck.      An echocardiogram completed on 08/19/2022 showed normal left ventricular systolic function, no significant regional wall motion abnormalities, and no color Doppler evidence of an atrial shunt with negative bubble study.  The aortic root and ascending aorta were mildly dilated at 4.2 cm and 4.0 cm, respectively.      Zio Patch cardiac monitor was negative for any atrial fibrillation.  There were rare runs of supraventricular tachycardia that were perceived by the patient.    At this time, the patient will continue antiplatelet therapy with aspirin, having found no indication for anticoagulation.  I also again " advised her to discuss with her gastroenterologist whether we can start her on a high-potency statin, and I did prescribe her rosuvastatin to begin at 20 mg daily as long as this is fine with her gastroenterologist.  We will need to follow her liver function tests after adding this medication.    2.  Cognitive decline  I reviewed the results of the neuropsychological evaluation performed under the supervision of Dr. Maricruz Vazquez on 01/06/2023.  Overall, this demonstrated impairment on some tests of attention and executive functioning as well as processing speed, suggestive of subcortical frontal dysfunction.  This would correlate reasonably well with the findings seen on MRI, which again I find most consistent with a cerebrovascular etiology.  Other contributing factors include, possibly, a component of hepatic encephalopathy as well as reported severe anxiety symptoms, as discussed further below.    I discussed with the patient and her daughter that I think that her cognitive symptoms are likely multifactorial.  They asked about her potentially returning to work versus seeking disability.  I told them that I am concerned that she would have similar difficulties as before were she to attempt to re-engage in a competitive employment situation.  At the present time, I would concentrate on trying to optimize management of her liver disease and treat underlying anxiety symptoms.    I will obtain the laboratory studies previously recommended today and also add a lipid panel for future reference.    3.  Anxiety  The patient does feel that she is much more anxious than usual, as noted on the neuropsychological evaluation.  We discussed medications as well as a counseling referral.  She did accept a behavioral health referral for counseling and is also willing to start an SSRI medication.  I provided her with a prescription for escitalopram 10 mg daily.    4.  Cirrhosis of the liver  As above, we will check an ammonia  level.  If this remains substantially elevated, we may consider addition of some lactulose to see if this is beneficial for her cognition.    5.  Aortic root dilation  At present, this is not of a severity for which any operative intervention would be considered but will need to be followed regularly with imaging over time, initially on roughly annual basis.    I spent a total of 36 minutes on patient care activities related to this encounter on the date of service, including time spent in reviewing the chart, obtaining history from and in counseling the patient and her daughter, and in coordination of care via the electronic medical record.    ADDENDUM (1-): I reviewed the results of blood tests performed per orders above.     The patient's random glucose level was considerably elevated at 243 mg/dL, and hemoglobin A1c was 8.8% (up from 7.2% in January 2021), consistent with poorly controlled diabetes mellitus. She should follow up with her primary care provider to discuss treatment options to improve blood sugar control, which is a significant risk factor for vascular events.    Her ammonia level was moderately elevated at 71 umol/L. INR was minimally elevated at 1.21, which could reflect some degree of abnormality in liver synthetic function. I will not start lactulose therapy at this time but would have a low threshold for considering this if her mental status changes worsen.    Lipid panel showed elevated total and LDL cholesterol levels at 216 and 136 mg/dL, respectively. The patient communicated to us that her gastroenterologist recommended starting rosuvastatin at 10 mg only in light of her liver disease. Although high potency statin therapy (e.g., rosuvastatin >=20 mg/day or atorvastatin >=40 mg/day) is recommended for secondary stroke prevention, I think that it is reasonable to start at a lower dose. We will recheck her liver function tests in one month to make sure that she is tolerating the  addition of statin. In the long term, the goal is an LDL level of 70 or less.    Nic Harrington MD   of Neurology  [facility]       Again, thank you for allowing me to participate in the care of your patient.      Sincerely,    Nic Harrington MD

## 2023-01-12 NOTE — PATIENT INSTRUCTIONS
We are going to start the following medications:    A) For anxiety, escitalopram (Lexapro) 10 mg daily    B) For stroke prevention, rosuvastatin 20 mg daily: please confirm with your GI specialist that they have no objections to you being on this medication    2) Continue aspirin 81 mg daily    3. We placed a counseling referral for evaluation and management of anxiety    4. Blood and urine tests today    5.  Return to clinic in 6 months with an MRI scan of the brain prior to visit

## 2023-01-20 NOTE — PROGRESS NOTES
Date of service: January 12, 2023    Referral source: Established patient    Chief complaint: White matter abnormalities on MRI scan of the brain.    History of the Present Illness: Ms. Sudheer Montiel is a 57-year-old woman who returns to the [facility] today to review the results of a stroke work-up and neuropsychological evaluation.    This is a patient whom I saw on one previous occasion in July of last year.  She had been referred to this clinic with suspicion of a demyelinating disease by her primary care provider based on MRI findings.  However, in talking to the patient and her daughter, I did not elicit any history that was suggestive of multiple sclerosis or other demyelinating process.  Her primary complaint was, rather, that of significant cognitive change over the past year, which was severe enough that the patient had actually been dismissed from her job due to performance concerns.  Potential contributing factors included incidental finding of cirrhosis of the liver with an elevated ammonia level.    As above, the patient had undergone MRI scans of the brain that were completed for investigation of her cognitive difficulties.  I noted 3 discrete lesions in the bilateral frontal white matter that I felt were most suggestive of subacute to chronic cerebral infarctions.  The patient did have multiple risk factors for stroke including diabetes mellitus and ongoing cigarette smoking.  I recommended that she undergo a stroke workup including a transthoracic echocardiogram, CT angiogram of the head and neck, and a prolonged cardiac monitor.    I also recommended a neuropsychological evaluation to better document her cognitive difficulties.  Additionally, I had recommended laboratory investigations including a basic metabolic panel, hepatic panel, INR and repeat ammonia level, but these were not completed prior to the appointment today.    Regarding management, I started the patient on aspirin at  "81 mg daily.  I advised her to talk with her gastroenterologist about potential addition of a high-potency statin.  I advised her to quit smoking.    The patient returns today to review the above investigations.  She relates that she is doing, \"I think overall well\".  She has had somewhat frequent headaches over the past week.    She has not started a statin yet.  She does, unfortunately, continue to smoke cigarettes.    PHYSICAL EXAMINATION:    VITAL SIGNS:  Blood pressure 133/74; pulse 69; oxygen saturation 96%; weight 109.1 kg.  GENERAL:  Obese woman who presents to the evaluation accompanied by her daughter, awake and alert and in no acute distress.    Assessment/plan:     1.  White-matter abnormalities on MRI scan of the brain, suspect cerebral ischemia  I reviewed the results of work-up for secondary causes of stroke, including CT angiogram performed on 07/08/2022, which did not demonstrate any significant occlusion of the major arterial vessels in the head and neck.      An echocardiogram completed on 08/19/2022 showed normal left ventricular systolic function, no significant regional wall motion abnormalities, and no color Doppler evidence of an atrial shunt with negative bubble study.  The aortic root and ascending aorta were mildly dilated at 4.2 cm and 4.0 cm, respectively.      Zio Patch cardiac monitor was negative for any atrial fibrillation.  There were rare runs of supraventricular tachycardia that were perceived by the patient.    At this time, the patient will continue antiplatelet therapy with aspirin, having found no indication for anticoagulation.  I also again advised her to discuss with her gastroenterologist whether we can start her on a high-potency statin, and I did prescribe her rosuvastatin to begin at 20 mg daily as long as this is fine with her gastroenterologist.  We will need to follow her liver function tests after adding this medication.    2.  Cognitive decline  I reviewed the " results of the neuropsychological evaluation performed under the supervision of Dr. Maricruz Vazquez on 01/06/2023.  Overall, this demonstrated impairment on some tests of attention and executive functioning as well as processing speed, suggestive of subcortical frontal dysfunction.  This would correlate reasonably well with the findings seen on MRI, which again I find most consistent with a cerebrovascular etiology.  Other contributing factors include, possibly, a component of hepatic encephalopathy as well as reported severe anxiety symptoms, as discussed further below.    I discussed with the patient and her daughter that I think that her cognitive symptoms are likely multifactorial.  They asked about her potentially returning to work versus seeking disability.  I told them that I am concerned that she would have similar difficulties as before were she to attempt to re-engage in a competitive employment situation.  At the present time, I would concentrate on trying to optimize management of her liver disease and treat underlying anxiety symptoms.    I will obtain the laboratory studies previously recommended today and also add a lipid panel for future reference.    3.  Anxiety  The patient does feel that she is much more anxious than usual, as noted on the neuropsychological evaluation.  We discussed medications as well as a counseling referral.  She did accept a behavioral health referral for counseling and is also willing to start an SSRI medication.  I provided her with a prescription for escitalopram 10 mg daily.    4.  Cirrhosis of the liver  As above, we will check an ammonia level.  If this remains substantially elevated, we may consider addition of some lactulose to see if this is beneficial for her cognition.    5.  Aortic root dilation  At present, this is not of a severity for which any operative intervention would be considered but will need to be followed regularly with imaging over time, initially  on roughly annual basis.    I spent a total of 36 minutes on patient care activities related to this encounter on the date of service, including time spent in reviewing the chart, obtaining history from and in counseling the patient and her daughter, and in coordination of care via the electronic medical record.    ADDENDUM (1-): I reviewed the results of blood tests performed per orders above.     The patient's random glucose level was considerably elevated at 243 mg/dL, and hemoglobin A1c was 8.8% (up from 7.2% in January 2021), consistent with poorly controlled diabetes mellitus. She should follow up with her primary care provider to discuss treatment options to improve blood sugar control, which is a significant risk factor for vascular events.    Her ammonia level was moderately elevated at 71 umol/L. INR was minimally elevated at 1.21, which could reflect some degree of abnormality in liver synthetic function. I will not start lactulose therapy at this time but would have a low threshold for considering this if her mental status changes worsen.    Lipid panel showed elevated total and LDL cholesterol levels at 216 and 136 mg/dL, respectively. The patient communicated to us that her gastroenterologist recommended starting rosuvastatin at 10 mg only in light of her liver disease. Although high potency statin therapy (e.g., rosuvastatin >=20 mg/day or atorvastatin >=40 mg/day) is recommended for secondary stroke prevention, I think that it is reasonable to start at a lower dose. We will recheck her liver function tests in one month to make sure that she is tolerating the addition of statin. In the long term, the goal is an LDL level of 70 or less.    Nic Harrington MD   of Neurology  [facility]

## 2023-02-13 DIAGNOSIS — E03.9 ACQUIRED HYPOTHYROIDISM: ICD-10-CM

## 2023-02-14 RX ORDER — THYROID 60 MG/1
TABLET ORAL
Qty: 30 TABLET | Refills: 1 | Status: SHIPPED | OUTPATIENT
Start: 2023-02-14 | End: 2023-03-16

## 2023-02-14 RX ORDER — LEVOTHYROXINE SODIUM 88 UG/1
88 TABLET ORAL DAILY
Qty: 30 TABLET | Refills: 1 | Status: SHIPPED | OUTPATIENT
Start: 2023-02-14 | End: 2023-03-16

## 2023-02-14 NOTE — TELEPHONE ENCOUNTER
"Last Written Prescription Date:  6/23/22  Last Fill Quantity: 30,  # refills: 5   Last office visit: 11/7/2022 with prescribing provider: Dr. Christianson  Future Office Visit:  3/27/23        Requested Prescriptions   Pending Prescriptions Disp Refills     levothyroxine (SYNTHROID/LEVOTHROID) 88 MCG tablet 30 tablet 5     Sig: Take 1 tablet (88 mcg) by mouth daily       Thyroid Protocol Failed - 2/13/2023  4:20 PM        Failed - Normal TSH on file in past 12 months     Recent Labs   Lab Test 01/12/23  1716   TSH 9.76*              Passed - Patient is 12 years or older        Passed - Recent (12 mo) or future (30 days) visit within the authorizing provider's specialty     Patient has had an office visit with the authorizing provider or a provider within the authorizing providers department within the previous 12 mos or has a future within next 30 days. See \"Patient Info\" tab in [employer], or \"Choose Columns\" in Meds & Orders section of the refill encounter.              Passed - Medication is active on med list        Passed - No active pregnancy on record     If patient is pregnant or has had a positive pregnancy test, please check TSH.          Passed - No positive pregnancy test in past 12 months     If patient is pregnant or has had a positive pregnancy test, please check TSH.             thyroid (ARMOUR) 60 MG tablet 30 tablet 5     Sig: Take 1-tablet by mouth daily as directed       Thyroid Protocol Failed - 2/13/2023  4:20 PM        Failed - Normal TSH on file in past 12 months     Recent Labs   Lab Test 01/12/23  1716   TSH 9.76*              Passed - Patient is 12 years or older        Passed - Recent (12 mo) or future (30 days) visit within the authorizing provider's specialty     Patient has had an office visit with the authorizing provider or a provider within the authorizing providers department within the previous 12 mos or has a future within next 30 days. See \"Patient Info\" tab in [employer], or \"Choose " "Columns\" in Meds & Orders section of the refill encounter.              Passed - Medication is active on med list        Passed - No active pregnancy on record     If patient is pregnant or has had a positive pregnancy test, please check TSH.          Passed - No positive pregnancy test in past 12 months     If patient is pregnant or has had a positive pregnancy test, please check TSH.             Will route to Endo provider to review TSH.  It was last checked by neurology last month. Samantha Marie RN    "

## 2023-03-15 DIAGNOSIS — E03.9 ACQUIRED HYPOTHYROIDISM: ICD-10-CM

## 2023-03-15 NOTE — TELEPHONE ENCOUNTER
Thyroid Protocol Failed 03/15/2023 09:37 AM   Protocol Details  Normal TSH on file in past 12 months        TSH resulted 12 JAN 2023 to Dr Carter   Contains abnormal data TSH  Order: [id number]   Status: Final result      Visible to patient: Yes (seen)      Dx: White matter abnormality on MRI of brain      0 Result Notes     1 HM Topic          Component Ref Range & Units 2 mo ago    TSH 0.30 - 4.20 uIU/mL 9.76 High          T4 free  Order: [id number]   Status: Final result      Visible to patient: Yes (seen)      Dx: White matter abnormality on MRI of brain      0 Result Notes     1  Topic            Component Ref Range & Units 2 mo ago 3 yr ago 4 yr ago    Free T4 0.90 - 1.70 ng/dL 1.15

## 2023-03-15 NOTE — CONFIDENTIAL NOTE
Requested Prescriptions   Pending Prescriptions Disp Refills     thyroid (ARMOUR) 60 MG tablet 30 tablet 1     Sig: Take 1-tablet by mouth daily as directed       There is no refill protocol information for this order          Last Written Prescription Date:  02/14/2023  Last Fill Quantity: 30 tab,  # refills: 1   Last office visit: 6/23/2022 with prescribing provider:  Kirk Christianson MD.   Future Office Visit:  03/27/2023

## 2023-03-16 DIAGNOSIS — E03.9 ACQUIRED HYPOTHYROIDISM: ICD-10-CM

## 2023-03-16 RX ORDER — LEVOTHYROXINE SODIUM 100 UG/1
100 TABLET ORAL DAILY
Qty: 30 TABLET | Refills: 1 | Status: SHIPPED | OUTPATIENT
Start: 2023-03-16 | End: 2023-05-09

## 2023-03-16 RX ORDER — THYROID 60 MG/1
TABLET ORAL
Qty: 30 TABLET | Refills: 1 | Status: SHIPPED | OUTPATIENT
Start: 2023-03-16 | End: 2023-06-11

## 2023-03-16 RX ORDER — LEVOTHYROXINE SODIUM 100 UG/1
100 TABLET ORAL DAILY
Qty: 30 TABLET | Refills: 1 | Status: SHIPPED | OUTPATIENT
Start: 2023-03-16 | End: 2023-03-16

## 2023-03-27 ENCOUNTER — OFFICE VISIT (OUTPATIENT)
Dept: ENDOCRINOLOGY | Facility: CLINIC | Age: 58
End: 2023-03-27
Payer: COMMERCIAL

## 2023-03-27 VITALS
WEIGHT: 242.8 LBS | SYSTOLIC BLOOD PRESSURE: 138 MMHG | BODY MASS INDEX: 41.45 KG/M2 | DIASTOLIC BLOOD PRESSURE: 83 MMHG | HEIGHT: 64 IN | HEART RATE: 58 BPM

## 2023-03-27 DIAGNOSIS — E11.9 TYPE 2 DIABETES MELLITUS WITHOUT COMPLICATION, WITHOUT LONG-TERM CURRENT USE OF INSULIN (H): Primary | ICD-10-CM

## 2023-03-27 DIAGNOSIS — E03.9 ACQUIRED HYPOTHYROIDISM: ICD-10-CM

## 2023-03-27 PROCEDURE — 99214 OFFICE O/P EST MOD 30 MIN: CPT | Performed by: INTERNAL MEDICINE

## 2023-03-27 NOTE — LETTER
3/27/2023         RE: Sudheer Montiel  [address]  [place] [address]        Dear Colleague,    Thank you for referring your patient, Sudheer Montiel, to the [facility] [place]. Please see a copy of my visit note below.      Recent issues:  Followup diabetes and thyroid evaluation  Previous right abdomen pain, some skin itching, hospitalized at [facility] 5/2022,   Diagnosed with liver problem suspicion for PBC, also liver cysts    GI evaluation with Dr. Johnson  Had head MRI scan, apparent evidence for 5 previous strokes    Neuropsych eval, apparently no evidence of dementia    Neurology evaluation with Dr. YURIDIA Harrington  Using the metformin, Ozempic, VGo pod device with Novolog and the Freestyle [place]ni sensor,   Taking the [place] thyroid and levothyroxine meds regularly           Diabetes:  ~4/2010. Initial diagnosis approx age 44  ...Has taken several DM meds including metformin, Byetta, glimeparide, Invokana, and Victoza   In 2018, she had been taking metformin, Victoza, glimeparide, and Jardiance  She stopped Jardiance, also ran out of Victoza last year  10/2018. Abdominal abcess illness, hospitalizations at [facility] and then [facility]              2-surgeries for abcess, thought related to the hysterectomy bladder sling mesh from prior surgery     Previously taking metformin 750 mg BID, glimeparide 4 mg every day, Tresiba 40U every day   10/2020. Changed to VGo40 pods management  Continue low dose glimeparide, but low SG levels and glimeparide discontinued, then restarted   Current DM meds:  Novolog in VGo40   Small carb meal 1-2 clicks (2-4U)   Large carb meal 2-3 clicks (4-6U)    Novolog    sscale:      -250 1-click    -300 2-clicks  Metformin 750 mg 2-tabs in morning  Ozempic 0.5 mg  Subcutaneous weekly    Has Glucocard Vital BG meter    Infrequent BG testing  10/2020. Started Freestyle Chandni CGM with [facility]  Recent Chandni data:      No data available,  but higher trends at bedtime    Recent [facility] labs include:  7/26/19 hgbA1c 9.3%, t.chol 195, , TSH 0.06, FT4 1.46, FT3 8.7, Cr 0.77  8/26/20 hgbA1c 11.4%  10/15/21 hgbA1c 6.6%, LDL 60 mg/dl     Recent [facility] labs include:  Lab Results   Component Value Date    A1C 8.8 (H) 01/12/2023     01/12/2023    POTASSIUM 3.9 01/12/2023    CHLORIDE 103 01/12/2023    CO2 25 01/12/2023    ANIONGAP 9 01/12/2023     (H) 01/12/2023    BUN 14.4 01/12/2023    CR 0.55 01/12/2023    GFRESTIMATED >90 01/12/2023    GFRESTBLACK >90 01/05/2021    JOANN 8.9 01/12/2023    CHOL 216 (H) 01/12/2023    TRIG 159 (H) 01/12/2023    HDL 48 (L) 01/12/2023     (H) 01/12/2023    NHDL 168 (H) 01/12/2023    UCRR 131.0 01/12/2023    MICROL 15.8 01/12/2023    UMALCR 12.06 01/12/2023     Last eye exam 2018?, results not available  DM Complications:  None known        Thyroid:  Had taken Synthroid  Changed to [place] thyroid medication, then [place] + levothyroxine combo dose plan  Had taken [place] thyroid 2-tablets daily... Possibly 60 mg 2-tabs QD  Early-mid 3/2018. Ran out of [place] thyroid medication  Previously took [place] thyroid 90 mg 2-tabs daily and low dose levothyroxine 0.025 mg daily  8/2019. Changed to lower dose [place] and higher dose levothyroxine  Subsequent dose reductions with levothyroxine medication  Mid 12/2021 to early 1/2022. 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 Frantz Guillen?/[facility]       PMH/PSH:  Past  Medical History:   Diagnosis Date     Abnormal liver CT      Hypothyroid 80's     Necrotizing fasciitis (H)      Primary osteoarthritis of both knees      Soft tissue infection      Subcortical infarction (H)      Type 2 diabetes mellitus (H)      Past Surgical History:   Procedure Laterality Date      SECTION       COLONOSCOPY N/A 10/21/2015    Procedure: COLONOSCOPY;  Surgeon: Jaky Arredondo MD;  Location:  GI     IRRIGATION AND DEBRIDEMENT ABDOMEN WASHOUT, COMBINED N/A 10/12/2018    Procedure: COMBINED IRRIGATION AND DEBRIDEMENT ABDOMEN WASHOUT;  Irrigation and Debridement of Lower Abdomen, removal of piece of mesh.;  Surgeon: Darlene Hogue MD;  Location: [facility] OR     marisol/bso      due to anemia     ZZC REPAIR CRUCIATE LIGAMENT,KNEE         Family Hx:  No family history on file.      Social Hx:  Social History     Socioeconomic History     Marital status:      Spouse name: Not on file     Number of children: 2     Years of education: Not on file     Highest education level: Not on file   Occupational History     Occupation: surgery scheduler [employer]   Tobacco Use     Smoking status: Some Days     Types: Cigarettes     Last attempt to quit: 2011     Years since quittin.1     Smokeless tobacco: Never   Substance and Sexual Activity     Alcohol use: No     Drug use: No     Sexual activity: Not on file   Other Topics Concern     Not on file   Social History Narrative     Not on file     Social Determinants of Health     Financial Resource Strain: Not on file   Food Insecurity: Not on file   Transportation Needs: Not on file   Physical Activity: Not on file   Stress: Not on file   Social Connections: Not on file   Intimate Partner Violence: Not on file   Housing Stability: Not on file          MEDICATIONS:  has a current medication list which includes the following prescription(s): acetaminophen, aspirin, celecoxib, freestyle evelyn reader, freestyle  "evelyn 14 day sensor, escitalopram, insulin lispro, levothyroxine, metformin, omeprazole, rosuvastatin, semaglutide, thyroid, valacyclovir, wearable insulin delivery device, blood glucose, and b-d u/f.     ROS: 10 point ROS neg other than the symptoms noted above in the HPI.     GENERAL: some fatigue, wt stable; denies fevers, chills, night sweats.   HEENT:  no dysphagia, odonophagia, diplopia, neck pain  THYROID:  no apparent hyper or hypothyroid symptoms  CV:  some palpitations; denies chest pain, pressure  LUNGS:  denies SOB, dyspnea, cough, wheezing  ABDOMEN:  Intermittent right abdomen pains; denies diarrhea, indigestion, constipation  EXTREMITIES: no rashes, ulcers, edema  NEUROLOGY: forgetfulness; no headaches, denies changes in vision, tingling, extremitiy numbness   MSK: knee and some low back pain; no muscle aches or pains, weakness  SKIN: no rashes or lesions  : no increased thirst and urination, nocturia; no menses since hysterectomy ~age 49  PSYCH:  stable mood, no significant anxiety or depression  ENDOCRINE: no heat or cold intolerance      Physical Exam   VS: /83   Pulse 58   Ht 1.626 m (5' 4\")   Wt 110.1 kg (242 lb 12.8 oz)   BMI 41.68 kg/m    GENERAL: AXOX3, NAD, well dressed, answering questions appropriately, appears stated age.  ENT: no nose swelling or nasal discharge, mouth redness or gum changes.  EYES: eyes grossly normal to inspection, conjunctivae and sclerae normal, no exophthalmos or proptosis  THYROID:  no apparent nodules or goiter  LUNGS: no audible wheeze, cough or visible cyanosis, or increased work of breathing  ABDOMEN: abdomen obese size  EXTREMITIES: no edema noted  NEUROLOGY: CN grossly intact, no tremors  MSK: grossly intact  SKIN:  no apparent skin lesions, rash, or edema with visualized skin appearance  PSYCH: mentation appears normal, affect normal/bright, judgement and insight intact,   normal speech and appearance well groomed       LABS:     All pertinent " notes, labs, and images personally reviewed by me.      A/P:  Encounter Diagnoses   Name Primary?     Type 2 diabetes mellitus without complication, without long-term current use of insulin (H) Yes     Acquired hypothyroidism      Comments:  Reviewed health issues, diabetes and thyroid management.  Difficult to assess glycemic control without BGM or SG data  Reviewed and interpreted tests that I previously ordered.   Ordered appropriate tests for the endocrinology disease management.    Management options discussed and implemented after shared medical decision making with the patient.  T2DM and hypothyroidism problems are chronic-stable    Plan:  Discussed general issues with the diabetes diagnosis and management  We discussed the hgbA1c test which reflects previous overall glucose levels or control  Discussed the importance of blood glucose (BG) testing to assess glucose trends  Provided general overview of the diabetes medication options and medication treatment plan  Reviewed recent Chandni CGM glucose trend data, in detail.    Recommend:  Continue the current VGo40, metformin, and Ozempic med plan at this time  We have reviewed dosing options using the VGo clicks for small and large carb meals  Consider change to the OmniPod pump alternative, if affordable  Goal target -150 mg/dl  Contact me if cost issues with Novolog, VGo, Ozempic, or other endo meds  Continue use of the 14-day Freestyle Chandni CGM sensor    Scan sensor 3-4x/day... more often    Use SG value for final decision on mealtime VGo clicks  Lab testing:    No lab tests ordered at this time    Needs repeat thyroid and diabetes lab testing in 8/2022 or 9/2022  Keep focus on diet, exercise, weight management.  Continue the NP-Thyroid 60 mg daily and levothyroxine 88 daily dose plan  Plan lab appointment in late 5/2023    Lab orders placed  Needs f/u on the mild hypercholesterolemia, consider adding statin medication for treatment  Keep focus on diet,  exercise, weight management.  Advise having fasting lipid panel testing and dilated eye examination, at least annually    Keep scheduled followup GI, neurology, and PCP appointments  Addressed patient questions today     Patient Instructions   Diabetes medication plan:  Humalog in VGo40   Small carb meal 1-2 clicks (2-4U)   Large carb meal 4 clicks (8U)    Novolog    sscale:      -250 1-click    -300 2-clicks  Metformin 750 mg 2-tabs in morning  Ozempic 0.5 mg  Subcutaneous weekly      Thyroid medication plan:  [place] thyroid 60 mg              1-tab daily  Levothyroxine 0.1 mg  1-tab daily daily    Goal target premeal glucose level  mg/dl  Continue use of the Chandni glucose sensor, scan premeal and bedtime  Repeat non-fasting labs in late 5/2023, orders available    Next endocrinology office appt 7/17/23 at 8:30 am  ([address], [address], [place])      Future labs ordered today:   Orders Placed This Encounter   Procedures     Hemoglobin A1c     Basic metabolic panel     TSH     T4 free     T3 Free     Radiology/Consults ordered today: None    Total time spent on day of encounter:  20 min    Follow-up:  7/17/23, Return    JOJO Christianson MD, MS  Endocrinology  [facility]    CC: Care Team                                              Again, thank you for allowing me to participate in the care of your patient.        Sincerely,        Kirk Christianson MD

## 2023-03-27 NOTE — PATIENT INSTRUCTIONS
Diabetes medication plan:  Humalog in VGo40   Small carb meal 1-2 clicks (2-4U)   Large carb meal 4 clicks (8U)    Novolog    sscale:      -250 1-click    -300 2-clicks  Metformin 750 mg 2-tabs in morning  Ozempic 0.5 mg  Subcutaneous weekly      Thyroid medication plan:  [place] thyroid 60 mg              1-tab daily  Levothyroxine 0.1 mg  1-tab daily daily    Goal target premeal glucose level  mg/dl  Continue use of the Chandni glucose sensor, scan premeal and bedtime  Repeat non-fasting labs in late 5/2023, orders available    Next endocrinology office appt 7/17/23 at 8:30 am  ([address], [address], [place])

## 2023-03-27 NOTE — PROGRESS NOTES
Recent issues:  Followup diabetes and thyroid evaluation  Previous right abdomen pain, some skin itching, hospitalized at [facility] 5/2022,   Diagnosed with liver problem suspicion for PBC, also liver cysts    GI evaluation with Dr. Johnson  Had head MRI scan, apparent evidence for 5 previous strokes    Neuropsych eval, apparently no evidence of dementia    Neurology evaluation with Dr. YURIDIA Harrington  Using the metformin, Ozempic, VGo pod device with Novolog and the Freestyle Chandni sensor,   Taking the [place]uchy thyroid and levothyroxine meds regularly           Diabetes:  ~4/2010. Initial diagnosis approx age 44  ...Has taken several DM meds including metformin, Byetta, glimeparide, Invokana, and Victoza   In 2018, she had been taking metformin, Victoza, glimeparide, and Jardiance  She stopped Jardiance, also ran out of Victoza last year  10/2018. Abdominal abcess illness, hospitalizations at [facility] and then [facility]              2-surgeries for abcess, thought related to the hysterectomy bladder sling mesh from prior surgery     Previously taking metformin 750 mg BID, glimeparide 4 mg every day, Tresiba 40U every day   10/2020. Changed to VGo40 pods management  Continue low dose glimeparide, but low SG levels and glimeparide discontinued, then restarted   Current DM meds:  Novolog in VGo40   Small carb meal 1-2 clicks (2-4U)   Large carb meal 2-3 clicks (4-6U)    Novolog    sscale:      -250 1-click    -300 2-clicks  Metformin 750 mg 2-tabs in morning  Ozempic 0.5 mg  Subcutaneous weekly    Has Glucocard Vital BG meter    Infrequent BG testing  10/2020. Started Freestyle Chandni CGM with [place]  Recent Chandni data:      No data available, but higher trends at bedtime    Recent [facility] labs include:  7/26/19 hgbA1c 9.3%, t.chol 195, , TSH 0.06, FT4 1.46, FT3 8.7, Cr 0.77  8/26/20 hgbA1c 11.4%  10/15/21 hgbA1c 6.6%, LDL 60 mg/dl     Recent  labs include:  Lab Results   Component Value  Date    A1C 8.8 (H) 01/12/2023     01/12/2023    POTASSIUM 3.9 01/12/2023    CHLORIDE 103 01/12/2023    CO2 25 01/12/2023    ANIONGAP 9 01/12/2023     (H) 01/12/2023    BUN 14.4 01/12/2023    CR 0.55 01/12/2023    GFRESTIMATED >90 01/12/2023    GFRESTBLACK >90 01/05/2021    JOANN 8.9 01/12/2023    CHOL 216 (H) 01/12/2023    TRIG 159 (H) 01/12/2023    HDL 48 (L) 01/12/2023     (H) 01/12/2023    NHDL 168 (H) 01/12/2023    UCRR 131.0 01/12/2023    MICROL 15.8 01/12/2023    UMALCR 12.06 01/12/2023     Last eye exam 2018?, results not available  DM Complications:  None known        Thyroid:  Had taken Synthroid  Changed to Nickerson thyroid medication, then Nickerson + levothyroxine combo dose plan  Had taken Nickerson thyroid 2-tablets daily... Possibly 60 mg 2-tabs QD  Early-mid 3/2018. Ran out of Nickerson thyroid medication  Previously took Nickerson thyroid 90 mg 2-tabs daily and low dose levothyroxine 0.025 mg daily  8/2019. Changed to lower dose Nickerson and higher dose levothyroxine  Subsequent dose reductions with levothyroxine medication  Mid 12/2021 to early 1/2022. 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 Frantz Guillen?/[facility]       PMH/PSH:  Past Medical History:   Diagnosis Date     Abnormal liver CT      Hypothyroid 80's     Necrotizing fasciitis (H)      Primary osteoarthritis of both knees      Soft tissue infection      Subcortical infarction (H)      Type 2 diabetes mellitus (H)      Past  Surgical History:   Procedure Laterality Date      SECTION       COLONOSCOPY N/A 10/21/2015    Procedure: COLONOSCOPY;  Surgeon: Jaky Arredondo MD;  Location:  GI     IRRIGATION AND DEBRIDEMENT ABDOMEN WASHOUT, COMBINED N/A 10/12/2018    Procedure: COMBINED IRRIGATION AND DEBRIDEMENT ABDOMEN WASHOUT;  Irrigation and Debridement of Lower Abdomen, removal of piece of mesh.;  Surgeon: Darlene Hogue MD;  Location: [facility] OR     marisol/bso      due to anemia     ZZC REPAIR CRUCIATE LIGAMENT,KNEE         Family Hx:  No family history on file.      Social Hx:  Social History     Socioeconomic History     Marital status:      Spouse name: Not on file     Number of children: 2     Years of education: Not on file     Highest education level: Not on file   Occupational History     Occupation: surgery scheduler [employer]   Tobacco Use     Smoking status: Some Days     Types: Cigarettes     Last attempt to quit: 2011     Years since quittin.1     Smokeless tobacco: Never   Substance and Sexual Activity     Alcohol use: No     Drug use: No     Sexual activity: Not on file   Other Topics Concern     Not on file   Social History Narrative     Not on file     Social Determinants of Health     Financial Resource Strain: Not on file   Food Insecurity: Not on file   Transportation Needs: Not on file   Physical Activity: Not on file   Stress: Not on file   Social Connections: Not on file   Intimate Partner Violence: Not on file   Housing Stability: Not on file          MEDICATIONS:  has a current medication list which includes the following prescription(s): acetaminophen, aspirin, celecoxib, freestyle evelyn reader, freestyle evelyn 14 day sensor, escitalopram, insulin lispro, levothyroxine, metformin, omeprazole, rosuvastatin, semaglutide, thyroid, valacyclovir, wearable insulin delivery device, blood glucose, and b-d u/f.     ROS: 10 point ROS neg other than the symptoms noted  "above in the HPI.     GENERAL: some fatigue, wt stable; denies fevers, chills, night sweats.   HEENT:  no dysphagia, odonophagia, diplopia, neck pain  THYROID:  no apparent hyper or hypothyroid symptoms  CV:  some palpitations; denies chest pain, pressure  LUNGS:  denies SOB, dyspnea, cough, wheezing  ABDOMEN:  Intermittent right abdomen pains; denies diarrhea, indigestion, constipation  EXTREMITIES: no rashes, ulcers, edema  NEUROLOGY: forgetfulness; no headaches, denies changes in vision, tingling, extremitiy numbness   MSK: knee and some low back pain; no muscle aches or pains, weakness  SKIN: no rashes or lesions  : no increased thirst and urination, nocturia; no menses since hysterectomy ~age 49  PSYCH:  stable mood, no significant anxiety or depression  ENDOCRINE: no heat or cold intolerance      Physical Exam   VS: /83   Pulse 58   Ht 1.626 m (5' 4\")   Wt 110.1 kg (242 lb 12.8 oz)   BMI 41.68 kg/m    GENERAL: AXOX3, NAD, well dressed, answering questions appropriately, appears stated age.  ENT: no nose swelling or nasal discharge, mouth redness or gum changes.  EYES: eyes grossly normal to inspection, conjunctivae and sclerae normal, no exophthalmos or proptosis  THYROID:  no apparent nodules or goiter  LUNGS: no audible wheeze, cough or visible cyanosis, or increased work of breathing  ABDOMEN: abdomen obese size  EXTREMITIES: no edema noted  NEUROLOGY: CN grossly intact, no tremors  MSK: grossly intact  SKIN:  no apparent skin lesions, rash, or edema with visualized skin appearance  PSYCH: mentation appears normal, affect normal/bright, judgement and insight intact,   normal speech and appearance well groomed       LABS:     All pertinent notes, labs, and images personally reviewed by me.      A/P:  Encounter Diagnoses   Name Primary?     Type 2 diabetes mellitus without complication, without long-term current use of insulin (H) Yes     Acquired hypothyroidism      Comments:  Reviewed health " issues, diabetes and thyroid management.  Difficult to assess glycemic control without BGM or SG data  Reviewed and interpreted tests that I previously ordered.   Ordered appropriate tests for the endocrinology disease management.    Management options discussed and implemented after shared medical decision making with the patient.  T2DM and hypothyroidism problems are chronic-stable    Plan:  Discussed general issues with the diabetes diagnosis and management  We discussed the hgbA1c test which reflects previous overall glucose levels or control  Discussed the importance of blood glucose (BG) testing to assess glucose trends  Provided general overview of the diabetes medication options and medication treatment plan  Reviewed recent Chandni CGM glucose trend data, in detail.    Recommend:  Continue the current VGo40, metformin, and Ozempic med plan at this time  We have reviewed dosing options using the VGo clicks for small and large carb meals  Consider change to the OmniPod pump alternative, if affordable  Goal target -150 mg/dl  Contact me if cost issues with Novolog, VGo, Ozempic, or other endo meds  Continue use of the 14-day Freestyle Chnadni CGM sensor    Scan sensor 3-4x/day... more often    Use SG value for final decision on mealtime VGo clicks  Lab testing:    No lab tests ordered at this time    Needs repeat thyroid and diabetes lab testing in 8/2022 or 9/2022  Keep focus on diet, exercise, weight management.  Continue the NP-Thyroid 60 mg daily and levothyroxine 88 daily dose plan  Plan lab appointment in late 5/2023    Lab orders placed  Needs f/u on the mild hypercholesterolemia, consider adding statin medication for treatment  Keep focus on diet, exercise, weight management.  Advise having fasting lipid panel testing and dilated eye examination, at least annually    Keep scheduled followup GI, neurology, and PCP appointments  Addressed patient questions today     Patient Instructions   Diabetes  medication plan:  Humalog in VGo40   Small carb meal 1-2 clicks (2-4U)   Large carb meal 4 clicks (8U)    Novolog    sscale:      -250 1-click    -300 2-clicks  Metformin 750 mg 2-tabs in morning  Ozempic 0.5 mg  Subcutaneous weekly      Thyroid medication plan:  Lloyd thyroid 60 mg              1-tab daily  Levothyroxine 0.1 mg  1-tab daily daily    Goal target premeal glucose level  mg/dl  Continue use of the Chandni glucose sensor, scan premeal and bedtime  Repeat non-fasting labs in late 5/2023, orders available    Next endocrinology office appt 7/17/23 at 8:30 am  ([address], [address], [place])      Future labs ordered today:   Orders Placed This Encounter   Procedures     Hemoglobin A1c     Basic metabolic panel     TSH     T4 free     T3 Free     Radiology/Consults ordered today: None    Total time spent on day of encounter:  20 min    Follow-up:  7/17/23, Return    JOJO Christianson MD, MS  Endocrinology  [facility]    CC: Care Team

## 2023-04-03 DIAGNOSIS — E11.9 TYPE 2 DIABETES MELLITUS WITHOUT COMPLICATION, WITHOUT LONG-TERM CURRENT USE OF INSULIN (H): ICD-10-CM

## 2023-04-03 NOTE — TELEPHONE ENCOUNTER
Last Written Prescription Date:  10/4/2022  Last Fill Quantity: 30,  # refills: 1   Last office visit: 3/27/2023 ; last virtual visit: 11/7/2022 with prescribing provider:     Future Office Visit:   Next 5 appointments (look out 90 days)    Jun 22, 2023  1:30 PM  (Arrive by 1:15 PM)  Return Visit with Nic Harrington MD  [facility] [place] ([facility] ) [address] [place] [id number] [phone]

## 2023-04-03 NOTE — TELEPHONE ENCOUNTER
"Requested Prescriptions   Pending Prescriptions Disp Refills     insulin lispro (HUMALOG VIAL) 100 UNIT/ML vial 30 mL 1     Sig: Use with VGo patch device, total daily dose approx 75 units       Short Acting Insulin Protocol Passed - 4/3/2023  9:52 AM        Passed - Serum creatinine on file in past 12 months     Recent Labs   Lab Test 01/12/23  1716   CR 0.55       Ok to refill medication if creatinine is low          Passed - HgbA1C in past 3 or 6 months     If HgbA1C is 8 or greater, it needs to be on file within the past 3 months.  If less than 8, must be on file within the past 6 months.     Recent Labs   Lab Test 01/12/23  1716 10/15/21  1338   A1C 8.8*  --    61183  --  6.6*             Passed - Medication is active on med list        Passed - Patient is age 18 or older        Passed - Recent (6 mo) or future (30 days) visit within the authorizing provider's specialty     Patient had office visit in the last 6 months or has a visit in the next 30 days with authorizing provider or within the authorizing provider's specialty.  See \"Patient Info\" tab in inbasket, or \"Choose Columns\" in Meds & Orders section of the refill encounter.               Next appt 7/17/23.  Refills sent  Samantha Marie RN    "

## 2023-04-12 ENCOUNTER — LAB (OUTPATIENT)
Dept: LAB | Facility: CLINIC | Age: 58
End: 2023-04-12
Payer: COMMERCIAL

## 2023-04-12 DIAGNOSIS — K74.60 CIRRHOSIS OF LIVER WITHOUT ASCITES, UNSPECIFIED HEPATIC CIRRHOSIS TYPE (H): ICD-10-CM

## 2023-04-12 LAB
ALBUMIN SERPL BCG-MCNC: 3.4 G/DL (ref 3.5–5.2)
ALP SERPL-CCNC: 159 U/L (ref 35–104)
ALT SERPL W P-5'-P-CCNC: 22 U/L (ref 10–35)
AMMONIA PLAS-SCNC: 92 UMOL/L (ref 11–51)
AST SERPL W P-5'-P-CCNC: 45 U/L (ref 10–35)
BILIRUB DIRECT SERPL-MCNC: 0.43 MG/DL (ref 0–0.3)
BILIRUB SERPL-MCNC: 1.1 MG/DL
PROT SERPL-MCNC: 7.2 G/DL (ref 6.4–8.3)

## 2023-04-12 PROCEDURE — 82140 ASSAY OF AMMONIA: CPT

## 2023-04-12 PROCEDURE — 36415 COLL VENOUS BLD VENIPUNCTURE: CPT

## 2023-04-12 PROCEDURE — 80076 HEPATIC FUNCTION PANEL: CPT

## 2023-04-16 ENCOUNTER — HEALTH MAINTENANCE LETTER (OUTPATIENT)
Age: 58
End: 2023-04-16

## 2023-04-25 ENCOUNTER — TELEPHONE (OUTPATIENT)
Dept: ENDOCRINOLOGY | Facility: CLINIC | Age: 58
End: 2023-04-25

## 2023-04-25 ENCOUNTER — MYC MEDICAL ADVICE (OUTPATIENT)
Dept: ENDOCRINOLOGY | Facility: CLINIC | Age: 58
End: 2023-04-25
Payer: COMMERCIAL

## 2023-04-25 DIAGNOSIS — E11.9 TYPE 2 DIABETES MELLITUS WITHOUT COMPLICATION, WITHOUT LONG-TERM CURRENT USE OF INSULIN (H): Primary | ICD-10-CM

## 2023-04-25 RX ORDER — FLASH GLUCOSE SCANNING READER
EACH MISCELLANEOUS
Qty: 1 EACH | Refills: 0 | Status: ON HOLD | OUTPATIENT
Start: 2023-04-25 | End: 2023-08-12

## 2023-04-25 RX ORDER — BLOOD-GLUCOSE SENSOR
1 EACH MISCELLANEOUS CONTINUOUS PRN
Qty: 2 EACH | Refills: 5 | Status: SHIPPED | OUTPATIENT
Start: 2023-04-25 | End: 2023-07-17

## 2023-04-25 RX ORDER — FLASH GLUCOSE SCANNING READER
EACH MISCELLANEOUS
Qty: 1 EACH | Refills: 0 | Status: SHIPPED | OUTPATIENT
Start: 2023-04-25 | End: 2023-04-25

## 2023-04-25 NOTE — TELEPHONE ENCOUNTER
Spoke with Sudheer about a new Glucose monitor.  Freestyle Chandni [place] (original) is no longer available.  The new Freestyle Chandni 3, which doesn't use a reader would be preferred.  It would be covered through insurance, but does require prior authorization.  The current authorization expires 10/2023 according to the insurance. Will you please send new orders for the Freestyle Chandni 3 sensors?    Thank you  Sondra, Pharmacy Coordinator  [facility]  [address] [place] [address] [facility]  [phone] Phone  [phone] Fax

## 2023-04-26 ENCOUNTER — PATIENT OUTREACH (OUTPATIENT)
Dept: CARE COORDINATION | Facility: CLINIC | Age: 58
End: 2023-04-26

## 2023-04-26 ENCOUNTER — TRANSFERRED RECORDS (OUTPATIENT)
Dept: HEALTH INFORMATION MANAGEMENT | Facility: CLINIC | Age: 58
End: 2023-04-26
Payer: COMMERCIAL

## 2023-04-26 NOTE — TELEPHONE ENCOUNTER
Message reviewed, appreciated.  I have now sent the Freestyle Libre3 sensor Rx to her [facility] pharmacy.  Thanks.    JOJO Christianson MD, MS  Endocrinology  [facility]

## 2023-05-01 ENCOUNTER — OFFICE VISIT (OUTPATIENT)
Dept: NEUROLOGY | Facility: CLINIC | Age: 58
End: 2023-05-01
Payer: COMMERCIAL

## 2023-05-01 VITALS
HEART RATE: 61 BPM | BODY MASS INDEX: 41.13 KG/M2 | DIASTOLIC BLOOD PRESSURE: 71 MMHG | WEIGHT: 239.6 LBS | SYSTOLIC BLOOD PRESSURE: 119 MMHG

## 2023-05-01 DIAGNOSIS — K76.82 HEPATIC ENCEPHALOPATHY (H): Primary | ICD-10-CM

## 2023-05-01 DIAGNOSIS — I67.9 CEREBROVASCULAR DISEASE: ICD-10-CM

## 2023-05-01 LAB
ALBUMIN SERPL-MCNC: 3.2 G/DL (ref 3.4–5)
ALP SERPL-CCNC: 186 U/L (ref 40–150)
ALT SERPL W P-5'-P-CCNC: 32 U/L (ref 0–50)
ANION GAP SERPL CALCULATED.3IONS-SCNC: 5 MMOL/L (ref 3–14)
AST SERPL W P-5'-P-CCNC: 47 U/L (ref 0–45)
BASOPHILS # BLD AUTO: 0 10E3/UL (ref 0–0.2)
BASOPHILS NFR BLD AUTO: 1 %
BILIRUB SERPL-MCNC: 1.5 MG/DL (ref 0.2–1.3)
BUN SERPL-MCNC: 10 MG/DL (ref 7–30)
CALCIUM SERPL-MCNC: 8.5 MG/DL (ref 8.5–10.1)
CHLORIDE BLD-SCNC: 110 MMOL/L (ref 94–109)
CO2 SERPL-SCNC: 27 MMOL/L (ref 20–32)
CREAT SERPL-MCNC: 0.57 MG/DL (ref 0.52–1.04)
EOSINOPHIL # BLD AUTO: 0.1 10E3/UL (ref 0–0.7)
EOSINOPHIL NFR BLD AUTO: 1 %
ERYTHROCYTE [DISTWIDTH] IN BLOOD BY AUTOMATED COUNT: 13.9 % (ref 10–15)
GFR SERPL CREATININE-BSD FRML MDRD: >90 ML/MIN/1.73M2
GLUCOSE BLD-MCNC: 181 MG/DL (ref 70–99)
HCT VFR BLD AUTO: 46.8 % (ref 35–47)
HGB BLD-MCNC: 15.5 G/DL (ref 11.7–15.7)
IMM GRANULOCYTES # BLD: 0 10E3/UL
IMM GRANULOCYTES NFR BLD: 0 %
INR PPP: 1.2 (ref 0.85–1.15)
LYMPHOCYTES # BLD AUTO: 0.9 10E3/UL (ref 0.8–5.3)
LYMPHOCYTES NFR BLD AUTO: 20 %
MCH RBC QN AUTO: 31.1 PG (ref 26.5–33)
MCHC RBC AUTO-ENTMCNC: 33.1 G/DL (ref 31.5–36.5)
MCV RBC AUTO: 94 FL (ref 78–100)
MONOCYTES # BLD AUTO: 0.3 10E3/UL (ref 0–1.3)
MONOCYTES NFR BLD AUTO: 7 %
NEUTROPHILS # BLD AUTO: 3.2 10E3/UL (ref 1.6–8.3)
NEUTROPHILS NFR BLD AUTO: 71 %
NRBC # BLD AUTO: 0 10E3/UL
NRBC BLD AUTO-RTO: 0 /100
PLATELET # BLD AUTO: 92 10E3/UL (ref 150–450)
POTASSIUM BLD-SCNC: 3.6 MMOL/L (ref 3.4–5.3)
PROT SERPL-MCNC: 7.6 G/DL (ref 6.8–8.8)
RBC # BLD AUTO: 4.99 10E6/UL (ref 3.8–5.2)
SODIUM SERPL-SCNC: 142 MMOL/L (ref 133–144)
WBC # BLD AUTO: 4.5 10E3/UL (ref 4–11)

## 2023-05-01 PROCEDURE — 85025 COMPLETE CBC W/AUTO DIFF WBC: CPT | Performed by: PSYCHIATRY & NEUROLOGY

## 2023-05-01 PROCEDURE — 99215 OFFICE O/P EST HI 40 MIN: CPT | Performed by: PSYCHIATRY & NEUROLOGY

## 2023-05-01 PROCEDURE — 36415 COLL VENOUS BLD VENIPUNCTURE: CPT | Performed by: PSYCHIATRY & NEUROLOGY

## 2023-05-01 PROCEDURE — 80053 COMPREHEN METABOLIC PANEL: CPT | Performed by: PSYCHIATRY & NEUROLOGY

## 2023-05-01 PROCEDURE — 85610 PROTHROMBIN TIME: CPT | Performed by: PSYCHIATRY & NEUROLOGY

## 2023-05-01 NOTE — PROGRESS NOTES
Date of service: May 1, 2023    Referral source: Established patient    Chief complaint: Cognitive impairment    History of the Present Illness: The patient is a 57-year-old woman who returns to the [facility] today accompanied by her daughters, to discuss worsening cognitive symptoms.    The patient's history is as per my previous notes.  I initially saw her in July of last year after an MRI scan was performed to investigate cognitive changes. An outside radiologist had opined that the findings were suggestive of multiple sclerosis, but to my review the imaging was more suggestive of subacute and chronic cerebral infarctions in a patient with multiple vascular risk factors, including diabetes and ongoing cigarette smoking.  I did not elicit any history suggestive of multiple sclerosis at any time in the patient's life.  As noted, the primary complaint was that of significant cognitive change over the past year that was severe enough that the patient had been dismissed from her job due to performance concerns.    She had also been incidentally noted to have cirrhosis of the liver and elevated ammonia level.    A comprehensive stroke workup, including transthoracic echocardiogram, CT angiogram of the head and neck, and a prolonged cardiac monitor did not reveal any evidence of large vessel stenosis or indication for anticoagulation.  I recommended that the patient start 81 mg of aspirin daily, as well as high-potency statin, although as discussed further below, the latter has now been stopped.    When I last saw the patient in January, I had added escitalopram 10 mg for treatment of significant anxiety.    Today, the patient's daughters indicate that her cognitive function has been declining over the past 3 months.  They associate this with starting the escitalopram, although they also feel that this has been clearly beneficial regarding her anxiety symptoms.  There have been concerning issues such as  walking away from a lit burner on the stove and leaving it on for a prolonged period of time.  The patient seems more somnolent.  She will have conversations with her daughters and then have no recollection of what was discussed.    There are additional social stressors in that she is imminently losing her apartment because of inability to pay rent after she lost her job.  Her daughters have been in contact with a  who is helping them to apply for assistance through the [facility].  They are hoping to get her into a memory care facility.    Of concern, we were notified that an outside laboratory had drawn an ammonia level that returned at 179.  After we became aware of this, I had my nurse coordinator contact the patient's daughter and have her stop the statin, as well as begin lactulose at 20 grams 3 times daily    She has now started the lactulose.  She is noticing increased frequency of bowel movements.  They have not, as of yet, noted any particular improvement in her cognition.    PHYSICAL EXAMINATION:    VITAL SIGNS:  Blood pressure 119/71; pulse 61; weight 108.7 kg.    GENERAL:  Obese woman who presents to the evaluation accompanied by her daughters, awake and alert and in no acute distress.  There is no visible jaundice.    Assessment/plan:     1.  Cognitive impairment, suspect hepatic encephalopathy  I do think that the patient's recent cognitive decline is most likely related to worsening hepatic encephalopathy in the setting of a markedly elevated ammonia level.  Escitalopram is not particularly sedating and I doubt that this is a significant contributor to her cognition, and I would continue it as it does seem to be helpful for her anxiety symptoms.    Today, we will check laboratory studies, including complete blood counts, comprehensive metabolic panel, INR, and repeat ammonia level.  This is going to need to be followed regularly going forward in the future.  She does have an appointment with  her gastroenterology clinic later this month.    The patient expresses concern that her glucose levels have been higher since she started taking the lactulose and she should continue to follow regularly with her endocrinologist for management of diabetes.    2.  Cerebral ischemia  I do think that she should have repeat MRI imaging performed next month as scheduled for monitoring of the previously noted white matter abnormalities.  As above, I do not think these are reflective of demyelinating disease.  Unless there has been further accumulation of new abnormalities, I would not expect her cognition to be worsening at this point related to these now presumably chronic lesions.    I discontinued her statin recently out of concern that this could be contributing to worsening liver function.  She should remain on aspirin 81 daily and stop cigarette smoking.    3.  Housing concerns  The patient's daughters requested a letter indicating that she would benefit from placement in a memory care facility or a similar setting for the [place] and I did provide this for them today.  They are in contact with a  who is assisting with this process.      I spent a total of 46 minutes on patient care activities related to this encounter on the date of service, including time spent in reviewing the chart, obtaining history from the patient and collateral history from both of her daughters, and in counseling and documentation via the electronic medical record.    ADDENDUM (5-9-2023): Unfortunately, the blood specimen for ammonia lab was mishandled by the laboratory and could not be analyzed. We will contact the patient's family and ask them to redraw.    Other pertinent labs include bilirubin 1.5 mg/dL, AST 47 U/L, ALT 32 U/L, alkaline phosphatase 186 U/L, and creatinine 0.57 mg/dL. INR was 1.2.    Nic Harrington MD        D: 05/01/2023   T: 05/01/2023   MT: heike    Name:     BHUPINDER LEESDean  MRN:      [id number]         Account:    [id number]   :      1965           Visit Date: 2023     Document: [id number]

## 2023-05-01 NOTE — PATIENT INSTRUCTIONS
If you would like to establish primary care with an internal medicine physician at the [facility], you can call [phone] to schedule a visit    2.  Blood tests today    3.  Continue lactulose 30 mL three times daily    4.  We will proceed with MRI and follow up next month as scheduled

## 2023-05-01 NOTE — LETTER
5/1/2023      RE: Sudheer RAFIQ Montiel  [address]  [place] [address]     Referral source: Established patient    Chief complaint: Cognitive impairment    History of the Present Illness: The patient is a 57-year-old woman who [facility] to the [facility] today accompanied by her daughters, to discuss worsening cognitive symptoms.    The patient's history is as per my previous notes.  I initially saw her in July of last year after an MRI scan was performed to investigate cognitive changes. An outside radiologist had opined that the findings were suggestive of multiple sclerosis, but to my review the imaging was more suggestive of subacute and chronic cerebral infarctions in a patient with multiple vascular risk factors, including diabetes and ongoing cigarette smoking.  I did not elicit any history suggestive of multiple sclerosis at any time in the patient's life.  As noted, the primary complaint was that of significant cognitive change over the past year that was severe enough that the patient had been dismissed from her job due to performance concerns.    She had also been incidentally noted to have cirrhosis of the liver and elevated ammonia level.    A comprehensive stroke workup, including transthoracic echocardiogram, CT angiogram of the head and neck, and a prolonged cardiac monitor did not reveal any evidence of large vessel stenosis or indication for anticoagulation.  I recommended that the patient start 81 mg of aspirin daily, as well as high-potency statin, although as discussed further below, the latter has now been stopped.    When I last saw the patient in January, I had added escitalopram 10 mg for treatment of significant anxiety.    Today, the patient's daughters indicate that her cognitive function has been declining over the past 3 months.  They associate this with starting the escitalopram, although they also feel that this has been clearly beneficial regarding her anxiety  symptoms.  There have been concerning issues such as walking away from a lit burner on the stove and leaving it on for a prolonged period of time.  The patient seems more somnolent.  She will have conversations with her daughters and then have no recollection of what was discussed.    There are additional social stressors in that she is imminently losing her apartment because of inability to pay rent after she lost her job.  Her daughters have been in contact with a  who is helping them to apply for assistance through the [facility].  They are hoping to get her into a memory care facility.    Of concern, we were notified that an outside laboratory had drawn an ammonia level that returned at 179.  After we became aware of this, I had my nurse coordinator contact the patient's daughter and have her stop the statin, as well as begin lactulose at 20 grams 3 times daily    She has now started the lactulose.  She is noticing increased frequency of bowel movements.  They have not, as of yet, noted any particular improvement in her cognition.    PHYSICAL EXAMINATION:    VITAL SIGNS:  Blood pressure 119/71; pulse 61; weight 108.7 kg.    GENERAL:  Obese woman who presents to the evaluation accompanied by her daughters, awake and alert and in no acute distress.  There is no visible jaundice.    Assessment/plan:     1.  Cognitive impairment, suspect hepatic encephalopathy  I do think that the patient's recent cognitive decline is most likely related to worsening hepatic encephalopathy in the setting of a markedly elevated ammonia level.  Escitalopram is not particularly sedating and I doubt that this is a significant contributor to her cognition, and I would continue it as it does seem to be helpful for her anxiety symptoms.    Today, we will check laboratory studies, including complete blood counts, comprehensive metabolic panel, INR, and repeat ammonia level.  This is going to need to be followed regularly going  forward in the future.  She does have an appointment with her gastroenterology clinic later this month.    The patient expresses concern that her glucose levels have been higher since she started taking the lactulose and she should continue to follow regularly with her endocrinologist for management of diabetes.    2.  Cerebral ischemia  I do think that she should have repeat MRI imaging performed next month as scheduled for monitoring of the previously noted white matter abnormalities.  As above, I do not think these are reflective of demyelinating disease.  Unless there has been further accumulation of new abnormalities, I would not expect her cognition to be worsening at this point related to these now presumably chronic lesions.    I discontinued her statin recently out of concern that this could be contributing to worsening liver function.  She should remain on aspirin 81 daily and stop cigarette smoking.    3.  Housing concerns  The patient's daughters requested a letter indicating that she would benefit from placement in a memory care facility or a similar setting [facility] and I did provide this for them today.  They are in contact with a  who is assisting with this process.      I spent a total of 46 minutes on patient care activities related to this encounter on the date of service, including time spent in reviewing the chart, obtaining history from the patient and collateral history from both of her daughters, and in counseling and documentation via the electronic medical record.    ADDENDUM (5-9-2023): Unfortunately, the blood specimen for ammonia lab was mishandled by the laboratory and could not be analyzed. We will contact the patient's family and ask them to redraw.            Other pertinent labs include bilirubin 1.5 mg/dL, AST 47 U/L, ALT 32 U/L, alkaline phosphatase 186 U/L, and creatinine 0.57 mg/dL. INR was 1.2.    Nic Harrington MD   of  Neurology  [facility]     Cc:  Beth Cantu PA-C (PCP)  Kirk Christianson MD (Endocrinology)  Laz Johnson MD (Gastroenterology)  Patient

## 2023-05-08 ASSESSMENT — ANXIETY QUESTIONNAIRES
2. NOT BEING ABLE TO STOP OR CONTROL WORRYING: MORE THAN HALF THE DAYS
4. TROUBLE RELAXING: NOT AT ALL
7. FEELING AFRAID AS IF SOMETHING AWFUL MIGHT HAPPEN: MORE THAN HALF THE DAYS
GAD7 TOTAL SCORE: 10
1. FEELING NERVOUS, ANXIOUS, OR ON EDGE: NEARLY EVERY DAY
GAD7 TOTAL SCORE: 10
GAD7 TOTAL SCORE: 10
6. BECOMING EASILY ANNOYED OR IRRITABLE: NOT AT ALL
8. IF YOU CHECKED OFF ANY PROBLEMS, HOW DIFFICULT HAVE THESE MADE IT FOR YOU TO DO YOUR WORK, TAKE CARE OF THINGS AT HOME, OR GET ALONG WITH OTHER PEOPLE?: EXTREMELY DIFFICULT
IF YOU CHECKED OFF ANY PROBLEMS ON THIS QUESTIONNAIRE, HOW DIFFICULT HAVE THESE PROBLEMS MADE IT FOR YOU TO DO YOUR WORK, TAKE CARE OF THINGS AT HOME, OR GET ALONG WITH OTHER PEOPLE: EXTREMELY DIFFICULT
7. FEELING AFRAID AS IF SOMETHING AWFUL MIGHT HAPPEN: MORE THAN HALF THE DAYS
5. BEING SO RESTLESS THAT IT IS HARD TO SIT STILL: NOT AT ALL
3. WORRYING TOO MUCH ABOUT DIFFERENT THINGS: NEARLY EVERY DAY

## 2023-05-09 DIAGNOSIS — E03.9 ACQUIRED HYPOTHYROIDISM: ICD-10-CM

## 2023-05-09 RX ORDER — LEVOTHYROXINE SODIUM 100 UG/1
TABLET ORAL
Qty: 30 TABLET | Refills: 1 | Status: SHIPPED | OUTPATIENT
Start: 2023-05-09 | End: 2023-06-11

## 2023-05-09 NOTE — TELEPHONE ENCOUNTER
"Last Written Prescription Date:  3/16/23  Last Fill Quantity: 30,  # refills: 1   Last office visit: 3/27/2023 with Dr. Christianson  Future Office Visit: 7/17/23  Next 5 appointments (look out 90 days)    Jun 22, 2023  1:30 PM  (Arrive by 1:15 PM)  Return Visit with Nic Harrington MD  [facility] [place] ([facility] ) [address] [place] [id number] [phone]               Requested Prescriptions   Pending Prescriptions Disp Refills     levothyroxine (SYNTHROID/LEVOTHROID) 100 MCG tablet [Pharmacy Med Name: LEVOTHYROXINE 0.100MG (100MCG) TAB] 30 tablet 1     Sig: TAKE 1 TABLET(100 MCG) BY MOUTH DAILY       Thyroid Protocol Failed - 5/9/2023  5:52 AM        Failed - Normal TSH on file in past 12 months     Recent Labs   Lab Test 01/12/23  1716   TSH 9.76*              Passed - Patient is 12 years or older        Passed - Recent (12 mo) or future (30 days) visit within the authorizing provider's specialty     Patient has had an office visit with the authorizing provider or a provider within the authorizing providers department within the previous 12 mos or has a future within next 30 days. See \"Patient Info\" tab in inbasket, or \"Choose Columns\" in Meds & Orders section of the refill encounter.              Passed - Medication is active on med list        Passed - No active pregnancy on record     If patient is pregnant or has had a positive pregnancy test, please check TSH.          Passed - No positive pregnancy test in past 12 months     If patient is pregnant or has had a positive pregnancy test, please check TSH.             Refill sent with reminder to get labs this month. Samantha Marie RN    "

## 2023-05-10 ENCOUNTER — LAB (OUTPATIENT)
Dept: LAB | Facility: CLINIC | Age: 58
End: 2023-05-10
Payer: COMMERCIAL

## 2023-05-10 DIAGNOSIS — K76.82 HEPATIC ENCEPHALOPATHY (H): ICD-10-CM

## 2023-05-10 LAB — AMMONIA PLAS-SCNC: 52 UMOL/L (ref 11–51)

## 2023-05-10 PROCEDURE — 36415 COLL VENOUS BLD VENIPUNCTURE: CPT

## 2023-05-10 PROCEDURE — 82140 ASSAY OF AMMONIA: CPT

## 2023-05-10 ASSESSMENT — PATIENT HEALTH QUESTIONNAIRE - PHQ9
SUM OF ALL RESPONSES TO PHQ QUESTIONS 1-9: 19
10. IF YOU CHECKED OFF ANY PROBLEMS, HOW DIFFICULT HAVE THESE PROBLEMS MADE IT FOR YOU TO DO YOUR WORK, TAKE CARE OF THINGS AT HOME, OR GET ALONG WITH OTHER PEOPLE: EXTREMELY DIFFICULT
SUM OF ALL RESPONSES TO PHQ QUESTIONS 1-9: 19

## 2023-05-11 ENCOUNTER — VIRTUAL VISIT (OUTPATIENT)
Dept: PSYCHOLOGY | Facility: CLINIC | Age: 58
End: 2023-05-11
Attending: PSYCHIATRY & NEUROLOGY
Payer: COMMERCIAL

## 2023-05-11 DIAGNOSIS — R41.844 IMPAIRED EXECUTIVE FUNCTIONING: Primary | ICD-10-CM

## 2023-05-11 DIAGNOSIS — F41.9 ANXIETY: ICD-10-CM

## 2023-05-11 PROCEDURE — 90791 PSYCH DIAGNOSTIC EVALUATION: CPT | Mod: VID | Performed by: COUNSELOR

## 2023-05-11 ASSESSMENT — COLUMBIA-SUICIDE SEVERITY RATING SCALE - C-SSRS
ATTEMPT LIFETIME: NO
1. HAVE YOU WISHED YOU WERE DEAD OR WISHED YOU COULD GO TO SLEEP AND NOT WAKE UP?: YES
1. IN YOUR LIFETIME, HAVE YOU WISHED YOU WERE DEAD OR WISHED YOU COULD GO TO SLEEP AND NOT WAKE UP?: HIGH SCHOOL
2. HAVE YOU ACTUALLY HAD ANY THOUGHTS OF KILLING YOURSELF?: NO
1. IN THE PAST MONTH, HAVE YOU WISHED YOU WERE DEAD OR WISHED YOU COULD GO TO SLEEP AND NOT WAKE UP?: NO
6. HAVE YOU EVER DONE ANYTHING, STARTED TO DO ANYTHING, OR PREPARED TO DO ANYTHING TO END YOUR LIFE?: NO
TOTAL  NUMBER OF INTERRUPTED ATTEMPTS LIFETIME: NO
TOTAL  NUMBER OF ABORTED OR SELF INTERRUPTED ATTEMPTS LIFETIME: NO
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
## 2023-06-06 ENCOUNTER — LAB (OUTPATIENT)
Dept: LAB | Facility: CLINIC | Age: 58
End: 2023-06-06
Payer: COMMERCIAL

## 2023-06-06 DIAGNOSIS — E11.9 TYPE 2 DIABETES MELLITUS WITHOUT COMPLICATION, WITHOUT LONG-TERM CURRENT USE OF INSULIN (H): ICD-10-CM

## 2023-06-06 DIAGNOSIS — K74.60 HEPATIC CIRRHOSIS, UNSPECIFIED HEPATIC CIRRHOSIS TYPE, UNSPECIFIED WHETHER ASCITES PRESENT (H): ICD-10-CM

## 2023-06-06 DIAGNOSIS — E03.9 ACQUIRED HYPOTHYROIDISM: ICD-10-CM

## 2023-06-06 LAB
ALBUMIN SERPL BCG-MCNC: 3.5 G/DL (ref 3.5–5.2)
ALP SERPL-CCNC: 195 U/L (ref 35–104)
ALT SERPL W P-5'-P-CCNC: 23 U/L (ref 10–35)
ANION GAP SERPL CALCULATED.3IONS-SCNC: 11 MMOL/L (ref 7–15)
AST SERPL W P-5'-P-CCNC: 53 U/L (ref 10–35)
BILIRUB DIRECT SERPL-MCNC: 0.54 MG/DL (ref 0–0.3)
BILIRUB SERPL-MCNC: 1.6 MG/DL
BUN SERPL-MCNC: 10.6 MG/DL (ref 8–23)
CALCIUM SERPL-MCNC: 8.9 MG/DL (ref 8.6–10)
CHLORIDE SERPL-SCNC: 104 MMOL/L (ref 98–107)
CREAT SERPL-MCNC: 0.59 MG/DL (ref 0.51–0.95)
CREAT UR-MCNC: 168 MG/DL
DEPRECATED HCO3 PLAS-SCNC: 23 MMOL/L (ref 22–29)
GFR SERPL CREATININE-BSD FRML MDRD: >90 ML/MIN/1.73M2
GLUCOSE SERPL-MCNC: 148 MG/DL (ref 70–99)
HBA1C MFR BLD: 6.9 % (ref 0–5.6)
INR PPP: 1.18 (ref 0.85–1.15)
MICROALBUMIN UR-MCNC: 18.3 MG/L
MICROALBUMIN/CREAT UR: 10.89 MG/G CR (ref 0–25)
POTASSIUM SERPL-SCNC: 4 MMOL/L (ref 3.4–5.3)
PROT SERPL-MCNC: 7.6 G/DL (ref 6.4–8.3)
SODIUM SERPL-SCNC: 138 MMOL/L (ref 136–145)
T3FREE SERPL-MCNC: 1.8 PG/ML (ref 2–4.4)
T4 FREE SERPL-MCNC: 1.23 NG/DL (ref 0.9–1.7)
TSH SERPL DL<=0.005 MIU/L-ACNC: 12.2 UIU/ML (ref 0.3–4.2)

## 2023-06-06 PROCEDURE — 84481 FREE ASSAY (FT-3): CPT

## 2023-06-06 PROCEDURE — 82043 UR ALBUMIN QUANTITATIVE: CPT

## 2023-06-06 PROCEDURE — 80053 COMPREHEN METABOLIC PANEL: CPT

## 2023-06-06 PROCEDURE — 84443 ASSAY THYROID STIM HORMONE: CPT

## 2023-06-06 PROCEDURE — 83036 HEMOGLOBIN GLYCOSYLATED A1C: CPT

## 2023-06-06 PROCEDURE — 82570 ASSAY OF URINE CREATININE: CPT

## 2023-06-06 PROCEDURE — 36415 COLL VENOUS BLD VENIPUNCTURE: CPT

## 2023-06-06 PROCEDURE — 84439 ASSAY OF FREE THYROXINE: CPT

## 2023-06-06 PROCEDURE — 82248 BILIRUBIN DIRECT: CPT

## 2023-06-06 PROCEDURE — 85610 PROTHROMBIN TIME: CPT

## 2023-06-08 ENCOUNTER — OFFICE VISIT (OUTPATIENT)
Dept: FAMILY MEDICINE | Facility: CLINIC | Age: 58
End: 2023-06-08
Payer: COMMERCIAL

## 2023-06-08 VITALS
DIASTOLIC BLOOD PRESSURE: 84 MMHG | WEIGHT: 245.6 LBS | TEMPERATURE: 97.6 F | OXYGEN SATURATION: 96 % | HEIGHT: 64 IN | HEART RATE: 60 BPM | SYSTOLIC BLOOD PRESSURE: 134 MMHG | BODY MASS INDEX: 41.93 KG/M2 | RESPIRATION RATE: 18 BRPM

## 2023-06-08 DIAGNOSIS — E11.9 TYPE 2 DIABETES MELLITUS WITHOUT COMPLICATION, WITHOUT LONG-TERM CURRENT USE OF INSULIN (H): ICD-10-CM

## 2023-06-08 DIAGNOSIS — R26.81 GAIT INSTABILITY: ICD-10-CM

## 2023-06-08 DIAGNOSIS — R41.89 COGNITIVE IMPAIRMENT: Primary | ICD-10-CM

## 2023-06-08 DIAGNOSIS — E66.01 MORBID OBESITY (H): ICD-10-CM

## 2023-06-08 LAB
AMMONIA PLAS-SCNC: 85 UMOL/L (ref 11–51)
FOLATE SERPL-MCNC: 14.4 NG/ML (ref 4.6–34.8)

## 2023-06-08 PROCEDURE — 82607 VITAMIN B-12: CPT | Performed by: PHYSICIAN ASSISTANT

## 2023-06-08 PROCEDURE — 82140 ASSAY OF AMMONIA: CPT

## 2023-06-08 PROCEDURE — 82746 ASSAY OF FOLIC ACID SERUM: CPT | Performed by: PHYSICIAN ASSISTANT

## 2023-06-08 PROCEDURE — 36415 COLL VENOUS BLD VENIPUNCTURE: CPT | Performed by: PHYSICIAN ASSISTANT

## 2023-06-08 PROCEDURE — 99204 OFFICE O/P NEW MOD 45 MIN: CPT | Performed by: PHYSICIAN ASSISTANT

## 2023-06-08 RX ORDER — RIFAXIMIN 550 MG/1
1 TABLET ORAL
COMMUNITY
Start: 2023-05-27 | End: 2024-04-05

## 2023-06-08 RX ORDER — METFORMIN HYDROCHLORIDE 750 MG/1
TABLET, EXTENDED RELEASE ORAL
Qty: 180 TABLET | Refills: 1 | Status: SHIPPED | OUTPATIENT
Start: 2023-06-08 | End: 2023-07-17

## 2023-06-08 ASSESSMENT — PAIN SCALES - GENERAL: PAINLEVEL: NO PAIN (0)

## 2023-06-08 NOTE — Clinical Note
Soham Richter,   Is it possible for you to do another reach out to this patient/family to help with home service needs? May be considering assisted living/memory care based on cognitive changes.   Any questions or if I can help with anything let me know!   Estevan

## 2023-06-08 NOTE — PROGRESS NOTES
"Assessment & Plan     Cognitive impairment  Complex medical history - suspect cognitive impairment is multifactorial. Happy she has close upcoming follow-up with neuro and endo plus an MRI. Recommended return visit to mental health time to continue to work through mental health. Will contact social work to reach out to help with home situation/services. May benefit from assisted living/memory care. Appears she has never had B12/Folate levels checked, due for Ammonia level. Switch Lexapro to evening/night dosing to help with fatigue potentially. Follow-up apt in 2 months. Sooner if needed.   - Home Care Referral  - Vitamin B12  - Folate      Gait instability  Concern for risk for falling.  Recommended at home safety, and for evaluation with PT/OT.  Reviewed at home safety.  - Home Care Referral    Type 2 diabetes mellitus without complication, without long-term current use of insulin (H)  Commended her on her great work with recent A1c results.  Gets diabetic foot exams with endocrinology.  Continues medication regimen.  Schedule eye exam.  - Home Care Referral  - FOOT EXAM    Obesity (BMI 35.0-39.9) with comorbidity (H)  Healthy diet and exercise.  Continue follow-up with endocrinology.      45 minutes spent by me on the date of the encounter doing chart review, review of test results, interpretation of tests, patient visit, documentation and discussion with family        Nicotine/Tobacco Cessation:  She reports that she has been smoking cigarettes. She has never used smokeless tobacco.  Nicotine/Tobacco Cessation Plan:   Information offered: Patient not interested at this time    BMI:   Estimated body mass index is 42.16 kg/m  as calculated from the following:    Height as of this encounter: 1.626 m (5' 4\").    Weight as of this encounter: 111.4 kg (245 lb 9.6 oz).   Weight management plan: Discussed healthy diet and exercise guidelines      No follow-ups on file.    The likelihood of other entities in the " differential is insufficient to justify any further testing for them at this time. This was explained to the patient. The patient was advised that persistent or worsening symptoms would require further evaluation. Patient advised to call the office and if unable to reach to go to the emergency room if they develop any new or worsening symptoms. Expressed understanding and agreement with above stated plan.     CHACHA Chavez [facility]   Sudheer Montiel is a 58 year old female presenting for the following health issues:  Patient presents with:  Establish Care  Health Maintenance: Sees Dr Sammy singh for foot exam, Eye exam planning to get scheduled with opthalmologic     Here today with her daughters Cassie and Jessie to establish care and to address concerns about her health.     History of prediabetes, hypothyroidism, obesity, and liver cirrhosis. Previously established with [facility].     Sees Dr. Christianson for her DM care. Recent A1c 6.9% which is a big improvement for her.     Sees [facility] for her liver care - Taking Lactulose once daily and recently added rifaximin    Currently living with one of her daughters as due to cognitive changes lost her job and house. Increasing imbalance and falls. Worsening memory - left a stove burner on a few months ago. Increasingly fatigued. Question underlying med interactions, worsening cognitive condition, or complication of cirrhosis/hepatic encephalopathy. Increasingly difficult for daughters to take care of her. No longer driving.     Saw psychology on 5/11/23 - due for follow-up it appears via note to work more closely with cognitive impairment. Stated on Lexapro 10 mg which helps with anxiety - however cognitive impairment is now more apparent per family.     Previously working with a  through  [facility] who was helping them reach out to the [facility] for additional services.     Due for ammonia level.  "    -Smoker. 4 cigs daily. Trying to cut back.   -No ETOH  -Off statin due to liver issues.   -Notes chronic bilateral knee pain that is \"bone on bone\"    -due for eye exam    Review of Systems   Constitutional, HEENT, cardiovascular, pulmonary, GI, , musculoskeletal, neuro, skin, endocrine and psych systems are negative, except as otherwise noted.      Objective    /84 (BP Location: Right arm, Patient Position: Sitting, Cuff Size: Adult Large)   Pulse 60   Temp 97.6  F (36.4  C) (Oral)   Resp 18   Ht 1.626 m (5' 4\")   Wt 111.4 kg (245 lb 9.6 oz)   SpO2 96%   BMI 42.16 kg/m    5' 4\"  245 lbs 9.6 oz    Allergies   Allergen Reactions     Cephalexin      Morphine      Other reaction(s): Other  Irritability, disorientation     Penicillins Itching     face     Amoxicillin Rash and Itching     Cefprozil Rash     Cefprozil Rash     Ceftazidime Itching and Rash     Ciprofloxacin Rash     Ciprofloxacin Itching and Rash     Tolerates levaquin      Percocet [Oxycodone-Acetaminophen] Nausea and Vomiting     Current Outpatient Medications   Medication Sig Dispense Refill     acetaminophen (TYLENOL) 325 MG tablet Take 2 tablets (650 mg) by mouth every 8 hours as needed for mild pain or other (and adjunct with moderate or severe pain or per patient request)       ASPIRIN 81 PO        blood glucose (NO BRAND SPECIFIED) test strip Use to test blood sugar 1 times daily or as directed. Glucocard Vital 200 strip 3     celecoxib (CELEBREX) 200 MG capsule TK 1 C PO D  1     Continuous Blood Gluc  (FREESTYLE DIMITRIOS 14 DAY READER) BEBE Use to read blood sugars as per 's instructions. 1 each 0     Continuous Blood Gluc  (FREESTYLE DIMITRIOS READER) BEBE 1 Device continuous prn (replace annually if needed) Use to read blood glucose levels per  instructions 1 Device 0     Continuous Blood Gluc Sensor (FREESTYLE DIMITRIOS 14 DAY SENSOR) MISC Change every 14 days. 2 each 5     Continuous Blood Gluc " Sensor (FREESTYLE DIMITRIOS 3 SENSOR) MISC 1 Device continuous prn (change every 14 days) 2 each 5     escitalopram (LEXAPRO) 10 MG tablet Take 1 tablet (10 mg) by mouth daily 30 tablet 5     insulin lispro (HUMALOG VIAL) 100 UNIT/ML vial Use with VGo patch device, total daily dose approx 75 units 30 mL 1     insulin pen needle (B-D U/F) 31G X 5 MM miscellaneous Use 1 pen needles daily or as directed. 100 each 3     lactulose 20 GM/30ML solution Take 30 mLs (20 g) by mouth 3 times daily (Patient taking differently: Take 10 mLs by mouth daily) 946 mL 1     levothyroxine (SYNTHROID/LEVOTHROID) 100 MCG tablet TAKE 1 TABLET(100 MCG) BY MOUTH DAILY 30 tablet 1     metFORMIN (GLUCOPHAGE-XR) 750 MG 24 hr tablet TAKE 1 TABLET(750 MG) BY MOUTH TWICE DAILY WITH MEALS 180 tablet 1     semaglutide (OZEMPIC) 2 MG/1.5ML SOPN pen Inject 0.5 mg weekly 4.5 mL 1     thyroid (ARMOUR) 60 MG tablet Take 1-tablet by mouth daily as directed 30 tablet 1     valACYclovir (VALTREX) 500 MG tablet Take 500 mg by mouth daily as needed       wearable insulin delivery device (V-GO 40) kit CHANGE EVERY 24 HOURS 30 each 5     XIFAXAN 550 MG TABS tablet Take 1 tablet by mouth 2 times daily       omeprazole (PRILOSEC) 10 MG DR capsule Take 10 mg by mouth daily (Patient not taking: Reported on 2023)       rosuvastatin (CRESTOR) 10 MG tablet Take 1 tablet (10 mg) by mouth daily (Patient not taking: Reported on 2023) 30 tablet 5     Past Medical History:   Diagnosis Date     Abnormal liver CT      Hypothyroid 80's     Necrotizing fasciitis (H)      Primary osteoarthritis of both knees      Soft tissue infection      Subcortical infarction (H)      Type 2 diabetes mellitus (H)      Past Surgical History:   Procedure Laterality Date      SECTION       COLONOSCOPY N/A 10/21/2015    Procedure: COLONOSCOPY;  Surgeon: Jaky Arredondo MD;  Location:  GI     IRRIGATION AND DEBRIDEMENT ABDOMEN WASHOUT, COMBINED N/A 10/12/2018     Procedure: COMBINED IRRIGATION AND DEBRIDEMENT ABDOMEN WASHOUT;  Irrigation and Debridement of Lower Abdomen, removal of piece of mesh.;  Surgeon: Darlene Hogue MD;  Location:  OR     [facility]  2004    due to anemia     ZZC REPAIR CRUCIATE LIGAMENT,KNEE  1986       Physical Exam   GENERAL: healthy, alert and no distress  EYES: Eyes grossly normal to inspection, PERRL and conjunctivae and sclerae normal  HENT: ear canals and TM's normal, nose and mouth without ulcers or lesions  NECK: no adenopathy, no asymmetry, masses, or scars and thyroid normal to palpation  RESP: lungs clear to auscultation - no rales, rhonchi or wheezes  CV: regular rate and rhythm, normal S1 S2, no S3 or S4, no murmur, click or rub, no peripheral edema and peripheral pulses strong  ABDOMEN: soft, nontender, no hepatosplenomegaly, no masses and bowel sounds normal  MS: no gross musculoskeletal defects noted, no edema  SKIN: no suspicious lesions or rashes  NEURO: Normal strength and tone, mentation intact and speech normal. Cranial nerves 2-12 grossly intact. Negative Romberg. Steady gait. Strength intact.   PSYCH: mentation appears normal, affect normal/bright

## 2023-06-08 NOTE — TELEPHONE ENCOUNTER
Last Written Prescription Date:  12/23/22  Last Fill Quantity: 180,  # refills: 1   Last office visit: 3/27/2023 with Dr. Christianson  Future Office Visit: 7/17/23      Next 5 appointments (look out 90 days)    Jun 22, 2023  1:30 PM  (Arrive by 1:15 PM)  Return Visit with Nic Harrington MD  [facility] ([facility] ) [address] [place] [id number] [phone]               Requested Prescriptions   Pending Prescriptions Disp Refills     metFORMIN (GLUCOPHAGE-XR) 750 MG 24 hr tablet [Pharmacy Med Name: METFORMIN ER 750MG 24HR TABS] 180 tablet 1     Sig: TAKE 1 TABLET(750 MG) BY MOUTH TWICE DAILY WITH MEALS       Biguanide Agents Passed - 6/8/2023  5:56 AM        Passed - Patient is age 10 or older        Passed - Patient has documented A1c within the specified period of time.     If HgbA1C is 8 or greater, it needs to be on file within the past 3 months.  If less than 8, must be on file within the past 6 months.     Recent Labs   Lab Test 06/06/23  1016 01/12/23  1716 10/15/21  1338   A1C 6.9*   < >  --    [address]  --   --  6.6*    < > = values in this interval not displayed.             Passed - Patient's CR is NOT>1.4 OR Patient's EGFR is NOT<45 within past 12 mos.     Recent Labs   Lab Test 06/06/23  1016 05/25/22  1545 01/05/21  1420   GFRESTIMATED >90   < > >90   GFRESTBLACK  --   --  >90    < > = values in this interval not displayed.       Recent Labs   Lab Test 06/06/23  1016   CR 0.59             Passed - Patient does NOT have a diagnosis of CHF.        Passed - Medication is active on med list        Passed - Patient is not pregnant        Passed - Patient has not had a positive pregnancy test within the past 12 mos.         Passed - Recent (6 mo) or future (30 days) visit within the authorizing provider's specialty     Patient had office visit in the last 6 months or has a visit in the next 30 days with  "authorizing provider or within the authorizing provider's specialty.  See \"Patient Info\" tab in inbasket, or \"Choose Columns\" in Meds & Orders section of the refill encounter.               Refills sent  Samantha Marie RN    "

## 2023-06-09 LAB — VIT B12 SERPL-MCNC: 570 PG/ML (ref 232–1245)

## 2023-06-11 DIAGNOSIS — E03.9 ACQUIRED HYPOTHYROIDISM: ICD-10-CM

## 2023-06-11 RX ORDER — LEVOTHYROXINE SODIUM 100 UG/1
100 TABLET ORAL DAILY
Qty: 30 TABLET | Refills: 1 | Status: SHIPPED | OUTPATIENT
Start: 2023-06-11 | End: 2023-07-17

## 2023-06-11 RX ORDER — THYROID 15 MG/1
15 TABLET ORAL DAILY
Qty: 90 TABLET | Refills: 1 | Status: SHIPPED | OUTPATIENT
Start: 2023-06-11 | End: 2023-07-17

## 2023-06-11 RX ORDER — THYROID 60 MG/1
TABLET ORAL
Qty: 30 TABLET | Refills: 1 | Status: SHIPPED | OUTPATIENT
Start: 2023-06-11 | End: 2023-07-17

## 2023-06-13 ENCOUNTER — VIRTUAL VISIT (OUTPATIENT)
Dept: PSYCHOLOGY | Facility: CLINIC | Age: 58
End: 2023-06-13
Payer: COMMERCIAL

## 2023-06-13 DIAGNOSIS — R41.844 IMPAIRED EXECUTIVE FUNCTIONING: ICD-10-CM

## 2023-06-13 DIAGNOSIS — F41.9 ANXIETY: Primary | ICD-10-CM

## 2023-06-13 PROCEDURE — 90834 PSYTX W PT 45 MINUTES: CPT | Mod: VID | Performed by: COUNSELOR

## 2023-06-13 PROCEDURE — 90785 PSYTX COMPLEX INTERACTIVE: CPT | Mod: VID | Performed by: COUNSELOR

## 2023-06-13 NOTE — PROGRESS NOTES
[facility]                                     Progress Note    Patient Name: Sudheer Montiel  Date: 6/13/2023         Service Type: Individual      Session Start Time: 9:05am  Session End Time: 9:45am     Session Length: 40 mins     Session #: 2    Attendees: Client    Service Modality:  In-person    DATA  Interactive Complexity: -The need to manage maladaptive communication (related to, e.g., high anxiety, high reactivity, repeated questions, or disagreement) among participants that complicates delivery of care- memory issues   Crisis: No        Progress Since Last Session (Related to Symptoms / Goals / Homework):   Symptoms: No change      Homework: Did not complete      Episode of Care Goals: goals created today      Current / Ongoing Stressors and Concerns:   Memory loss, loss of independence, health      Treatment Objective(s) Addressed in This Session:   tx planning  Information gathering and rapport building     Intervention:   Emotion Focused Therapy: supportive listening, emotional processing  Solution Focused: tx planning     Assessments completed prior to visit:  The following assessments were completed by patient for this visit:  PHQ9:       5/10/2023     5:47 PM   PHQ-9 SCORE   PHQ-9 Total Score MyChart 19 (Moderately severe depression)   PHQ-9 Total Score 19     GAD7:       5/8/2023    12:50 PM   WILLIAM-7 SCORE   Total Score 10 (moderate anxiety)   Total Score 10         ASSESSMENT: Current Emotional / Mental Status (status of significant symptoms):   Risk status (Self / Other harm or suicidal ideation)   Patient denies current fears or concerns for personal safety.   Patient denies current or recent suicidal ideation or behaviors.   Patient denies current or recent homicidal ideation or behaviors.   Patient denies current or recent self injurious behavior or ideation.   Patient denies other safety concerns.   Patient reports there has been no change in risk factors since their last  session.     Patient reports there has been no change in protective factors since their last session.     Recommended that patient call 911 or go to the local ED should there be a change in any of these risk factors.     Appearance:   Appropriate    Eye Contact:   Fair    Psychomotor Behavior: Normal    Attitude:   Cooperative  Friendly   Orientation:   All    Speech    Rate / Production: Normal     Volume:  Normal    Mood:    Anxious  Sad    Affect:    Bright    Thought Content:  Rumination    Thought Form:  Coherent    Insight:    Fair      Medication Review:   No changes to current psychiatric medication(s)     Medication Compliance:   Yes     Changes in Health Issues:   Yes: Diabetes, Associated Psychological Distress  Chronic disease management, Associated Psychological Distress  memory, Associated Psychological Distress     Chemical Use Review:   Substance Use: Chemical use reviewed, no active concerns identified      Tobacco Use: No change in amount of tobacco use since last session.       Diagnosis:  1. Anxiety    2. Impaired executive functioning        Collateral Reports Completed:   Communicated with: manasa strong     PLAN: (Patient Tasks / Therapist Tasks / Other)  Provider assigned client to keep track of emotions via phone note between sessions         Elaine Sidhu [facility] 6/13/2023                                                           ______________________________________________________________________    Individual Treatment Plan    Patient's Name: Sudheer Montiel  YOB: 1965    Date of Creation: 6/13/2023  Date Treatment Plan Last Reviewed/Revised: 6/13/2023    DSM5 Diagnoses:  1. Anxiety    2. Impaired executive functioning      Psychosocial / Contextual Factors: memory loss, job loss, loss of home and license   PROMIS (reviewed every 90 days): 12    Referral / Collaboration:  Was/were discussed and patient will pursue. , memory clinic     Anticipated number of  session for this episode of care: 9-12 sessions  Anticipation frequency of session: Every other week  Anticipated Duration of each session: 38-52 minutes  Treatment plan will be reviewed in 90 days or when goals have been changed.       MeasurableTreatment Goal(s) related to diagnosis / functional impairment(s)  Goal 1: Patient will report     I will know I've met my goal when .

## 2023-06-14 ENCOUNTER — PATIENT OUTREACH (OUTPATIENT)
Dept: CARE COORDINATION | Facility: CLINIC | Age: 58
End: 2023-06-14
Payer: COMMERCIAL

## 2023-06-14 NOTE — PROGRESS NOTES
Clinic Care Coordination Contact  [facility]/Voicemail       Clinical Data: Care Coordinator Outreach - pt previously seen at [facility] and [facility] but may have now transitioned care to [facility]. SW CC outreach to determine pt's PCP status and ensure connection with appropriate CC team.     Outreach attempted x 1.  Left message on daughter's voicemail with call back information and requested return call.    Plan: Care Coordinator will try to reach patient again in 3-5 business days.    AGA Mijares   Social Work Care Coordinator  [phone]

## 2023-06-22 ENCOUNTER — ANCILLARY PROCEDURE (OUTPATIENT)
Dept: MRI IMAGING | Facility: CLINIC | Age: 58
End: 2023-06-22
Attending: PSYCHIATRY & NEUROLOGY
Payer: COMMERCIAL

## 2023-06-22 ENCOUNTER — OFFICE VISIT (OUTPATIENT)
Dept: NEUROLOGY | Facility: CLINIC | Age: 58
End: 2023-06-22
Attending: PSYCHIATRY & NEUROLOGY
Payer: COMMERCIAL

## 2023-06-22 VITALS
SYSTOLIC BLOOD PRESSURE: 144 MMHG | BODY MASS INDEX: 42.59 KG/M2 | HEART RATE: 61 BPM | WEIGHT: 248.1 LBS | DIASTOLIC BLOOD PRESSURE: 84 MMHG | OXYGEN SATURATION: 95 %

## 2023-06-22 DIAGNOSIS — R90.82 WHITE MATTER ABNORMALITY ON MRI OF BRAIN: ICD-10-CM

## 2023-06-22 DIAGNOSIS — R26.9 GAIT DISTURBANCE: ICD-10-CM

## 2023-06-22 DIAGNOSIS — G93.40 ENCEPHALOPATHY: ICD-10-CM

## 2023-06-22 DIAGNOSIS — I67.9 CEREBROVASCULAR DISEASE: Primary | ICD-10-CM

## 2023-06-22 DIAGNOSIS — F17.219 CIGARETTE NICOTINE DEPENDENCE WITH NICOTINE-INDUCED DISORDER: ICD-10-CM

## 2023-06-22 PROCEDURE — 99214 OFFICE O/P EST MOD 30 MIN: CPT | Performed by: PSYCHIATRY & NEUROLOGY

## 2023-06-22 PROCEDURE — 70553 MRI BRAIN STEM W/O & W/DYE: CPT | Mod: GC | Performed by: RADIOLOGY

## 2023-06-22 PROCEDURE — A9585 GADOBUTROL INJECTION: HCPCS | Mod: JZ | Performed by: RADIOLOGY

## 2023-06-22 PROCEDURE — G0463 HOSPITAL OUTPT CLINIC VISIT: HCPCS | Performed by: PSYCHIATRY & NEUROLOGY

## 2023-06-22 RX ORDER — GADOBUTROL 604.72 MG/ML
15 INJECTION INTRAVENOUS ONCE
Status: COMPLETED | OUTPATIENT
Start: 2023-06-22 | End: 2023-06-22

## 2023-06-22 RX ADMIN — GADOBUTROL 11 ML: 604.72 INJECTION INTRAVENOUS at 11:52

## 2023-06-22 ASSESSMENT — PAIN SCALES - GENERAL: PAINLEVEL: NO PAIN (0)

## 2023-06-22 NOTE — PATIENT INSTRUCTIONS
Your ammonia value has increased to 85 umol/L on 6-8-23, up from 52 on 5-10-23. I suspect that this may be related to less frequent lactulose dosing. Please provide this information to your gastroenterologist (Dr. Chavez) to discuss whether there should be a change in the therapy plan.    2.   Please discuss home care referral with primary care.    3.   Return to this clinic as needed

## 2023-06-22 NOTE — PROGRESS NOTES
Date of service: June 22, 2023    Referral source: Established patient    Chief complaint: Encephalopathy    History of the Present Illness: Ms. Sudheer Montiel is a 58-year-old woman who returns to the Multiple Sclerosis Clinic today to review the results of a recent MRI scan of the brain.    The patient's history is as per my previous notes.  I have followed this patient since July 2022 after an MRI scan was performed to investigate cognitive decline.  The outside radiologist opined that the findings seen on the MRI were suggestive of multiple sclerosis, but I felt that the white matter lesions seen were subcortical cerebral infarctions of varying ages in a patient with multiple vascular risk factors including diabetes and ongoing cigarette smoking.  She did not have any history of episodic neurologic symptoms nor progressive gait disturbance suggestive of multiple sclerosis at any time during her life.    I felt that the patient's cognitive impairment was multifactorial with hepatic encephalopathy likely a significant contributor in the setting of recently diagnosed cirrhosis of the liver.    A comprehensive stroke workup including vascular imaging, echocardiogram and prolonged cardiac monitor did not demonstrate any indication for anticoagulation.  The patient was placed on an antiplatelet agent (aspirin).  We also tried treating her with a high-potency statin, but she could not tolerate this due to rising liver function tests.    I asked them to come back today with an MRI scan of the brain to make sure that there had been no further accumulation of new lesions.    Symptomatically, the patient is accompanied by her daughter today, who indicates that her memory has not gotten any better overall.  I note that her gastroenterologist has now started her on rifampicin and reduced her dose of lactulose down to once a day.  The patient was bothered by loose stools and found the lactulose unpalatable.  However, her  "ammonia level is back up to 85 micromoles per liter as of 2 weeks ago, whereas it was 52 when she was on lactulose 3 times daily.    They also indicate concerns about balance and falls.  The patient last fell yesterday.  She indicates that she \"tripped over her own feet.\" She has a cane, but her daughter indicates that she frequently forgets to use this.  Her primary care provider did place a home care referral, but as I understand it, there was some confusion about the patient's residence, and the agency to whom the referral was sent was unable to provide services.  The patient is temporarily residing with her daughter in [place] as she lost her apartment in [place].    PHYSICAL EXAMINATION:    VITAL SIGNS:  Blood pressure 144/84; pulse 61; oxygen saturation 95%; weight 112.5 kg.  GENERAL:  Obese woman who presents to the evaluation accompanied by her daughter, awake and alert and in no acute distress.    Investigations: I reviewed images from an MRI scan of the brain performed earlier on today's date, and the following is my independent interpretation of those images.  There is no abnormal enhancement with gadolinium contrast or restricted diffusion. Chronic appearing white matter lesions with gliosis surrounding central, CSF isointense cores are seen in several locations in the bilateral cerebral white matter, again, to my eye consistent with now chronic multifocal lacunar infarctions.    Assessment/plan:     1.  Cerebrovascular disease  2.  Cigarette nicotine dependence, with vascular complication  The patient was initially referred to this clinic with radiologic suspicion for multiple sclerosis, but I did not get any history that would be compatible with that diagnosis, and as before, I do not think that the lesions seen were reflective of demyelinating changes.  The patient should remain on an antiplatelet agent for life absent any specific contraindication.  She has done a good job recently with lowering her " hemoglobin A1c, most recently below 7%.  She certainly needs to discontinue cigarette smoking entirely.    3. Encephalopathy  I continue to think that this is multifactorial.  There may be a component of the changes related to cerebrovascular ischemia, although there is no treatment for this etiology other than trying to control vascular risk factors to prevent further events.  Some of her cognitive changes may also be mood and anxiety related.  I do continue to feel that her liver disease is a prominent contributor as well.  I did ask them to contact the patient's gastroenterologist and let her know that the patient's ammonia value has risen over the last month on a reduced frequency of lactulose.    4. Gait disturbance  I advised them to contact the patient's primary care clinic to discuss the home care referral and try to connect the patient with an agency that can service her current home.    I do not have any further recommendations at this time.  I do not think that the patient needs to have serial radiologic imaging unless there are new focal neurologic changes.  Her liver disease is most appropriately managed in the Gastroenterology Clinic.    I have not made a specific follow-up with the patient at this time, but I am happy to speak with them again if they have further neurologic concerns.    I spent a total of 31 minutes in patient care activities related to this encounter on the date of service, including time spent in reviewing the chart, obtaining history from and in counseling the patient and her daughter. I answered their questions.    Nic Harrington MD        D: 2023   T: 2023   MT: moise    Name:     BHUPINDER LEES  MRN:      [id number]        Account:      [id number]   :      1965           Service Date: 2023       Document: [id number]

## 2023-06-22 NOTE — Clinical Note
6/22/2023       RE: Sudheer Montiel  [address]  [place] [address]     Dear Colleague,    Thank you for referring your patient, Sudheer Montiel, to the [facility] [place] [facility]. Please see a copy of my visit note below.    Date of service: June 22, 2023    Referral source: Established patient    Chief complaint: Encephalopathy    History of the Present Illness: Ms. Sudheer Montiel is a 58-year-old woman who returns to the Multiple Sclerosis Clinic today to review the results of a recent MRI scan of the brain.    The patient's history is as per my previous notes.  I have followed this patient since July 2022 after an MRI scan was performed to investigate cognitive decline.  The outside radiologist opined that the findings seen on the MRI were suggestive of multiple sclerosis, but I felt that the white matter lesions seen were subcortical cerebral infarctions of varying ages in a patient with multiple vascular risk factors including diabetes and ongoing cigarette smoking.  She did not have any history of episodic neurologic symptoms nor progressive gait disturbance suggestive of multiple sclerosis at any time during her life.    I felt that the patient's cognitive impairment was multifactorial with hepatic encephalopathy likely a significant contributor in the setting of recently diagnosed cirrhosis of the liver.    A comprehensive stroke workup including vascular imaging, echocardiogram and prolonged cardiac monitor did not demonstrate any indication for anticoagulation.  The patient was placed on an antiplatelet agent (aspirin).  We also tried treating her with a high-potency statin, but she could not tolerate this due to rising liver function tests.    I asked them to come back today with an MRI scan of the brain to make sure that there had been no further accumulation of new lesions.    Symptomatically, the patient is  "accompanied by her daughter today, who indicates that her memory has not gotten any better overall.  I note that her gastroenterologist has now started her on rifampicin and reduced her dose of lactulose down to once a day.  The patient was bothered by loose stools and found the lactulose unpalatable.  However, her ammonia level is back up to 85 micromoles per liter as of 2 weeks ago, whereas it was 52 when she was on lactulose 3 times daily.    They also indicate concerns about balance and falls.  The patient last fell yesterday.  She indicates that she \"tripped over her own feet.\" She has a cane, but her daughter indicates that she frequently forgets to use this.  Her primary care provider did place a home care referral, but as I understand it, there was some confusion about the patient's residence, and the agency to whom the referral was sent was unable to provide services.  The patient is temporarily residing with her daughter in [place] as she lost her apartment in [place].    PHYSICAL EXAMINATION:    VITAL SIGNS:  Blood pressure 144/84; pulse 61; oxygen saturation 95%; weight 112.5 kg.  GENERAL:  Obese woman who presents to the evaluation accompanied by her daughter, awake and alert and in no acute distress.    Investigations: I reviewed images from an MRI scan of the brain performed earlier on today's date, and the following is my independent interpretation of those images.  There is no abnormal enhancement with gadolinium contrast or restricted diffusion. Chronic appearing white matter lesions with gliosis surrounding central, CSF isointense cores are seen in several locations in the bilateral cerebral white matter, again, to my eye consistent with now chronic multifocal lacunar infarctions.    Assessment/plan:     1.  Cerebrovascular disease  2.  Cigarette nicotine dependence, with vascular complication  The patient was initially referred to this clinic with radiologic suspicion for multiple sclerosis, but I " did not get any history that would be compatible with that diagnosis, and as before, I do not think that the lesions seen were reflective of demyelinating changes.  The patient should remain on an antiplatelet agent for life absent any specific contraindication.  She has done a good job recently with lowering her hemoglobin A1c, most recently below 7%.  She certainly needs to discontinue cigarette smoking entirely.    3. Encephalopathy  I continue to think that this is multifactorial.  There may be a component of the changes related to cerebrovascular ischemia, although there is no treatment for this etiology other than trying to control vascular risk factors to prevent further events.  Some of her cognitive changes may also be mood and anxiety related.  I do continue to feel that her liver disease is a prominent contributor as well.  I did ask them to contact the patient's gastroenterologist and let her know that the patient's ammonia value has risen over the last month on a reduced frequency of lactulose.    4. Gait disturbance  I advised them to contact the patient's primary care clinic to discuss the home care referral and try to connect the patient with an agency that can service her current home.    I do not have any further recommendations at this time.  I do not think that the patient needs to have serial radiologic imaging unless there are new focal neurologic changes.  Her liver disease is most appropriately managed in the Gastroenterology Clinic.    I have not made a specific follow-up with the patient at this time, but I am happy to speak with them again if they have further neurologic concerns.    I spent a total of 31 minutes in patient care activities related to this encounter on the date of service, including time spent in reviewing the chart, obtaining history from and in counseling the patient and her daughter. I answered their questions.    Nic Harrington MD        D: 06/22/2023   T:  2023   MT: moise    Name:     BHUPINDER LEES  MRN:      [id number]        Account:      [id number]   :      1965           Service Date: 2023       Document: [id number]      Again, thank you for allowing me to participate in the care of your patient.      Sincerely,    Nic Harrington MD

## 2023-06-22 NOTE — LETTER
6/22/2023      RE: Sudheer Montiel  [address] [place] [address]     Referral source: Established patient    Chief complaint: Encephalopathy    History of the Present Illness: Ms. Sudheer Montiel is a 58-year-old woman who returns to the Multiple Sclerosis Clinic today to review the results of a recent MRI scan of the brain.    The patient's history is as per my previous notes.  I have followed this patient since July 2022 after an MRI scan was performed to investigate cognitive decline.  The outside radiologist opined that the findings seen on the MRI were suggestive of multiple sclerosis, but I felt that the white matter lesions seen were subcortical cerebral infarctions of varying ages in a patient with multiple vascular risk factors including diabetes and ongoing cigarette smoking.  She did not have any history of episodic neurologic symptoms nor progressive gait disturbance suggestive of multiple sclerosis at any time during her life.    I felt that the patient's cognitive impairment was multifactorial with hepatic encephalopathy likely a significant contributor in the setting of recently diagnosed cirrhosis of the liver.    A comprehensive stroke workup including vascular imaging, echocardiogram and prolonged cardiac monitor did not demonstrate any indication for anticoagulation.  The patient was placed on an antiplatelet agent (aspirin).  We also tried treating her with a high-potency statin, but she could not tolerate this due to rising liver function tests.    I asked them to come back today with an MRI scan of the brain to make sure that there had been no further accumulation of new lesions.    Symptomatically, the patient is accompanied by her daughter today, who indicates that her memory has not gotten any better overall.  I note that her gastroenterologist has now started her on rifampicin and reduced her dose of lactulose down to once a day.  The patient was bothered by loose stools and found  "the lactulose unpalatable.  However, her ammonia level is back up to 85 micromoles per liter as of 2 weeks ago, whereas it was 52 when she was on lactulose 3 times daily.    They also indicate concerns about balance and falls.  The patient last fell yesterday.  She indicates that she \"tripped over her own feet.\" She has a cane, but her daughter indicates that she frequently forgets to use this.  Her primary care provider did place a home care referral, but as I understand it, there was some confusion about the patient's residence, and the agency to whom the referral was sent was unable to provide services.  The patient is temporarily residing with her daughter in [place] as she lost her apartment in [place].    PHYSICAL EXAMINATION:    VITAL SIGNS:  Blood pressure 144/84; pulse 61; oxygen saturation 95%; weight 112.5 kg.  GENERAL:  Obese woman who presents to the evaluation accompanied by her daughter, awake and alert and in no acute distress.    Investigations: I reviewed images from an MRI scan of the brain performed earlier on today's date, and the following is my independent interpretation of those images.  There is no abnormal enhancement with gadolinium contrast or restricted diffusion. Chronic appearing white matter lesions with gliosis surrounding central, CSF isointense cores are seen in several locations in the bilateral cerebral white matter, again, to my eye consistent with now chronic multifocal lacunar infarctions.    Assessment/plan:     1.  Cerebrovascular disease  2.  Cigarette nicotine dependence, with vascular complication  The patient was initially referred to this clinic with radiologic suspicion for multiple sclerosis, but I did not get any history that would be compatible with that diagnosis, and as before, I do not think that the lesions seen were reflective of demyelinating changes.  The patient should remain on an antiplatelet agent for life absent any specific contraindication.  She has done a " good job recently with lowering her hemoglobin A1c, most recently below 7%.  She certainly needs to discontinue cigarette smoking entirely.    3. Encephalopathy  I continue to think that this is multifactorial.  There may be a component of the changes related to cerebrovascular ischemia, although there is no treatment for this etiology other than trying to control vascular risk factors to prevent further events.  Some of her cognitive changes may also be mood and anxiety related.  I do continue to feel that her liver disease is a prominent contributor as well.  I did ask them to contact the patient's gastroenterologist and let her know that the patient's ammonia value has risen over the last month on a reduced frequency of lactulose.    4. Gait disturbance  I advised them to contact the patient's primary care clinic to discuss the home care referral and try to connect the patient with an agency that can service her current home.    I do not have any further recommendations at this time.  I do not think that the patient needs to have serial radiologic imaging unless there are new focal neurologic changes.  Her liver disease is most appropriately managed in the Gastroenterology Clinic.    I have not made a specific follow-up with the patient at this time, but I am happy to speak with them again if they have further neurologic concerns.    I spent a total of 31 minutes in patient care activities related to this encounter on the date of service, including time spent in reviewing the chart, obtaining history from and in counseling the patient and her daughter. I answered their questions.    Nic Harrington MD   of Neurology  [facility]     Cc:  Pawan Burns PA-C (PCP)  Nevin Chavez MD (Gastroenterology)  Patient

## 2023-06-27 ENCOUNTER — TELEPHONE (OUTPATIENT)
Dept: FAMILY MEDICINE | Facility: CLINIC | Age: 58
End: 2023-06-27
Payer: COMMERCIAL

## 2023-06-27 ENCOUNTER — VIRTUAL VISIT (OUTPATIENT)
Dept: PSYCHOLOGY | Facility: CLINIC | Age: 58
End: 2023-06-27
Payer: COMMERCIAL

## 2023-06-27 DIAGNOSIS — E11.9 TYPE 2 DIABETES MELLITUS WITHOUT COMPLICATION, WITHOUT LONG-TERM CURRENT USE OF INSULIN (H): ICD-10-CM

## 2023-06-27 DIAGNOSIS — E66.01 MORBID OBESITY (H): ICD-10-CM

## 2023-06-27 DIAGNOSIS — R41.89 COGNITIVE IMPAIRMENT: Primary | ICD-10-CM

## 2023-06-27 DIAGNOSIS — R26.81 GAIT INSTABILITY: ICD-10-CM

## 2023-06-27 DIAGNOSIS — F41.9 ANXIETY: Primary | ICD-10-CM

## 2023-06-27 DIAGNOSIS — R41.844 IMPAIRED EXECUTIVE FUNCTIONING: ICD-10-CM

## 2023-06-27 PROCEDURE — 90834 PSYTX W PT 45 MINUTES: CPT | Mod: VID | Performed by: COUNSELOR

## 2023-06-27 NOTE — PROGRESS NOTES
[facility]                                     Progress Note    Patient Name: Sudheer Montiel  Date: 6/27/2023         Service Type: Individual      Session Start Time: 9:10am  Session End Time: 9:50am     Session Length: 40 mins     Session #: 3    Attendees: Client attended alone    Service Modality:  Video Visit:      Provider verified identity through the following two step process.  Patient provided:  Patient is known previously to provider    Telemedicine Visit: The patient's condition can be safely assessed and treated via synchronous audio and visual telemedicine encounter.      Reason for Telemedicine Visit: Services only offered telehealth    Originating Site (Patient Location): Patient's home    Distant Site (Provider Location): Provider Remote Setting- Home Office    Consent:  The patient/guardian has verbally consented to: the potential risks and benefits of telemedicine (video visit) versus in person care; bill my insurance or make self-payment for services provided; and responsibility for payment of non-covered services.     Patient would like the video invitation sent by:  My Chart    Mode of Communication:  Video Conference via [employer]    Distant Location (Provider):  Off-site    As the provider I attest to compliance with applicable laws and regulations related to telemedicine.    DATA  Interactive Complexity: -The need to manage maladaptive communication (related to, e.g., high anxiety, high reactivity, repeated questions, or disagreement) among participants that complicates delivery of care- memory concerns  Crisis: No        Progress Since Last Session (Related to Symptoms / Goals / Homework):   Symptoms: Worsening balance issues     Homework: Achieved / completed to satisfaction  Completed in session      Episode of Care Goals: Minimal progress - PREPARATION (Decided to change - considering how); Intervened by negotiating a change plan and determining options / strategies  for behavior change, identifying triggers, exploring social supports, and working towards setting a date to begin behavior change     Current / Ongoing Stressors and Concerns:   Lost job, license, home al in short amount of time due to memory concerns and health issues      Treatment Objective(s) Addressed in This Session:   Decrease frequency and intensity of feeling down, depressed, hopeless  Identify negative self-talk and behaviors: challenge core beliefs, myths, and actions       Intervention:   CBT: fact checking   Emotion Focused Therapy: emotional processing, supportive listening, validation     Assessments completed prior to visit:  The following assessments were completed by patient for this visit:  PHQ9:       5/10/2023     5:47 PM   PHQ-9 SCORE   PHQ-9 Total Score MyChart 19 (Moderately severe depression)   PHQ-9 Total Score 19     GAD7:       5/8/2023    12:50 PM   WILLIAM-7 SCORE   Total Score 10 (moderate anxiety)   Total Score 10         ASSESSMENT: Current Emotional / Mental Status (status of significant symptoms):   Risk status (Self / Other harm or suicidal ideation)   Patient denies current fears or concerns for personal safety.   Patient denies current or recent suicidal ideation or behaviors.   Patient denies current or recent homicidal ideation or behaviors.   Patient denies current or recent self injurious behavior or ideation.   Patient denies other safety concerns.   Patient reports there has been no change in risk factors since their last session.     Patient reports there has been no change in protective factors since their last session.     Recommended that patient call 911 or go to the local ED should there be a change in any of these risk factors.     Appearance:   Appropriate    Eye Contact:   Good    Psychomotor Behavior: Normal    Attitude:   Cooperative    Orientation:   All   Speech    Rate / Production: Normal     Volume:  Normal    Mood:    Depressed  Sad    Affect:    Appropriate     Thought Content:  Rumination    Thought Form:  Coherent    Insight:    Fair      Medication Review:   No changes to current psychiatric medication(s)     Medication Compliance:   Yes     Changes in Health Issues:   Yes: Diabetes, Associated Psychological Distress  neurological , Associated Psychological Distress     Chemical Use Review:   Substance Use: Chemical use reviewed, no active concerns identified      Tobacco Use: No change in amount of tobacco use since last session.  Patient declined discussion at this time    Diagnosis:  1. Anxiety    2. Impaired executive functioning        Collateral Reports Completed:   Not Applicable    PLAN: (Patient Tasks / Therapist Tasks / Other)  Provider assigned client to use alley, sit on the balcony   Provider assigned client to fact check thoughts         Elaine Sidhu [facility] 6/27/2023                                                         ______________________________________________________________________     Individual Treatment Plan     Patient's Name: Sudheer Montiel              YOB: 1965     Date of Creation: 6/13/2023  Date Treatment Plan Last Reviewed/Revised: 6/13/2023     DSM5 Diagnoses:  1. Anxiety    2. Impaired executive functioning       Psychosocial / Contextual Factors: memory loss, job loss, loss of home and license   PROMIS (reviewed every 90 days): 12     Referral / Collaboration:  Was/were discussed and patient will pursue. , memory clinic      Anticipated number of session for this episode of care: 9-12 sessions  Anticipation frequency of session: Every other week  Anticipated Duration of each session: 38-52 minutes  Treatment plan will be reviewed in 90 days or when goals have been changed.         MeasurableTreatment Goal(s) related to diagnosis / functional impairment(s)  Goal 1: Patient will report     I will know I've met my goal when client has known coping skills to deal with life changes .          Objective #A  (Patient Action)    Patient will identify   fears / thoughts that contribute to feeling anxious.  Status: New - Date: 6/27/23     Intervention(s)  Therapist will teach emotional recognition/identification.  .    Objective #B  Patient will increase length and frequency of contact with others  .  Status: New - Date: 6/27/23     Intervention(s)  Therapist will assign homework rhat includes client reaching out to others to keep contact .    Objective #C  Patient will use cognitive strategies identified in therapy to challenge anxious thoughts.  Status: New - Date: 6/27/23     Intervention(s)  Therapist will teach emotional regulation skills.  .  Therapist will teach emotional regulation skills.  .      Patient has reviewed and agreed to the above plan.      Elaine Sidhu, [facility]  June 27, 2023

## 2023-06-27 NOTE — Clinical Note
Hello!  I noticed when Lorri was at the neurologist they stated she needs a new home care referral for her new home area ([place]). I could not find which doctor had put in the original one so I added all of her care team. Hopefully a new one can be put in for her current address to get her some assistance.   Thank you so much,   Elaine Sidhu [facility] Provider

## 2023-07-10 ENCOUNTER — MYC MEDICAL ADVICE (OUTPATIENT)
Dept: FAMILY MEDICINE | Facility: CLINIC | Age: 58
End: 2023-07-10
Payer: COMMERCIAL

## 2023-07-11 ENCOUNTER — NURSE TRIAGE (OUTPATIENT)
Dept: FAMILY MEDICINE | Facility: CLINIC | Age: 58
End: 2023-07-11
Payer: COMMERCIAL

## 2023-07-11 DIAGNOSIS — R35.0 URINARY FREQUENCY: Primary | ICD-10-CM

## 2023-07-11 NOTE — TELEPHONE ENCOUNTER
clem Cross (proxy for Sudheer Montiel)  P Cs Triage Im (supporting Pawan Burns PA-C) Yesterday (10:39 AM)       This message is being sent by Cassie Cross on behalf of Sudheer Montiel.     Soham Villalta Laurie has had urinary tract infections in the past and right now she is having foul smelling urine and dark urine and discomfort with urination, and we are positive she has one again. can you put in orders for her to have a UAUC?         Patient Contact    Attempt # 1    Was call answered? No.    Left message for patient to call triage back.    Dolores Levy RN

## 2023-07-12 ENCOUNTER — VIRTUAL VISIT (OUTPATIENT)
Dept: PSYCHOLOGY | Facility: CLINIC | Age: 58
End: 2023-07-12
Payer: COMMERCIAL

## 2023-07-12 DIAGNOSIS — R41.844 IMPAIRED EXECUTIVE FUNCTIONING: ICD-10-CM

## 2023-07-12 DIAGNOSIS — F41.9 ANXIETY: Primary | ICD-10-CM

## 2023-07-12 PROCEDURE — 90834 PSYTX W PT 45 MINUTES: CPT | Mod: VID | Performed by: COUNSELOR

## 2023-07-12 RX ORDER — NITROFURANTOIN 25; 75 MG/1; MG/1
100 CAPSULE ORAL 2 TIMES DAILY
Qty: 10 CAPSULE | Refills: 0 | Status: SHIPPED | OUTPATIENT
Start: 2023-07-12 | End: 2023-07-17

## 2023-07-12 NOTE — TELEPHONE ENCOUNTER
Patient agreeable to starting the medication after getting a urine sample prior to starting treatment. Patient stated she spoke to her daughter.     HAILEY Pierson  [facility]

## 2023-07-12 NOTE — TELEPHONE ENCOUNTER
To PCP:  Patient may have bladder infection. Protocol says see in office today. Patient can't drive. Can she be seen in office tomorrow?   Nurse Triage SBAR    Is this a 2nd Level Triage? YES, LICENSED PRACTITIONER REVIEW IS REQUIRED    Situation: Patient having bladder spasms and pain. Has back pain, no fever.     Background: feels like previous bladder infections     Assessment: Needs to be seen today     Protocol Recommended Disposition:   See in Office Today    Recommendation: needs to be seen       Routed to provider    Does the patient meet one of the following criteria for ADS visit consideration? No    Reason for Disposition    Urinating more frequently than usual (i.e., frequency)    Additional Information    Negative: Shock suspected (e.g., cold/pale/clammy skin, too weak to stand, low BP, rapid pulse)    Negative: Sounds like a life-threatening emergency to the triager    Negative: Followed a female genital area injury (e.g., vagina, vulva)    Negative: Followed a male genital area injury (penis, scrotum)    Negative: Vaginal discharge    Negative: Pus (white, yellow) or bloody discharge from end of penis    Negative: Pain or burning with passing urine (urination) and pregnant    Negative: Pain or burning with passing urine (urination) and female    Negative: Pain or burning with passing urine (urination) and male    Negative: Pain or itching in the vulvar area    Negative: Pain in scrotum is main symptom    Negative: Blood in the urine is main symptom    Negative: Symptoms arising from use of a urinary catheter (e.g., coude, Crystal)    Negative: Unable to urinate (or only a few drops) > 4 hours and bladder feels very full (e.g., palpable bladder or strong urge to urinate)    Negative: Decreased urination and drinking very little and dehydration suspected (e.g., dark urine, no urine > 12 hours, very dry mouth, very lightheaded)    Negative: Patient sounds very sick or weak to the triager    Negative: Fever  "> 100.4 F  (38.0 C)    Negative: Side (flank) or lower back pain present    Answer Assessment - Initial Assessment Questions  1. SYMPTOM: \"What's the main symptom you're concerned about?\" (e.g., frequency, incontinence)     Bladder spasms, painful peeing and smell  2. ONSET: \"When did the   start?\"     Saturday sunday  3. PAIN: \"Is there any pain?\" If Yes, ask: \"How bad is it?\" (Scale: 1-10; mild, moderate, severe)      Yes 8/10  4. CAUSE: \"What do you think is causing the symptoms?\"      Bladder infection  5. OTHER SYMPTOMS: \"Do you have any other symptoms?\" (e.g., fever, flank pain, blood in urine, pain with urination)  Not flank pain but back pain  6. PREGNANCY: \"Is there any chance you are pregnant?\" \"When was your last menstrual period?\"      No    Protocols used: URINARY SYMPTOMS-A-OH    "

## 2023-07-12 NOTE — PROGRESS NOTES
[facility]                                     Progress Note    Patient Name: Sudheer Montiel  Date: 7/12/2023         Service Type: Individual      Session Start Time: 9:07am  Session End Time: 9:57am     Session Length: 50 mins     Session #: 4    Attendees: Client attended alone    Service Modality:  Video Visit:      Provider verified identity through the following two step process.  Patient provided:  Patient is known previously to provider    Telemedicine Visit: The patient's condition can be safely assessed and treated via synchronous audio and visual telemedicine encounter.      Reason for Telemedicine Visit: Services only offered telehealth    Originating Site (Patient Location): Patient's home    Distant Site (Provider Location): Provider Remote Setting- Home Office    Consent:  The patient/guardian has verbally consented to: the potential risks and benefits of telemedicine (video visit) versus in person care; bill my insurance or make self-payment for services provided; and responsibility for payment of non-covered services.     Patient would like the video invitation sent by:  My Chart    Mode of Communication:  Video Conference via Amwell    Distant Location (Provider):  Off-site    As the provider I attest to compliance with applicable laws and regulations related to telemedicine.    DATA  Interactive Complexity: Yes, visit entailed Interactive Complexity evidenced by: memory concerns   Crisis: No        Progress Since Last Session (Related to Symptoms / Goals / Homework):   Symptoms: Improving working on fact checking, leaving the house more often (social)    Homework: Achieved / completed to satisfaction      Episode of Care Goals: Minimal progress - ACTION (Actively working towards change); Intervened by reinforcing change plan / affirming steps taken     Current / Ongoing Stressors and Concerns:   Memory concerns caused loss of job, home and income as well as independence       Treatment Objective(s) Addressed in This Session:   learn & utilize  communication skills to talk to daughters about wanting to be included in regards to her health and future        Intervention:   Emotion Focused Therapy: emotional processing , supportive listening  Interpersonal Therapy: communication     Assessments completed prior to visit:  The following assessments were completed by patient for this visit:  PHQ9:       5/10/2023     5:47 PM   PHQ-9 SCORE   PHQ-9 Total Score MyChart 19 (Moderately severe depression)   PHQ-9 Total Score 19     GAD7:       5/8/2023    12:50 PM   WILLIAM-7 SCORE   Total Score 10 (moderate anxiety)   Total Score 10         ASSESSMENT: Current Emotional / Mental Status (status of significant symptoms):   Risk status (Self / Other harm or suicidal ideation)   Patient denies current fears or concerns for personal safety.   Patient denies current or recent suicidal ideation or behaviors.   Patient denies current or recent homicidal ideation or behaviors.   Patient denies current or recent self injurious behavior or ideation.   Patient denies other safety concerns.   Patient reports there has been no change in risk factors since their last session.     Patient reports there has been no change in protective factors since their last session.     Recommended that patient call 911 or go to the local ED should there be a change in any of these risk factors.     Appearance:   Appropriate    Eye Contact:   Good    Psychomotor Behavior: Normal    Attitude:   Cooperative    Orientation:   All   Speech    Rate / Production: Normal     Volume:  Normal    Mood:    Normal   Affect:    Appropriate    Thought Content:  Clear  some cognitive concerns    Thought Form:  Coherent  Logical    Insight:    Fair      Medication Review:   No changes to current psychiatric medication(s)     Medication Compliance:   Yes     Changes in Health Issues:   Yes: cognitive, Associated Psychological  Distress     Chemical Use Review:   Substance Use: Chemical use reviewed, no active concerns identified      Tobacco Use: No change in amount of tobacco use since last session.  Patient declined discussion at this time    Diagnosis:  1. Anxiety    2. Impaired executive functioning        Collateral Reports Completed:   Not Applicable    PLAN: (Patient Tasks / Therapist Tasks / Other)  Provider assigned client to be open with daughters about her feelings around wanting to know more about her health and future         Elaine Sidhu [facility] 7/12/2023                                                           ______________________________________________________________________     Individual Treatment Plan     Patient's Name: Sudheer Montiel              YOB: 1965     Date of Creation: 6/13/2023  Date Treatment Plan Last Reviewed/Revised: 6/13/2023     DSM5 Diagnoses:  1. Anxiety    2. Impaired executive functioning       Psychosocial / Contextual Factors: memory loss, job loss, loss of home and license   PROMIS (reviewed every 90 days): 12     Referral / Collaboration:  Was/were discussed and patient will pursue. , memory clinic      Anticipated number of session for this episode of care: 9-12 sessions  Anticipation frequency of session: Every other week  Anticipated Duration of each session: 38-52 minutes  Treatment plan will be reviewed in 90 days or when goals have been changed.         MeasurableTreatment Goal(s) related to diagnosis / functional impairment(s)  Goal 1: Patient will report     I will know I've met my goal when client has known coping skills to deal with life changes .             Objective #A (Patient Action)                          Patient will identify   fears / thoughts that contribute to feeling anxious.  Status: New - Date: 6/27/23      Intervention(s)  Therapist will teach emotional recognition/identification.  .     Objective #B  Patient will increase length and  frequency of contact with others  .  Status: New - Date: 6/27/23      Intervention(s)  Therapist will assign homework rhat includes client reaching out to others to keep contact .     Objective #C  Patient will use cognitive strategies identified in therapy to challenge anxious thoughts.  Status: New - Date: 6/27/23      Intervention(s)  Therapist will teach emotional regulation skills.  .  Therapist will teach emotional regulation skills.  .        Patient has reviewed and agreed to the above plan.        Elaine Sidhu, [facility]                       June 27, 2023

## 2023-07-12 NOTE — TELEPHONE ENCOUNTER
Signed UA if they can complete that and bring it in before starting medication that would be great! Sent Macrobid to her pharmacy. Close monitoring for worsening symptoms.

## 2023-07-13 ENCOUNTER — LAB (OUTPATIENT)
Dept: LAB | Facility: CLINIC | Age: 58
End: 2023-07-13
Payer: COMMERCIAL

## 2023-07-13 DIAGNOSIS — R35.0 URINARY FREQUENCY: ICD-10-CM

## 2023-07-13 LAB
ALBUMIN UR-MCNC: NEGATIVE MG/DL
APPEARANCE UR: CLEAR
BACTERIA #/AREA URNS HPF: ABNORMAL /HPF
BILIRUB UR QL STRIP: ABNORMAL
COLOR UR AUTO: YELLOW
GLUCOSE UR STRIP-MCNC: NEGATIVE MG/DL
HGB UR QL STRIP: NEGATIVE
KETONES UR STRIP-MCNC: NEGATIVE MG/DL
LEUKOCYTE ESTERASE UR QL STRIP: ABNORMAL
NITRATE UR QL: POSITIVE
PH UR STRIP: 6 [PH] (ref 5–7)
RBC #/AREA URNS AUTO: ABNORMAL /HPF
SP GR UR STRIP: 1.02 (ref 1–1.03)
SQUAMOUS #/AREA URNS AUTO: ABNORMAL /LPF
UROBILINOGEN UR STRIP-ACNC: 4 E.U./DL
WBC #/AREA URNS AUTO: >100 /HPF

## 2023-07-13 PROCEDURE — 81001 URINALYSIS AUTO W/SCOPE: CPT

## 2023-07-13 PROCEDURE — 87086 URINE CULTURE/COLONY COUNT: CPT

## 2023-07-13 PROCEDURE — 87186 SC STD MICRODIL/AGAR DIL: CPT

## 2023-07-13 NOTE — RESULT ENCOUNTER NOTE
Soham Willard,     Definitely looks like you have a urinary tract infection based on early results.  Start the antibiotic and hopefully things improve.  Stay well-hydrated!  We will await the urine culture which will give us more information about what type of antibiotic this bacteria is sensitive to.    Let me know if you have any questions or concerns,     Pawan Burns PA-C  [facility]

## 2023-07-14 LAB — BACTERIA UR CULT: ABNORMAL

## 2023-07-17 ENCOUNTER — MYC MEDICAL ADVICE (OUTPATIENT)
Dept: ENDOCRINOLOGY | Facility: CLINIC | Age: 58
End: 2023-07-17

## 2023-07-17 ENCOUNTER — TELEPHONE (OUTPATIENT)
Dept: ENDOCRINOLOGY | Facility: CLINIC | Age: 58
End: 2023-07-17

## 2023-07-17 ENCOUNTER — OFFICE VISIT (OUTPATIENT)
Dept: ENDOCRINOLOGY | Facility: CLINIC | Age: 58
End: 2023-07-17
Payer: COMMERCIAL

## 2023-07-17 VITALS
SYSTOLIC BLOOD PRESSURE: 120 MMHG | WEIGHT: 254.2 LBS | HEART RATE: 87 BPM | BODY MASS INDEX: 43.4 KG/M2 | HEIGHT: 64 IN | DIASTOLIC BLOOD PRESSURE: 77 MMHG

## 2023-07-17 DIAGNOSIS — E11.9 TYPE 2 DIABETES MELLITUS WITHOUT COMPLICATION, WITHOUT LONG-TERM CURRENT USE OF INSULIN (H): Primary | ICD-10-CM

## 2023-07-17 DIAGNOSIS — E66.01 MORBID OBESITY (H): ICD-10-CM

## 2023-07-17 DIAGNOSIS — E03.9 ACQUIRED HYPOTHYROIDISM: ICD-10-CM

## 2023-07-17 PROCEDURE — 95251 CONT GLUC MNTR ANALYSIS I&R: CPT | Performed by: INTERNAL MEDICINE

## 2023-07-17 PROCEDURE — 99214 OFFICE O/P EST MOD 30 MIN: CPT | Performed by: INTERNAL MEDICINE

## 2023-07-17 RX ORDER — METFORMIN HYDROCHLORIDE 750 MG/1
TABLET, EXTENDED RELEASE ORAL
Qty: 180 TABLET | Refills: 3 | Status: ON HOLD | OUTPATIENT
Start: 2023-07-17 | End: 2023-08-20

## 2023-07-17 RX ORDER — LEVOTHYROXINE SODIUM 100 UG/1
100 TABLET ORAL DAILY
Qty: 90 TABLET | Refills: 1 | Status: SHIPPED | OUTPATIENT
Start: 2023-07-17 | End: 2023-11-07

## 2023-07-17 RX ORDER — BLOOD-GLUCOSE SENSOR
1 EACH MISCELLANEOUS CONTINUOUS PRN
Qty: 2 EACH | Refills: 5 | Status: SHIPPED | OUTPATIENT
Start: 2023-07-17 | End: 2024-04-11

## 2023-07-17 RX ORDER — THYROID 60 MG/1
TABLET ORAL
Qty: 90 TABLET | Refills: 1 | Status: SHIPPED | OUTPATIENT
Start: 2023-07-17 | End: 2023-11-07

## 2023-07-17 RX ORDER — FLURBIPROFEN SODIUM 0.3 MG/ML
SOLUTION/ DROPS OPHTHALMIC
Qty: 400 EACH | Refills: 3 | Status: SHIPPED | OUTPATIENT
Start: 2023-07-17 | End: 2024-04-24

## 2023-07-17 RX ORDER — THYROID 15 MG/1
15 TABLET ORAL DAILY
Qty: 90 TABLET | Refills: 1 | Status: SHIPPED | OUTPATIENT
Start: 2023-07-17 | End: 2023-11-07

## 2023-07-17 NOTE — LETTER
7/17/2023         RE: Sudheer Montiel  [address]  [place] [address]        Dear Colleague,    Thank you for referring your patient, Sudheer Montiel, to the [facility] [place]. Please see a copy of my visit note below.      Recent issues:  Followup diabetes and thyroid evaluation, with daughters Jessie and Cassie today  Using the metformin, Ozempic, VGo pod device with Novolog and the Freestyle Libre3 sensor,   Taking the [place] thyroid (lower dose) and levothyroxine meds regularly  She's planning a 2-week trip to [facility] later this week           Diabetes:  ~4/2010. Initial diagnosis approx age 44  ...Has taken several DM meds including metformin, Byetta, glimeparide, Invokana, and Victoza   In 2018, she had been taking metformin, Victoza, glimeparide, and Jardiance  She stopped Jardiance, also ran out of Victoza last year  Had seen Beth CASTRO/[facility] for general medical evaluations  10/2018. Abdominal abcess illness, hospitalizations at [facility] and then [facility] hospital              2-surgeries for abcess, thought related to the hysterectomy bladder sling mesh from prior surgery     Previously taking metformin 750 mg BID, glimeparide 4 mg every day, Tresiba 40U every day   10/2020. Changed to VGo40 pods management  Continue low dose glimeparide, but low SG levels and glimeparide discontinued, then restarted   Current DM meds:  Novolog in VGo40   Small carb meal 2 clicks (4U)   Large carb meal 3 clicks (6U)    Novolog    sscale:      -250 1-click    -300 2-clicks  Metformin 750 mg 2-tabs in morning   Ozempic 0.5 mg  Subcutaneous weekly    Has Glucocard Vital BG meter    Infrequent BG testing  10/2020. Started Freestyle Chandni CGM with [place]  Recent Chandni data:            Recent [facility] labs include:  7/26/19 hgbA1c 9.3%, t.chol 195, , TSH 0.06, FT4 1.46, FT3 8.7, Cr 0.77  8/26/20 hgbA1c 11.4%  10/15/21 hgbA1c 6.6%, LDL 60 mg/dl     Recent [facility]  labs include:  Lab Results   Component Value Date    A1C 6.9 (H) 06/06/2023     06/06/2023    POTASSIUM 4.0 06/06/2023    CHLORIDE 104 06/06/2023    CO2 23 06/06/2023    ANIONGAP 11 06/06/2023     (H) 06/06/2023    BUN 10.6 06/06/2023    CR 0.59 06/06/2023    GFRESTIMATED >90 06/06/2023    GFRESTBLACK >90 01/05/2021    JOANN 8.9 06/06/2023    CHOL 216 (H) 01/12/2023    TRIG 159 (H) 01/12/2023    HDL 48 (L) 01/12/2023     (H) 01/12/2023    NHDL 168 (H) 01/12/2023    UCRR 168.0 06/06/2023    MICROL 18.3 06/06/2023    UMALCR 10.89 06/06/2023     Last eye exam 2018?, results not available  DM Complications:  None known        Thyroid:  Had taken Synthroid  Changed to [place] thyroid medication, then [place] + levothyroxine combo dose plan  Had taken [place] thyroid 2-tablets daily... Possibly 60 mg 2-tabs QD  Early-mid 3/2018. Ran out of [place] thyroid medication  Previously took [place] thyroid 90 mg 2-tabs daily and low dose levothyroxine 0.025 mg daily  8/2019. Changed to lower dose [place] and higher dose levothyroxine  Subsequent dose reductions with levothyroxine medication  Mid 12/2021 to early 1/2022. Off [place] thyroid medication, then restarted   6/2023. 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 Frantz Guillen?/[facility], Dr. Johnson/GI, Dr. YURIDIA Harrington/Neurology       PMH/PSH:  Past Medical History:   Diagnosis Date      Abnormal liver CT      Hypothyroid 80's     Necrotizing fasciitis (H)      Primary osteoarthritis of both knees      Soft tissue infection      Subcortical infarction (H)      Type 2 diabetes mellitus (H)      Past Surgical History:   Procedure Laterality Date      SECTION       COLONOSCOPY N/A 10/21/2015    Procedure: COLONOSCOPY;  Surgeon: Jaky Arredondo MD;  Location:  GI     IRRIGATION AND DEBRIDEMENT ABDOMEN WASHOUT, COMBINED N/A 10/12/2018    Procedure: COMBINED IRRIGATION AND DEBRIDEMENT ABDOMEN WASHOUT;  Irrigation and Debridement of Lower Abdomen, removal of piece of mesh.;  Surgeon: Darlene Hogue MD;  Location: [facility] OR     marisol/bso      due to anemia     ZZC REPAIR CRUCIATE LIGAMENT,KNEE  1986       Family Hx:  No family history on file.      Social Hx:  Social History     Socioeconomic History     Marital status:      Spouse name: Not on file     Number of children: 2     Years of education: Not on file     Highest education level: Not on file   Occupational History     Occupation: surgery scheduler [employer]   Tobacco Use     Smoking status: Some Days     Types: Cigarettes     Last attempt to quit: 2011     Years since quittin.4     Smokeless tobacco: Never   Vaping Use     Vaping Use: Never used   Substance and Sexual Activity     Alcohol use: No     Drug use: Never     Sexual activity: Not on file   Other Topics Concern     Not on file   Social History Narrative     Not on file     Social Determinants of Health     Financial Resource Strain: Not on file   Food Insecurity: Not on file   Transportation Needs: Not on file   Physical Activity: Not on file   Stress: Not on file   Social Connections: Not on file   Intimate Partner Violence: Not on file   Housing Stability: Not on file          MEDICATIONS:  has a current medication list which includes the following prescription(s): acetaminophen, aspirin, celecoxib, freestyle evelyn 3 sensor,  "escitalopram, insulin lispro, lactulose, levothyroxine, metformin, nitrofurantoin macrocrystal-monohydrate, semaglutide, thyroid, thyroid, valacyclovir, wearable insulin delivery device, xifaxan, blood glucose, freestyle evelyn 14 day reader, freestyle evelyn reader, freestyle evelyn 14 day sensor, b-d u/f, omeprazole, and statin not prescribed.     ROS: 10 point ROS neg other than the symptoms noted above in the HPI.     GENERAL: some fatigue, wt stable; denies fevers, chills, night sweats.   HEENT:  no dysphagia, odonophagia, diplopia, neck pain  THYROID:  no apparent hyper or hypothyroid symptoms  CV:  some palpitations; denies chest pain, pressure  LUNGS:  denies SOB, dyspnea, cough, wheezing  ABDOMEN:  Intermittent right abdomen pains; denies diarrhea, indigestion, constipation  EXTREMITIES: no rashes, ulcers, edema  NEUROLOGY: forgetfulness; no headaches, denies changes in vision, tingling, extremitiy numbness   MSK: knee and some low back pain; no muscle aches or pains, weakness  SKIN: no rashes or lesions  : no increased thirst and urination, nocturia; no menses since hysterectomy ~age 49  PSYCH:  stable mood, no significant anxiety or depression  ENDOCRINE: no heat or cold intolerance      Physical Exam   VS: /77   Pulse 87   Ht 1.626 m (5' 4\")   Wt 115.3 kg (254 lb 3.2 oz)   BMI 43.63 kg/m    GENERAL: AXOX3, NAD, well dressed, answering questions appropriately, appears stated age.  ENT: no nose swelling or nasal discharge, mouth redness or gum changes.  EYES: eyes grossly normal to inspection, conjunctivae and sclerae normal, no exophthalmos or proptosis  THYROID:  no apparent nodules or goiter  LUNGS: no audible wheeze, cough or visible cyanosis, or increased work of breathing  ABDOMEN: abdomen obese size  EXTREMITIES: no edema noted  NEUROLOGY: CN grossly intact, no tremors  MSK: grossly intact  SKIN:  no apparent skin lesions, rash, or edema with visualized skin appearance  PSYCH: mentation " appears normal, affect normal/bright, judgement and insight intact,   normal speech and appearance well groomed       LABS:     All pertinent notes, labs, and images personally reviewed by me.      A/P:  Encounter Diagnoses   Name Primary?     Type 2 diabetes mellitus without complication, without long-term current use of insulin (H) Yes     Acquired hypothyroidism      Morbid obesity (H)        Comments:  Reviewed health issues, diabetes and thyroid management.  Difficult to assess glycemic control without BGM or SG data  Reviewed and interpreted tests that I previously ordered.   Ordered appropriate tests for the endocrinology disease management.    Management options discussed and implemented after shared medical decision making with the patient.  T2DM and hypothyroidism problems are chronic-stable    Plan:  Discussed general issues with the diabetes diagnosis and management  We discussed the hgbA1c test which reflects previous overall glucose levels or control  Discussed the importance of blood glucose (BG) testing to assess glucose trends  Provided general overview of the diabetes medication options and medication treatment plan  Reviewed recent Chandni CGM glucose trend data, in detail.    Recommend:  Continue the current VGo40, metformin, and Ozempic med plan at this time    They need to call [facility] ASAP, clarify the insurance coverage for VGO pods    If VGO pods not available or affordable, need to change plan to MDI with insulin pens, ASAP    Since Cassie helps with her Check I'm Here messaging, I asked them to send me message update soon  We have reviewed dosing options using the VGo clicks for small and large carb meals  Consider change to the OmniPod pump alternative, if affordable    Goal target -150 mg/dl  Continue use of the 14-day Freestyle Libre3 CGM sensor    Use SG value for final decision on mealtime VGo clicks  Keep focus on diet, exercise, weight management.  Increase the thyroid  medications as noted in previous D-Share[employer] message    Change to [place] Thyroid 75 mg daily and levothyroxine 0.1 mg daily dose plan  Plan lab appointment in mid 9/2023    Lab orders placed  Needs f/u on the mild hypercholesterolemia, consider adding statin medication for treatment  Keep focus on diet, exercise, weight management.  Advise having fasting lipid panel testing and dilated eye examination, at least annually    Keep scheduled followup GI, neurology, and PCP appointments  Addressed patient questions today     There are no Patient Instructions on file for this visit.    Future labs ordered today:   Orders Placed This Encounter   Procedures     GLUCOSE MONITOR, 72 HOUR, PHYS INTERP     TSH     T4 free     T3 Free     Basic metabolic panel     Radiology/Consults ordered today: None    Total time spent on day of encounter:  37 min    Follow-up:  10/30/23 at 12 noon, Return    JOJO Christianson MD, MS  Endocrinology  [facility]    CC: Care Team                                              Again, thank you for allowing me to participate in the care of your patient.        Sincerely,        Kirk Christianson MD

## 2023-07-17 NOTE — RESULT ENCOUNTER NOTE
Soham Willard,     Hopefully you are feeling better - let me know if your symptoms did not resolve.     Pawan Burns PA-C  [facility]

## 2023-07-17 NOTE — TELEPHONE ENCOUNTER
Learning About Healthy Weight  What is a healthy weight? A healthy weight is the weight at which you feel good about yourself and have energy for work and play. It's also one that lowers your risk for health problems. What can you do to stay at a healthy weight? It can be hard to stay at a healthy weight, especially when fast food, vending-machine snacks, and processed foods are so easy to find. And with your busy lifestyle, activity may be low on your list of things to do. But staying at a healthy weight may be easier than you think. Here are some dos and don'ts for staying at a healthy weight. Do eat healthy foods  The kinds of foods you eat have a big impact on both your weight and your health. Reaching and staying at a healthy weight is not about going on a diet. It's about making healthier food choices every day and changing your diet for good. Healthy eating means eating a variety of foods so that you get all the nutrients you need. Your body needs protein, carbohydrate, and fats for energy. They keep your heart beating, your brain active, and your muscles working. On most days, try to eat from each food group. This means eating a variety of: Whole grains, such as whole wheat breads and pastas. Fruits and vegetables. Dairy products, such as low-fat milk, yogurt, and cheese. Lean proteins, such as all types of fish, chicken without the skin, and beans. Don't have too much or too little of one thing. All foods, if eaten in moderation, can be part of healthy eating. Even sweets can be okay. If your favorite foods are high in fat, salt, sugar, or calories, limit how often you eat them. Eat smaller servings, or look for healthy substitutes. Do watch what you eat  Many people eat more than their bodies need. Part of staying at a healthy weight means learning how much food you really need from day to day and not eating more than that.  Even with healthy foods, eating too much can make you gain [place] Initiation    Medication: OZEMPIC (0.25 OR 0.5 MG/DOSE) 2 MG/3ML SC SOPN  Insurance Company: [employer] - Phone [phone] Fax [phone]  Pharmacy Filling the Rx:    Filling Pharmacy Phone:    Filling Pharmacy Fax:    Start Date: 7/17/2023    Key: [id number]

## 2023-07-17 NOTE — PROGRESS NOTES
Recent issues:  Followup diabetes and thyroid evaluation, with daughters Jessie and Cassie today  Using the metformin, Ozempic, VGo pod device with Novolog and the Freestyle Libre3 sensor,   Taking the [place] thyroid (lower dose) and levothyroxine meds regularly  She's planning a 2-week trip to [facility] later this week           Diabetes:  ~4/2010. Initial diagnosis approx age 44  ...Has taken several DM meds including metformin, Byetta, glimeparide, Invokana, and Victoza   In 2018, she had been taking metformin, Victoza, glimeparide, and Jardiance  She stopped Jardiance, also ran out of Victoza last year  Had seen Beth CASTRO/[facility] for general medical evaluations  10/2018. Abdominal abcess illness, hospitalizations at [facility] and then [facility]              2-surgeries for abcess, thought related to the hysterectomy bladder sling mesh from prior surgery     Previously taking metformin 750 mg BID, glimeparide 4 mg every day, Tresiba 40U every day   10/2020. Changed to VGo40 pods management  Continue low dose glimeparide, but low SG levels and glimeparide discontinued, then restarted   Current DM meds:  Novolog in VGo40   Small carb meal 2 clicks (4U)   Large carb meal 3 clicks (6U)    Novolog    sscale:      -250 1-click    -300 2-clicks  Metformin 750 mg 2-tabs in morning   Ozempic 0.5 mg  Subcutaneous weekly    Has Glucocard Vital BG meter    Infrequent BG testing  10/2020. Started Freestyle Chandni CGM with [facility]  Recent Chandni data:            Recent [facility] labs include:  7/26/19 hgbA1c 9.3%, t.chol 195, , TSH 0.06, FT4 1.46, FT3 8.7, Cr 0.77  8/26/20 hgbA1c 11.4%  10/15/21 hgbA1c 6.6%, LDL 60 mg/dl     Recent  labs include:  Lab Results   Component Value Date    A1C 6.9 (H) 06/06/2023     06/06/2023    POTASSIUM 4.0 06/06/2023    CHLORIDE 104 06/06/2023    CO2 23 06/06/2023    ANIONGAP 11 06/06/2023     (H) 06/06/2023    BUN 10.6 06/06/2023    CR 0.59  06/06/2023    GFRESTIMATED >90 06/06/2023    GFRESTBLACK >90 01/05/2021    JOANN 8.9 06/06/2023    CHOL 216 (H) 01/12/2023    TRIG 159 (H) 01/12/2023    HDL 48 (L) 01/12/2023     (H) 01/12/2023    NHDL 168 (H) 01/12/2023    UCRR 168.0 06/06/2023    MICROL 18.3 06/06/2023    UMALCR 10.89 06/06/2023     Last eye exam 2018?, results not available  DM Complications:  None known        Thyroid:  Had taken Synthroid  Changed to [place] thyroid medication, then [place] + levothyroxine combo dose plan  Had taken [place] thyroid 2-tablets daily... Possibly 60 mg 2-tabs QD  Early-mid 3/2018. Ran out of [place] thyroid medication  Previously took [place] thyroid 90 mg 2-tabs daily and low dose levothyroxine 0.025 mg daily  8/2019. Changed to lower dose [place] and higher dose levothyroxine  Subsequent dose reductions with levothyroxine medication  Mid 12/2021 to early 1/2022. Off [place] thyroid medication, then restarted   6/2023. 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 Frantz Guillen?/[facility], Dr. Johnson/GI, Dr. YURIDIA Harrington/Neurology       PMH/PSH:  Past Medical History:   Diagnosis Date     Abnormal liver CT      Hypothyroid 80's     Necrotizing fasciitis (H)      Primary osteoarthritis of both knees      Soft tissue infection      Subcortical infarction (H)      Type 2 diabetes mellitus (H)      Past Surgical History:   Procedure Laterality Date       SECTION       COLONOSCOPY N/A 10/21/2015    Procedure: COLONOSCOPY;  Surgeon: Jaky Arredondo MD;  Location:  GI     IRRIGATION AND DEBRIDEMENT ABDOMEN WASHOUT, COMBINED N/A 10/12/2018    Procedure: COMBINED IRRIGATION AND DEBRIDEMENT ABDOMEN WASHOUT;  Irrigation and Debridement of Lower Abdomen, removal of piece of mesh.;  Surgeon: Darlene Hogue MD;  Location: [facility] OR     marisol/bso      due to anemia     ZZC REPAIR CRUCIATE LIGAMENT,KNEE  1986       Family Hx:  No family history on file.      Social Hx:  Social History     Socioeconomic History     Marital status:      Spouse name: Not on file     Number of children: 2     Years of education: Not on file     Highest education level: Not on file   Occupational History     Occupation: surgery scheduler [employer]   Tobacco Use     Smoking status: Some Days     Types: Cigarettes     Last attempt to quit: 2011     Years since quittin.4     Smokeless tobacco: Never   Vaping Use     Vaping Use: Never used   Substance and Sexual Activity     Alcohol use: No     Drug use: Never     Sexual activity: Not on file   Other Topics Concern     Not on file   Social History Narrative     Not on file     Social Determinants of Health     Financial Resource Strain: Not on file   Food Insecurity: Not on file   Transportation Needs: Not on file   Physical Activity: Not on file   Stress: Not on file   Social Connections: Not on file   Intimate Partner Violence: Not on file   Housing Stability: Not on file          MEDICATIONS:  has a current medication list which includes the following prescription(s): acetaminophen, aspirin, celecoxib, freestyle evelyn 3 sensor, escitalopram, insulin lispro, lactulose, levothyroxine, metformin, nitrofurantoin macrocrystal-monohydrate, semaglutide, thyroid, thyroid, valacyclovir, wearable insulin delivery device, xifaxan, blood glucose, freestyle evelyn 14 day reader, freestyle evelyn reader,  "freestyle evelyn 14 day sensor, b-d u/f, omeprazole, and statin not prescribed.     ROS: 10 point ROS neg other than the symptoms noted above in the HPI.     GENERAL: some fatigue, wt stable; denies fevers, chills, night sweats.   HEENT:  no dysphagia, odonophagia, diplopia, neck pain  THYROID:  no apparent hyper or hypothyroid symptoms  CV:  some palpitations; denies chest pain, pressure  LUNGS:  denies SOB, dyspnea, cough, wheezing  ABDOMEN:  Intermittent right abdomen pains; denies diarrhea, indigestion, constipation  EXTREMITIES: no rashes, ulcers, edema  NEUROLOGY: forgetfulness; no headaches, denies changes in vision, tingling, extremitiy numbness   MSK: knee and some low back pain; no muscle aches or pains, weakness  SKIN: no rashes or lesions  : no increased thirst and urination, nocturia; no menses since hysterectomy ~age 49  PSYCH:  stable mood, no significant anxiety or depression  ENDOCRINE: no heat or cold intolerance      Physical Exam   VS: /77   Pulse 87   Ht 1.626 m (5' 4\")   Wt 115.3 kg (254 lb 3.2 oz)   BMI 43.63 kg/m    GENERAL: AXOX3, NAD, well dressed, answering questions appropriately, appears stated age.  ENT: no nose swelling or nasal discharge, mouth redness or gum changes.  EYES: eyes grossly normal to inspection, conjunctivae and sclerae normal, no exophthalmos or proptosis  THYROID:  no apparent nodules or goiter  LUNGS: no audible wheeze, cough or visible cyanosis, or increased work of breathing  ABDOMEN: abdomen obese size  EXTREMITIES: no edema noted  NEUROLOGY: CN grossly intact, no tremors  MSK: grossly intact  SKIN:  no apparent skin lesions, rash, or edema with visualized skin appearance  PSYCH: mentation appears normal, affect normal/bright, judgement and insight intact,   normal speech and appearance well groomed       LABS:     All pertinent notes, labs, and images personally reviewed by me.      A/P:  Encounter Diagnoses   Name Primary?     Type 2 diabetes mellitus " without complication, without long-term current use of insulin (H) Yes     Acquired hypothyroidism      Morbid obesity (H)        Comments:  Reviewed health issues, diabetes and thyroid management.  Difficult to assess glycemic control without BGM or SG data  Reviewed and interpreted tests that I previously ordered.   Ordered appropriate tests for the endocrinology disease management.    Management options discussed and implemented after shared medical decision making with the patient.  T2DM and hypothyroidism problems are chronic-stable    Plan:  Discussed general issues with the diabetes diagnosis and management  We discussed the hgbA1c test which reflects previous overall glucose levels or control  Discussed the importance of blood glucose (BG) testing to assess glucose trends  Provided general overview of the diabetes medication options and medication treatment plan  Reviewed recent Chandni CGM glucose trend data, in detail.    Recommend:  Continue the current VGo40, metformin, and Ozempic med plan at this time    They need to call [facility] ASAP, clarify the insurance coverage for VGO pods    If VGO pods not available or affordable, need to change plan to MDI with insulin pens, ASAP    Since Cassie helps with her [email or web address] messaging, I asked them to send me message update soon  We have reviewed dosing options using the VGo clicks for small and large carb meals  Consider change to the OmniPod pump alternative, if affordable    Goal target -150 mg/dl  Continue use of the 14-day Freestyle Libre3 CGM sensor    Use SG value for final decision on mealtime VGo clicks  Keep focus on diet, exercise, weight management.  Increase the thyroid medications as noted in previous [email or web address] message    Change to [place] Thyroid 75 mg daily and levothyroxine 0.1 mg daily dose plan  Plan lab appointment in mid 9/2023    Lab orders placed  Needs f/u on the mild hypercholesterolemia, consider adding statin medication for  treatment  Keep focus on diet, exercise, weight management.  Advise having fasting lipid panel testing and dilated eye examination, at least annually    Keep scheduled followup GI, neurology, and PCP appointments  Addressed patient questions today     There are no Patient Instructions on file for this visit.    Future labs ordered today:   Orders Placed This Encounter   Procedures     GLUCOSE MONITOR, 72 HOUR, PHYS INTERP     TSH     T4 free     T3 Free     Basic metabolic panel     Radiology/Consults ordered today: None    Total time spent on day of encounter:  37 min    Follow-up:  10/30/23 at 12 noon, Return    JOJO Christianson MD, MS  Endocrinology  [facility]    CC: Care Team

## 2023-07-21 NOTE — TELEPHONE ENCOUNTER
Prior Authorization Approval    Medication: OZEMPIC (0.25 OR 0.5 MG/DOSE) 2 MG/3ML SC SOPN  Authorization Effective Date: 6/17/2023  Authorization Expiration Date: 7/17/2024  Approved Dose/Quantity:   Reference #: Key: [id number]   Insurance Company: [employer] - Phone [phone] Fax [phone]  Expected CoPay:       CoPay Card Available:      Financial Assistance Needed:    Which Pharm[facility] is filling the prescription[id number] - [place], [place] - [address] [facility] [address]  Pharmacy Notified: Yes  Patient Notified:

## 2023-07-25 ENCOUNTER — VIRTUAL VISIT (OUTPATIENT)
Dept: PSYCHOLOGY | Facility: CLINIC | Age: 58
End: 2023-07-25
Payer: COMMERCIAL

## 2023-07-25 DIAGNOSIS — F41.9 ANXIETY: Primary | ICD-10-CM

## 2023-07-25 DIAGNOSIS — R41.844 IMPAIRED EXECUTIVE FUNCTIONING: ICD-10-CM

## 2023-07-25 PROCEDURE — 90834 PSYTX W PT 45 MINUTES: CPT | Mod: VID | Performed by: COUNSELOR

## 2023-07-25 PROCEDURE — 90785 PSYTX COMPLEX INTERACTIVE: CPT | Mod: VID | Performed by: COUNSELOR

## 2023-07-25 NOTE — PROGRESS NOTES
[facility]                                     Progress Note    Patient Name: Sudheer Montiel  Date: 7/25/2023         Service Type: Individual      Session Start Time: 10:00 am  Session End Time: 10:45 am     Session Length: 4 mins     Session #: 5    Attendees: Client attended alone    Service Modality:  Video Visit:      Provider verified identity through the following two step process.  Patient provided:  Patient is known previously to provider    Telemedicine Visit: The patient's condition can be safely assessed and treated via synchronous audio and visual telemedicine encounter.      Reason for Telemedicine Visit: Services only offered telehealth    Originating Site (Patient Location): Patient's other moms home     Distant Site (Provider Location): Provider Remote Setting- Home Office    Consent:  The patient/guardian has verbally consented to: the potential risks and benefits of telemedicine (video visit) versus in person care; bill my insurance or make self-payment for services provided; and responsibility for payment of non-covered services.     Patient would like the video invitation sent by:  My Chart    Mode of Communication:  [facility]    Distant Location (Provider):  Off-site    As the provider I attest to compliance with applicable laws and regulations related to telemedicine.    DATA  Interactive Complexity: Yes, visit entailed Interactive Complexity evidenced by:  -The need to manage maladaptive communication (related to, e.g., high anxiety, high reactivity, repeated questions, or disagreement) among participants that complicates delivery of care (memory and processing)  Crisis: No        Progress Since Last Session (Related to Symptoms / Goals / Homework):   Symptoms: No change      Homework: Partially completed      Episode of Care Goals: Minimal progress - ACTION (Actively working towards change); Intervened by reinforcing change plan / affirming steps  taken     Current / Ongoing Stressors and Concerns:   Memory loss, chronic health issues, loss of independence, finances, unknown future     Treatment Objective(s) Addressed in This Session:   use cognitive strategies identified in therapy to challenge anxious thoughts  Decrease frequency and intensity of feeling down, depressed, hopeless  Identify negative self-talk and behaviors: challenge core beliefs, myths, and actions  Improve concentration, focus, and mindfulness in daily activities   learn & utilize at least   assertive communication skills weekly       Intervention:   CBT: restructuring   Emotion Focused Therapy: emotional processing,supportive listening   Interpersonal Therapy: communication with daughters     Assessments completed prior to visit:  The following assessments were completed by patient for this visit:  PHQ9:       5/10/2023     5:47 PM   PHQ-9 SCORE   PHQ-9 Total Score MyChart 19 (Moderately severe depression)   PHQ-9 Total Score 19     GAD7:       5/8/2023    12:50 PM   WILLIAM-7 SCORE   Total Score 10 (moderate anxiety)   Total Score 10         ASSESSMENT: Current Emotional / Mental Status (status of significant symptoms):   Risk status (Self / Other harm or suicidal ideation)   Patient denies current fears or concerns for personal safety.   Patient denies current or recent suicidal ideation or behaviors.   Patient denies current or recent homicidal ideation or behaviors.   Patient denies current or recent self injurious behavior or ideation.   Patient denies other safety concerns.   Patient reports there has been no change in risk factors since their last session.     Patient reports there has been no change in protective factors since their last session.     Recommended that patient call 911 or go to the local ED should there be a change in any of these risk factors.     Appearance:   Appropriate    Eye Contact:   Fair    Psychomotor Behavior: Normal    Attitude:   Cooperative     Orientation:   All   Speech    Rate / Production: Normal/ Responsive Normal     Volume:  Normal    Mood:    Anxious  Normal   Affect:    Appropriate    Thought Content:  Clear  some cognitive concerns, processing and memory    Thought Form:  Coherent    Insight:    Good  and Fair      Medication Review:   No changes to current psychiatric medication(s)     Medication Compliance:   Yes     Changes in Health Issues:   Yes: Diabetes, Associated Psychological Distress  Chronic disease management, Associated Psychological Distress     Chemical Use Review:   Substance Use: Chemical use reviewed, no active concerns identified      Tobacco Use: No change in amount of tobacco use since last session.  Patient declined discussion at this time    Diagnosis:  1. Anxiety    2. Impaired executive functioning        Collateral Reports Completed:   Not Applicable    PLAN: (Patient Tasks / Therapist Tasks / Other)  Provider assigned client to talk to her daughter with open communication       Thought record   Elaine Sidhu [facility] 7/25/2023                                                            ______________________________________________________________________     Individual Treatment Plan     Patient's Name: Sudheer Montiel              YOB: 1965     Date of Creation: 6/13/2023  Date Treatment Plan Last Reviewed/Revised: 6/13/2023     DSM5 Diagnoses:  1. Anxiety    2. Impaired executive functioning       Psychosocial / Contextual Factors: memory loss, job loss, loss of home and license   PROMIS (reviewed every 90 days): 12     Referral / Collaboration:  Was/were discussed and patient will pursue. , memory clinic      Anticipated number of session for this episode of care: 9-12 sessions  Anticipation frequency of session: Every other week  Anticipated Duration of each session: 38-52 minutes  Treatment plan will be reviewed in 90 days or when goals have been changed.         MeasurableTreatment  Goal(s) related to diagnosis / functional impairment(s)  Goal 1: Patient will report     I will know I've met my goal when client has known coping skills to deal with life changes .             Objective #A (Patient Action)                          Patient will identify   fears / thoughts that contribute to feeling anxious.  Status: New - Date: 6/27/23      Intervention(s)  Therapist will teach emotional recognition/identification.  .     Objective #B  Patient will increase length and frequency of contact with others  .  Status: New - Date: 6/27/23      Intervention(s)  Therapist will assign homework rhat includes client reaching out to others to keep contact .     Objective #C  Patient will use cognitive strategies identified in therapy to challenge anxious thoughts.  Status: New - Date: 6/27/23      Intervention(s)  Therapist will teach emotional regulation skills.  .  Therapist will teach emotional regulation skills.  .        Patient has reviewed and agreed to the above plan.        Elaine Sidhu, [facility]                       June 27, 2023

## 2023-07-31 ENCOUNTER — PATIENT OUTREACH (OUTPATIENT)
Dept: CARE COORDINATION | Facility: CLINIC | Age: 58
End: 2023-07-31
Payer: COMMERCIAL

## 2023-07-31 NOTE — PROGRESS NOTES
Clinic Care Coordination Contact  [facility]/Voicemail       Clinical Data: Care Coordinator Outreach - pt previously seen at [facility] but has now transitioned care to [facility].  CC outreach to determine current need and provide warm hand off to new CC team with new PCP clinic.      Outreach attempted x 1.  Left message on daughter's voicemail. Left contact information for  CC, Laura, with [facility]. Encouraged family to reach out to Laura with needs.      Plan: No further outreach planned by this writer.      AGA Mijares   Social Work Care Coordinator  [phone]

## 2023-08-11 ENCOUNTER — APPOINTMENT (OUTPATIENT)
Dept: CT IMAGING | Facility: CLINIC | Age: 58
End: 2023-08-11
Attending: EMERGENCY MEDICINE
Payer: COMMERCIAL

## 2023-08-11 ENCOUNTER — HOSPITAL ENCOUNTER (INPATIENT)
Facility: CLINIC | Age: 58
LOS: 10 days | Discharge: NURSING, REHAB OR LONG-TERM CARE | End: 2023-08-21
Attending: EMERGENCY MEDICINE | Admitting: HOSPITALIST
Payer: COMMERCIAL

## 2023-08-11 ENCOUNTER — APPOINTMENT (OUTPATIENT)
Dept: GENERAL RADIOLOGY | Facility: CLINIC | Age: 58
End: 2023-08-11
Attending: EMERGENCY MEDICINE
Payer: COMMERCIAL

## 2023-08-11 DIAGNOSIS — J90 PLEURAL EFFUSION ON RIGHT: ICD-10-CM

## 2023-08-11 DIAGNOSIS — K75.81 LIVER CIRRHOSIS SECONDARY TO NASH (H): Primary | ICD-10-CM

## 2023-08-11 DIAGNOSIS — R52 MODERATE PAIN: ICD-10-CM

## 2023-08-11 DIAGNOSIS — K81.0 ACUTE CHOLECYSTITIS: ICD-10-CM

## 2023-08-11 DIAGNOSIS — R06.03 ACUTE RESPIRATORY DISTRESS: ICD-10-CM

## 2023-08-11 DIAGNOSIS — F17.208 NICOTINE DEPENDENCE WITH OTHER NICOTINE-INDUCED DISORDER, UNSPECIFIED NICOTINE PRODUCT TYPE: ICD-10-CM

## 2023-08-11 DIAGNOSIS — K74.60 LIVER CIRRHOSIS SECONDARY TO NASH (H): Primary | ICD-10-CM

## 2023-08-11 LAB
% LINING CELLS, BODY FLUID: 46 %
ALBUMIN SERPL BCG-MCNC: 3.5 G/DL (ref 3.5–5.2)
ALP SERPL-CCNC: 198 U/L (ref 35–104)
ALT SERPL W P-5'-P-CCNC: 22 U/L (ref 0–50)
ANION GAP SERPL CALCULATED.3IONS-SCNC: 12 MMOL/L (ref 7–15)
APPEARANCE FLD: CLEAR
AST SERPL W P-5'-P-CCNC: 48 U/L (ref 0–45)
ATRIAL RATE - MUSE: 71 BPM
BASOPHILS # BLD AUTO: 0 10E3/UL (ref 0–0.2)
BASOPHILS NFR BLD AUTO: 1 %
BILIRUB SERPL-MCNC: 1.3 MG/DL
BUN SERPL-MCNC: 10.2 MG/DL (ref 8–23)
CALCIUM SERPL-MCNC: 8.4 MG/DL (ref 8.6–10)
CELL COUNT BODY FLUID SOURCE: NORMAL
CHLORIDE SERPL-SCNC: 106 MMOL/L (ref 98–107)
COLOR FLD: YELLOW
CREAT BLD-MCNC: 0.4 MG/DL (ref 0.5–1)
CREAT SERPL-MCNC: 0.46 MG/DL (ref 0.51–0.95)
DEPRECATED HCO3 PLAS-SCNC: 23 MMOL/L (ref 22–29)
DIASTOLIC BLOOD PRESSURE - MUSE: NORMAL MMHG
EOSINOPHIL # BLD AUTO: 0.1 10E3/UL (ref 0–0.7)
EOSINOPHIL NFR BLD AUTO: 3 %
ERYTHROCYTE [DISTWIDTH] IN BLOOD BY AUTOMATED COUNT: 15 % (ref 10–15)
GFR SERPL CREATININE-BSD FRML MDRD: >60 ML/MIN/1.73M2
GFR SERPL CREATININE-BSD FRML MDRD: >90 ML/MIN/1.73M2
GLUCOSE BLDC GLUCOMTR-MCNC: 100 MG/DL (ref 70–99)
GLUCOSE BODY FLUID SOURCE: NORMAL
GLUCOSE FLD-MCNC: 128 MG/DL
GLUCOSE SERPL-MCNC: 105 MG/DL (ref 70–99)
HCT VFR BLD AUTO: 41.7 % (ref 35–47)
HGB BLD-MCNC: 13.6 G/DL (ref 11.7–15.7)
HOLD SPECIMEN: NORMAL
IMM GRANULOCYTES # BLD: 0 10E3/UL
IMM GRANULOCYTES NFR BLD: 0 %
INR PPP: 1.17 (ref 0.85–1.15)
INTERPRETATION ECG - MUSE: NORMAL
LD BODY BODY FLUID SOURCE: NORMAL
LDH FLD L TO P-CCNC: 79 U/L
LDH SERPL L TO P-CCNC: 196 U/L (ref 0–250)
LYMPHOCYTES # BLD AUTO: 0.8 10E3/UL (ref 0.8–5.3)
LYMPHOCYTES NFR BLD AUTO: 21 %
LYMPHOCYTES NFR FLD MANUAL: 42 %
MCH RBC QN AUTO: 31.6 PG (ref 26.5–33)
MCHC RBC AUTO-ENTMCNC: 32.6 G/DL (ref 31.5–36.5)
MCV RBC AUTO: 97 FL (ref 78–100)
MONOCYTES # BLD AUTO: 0.5 10E3/UL (ref 0–1.3)
MONOCYTES NFR BLD AUTO: 12 %
MONOS+MACROS NFR FLD MANUAL: 3 %
NEUTROPHILS # BLD AUTO: 2.6 10E3/UL (ref 1.6–8.3)
NEUTROPHILS NFR BLD AUTO: 63 %
NEUTS BAND NFR FLD MANUAL: 9 %
NRBC # BLD AUTO: 0 10E3/UL
NRBC BLD AUTO-RTO: 0 /100
P AXIS - MUSE: 27 DEGREES
PLATELET # BLD AUTO: 99 10E3/UL (ref 150–450)
POTASSIUM SERPL-SCNC: 3.7 MMOL/L (ref 3.4–5.3)
PR INTERVAL - MUSE: 154 MS
PROT FLD-MCNC: 1.8 G/DL
PROT SERPL-MCNC: 7.1 G/DL (ref 6.4–8.3)
PROTEIN BODY FLUID SOURCE: NORMAL
QRS DURATION - MUSE: 90 MS
QT - MUSE: 424 MS
QTC - MUSE: 460 MS
R AXIS - MUSE: -23 DEGREES
RBC # BLD AUTO: 4.31 10E6/UL (ref 3.8–5.2)
SODIUM SERPL-SCNC: 141 MMOL/L (ref 136–145)
SYSTOLIC BLOOD PRESSURE - MUSE: NORMAL MMHG
T AXIS - MUSE: 67 DEGREES
VENTRICULAR RATE- MUSE: 71 BPM
WBC # BLD AUTO: 4 10E3/UL (ref 4–11)
WBC # FLD AUTO: 630 /UL

## 2023-08-11 PROCEDURE — 87070 CULTURE OTHR SPECIMN AEROBIC: CPT | Performed by: EMERGENCY MEDICINE

## 2023-08-11 PROCEDURE — 85610 PROTHROMBIN TIME: CPT | Performed by: EMERGENCY MEDICINE

## 2023-08-11 PROCEDURE — 87205 SMEAR GRAM STAIN: CPT | Performed by: EMERGENCY MEDICINE

## 2023-08-11 PROCEDURE — 93005 ELECTROCARDIOGRAM TRACING: CPT

## 2023-08-11 PROCEDURE — 250N000011 HC RX IP 250 OP 636: Performed by: EMERGENCY MEDICINE

## 2023-08-11 PROCEDURE — 99291 CRITICAL CARE FIRST HOUR: CPT | Mod: 25

## 2023-08-11 PROCEDURE — 82565 ASSAY OF CREATININE: CPT

## 2023-08-11 PROCEDURE — 250N000009 HC RX 250: Performed by: EMERGENCY MEDICINE

## 2023-08-11 PROCEDURE — 36415 COLL VENOUS BLD VENIPUNCTURE: CPT | Performed by: EMERGENCY MEDICINE

## 2023-08-11 PROCEDURE — 83615 LACTATE (LD) (LDH) ENZYME: CPT | Performed by: EMERGENCY MEDICINE

## 2023-08-11 PROCEDURE — 96375 TX/PRO/DX INJ NEW DRUG ADDON: CPT

## 2023-08-11 PROCEDURE — 82962 GLUCOSE BLOOD TEST: CPT

## 2023-08-11 PROCEDURE — 120N000001 HC R&B MED SURG/OB

## 2023-08-11 PROCEDURE — 96374 THER/PROPH/DIAG INJ IV PUSH: CPT | Mod: 59

## 2023-08-11 PROCEDURE — 99223 1ST HOSP IP/OBS HIGH 75: CPT | Performed by: HOSPITALIST

## 2023-08-11 PROCEDURE — 71045 X-RAY EXAM CHEST 1 VIEW: CPT

## 2023-08-11 PROCEDURE — 0W9930Z DRAINAGE OF RIGHT PLEURAL CAVITY WITH DRAINAGE DEVICE, PERCUTANEOUS APPROACH: ICD-10-PCS | Performed by: EMERGENCY MEDICINE

## 2023-08-11 PROCEDURE — 82945 GLUCOSE OTHER FLUID: CPT | Performed by: EMERGENCY MEDICINE

## 2023-08-11 PROCEDURE — 85025 COMPLETE CBC W/AUTO DIFF WBC: CPT | Performed by: EMERGENCY MEDICINE

## 2023-08-11 PROCEDURE — 999N000065 XR CHEST PORT 1 VIEW

## 2023-08-11 PROCEDURE — 80053 COMPREHEN METABOLIC PANEL: CPT | Performed by: EMERGENCY MEDICINE

## 2023-08-11 PROCEDURE — 71275 CT ANGIOGRAPHY CHEST: CPT

## 2023-08-11 PROCEDURE — 32551 INSERTION OF CHEST TUBE: CPT

## 2023-08-11 PROCEDURE — 84157 ASSAY OF PROTEIN OTHER: CPT | Performed by: EMERGENCY MEDICINE

## 2023-08-11 PROCEDURE — 89051 BODY FLUID CELL COUNT: CPT | Performed by: EMERGENCY MEDICINE

## 2023-08-11 PROCEDURE — 999N000065 XR CHEST 1 VIEW

## 2023-08-11 PROCEDURE — 99292 CRITICAL CARE ADDL 30 MIN: CPT

## 2023-08-11 RX ORDER — LIDOCAINE 40 MG/G
CREAM TOPICAL
Status: CANCELLED | OUTPATIENT
Start: 2023-08-11

## 2023-08-11 RX ORDER — NITROGLYCERIN 0.4 MG/1
0.4 TABLET SUBLINGUAL EVERY 5 MIN PRN
Status: CANCELLED | OUTPATIENT
Start: 2023-08-11

## 2023-08-11 RX ORDER — IOPAMIDOL 755 MG/ML
76 INJECTION, SOLUTION INTRAVASCULAR ONCE
Status: COMPLETED | OUTPATIENT
Start: 2023-08-11 | End: 2023-08-11

## 2023-08-11 RX ORDER — LORAZEPAM 2 MG/ML
2 INJECTION INTRAMUSCULAR ONCE
Status: COMPLETED | OUTPATIENT
Start: 2023-08-11 | End: 2023-08-11

## 2023-08-11 RX ORDER — FENTANYL CITRATE 50 UG/ML
100 INJECTION, SOLUTION INTRAMUSCULAR; INTRAVENOUS ONCE
Status: COMPLETED | OUTPATIENT
Start: 2023-08-11 | End: 2023-08-11

## 2023-08-11 RX ADMIN — SODIUM CHLORIDE 99 ML: 9 INJECTION, SOLUTION INTRAVENOUS at 17:07

## 2023-08-11 RX ADMIN — FENTANYL CITRATE 100 MCG: 50 INJECTION, SOLUTION INTRAMUSCULAR; INTRAVENOUS at 19:33

## 2023-08-11 RX ADMIN — IOPAMIDOL 76 ML: 755 INJECTION, SOLUTION INTRAVENOUS at 17:06

## 2023-08-11 RX ADMIN — LORAZEPAM 2 MG: 2 INJECTION INTRAMUSCULAR; INTRAVENOUS at 19:06

## 2023-08-11 ASSESSMENT — ACTIVITIES OF DAILY LIVING (ADL)
ADLS_ACUITY_SCORE: 35

## 2023-08-11 NOTE — ED NOTES
Bed: ST02  Expected date:   Expected time:   Means of arrival:   Comments:  Sherrie possible pnuemo

## 2023-08-11 NOTE — ED TRIAGE NOTES
Shortness of breath that started today. Lung sounds absent on the right per EMS. Oxygen saturations unchanged with nebulizer treatment.      Triage Assessment       Row Name 08/11/23 0192       Triage Assessment (Adult)    Airway WDL WDL       Respiratory WDL    Respiratory WDL X;all    Rhythm/Pattern, Respiratory shortness of breath

## 2023-08-12 ENCOUNTER — APPOINTMENT (OUTPATIENT)
Dept: GENERAL RADIOLOGY | Facility: CLINIC | Age: 58
End: 2023-08-12
Attending: HOSPITALIST
Payer: COMMERCIAL

## 2023-08-12 ENCOUNTER — APPOINTMENT (OUTPATIENT)
Dept: CARDIOLOGY | Facility: CLINIC | Age: 58
End: 2023-08-12
Attending: HOSPITALIST
Payer: COMMERCIAL

## 2023-08-12 ENCOUNTER — APPOINTMENT (OUTPATIENT)
Dept: PHYSICAL THERAPY | Facility: CLINIC | Age: 58
End: 2023-08-12
Attending: HOSPITALIST
Payer: COMMERCIAL

## 2023-08-12 LAB
ANION GAP SERPL CALCULATED.3IONS-SCNC: 9 MMOL/L (ref 7–15)
BUN SERPL-MCNC: 11.5 MG/DL (ref 8–23)
CALCIUM SERPL-MCNC: 8.3 MG/DL (ref 8.6–10)
CHLORIDE SERPL-SCNC: 106 MMOL/L (ref 98–107)
CREAT SERPL-MCNC: 0.48 MG/DL (ref 0.51–0.95)
DEPRECATED HCO3 PLAS-SCNC: 25 MMOL/L (ref 22–29)
ERYTHROCYTE [DISTWIDTH] IN BLOOD BY AUTOMATED COUNT: 15.1 % (ref 10–15)
GFR SERPL CREATININE-BSD FRML MDRD: >90 ML/MIN/1.73M2
GLUCOSE BLDC GLUCOMTR-MCNC: 105 MG/DL (ref 70–99)
GLUCOSE BLDC GLUCOMTR-MCNC: 110 MG/DL (ref 70–99)
GLUCOSE BLDC GLUCOMTR-MCNC: 155 MG/DL (ref 70–99)
GLUCOSE BLDC GLUCOMTR-MCNC: 166 MG/DL (ref 70–99)
GLUCOSE BLDC GLUCOMTR-MCNC: 170 MG/DL (ref 70–99)
GLUCOSE SERPL-MCNC: 119 MG/DL (ref 70–99)
HCT VFR BLD AUTO: 40.5 % (ref 35–47)
HGB BLD-MCNC: 13.2 G/DL (ref 11.7–15.7)
LVEF ECHO: NORMAL
MAGNESIUM SERPL-MCNC: 1.8 MG/DL (ref 1.7–2.3)
MCH RBC QN AUTO: 31.4 PG (ref 26.5–33)
MCHC RBC AUTO-ENTMCNC: 32.6 G/DL (ref 31.5–36.5)
MCV RBC AUTO: 96 FL (ref 78–100)
PHOSPHATE SERPL-MCNC: 3.3 MG/DL (ref 2.5–4.5)
PLATELET # BLD AUTO: 100 10E3/UL (ref 150–450)
POTASSIUM SERPL-SCNC: 3.5 MMOL/L (ref 3.4–5.3)
RBC # BLD AUTO: 4.21 10E6/UL (ref 3.8–5.2)
SODIUM SERPL-SCNC: 140 MMOL/L (ref 136–145)
WBC # BLD AUTO: 6.6 10E3/UL (ref 4–11)

## 2023-08-12 PROCEDURE — 120N000001 HC R&B MED SURG/OB

## 2023-08-12 PROCEDURE — 36415 COLL VENOUS BLD VENIPUNCTURE: CPT | Performed by: HOSPITALIST

## 2023-08-12 PROCEDURE — 97161 PT EVAL LOW COMPLEX 20 MIN: CPT | Mod: GP

## 2023-08-12 PROCEDURE — 71045 X-RAY EXAM CHEST 1 VIEW: CPT

## 2023-08-12 PROCEDURE — 97530 THERAPEUTIC ACTIVITIES: CPT | Mod: GP

## 2023-08-12 PROCEDURE — 255N000002 HC RX 255 OP 636: Performed by: HOSPITALIST

## 2023-08-12 PROCEDURE — 999N000208 ECHOCARDIOGRAM COMPLETE

## 2023-08-12 PROCEDURE — 250N000012 HC RX MED GY IP 250 OP 636 PS 637: Performed by: HOSPITALIST

## 2023-08-12 PROCEDURE — 250N000013 HC RX MED GY IP 250 OP 250 PS 637: Performed by: HOSPITALIST

## 2023-08-12 PROCEDURE — 97116 GAIT TRAINING THERAPY: CPT | Mod: GP

## 2023-08-12 PROCEDURE — 99232 SBSQ HOSP IP/OBS MODERATE 35: CPT | Performed by: HOSPITALIST

## 2023-08-12 PROCEDURE — 84100 ASSAY OF PHOSPHORUS: CPT | Performed by: HOSPITALIST

## 2023-08-12 PROCEDURE — 85027 COMPLETE CBC AUTOMATED: CPT | Performed by: HOSPITALIST

## 2023-08-12 PROCEDURE — 93306 TTE W/DOPPLER COMPLETE: CPT | Mod: 26 | Performed by: INTERNAL MEDICINE

## 2023-08-12 PROCEDURE — 83735 ASSAY OF MAGNESIUM: CPT | Performed by: HOSPITALIST

## 2023-08-12 PROCEDURE — 80048 BASIC METABOLIC PNL TOTAL CA: CPT | Performed by: HOSPITALIST

## 2023-08-12 RX ORDER — THYROID 60 MG/1
60 TABLET ORAL DAILY
Status: DISCONTINUED | OUTPATIENT
Start: 2023-08-13 | End: 2023-08-21 | Stop reason: HOSPADM

## 2023-08-12 RX ORDER — POLYETHYLENE GLYCOL 3350 17 G
2 POWDER IN PACKET (EA) ORAL
Status: DISCONTINUED | OUTPATIENT
Start: 2023-08-12 | End: 2023-08-21 | Stop reason: HOSPADM

## 2023-08-12 RX ORDER — NICOTINE POLACRILEX 4 MG
15-30 LOZENGE BUCCAL
Status: DISCONTINUED | OUTPATIENT
Start: 2023-08-12 | End: 2023-08-21 | Stop reason: HOSPADM

## 2023-08-12 RX ORDER — NALOXONE HYDROCHLORIDE 0.4 MG/ML
0.4 INJECTION, SOLUTION INTRAMUSCULAR; INTRAVENOUS; SUBCUTANEOUS
Status: DISCONTINUED | OUTPATIENT
Start: 2023-08-12 | End: 2023-08-21 | Stop reason: HOSPADM

## 2023-08-12 RX ORDER — HYDROMORPHONE HCL IN WATER/PF 6 MG/30 ML
0.2 PATIENT CONTROLLED ANALGESIA SYRINGE INTRAVENOUS
Status: DISCONTINUED | OUTPATIENT
Start: 2023-08-12 | End: 2023-08-21 | Stop reason: HOSPADM

## 2023-08-12 RX ORDER — ACETAMINOPHEN 325 MG/1
975 TABLET ORAL EVERY 8 HOURS
Status: DISCONTINUED | OUTPATIENT
Start: 2023-08-12 | End: 2023-08-21 | Stop reason: HOSPADM

## 2023-08-12 RX ORDER — POLYETHYLENE GLYCOL 3350 17 G/17G
17 POWDER, FOR SOLUTION ORAL DAILY PRN
Status: DISCONTINUED | OUTPATIENT
Start: 2023-08-12 | End: 2023-08-21 | Stop reason: HOSPADM

## 2023-08-12 RX ORDER — PROCHLORPERAZINE 25 MG
25 SUPPOSITORY, RECTAL RECTAL EVERY 12 HOURS PRN
Status: DISCONTINUED | OUTPATIENT
Start: 2023-08-12 | End: 2023-08-21 | Stop reason: HOSPADM

## 2023-08-12 RX ORDER — LEVOTHYROXINE SODIUM 100 UG/1
100 TABLET ORAL
Status: DISCONTINUED | OUTPATIENT
Start: 2023-08-13 | End: 2023-08-21 | Stop reason: HOSPADM

## 2023-08-12 RX ORDER — ONDANSETRON 4 MG/1
4 TABLET, ORALLY DISINTEGRATING ORAL EVERY 6 HOURS PRN
Status: DISCONTINUED | OUTPATIENT
Start: 2023-08-12 | End: 2023-08-21 | Stop reason: HOSPADM

## 2023-08-12 RX ORDER — DEXTROSE MONOHYDRATE 25 G/50ML
25-50 INJECTION, SOLUTION INTRAVENOUS
Status: DISCONTINUED | OUTPATIENT
Start: 2023-08-12 | End: 2023-08-21 | Stop reason: HOSPADM

## 2023-08-12 RX ORDER — LACTULOSE 10 G/15ML
10 SOLUTION ORAL DAILY
Status: DISCONTINUED | OUTPATIENT
Start: 2023-08-12 | End: 2023-08-14

## 2023-08-12 RX ORDER — THYROID 15 MG/1
15 TABLET ORAL DAILY
Status: DISCONTINUED | OUTPATIENT
Start: 2023-08-13 | End: 2023-08-21 | Stop reason: HOSPADM

## 2023-08-12 RX ORDER — HYDROMORPHONE HCL IN WATER/PF 6 MG/30 ML
0.4 PATIENT CONTROLLED ANALGESIA SYRINGE INTRAVENOUS
Status: DISCONTINUED | OUTPATIENT
Start: 2023-08-12 | End: 2023-08-21 | Stop reason: HOSPADM

## 2023-08-12 RX ORDER — NALOXONE HYDROCHLORIDE 0.4 MG/ML
0.2 INJECTION, SOLUTION INTRAMUSCULAR; INTRAVENOUS; SUBCUTANEOUS
Status: DISCONTINUED | OUTPATIENT
Start: 2023-08-12 | End: 2023-08-21 | Stop reason: HOSPADM

## 2023-08-12 RX ORDER — OXYCODONE HYDROCHLORIDE 5 MG/1
10 TABLET ORAL EVERY 4 HOURS PRN
Status: DISCONTINUED | OUTPATIENT
Start: 2023-08-12 | End: 2023-08-19

## 2023-08-12 RX ORDER — ESCITALOPRAM OXALATE 10 MG/1
10 TABLET ORAL DAILY
Status: DISCONTINUED | OUTPATIENT
Start: 2023-08-12 | End: 2023-08-21 | Stop reason: HOSPADM

## 2023-08-12 RX ORDER — PROCHLORPERAZINE MALEATE 10 MG
10 TABLET ORAL EVERY 6 HOURS PRN
Status: DISCONTINUED | OUTPATIENT
Start: 2023-08-12 | End: 2023-08-21 | Stop reason: HOSPADM

## 2023-08-12 RX ORDER — AMOXICILLIN 250 MG
1 CAPSULE ORAL 2 TIMES DAILY PRN
Status: DISCONTINUED | OUTPATIENT
Start: 2023-08-12 | End: 2023-08-21 | Stop reason: HOSPADM

## 2023-08-12 RX ORDER — OXYCODONE HYDROCHLORIDE 5 MG/1
5 TABLET ORAL EVERY 4 HOURS PRN
Status: DISCONTINUED | OUTPATIENT
Start: 2023-08-12 | End: 2023-08-19

## 2023-08-12 RX ORDER — AMOXICILLIN 250 MG
2 CAPSULE ORAL 2 TIMES DAILY PRN
Status: DISCONTINUED | OUTPATIENT
Start: 2023-08-12 | End: 2023-08-21 | Stop reason: HOSPADM

## 2023-08-12 RX ORDER — LIDOCAINE 40 MG/G
CREAM TOPICAL
Status: DISCONTINUED | OUTPATIENT
Start: 2023-08-12 | End: 2023-08-21 | Stop reason: HOSPADM

## 2023-08-12 RX ORDER — ONDANSETRON 2 MG/ML
4 INJECTION INTRAMUSCULAR; INTRAVENOUS EVERY 6 HOURS PRN
Status: DISCONTINUED | OUTPATIENT
Start: 2023-08-12 | End: 2023-08-21 | Stop reason: HOSPADM

## 2023-08-12 RX ADMIN — ACETAMINOPHEN 975 MG: 325 TABLET, FILM COATED ORAL at 16:04

## 2023-08-12 RX ADMIN — INSULIN GLARGINE 30 UNITS: 100 INJECTION, SOLUTION SUBCUTANEOUS at 08:31

## 2023-08-12 RX ADMIN — ACETAMINOPHEN 975 MG: 325 TABLET, FILM COATED ORAL at 08:30

## 2023-08-12 RX ADMIN — LACTULOSE 10 ML: 20 SOLUTION ORAL at 12:39

## 2023-08-12 RX ADMIN — INSULIN ASPART 1 UNITS: 100 INJECTION, SOLUTION INTRAVENOUS; SUBCUTANEOUS at 12:39

## 2023-08-12 RX ADMIN — HUMAN ALBUMIN MICROSPHERES AND PERFLUTREN 9 ML: 10; .22 INJECTION, SOLUTION INTRAVENOUS at 15:14

## 2023-08-12 RX ADMIN — INSULIN ASPART 1 UNITS: 100 INJECTION, SOLUTION INTRAVENOUS; SUBCUTANEOUS at 17:17

## 2023-08-12 RX ADMIN — RIFAXIMIN 550 MG: 550 TABLET ORAL at 16:04

## 2023-08-12 RX ADMIN — ACETAMINOPHEN 975 MG: 325 TABLET, FILM COATED ORAL at 01:24

## 2023-08-12 RX ADMIN — ESCITALOPRAM OXALATE 10 MG: 10 TABLET ORAL at 13:17

## 2023-08-12 RX ADMIN — RIFAXIMIN 550 MG: 550 TABLET ORAL at 13:17

## 2023-08-12 ASSESSMENT — ACTIVITIES OF DAILY LIVING (ADL)
ADLS_ACUITY_SCORE: 35
ADLS_ACUITY_SCORE: 45
ADLS_ACUITY_SCORE: 35
ADLS_ACUITY_SCORE: 45

## 2023-08-12 NOTE — PROGRESS NOTES
"   08/12/23 1100   Appointment Info   Signing Clinician's Name / Credentials (PT) Diane Mishra DPT   Living Environment   People in Home child(ashwini), adult   Current Living Arrangements apartment  (has been staying with dtr, has an evaluation)   Home Accessibility no concerns   Transportation Anticipated family or friend will provide   Living Environment Comments Pt is homeless and has been living with her dtr. Is applying for a cadi waiver in hope of getting into a SNF.   Self-Care   Usual Activity Tolerance moderate   Current Activity Tolerance fair   Equipment Currently Used at Home cane, straight   Fall history within last six months yes   Number of times patient has fallen within last six months 4   Activity/Exercise/Self-Care Comment Dtr assists with cooking, cleaning, groceries. Pt IND with SEC at baseline for mobility. Had been managing meds IND, but dtr noting pt had been inconsistent with this   General Information   Onset of Illness/Injury or Date of Surgery 08/11/23   Referring Physician Tonja Bazzi, DO   Pertinent History of Current Problem (include personal factors and/or comorbidities that impact the POC) Per chart: \"Sudheer Montiel is a 58 year old female admitted on 8/11/2023 with large right sided pleural effusion, underwent placement of chest tube in ED with drainage of effusion\"   Existing Precautions/Restrictions fall;oxygen therapy device and L/min  (1.5 L O2 via NC)   Cognition   Affect/Mental Status (Cognition) WFL   Orientation Status (Cognition) oriented x 3   Follows Commands (Cognition) WFL   Pain Assessment   Patient Currently in Pain Yes, see Vital Sign flowsheet  (R side 8/10. B chronic knee pain)   Integumentary/Edema   Integumentary/Edema Comments chest tube present on R side   Posture    Posture Forward head position   Range of Motion (ROM)   Range of Motion ROM is WFL   Strength (Manual Muscle Testing)   Strength (Manual Muscle Testing) Able to perform R SLR;Able " to perform L SLR;Deficits observed during functional mobility   Strength Comments decreased BLE functional strength and endurance   Bed Mobility   Comment, (Bed Mobility) CGA sup>sit   Transfers   Comment, (Transfers) CGA sit>stand at FWW   Gait/Stairs (Locomotion)   Distance in Feet (Gait) 30 + 50   Comment, (Gait/Stairs) Pt amb 5 ft with FWW and CGA. Slow pace, downward gaze   Balance   Balance Comments benefits from use of FWW for upright mobility   Sensory Examination   Sensory Perception Comments BLE light touch intact   Coordination   Coordination no deficits were identified   Muscle Tone   Muscle Tone no deficits were identified   Clinical Impression   Criteria for Skilled Therapeutic Intervention Yes, treatment indicated   PT Diagnosis (PT) impaired IND with functional mobility   Influenced by the following impairments impaired functional strength, balance, activity tolerance   Functional limitations due to impairments impaired bed mobility, transfers, ambulation   Clinical Presentation (PT Evaluation Complexity) Stable/Uncomplicated   Clinical Presentation Rationale Based on current presentation, PMH, social support   Clinical Decision Making (Complexity) low complexity   Planned Therapy Interventions (PT) balance training;bed mobility training;gait training;home exercise program;patient/family education;stair training;transfer training;progressive activity/exercise;home program guidelines   Anticipated Equipment Needs at Discharge (PT) walker, rolling   Risk & Benefits of therapy have been explained evaluation/treatment results reviewed;care plan/treatment goals reviewed;risks/benefits reviewed;current/potential barriers reviewed;participants voiced agreement with care plan;participants included;patient;daughter   PT Total Evaluation Time   PT Eval, Low Complexity Minutes (66035) 10   Physical Therapy Goals   PT Frequency Daily   PT Predicted Duration/Target Date for Goal Attainment 08/19/23   PT Goals Bed  Mobility;Transfers;Gait;Aerobic Activity   PT: Bed Mobility Modified independent;Supine to/from sit   PT: Transfers Modified independent;Sit to/from stand;Bed to/from chair;Assistive device   PT: Gait Modified independent;Assistive device;Rolling walker;100 feet   PT: Perform aerobic activity with stable cardiovascular response intermittent activity;10 minutes;ambulation   Interventions   Interventions Quick Adds Gait Training;Therapeutic Activity   Therapeutic Activity   Therapeutic Activities: dynamic activities to improve functional performance Minutes (96481) 10   Symptoms Noted During/After Treatment Fatigue;Shortness of breath   Treatment Detail/Skilled Intervention Pt cued for sup>sit, CGA. Sit<>stands throughout session with CGA at FWW. Pt On 1.5 L O2 throughout session, O2 sats remained >92%, but some SOB noted, cued for PLB. Increased time spent throughout session to manage lines and gather equipment for mobility Pt edu on benefits of mobility during hospitalization. Pt remained in chair, alarm armed and needs in reach.   Gait Training   Gait Training Minutes (37621) 10   Symptoms Noted During/After Treatment (Gait Training) fatigue;shortness of breath   Treatment Detail/Skilled Intervention Pt cued for gait with FWW, CGA-SBA. Slow pace, fatigues quickly. Cues for safe navigation of environment. Takes seated rest break between bouts.   PT Discharge Planning   PT Plan progress gait and activity tolerance, monitor O2 sats   PT Discharge Recommendation (DC Rec) home with assist;home with home care physical therapy   PT Rationale for DC Rec Pt below reported baseline, currently CGA with FWW. Pt limited by decreased activity tolerance, functional strength, balance. Anticipate with further medical management and continued skilled PT, pt will be able to return home with assist of dtr and HH PT to address continued deficits.   PT Brief overview of current status Ax1 FWW   Total Session Time   Timed Code Treatment  Minutes 20   Total Session Time (sum of timed and untimed services) 30

## 2023-08-12 NOTE — PLAN OF CARE
Goal Outcome Evaluation:    Orientations: AOx4, forgetful at times  Vitals/Pain: VSS, 1.5L NC, denies pain   Lines/Drains: PIV SL, CT to -20 suction, serous output, intermittent airleak  Skin/Wounds: CT insertion site CDL  GI/: UOA, no BM  Labs: Abnormal/Trends, Electrolyte Replacement- K and Mag recheck in the morning   Ambulation/Assist: A1 GBW   Plan: Continue plan of care

## 2023-08-12 NOTE — PROGRESS NOTES
[facility]    Medicine Progress Note - Hospitalist Service    Date of Admission:  8/11/2023    Assessment & Plan   Sudheer Montiel is a 58 year old female admitted on 8/11/2023 with large right sided pleural effusion, underwent placement of chest tube in ED with drainage of effusion     Large right sided pleural effusion s/p chest tube 8/11  Acute hypoxic respiratory failure  Initially required 15LPM for O2 sat in 80s, improved to 4LPM after chest tube placed. ~2800ml output after chest tube placement while patient still in ED. Effusion consistent with transudative based on fluid studies  *CT chest PE: no PE, large R effusion with associated atelectasis  *CXR: large R pleural effusion, near collapse of R Lung  *CXR post CT: CT at right lung base. No PTX. Small R effusion with atelectasis. Increased airspace consolidation at right mid and lower lung ? Edema vs PNA. Mild interstitial edema.  - thoracic surgery consulted  - follow CXR  - keep CT to suction - defer to Dr Rosario, appreciate assistance  - monitor CT drain output  - Echo pending  - encourage IS  - wean O2 as able  - scheduled tylenol for chest tube related pain  - WBC WNL and no recent fever/chills/cough/congestion, transudative effusion -- holding off on abx for possible R sided consolidation  - appears transudative and likely related to liver disease, follow cultures  - Care management consulted, patient is currently in process of trying to obtain a CADI waiver and has been living with her daughter     Diabetes mellitus  Follows with Dr Christianson of endocrine. Uses novolog 4U with small carb meal, 6U with large carb meal and sliding scale. Lantus in AM., metformin 1500mg in AM and ozempic weekly. Last A1C was 6.9 in June 2023.  - continue lantus 30 units daily given hasn't eaten much  - aspart 1 unit per 15 units carbs  - medium resistance SSI     Elevated LFTs  Alk phos, tbili and AST similarly elevated as during June 2023  "checks  - Monitor     Tobacco use  Smokes 4-6 cigarettes per day. Declines nicotine patch, wants to \"cold turkey it\"  - noted     Hypothyroidism  Resume PTA regimen     Anxiety  Stable. PTA lexapro resumed.     Obesity  Follow up with PCP        Diet: Moderate Consistent Carb (60 g CHO per Meal) Diet    DVT Prophylaxis: Pneumatic Compression Devices  Crystal Catheter: Not present  Lines: None     Cardiac Monitoring: None  Code Status: Full Code      Clinically Significant Risk Factors Present on Admission          # Hypocalcemia: Lowest Ca = 8.3 mg/dL in last 2 days, will monitor and replace as appropriate      # Coagulation Defect: INR = 1.17 (Ref range: 0.85 - 1.15) and/or PTT = N/A, will monitor for bleeding  # Drug Induced Platelet Defect: home medication list includes an antiplatelet medication       # DMII: A1C = 6.9 % (Ref range: 0.0 - 5.6 %) within past 6 months    # Severe Obesity: Estimated body mass index is 46.84 kg/m  as calculated from the following:    Height as of this encounter: 1.524 m (5').    Weight as of this encounter: 108.8 kg (239 lb 13.8 oz).            Disposition Plan      Expected Discharge Date: 08/14/2023                  Roger Archer MD  Hospitalist Service  [facility]  Securely message with [employer] (more info)  Text page via [employer] Paging/Directory   ______________________________________________________________________    Interval History   Care assumed today. Seen and examined, family in room. She is still on O2 and up in chair. No new complaints, some expected discomfort around drain site. Breathing improved since getting chest tube.     Physical Exam   Vital Signs: Temp: 98  F (36.7  C) Temp src: Oral BP: 129/75 Pulse: 77   Resp: 20 SpO2: 94 % O2 Device: Nasal cannula Oxygen Delivery: 1.5 LPM  Weight: 239 lbs 13.77 oz    Gen: NAD, pleasant  HEENT: EOMI, MMM  Resp: no focal crackles,  no wheezes, no increased work of resp  CV: S1S2 heard, reg rhythm, reg " rate  Abdo: soft, nontender, nondistended, bowel sounds present  Ext: calves nontender, well perfused  Neuro: aa, conversant, moving ext, CN grossly intact, no facial asymmetry      Medical Decision Making       39 MINUTES SPENT BY ME on the date of service doing chart review, history, exam, documentation & further activities per the note.      Data     I have personally reviewed the following data over the past 24 hrs:    6.6  \   13.2   / 100 (L)     140 106 11.5 /  155 (H)   3.5 25 0.48 (L) \     ALT: 22 AST: 48 (H) AP: 198 (H) TBILI: 1.3 (H)   ALB: 3.5 TOT PROTEIN: 7.1 LIPASE: N/A     INR:  1.17 (H) PTT:  N/A   D-dimer:  N/A Fibrinogen:  N/A     Ferritin:  N/A % Retic:  N/A LDH:  196

## 2023-08-12 NOTE — H&P
"[facility]    History and Physical - Hospitalist Service       Date of Admission:  8/11/2023    Assessment & Plan      Sudheer Montiel is a 58 year old female admitted on 8/11/2023 with large right sided pleural effusion, underwent placement of chest tube in ED with drainage of effusion    Large right sided pleural effusion s/p chest tube 8/11  Acute hypoxic respiratory failure  Initially required 15LPM for O2 sat in 80s, improved to 4LPM after chest tube placed. ~2800ml output after chest tube placement while patient still in ED. Effusion consistent with transudative based on fluid studies  *CT chest PE: no PE, large R effusion with associated atelectasis  *CXR: large R pleural effusion, near collapse of R Lung  *CXR post CT: CT at right lung base. No PTX. Small R effusion with atelectasis. Increased airspace consolidation at right mid and lower lung ? Edema vs PNA. Mild interstitial edema.  - thoracic surgery consulted  - CXR in AM  - keep CT to suction  - monitor CT drain output  - check echocardiogram  - encourage IS  - wean O2 as able  - scheduled tylenol for chest tube related pain  - WBC WNL and no recent fever/chills/cough/congestion, transudative effusion --holding off on abx for possible R sided consolidation  - Care management consulted, patient is currently in process of trying to obtain a CADI waiver and has been living with her daughter    Diabetes mellitus  Follows with Dr Christianson of endocrine. Uses novolog 4U with small carb meal, 6U with large carb meal and sliding scale. Lantus in AM., metformin 1500mg in AM and ozempic weekly. Last A1C was 6.9 in June 2023.  - continue lantus 30 units daily given hasn't eaten much  - aspart 1 unit per 15 units carbs  - medium resistance SSI    Elevated LFTs  Alk phos, tbili and AST similarly elevated as during June 2023 checks  - Monitor    Tobacco use  Smokes 4-6 cigarettes per day. Declines nicotine patch, wants to \"cold turkey it\"  - " "noted    Hypothyroidism  Await med rec    Anxiety  Await med rec    Obesity  Follow up with PCP     Diet:  moderate carb diet  DVT Prophylaxis: Pneumatic Compression Devices  Crystal Catheter: Not present  Lines: None     Cardiac Monitoring: None  Code Status:  full code    Clinically Significant Risk Factors Present on Admission          # Hypocalcemia: Lowest Ca = 8.4 mg/dL in last 2 days, will monitor and replace as appropriate        # Coagulation Defect: INR = 1.17 (Ref range: 0.85 - 1.15) and/or PTT = N/A, will monitor for bleeding    # Drug Induced Platelet Defect: home medication list includes an antiplatelet medication       # DMII: A1C = 6.9 % (Ref range: 0.0 - 5.6 %) within past 6 months      # Obesity: Estimated body mass index is 38.55 kg/m  as calculated from the following:    Height as of this encounter: 1.6 m (5' 3\").    Weight as of this encounter: 98.7 kg (217 lb 9.5 oz).            Disposition Plan      Expected Discharge Date: 08/13/2023                  Tonja Bazzi DO  Hospitalist Service  [facility]  Securely message with [employer] (more info)  Text page via [facility] Paging/Directory     ______________________________________________________________________    Chief Complaint   Shortness of breath    History is obtained from the patient, daughter at bedside    History of Present Illness   Sudheer Montiel is a 58 year old female who presents from home with shortness of breath. She felt she was in her normal state of health on 8/10. Woke up on 8/11 and went to take the dogs for a walk and felt very short of breath. She felt hot in the morning and like she was working very hard, but not febrile. After she did not get better with rest throughout day, EMS was called. She received a nebulizer but had no change in her O2 sat. CXR showed effusion, drain placed and she feels much better. She is a little sleepy due to fentanyl and ativan received for procedure. She denies recent " vomiting, recent illness, cough/congestion/chills.  She does have some pain located at site of chest tube      Past Medical History    Past Medical History:   Diagnosis Date    Abnormal liver CT     Hypothyroid 80's    Necrotizing fasciitis (H)     Primary osteoarthritis of both knees     Soft tissue infection     Subcortical infarction (H)     Type 2 diabetes mellitus (H)        Past Surgical History   Past Surgical History:   Procedure Laterality Date     SECTION      COLONOSCOPY N/A 10/21/2015    Procedure: COLONOSCOPY;  Surgeon: Jaky Arredondo MD;  Location:  GI    IRRIGATION AND DEBRIDEMENT ABDOMEN WASHOUT, COMBINED N/A 10/12/2018    Procedure: COMBINED IRRIGATION AND DEBRIDEMENT ABDOMEN WASHOUT;  Irrigation and Debridement of Lower Abdomen, removal of piece of mesh.;  Surgeon: Darlene Hogue MD;  Location: [facility] OR    marisol/bso      due to anemia    ZZC REPAIR CRUCIATE LIGAMENT,KNEE         Prior to Admission Medications   Prior to Admission Medications   Prescriptions Last Dose Informant Patient Reported? Taking?   ASPIRIN 81 PO   Yes No   Continuous Blood Gluc  (FREESTYLE DIMITRIOS 14 DAY READER) BEBE   No No   Sig: Use to read blood sugars as per 's instructions.   Patient not taking: Reported on 2023   Continuous Blood Gluc  (FREESTYLE DIMITRIOS READER) BEBE   No No   Si Device continuous prn (replace annually if needed) Use to read blood glucose levels per  instructions   Patient not taking: Reported on 2023   Continuous Blood Gluc Sensor (FREESTYLE DIMITRIOS 14 DAY SENSOR) [facility]   No No   Sig: Change every 14 days.   Patient not taking: Reported on 2023   Continuous Blood Gluc Sensor (FREESTYLE DIMITRIOS 3 SENSOR) [facility]   No No   Si Device continuous prn (change every 14 days)   STATIN NOT PRESCRIBED (INTENTIONAL)   Yes No   Sig: Please choose reason not prescribed from choices below.   XIFAXAN 550 MG TABS tablet   Yes No    Sig: Take 1 tablet by mouth 2 times daily   acetaminophen (TYLENOL) 325 MG tablet   Yes No   Sig: Take 2 tablets (650 mg) by mouth every 8 hours as needed for mild pain or other (and adjunct with moderate or severe pain or per patient request)   blood glucose (NO BRAND SPECIFIED) test strip   No No   Sig: Use to test blood sugar 1 times daily or as directed. Glucocard Vital   celecoxib (CELEBREX) 200 MG capsule   Yes No   Sig: TK 1 C PO D   escitalopram (LEXAPRO) 10 MG tablet   No No   Sig: Take 1 tablet (10 mg) by mouth daily   insulin aspart (NOVOLOG PEN) 100 UNIT/ML pen   No No   Sig: Inject 3-6 Units Subcutaneous 3 times daily (with meals)   insulin glargine (LANTUS PEN) 100 UNIT/ML pen   No No   Sig: Inject 40 Units Subcutaneous daily   insulin lispro (HUMALOG VIAL) 100 UNIT/ML vial   No No   Sig: Use with VGo patch device, total daily dose approx 75 units   insulin pen needle (B-D U/F) 31G X 5 MM miscellaneous   No No   Sig: Use 1 pen needles daily or as directed.   insulin pen needle (B-D U/F) 31G X 5 MM miscellaneous   No No   Sig: Use 4 pen needles daily or as directed.   lactulose 20 GM/30ML solution   No No   Sig: Take 30 mLs (20 g) by mouth 3 times daily   Patient taking differently: Take 10 mLs by mouth daily   levothyroxine (SYNTHROID/LEVOTHROID) 100 MCG tablet   No No   Sig: Take 1 tablet (100 mcg) by mouth daily   metFORMIN (GLUCOPHAGE-XR) 750 MG 24 hr tablet   No No   Sig: Take 2-tablets by mouth daily as directed   omeprazole (PRILOSEC) 10 MG DR capsule   Yes No   Sig: Take 10 mg by mouth daily   Patient not taking: Reported on 6/8/2023   semaglutide (OZEMPIC) 2 MG/1.5ML SOPN pen   No No   Sig: Inject 0.5 mg weekly   thyroid (ARMOUR) 15 MG tablet   No No   Sig: Take 1 tablet (15 mg) by mouth daily   thyroid (ARMOUR) 60 MG tablet   No No   Sig: Take 1-tablet by mouth daily as directed   valACYclovir (VALTREX) 500 MG tablet   Yes No   Sig: Take 500 mg by mouth daily as needed   wearable insulin  delivery device (V-GO 40) kit   No No   Sig: CHANGE EVERY 24 HOURS      Facility-Administered Medications: None           Physical Exam   Vital Signs: Temp: 98.7  F (37.1  C) Temp src: Oral BP: (!) 140/77 Pulse: 92   Resp: 19 SpO2: 94 % O2 Device: Oxymask Oxygen Delivery: 2 LPM  Weight: 217 lbs 9.5 oz    Constitutional: Drowsy, cooperative, no apparent distress  Respiratory: decreased breath sounds at base, otherwise clear anteriorly  Cardiovascular: Regular rate and rhythm, normal S1 and S2, and no murmur noted  GI: Normal bowel sounds, soft, non-distended, non-tender  Skin/Integumen: No rashes, no cyanosis, no edema  Other:  Right sided chest tube in place, no crepitus. CT with yellow fluid draining    Medical Decision Making       75 MINUTES SPENT BY ME on the date of service doing chart review, history, exam, documentation & further activities per the note.      Data     I have personally reviewed the following data over the past 24 hrs:    4.0  \   13.6   / 99 (L)     141 106 10.2 /  100 (H)   3.7 23 0.4 (L) \     ALT: 22 AST: 48 (H) AP: 198 (H) TBILI: 1.3 (H)   ALB: 3.5 TOT PROTEIN: 7.1 LIPASE: N/A     INR:  1.17 (H) PTT:  N/A   D-dimer:  N/A Fibrinogen:  N/A     Ferritin:  N/A % Retic:  N/A LDH:  196     Imaging results reviewed over the past 24 hrs:   Recent Results (from the past 24 hour(s))   XR Chest Port 1 View    Narrative    EXAM: XR CHEST PORT 1 VIEW  LOCATION: [facility]  DATE: 8/11/2023    INDICATION: possible pnuemothorax  COMPARISON: 10/28/2014.      Impression    IMPRESSION: Large right pleural effusion and near complete collapse of the right lung, new. Left lung clear. No pneumothorax. Normal heart size.   CT Chest Pulmonary Embolism w Contrast    Narrative    EXAM: CT CHEST PULMONARY EMBOLISM W CONTRAST  LOCATION: [facility]  DATE: 8/11/2023    INDICATION: sob,  fluid right side,  ?mass or pneumonia with effusion;  r o pe  COMPARISON:  None.  TECHNIQUE: CT chest pulmonary angiogram during arterial phase injection of IV contrast. Multiplanar reformats and MIP reconstructions were performed. Dose reduction techniques were used.   CONTRAST: 76mL Isovue 370    FINDINGS:  ANGIOGRAM CHEST: Pulmonary arteries are normal caliber and negative for pulmonary emboli. Thoracic aorta is negative for dissection. No CT evidence of right heart strain.    LUNGS AND PLEURA: Large right effusion with atelectasis. Left lung grossly clear.    MEDIASTINUM/AXILLAE: Heart is normal in size. No mediastinal, axillary, or hilar adenopathy.    CORONARY ARTERY CALCIFICATION: None.    UPPER ABDOMEN: Normal.    MUSCULOSKELETAL: Degenerative changes of the spine.      Impression    IMPRESSION:  1.  No pulmonary embolism.  2.  Large right effusion with associated atelectasis.   XR Chest Port 1 View    Narrative    EXAM: XR CHEST PORT 1 VIEW  LOCATION: [facility]  DATE: 8/11/2023    INDICATION: post chest tube placement  COMPARISON: 08/11/2023      Impression    IMPRESSION: Right chest tube is noted at the lung bases. No evidence of pneumothorax. Heart is mildly enlarged, unchanged. Small right effusion with atelectasis. Increased airspace consolidation within the right mid and lower lung which may related to   edema or possible pneumonia. Suggestion of mild interstitial edema. Overall, effusion has decreased when compared to the prior examination.   XR Chest 1 View    Narrative    EXAM: XR CHEST 1 VIEW  LOCATION: [facility]  DATE: 8/11/2023    INDICATION: post chest tube  COMPARISON: Earlier portable AP view, 1945 hours.       Impression    IMPRESSION: Lateral view compromised by patient's size. The right-sided chest tube is seen on frontal view is seen to enter from the anterior right side of low chest with tip situated in paraspinal region.

## 2023-08-12 NOTE — PLAN OF CARE
Goal Outcome Evaluation: Improving  Pt  alert and oriented x4 though sleepy most of shift; arouses to voice. CT draining sero-sang to sanguinous drainage overnight, output slowed to 300 ml's in 3 hour time frame.  Tiny, intermittent airleak noted.Unable to void via purewick; bladder scanned for 602 ml's, str cathed for 700 ml's. New purewick replaced. Tylenol given x1 for CT site pain with good effect. 02 at 3 L/Oxymask.

## 2023-08-12 NOTE — PROGRESS NOTES
THORACIC SURGERY    Reviewed    Admitted with dyspnea  Large right pleural effusion    Fluid is transudate     micro and cytology pending    Transudative right pleural effusion most likely secondary to liver dysfunction    Medical management    CT suction for now  Daily CXR    Discussed    TATYANA PETERSON MD [facility] ONCOLOGY THORACIC SURGERY  CELL:  [phone]  OFFICE: [phone]

## 2023-08-12 NOTE — ED PROVIDER NOTES
History     Chief Complaint:  Shortness of Breath       HPI   Sudheer Montiel is a 58 year old female shortness of breath since this morning.  She felt okay yesterday before waking up this morning and noticed the shortness of breath and cough when she took the dogs for a walk she denies trauma fevers or chills.  She denies chest pain.  She is a smoker but has never been diagnosed to COPD.  Patient transported by paramedics who could not hear any breath sounds on the right side.  Pulse ox mid 80s on paramedic arrival      Independent Historian:   None    Review of External Notes:   None      Medications:    acetaminophen (TYLENOL) 325 MG tablet  ASPIRIN 81 PO  blood glucose (NO BRAND SPECIFIED) test strip  celecoxib (CELEBREX) 200 MG capsule  Continuous Blood Gluc  (FREESTYLE DIMITRIOS 14 DAY READER) BEBE  Continuous Blood Gluc  (FREESTYLE DIMITRIOS READER) BEBE  Continuous Blood Gluc Sensor (FREESTYLE DIMITRIOS 14 DAY SENSOR) MISC  Continuous Blood Gluc Sensor (FREESTYLE DIMITRIOS 3 SENSOR) MISC  escitalopram (LEXAPRO) 10 MG tablet  insulin aspart (NOVOLOG PEN) 100 UNIT/ML pen  insulin glargine (LANTUS PEN) 100 UNIT/ML pen  insulin lispro (HUMALOG VIAL) 100 UNIT/ML vial  insulin pen needle (B-D U/F) 31G X 5 MM miscellaneous  insulin pen needle (B-D U/F) 31G X 5 MM miscellaneous  lactulose 20 GM/30ML solution  levothyroxine (SYNTHROID/LEVOTHROID) 100 MCG tablet  metFORMIN (GLUCOPHAGE-XR) 750 MG 24 hr tablet  omeprazole (PRILOSEC) 10 MG DR capsule  semaglutide (OZEMPIC) 2 MG/1.5ML SOPN pen  STATIN NOT PRESCRIBED (INTENTIONAL)  thyroid (ARMOUR) 15 MG tablet  thyroid (ARMOUR) 60 MG tablet  valACYclovir (VALTREX) 500 MG tablet  wearable insulin delivery device (V-GO 40) kit  XIFAXAN 550 MG TABS tablet        Past Medical History:    Past Medical History:   Diagnosis Date    Abnormal liver CT     Hypothyroid 80's    Necrotizing fasciitis (H)     Primary osteoarthritis of both knees     Soft tissue infection      "Subcortical infarction (H)     Type 2 diabetes mellitus (H)        Past Surgical History:    Past Surgical History:   Procedure Laterality Date     SECTION      COLONOSCOPY N/A 10/21/2015    Procedure: COLONOSCOPY;  Surgeon: Jaky Arredondo MD;  Location:  GI    IRRIGATION AND DEBRIDEMENT ABDOMEN WASHOUT, COMBINED N/A 10/12/2018    Procedure: COMBINED IRRIGATION AND DEBRIDEMENT ABDOMEN WASHOUT;  Irrigation and Debridement of Lower Abdomen, removal of piece of mesh.;  Surgeon: Darlene Hogue MD;  Location: [facility] OR    marisol/bso  2004    due to anemia    ZZC REPAIR CRUCIATE LIGAMENT,KNEE  1986        Physical Exam   Patient Vitals for the past 24 hrs:   BP Temp Temp src Pulse Resp SpO2 Height Weight   23 2100 129/70 -- -- 87 15 95 % -- --   23 2045 125/68 -- -- 85 17 95 % -- --   23 2030 124/70 -- -- 85 (!) 32 96 % -- --   23 127/69 -- -- 85 13 -- -- --   23 1930 116/67 -- -- 85 21 97 % -- --   23 1915 136/74 -- -- 79 27 92 % -- --   23 1900 (!) 131/91 -- -- 72 28 95 % -- --   23 1845 (!) 159/93 -- -- 74 26 95 % -- --   23 1830 (!) 172/97 -- -- 70 23 95 % -- --   23 1815 (!) 166/80 -- -- 69 23 96 % -- --   23 1800 139/82 -- -- 70 25 96 % -- --   23 1743 (!) 141/70 -- -- 68 26 97 % -- --   23 1728 -- -- -- 75 22 94 % -- --   23 1715 (!) 149/82 -- -- 78 22 95 % -- --   23 1659 138/72 -- -- 68 19 94 % -- --   23 -- -- -- -- -- -- 1.6 m (5' 3\") --   23 1646 -- -- -- 72 -- -- -- --   23 -- -- -- -- -- -- -- 98.7 kg (217 lb 9.5 oz)   23 164 (!) 149/70 98.7  F (37.1  C) Oral -- 22 94 % -- --        Physical Exam  General: Middle-aged white female sitting tachypneic  HEENT: Oropharynx clear no JVD  Lungs: Absent breath sounds right side posterior faint breath sounds upper anterior right lung  Cardiovascular: Regular rapid no murmur  GI: Abdomen soft no tenderness or " masses  Musculoskeletal: Trace edema  Neuro: Awake alert and appropriate normal motor sensation and coordination gait not tested  Skin: No rash  Psych: No overt psychosis    Emergency Department Course   ECG  Normal sinus rhythm no acute ischemic or hypertrophic changes left axis deviation  Rate 71 bpm. MO interval 154 ms. QRS duration 90 ms. QT/QTc 424/460 ms. P-R-T axes 27 2367.     Imaging:  XR Chest 1 View   Final Result   IMPRESSION: Lateral view compromised by patient's size. The right-sided chest tube is seen on frontal view is seen to enter from the anterior right side of low chest with tip situated in paraspinal region.      XR Chest Port 1 View   Final Result   IMPRESSION: Right chest tube is noted at the lung bases. No evidence of pneumothorax. Heart is mildly enlarged, unchanged. Small right effusion with atelectasis. Increased airspace consolidation within the right mid and lower lung which may related to    edema or possible pneumonia. Suggestion of mild interstitial edema. Overall, effusion has decreased when compared to the prior examination.      CT Chest Pulmonary Embolism w Contrast   Final Result   IMPRESSION:   1.  No pulmonary embolism.   2.  Large right effusion with associated atelectasis.      XR Chest Port 1 View   Final Result   IMPRESSION: Large right pleural effusion and near complete collapse of the right lung, new. Left lung clear. No pneumothorax. Normal heart size.         Report per myself    Laboratory:  Labs Ordered and Resulted from Time of ED Arrival to Time of ED Departure   COMPREHENSIVE METABOLIC PANEL - Abnormal       Result Value    Sodium 141      Potassium 3.7      Chloride 106      Carbon Dioxide (CO2) 23      Anion Gap 12      Urea Nitrogen 10.2      Creatinine 0.46 (*)     Calcium 8.4 (*)     Glucose 105 (*)     Alkaline Phosphatase 198 (*)     AST 48 (*)     ALT 22      Protein Total 7.1      Albumin 3.5      Bilirubin Total 1.3 (*)     GFR Estimate >90     CBC WITH  PLATELETS AND DIFFERENTIAL - Abnormal    WBC Count 4.0      RBC Count 4.31      Hemoglobin 13.6      Hematocrit 41.7      MCV 97      MCH 31.6      MCHC 32.6      RDW 15.0      Platelet Count 99 (*)     % Neutrophils 63      % Lymphocytes 21      % Monocytes 12      % Eosinophils 3      % Basophils 1      % Immature Granulocytes 0      NRBCs per 100 WBC 0      Absolute Neutrophils 2.6      Absolute Lymphocytes 0.8      Absolute Monocytes 0.5      Absolute Eosinophils 0.1      Absolute Basophils 0.0      Absolute Immature Granulocytes 0.0      Absolute NRBCs 0.0     ISTAT CREATININE POCT - Abnormal    Creatinine POCT 0.4 (*)     GFR, ESTIMATED POCT >60     INR - Abnormal    INR 1.17 (*)    LACTATE DEHYDROGENASE - Normal    Lactate Dehydrogenase 196     GLUCOSE FLUID    Glucose Fluid Source Chest      Glucose fluid 128     PROTEIN FLUID    Protein Fluid Source Chest      Protein Total Fluid 1.8     LACTATE DEHYDROGENASE FLUID    LD Fluid Source Chest      Lactate dehydrogenase fluid 79     CELL COUNT BODY FLUID    Color Yellow      Clarity Clear      Cell Count Fluid Source Chest      Total Nucleated Cells 630     DIFERENTIAL BODY FLUID    % Neutrophils 9      % Lymphocytes 42      % Monocyte/Macrophages 3      % Lining Cells 46     NON-GYNECOLOGIC CYTOLOGY   AEROBIC BACTERIAL CULTURE ROUTINE    Gram Stain Result No organisms seen     CELL COUNT WITH DIFFERENTIAL FLUID        Procedures   Thoracostomy  Risk benefits and alternatives discussed  Patient placed in supine position with right arm behind head and blankets under right side  Skin prepped with Betadine.  0.5% bupivacaine used for anesthesia using a #11 blade an incision was made at the fifth intercostal mid axillary line.  I initially placed a 24 Portuguese chest tube placing it over the above rib and superiorly and posteriorly however I got no fluid return.  I remove this and placed that new tube going straight in.  This time I got a tremendous amount of  transudative appearing fluid.  At this time we are up to over 2900 mL out.    Patient did receive Ativan for this procedure and tolerated it well    Emergency Department Course & Assessments:             Interventions:  Medications   iopamidol (ISOVUE-370) solution 76 mL (76 mLs Intravenous $Given 8/11/23 1706)   sodium chloride 0.9 % bag 500 mL for CT scan flush use (99 mLs Intravenous $Given 8/11/23 1707)   LORazepam (ATIVAN) injection 2 mg (2 mg Intravenous $Given 8/11/23 1906)   fentaNYL (PF) (SUBLIMAZE) injection 100 mcg (100 mcg Intravenous $Given 8/11/23 1933)        Assessments:  None    Independent Interpretation (X-rays, CTs, rhythm strip):  None    Consultations/Discussion of Management or Tests:  Dr. holloway       Social Determinants of Health affecting care:   None admit    Disposition:  Admit    Impression & Plan        Medical Decision Making:  Patient presented hypoxic tachypneic with diminished breath sounds on right side.  Chest x-ray however showed a large pleural effusion.  Patient remained stable on 15 L initially.  I discussed the patient with Dr. GISSEL MARCUS and decision was made to place a chest tube.  This was done with a large amount of transudative appearing fluid withdrawn.  Patient will be admitted for further evaluation and treatment.  She does not appear septic at this point and we will hold off on antibiotics.  She has much improved much more relaxed and is only on 4 L nasal cannula      Diagnosis:    ICD-10-CM    1. Pleural effusion on right  J90       2. Acute respiratory distress  R06.03                     Serg Ford MD  8/11/2023   Serg Ford MD Steinman, Randall Ira, MD  08/11/23 0358

## 2023-08-12 NOTE — ED NOTES
[facility]  ED Nurse Handoff Report    ED Chief complaint: Shortness of Breath      ED Diagnosis:   Final diagnoses:   Pleural effusion on right   Acute respiratory distress       Code Status: Full Code    Allergies:   Allergies   Allergen Reactions    Cephalexin     Morphine      Other reaction(s): Other  Irritability, disorientation    Penicillins Itching     face    Amoxicillin Rash and Itching    Cefprozil Rash    Cefprozil Rash    Ceftazidime Itching and Rash    Ciprofloxacin Rash    Ciprofloxacin Itching and Rash     Tolerates levaquin     Percocet [Oxycodone-Acetaminophen] Nausea and Vomiting       Patient Story:   58 year old female with large right sided pleural effusion, underwent placement of chest tube in ED with drainage of effusion. Arrived via EMS to ED with significant SOB, no cough, absent lung sounds on right. After chest tube placed had drainage of >2100cc fluid and continues to drain    Focused Assessment:    Neuro: Alert, oriented x 4  Respiratory:Lung sounds improved after chest tube   Cardiology:  no CP except at site of chest tube, NSR normal BP   Gastrointestinal: soft, non tender, non distended   Genitourinary/Renal:    Musculoskeletal: moves all extremities   Skin: Intact skin   Lines: 18 right arm, 20 ga left hand    Labs Ordered and Resulted from Time of ED Arrival to Time of ED Departure   COMPREHENSIVE METABOLIC PANEL - Abnormal       Result Value    Sodium 141      Potassium 3.7      Chloride 106      Carbon Dioxide (CO2) 23      Anion Gap 12      Urea Nitrogen 10.2      Creatinine 0.46 (*)     Calcium 8.4 (*)     Glucose 105 (*)     Alkaline Phosphatase 198 (*)     AST 48 (*)     ALT 22      Protein Total 7.1      Albumin 3.5      Bilirubin Total 1.3 (*)     GFR Estimate >90     CBC WITH PLATELETS AND DIFFERENTIAL - Abnormal    WBC Count 4.0      RBC Count 4.31      Hemoglobin 13.6      Hematocrit 41.7      MCV 97      MCH 31.6      MCHC 32.6      RDW 15.0       Platelet Count 99 (*)     % Neutrophils 63      % Lymphocytes 21      % Monocytes 12      % Eosinophils 3      % Basophils 1      % Immature Granulocytes 0      NRBCs per 100 WBC 0      Absolute Neutrophils 2.6      Absolute Lymphocytes 0.8      Absolute Monocytes 0.5      Absolute Eosinophils 0.1      Absolute Basophils 0.0      Absolute Immature Granulocytes 0.0      Absolute NRBCs 0.0     ISTAT CREATININE POCT - Abnormal    Creatinine POCT 0.4 (*)     GFR, ESTIMATED POCT >60     INR - Abnormal    INR 1.17 (*)    GLUCOSE FLUID   LACTATE DEHYDROGENASE   PROTEIN FLUID   LACTATE DEHYDROGENASE FLUID   CELL COUNT BODY FLUID   DIFERENTIAL BODY FLUID   NON-GYNECOLOGIC CYTOLOGY   AEROBIC BACTERIAL CULTURE ROUTINE   CELL COUNT WITH DIFFERENTIAL FLUID        XR Chest 1 View   Final Result   IMPRESSION: Lateral view compromised by patient's size. The right-sided chest tube is seen on frontal view is seen to enter from the anterior right side of low chest with tip situated in paraspinal region.      XR Chest Port 1 View   Final Result   IMPRESSION: Right chest tube is noted at the lung bases. No evidence of pneumothorax. Heart is mildly enlarged, unchanged. Small right effusion with atelectasis. Increased airspace consolidation within the right mid and lower lung which may related to    edema or possible pneumonia. Suggestion of mild interstitial edema. Overall, effusion has decreased when compared to the prior examination.      CT Chest Pulmonary Embolism w Contrast   Final Result   IMPRESSION:   1.  No pulmonary embolism.   2.  Large right effusion with associated atelectasis.      XR Chest Port 1 View   Final Result   IMPRESSION: Large right pleural effusion and near complete collapse of the right lung, new. Left lung clear. No pneumothorax. Normal heart size.            Treatments and/or interventions provided:     Chest tube placed right side lower chest. Dressing CDI, Drained >2100cc initially, continues to have some  output     Medications   iopamidol (ISOVUE-370) solution 76 mL (76 mLs Intravenous $Given 8/11/23 1706)   sodium chloride 0.9 % bag 500 mL for CT scan flush use (99 mLs Intravenous $Given 8/11/23 1707)   LORazepam (ATIVAN) injection 2 mg (2 mg Intravenous $Given 8/11/23 1906)   fentaNYL (PF) (SUBLIMAZE) injection 100 mcg (100 mcg Intravenous $Given 8/11/23 1933)        Patient's response to treatments and/or interventions:   Resting comfortably    To be done/followed up on inpatient unit:    See any in-patient orders    Does this patient have any cognitive concerns?: Short term memory loss    Activity level - Baseline/Home:    Independent    Activity Level - Current:     Stand with Assist    Patient's Preferred language: English     Needed?: No    Isolation: Contact   Infection: MRSA  Patient tested for COVID 19 prior to admission: NO    Bariatric?: No    Vital Signs:   Vitals:    08/11/23 1900 08/11/23 1915 08/11/23 1930 08/11/23 2000   BP: (!) 131/91 136/74 116/67 127/69   Pulse: 72 79 85 85   Resp: 28 27 21 13   Temp:       TempSrc:       SpO2: 95% 92% 97%    Weight:       Height:           Cardiac Rhythm:     Was the PSS-3 completed:   Yes  What interventions are required if any?               Family Comments: daughters at bedside  OBS brochure/video discussed/provided to patient/family: No              Name of person given brochure if not patient:              Relationship to patient:    For the majority of the shift this patient's behavior was Green.   Behavioral interventions performed were     ED NURSE PHONE NUMBER: [phone]

## 2023-08-12 NOTE — PHARMACY-ADMISSION MEDICATION HISTORY
Pharmacy Intern Admission Medication History    Admission medication history is complete. The information provided in this note is only as accurate as the sources available at the time of the update.    Medication reconciliation/reorder completed by provider prior to medication history? Yes    Information Source(s): Patient, Family member, and CareEverywhere/SureScripts via in-person    Pertinent Information:   - Patient is no longer using Vgo 40 wearable insulin device and has switched to Novolog and Lantus.   - She typically uses 4 units of Novolog with each meal but has instructions for 3 to 6 units.   - Patient states she typically uses Ozempic on Wednesdays but if she doesn't remember on Wednesday she will take it on Saturday.     Changes made to PTA medication list:  Added: None  Deleted: Humalog vial, V-Go 40 kit   Changed: Lactulose TID -> QD     Medication Affordability: Not including over the counter (OTC) medications, was there a time in the past 3 months when you did not take your medications as prescribed because of cost? No        Allergies reviewed with patient and updates made in EHR: yes- no updates made.     Medication History Completed By: Clotilde Jacobsen 8/12/2023 12:59 PM    Prior to Admission medications    Medication Sig Last Dose Taking? Auth Provider Long Term End Date   acetaminophen (TYLENOL) 325 MG tablet Take 2 tablets (650 mg) by mouth every 8 hours as needed for mild pain or other (and adjunct with moderate or severe pain or per patient request) Past Week at prn Yes Sameer Briggs MD     ASPIRIN 81 PO Take 81 mg by mouth daily 8/11/2023 at AM Yes Reported, Patient     blood glucose (NO BRAND SPECIFIED) test strip Use to test blood sugar 1 times daily or as directed. Glucocard Vital  Yes Kirk Christianson MD     celecoxib (CELEBREX) 200 MG capsule Take 200 mg by mouth daily 8/11/2023 at AM Yes Reported, Patient Yes    Continuous Blood Gluc Sensor (FREESTYLE DIMITRIOS 3 SENSOR) [facility] [facility]  Device continuous prn (change every 14 days)  Yes Kirk Christianson MD     escitalopram (LEXAPRO) 10 MG tablet Take 1 tablet (10 mg) by mouth daily 8/11/2023 at AM Yes Nic Harrington MD Yes    insulin aspart (NOVOLOG PEN) 100 UNIT/ML pen Inject 3-6 Units Subcutaneous 3 times daily (with meals) 8/11/2023 at afternoon meal Yes Kirk Christianson MD Yes    insulin glargine (LANTUS PEN) 100 UNIT/ML pen Inject 40 Units Subcutaneous daily 8/11/2023 at AM Yes Kirk Christianson MD Yes    insulin pen needle (B-D U/F) 31G X 5 MM miscellaneous Use 4 pen needles daily or as directed.  Yes Kirk Christianson MD     insulin pen needle (B-D U/F) 31G X 5 MM miscellaneous Use 1 pen needles daily or as directed.  Yes Kirk Christianson MD     lactulose 20 GM/30ML solution Take 30 mLs (20 g) by mouth 3 times daily  Patient taking differently: Take 20 g by mouth daily 8/11/2023 at AM Yes Nic Harrington MD     levothyroxine (SYNTHROID/LEVOTHROID) 100 MCG tablet Take 1 tablet (100 mcg) by mouth daily 8/11/2023 at AM Yes Kirk Christianson MD Yes    metFORMIN (GLUCOPHAGE-XR) 750 MG 24 hr tablet Take 2-tablets by mouth daily as directed 8/11/2023 at AM Yes Kirk Christianson MD Yes    omeprazole (PRILOSEC) 10 MG DR capsule Take 10 mg by mouth daily  at PRN Yes Reported, Patient     semaglutide (OZEMPIC) 2 MG/1.5ML SOPN pen Inject 0.5 mg weekly  Patient taking differently: Inject 0.5 mg Subcutaneous every 7 days Wednesday Past Month at 7/26 Yes Kirk Christianson MD     STATIN NOT PRESCRIBED (INTENTIONAL) Please choose reason not prescribed from choices below.  Yes Pawan Burns PA-C Yes    thyroid (ARMOUR) 15 MG tablet Take 1 tablet (15 mg) by mouth daily 8/11/2023 at AM Yes Kirk Christianson MD Yes    thyroid (ARMOUR) 60 MG tablet Take 1-tablet by mouth daily as directed 8/11/2023 at AM Yes Kirk Christianson MD Yes    valACYclovir (VALTREX) 500 MG tablet Take 500 mg by mouth daily as needed  at PRN Yes Reported,  Patient Yes    XIFAXAN 550 MG TABS tablet Take 1 tablet by mouth 2 times daily 8/11/2023 at AM Yes Reported, Patient

## 2023-08-13 ENCOUNTER — APPOINTMENT (OUTPATIENT)
Dept: PHYSICAL THERAPY | Facility: CLINIC | Age: 58
End: 2023-08-13
Payer: COMMERCIAL

## 2023-08-13 ENCOUNTER — APPOINTMENT (OUTPATIENT)
Dept: GENERAL RADIOLOGY | Facility: CLINIC | Age: 58
End: 2023-08-13
Attending: THORACIC SURGERY (CARDIOTHORACIC VASCULAR SURGERY)
Payer: COMMERCIAL

## 2023-08-13 LAB
ALBUMIN SERPL BCG-MCNC: 3.1 G/DL (ref 3.5–5.2)
ALP SERPL-CCNC: 177 U/L (ref 35–104)
ALT SERPL W P-5'-P-CCNC: 19 U/L (ref 0–50)
AST SERPL W P-5'-P-CCNC: 43 U/L (ref 0–45)
BILIRUB DIRECT SERPL-MCNC: 0.52 MG/DL (ref 0–0.3)
BILIRUB SERPL-MCNC: 1.5 MG/DL
GLUCOSE BLDC GLUCOMTR-MCNC: 113 MG/DL (ref 70–99)
GLUCOSE BLDC GLUCOMTR-MCNC: 116 MG/DL (ref 70–99)
GLUCOSE BLDC GLUCOMTR-MCNC: 124 MG/DL (ref 70–99)
GLUCOSE BLDC GLUCOMTR-MCNC: 126 MG/DL (ref 70–99)
GLUCOSE BLDC GLUCOMTR-MCNC: 148 MG/DL (ref 70–99)
MAGNESIUM SERPL-MCNC: 1.8 MG/DL (ref 1.7–2.3)
POTASSIUM SERPL-SCNC: 3.8 MMOL/L (ref 3.4–5.3)
PROT SERPL-MCNC: 6.6 G/DL (ref 6.4–8.3)
RADIOLOGIST FLAGS: ABNORMAL

## 2023-08-13 PROCEDURE — 99232 SBSQ HOSP IP/OBS MODERATE 35: CPT | Performed by: HOSPITALIST

## 2023-08-13 PROCEDURE — 250N000013 HC RX MED GY IP 250 OP 250 PS 637: Performed by: HOSPITALIST

## 2023-08-13 PROCEDURE — 84132 ASSAY OF SERUM POTASSIUM: CPT | Performed by: HOSPITALIST

## 2023-08-13 PROCEDURE — 120N000001 HC R&B MED SURG/OB

## 2023-08-13 PROCEDURE — 71045 X-RAY EXAM CHEST 1 VIEW: CPT

## 2023-08-13 PROCEDURE — 36415 COLL VENOUS BLD VENIPUNCTURE: CPT | Performed by: HOSPITALIST

## 2023-08-13 PROCEDURE — 82248 BILIRUBIN DIRECT: CPT | Performed by: HOSPITALIST

## 2023-08-13 PROCEDURE — 83735 ASSAY OF MAGNESIUM: CPT | Performed by: HOSPITALIST

## 2023-08-13 PROCEDURE — 97110 THERAPEUTIC EXERCISES: CPT | Mod: GP

## 2023-08-13 RX ADMIN — LEVOTHYROXINE, LIOTHYRONINE 60 MG: 38; 9 TABLET ORAL at 10:54

## 2023-08-13 RX ADMIN — ESCITALOPRAM OXALATE 10 MG: 10 TABLET ORAL at 09:05

## 2023-08-13 RX ADMIN — LEVOTHYROXINE SODIUM 100 MCG: 100 TABLET ORAL at 09:05

## 2023-08-13 RX ADMIN — LEVOTHYROXINE, LIOTHYRONINE 15 MG: 9.5; 2.25 TABLET ORAL at 09:58

## 2023-08-13 RX ADMIN — ACETAMINOPHEN 975 MG: 325 TABLET, FILM COATED ORAL at 00:22

## 2023-08-13 RX ADMIN — ACETAMINOPHEN 975 MG: 325 TABLET, FILM COATED ORAL at 16:16

## 2023-08-13 RX ADMIN — RIFAXIMIN 550 MG: 550 TABLET ORAL at 16:16

## 2023-08-13 RX ADMIN — ACETAMINOPHEN 975 MG: 325 TABLET, FILM COATED ORAL at 09:57

## 2023-08-13 RX ADMIN — INSULIN GLARGINE 30 UNITS: 100 INJECTION, SOLUTION SUBCUTANEOUS at 09:09

## 2023-08-13 RX ADMIN — RIFAXIMIN 550 MG: 550 TABLET ORAL at 09:57

## 2023-08-13 RX ADMIN — LACTULOSE 10 ML: 20 SOLUTION ORAL at 09:59

## 2023-08-13 RX ADMIN — INSULIN ASPART 1 UNITS: 100 INJECTION, SOLUTION INTRAVENOUS; SUBCUTANEOUS at 17:55

## 2023-08-13 ASSESSMENT — ACTIVITIES OF DAILY LIVING (ADL)
ADLS_ACUITY_SCORE: 45
ADLS_ACUITY_SCORE: 45
ADLS_ACUITY_SCORE: 37
ADLS_ACUITY_SCORE: 41
ADLS_ACUITY_SCORE: 45
ADLS_ACUITY_SCORE: 37
ADLS_ACUITY_SCORE: 37
ADLS_ACUITY_SCORE: 45
ADLS_ACUITY_SCORE: 41
ADLS_ACUITY_SCORE: 37

## 2023-08-13 NOTE — PROGRESS NOTES
[facility]    Medicine Progress Note - Hospitalist Service    Date of Admission:  8/11/2023    Assessment & Plan   Sudheer Montiel is a 58 year old female admitted on 8/11/2023 with large right sided pleural effusion, underwent placement of chest tube in ED with drainage of effusion     Large right sided pleural effusion s/p chest tube 8/11  Small R apical pneumothorax 8/13  Acute hypoxic respiratory failure  Initially required 15LPM for O2 sat in 80s, improved to 4LPM after chest tube placed. ~2800ml output after chest tube placement while patient still in ED. Effusion consistent with transudative based on fluid studies  *CT chest PE: no PE, large R effusion with associated atelectasis  *CXR: large R pleural effusion, near collapse of R Lung  *CXR post CT: CT at right lung base. No PTX. Small R effusion with atelectasis. Increased airspace consolidation at right mid and lower lung ? Edema vs PNA. Mild interstitial edema.  - thoracic surgery following and appreciated  - follow CXR - small apical ptx 8/13, thoracic aware  - keep CT to suction per surgery 8/13  - monitor CT drain output - still having significant serosang output  - Echo fairly WNL  - encourage IS  - wean O2 as able  - scheduled tylenol for chest tube related pain  - WBC WNL and no recent fever/chills/cough/congestion, transudative effusion -- continue holding off on abx for possible R sided consolidation, nontoxic and afebrile 8/13  - appears transudative and likely related to liver disease, follow cultures  - Care management consulted, patient is currently in process of trying to obtain a CADI waiver and has been living with her daughter     Diabetes mellitus  Follows with Dr Christianson of endocrine. Uses novolog 4U with small carb meal, 6U with large carb meal and sliding scale. Lantus in AM., metformin 1500mg in AM and ozempic weekly. Last A1C was 6.9 in June 2023.  - continue lantus 30 units daily given hasn't eaten much  -  "aspart 1 unit per 15 units carbs  - medium resistance sliding scale insulin  - continue regimen - sugars low to mid 100s as of 8/13     Elevated LFTs, variable/stable  Alk phos, tbili and AST similarly elevated as during June 2023 checks  - Monitor     Tobacco use  Smokes 4-6 cigarettes per day. Declines nicotine patch, wants to \"cold turkey it\"  - noted     Hypothyroidism  Resume PTA regimen     Anxiety  Stable. PTA lexapro resumed.     Obesity  Follow up with PCP        Diet: Moderate Consistent Carb (60 g CHO per Meal) Diet    DVT Prophylaxis: Pneumatic Compression Devices  Crystal Catheter: Not present  Lines: None     Cardiac Monitoring: None  Code Status: Full Code      Clinically Significant Risk Factors          # Hypocalcemia: Lowest Ca = 8.3 mg/dL in last 2 days, will monitor and replace as appropriate      # Coagulation Defect: INR = 1.17 (Ref range: 0.85 - 1.15) and/or PTT = N/A, will monitor for bleeding  # Thrombocytopenia: Lowest platelets = 99 in last 2 days, will monitor for bleeding         # DMII: A1C = 6.9 % (Ref range: 0.0 - 5.6 %) within past 6 months, PRESENT ON ADMISSION  # Severe Obesity: Estimated body mass index is 46.84 kg/m  as calculated from the following:    Height as of this encounter: 1.524 m (5').    Weight as of this encounter: 108.8 kg (239 lb 13.8 oz)., PRESENT ON ADMISSION          Disposition Plan      Expected Discharge Date: 08/15/2023                  Roger Archer MD  Hospitalist Service  [facility]  Securely message with [employer] (more info)  Text page via [employer] Paging/Directory   ______________________________________________________________________    Interval History   Seen and examined midday. Breathing stable from yesterday, no new pain. No fevers or chills. Moving ext. Denies abdo pain or new distention.  Appreciate Dr Rosario assistance    Physical Exam   Vital Signs: Temp: 98.1  F (36.7  C) Temp src: Oral BP: 126/74 Pulse: 71   Resp: 18 " SpO2: 94 % O2 Device: None (Room air) Oxygen Delivery: 1.5 LPM  Weight: 239 lbs 13.77 oz    Gen: NAD, pleasant  HEENT: EOMI, MMM  Resp: no focal crackles,  no wheezes, no increased work of resp, slightly diminished BS in R base  CV: S1S2 heard, reg rhythm, reg rate  Abdo: soft, nontender, distended/full but not tense, bowel sounds present  Ext: calves nontender, well perfused  Neuro: aa, conversant, moving all ext, CN grossly intact, no facial asymmetry      Medical Decision Making       38 MINUTES SPENT BY ME on the date of service doing chart review, history, exam, documentation & further activities per the note.      Data     I have personally reviewed the following data over the past 24 hrs:    N/A  \   N/A   / N/A     N/A N/A N/A /  116 (H)   3.8 N/A N/A \     ALT: 19 AST: 43 AP: 177 (H) TBILI: 1.5 (H)   ALB: 3.1 (L) TOT PROTEIN: 6.6 LIPASE: N/A

## 2023-08-13 NOTE — PROGRESS NOTES
THORACIC SURGERY    High CT output serous  1390 ml last 24 hrs    CXR: r pleural space well drained, small apical air space    Continue CT suction for now    Echocardiogram NL    Optimize medical management.  R/O liver dysfunction  No surgical intervention indicated at this time    TAYTANA PETERSON MD [facility] ONCOLOGY THORACIC SURGERY  CELL:  [phone]  OFFICE: [phone]

## 2023-08-13 NOTE — CARE PLAN
Orientations: A&0 x4.   Vitals/Pain: VSS. LS diminished. Pain managed with tylenol.   Lines/Drains: 2 PIV SL. Chest tube -20 suction, no air leak.  Skin/Wounds: Incision wound UTV.    GI/: Voids with out difficulty. No BM.  Labs: Abnormal/Trends, Electrolyte Replacement- K, Mag protocol.   Ambulation/Assist: SBA  with gait belt & walker.   Sleep Quality: Good  Plan: Thoracic surgery following.

## 2023-08-13 NOTE — PROVIDER NOTIFICATION
[facility] called regarding patient's AM chest xray results - New small right apical pneumothorax noted. Has 1 chest tube to suction, no air leak noted. Notified Dr. Rosario of xray results. No new orders at this time.

## 2023-08-13 NOTE — PLAN OF CARE
[id number]  Orientations: 4  Vitals/Pain: VSS on RA to 1L NC  Pain managed with tylenol  Tele: none  Lines/Drains: PIV x2 SL  Right side chest tube to -20 suction: 280ml serous output   Skin/Wounds: WDL  GI/: adequate UO, no BM  Labs: Abnormal/Trends, Electrolyte Replacement- K and Mg recheck in AM  Ambulation/Assist: x1 GB walker  Plan: chest tubes to remain

## 2023-08-14 ENCOUNTER — APPOINTMENT (OUTPATIENT)
Dept: PHYSICAL THERAPY | Facility: CLINIC | Age: 58
End: 2023-08-14
Payer: COMMERCIAL

## 2023-08-14 ENCOUNTER — APPOINTMENT (OUTPATIENT)
Dept: GENERAL RADIOLOGY | Facility: CLINIC | Age: 58
End: 2023-08-14
Attending: THORACIC SURGERY (CARDIOTHORACIC VASCULAR SURGERY)
Payer: COMMERCIAL

## 2023-08-14 LAB
GLUCOSE BLDC GLUCOMTR-MCNC: 106 MG/DL (ref 70–99)
GLUCOSE BLDC GLUCOMTR-MCNC: 107 MG/DL (ref 70–99)
GLUCOSE BLDC GLUCOMTR-MCNC: 125 MG/DL (ref 70–99)
GLUCOSE BLDC GLUCOMTR-MCNC: 177 MG/DL (ref 70–99)
GLUCOSE BLDC GLUCOMTR-MCNC: 178 MG/DL (ref 70–99)
MAGNESIUM SERPL-MCNC: 1.7 MG/DL (ref 1.7–2.3)
POTASSIUM SERPL-SCNC: 3.3 MMOL/L (ref 3.4–5.3)
POTASSIUM SERPL-SCNC: 3.6 MMOL/L (ref 3.4–5.3)

## 2023-08-14 PROCEDURE — 83735 ASSAY OF MAGNESIUM: CPT | Performed by: HOSPITALIST

## 2023-08-14 PROCEDURE — 36415 COLL VENOUS BLD VENIPUNCTURE: CPT | Performed by: HOSPITALIST

## 2023-08-14 PROCEDURE — 97116 GAIT TRAINING THERAPY: CPT | Mod: GP

## 2023-08-14 PROCEDURE — 71045 X-RAY EXAM CHEST 1 VIEW: CPT

## 2023-08-14 PROCEDURE — 99233 SBSQ HOSP IP/OBS HIGH 50: CPT | Performed by: HOSPITALIST

## 2023-08-14 PROCEDURE — 84132 ASSAY OF SERUM POTASSIUM: CPT | Performed by: HOSPITALIST

## 2023-08-14 PROCEDURE — 250N000013 HC RX MED GY IP 250 OP 250 PS 637: Performed by: HOSPITALIST

## 2023-08-14 PROCEDURE — 97750 PHYSICAL PERFORMANCE TEST: CPT | Mod: GP

## 2023-08-14 PROCEDURE — 120N000001 HC R&B MED SURG/OB

## 2023-08-14 RX ORDER — LACTULOSE 10 G/15ML
20 SOLUTION ORAL 3 TIMES DAILY PRN
Status: DISCONTINUED | OUTPATIENT
Start: 2023-08-14 | End: 2023-08-18

## 2023-08-14 RX ORDER — GINSENG 100 MG
CAPSULE ORAL DAILY PRN
Status: COMPLETED | OUTPATIENT
Start: 2023-08-14 | End: 2023-08-14

## 2023-08-14 RX ORDER — POTASSIUM CHLORIDE 1500 MG/1
40 TABLET, EXTENDED RELEASE ORAL ONCE
Status: COMPLETED | OUTPATIENT
Start: 2023-08-14 | End: 2023-08-14

## 2023-08-14 RX ADMIN — LEVOTHYROXINE SODIUM 100 MCG: 100 TABLET ORAL at 08:26

## 2023-08-14 RX ADMIN — INSULIN GLARGINE 30 UNITS: 100 INJECTION, SOLUTION SUBCUTANEOUS at 08:29

## 2023-08-14 RX ADMIN — ACETAMINOPHEN 975 MG: 325 TABLET, FILM COATED ORAL at 08:25

## 2023-08-14 RX ADMIN — ESCITALOPRAM OXALATE 10 MG: 10 TABLET ORAL at 08:26

## 2023-08-14 RX ADMIN — LACTULOSE 10 ML: 20 SOLUTION ORAL at 08:25

## 2023-08-14 RX ADMIN — LEVOTHYROXINE, LIOTHYRONINE 60 MG: 38; 9 TABLET ORAL at 08:26

## 2023-08-14 RX ADMIN — POTASSIUM CHLORIDE 40 MEQ: 1500 TABLET, EXTENDED RELEASE ORAL at 10:38

## 2023-08-14 RX ADMIN — INSULIN ASPART 1 UNITS: 100 INJECTION, SOLUTION INTRAVENOUS; SUBCUTANEOUS at 12:00

## 2023-08-14 RX ADMIN — SENNOSIDES AND DOCUSATE SODIUM 1 TABLET: 50; 8.6 TABLET ORAL at 14:23

## 2023-08-14 RX ADMIN — RIFAXIMIN 550 MG: 550 TABLET ORAL at 16:04

## 2023-08-14 RX ADMIN — ACETAMINOPHEN 975 MG: 325 TABLET, FILM COATED ORAL at 00:01

## 2023-08-14 RX ADMIN — RIFAXIMIN 550 MG: 550 TABLET ORAL at 08:26

## 2023-08-14 RX ADMIN — LEVOTHYROXINE, LIOTHYRONINE 15 MG: 9.5; 2.25 TABLET ORAL at 08:25

## 2023-08-14 RX ADMIN — ACETAMINOPHEN 975 MG: 325 TABLET, FILM COATED ORAL at 16:04

## 2023-08-14 RX ADMIN — LACTULOSE 20 G: 20 SOLUTION ORAL at 14:23

## 2023-08-14 ASSESSMENT — ACTIVITIES OF DAILY LIVING (ADL)
ADLS_ACUITY_SCORE: 42
ADLS_ACUITY_SCORE: 41
ADLS_ACUITY_SCORE: 42
ADLS_ACUITY_SCORE: 41
ADLS_ACUITY_SCORE: 41
ADLS_ACUITY_SCORE: 42
ADLS_ACUITY_SCORE: 41
ADLS_ACUITY_SCORE: 42
ADLS_ACUITY_SCORE: 41
ADLS_ACUITY_SCORE: 41

## 2023-08-14 NOTE — CONSULTS
Care Management Initial Consult    General Information  Assessment completed with: Patient, Other (dtr, Cassie),    Type of CM/SW Visit: Initial Assessment    Primary Care Provider verified and updated as needed:     Readmission within the last 30 days: no previous admission in last 30 days      Reason for Consult: discharge planning  Advance Care Planning: Advance Care Planning Reviewed: no concerns identified          Communication Assessment  Patient's communication style: spoken language (English or Bilingual)             Cognitive  Cognitive/Neuro/Behavioral: WDL  Level of Consciousness: alert  Arousal Level: opens eyes spontaneously  Orientation: oriented x 4  Mood/Behavior: calm, cooperative  Best Language: 0 - No aphasia  Speech: logical, spontaneous, clear    Living Environment:   People in home: child(ashwini), adult (lives with adult child, Jessie, in Jessie's apt)     Current living Arrangements: apartment      Able to return to prior arrangements:  (Pt and dtr report that pt does not have assistance at home)       Family/Social Support:  Care provided by: self  Provides care for: no one, unable/limited ability to care for self  Marital Status:   Children          Description of Support System: Supportive, Involved    Support Assessment: Adequate family and caregiver support, Adequate social supports    Current Resources:   Patient receiving home care services:       Community Resources:    Equipment currently used at home: cane, straight  Supplies currently used at home:      Employment/Financial:  Employment Status:          Financial Concerns:             Does the patient's insurance plan have a 3 day qualifying hospital stay waiver?  No    Lifestyle & Psychosocial Needs:  Social Determinants of Health     Tobacco Use: High Risk (7/17/2023)    Patient History     Smoking Tobacco Use: Some Days     Smokeless Tobacco Use: Never     Passive Exposure: Not on file   Alcohol Use: Not on file   Financial  Resource Strain: Not on file   Food Insecurity: Not on file   Transportation Needs: Not on file   Physical Activity: Not on file   Stress: Not on file   Social Connections: Not on file   Intimate Partner Violence: Not on file   Depression: Not at risk (7/12/2023)    PHQ-2     PHQ-2 Score: 0   Recent Concern: Depression - At risk (5/11/2023)    PHQ-2     PHQ-2 Score: 4   Housing Stability: Not on file       Functional Status:  Prior to admission patient needed assistance:              Mental Health Status:          Chemical Dependency Status:                Values/Beliefs:  Spiritual, Cultural Beliefs, Buddhism Practices, Values that affect care:  (Undesignated)               Additional Information:    Pt was admitted on 8/11/23 for acute respiratory distress and pleural effusion on right.  PT assessed pt and recommends home with assistance and HC services.  Sw consult ordered for discharge planning.      Sw met with pt to discuss PT recommendations.  Pt confirms she currently resides with her dtr, Jessie, in Jessie's apartment.  Pt states that Jessie works FT and would not be home to provide assistance.  Pt states that her other dtr also works FT and pt would not have assistance throughout much of the day.  Pt reports concerns with this as she feels she could fall.  Sw explained that possibly TCU could be looked into but would like PT to reassess pt; pt agreeable.  Pt has not been to [facility] in [facility] past but would like referral sent to [facility] if determined to be eligible.  Pt mentioned that she has a CADI waiver assessment/appointment on 8/22/23 as living with dtr is temporary until she secures permanent housing.  Pt states that this is through [employer].  Pt asked that Sw speak with her daughters to further discuss as she reports cognitive impairment from a previous stroke.    Sw attempted to call dtr Jessie but was unable to leave a vm due to inbox being full.  Robi called Cassie and confirmed that pt resides with  Jessie in an apartment; can use elevator to get to 2nd floor apt unit.  Cassie confirms that there would not be anyone home to provide assistance to pt and she, too, has concerns about pt returning home.  Robi explained that possibly TCU could be looked into but would need to meet admission criteria.  Cassie is also aware that pt has CADI Waiver appt scheduled for 8/22/23 and understands that someone would need to contact [facility] to reschedule this if pt is not home by then to attend this appt. Cassie plans on reaching out to Jessie to further discuss pt needs and will call Sw back.      Sw connected with OT and OT placed request for PT to assess pt again as there is not an upcoming appt scheduled at this time.      Sw to continue to follow.    Update:  Robi received phone call from dtr, Jessie.  Jessie also confirms that pt discharging to her home would not be possible and pt needs TCU.  As conversation continued, Jessie explains that pt has been living with her and her roommate in a 2 bedroom apartment since May and the caretaking has been overwhelming/expensive for dtr.  Dtr was tearful during the conversation but ultimately stated that she can no longer care for pt and pt needs LTC, possibly within a [facility] unit; dtr agreeable to sending referrals to various facilities within [facility].  Robi provided validation and expressed understanding into dtr's perspective.  Dtr stated that she was told by [facility] workers that pt needs a CADI waiver for LTC funding given pt's age.  Pt has are [employer], a medical assistance policy.  Robi called [facility] to check on possible LTC funding with this MA policy; Vinicius reviewing referral.      Update:  Robi received vm from Sangeetha at [facility] stating that bed is not available and b/c of this she is unable to verify LTC benefits.  Sangeetha states in the past that this policy typically covers SNF/LTC but cannot confirm this as things change.  Robi sent referrals to [facility] and [facility] given LTC bed  availability being indicated in team email this AM.  Sw to continue to follow.    Fifi Arechiga, LICSW

## 2023-08-14 NOTE — CARE PLAN
Orientations: A&0 x4.   Vitals/Pain: VSS. LS diminished. Pain managed with tylenol.   Lines/Drains: 2 PIV SL. R Chest tube -20 suction, no air leak, no crepitus.   Skin/Wounds: Incision wound UTV.    GI/: Voids with out difficulty. No BM.  Labs: Abnormal/Trends, Electr olyte Replacement- K, Mag protocol.   Ambulation/Assist: SBA  with gait belt & walker.   Sleep Quality: Good  Plan: Thoracic surgery following.

## 2023-08-14 NOTE — PLAN OF CARE
Summary: [id number] large R sided pleural effusion chest tube placement  Orientation: A/Ox4  Activity Level: Ax1 GB/W  Fall Risk: yes  Behavior & Aggression Tool Color: green  Pain Management: denies  ABNL VS/O2: VSS on 1L NC  ABNL Lab/BG:  and  Diet: mod carb  Bowel/Bladder: continent of bladder, no BM this shift  Drains/Devices: R chest tube, R PIV saline locked  Tests/Procedures for next shift: 8/14/23 0830 PT  Anticipated DC date: pending improvement   Other Important Info: contact for MRSA.

## 2023-08-14 NOTE — PLAN OF CARE
Goal Outcome Evaluation:    Orientations: AOx4  Vitals/Pain: VSS, 1L NC, scheduled tylenol for pain   Lines/Drains: PIV SL, R chest tube to water seal, no air leak or crepitus, 400 mL serous output  Skin/Wounds: WDL  GI/: UOA, no BM, PRN senna given x1 and PRN lactulose given x1  Labs: Abnormal/Trends, Electrolyte Replacement- K and Mag rechecks in the morning   Ambulation/Assist: Assist of 1 GBW  Plan: Pt recommending TCU at discharge. Continue plan of care.

## 2023-08-14 NOTE — PROGRESS NOTES
[facility]    Medicine Progress Note - Hospitalist Service    Date of Admission:  8/11/2023    Assessment & Plan   Sudheer Montiel is a 58 year old female admitted on 8/11/2023 with large right sided pleural effusion, underwent placement of chest tube in ED with drainage of effusion     Large right sided pleural effusion s/p chest tube 8/11  Small R apical pneumothorax 8/13 - improving  Acute hypoxic respiratory failure - improving  Initially required 15LPM for O2 sat in 80s, improved to 4LPM after chest tube placed. ~2800ml output after chest tube placement while patient still in ED. Effusion consistent with transudative based on fluid studies  *CT chest PE: no PE, large R effusion with associated atelectasis  *CXR: large R pleural effusion, near collapse of R Lung  *CXR post CT: CT at right lung base. No PTX. Small R effusion with atelectasis. Increased airspace consolidation at right mid and lower lung ? Edema vs PNA. Mild interstitial edema.  - thoracic surgery following and appreciated  - Echo fairly WNL  - encourage IS  - wean O2 as able  - scheduled tylenol for chest tube related pain  - WBC WNL and no recent fever/chills/cough/congestion, transudative effusion -- continue holding off on abx for possible R sided consolidation, nontoxic and afebrile thru 8/14  - appears transudative and likely related to liver disease, follow cultures (NGTD)  - Care management consulted, patient is currently in process of trying to obtain a CADI waiver and has been living with her daughter  - 8/14 - follow CXR - small apical ptx appears improved, thoracic aware  - CT to water seal  - 8/14 - PTX improving, output: 790cc/last 24 hrs (prior day 1390cc/24hr), to water seal per thoracic surgery     Mild hypokalemia  K 3.3. Replacement protocol. Follow mag and replace also.    Diabetes mellitus  Follows with Dr Christianson of endocrine. Uses novolog 4U with small carb meal, 6U with large carb meal and sliding  "scale. Lantus in AM., metformin 1500mg in AM and ozempic weekly. Last A1C was 6.9 in June 2023.  - continue lantus 30 units daily given hasn't eaten much  - aspart 1 unit per 15 units carbs  - medium resistance sliding scale insulin  - continue regimen - sugars low to mid 100s as of 8/14     Elevated LFTs, variable/stable  Alk phos, tbili and AST similarly elevated as during June 2023 checks  - Monitor  - reports following with [facility] - Dr Nevin Chavez. They have asked me to update her - will send a message. Anticipate clinic follow up.     Tobacco use  Smokes 4-6 cigarettes per day. Declines nicotine patch, wants to \"cold turkey it\"  - noted     Hypothyroidism  Resume PTA regimen     Anxiety  Stable. PTA lexapro resumed.     Obesity  Follow up with PCP           Diet: Moderate Consistent Carb (60 g CHO per Meal) Diet    DVT Prophylaxis: Pneumatic Compression Devices and Ambulate every shift  Crystal Catheter: Not present  Lines: None     Cardiac Monitoring: None  Code Status: Full Code      Clinically Significant Risk Factors        # Hypokalemia: Lowest K = 3.3 mmol/L in last 2 days, will replace as needed       # Hypoalbuminemia: Lowest albumin = 3.1 g/dL at 8/13/2023  5:44 AM, will monitor as appropriate           # DMII: A1C = 6.9 % (Ref range: 0.0 - 5.6 %) within past 6 months, PRESENT ON ADMISSION  # Severe Obesity: Estimated body mass index is 46.84 kg/m  as calculated from the following:    Height as of this encounter: 1.524 m (5').    Weight as of this encounter: 108.8 kg (239 lb 13.8 oz)., PRESENT ON ADMISSION            Disposition Plan      Expected Discharge Date: 08/16/2023,  3:00 PM                Roger Archer MD  Hospitalist Service  [facility]  Securely message with [employer] (more info)  Text page via [facility] Paging/Directory   ______________________________________________________________________    Interval History   Patient seen and examined with family at bedside.  " Patient denies fevers, chills, coughing, or new pain.  Still some somewhat expected chest tube area pain.  Chest tube now to waterseal per thoracic surgery.    Physical Exam   Vital Signs: Temp: 98.2  F (36.8  C) Temp src: Oral BP: 126/87 Pulse: 72   Resp: 22 SpO2: 95 % O2 Device: Nasal cannula Oxygen Delivery: 1 LPM  Weight: 239 lbs 13.77 oz    Gen: NAD, pleasant  HEENT: EOMI, MMM  Resp: no focal crackles, slightly diminished in R base, no wheezes, no increased work of resp  CV: S1S2 heard, reg rhythm, reg rate  Abdo: soft, nontender, mildly distended but not tense, bowel sounds present  Ext: calves nontender, well perfused  Neuro: aa, conversant, moving ext, CN grossly intact, no facial asymmetry      Medical Decision Making       52 MINUTES SPENT BY ME on the date of service doing chart review, history, exam, documentation & further activities per the note.      Data     I have personally reviewed the following data over the past 24 hrs:    N/A  \   N/A   / N/A     N/A N/A N/A /  178 (H)   3.3 (L) N/A N/A \

## 2023-08-14 NOTE — PROGRESS NOTES
Thoracic Surgery:    /87 (BP Location: Right arm)   Pulse 72   Temp 98.2  F (36.8  C) (Oral)   Resp 22   Ht 1.524 m (5')   Wt 108.8 kg (239 lb 13.8 oz)   SpO2 95%   BMI 46.84 kg/m      CXR: tiny right apical PTX, improved from yesterday. Right chest tube in good position. No pleural effusion  CT: serous output, 780 ml over 24 hours    On 2 L NC-- I reduced it to 1 L    S: Not on home oxygen, feels her breathing is very good now- pain managed- discussed CXR and CT to water seal as next step.  O: CT: no erythema, no drainage around tube site. CT output serous, no bleeding, no air leak  P: Chest tube to water seal  IS  Daily PCXR  Cont to minitor output amounts    Magdalena Padilla PA-C with Dr. Serafin [facility]  Cell [phone]

## 2023-08-15 ENCOUNTER — APPOINTMENT (OUTPATIENT)
Dept: ULTRASOUND IMAGING | Facility: CLINIC | Age: 58
End: 2023-08-15
Attending: INTERNAL MEDICINE
Payer: COMMERCIAL

## 2023-08-15 ENCOUNTER — APPOINTMENT (OUTPATIENT)
Dept: GENERAL RADIOLOGY | Facility: CLINIC | Age: 58
End: 2023-08-15
Attending: THORACIC SURGERY (CARDIOTHORACIC VASCULAR SURGERY)
Payer: COMMERCIAL

## 2023-08-15 ENCOUNTER — APPOINTMENT (OUTPATIENT)
Dept: ULTRASOUND IMAGING | Facility: CLINIC | Age: 58
End: 2023-08-15
Attending: HOSPITALIST
Payer: COMMERCIAL

## 2023-08-15 LAB
ALBUMIN SERPL BCG-MCNC: 2.9 G/DL (ref 3.5–5.2)
ALP SERPL-CCNC: 173 U/L (ref 35–104)
ALT SERPL W P-5'-P-CCNC: 18 U/L (ref 0–50)
ANION GAP SERPL CALCULATED.3IONS-SCNC: 10 MMOL/L (ref 7–15)
AST SERPL W P-5'-P-CCNC: 41 U/L (ref 0–45)
BILIRUB DIRECT SERPL-MCNC: 0.38 MG/DL (ref 0–0.3)
BILIRUB SERPL-MCNC: 1.1 MG/DL
BUN SERPL-MCNC: 10.5 MG/DL (ref 8–23)
CALCIUM SERPL-MCNC: 8.6 MG/DL (ref 8.6–10)
CHLORIDE SERPL-SCNC: 103 MMOL/L (ref 98–107)
CREAT SERPL-MCNC: 0.52 MG/DL (ref 0.51–0.95)
DEPRECATED HCO3 PLAS-SCNC: 26 MMOL/L (ref 22–29)
ERYTHROCYTE [DISTWIDTH] IN BLOOD BY AUTOMATED COUNT: 14.8 % (ref 10–15)
GFR SERPL CREATININE-BSD FRML MDRD: >90 ML/MIN/1.73M2
GLUCOSE BLDC GLUCOMTR-MCNC: 112 MG/DL (ref 70–99)
GLUCOSE BLDC GLUCOMTR-MCNC: 113 MG/DL (ref 70–99)
GLUCOSE BLDC GLUCOMTR-MCNC: 115 MG/DL (ref 70–99)
GLUCOSE BLDC GLUCOMTR-MCNC: 121 MG/DL (ref 70–99)
GLUCOSE BLDC GLUCOMTR-MCNC: 140 MG/DL (ref 70–99)
GLUCOSE BLDC GLUCOMTR-MCNC: 152 MG/DL (ref 70–99)
GLUCOSE SERPL-MCNC: 120 MG/DL (ref 70–99)
HCT VFR BLD AUTO: 43.8 % (ref 35–47)
HGB BLD-MCNC: 14.5 G/DL (ref 11.7–15.7)
MAGNESIUM SERPL-MCNC: 1.9 MG/DL (ref 1.7–2.3)
MCH RBC QN AUTO: 31.7 PG (ref 26.5–33)
MCHC RBC AUTO-ENTMCNC: 33.1 G/DL (ref 31.5–36.5)
MCV RBC AUTO: 96 FL (ref 78–100)
PLATELET # BLD AUTO: 125 10E3/UL (ref 150–450)
POTASSIUM SERPL-SCNC: 3.7 MMOL/L (ref 3.4–5.3)
PROT SERPL-MCNC: 6.5 G/DL (ref 6.4–8.3)
RBC # BLD AUTO: 4.57 10E6/UL (ref 3.8–5.2)
SODIUM SERPL-SCNC: 139 MMOL/L (ref 136–145)
WBC # BLD AUTO: 4.7 10E3/UL (ref 4–11)

## 2023-08-15 PROCEDURE — 71045 X-RAY EXAM CHEST 1 VIEW: CPT

## 2023-08-15 PROCEDURE — 36415 COLL VENOUS BLD VENIPUNCTURE: CPT | Performed by: HOSPITALIST

## 2023-08-15 PROCEDURE — 99233 SBSQ HOSP IP/OBS HIGH 50: CPT | Performed by: HOSPITALIST

## 2023-08-15 PROCEDURE — 250N000013 HC RX MED GY IP 250 OP 250 PS 637: Performed by: HOSPITALIST

## 2023-08-15 PROCEDURE — 76705 ECHO EXAM OF ABDOMEN: CPT | Mod: XS

## 2023-08-15 PROCEDURE — 76705 ECHO EXAM OF ABDOMEN: CPT

## 2023-08-15 PROCEDURE — 82248 BILIRUBIN DIRECT: CPT | Performed by: HOSPITALIST

## 2023-08-15 PROCEDURE — 85027 COMPLETE CBC AUTOMATED: CPT | Performed by: HOSPITALIST

## 2023-08-15 PROCEDURE — 120N000001 HC R&B MED SURG/OB

## 2023-08-15 PROCEDURE — 80053 COMPREHEN METABOLIC PANEL: CPT | Performed by: HOSPITALIST

## 2023-08-15 PROCEDURE — 83735 ASSAY OF MAGNESIUM: CPT | Performed by: HOSPITALIST

## 2023-08-15 RX ORDER — FUROSEMIDE 20 MG
20 TABLET ORAL DAILY
Status: DISCONTINUED | OUTPATIENT
Start: 2023-08-15 | End: 2023-08-16

## 2023-08-15 RX ORDER — SPIRONOLACTONE 25 MG/1
50 TABLET ORAL DAILY
Status: DISCONTINUED | OUTPATIENT
Start: 2023-08-15 | End: 2023-08-16

## 2023-08-15 RX ADMIN — ESCITALOPRAM OXALATE 10 MG: 10 TABLET ORAL at 08:29

## 2023-08-15 RX ADMIN — LACTULOSE 20 G: 20 SOLUTION ORAL at 09:03

## 2023-08-15 RX ADMIN — INSULIN GLARGINE 30 UNITS: 100 INJECTION, SOLUTION SUBCUTANEOUS at 08:29

## 2023-08-15 RX ADMIN — LEVOTHYROXINE, LIOTHYRONINE 15 MG: 9.5; 2.25 TABLET ORAL at 08:29

## 2023-08-15 RX ADMIN — RIFAXIMIN 550 MG: 550 TABLET ORAL at 08:27

## 2023-08-15 RX ADMIN — LEVOTHYROXINE, LIOTHYRONINE 60 MG: 38; 9 TABLET ORAL at 08:28

## 2023-08-15 RX ADMIN — SENNOSIDES AND DOCUSATE SODIUM 2 TABLET: 50; 8.6 TABLET ORAL at 09:03

## 2023-08-15 RX ADMIN — ACETAMINOPHEN 975 MG: 325 TABLET, FILM COATED ORAL at 16:37

## 2023-08-15 RX ADMIN — ACETAMINOPHEN 975 MG: 325 TABLET, FILM COATED ORAL at 08:29

## 2023-08-15 RX ADMIN — SPIRONOLACTONE 50 MG: 25 TABLET ORAL at 12:42

## 2023-08-15 RX ADMIN — FUROSEMIDE 20 MG: 20 TABLET ORAL at 12:42

## 2023-08-15 RX ADMIN — RIFAXIMIN 550 MG: 550 TABLET ORAL at 16:37

## 2023-08-15 RX ADMIN — LEVOTHYROXINE SODIUM 100 MCG: 100 TABLET ORAL at 08:29

## 2023-08-15 RX ADMIN — ACETAMINOPHEN 975 MG: 325 TABLET, FILM COATED ORAL at 00:48

## 2023-08-15 ASSESSMENT — ACTIVITIES OF DAILY LIVING (ADL)
ADLS_ACUITY_SCORE: 42
ADLS_ACUITY_SCORE: 38
ADLS_ACUITY_SCORE: 42
ADLS_ACUITY_SCORE: 42
ADLS_ACUITY_SCORE: 38
ADLS_ACUITY_SCORE: 42
ADLS_ACUITY_SCORE: 38
ADLS_ACUITY_SCORE: 38
ADLS_ACUITY_SCORE: 42
ADLS_ACUITY_SCORE: 42

## 2023-08-15 NOTE — PROGRESS NOTES
Care Management Follow Up    Length of Stay (days): 4    Expected Discharge Date: 08/17/2023     Concerns to be Addressed:       Patient plan of care discussed at interdisciplinary rounds: Yes    Anticipated Discharge Disposition:  (TCU vs home with HC)     Anticipated Discharge Services:    Anticipated Discharge DME:      Patient/family educated on Medicare website which has current facility and service quality ratings:    Education Provided on the Discharge Plan:    Patient/Family in Agreement with the Plan:      Referrals Placed by CM/SW: Post Acute Facilities  Private pay costs discussed: Not applicable    Additional Information:    Per chart review all referrals sent yesterday declined for the following reasons:    [facility]:  cannot accept chest tube; pilo to inquire further   [facility]:  no beds available  [facility]:  no beds available    Sw sent referrals to [facility] and [facility].  Pilo to continue to follow.    Update:  Pilo spoke with [facility] to update them that pt's chest tube will be removed prior to discharge.  As conversation continued it was determined that they do not have any private female LTC beds at this time given pt's isolation status.  Per DOD, [facility] declined due to not having any beds; [facility] remains pending.  Pilo sent a referral to [facility] given their capacity for [facility] patients.  Sw to continue to follow.    Update:  Bed available at [facility] but pt's insurance needs to be verified for LTC benefits; waiting on return call back.    GEOVANNY WalkerSW

## 2023-08-15 NOTE — PROGRESS NOTES
[facility]    Medicine Progress Note - Hospitalist Service    Date of Admission:  8/11/2023    Assessment & Plan   Sudheer Montiel is a 58 year old female admitted on 8/11/2023 with large right sided pleural effusion, underwent placement of chest tube in ED with drainage of effusion     Large right sided pleural effusion s/p chest tube 8/11  Small R apical pneumothorax 8/13 - improving  Acute hypoxic respiratory failure - improving  Initially required 15LPM for O2 sat in 80s, improved to 4LPM after chest tube placed. ~2800ml output after chest tube placement while patient still in ED. Effusion consistent with transudative based on fluid studies  *CT chest PE: no PE, large R effusion with associated atelectasis  *CXR: large R pleural effusion, near collapse of R Lung  *CXR post CT: CT at right lung base. No PTX. Small R effusion with atelectasis. Increased airspace consolidation at right mid and lower lung ? Edema vs PNA. Mild interstitial edema.  - thoracic surgery following and appreciated  - Echo fairly WNL  - encourage IS  - wean O2 as able - on RA 8/15  - scheduled tylenol for chest tube related pain  - WBC WNL and no recent fever/chills/cough/congestion, transudative effusion -- continue holding off on abx for possible R sided consolidation, nontoxic and afebrile thru 8/14  - appears transudative and likely related to liver disease, follow cultures (NGTD)  - Care management consulted, patient is currently in process of trying to obtain a CADI waiver and has been living with her daughter  - 8/14 - follow CXR - small apical ptx appears improved, thoracic aware  - CT to water seal  - 8/14 - PTX improving, output: 790cc/last 24 hrs (prior day 1390cc/24hr), to water seal per thoracic surgery. Messaged primary GI Dr Chavez that patient was admitted, considering diuretic initiation.   - 8/15 - output continues - discussed with Dr Rosario, agree with requesting [facility] input now while inpatient.  "Will obtain RUQ US also.     Mild hypokalemia  K 3.3. Replacement protocol. Follow mag and replace also.     Diabetes mellitus  Follows with Dr Christianson of endocrine. Uses novolog 4U with small carb meal, 6U with large carb meal and sliding scale. Lantus in AM., metformin 1500mg in AM and ozempic weekly. Last A1C was 6.9 in June 2023.  - continue lantus 30 units daily given hasn't eaten much  - aspart 1 unit per 15 units carbs  - medium resistance sliding scale insulin  - continue regimen - sugars low to mid 100s as of 8/15     Elevated LFTs, variable/stable  Alk phos, tbili and AST similarly elevated as during June 2023 checks  - Monitor  - reports following with [facility] - Dr Nevin Chavez. They have asked me to update her - will send a message. Anticipate clinic follow up.     Tobacco use  Smokes 4-6 cigarettes per day. Declines nicotine patch, wants to \"cold turkey it\"  - noted     Hypothyroidism  Resume PTA regimen     Anxiety  Stable. PTA lexapro resumed.     Obesity  Follow up with PCP        Diet: Moderate Consistent Carb (60 g CHO per Meal) Diet    DVT Prophylaxis: Ambulate every shift  Crystal Catheter: Not present  Lines: None     Cardiac Monitoring: None  Code Status: Full Code      Clinically Significant Risk Factors        # Hypokalemia: Lowest K = 3.3 mmol/L in last 2 days, will replace as needed       # Hypoalbuminemia: Lowest albumin = 2.9 g/dL at 8/15/2023  5:35 AM, will monitor as appropriate   # Thrombocytopenia: Lowest platelets = 125 in last 2 days, will monitor for bleeding         # DMII: A1C = 6.9 % (Ref range: 0.0 - 5.6 %) within past 6 months, PRESENT ON ADMISSION  # Severe Obesity: Estimated body mass index is 46.84 kg/m  as calculated from the following:    Height as of this encounter: 1.524 m (5').    Weight as of this encounter: 108.8 kg (239 lb 13.8 oz)., PRESENT ON ADMISSION            Disposition Plan      Expected Discharge Date: 08/17/2023,  3:00 PM                Roger Archer, " MD  Hospitalist Service  [facility]  Securely message with [employer] (more info)  Text page via [employer] Paging/Directory   ______________________________________________________________________    Interval History   Seen and examined. Dtr at bedside. Up in chair, on RA, some tube pain still but otherwise doing okay.     Physical Exam   Vital Signs: Temp: 98.1  F (36.7  C) Temp src: Oral BP: 128/68 Pulse: 63   Resp: 14 SpO2: 94 % O2 Device: None (Room air) Oxygen Delivery: 1 LPM  Weight: 239 lbs 13.77 oz    Gen: NAD, pleasant  HEENT: EOMI, MMM  Resp: no focal crackles,  slightly diminished in R base, no wheezes, no increased work of resp  CV: S1S2 heard, reg rhythm, reg rate  Abdo: soft, nontender, somewhat distended but not tense, bowel sounds present  Ext: calves nontender, well perfused  Neuro: aa, conversant, moving ext, CN grossly intact, no facial asymmetry      Medical Decision Making       51 MINUTES SPENT BY ME on the date of service doing chart review, history, exam, documentation & further activities per the note.      Data     I have personally reviewed the following data over the past 24 hrs:    4.7  \   14.5   / 125 (L)     139 103 10.5 /  112 (H)   3.7 26 0.52 \     ALT: 18 AST: 41 AP: 173 (H) TBILI: 1.1   ALB: 2.9 (L) TOT PROTEIN: 6.5 LIPASE: N/A

## 2023-08-15 NOTE — PROGRESS NOTES
THORACIC SURGERY    CT out remains high  approx 1 liter yesterday.  Still serous    Discussed with Dr. Archer    Recommend GI consult.  ? US liver    CT to water seal  Will follow    TATYANA PETERSON MD [facility] ONCOLOGY THORACIC SURGERY  CELL:  [phone]  OFFICE: [phone]

## 2023-08-15 NOTE — PLAN OF CARE
Neuro: A&O x4 forgetful  Tele/cardiac: N/A  Respiration: weaned to room air  Activity: standby assist  Pain: scheduled tylenol  Drips: PIV SL  Drains/tubes: 1 Chest to 1 atrium to water seal, no crepitus/air leak, 320 ml serous output for this shift  Skin: chest tube dressing changed  Aggression color: green  Isolation: contact precautions  Plan: TCU at discharge

## 2023-08-15 NOTE — CONSULTS
[facility]  Gastroenterology Consultation    Sudheer Montiel  [address]  [facility], [place] [address]  58 year old female    Admission Date/Time: 8/11/2023  Primary Care Provider: Pawan Burns    We were asked to see the patient in consultation by Dr. Bazzi for evaluation of history of liver disease, pleural effusion.        HPI:  Sudheer Montiel is a 58 year old female who presented 8/11/23 with shortness of breath.  She was in her normal state of health when she awoke on 8/11 and felt very short of breath.  She did not get better with rest throughout the day and EMS was called.  She received a nebulizer with no change in her O2 sat.  Chest x-ray showed effusion.  A drain was placed and she felt much better.  No recent nausea, vomiting, fever, chills.  Effusion was consistent with transudate fluid based on fluid studies.    Albumin 2.9.  , ALT 18, AST 41, total bilirubin 1.1, direct bilirubin 0.38.  Platelets 125.    Patient has a history of known REYES cirrhosis.  She was diagnosed in 2022 when she was incidentally found to have nodular appearing liver on imaging.  Workup for possible etiology negative for hepatitis B and C, AMA negative, ceruloplasmin normal.  SMA was mildly elevated.  MRI of liver 9/2022 showed cirrhosis with splenomegaly and no concerning liver lesions.  Due to normal transaminases, autoimmune hepatitis is felt to be less likely and a liver biopsy has not been pursued.    Patient has also previously been followed by neurology for memory impairment thought in part due to hepatic encephalopathy as she has had high ammonia levels, as well as, a history of TIAs.  She has been treated with lactulose and rifaximin.    EGD to screen for esophageal varices in 9/2022 with moderate portal hypertensive gastropathy and 3 small columns of varices.    Patient does have a history of lower extremity swelling particularly after standing for long periods of time.  This has been  managed with a low salt diet.  No history of abdominal ascites.    ROS: A comprehensive ten point review of systems was negative aside from those in mentioned in the HPI.      MEDICATIONS: No current facility-administered medications on file prior to encounter.  acetaminophen (TYLENOL) 325 MG tablet, Take 2 tablets (650 mg) by mouth every 8 hours as needed for mild pain or other (and adjunct with moderate or severe pain or per patient request)  ASPIRIN 81 PO, Take 81 mg by mouth daily  blood glucose (NO BRAND SPECIFIED) test strip, Use to test blood sugar 1 times daily or as directed. Glucocard Vital  celecoxib (CELEBREX) 200 MG capsule, Take 200 mg by mouth daily  Continuous Blood Gluc Sensor (FREESTYLE DIMITRIOS 3 SENSOR) MISC, 1 Device continuous prn (change every 14 days)  escitalopram (LEXAPRO) 10 MG tablet, Take 1 tablet (10 mg) by mouth daily  insulin aspart (NOVOLOG PEN) 100 UNIT/ML pen, Inject 3-6 Units Subcutaneous 3 times daily (with meals)  insulin glargine (LANTUS PEN) 100 UNIT/ML pen, Inject 40 Units Subcutaneous daily  insulin pen needle (B-D U/F) 31G X 5 MM miscellaneous, Use 4 pen needles daily or as directed.  insulin pen needle (B-D U/F) 31G X 5 MM miscellaneous, Use 1 pen needles daily or as directed.  lactulose 20 GM/30ML solution, Take 30 mLs (20 g) by mouth 3 times daily (Patient taking differently: Take 20 g by mouth daily)  levothyroxine (SYNTHROID/LEVOTHROID) 100 MCG tablet, Take 1 tablet (100 mcg) by mouth daily  metFORMIN (GLUCOPHAGE-XR) 750 MG 24 hr tablet, Take 2-tablets by mouth daily as directed  omeprazole (PRILOSEC) 10 MG DR capsule, Take 10 mg by mouth daily  semaglutide (OZEMPIC) 2 MG/1.5ML SOPN pen, Inject 0.5 mg weekly (Patient taking differently: Inject 0.5 mg Subcutaneous every 7 days Wednesday)  STATIN NOT PRESCRIBED (INTENTIONAL), Please choose reason not prescribed from choices below.  thyroid (ARMOUR) 15 MG tablet, Take 1 tablet (15 mg) by mouth daily  thyroid (ARMOUR) 60 MG  tablet, Take 1-tablet by mouth daily as directed  valACYclovir (VALTREX) 500 MG tablet, Take 500 mg by mouth daily as needed  XIFAXAN 550 MG TABS tablet, Take 1 tablet by mouth 2 times daily        ALLERGIES:   Allergies   Allergen Reactions    Cephalexin     Morphine      Other reaction(s): Other  Irritability, disorientation    Penicillins Itching     face    Amoxicillin Rash and Itching    Cefprozil Rash    Cefprozil Rash    Ceftazidime Itching and Rash    Ciprofloxacin Rash    Ciprofloxacin Itching and Rash     Tolerates levaquin     Percocet [Oxycodone-Acetaminophen] Nausea and Vomiting       Past Medical History:   Diagnosis Date    Abnormal liver CT     Hypothyroid 80's    Necrotizing fasciitis (H)     Primary osteoarthritis of both knees     Soft tissue infection     Subcortical infarction (H)     Type 2 diabetes mellitus (H)        Past Surgical History:   Procedure Laterality Date     SECTION      COLONOSCOPY N/A 10/21/2015    Procedure: COLONOSCOPY;  Surgeon: Jaky Arredondo MD;  Location:  GI    IRRIGATION AND DEBRIDEMENT ABDOMEN WASHOUT, COMBINED N/A 10/12/2018    Procedure: COMBINED IRRIGATION AND DEBRIDEMENT ABDOMEN WASHOUT;  Irrigation and Debridement of Lower Abdomen, removal of piece of mesh.;  Surgeon: Darlene Hogue MD;  Location:  OR    marisol/bso  2004    due to anemia    ZZC REPAIR CRUCIATE LIGAMENT,KNEE           SOCIAL HISTORY:  Social History     Tobacco Use    Smoking status: Some Days     Types: Cigarettes     Last attempt to quit: 2011     Years since quittin.4    Smokeless tobacco: Never   Vaping Use    Vaping Use: Never used   Substance Use Topics    Alcohol use: No    Drug use: Never       FAMILY HISTORY:  No family history on file.    PHYSICAL EXAM:   /68   Pulse 63   Temp 98.1  F (36.7  C) (Oral)   Resp 14   Ht 1.524 m (5')   Wt 108.8 kg (239 lb 13.8 oz)   SpO2 94%   BMI 46.84 kg/m      Constitutional: NAD,  comfortable  Cardiovascular: RRR, normal S1 and S2, no r/c/g/m  Respiratory: CTAB  Psychiatric: mentation appears normal and affect normal  Head: Normocephalic. Atraumatic.    Neck: Neck supple. No adenopathy. Thyroid symmetric, normal size, trachea midline  Eyes:  PERRL, no icterus  ENT: Hearing adequate, pharynx normal without erythema or exudate  Abdomen:   Auscultation: + BS  Appearance: non-distended  Palpation: non-tender  NEURO: grossly negative  SKIN: no suspicious lesions or rashes  LYMPH:   anterior cervical: no adenopathy  posterior cervical: no adenopathy  supraclavicular: no adenopathy          ADDITIONAL COMMENTS:   I reviewed the patient's new clinical lab test results.   Recent Labs   Lab Test 08/15/23  0535 08/12/23  0525 08/11/23  1647 06/06/23  1016 05/01/23  1217   WBC 4.7 6.6 4.0  --  4.5   HGB 14.5 13.2 13.6  --  15.5   MCV 96 96 97  --  94   * 100* 99*  --  92*   INR  --   --  1.17* 1.18* 1.20*     Recent Labs   Lab Test 08/15/23  0729 08/15/23  0535 08/15/23  0134 08/14/23  1631 08/14/23  1520 08/14/23  0720 08/14/23  0527 08/12/23  0810 08/12/23  0525 08/11/23  2139 08/11/23  1658 08/11/23  1647   NA  --  139  --   --   --   --   --   --  140  --   --  141   POTASSIUM  --  3.7  --   --  3.6  --  3.3*   < > 3.5  --   --  3.7   CHLORIDE  --  103  --   --   --   --   --   --  106  --   --  106   CO2  --  26  --   --   --   --   --   --  25  --   --  23   BUN  --  10.5  --   --   --   --   --   --  11.5  --   --  10.2   CR  --  0.52  --   --   --   --   --   --  0.48*  --  0.4* 0.46*   ANIONGAP  --  10  --   --   --   --   --   --  9  --   --  12   JOANN  --  8.6  --   --   --   --   --   --  8.3*  --   --  8.4*   * 120* 121*   < >  --    < >  --    < > 119*   < >  --  105*    < > = values in this interval not displayed.     Recent Labs   Lab Test 08/15/23  0535 08/13/23  0544 08/11/23  1647 07/13/23  1048 06/06/23  1016 05/26/22  0658 05/25/22  1545   ALBUMIN 2.9* 3.1* 3.5  --  3.5    < > 3.0*   BILITOTAL 1.1 1.5* 1.3*  --  1.6*   < > 0.9   DBIL 0.38* 0.52*  --   --  0.54*   < >  --    ALT 18 19 22  --  23   < > 33   AST 41 43 48*  --  53*   < > 54*   ALKPHOS 173* 177* 198*  --  195*   < > 188*   PROTEIN  --   --   --  Negative  --   --   --    LIPASE  --   --   --   --   --   --  237    < > = values in this interval not displayed.             .    CONSULTATION ASSESSMENT AND PLAN:      57 yo female who was admitted with shortness of breath secondary to pleural effusion.  Fluid studies are consistent with transudative effusion thought secondary to her known liver disease.  Patient diagnosed with cirrhosis in 2022, felt secondary to REYES.  History of mildly elevated SMA but normal transaminases make autoimmune hepatitis less likely.  No history of abdominal ascites but she has had lower extremity swelling in the past managed with low salt diet.    -  Agree with ultrasound.  Would also obtain Dopplers to exclude portal vein thrombosis.  If large volume of ascites, please perform diagnostic and therapeutic paracentesis with albumin.  -  Monitor liver function.  Currently stable from previous.  -  Recommend diuretic therapy.  Furosemide 20 mg and spironolactone 50 mg daily.  -  Low sodium (<2 gm) diet.  -  Appreciate thoracic surgery input.  -  Repeat thoracentesis as needed.    -  Follow up in liver clinic with Dr. Chavez.  [facility] will call to arrange.      I discussed the patient's findings and plan with Dr. Garzon.          Elida Crain, PAC  [facility]  Office:  [phone] call if needed after 11:30AM  Cell:  [phone], not available after 11:30AM at this number

## 2023-08-15 NOTE — PLAN OF CARE
Goal Outcome Evaluation:    Orientations: AOx4  Vitals/Pain: VSS, RA, scheduled tylenol for pain   Lines/Drains: PIV SL, R chest tube to water seal, no air leak or crepitus, 350 mL serous output  Skin/Wounds: WDL  GI/: UOA, BMx1, PRN senna given x1 and PRN lactulose given x1  Labs: Abnormal/Trends, Electrolyte Replacement- K and Mag rechecks in the morning   Ambulation/Assist: SBA  Plan: Pt recommending TCU at discharge. Continue plan of care.

## 2023-08-16 ENCOUNTER — APPOINTMENT (OUTPATIENT)
Dept: GENERAL RADIOLOGY | Facility: CLINIC | Age: 58
End: 2023-08-16
Attending: HOSPITALIST
Payer: COMMERCIAL

## 2023-08-16 ENCOUNTER — APPOINTMENT (OUTPATIENT)
Dept: PHYSICAL THERAPY | Facility: CLINIC | Age: 58
End: 2023-08-16
Payer: COMMERCIAL

## 2023-08-16 LAB
GLUCOSE BLDC GLUCOMTR-MCNC: 109 MG/DL (ref 70–99)
GLUCOSE BLDC GLUCOMTR-MCNC: 136 MG/DL (ref 70–99)
GLUCOSE BLDC GLUCOMTR-MCNC: 142 MG/DL (ref 70–99)
GLUCOSE BLDC GLUCOMTR-MCNC: 143 MG/DL (ref 70–99)
GLUCOSE BLDC GLUCOMTR-MCNC: 149 MG/DL (ref 70–99)
MAGNESIUM SERPL-MCNC: 1.8 MG/DL (ref 1.7–2.3)
NT-PROBNP SERPL-MCNC: <36 PG/ML (ref 0–900)
POTASSIUM SERPL-SCNC: 3.6 MMOL/L (ref 3.4–5.3)

## 2023-08-16 PROCEDURE — 250N000013 HC RX MED GY IP 250 OP 250 PS 637: Performed by: HOSPITALIST

## 2023-08-16 PROCEDURE — 36415 COLL VENOUS BLD VENIPUNCTURE: CPT | Performed by: HOSPITALIST

## 2023-08-16 PROCEDURE — 83735 ASSAY OF MAGNESIUM: CPT | Performed by: HOSPITALIST

## 2023-08-16 PROCEDURE — 250N000013 HC RX MED GY IP 250 OP 250 PS 637: Performed by: INTERNAL MEDICINE

## 2023-08-16 PROCEDURE — 120N000001 HC R&B MED SURG/OB

## 2023-08-16 PROCEDURE — 97110 THERAPEUTIC EXERCISES: CPT | Mod: GP

## 2023-08-16 PROCEDURE — 84132 ASSAY OF SERUM POTASSIUM: CPT | Performed by: HOSPITALIST

## 2023-08-16 PROCEDURE — 83880 ASSAY OF NATRIURETIC PEPTIDE: CPT | Performed by: HOSPITALIST

## 2023-08-16 PROCEDURE — 99232 SBSQ HOSP IP/OBS MODERATE 35: CPT | Performed by: HOSPITALIST

## 2023-08-16 PROCEDURE — 97116 GAIT TRAINING THERAPY: CPT | Mod: GP

## 2023-08-16 PROCEDURE — 71045 X-RAY EXAM CHEST 1 VIEW: CPT

## 2023-08-16 RX ORDER — FUROSEMIDE 40 MG
40 TABLET ORAL DAILY
Status: DISCONTINUED | OUTPATIENT
Start: 2023-08-17 | End: 2023-08-21 | Stop reason: HOSPADM

## 2023-08-16 RX ORDER — SPIRONOLACTONE 25 MG/1
50 TABLET ORAL ONCE
Status: COMPLETED | OUTPATIENT
Start: 2023-08-16 | End: 2023-08-16

## 2023-08-16 RX ORDER — SPIRONOLACTONE 25 MG/1
100 TABLET ORAL DAILY
Status: DISCONTINUED | OUTPATIENT
Start: 2023-08-17 | End: 2023-08-21 | Stop reason: HOSPADM

## 2023-08-16 RX ORDER — FUROSEMIDE 20 MG
20 TABLET ORAL ONCE
Status: COMPLETED | OUTPATIENT
Start: 2023-08-16 | End: 2023-08-16

## 2023-08-16 RX ADMIN — RIFAXIMIN 550 MG: 550 TABLET ORAL at 15:37

## 2023-08-16 RX ADMIN — ACETAMINOPHEN 975 MG: 325 TABLET, FILM COATED ORAL at 16:58

## 2023-08-16 RX ADMIN — LEVOTHYROXINE SODIUM 100 MCG: 100 TABLET ORAL at 06:20

## 2023-08-16 RX ADMIN — RIFAXIMIN 550 MG: 550 TABLET ORAL at 08:10

## 2023-08-16 RX ADMIN — LACTULOSE 20 G: 20 SOLUTION ORAL at 08:26

## 2023-08-16 RX ADMIN — ACETAMINOPHEN 975 MG: 325 TABLET, FILM COATED ORAL at 01:01

## 2023-08-16 RX ADMIN — LEVOTHYROXINE, LIOTHYRONINE 60 MG: 38; 9 TABLET ORAL at 08:11

## 2023-08-16 RX ADMIN — SPIRONOLACTONE 50 MG: 25 TABLET ORAL at 08:10

## 2023-08-16 RX ADMIN — FUROSEMIDE 20 MG: 20 TABLET ORAL at 08:10

## 2023-08-16 RX ADMIN — ESCITALOPRAM OXALATE 10 MG: 10 TABLET ORAL at 08:10

## 2023-08-16 RX ADMIN — LEVOTHYROXINE, LIOTHYRONINE 15 MG: 9.5; 2.25 TABLET ORAL at 08:10

## 2023-08-16 RX ADMIN — INSULIN GLARGINE 30 UNITS: 100 INJECTION, SOLUTION SUBCUTANEOUS at 08:16

## 2023-08-16 RX ADMIN — SPIRONOLACTONE 50 MG: 25 TABLET ORAL at 16:55

## 2023-08-16 RX ADMIN — ACETAMINOPHEN 975 MG: 325 TABLET, FILM COATED ORAL at 08:09

## 2023-08-16 RX ADMIN — INSULIN ASPART 1 UNITS: 100 INJECTION, SOLUTION INTRAVENOUS; SUBCUTANEOUS at 17:38

## 2023-08-16 RX ADMIN — FUROSEMIDE 20 MG: 20 TABLET ORAL at 16:55

## 2023-08-16 ASSESSMENT — ACTIVITIES OF DAILY LIVING (ADL)
ADLS_ACUITY_SCORE: 38

## 2023-08-16 NOTE — CARE PLAN
Orientations: A&0 x4.   Vitals/Pain: VSS. LS diminished. Pain managed with tylenol.   Lines/Drains: 2 PIV SL. R Chest tube water seal, no air leak, no crepitus.   Skin/Wounds: Incision wound UTV.    GI/: Voids with out difficulty. No BM.  Labs: Abnormal/Trends, Electrolyte Replacement- K, Mag protocol.   Ambulation/Assist: SBA  with gait belt & walker.   Sleep Quality: Good  Plan: GI consult. Discharge TBD

## 2023-08-16 NOTE — PROGRESS NOTES
THORACIC SURGERY PROGRESS NOTE    S: Patient doing well this afternoon. Feels her breathing is stable. Tolerating diuretics.     O: BP (!) 149/81 (BP Location: Right arm)   Pulse 74   Temp 98.6  F (37  C) (Oral)   Resp 18   Ht 1.524 m (5')   Wt 108.8 kg (239 lb 13.8 oz)   SpO2 93%   BMI 46.84 kg/m    Gen: Sitting up in bed, alert  Resp: Regular respirations, no distress, on room air  CT: Serous output, connections secure and dressing intact. About 700 mL in past 24 hours (from 7AM 8/15 to 7AM 8/16)    CXR: Well drained right pleural space    Plan:  - Continue chest tube to water seal  - Awaiting output to decrease further, monitor I&O's closely  - Daily pCXR    Ritu Dasilva PA-C with Dr. Serafin [facility]  Office: [phone]  Cell: [phone]

## 2023-08-16 NOTE — PROGRESS NOTES
GASTROENTEROLOGY PROGRESS NOTE     IMPRESSION:    #1 Hepatic hydrothorax, right s/p chest tube  #2 Decompensated REYES cirrhosis  #3 Hepatic encephalopathy    Improved breathing after chest tube but significant persistent output.   No intra-abdominal ascites on ultrasound. No mass/new vascular issues in the liver on ultrasound.    Good renal function.    RECOMMENDATIONS:    Increase lasix/spirinolactone to 40mg/100mg.  2.   Check electrolytes/Cr AM. Ok to continue above diuretics dosing if stable labs.  3.   Low Na (<2 g) diet.  4.   Ok to plan disposition to TCU.   5.    Would give a few more days for chest tube drainage. If this continues despite aggressive diuretic therapy and salt restriction, would need to consider pleurx vs review risks/benefits of TIPS.   6.    TIPS would be helpful in this setting with preserved renal function but not pursued at this time due to encephalopathy issues.  7.   Continue lactulose, adjust dose to target 2-3 loose stools daily. Continue rifaximin.  8.    [facility] will arrange sooner liver clinic follow up.     GI team will sign off at this time.         Priscila Garzon MD  [facility]  [phone]      ________________________________________________________________________      SUBJECTIVE:  Denies new issues.       OBJECTIVE:  /60 (BP Location: Right arm)   Pulse 65   Temp 97.6  F (36.4  C) (Oral)   Resp 18   Ht 1.524 m (5')   Wt 108.8 kg (239 lb 13.8 oz)   SpO2 94%   BMI 46.84 kg/m    Temp (24hrs), Av.8  F (36.6  C), Min:97.6  F (36.4  C), Max:98.4  F (36.9  C)    No data found.     PHYSICAL EXAM  GEN: No acute distress, AO x 3.  Chest: Chest tube with serous output about 800 ml in 24 hours.    Additional Data:  I have reviewed the patient's new clinical lab results:     Recent Labs   Lab Test 08/15/23  0535 23  0525 23  1647 23  1016 23  1217   WBC 4.7 6.6 4.0  --  4.5   HGB 14.5 13.2 13.6  --  15.5   MCV 96 96 97  --  94   PLT  125* 100* 99*  --  92*   INR  --   --  1.17* 1.18* 1.20*     Recent Labs   Lab Test 08/16/23  1144 08/16/23  0748 08/16/23  0534 08/16/23  0210 08/15/23  0729 08/15/23  0535 08/14/23  1631 08/14/23  1520 08/12/23  0810 08/12/23  0525 08/11/23  2139 08/11/23  1658 08/11/23  1647   NA  --   --   --   --   --  139  --   --   --  140  --   --  141   POTASSIUM  --   --  3.6  --   --  3.7  --  3.6   < > 3.5  --   --  3.7   CHLORIDE  --   --   --   --   --  103  --   --   --  106  --   --  106   CO2  --   --   --   --   --  26  --   --   --  25  --   --  23   BUN  --   --   --   --   --  10.5  --   --   --  11.5  --   --  10.2   CR  --   --   --   --   --  0.52  --   --   --  0.48*  --  0.4* 0.46*   ANIONGAP  --   --   --   --   --  10  --   --   --  9  --   --  12   JOANN  --   --   --   --   --  8.6  --   --   --  8.3*  --   --  8.4*   * 109*  --  142*   < > 120*   < >  --    < > 119*   < >  --  105*    < > = values in this interval not displayed.     Recent Labs   Lab Test 08/15/23  0535 08/13/23  0544 08/11/23  1647 07/13/23  1048 05/26/22  0658 05/25/22  1545   ALBUMIN 2.9* 3.1* 3.5  --    < > 3.0*   BILITOTAL 1.1 1.5* 1.3*  --    < > 0.9   ALT 18 19 22  --    < > 33   AST 41 43 48*  --    < > 54*   PROTEIN  --   --   --  Negative  --   --    LIPASE  --   --   --   --   --  237    < > = values in this interval not displayed.       Total time: 40 minutes,  at least 50% time spent in coordination of care, counseling, and discussions with pt/family/team members.

## 2023-08-16 NOTE — PLAN OF CARE
Orientation: A&Ox4.  Vitals/Pain: VSS on RA. Denies pain.   Diet: Mod carb, 2000 mL FR, BG checks.   Lungs: Clear/dim LS. Encouraging IS use.  Lines/Drains: PIV SL.  Skin/Wounds: R CT to water seal, no air leak or crepitus, serous output, site CDI.   GI/: AUO. +BM.   Labs: K & mg lab rechecks in AM.  Ambulation/Assist: Up independent in room, SBA in hallway.   Plan: TCU at discharge.

## 2023-08-16 NOTE — PROGRESS NOTES
Care Management Follow Up    Length of Stay (days): 5    Expected Discharge Date: 08/17/2023     Concerns to be Addressed:       Patient plan of care discussed at interdisciplinary rounds: Yes    Anticipated Discharge Disposition:  (TCU vs home with HC)     Anticipated Discharge Services:    Anticipated Discharge DME:      Patient/family educated on Medicare website which has current facility and service quality ratings:    Education Provided on the Discharge Plan:    Patient/Family in Agreement with the Plan:      Referrals Placed by CM/SW: Post Acute Facilities  Private pay costs discussed: Not applicable    Additional Information:    [facility] has LTC/TCU bed available; they are checking pt's LTC benefits.  Robi called admissions this AM and left a vm to check on status.      Update:  Pt has been clinically accepted at [facility] and will be placed into the TCU.  [facility] verified that pt's insurance does cover LTC/SNF.  Once/if pt is done with TCU, pt will transfer over into LTC while pt/family work with [employer] [facility] on securing funding for other placement options that may be more appropriate given pt's age/condition.  If pt opts to stay at [facility], however, this can be an option too.  Pt's CADI waiver appointment is 8/22/23 and [facility] is aware of this.  Robi called dtr, Jessie, to provide this update and she is agreeable.  Jessie will continue to help assist with pt's CADI waiver appt and is reaching out to [employer] [facility] to provide update on pt's new address/location with [facility].  Robi provided Jessie with address/phone number of [facility] and stated that there should be a Sw at this facility that can also help assist with these services.  Sw to update pt on TCU acceptance and possible eventual transition into LTC while working on securing funding with [employer] for CADI waiver.      Fifi Arechiga, [facility] HospitalSW

## 2023-08-16 NOTE — PROGRESS NOTES
[facility]  Hospitalist Progress Note   08/16/2023          Assessment and Plan:       Sudheer Montiel is a 58 year old female admitted on 8/11/2023 with shortness of breath.     Large right sided pleural effusion s/p chest tube 8/11  Small R apical pneumothorax 8/13 - improving  Acute hypoxic respiratory failure - improved   Presented with shortness of breath  Initially required 15LPM for O2 sat in 80s, improved to 4LPM after chest tube placed. ~2800ml output after chest tube placement while patient still in ED. Effusion consistent with transudative based on fluid studies  *CT chest PE: no PE, large R effusion with associated atelectasis  *CXR: large R pleural effusion, near collapse of R Lung  *CXR post CT: CT at right lung base. No PTX. Small R effusion with atelectasis. Increased airspace consolidation at right mid and lower lung ? Edema vs PNA. Mild interstitial edema.  Echo fairly WNL    ---WBC WNL and no recent fever/chills/cough/congestion, transudative effusion -- continue holding off on abx for possible R sided consolidation, nontoxic and afebrile thru 8/16. Appears transudative and likely related to liver disease, follow cultures (NGTD)  --Right chest tube to waterseal.   - thoracic surgery following and appreciated  -- Continue Lasix, spironolactone  - scheduled tylenol for chest tube related pain  - encourage IS  - weaned O2 as able    REYES cirrhosis decompensated  Elevated LFTs, variable/stable  Alk phos, tbili and AST similarly elevated as during June 2023 checks  Ultrasound liver with hepatic steatosis, no ascites.  Ultrasound Doppler Hepatic steatosis and cirrhosis. No focal masses. Normal abdominal liver duplex.  -- [facility] following.  Appreciate comanagement.  Consider lifestyle modification with diet and exercise as able to, weight loss.  Continue Lasix, spironolactone.  Continue rifaximin.  Intake output monitoring, daily weights.  Fluid restriction     Mild hypokalemia  "replaced   K 3.3 > 3.6      Diabetes mellitus with hemoglobin A1c of 6.9.    Follows with Dr Christianson of endocrine. Uses novolog 4U with small carb meal, 6U with large carb meal and sliding scale. Lantus in AM., metformin 1500mg in AM and ozempic weekly. Last A1C was 6.9 in June 2023.  - continue lantus 30 units daily   - aspart 1 unit per 15 units carbs  - medium resistance sliding scale insulin    Tobacco use  Smokes 4-6 cigarettes per day. Declines nicotine patch, wants to \"cold turkey it\"  Consider abstinence from nicotine.     Hypothyroidism  Continue PTA supplements.     Anxiety  Stable. PTA lexapro continued.    Class III obesity with a BMI of 46.84  Increased all cause mortality and morbidity.  Consider lifestyle modification with diet and exercise  Follow up with PCP     Orders Placed This Encounter      Moderate Consistent Carb (60 g CHO per Meal) Diet      DVT Prophylaxis:   Code Status: Full Code  Disposition: Expected discharge in     Discussed with patient, bedside RN  >35 minutes spent by me on the date of service doing chart review, history, exam, documentation & further activities per the note.      Harjeet Negron MD        Interval History:      Patient lying in bed.  Denies any chest pain at this time, discomfort at chest tube site.  Denies any headache or dizziness.  Denies any new tingling or numbness.  Ambulating with gait belt and walker per report.  Denies any abdominal pain.  Denies any new shortness of breath.  Has been on room air.  Afebrile       Physical Exam:        Physical Exam   Temp:  [97.6  F (36.4  C)-98.4  F (36.9  C)] 97.6  F (36.4  C)  Pulse:  [63-67] 65  Resp:  [14-18] 16  BP: (112-132)/(62-72) 112/62  SpO2:  [92 %-95 %] 92 %    Intake/Output Summary (Last 24 hours) at 8/16/2023 1103  Last data filed at 8/16/2023 0900  Gross per 24 hour   Intake 637 ml   Output 580 ml   Net 57 ml       Admission Weight: 98.7 kg (217 lb 9.5 oz)  Current Weight: 108.8 kg (239 lb 13.8 " oz)    PHYSICAL EXAM  GENERAL: Patient is in no distress. Alert and oriented.  HEENT: Oropharynx pink  HEART: Regular rate and rhythm. S1S2. No murmurs  LUNGS: Bilateral decreased breath sounds, no wheezing or crackles  Respirations unlabored  ABDOMEN: Soft, no abdominal tenderness, bowel sounds heard   No guarding or rigidity.  NEURO: Moving all extremities.  EXTREMITIES: trace pedal edema.  SKIN: Warm, dry. No rash  PSYCHIATRY Cooperative       Medications:         acetaminophen  975 mg Oral Q8H    escitalopram  10 mg Oral Daily    furosemide  20 mg Oral Daily    insulin aspart  1-7 Units Subcutaneous TID AC    insulin aspart  1-5 Units Subcutaneous At Bedtime    insulin aspart   Subcutaneous TID w/meals    insulin glargine  30 Units Subcutaneous QAM AC    levothyroxine  100 mcg Oral QAM AC    rifaximin  550 mg Oral BID    sodium chloride (PF)  3 mL Intracatheter Q8H    spironolactone  50 mg Oral Daily    thyroid  15 mg Oral Daily    thyroid  60 mg Oral Daily     glucose **OR** dextrose **OR** glucagon, HYDROmorphone, HYDROmorphone, lactulose, lidocaine 4%, lidocaine (buffered or not buffered), melatonin, naloxone **OR** naloxone **OR** naloxone **OR** naloxone, nicotine, ondansetron **OR** ondansetron, oxyCODONE, oxyCODONE, polyethylene glycol, prochlorperazine **OR** prochlorperazine **OR** prochlorperazine, senna-docusate **OR** senna-docusate, sodium chloride (PF)         Data:      All new lab and imaging data was reviewed.

## 2023-08-17 ENCOUNTER — APPOINTMENT (OUTPATIENT)
Dept: GENERAL RADIOLOGY | Facility: CLINIC | Age: 58
End: 2023-08-17
Attending: HOSPITALIST
Payer: COMMERCIAL

## 2023-08-17 LAB
ALBUMIN SERPL BCG-MCNC: 2.8 G/DL (ref 3.5–5.2)
BACTERIA PLR CULT: NO GROWTH
CREAT SERPL-MCNC: 0.55 MG/DL (ref 0.51–0.95)
GFR SERPL CREATININE-BSD FRML MDRD: >90 ML/MIN/1.73M2
GLUCOSE BLDC GLUCOMTR-MCNC: 132 MG/DL (ref 70–99)
GLUCOSE BLDC GLUCOMTR-MCNC: 152 MG/DL (ref 70–99)
GLUCOSE BLDC GLUCOMTR-MCNC: 163 MG/DL (ref 70–99)
GLUCOSE BLDC GLUCOMTR-MCNC: 165 MG/DL (ref 70–99)
GLUCOSE BLDC GLUCOMTR-MCNC: 172 MG/DL (ref 70–99)
GLUCOSE BLDC GLUCOMTR-MCNC: 181 MG/DL (ref 70–99)
GRAM STAIN RESULT: NORMAL
GRAM STAIN RESULT: NORMAL
MAGNESIUM SERPL-MCNC: 1.8 MG/DL (ref 1.7–2.3)
PLATELET # BLD AUTO: 137 10E3/UL (ref 150–450)
POTASSIUM SERPL-SCNC: 3.5 MMOL/L (ref 3.4–5.3)

## 2023-08-17 PROCEDURE — 82040 ASSAY OF SERUM ALBUMIN: CPT | Performed by: HOSPITALIST

## 2023-08-17 PROCEDURE — 250N000013 HC RX MED GY IP 250 OP 250 PS 637: Performed by: INTERNAL MEDICINE

## 2023-08-17 PROCEDURE — 250N000013 HC RX MED GY IP 250 OP 250 PS 637: Performed by: HOSPITALIST

## 2023-08-17 PROCEDURE — 82565 ASSAY OF CREATININE: CPT | Performed by: HOSPITALIST

## 2023-08-17 PROCEDURE — 84132 ASSAY OF SERUM POTASSIUM: CPT | Performed by: HOSPITALIST

## 2023-08-17 PROCEDURE — 83735 ASSAY OF MAGNESIUM: CPT | Performed by: HOSPITALIST

## 2023-08-17 PROCEDURE — 71045 X-RAY EXAM CHEST 1 VIEW: CPT

## 2023-08-17 PROCEDURE — 36415 COLL VENOUS BLD VENIPUNCTURE: CPT | Performed by: HOSPITALIST

## 2023-08-17 PROCEDURE — 99232 SBSQ HOSP IP/OBS MODERATE 35: CPT | Performed by: HOSPITALIST

## 2023-08-17 PROCEDURE — 120N000001 HC R&B MED SURG/OB

## 2023-08-17 PROCEDURE — 85049 AUTOMATED PLATELET COUNT: CPT | Performed by: HOSPITALIST

## 2023-08-17 RX ADMIN — INSULIN ASPART 1 UNITS: 100 INJECTION, SOLUTION INTRAVENOUS; SUBCUTANEOUS at 18:43

## 2023-08-17 RX ADMIN — ACETAMINOPHEN 975 MG: 325 TABLET, FILM COATED ORAL at 08:37

## 2023-08-17 RX ADMIN — OXYCODONE HYDROCHLORIDE 5 MG: 5 TABLET ORAL at 18:49

## 2023-08-17 RX ADMIN — LEVOTHYROXINE SODIUM 100 MCG: 100 TABLET ORAL at 06:39

## 2023-08-17 RX ADMIN — ACETAMINOPHEN 975 MG: 325 TABLET, FILM COATED ORAL at 16:56

## 2023-08-17 RX ADMIN — LEVOTHYROXINE, LIOTHYRONINE 15 MG: 9.5; 2.25 TABLET ORAL at 08:37

## 2023-08-17 RX ADMIN — INSULIN ASPART 1 UNITS: 100 INJECTION, SOLUTION INTRAVENOUS; SUBCUTANEOUS at 12:15

## 2023-08-17 RX ADMIN — ESCITALOPRAM OXALATE 10 MG: 10 TABLET ORAL at 08:37

## 2023-08-17 RX ADMIN — FUROSEMIDE 40 MG: 40 TABLET ORAL at 08:37

## 2023-08-17 RX ADMIN — SPIRONOLACTONE 100 MG: 25 TABLET ORAL at 08:36

## 2023-08-17 RX ADMIN — LEVOTHYROXINE, LIOTHYRONINE 60 MG: 38; 9 TABLET ORAL at 06:38

## 2023-08-17 RX ADMIN — INSULIN GLARGINE 30 UNITS: 100 INJECTION, SOLUTION SUBCUTANEOUS at 08:42

## 2023-08-17 RX ADMIN — RIFAXIMIN 550 MG: 550 TABLET ORAL at 08:37

## 2023-08-17 RX ADMIN — ACETAMINOPHEN 975 MG: 325 TABLET, FILM COATED ORAL at 01:05

## 2023-08-17 RX ADMIN — LACTULOSE 20 G: 20 SOLUTION ORAL at 08:49

## 2023-08-17 RX ADMIN — RIFAXIMIN 550 MG: 550 TABLET ORAL at 16:56

## 2023-08-17 ASSESSMENT — ACTIVITIES OF DAILY LIVING (ADL)
ADLS_ACUITY_SCORE: 38

## 2023-08-17 NOTE — PROGRESS NOTES
[facility]  Hospitalist Progress Note   08/17/2023          Assessment and Plan:       Sudheer Montiel is a 58 year old female admitted on 8/11/2023 with shortness of breath.     Large right sided pleural effusion s/p chest tube 8/11  Small R apical pneumothorax 8/13 - improving  Acute hypoxic respiratory failure - improved   Presented with shortness of breath Initially required 15LPM for O2 sat in 80s, improved to 4LPM after chest tube placed. ~2800ml output after chest tube placement while patient still in ED. Effusion consistent with transudative based on fluid studies  *CT chest PE: no PE, large R effusion with associated atelectasis  *CXR: large R pleural effusion, near collapse of R Lung  Echo with LVEF of 60 to 65%.  No significant valvular abnormalities  CXR 8/17 - Right chest tube. Probable tiny right apical pneumothorax. Bibasilar atelectasis. Soft tissue emphysema right chest.     --WBC WNL and no recent fever/chills/cough/congestion, transudative effusion --continue holding off on abx for possible R sided consolidation, nontoxic and afebrile thru 8/16. Appears transudative and likely related to liver disease  --Right chest tube to waterseal. Daily chest x-ray.  - Thoracic surgery following and appreciated  --Continue Lasix, spironolactone, doses increased 8/17  - Scheduled tylenol for chest tube related pain  Incentive spirometry, Acapella.    REYES cirrhosis decompensated  Elevated LFTs, variable/stable  Alk phos, tbili and AST similarly elevated as during June 2023 checks  Ultrasound liver with hepatic steatosis, no ascites.  Ultrasound Doppler Hepatic steatosis and cirrhosis. No focal masses. Normal abdominal liver duplex.  -- [facility] following.  Appreciate comanagement.  Continue Lasix, spironolactone.  Dosing increased 8/17.  Continue rifaximin.  Consider lifestyle modification with diet and exercise as able to, weight loss.  Intake output monitoring, daily weights.  Fluid  "restriction     Diabetes mellitus with hemoglobin A1c of 6.9.    Follows with Dr Christianson of endocrine. Uses novolog 4U with small carb meal, 6U with large carb meal and sliding scale. Lantus in AM., metformin 1500mg in AM and ozempic weekly. Last A1C was 6.9 in June 2023.  - continue lantus 30 units daily   - aspart 1 unit per 15 units carbs  - medium resistance sliding scale insulin    Tobacco use  Smokes 4-6 cigarettes per day. Declines nicotine patch, wants to \"cold turkey it\"  Consider abstinence from nicotine.     Hypothyroidism  Continue PTA supplements.     Anxiety  Stable. PTA lexapro continued.    Ascending aortic dilatation.  Echo with mild aortic root dilatation. Max diameter of the visualized portion 4.3 cm.  The ascending aorta is Mildly dilated. Max diameter of the visualized portion  3.9 cm.  Blood pressure management, follow-up as outpatient per protocol.    Mild hypokalemia replaced   K 3.3 > 3.6      Class III obesity with a BMI of 46.84  Increased all cause mortality and morbidity.  Consider lifestyle modification with diet and exercise  Follow up with PCP     Orders Placed This Encounter      Moderate Consistent Carb (60 g CHO per Meal) Diet      DVT Prophylaxis: SCDs, ambulate.  Code Status: Full Code  Disposition: Expected discharge in 1-2 days     Discussed with patient, daughter by the bedside, bedside RN  >35 minutes spent by me on the date of service doing chart review, history, exam, documentation & further activities per the note.      Harjeet Negron MD        Interval History:      Patient lying in bed.  Denies any chest pain at this time, discomfort at chest tube site.  Denies any headache or dizziness.  Denies any new tingling or numbness.  Denies any abdominal pain.  Denies any new shortness of breath.  Has been on room air.  Afebrile       Physical Exam:        Physical Exam   Temp:  [97.6  F (36.4  C)-98.7  F (37.1  C)] 97.6  F (36.4  C)  Pulse:  [61-76] 67  Resp:  [16-18] " 16  BP: (129-149)/(66-81) 129/73  SpO2:  [93 %-94 %] 93 %    Intake/Output Summary (Last 24 hours) at 8/16/2023 1103  Last data filed at 8/16/2023 0900  Gross per 24 hour   Intake 637 ml   Output 580 ml   Net 57 ml       Admission Weight: 98.7 kg (217 lb 9.5 oz)  Current Weight: 108.8 kg (239 lb 13.8 oz)    PHYSICAL EXAM  GENERAL: Patient is in no distress. Alert and oriented.  HEART: Regular rate and rhythm. S1S2. No murmurs  LUNGS: Bilateral decreased breath sounds, no wheezing or crackles  Respirations unlabored  Abdomen soft nontender bowel sounds heard.  NEURO: Moving all extremities.  EXTREMITIES: trace pedal edema.  SKIN: Warm, dry. No rash  PSYCHIATRY Cooperative       Medications:         acetaminophen  975 mg Oral Q8H    escitalopram  10 mg Oral Daily    furosemide  40 mg Oral Daily    insulin aspart  1-7 Units Subcutaneous TID AC    insulin aspart  1-5 Units Subcutaneous At Bedtime    insulin aspart   Subcutaneous TID w/meals    insulin glargine  30 Units Subcutaneous QAM AC    levothyroxine  100 mcg Oral QAM AC    rifaximin  550 mg Oral BID    sodium chloride (PF)  3 mL Intracatheter Q8H    spironolactone  100 mg Oral Daily    thyroid  15 mg Oral Daily    thyroid  60 mg Oral Daily     glucose **OR** dextrose **OR** glucagon, HYDROmorphone, HYDROmorphone, lactulose, lidocaine 4%, lidocaine (buffered or not buffered), melatonin, naloxone **OR** naloxone **OR** naloxone **OR** naloxone, nicotine, ondansetron **OR** ondansetron, oxyCODONE, oxyCODONE, polyethylene glycol, prochlorperazine **OR** prochlorperazine **OR** prochlorperazine, senna-docusate **OR** senna-docusate, sodium chloride (PF)         Data:      All new lab and imaging data was reviewed.

## 2023-08-17 NOTE — PROGRESS NOTES
Thoracic Surgery:    /66 (BP Location: Right arm, Patient Position: Semi-Banks's)   Pulse 61   Temp 98.5  F (36.9  C) (Oral)   Resp 18   Ht 1.524 m (5')   Wt 104.4 kg (230 lb 1.6 oz)   SpO2 94%   BMI 44.94 kg/m    On room air  CXR: right pleural space well-drained, right basilar CT in good position    CT: 580 ml serous output over 24 hours    S: No dyspnea, walking hallways and using IS. Dicussed needs to keep chest tube 1-2 more days.  O:  CT: no air leak with cough, serous output  P: Cont CT to water seal-- monitor output amounts to determine whether medical mgmt as per GI will be successful  Daily PCXR    Magdalena Padilla PA-C with Dr. [facility]  Cell [phone]

## 2023-08-17 NOTE — CARE PLAN
Orientations: A&0 x4.   Vitals/Pain: VSS. LS diminished. Pain managed with tylenol.   Lines/Drains: 1 PIV SL. R Chest tube water seal, no air leak, no crepitus. 280 ml output overnight.   Skin/Wounds: Incision wound UTV.    GI/: Voids with out difficulty. No BM. 2 l fluid restriction.   Labs: Abnormal/Trends, Electrolyte Replacement- K, Mag protocol.   Ambulation/Assist: SBA  with gait belt & walker.   Sleep Quality: Good  Plan: Pt going to tcu discharge date tbd.          56-year-old female with HTN, CAD, CHF s/p AICD/PPM, gastritis, presenting from home with acute worsening of generalized abdominal pain that radiates to the back found to have recurrent UTI and hydronephrosis, now with acute hepatitis

## 2023-08-18 ENCOUNTER — APPOINTMENT (OUTPATIENT)
Dept: PHYSICAL THERAPY | Facility: CLINIC | Age: 58
End: 2023-08-18
Payer: COMMERCIAL

## 2023-08-18 ENCOUNTER — APPOINTMENT (OUTPATIENT)
Dept: GENERAL RADIOLOGY | Facility: CLINIC | Age: 58
End: 2023-08-18
Attending: HOSPITALIST
Payer: COMMERCIAL

## 2023-08-18 LAB
ANION GAP SERPL CALCULATED.3IONS-SCNC: 9 MMOL/L (ref 7–15)
BUN SERPL-MCNC: 12.1 MG/DL (ref 8–23)
CALCIUM SERPL-MCNC: 8.2 MG/DL (ref 8.6–10)
CHLORIDE SERPL-SCNC: 101 MMOL/L (ref 98–107)
CREAT SERPL-MCNC: 0.58 MG/DL (ref 0.51–0.95)
DEPRECATED HCO3 PLAS-SCNC: 26 MMOL/L (ref 22–29)
GFR SERPL CREATININE-BSD FRML MDRD: >90 ML/MIN/1.73M2
GLUCOSE BLDC GLUCOMTR-MCNC: 131 MG/DL (ref 70–99)
GLUCOSE BLDC GLUCOMTR-MCNC: 131 MG/DL (ref 70–99)
GLUCOSE BLDC GLUCOMTR-MCNC: 184 MG/DL (ref 70–99)
GLUCOSE BLDC GLUCOMTR-MCNC: 187 MG/DL (ref 70–99)
GLUCOSE SERPL-MCNC: 127 MG/DL (ref 70–99)
MAGNESIUM SERPL-MCNC: 1.9 MG/DL (ref 1.7–2.3)
POTASSIUM SERPL-SCNC: 3.8 MMOL/L (ref 3.4–5.3)
SODIUM SERPL-SCNC: 136 MMOL/L (ref 136–145)

## 2023-08-18 PROCEDURE — 120N000001 HC R&B MED SURG/OB

## 2023-08-18 PROCEDURE — 250N000013 HC RX MED GY IP 250 OP 250 PS 637: Performed by: INTERNAL MEDICINE

## 2023-08-18 PROCEDURE — 250N000013 HC RX MED GY IP 250 OP 250 PS 637: Performed by: HOSPITALIST

## 2023-08-18 PROCEDURE — 83735 ASSAY OF MAGNESIUM: CPT | Performed by: HOSPITALIST

## 2023-08-18 PROCEDURE — 36415 COLL VENOUS BLD VENIPUNCTURE: CPT | Performed by: HOSPITALIST

## 2023-08-18 PROCEDURE — 97116 GAIT TRAINING THERAPY: CPT | Mod: GP

## 2023-08-18 PROCEDURE — 97110 THERAPEUTIC EXERCISES: CPT | Mod: GP

## 2023-08-18 PROCEDURE — 99232 SBSQ HOSP IP/OBS MODERATE 35: CPT | Performed by: HOSPITALIST

## 2023-08-18 PROCEDURE — 80048 BASIC METABOLIC PNL TOTAL CA: CPT | Performed by: HOSPITALIST

## 2023-08-18 PROCEDURE — 71045 X-RAY EXAM CHEST 1 VIEW: CPT

## 2023-08-18 RX ORDER — LACTULOSE 10 G/15ML
20 SOLUTION ORAL 3 TIMES DAILY
Status: DISCONTINUED | OUTPATIENT
Start: 2023-08-18 | End: 2023-08-21 | Stop reason: HOSPADM

## 2023-08-18 RX ADMIN — LEVOTHYROXINE, LIOTHYRONINE 60 MG: 38; 9 TABLET ORAL at 05:37

## 2023-08-18 RX ADMIN — RIFAXIMIN 550 MG: 550 TABLET ORAL at 16:11

## 2023-08-18 RX ADMIN — LEVOTHYROXINE SODIUM 100 MCG: 100 TABLET ORAL at 05:36

## 2023-08-18 RX ADMIN — ESCITALOPRAM OXALATE 10 MG: 10 TABLET ORAL at 08:30

## 2023-08-18 RX ADMIN — INSULIN GLARGINE 30 UNITS: 100 INJECTION, SOLUTION SUBCUTANEOUS at 08:32

## 2023-08-18 RX ADMIN — ACETAMINOPHEN 975 MG: 325 TABLET, FILM COATED ORAL at 08:29

## 2023-08-18 RX ADMIN — ACETAMINOPHEN 975 MG: 325 TABLET, FILM COATED ORAL at 16:11

## 2023-08-18 RX ADMIN — LEVOTHYROXINE, LIOTHYRONINE 15 MG: 9.5; 2.25 TABLET ORAL at 05:37

## 2023-08-18 RX ADMIN — RIFAXIMIN 550 MG: 550 TABLET ORAL at 08:30

## 2023-08-18 RX ADMIN — OXYCODONE HYDROCHLORIDE 5 MG: 5 TABLET ORAL at 00:16

## 2023-08-18 RX ADMIN — ACETAMINOPHEN 975 MG: 325 TABLET, FILM COATED ORAL at 00:16

## 2023-08-18 RX ADMIN — OXYCODONE HYDROCHLORIDE 5 MG: 5 TABLET ORAL at 21:08

## 2023-08-18 RX ADMIN — OXYCODONE HYDROCHLORIDE 5 MG: 5 TABLET ORAL at 11:18

## 2023-08-18 RX ADMIN — FUROSEMIDE 40 MG: 40 TABLET ORAL at 08:30

## 2023-08-18 RX ADMIN — INSULIN ASPART 1 UNITS: 100 INJECTION, SOLUTION INTRAVENOUS; SUBCUTANEOUS at 12:33

## 2023-08-18 RX ADMIN — SPIRONOLACTONE 100 MG: 25 TABLET ORAL at 08:29

## 2023-08-18 RX ADMIN — LACTULOSE 20 G: 20 SOLUTION ORAL at 16:11

## 2023-08-18 ASSESSMENT — ACTIVITIES OF DAILY LIVING (ADL)
ADLS_ACUITY_SCORE: 38

## 2023-08-18 NOTE — PROGRESS NOTES
[facility]  Hospitalist Progress Note   08/18/2023          Assessment and Plan:       Sudheer Montiel is a 58 year old female admitted on 8/11/2023 with shortness of breath.     Large right sided pleural effusion s/p chest tube 8/11  Small R apical pneumothorax 8/13 - improving  Acute hypoxic respiratory failure - improved   Presented with shortness of breath Initially required 15LPM for O2 sat in 80s, improved to 4LPM after chest tube placed. ~2800ml output after chest tube placement while patient still in ED. Effusion consistent with transudative based on fluid studies  *CT chest PE: no PE, large R effusion with associated atelectasis  *CXR: large R pleural effusion, near collapse of R Lung  Echo with LVEF of 60 to 65%.  No significant valvular abnormalities  CXR 8/17 - Right chest tube. Probable tiny right apical pneumothorax. Bibasilar atelectasis. Soft tissue emphysema right chest.     --WBC WNL and no recent fever/chills/cough/congestion, transudative effusion --continue holding off on abx for possible R sided consolidation, nontoxic and afebrile thru 8/16. Appears transudative and likely related to liver disease  --Right chest tube to waterseal, continues to have output.  --[facility] signed off.  - Thoracic surgery following , recommend IR consult for Pleurx catheter.  --Interventional radiology consult requested.  --Continue Lasix, spironolactone, doses increased 8/17  - Scheduled tylenol for chest tube related pain  Incentive spirometry, Acapella.    REYES cirrhosis decompensated  Elevated LFTs, variable/stable  Alk phos, tbili and AST similarly elevated as during June 2023 checks  Ultrasound liver with hepatic steatosis, no ascites.  Ultrasound Doppler Hepatic steatosis and cirrhosis. No focal masses. Normal abdominal liver duplex.  -- [facility] recommend diuresis, signed off.  Appreciate comanagement.  Continue Lasix, spironolactone.  Dosing increased 8/17.  Continue  "rifaximin twice daily.  Continue lactulose 3 times daily, titrate to 2-3 loose stools per day.  Consider lifestyle modification with diet and exercise as able to, weight loss.  Intake output monitoring, daily weights.  Fluid restriction     Diabetes mellitus with hemoglobin A1c of 6.9.    Follows with Dr Christianson of endocrine. Uses novolog 4U with small carb meal, 6U with large carb meal and sliding scale. Lantus in AM., metformin 1500mg in AM and ozempic weekly. Last A1C was 6.9 in June 2023.  - continue lantus 30 units daily   - aspart 1 unit per 15 units carbs  - medium resistance sliding scale insulin    Tobacco use  Smokes 4-6 cigarettes per day. Declines nicotine patch, wants to \"cold turkey it\"  Consider abstinence from nicotine.     Hypothyroidism  Continue PTA supplements.     Anxiety  Stable. PTA lexapro continued.    Ascending aortic dilatation.  Echo with mild aortic root dilatation. Max diameter of the visualized portion 4.3 cm.  The ascending aorta is Mildly dilated. Max diameter of the visualized portion  3.9 cm.  Blood pressure management, follow-up as outpatient per protocol.    Mild hypokalemia replaced   K 3.3 > 3.6      Class III obesity with a BMI of 46.84  Increased all cause mortality and morbidity.  Consider lifestyle modification with diet and exercise  Follow up with PCP     Orders Placed This Encounter      Moderate Consistent Carb (60 g CHO per Meal) Diet      DVT Prophylaxis: SCDs, ambulate.  Code Status: Full Code  Disposition: Expected discharge in 1-2 days pending clinical improvement.    Discussed with patient, bedside RN, interventional radiology  >35 minutes spent by me on the date of service doing chart review, history, exam, documentation & further activities per the note.      Harjeet Negron MD        Interval History:      Patient lying in bed.  Denies any chest pain at this time, discomfort at chest tube site.  Denies any headache or dizziness.  Denies any new tingling or " numbness.  Denies any abdominal pain.  Denies any new shortness of breath.  Has been on room air.  Afebrile    Tube in place, continues to have output from chest tube.       Physical Exam:        Physical Exam   Temp:  [97.6  F (36.4  C)-98.7  F (37.1  C)] 98.7  F (37.1  C)  Pulse:  [67-84] 68  Resp:  [16-18] 18  BP: (120-140)/(64-74) 120/64  SpO2:  [93 %-95 %] 94 %    Intake/Output Summary (Last 24 hours) at 8/16/2023 1103  Last data filed at 8/16/2023 0900  Gross per 24 hour   Intake 637 ml   Output 580 ml   Net 57 ml       Admission Weight: 98.7 kg (217 lb 9.5 oz)  Current Weight: 108.8 kg (239 lb 13.8 oz)    PHYSICAL EXAM  GENERAL: Patient is in no distress. Alert and oriented.  HEART: Regular rate and rhythm. S1S2. No murmurs  LUNGS: Bilateral decreased breath sounds, no wheezing or crackles  Respirations unlabored.  Right chest tube present.  Abdomen soft nontender bowel sounds heard.  NEURO: Moving all extremities.  EXTREMITIES: trace pedal edema.  SKIN: Warm, dry. No rash  PSYCHIATRY Cooperative       Medications:         acetaminophen  975 mg Oral Q8H    escitalopram  10 mg Oral Daily    furosemide  40 mg Oral Daily    insulin aspart  1-7 Units Subcutaneous TID AC    insulin aspart  1-5 Units Subcutaneous At Bedtime    insulin aspart   Subcutaneous TID w/meals    insulin glargine  30 Units Subcutaneous QAM AC    levothyroxine  100 mcg Oral QAM AC    rifaximin  550 mg Oral BID    sodium chloride (PF)  3 mL Intracatheter Q8H    spironolactone  100 mg Oral Daily    thyroid  15 mg Oral Daily    thyroid  60 mg Oral Daily     glucose **OR** dextrose **OR** glucagon, HYDROmorphone, HYDROmorphone, lactulose, lidocaine 4%, lidocaine (buffered or not buffered), melatonin, naloxone **OR** naloxone **OR** naloxone **OR** naloxone, nicotine, ondansetron **OR** ondansetron, oxyCODONE, oxyCODONE, polyethylene glycol, prochlorperazine **OR** prochlorperazine **OR** prochlorperazine, senna-docusate **OR** senna-docusate,  sodium chloride (PF)         Data:      All new lab and imaging data was reviewed.

## 2023-08-18 NOTE — PLAN OF CARE
[id number]: Alert and oriented x4. Vital signs stable on room air. Independent. Tolerating mod carb diet with 2000 mL fluid restriction. Lung sounds clear. Bowel sounds active, passing flatus, BM during shift, adequate urine output. CT x1, waterseal, no crepitus or airleak, serous fluid. Pain managed with PRN oxy x1 and scheduled tylenol. Denies nausea. PIV SL.

## 2023-08-18 NOTE — PLAN OF CARE
Orientation: A&Ox4.  Vitals/Pain: VSS on RA. C/o incisional pain, controlled w/PRN oxy & scheduled tylenol.   Diet: Mod carb, 2000 mL FR, BG checks.   Lungs: Clear/dim LS. Encouraging IS use.  Lines/Drains: PIV SL.  Skin/Wounds: New atrium for R CT to water seal, intermittent air leak towards the end of shift, no crepitus noted, serous output, site CDI.   GI/: AUO. +BM.  Labs: K & mg lab rechecks in AM.  Ambulation/Assist: Up independent in room, SBA in hallway.   Plan: TCU at discharge.

## 2023-08-18 NOTE — PROGRESS NOTES
THORACIC SURGERY    AVSS on RA  Continue to have moderate to large CT output  approx 650 ml last 24hrs    CXR r pleural space well drained    Recommend IR consult re: pleuRx catheter    Success rate of talc pleurodesis with transudative pleural effusion is very low.  Do not recommend.    TATYANA PETERSON MD [facility] ONCOLOGY THORACIC SURGERY  CELL:  [phone]  OFFICE: [phone]

## 2023-08-18 NOTE — PROGRESS NOTES
"SPIRITUAL HEALTH SERVICES Progress Note  [facility]    Saw pt Sudheer Montiel per length of stay. Visited with pt and her stepdaughter.    Patient/Family Understanding of Illness and Goals of Care - Pt shared that she has been in the hospital for \"more than a few days\" and reflected on recently lost abilities such as driving.     Distress and Loss - Pt discussed losing parts of her independence, noting that she is dependent on loved ones for transportation now that she is no longer driving. I offered emotional normalization and validation of the grief that accompanies these kinds of losses.    Strengths, Coping, and Resources - Pt stated \"I know this will get better- it always does.\" She held her stepdaughters hand and reflected on the support of her family, stating \"I really can't complain.\" Pt utilized humor as a coping skill throughout our conversation.    Meaning, Beliefs, and Spirituality - Pt shared that she has a [employer] that she attends, though she has not been able to go lately. She accepted my offer for prayer together, specifically requesting prayers for health. I prayed incorporating requests and conversational themes.    Plan of Care - Spiritual Health remains available for spiritual and emotional support. Please consult should any needs arise.    Sarah Johanson  Chaplain Resident  Spiritual Health Services  [facility] Pager: [phone]    [facility] available 24/7 for emergent requests/referrals, either by having the on-call  paged or by entering an ASAP/STAT consult in Epic (this will also page the on-call ).   "

## 2023-08-18 NOTE — PLAN OF CARE
[id number]  Orientations: A&O x4. Contact for MRSA  Vitals/Pain: VSS on room air.   Tele: N/A  Lines/Drains: R PIV. CT to water seal, no air leak- serious output  Skin/Wounds: Scattered bruising.   GI/: Continent of bowel and bladder. Voiding adequately, - BM.   Labs: Blood sugar checks. 127 this AM, no insulin sliding scale required with breakfast.   Ambulation/Assist: SBA- Independent  Plan: Continue plan of care. IR consult for drain placement?

## 2023-08-18 NOTE — CONSULTS
IR consult request for a right pleurX has been made by Thoracic surgery.     Sudheer Montiel is a 58 year old woman with a complex PMH that includes REYES cirrhosis diagnosed in 2022, elevated ammonia levels started on lactulose this past year, follows with [facility], MELD 8,abnormal MRI with some decrease in memory being seen by Neurology, obesity, type 2 diabetes who was admitted on 8/11 with SOB that started the day of admission and found to have a large right pleural effusion. Thoracic surgery was consulted and a chest tube was placed with high daily outputs, slowly decreasing but still significant.     Outputs from chest tube 1700 mL 8/12; 1 L 8/13; 760 mL 8/14; 675 mL 8/15; 615 8/16; 550 mL 8/17 and 460 mL so far today.     IR request was made for a right pleurX catheter placement.     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     Discussed above with Dr Bonnie Michel, Dr Rosario and Dr Negron.     Thanks, [facility] Interventional Radiology CNP ([phone]) (phone [phone])

## 2023-08-18 NOTE — PLAN OF CARE
Goal Outcome Evaluation:       Orientation: A&Ox4.  Vitals/Pain: VSS on RA. C/o incisional pain, controlled w/PRN oxy & scheduled tylenol.   Diet: Mod carb, 2000 mL FR,   Lines/Drains: PIV SL.  Skin/Wounds: New atrium for R CT to water seal, no air leak  no crepitus noted, serous output, site CDI.   GI/: Voiding in the bathroom, no BM  Labs: K & mg lab rechecks in AM.  Ambulation/Assist: Up independent in room  Plan: TCU at discharge.

## 2023-08-19 LAB
GLUCOSE BLDC GLUCOMTR-MCNC: 120 MG/DL (ref 70–99)
GLUCOSE BLDC GLUCOMTR-MCNC: 121 MG/DL (ref 70–99)
GLUCOSE BLDC GLUCOMTR-MCNC: 128 MG/DL (ref 70–99)
GLUCOSE BLDC GLUCOMTR-MCNC: 144 MG/DL (ref 70–99)
GLUCOSE BLDC GLUCOMTR-MCNC: 174 MG/DL (ref 70–99)
GLUCOSE BLDC GLUCOMTR-MCNC: 230 MG/DL (ref 70–99)
MAGNESIUM SERPL-MCNC: 2 MG/DL (ref 1.7–2.3)
POTASSIUM SERPL-SCNC: 3.9 MMOL/L (ref 3.4–5.3)

## 2023-08-19 PROCEDURE — 120N000001 HC R&B MED SURG/OB

## 2023-08-19 PROCEDURE — 83735 ASSAY OF MAGNESIUM: CPT | Performed by: HOSPITALIST

## 2023-08-19 PROCEDURE — 250N000013 HC RX MED GY IP 250 OP 250 PS 637: Performed by: INTERNAL MEDICINE

## 2023-08-19 PROCEDURE — 36415 COLL VENOUS BLD VENIPUNCTURE: CPT | Performed by: HOSPITALIST

## 2023-08-19 PROCEDURE — 250N000013 HC RX MED GY IP 250 OP 250 PS 637: Performed by: HOSPITALIST

## 2023-08-19 PROCEDURE — 84132 ASSAY OF SERUM POTASSIUM: CPT | Performed by: HOSPITALIST

## 2023-08-19 PROCEDURE — 99232 SBSQ HOSP IP/OBS MODERATE 35: CPT | Performed by: HOSPITALIST

## 2023-08-19 RX ADMIN — ESCITALOPRAM OXALATE 10 MG: 10 TABLET ORAL at 09:35

## 2023-08-19 RX ADMIN — LEVOTHYROXINE SODIUM 100 MCG: 100 TABLET ORAL at 05:53

## 2023-08-19 RX ADMIN — FUROSEMIDE 40 MG: 40 TABLET ORAL at 09:35

## 2023-08-19 RX ADMIN — INSULIN GLARGINE 30 UNITS: 100 INJECTION, SOLUTION SUBCUTANEOUS at 09:38

## 2023-08-19 RX ADMIN — TRAMADOL HYDROCHLORIDE 25 MG: 50 TABLET, COATED ORAL at 16:02

## 2023-08-19 RX ADMIN — OXYCODONE HYDROCHLORIDE 5 MG: 5 TABLET ORAL at 10:44

## 2023-08-19 RX ADMIN — SPIRONOLACTONE 100 MG: 25 TABLET ORAL at 09:36

## 2023-08-19 RX ADMIN — LACTULOSE 20 G: 20 SOLUTION ORAL at 16:03

## 2023-08-19 RX ADMIN — LEVOTHYROXINE, LIOTHYRONINE 15 MG: 9.5; 2.25 TABLET ORAL at 05:52

## 2023-08-19 RX ADMIN — RIFAXIMIN 550 MG: 550 TABLET ORAL at 16:02

## 2023-08-19 RX ADMIN — LACTULOSE 20 G: 20 SOLUTION ORAL at 09:36

## 2023-08-19 RX ADMIN — RIFAXIMIN 550 MG: 550 TABLET ORAL at 09:35

## 2023-08-19 RX ADMIN — INSULIN ASPART 1 UNITS: 100 INJECTION, SOLUTION INTRAVENOUS; SUBCUTANEOUS at 13:29

## 2023-08-19 RX ADMIN — LACTULOSE 20 G: 20 SOLUTION ORAL at 21:03

## 2023-08-19 RX ADMIN — ACETAMINOPHEN 975 MG: 325 TABLET, FILM COATED ORAL at 16:02

## 2023-08-19 RX ADMIN — ACETAMINOPHEN 975 MG: 325 TABLET, FILM COATED ORAL at 00:53

## 2023-08-19 RX ADMIN — LEVOTHYROXINE, LIOTHYRONINE 60 MG: 38; 9 TABLET ORAL at 05:52

## 2023-08-19 RX ADMIN — ACETAMINOPHEN 975 MG: 325 TABLET, FILM COATED ORAL at 09:35

## 2023-08-19 ASSESSMENT — ACTIVITIES OF DAILY LIVING (ADL)
ADLS_ACUITY_SCORE: 38

## 2023-08-19 NOTE — PROGRESS NOTES
THORACIC SURGERY     Continue to have high CT output  750 ml last 24 hrs    Reviewed IR note    Will plan to D/C CT tomorrow am    Discussed    TATYANA PETERSON MD [facility] ONCOLOGY THORACIC SURGERY  CELL:  [phone]  OFFICE: [phone]

## 2023-08-19 NOTE — PLAN OF CARE
Goal Outcome Evaluation:      Plan of Care Reviewed With: patient    Overall Patient Progress: improvingOverall Patient Progress: improving     Orientations: A&Ox4, Contact precautions  Vitals/Pain: VSS on RA. Pt complains of mild to moderate pain relieved with tylenol and oxy (discontinued) and tramadol.   Lines/Drains: 1 CT to water seal   Skin/Wounds: Ct site, CT dressing changed this AM  GI/: Mod carb Diet, 2L fluid restriction.   Labs: Abnormal/Trends, Electrolyte Replacement- Mg 1.9, K 3.8  Ambulation/Assist: Ind in room   Plan: Follow up with hepatologist on Mon. Possible discharge tomorrow.

## 2023-08-19 NOTE — PLAN OF CARE
Goal Outcome Evaluation:      COGNITION/MENTATION: A/O x 4   NEURO/CMS: Trace bilat ankle edema  CARDIAC/TELE: n/a   RESPIRATORY: LS diminished, on RA  GI: BS+, refused po lactulose stating she only is taking it in the morning. Pt reported 2 BM today ()   : AUO per pt   PAIN: Pain to CT site managed w/scheduled tylenol and prn oxycodone  SKIN: WDL -CT  DRAINS/LINES: CT to water seal, no airleak, no crepitus, serous output, 380cc out in the 12hr shift .  ACTIVITY: Independent  DIET: Mod carb, 2,000cc fluid restriction    B, 121

## 2023-08-19 NOTE — PROGRESS NOTES
[facility]  Hospitalist Progress Note   08/19/2023          Assessment and Plan:       Sudheer Montiel is a 58 year old female admitted on 8/11/2023 with shortness of breath.     Large right sided pleural effusion s/p chest tube 8/11  Small R apical pneumothorax 8/13 - improving  Acute hypoxic respiratory failure - improved   Presented with shortness of breath Initially required 15LPM for O2 sat in 80s, improved to 4LPM after chest tube placed. ~2800ml output after chest tube placement while patient still in ED. Effusion consistent with transudative based on fluid studies  *CT chest PE: no PE, large R effusion with associated atelectasis  *CXR: large R pleural effusion, near collapse of R Lung  Echo with LVEF of 60 to 65%.  No significant valvular abnormalities  CXR 8/17 - Right chest tube. Probable tiny right apical pneumothorax. Bibasilar atelectasis. Soft tissue emphysema right chest.     --WBC WNL and no recent fever/chills/cough/congestion, transudative effusion --continue holding off on abx for possible R sided consolidation, nontoxic and afebrile. Appears transudative and likely related to liver disease  --Right chest tube to waterseal, continues to have output.  --[facility] signed off.  - IR consulted, given new right-sided hepatic hydrothorax recommend hepatology follow-up, possible TIPS, standing order for thoracocentesis.  Pleurx catheter considered if end-stage liver disease.  - Thoracic surgery following continue chest tube for today.    Appreciate multidisciplinary team input.  --Continue Lasix, spironolactone, doses increased 8/17  - Scheduled tylenol for chest tube related pain  Incentive spirometry, Acapella.    REYES cirrhosis decompensated  Elevated LFTs, variable/stable  Alk phos, tbili and AST similarly elevated as during June 2023 checks  Ultrasound liver with hepatic steatosis, no ascites.  Ultrasound Doppler Hepatic steatosis and cirrhosis. No focal masses.  "Normal abdominal liver duplex.  -- [facility] recommend diuresis, signed off.  Appreciate comanagement.  Continue Lasix, spironolactone.  Dosing increased 8/17.  Continue rifaximin twice daily.  Continue lactulose 3 times daily, titrate to 2-3 loose stools per day.  Recommended patient to follow-up with hepatology and liver clinic as outpatient.  Consider lifestyle modification with diet and exercise as able to, weight loss.  Intake output monitoring, daily weights.  Fluid restriction     Diabetes mellitus with hemoglobin A1c of 6.9.    Follows with Dr Christianson of endocrine. Uses novolog 4U with small carb meal, 6U with large carb meal and sliding scale. Lantus in AM., metformin 1500mg in AM and ozempic weekly. Last A1C was 6.9 in June 2023.  - continue lantus 30 units daily   - aspart 1 unit per 15 units carbs  - medium resistance sliding scale insulin    Tobacco use  Smokes 4-6 cigarettes per day. Declines nicotine patch, wants to \"cold turkey it\"  Consider abstinence from nicotine.     Hypothyroidism  Continue PTA supplements.     Anxiety  Stable. PTA lexapro continued.    Ascending aortic dilatation.  Echo with mild aortic root dilatation. Max diameter of the visualized portion 4.3 cm.  The ascending aorta is Mildly dilated. Max diameter of the visualized portion  3.9 cm.  Blood pressure management, follow-up as outpatient per protocol.    Mild hypokalemia replaced   K 3.3 > 3.6      Class III obesity with a BMI of 46.84  Increased all cause mortality and morbidity.  Consider lifestyle modification with diet and exercise  Follow up with PCP     Orders Placed This Encounter      Moderate Consistent Carb (60 g CHO per Meal) Diet      DVT Prophylaxis: SCDs, ambulate.  Code Status: Full Code  Disposition: Expected discharge in 1-2 days pending clinical improvement.    Discussed with patient, bedside RN  >35 minutes spent by me on the date of service doing chart review, history, exam, documentation & further " activities per the note.      Harjeet Negron MD        Interval History:      Patient lying in bed.  Denies any chest pain at this time, discomfort at chest tube site.  Denies any headache or dizziness.  Denies any new tingling or numbness.  Denies any abdominal pain.  Denies any new shortness of breath.  Has been on room air.  Afebrile    Chest Tube in place, continues to have output from chest tube.  Patient having questions regarding chest tube/Pleurx catheter, ammonia levels.         Physical Exam:        Physical Exam   Temp:  [97.4  F (36.3  C)-98.5  F (36.9  C)] 98.5  F (36.9  C)  Pulse:  [63-69] 63  Resp:  [16-18] 16  BP: (105-133)/(57-69) 122/61  SpO2:  [93 %-97 %] 97 %    Intake/Output Summary (Last 24 hours) at 8/16/2023 1103  Last data filed at 8/16/2023 0900  Gross per 24 hour   Intake 637 ml   Output 580 ml   Net 57 ml       Admission Weight: 98.7 kg (217 lb 9.5 oz)  Current Weight: 108.8 kg (239 lb 13.8 oz)    PHYSICAL EXAM  GENERAL: Patient is in no distress. Alert and oriented.  LUNGS: Bilateral decreased breath sounds, no wheezing or crackles  Respirations unlabored.  Right chest tube present.  Abdomen soft nontender bowel sounds heard.  NEURO: Moving all extremities.  EXTREMITIES: trace pedal edema.  SKIN: Warm, dry. No rash  PSYCHIATRY Cooperative       Medications:         acetaminophen  975 mg Oral Q8H    escitalopram  10 mg Oral Daily    furosemide  40 mg Oral Daily    insulin aspart  1-7 Units Subcutaneous TID AC    insulin aspart  1-5 Units Subcutaneous At Bedtime    insulin aspart   Subcutaneous TID w/meals    insulin glargine  30 Units Subcutaneous QAM AC    lactulose  20 g Oral TID    levothyroxine  100 mcg Oral QAM AC    rifaximin  550 mg Oral BID    sodium chloride (PF)  3 mL Intracatheter Q8H    spironolactone  100 mg Oral Daily    thyroid  15 mg Oral Daily    thyroid  60 mg Oral Daily     glucose **OR** dextrose **OR** glucagon, HYDROmorphone, HYDROmorphone, lidocaine 4%, lidocaine  (buffered or not buffered), melatonin, naloxone **OR** naloxone **OR** naloxone **OR** naloxone, nicotine, ondansetron **OR** ondansetron, oxyCODONE, oxyCODONE, polyethylene glycol, prochlorperazine **OR** prochlorperazine **OR** prochlorperazine, senna-docusate **OR** senna-docusate, sodium chloride (PF)         Data:      All new lab and imaging data was reviewed.

## 2023-08-20 LAB
ALBUMIN SERPL BCG-MCNC: 2.9 G/DL (ref 3.5–5.2)
ALP SERPL-CCNC: 179 U/L (ref 35–104)
ALT SERPL W P-5'-P-CCNC: 21 U/L (ref 0–50)
ANION GAP SERPL CALCULATED.3IONS-SCNC: 12 MMOL/L (ref 7–15)
AST SERPL W P-5'-P-CCNC: 46 U/L (ref 0–45)
BILIRUB SERPL-MCNC: 0.9 MG/DL
BUN SERPL-MCNC: 12.6 MG/DL (ref 8–23)
CALCIUM SERPL-MCNC: 8.3 MG/DL (ref 8.6–10)
CHLORIDE SERPL-SCNC: 100 MMOL/L (ref 98–107)
CREAT SERPL-MCNC: 0.55 MG/DL (ref 0.51–0.95)
DEPRECATED HCO3 PLAS-SCNC: 24 MMOL/L (ref 22–29)
ERYTHROCYTE [DISTWIDTH] IN BLOOD BY AUTOMATED COUNT: 14.6 % (ref 10–15)
GFR SERPL CREATININE-BSD FRML MDRD: >90 ML/MIN/1.73M2
GLUCOSE BLDC GLUCOMTR-MCNC: 121 MG/DL (ref 70–99)
GLUCOSE BLDC GLUCOMTR-MCNC: 132 MG/DL (ref 70–99)
GLUCOSE BLDC GLUCOMTR-MCNC: 163 MG/DL (ref 70–99)
GLUCOSE BLDC GLUCOMTR-MCNC: 172 MG/DL (ref 70–99)
GLUCOSE SERPL-MCNC: 126 MG/DL (ref 70–99)
HCT VFR BLD AUTO: 43.2 % (ref 35–47)
HGB BLD-MCNC: 14.4 G/DL (ref 11.7–15.7)
MAGNESIUM SERPL-MCNC: 1.9 MG/DL (ref 1.7–2.3)
MCH RBC QN AUTO: 31.9 PG (ref 26.5–33)
MCHC RBC AUTO-ENTMCNC: 33.3 G/DL (ref 31.5–36.5)
MCV RBC AUTO: 96 FL (ref 78–100)
PLATELET # BLD AUTO: 168 10E3/UL (ref 150–450)
POTASSIUM SERPL-SCNC: 3.8 MMOL/L (ref 3.4–5.3)
PROT SERPL-MCNC: 6.5 G/DL (ref 6.4–8.3)
RBC # BLD AUTO: 4.52 10E6/UL (ref 3.8–5.2)
SODIUM SERPL-SCNC: 136 MMOL/L (ref 136–145)
WBC # BLD AUTO: 5.6 10E3/UL (ref 4–11)

## 2023-08-20 PROCEDURE — 85027 COMPLETE CBC AUTOMATED: CPT | Performed by: HOSPITALIST

## 2023-08-20 PROCEDURE — 120N000001 HC R&B MED SURG/OB

## 2023-08-20 PROCEDURE — 36415 COLL VENOUS BLD VENIPUNCTURE: CPT | Performed by: HOSPITALIST

## 2023-08-20 PROCEDURE — 99232 SBSQ HOSP IP/OBS MODERATE 35: CPT | Performed by: HOSPITALIST

## 2023-08-20 PROCEDURE — 250N000013 HC RX MED GY IP 250 OP 250 PS 637: Performed by: HOSPITALIST

## 2023-08-20 PROCEDURE — 83735 ASSAY OF MAGNESIUM: CPT | Performed by: HOSPITALIST

## 2023-08-20 PROCEDURE — 250N000013 HC RX MED GY IP 250 OP 250 PS 637: Performed by: INTERNAL MEDICINE

## 2023-08-20 PROCEDURE — 80053 COMPREHEN METABOLIC PANEL: CPT | Performed by: HOSPITALIST

## 2023-08-20 RX ORDER — SPIRONOLACTONE 100 MG/1
100 TABLET, FILM COATED ORAL DAILY
Qty: 30 TABLET | Refills: 0 | Status: SHIPPED | OUTPATIENT
Start: 2023-08-21 | End: 2023-12-12 | Stop reason: DRUGHIGH

## 2023-08-20 RX ORDER — FUROSEMIDE 40 MG
40 TABLET ORAL DAILY
Qty: 30 TABLET | Refills: 0 | Status: ON HOLD | OUTPATIENT
Start: 2023-08-21 | End: 2023-12-18

## 2023-08-20 RX ORDER — POLYETHYLENE GLYCOL 3350 17 G
2 POWDER IN PACKET (EA) ORAL
Qty: 30 LOZENGE | Refills: 0 | Status: SHIPPED | OUTPATIENT
Start: 2023-08-20 | End: 2023-12-12

## 2023-08-20 RX ADMIN — INSULIN GLARGINE 30 UNITS: 100 INJECTION, SOLUTION SUBCUTANEOUS at 07:31

## 2023-08-20 RX ADMIN — ACETAMINOPHEN 975 MG: 325 TABLET, FILM COATED ORAL at 01:34

## 2023-08-20 RX ADMIN — SPIRONOLACTONE 100 MG: 25 TABLET ORAL at 08:40

## 2023-08-20 RX ADMIN — LEVOTHYROXINE SODIUM 100 MCG: 100 TABLET ORAL at 05:30

## 2023-08-20 RX ADMIN — LACTULOSE 20 G: 20 SOLUTION ORAL at 16:01

## 2023-08-20 RX ADMIN — ESCITALOPRAM OXALATE 10 MG: 10 TABLET ORAL at 08:40

## 2023-08-20 RX ADMIN — TRAMADOL HYDROCHLORIDE 50 MG: 50 TABLET, COATED ORAL at 08:52

## 2023-08-20 RX ADMIN — LACTULOSE 20 G: 20 SOLUTION ORAL at 22:22

## 2023-08-20 RX ADMIN — RIFAXIMIN 550 MG: 550 TABLET ORAL at 16:02

## 2023-08-20 RX ADMIN — LEVOTHYROXINE, LIOTHYRONINE 15 MG: 9.5; 2.25 TABLET ORAL at 05:30

## 2023-08-20 RX ADMIN — ACETAMINOPHEN 975 MG: 325 TABLET, FILM COATED ORAL at 08:39

## 2023-08-20 RX ADMIN — LEVOTHYROXINE, LIOTHYRONINE 60 MG: 38; 9 TABLET ORAL at 05:30

## 2023-08-20 RX ADMIN — RIFAXIMIN 550 MG: 550 TABLET ORAL at 08:39

## 2023-08-20 RX ADMIN — LACTULOSE 20 G: 20 SOLUTION ORAL at 08:39

## 2023-08-20 RX ADMIN — TRAMADOL HYDROCHLORIDE 50 MG: 50 TABLET, COATED ORAL at 01:34

## 2023-08-20 RX ADMIN — ACETAMINOPHEN 975 MG: 325 TABLET, FILM COATED ORAL at 16:02

## 2023-08-20 RX ADMIN — FUROSEMIDE 40 MG: 40 TABLET ORAL at 08:40

## 2023-08-20 ASSESSMENT — ACTIVITIES OF DAILY LIVING (ADL)
ADLS_ACUITY_SCORE: 38

## 2023-08-20 NOTE — PROGRESS NOTES
Care Management Follow Up    Length of Stay (days): 9    Expected Discharge Date: 08/21/2023     Concerns to be Addressed: discharge planning     Patient plan of care discussed at interdisciplinary rounds: Yes    Anticipated Discharge Disposition:  (TCU vs home with HC)     Anticipated Discharge Services:    Anticipated Discharge DME:      Patient/family educated on Medicare website which has current facility and service quality ratings: yes  Education Provided on the Discharge Plan:    Patient/Family in Agreement with the Plan:      Referrals Placed by CM/SW: Post Acute Facilities  Private pay costs discussed: Not applicable    Additional Information:  [facility] Anticipate discharge Monday. Dtr Jessie updated. She will transport on Monday. Nursing aware.     KATIUSKA Moon, [facility]  [phone] Desk phone  [phone] Cell/text (Preferred)  [facility]

## 2023-08-20 NOTE — PROGRESS NOTES
THORACIC SURGERY    CT output still high  CXR unchanged    CT out    OK to D/C from my standpoint    Needs F/U with GI  Prn thoracentesis as the only option for management of hepatic hydrothorax as per PCP/ GI    Discussed    TATYANA PETERSON MD [facility] ONCOLOGY THORACIC SURGERY  CELL:  [phone]  OFFICE: [phone]

## 2023-08-20 NOTE — PLAN OF CARE
Goal Outcome Evaluation:    [id number]  COGNITION/MENTATION: A/O x 4   NEURO/CMS: Intact  CARDIAC/TELE: n/a  RESPIRATORY: LS diminished, on RA   GI: BS+, reports 1 BM, compliant w/ scheduled PO lactulose   : AUO per pt   PAIN: c/o incisional pain at CT site, prn tramadol and scheduled tylenol admin   SKIN: Intact  DRAINS/LINES: CT to water seal, no air leak   ACTIVITY: Up ad francis, Independent  DIET: Mod carb, 2000cc fluid restriction    B, 144, 163.

## 2023-08-20 NOTE — PLAN OF CARE
Goal Outcome Evaluation:      Plan of Care Reviewed With: patient, child    Overall Patient Progress: improvingOverall Patient Progress: improving     Orientations: A&Ox4  Vitals/Pain: VSS on RA. Pt complains of pain relieved with scheduled tylenol.   Lines/Drains: CT removed today.   Skin/Wounds: CT site CDI  GI/: Pt voiding, No BM pt took 2 doses of lactulose.   Labs: Abnormal/Trends, Electrolyte Replacement- Alk phos 179, AST 46, BG (126, 132, 121)  Ambulation/Assist: Ind in room  Plan: Plan to discharge to TCU tomorrow. Social work assisting with transition plans, Family at bedside most of shift.

## 2023-08-20 NOTE — PROGRESS NOTES
[facility]  Hospitalist Progress Note   08/20/2023          Assessment and Plan:         Sudheer Montiel is a 58 year old female admitted on 8/11/2023 with shortness of breath.      Large right sided pleural effusion s/p chest tube 8/11  Small R apical pneumothorax 8/13 - improving  Acute hypoxic respiratory failure - improved   Presented with shortness of breath Initially required 15LPM for O2 sat in 80s, improved to 4LPM after chest tube placed. ~2800ml output after chest tube placement while patient still in ED. Effusion consistent with transudative based on fluid studies  *CT chest PE: no PE, large R effusion with associated atelectasis  *CXR: large R pleural effusion, near collapse of R Lung  Echo with LVEF of 60 to 65%.  No significant valvular abnormalities  CXR 8/18 - Right chest tube. Probable tiny right apical pneumothorax. Bibasilar atelectasis.  Subcutaneous emphysema emphysema right chest.      --WBC WNL and no recent fever/chills/cough/congestion, transudative effusion --continue holding off on abx for possible R sided consolidation, nontoxic and afebrile. Appears transudative and likely related to liver disease  --Right chest tube removed 8/20 by thoracic surgery.  Thoracic surgery recommended as needed thoracocentesis for further management.  --[facility] signed off.  - IR consulted, given new right-sided hepatic hydrothorax recommend hepatology follow-up, possible TIPS, standing order for thoracocentesis.  Pleurx catheter considered if end-stage liver disease.  Appreciate multidisciplinary team input.  --Continue Lasix, spironolactone.   Low-salt diet, fluid restriction to 2000 mL/day.  Recommend repeat imaging of chest in 1 to 2 weeks for evaluation of fluid reaccumulation.  Incentive spirometry, Acapella.     REYES cirrhosis decompensated  Elevated LFTs, variable/stable  Alk phos, tbili and AST similarly elevated as during June 2023 checks  Ultrasound liver with  "hepatic steatosis, no ascites.  Ultrasound Doppler Hepatic steatosis and cirrhosis. No focal masses. Normal abdominal liver duplex.  -- [facility] recommend diuresis, signed off.  Appreciate comanagement.  Continue Lasix 40 mg oral daily with spironolactone 100 mg oral daily.  Continue rifaximin twice daily.  Continue lactulose 3 times daily, titrate to 2-3 loose stools per day.  Recommended patient to follow-up with [facility], liver clinic as outpatient.  Consider lifestyle modification with diet and exercise as able to, weight loss.  Intake output monitoring, daily weights.  Fluid restriction to 2000 mL/day.     Diabetes mellitus with hemoglobin A1c of 6.9.    Follows with Dr Christianson of endocrine. Uses novolog 4U with small carb meal, 6U with large carb meal and sliding scale. Lantus in AM., metformin 1500mg in AM and ozempic weekly. Last A1C was 6.9 in June 2023.  - continue lantus 30 units daily AM  - aspart 1 unit per 15 units carbs  - medium resistance sliding scale insulin  Monitor blood sugars, optimize regimen.     Tobacco use  Smokes 4-6 cigarettes per day. Declines nicotine patch, wants to \"cold turkey it\"  Emphasized abstinence from nicotine.     Hypothyroidism  Continue PTA supplements.     Anxiety  Stable. PTA lexapro continued.     Ascending aortic dilatation.  Echo with mild aortic root dilatation. Max diameter of the visualized portion 4.3 cm.  The ascending aorta is Mildly dilated. Max diameter of the visualized portion  3.9 cm.  Blood pressure management, follow-up as outpatient per protocol.     Mild hypokalemia replaced   K 3.3 > 3.6      Class III obesity with a BMI of 46.84  Increased all cause mortality and morbidity.  Consider lifestyle modification with diet and exercise  Follow up with PCP    Physical deconditioning from medical illness.    PT following, recommending home with outpatient PT.  Patient prefers to discharge home, family would like discharge to TCU.  Care " coordinator social work assisting with transition plans.       Orders Placed This Encounter      Moderate Consistent Carb (60 g CHO per Meal) Diet      DVT Prophylaxis: SCDs, ambulate.  Code Status: Full Code  Disposition: Expected discharge likely 8/21 pending safe discharge plan in place     Discussed with patient, bedside RN  >35 minutes spent by me on the date of service doing chart review, history, exam, documentation & further activities per the note.      Harjeet Negron MD        Interval History:        Patient lying in bed.  Denies any chest pain at this time, discomfort at chest tube site.  Denies any headache or dizziness.  Denies any new tingling or numbness.  Denies any abdominal pain.  Denies any new shortness of breath.  Has been on room air.  Afebrile     Chest tube removed by thoracic surgery this morning.  Multiple questions answered, discharge plan discussed with patient         Physical Exam:        Physical Exam   Temp:  [97.7  F (36.5  C)-98.9  F (37.2  C)] 97.7  F (36.5  C)  Pulse:  [62-75] 65  Resp:  [16] 16  BP: (102-124)/(48-66) 116/65  SpO2:  [92 %-94 %] 93 %    Intake/Output Summary (Last 24 hours) at 8/20/2023 1625  Last data filed at 8/20/2023 1300  Gross per 24 hour   Intake 1360 ml   Output 1772 ml   Net -412 ml       Admission Weight: 98.7 kg (217 lb 9.5 oz)  Current Weight: 104.4 kg (230 lb 1.6 oz)    PHYSICAL EXAM  GENERAL: Patient is in no distress. Alert and oriented.  LUNGS: Bilateral decreased breath sounds, no wheezing or crackles  Respirations unlabored.    Abdomen soft nontender bowel sounds heard.  NEURO: Moving all extremities.  EXTREMITIES: trace pedal edema.  SKIN: Warm, dry. No rash  PSYCHIATRY Cooperative       Medications:         acetaminophen  975 mg Oral Q8H    escitalopram  10 mg Oral Daily    furosemide  40 mg Oral Daily    insulin aspart  1-7 Units Subcutaneous TID AC    insulin aspart  1-5 Units Subcutaneous At Bedtime    insulin aspart   Subcutaneous TID  w/meals    insulin glargine  30 Units Subcutaneous QAM AC    lactulose  20 g Oral TID    levothyroxine  100 mcg Oral QAM AC    rifaximin  550 mg Oral BID    sodium chloride (PF)  3 mL Intracatheter Q8H    spironolactone  100 mg Oral Daily    thyroid  15 mg Oral Daily    thyroid  60 mg Oral Daily     glucose **OR** dextrose **OR** glucagon, HYDROmorphone, HYDROmorphone, lidocaine 4%, lidocaine (buffered or not buffered), melatonin, naloxone **OR** naloxone **OR** naloxone **OR** naloxone, nicotine, ondansetron **OR** ondansetron, polyethylene glycol, prochlorperazine **OR** prochlorperazine **OR** prochlorperazine, senna-docusate **OR** senna-docusate, sodium chloride (PF), traMADol         Data:      All new lab and imaging data was reviewed.

## 2023-08-21 ENCOUNTER — MEDICAL CORRESPONDENCE (OUTPATIENT)
Dept: HEALTH INFORMATION MANAGEMENT | Facility: CLINIC | Age: 58
End: 2023-08-21

## 2023-08-21 VITALS
OXYGEN SATURATION: 93 % | TEMPERATURE: 98 F | HEART RATE: 68 BPM | BODY MASS INDEX: 45.17 KG/M2 | HEIGHT: 60 IN | WEIGHT: 230.1 LBS | SYSTOLIC BLOOD PRESSURE: 140 MMHG | DIASTOLIC BLOOD PRESSURE: 75 MMHG | RESPIRATION RATE: 16 BRPM

## 2023-08-21 LAB
GLUCOSE BLDC GLUCOMTR-MCNC: 118 MG/DL (ref 70–99)
GLUCOSE BLDC GLUCOMTR-MCNC: 134 MG/DL (ref 70–99)

## 2023-08-21 PROCEDURE — 250N000013 HC RX MED GY IP 250 OP 250 PS 637: Performed by: HOSPITALIST

## 2023-08-21 PROCEDURE — 250N000013 HC RX MED GY IP 250 OP 250 PS 637: Performed by: INTERNAL MEDICINE

## 2023-08-21 PROCEDURE — 99239 HOSP IP/OBS DSCHRG MGMT >30: CPT | Performed by: HOSPITALIST

## 2023-08-21 RX ORDER — TRAMADOL HYDROCHLORIDE 50 MG/1
25-50 TABLET ORAL EVERY 6 HOURS PRN
Qty: 10 TABLET | Refills: 0 | Status: SHIPPED | OUTPATIENT
Start: 2023-08-21 | End: 2023-10-19 | Stop reason: DRUGHIGH

## 2023-08-21 RX ADMIN — LEVOTHYROXINE, LIOTHYRONINE 60 MG: 38; 9 TABLET ORAL at 05:46

## 2023-08-21 RX ADMIN — SPIRONOLACTONE 100 MG: 25 TABLET ORAL at 08:17

## 2023-08-21 RX ADMIN — LEVOTHYROXINE, LIOTHYRONINE 15 MG: 9.5; 2.25 TABLET ORAL at 05:46

## 2023-08-21 RX ADMIN — RIFAXIMIN 550 MG: 550 TABLET ORAL at 08:17

## 2023-08-21 RX ADMIN — FUROSEMIDE 40 MG: 40 TABLET ORAL at 08:17

## 2023-08-21 RX ADMIN — INSULIN GLARGINE 30 UNITS: 100 INJECTION, SOLUTION SUBCUTANEOUS at 08:15

## 2023-08-21 RX ADMIN — ACETAMINOPHEN 975 MG: 325 TABLET, FILM COATED ORAL at 08:17

## 2023-08-21 RX ADMIN — LEVOTHYROXINE SODIUM 100 MCG: 100 TABLET ORAL at 05:46

## 2023-08-21 RX ADMIN — ESCITALOPRAM OXALATE 10 MG: 10 TABLET ORAL at 08:17

## 2023-08-21 RX ADMIN — LACTULOSE 20 G: 20 SOLUTION ORAL at 08:23

## 2023-08-21 RX ADMIN — ACETAMINOPHEN 975 MG: 325 TABLET, FILM COATED ORAL at 03:25

## 2023-08-21 ASSESSMENT — ACTIVITIES OF DAILY LIVING (ADL)
ADLS_ACUITY_SCORE: 38

## 2023-08-21 NOTE — PLAN OF CARE
Goal Outcome Evaluation:    [id number]    COGNITION/MENTATION: A/O x 4   NEURO/CMS: BLE trace ankle edema  CARDIAC/TELE: n/a  RESPIRATORY: LS diminished, on RA   GI: BS hypoactive, compliant w/lactulose admin w/encouragement  : AUO   PAIN: Mild pain to old CT site managed w/scheduled tylenol   SKIN: Old CT site, dressing CDI   DRAINS/LINES: PIV SL   ACTIVITY: Up ad francis, independent  DIET: Mod carb, 2000cc fluid restriction       Per SW note, discharge to [facility] w/daughter transporting Pt.

## 2023-08-21 NOTE — PLAN OF CARE
Discharge Note    Patient discharged to TCU via private vehicle  accompanied by a few family members.  IV: Discontinued  Prescriptions printed and given to patient/family. Packet given to family to bring to TCU.   Belongings reviewed and sent with patient.   Equipment sent with: patient.   patient and family verbalizes understanding of discharge instructions. AVS given to patient and family.

## 2023-08-21 NOTE — PROGRESS NOTES
Care Management Discharge Note    Discharge Date: 08/21/2023       Discharge Disposition:  (TCU vs home with HC)    Discharge Services:      Discharge DME:      Discharge Transportation: family or friend will provide    Private pay costs discussed: Not applicable    Does the patient's insurance plan have a 3 day qualifying hospital stay waiver?  Yes   Will the waiver be used for post-acute placement? Yes    PAS Confirmation Code:    Patient/family educated on Medicare website which has current facility and service quality ratings: yes    Education Provided on the Discharge Plan:    Persons Notified of Discharge Plans: Patient, Patient's Family, HUC, Charge Nurse, MD, Bedside Nurse, and Facility.  Patient/Family in Agreement with the Plan:      Handoff Referral Completed: Yes    Additional Information:  Patient will be discharging today from the hospital to [facility] TCU. Patient will be transported by her daughter to TCU and will arrive 3:00 P.M. or later. Scripts have been sent to facility. PAS has been completed and sent to the facility. Orders have been faxed. Charge Nurse, Bedside Nurse, and MD have been notified of discharge. Patient will discharge today to TCU.     Calvin Guerrero [facility]  Care Management

## 2023-08-21 NOTE — DISCHARGE SUMMARY
[facility]  Hospitalist Discharge Summary      Date of Admission:  8/11/2023  Date of Discharge:  8/21/2023  Discharging Provider: Tonja Bazzi DO  Discharge Service: Hospitalist Service    Discharge Diagnoses   Large right sided pleural effusion (hepatic hydrothorax) s/p chest tube 8/11  Small R apical pneumothorax 8/13 - improving  Acute hypoxic respiratory failure - improved   REYES cirrhosis decompensated  Elevated LFTs, variable/stable  Diabetes mellitus with hemoglobin A1c of 6.9.    Tobacco use  Hypothyroidism  Anxiety  Ascending aortic dilatation.  Mild hypokalemia replaced   Class III obesity with a BMI of 46.84  Physical deconditioning from medical illness.      Clinically Significant Risk Factors     # DMII: A1C = 6.9 % (Ref range: 0.0 - 5.6 %) within past 6 months    # Severe Obesity: Estimated body mass index is 44.94 kg/m  as calculated from the following:    Height as of this encounter: 1.524 m (5').    Weight as of this encounter: 104.4 kg (230 lb 1.6 oz).       Follow-ups Needed After Discharge   Follow-up Appointments     Follow-up and recommended labs and tests       Follow up with primary care provider, Pawan Burns, within 7 days   for hospital follow- up.      The following labs/tests are recommended: BMP, Hemoglobin.   Repeat Chest Xray in 1 -2 weeks for follow up     Follow-up with [facility], liver clinic next available   appointment.  Age-appropriate health maintenance on PCP visit.  Consider sleep studies as outpatient.  Follow incidental ascending aortic dilatation periodically as outpatient        .            Discharge Disposition   Discharged to TCU  Condition at discharge: Stable    Hospital Course   Sudheer Montiel is a 58 year old female admitted on 8/11/2023 with shortness of breath.      Large right sided pleural effusion (hepatic hydrothorax) s/p chest tube 8/11  Small R apical pneumothorax 8/13 - improving  Acute hypoxic  respiratory failure - improved   Presented with shortness of breath Initially required 15LPM for O2 sat in 80s, improved to 4LPM after chest tube placed. ~2800ml output after chest tube placement while patient still in ED. Effusion consistent with transudative based on fluid studies  *CT chest PE: no PE, large R effusion with associated atelectasis  *CXR: large R pleural effusion, near collapse of R Lung  Echo with LVEF of 60 to 65%.  No significant valvular abnormalities  CXR 8/18 - Right chest tube. Probable tiny right apical pneumothorax. Bibasilar atelectasis.  Subcutaneous emphysema right chest.   *8/20 CT removed  --[facility] signed off.  - IR consulted, given new right-sided hepatic hydrothorax recommend hepatology follow-up, possible TIPS, standing order for thoracocentesis.  Pleurx catheter considered if end-stage liver disease.  --Continue Lasix, spironolactone.   --Low-salt diet, fluid restriction to 2000 mL/day.  --Recommend repeat imaging of chest in 1 to 2 weeks for evaluation of fluid reaccumulation.     REYES cirrhosis decompensated  Elevated LFTs, variable/stable  Alk phos, tbili and AST similarly elevated as during June 2023 checks  Ultrasound liver with hepatic steatosis, no ascites.  Ultrasound Doppler Hepatic steatosis and cirrhosis. No focal masses. Normal abdominal liver duplex.  -- [facility] recommend diuresis, signed off.  Appreciate comanagement.  --Continue Lasix 40 mg oral daily with spironolactone 100 mg oral daily.  --Continue rifaximin twice daily.  --Continue lactulose 3 times daily, titrate to 2-3 loose stools per day.  --Recommended patient to follow-up with [facility], liver clinic as outpatient.     Diabetes mellitus with hemoglobin A1c of 6.9.    Follows with Dr Christianson of endocrine. Uses novolog 4U with small carb meal, 6U with large carb meal and sliding scale. Lantus in AM., metformin 1500mg in AM and ozempic weekly. Last A1C was 6.9 in June  2023.  - continue lantus 40 units daily, resume ozempic  - stop PTA metformin  - continue 3-6 units insulin aspart for carb counting with meals     Tobacco use  Smokes 4-6 cigarettes per day. Declined  - Emphasized abstinence from nicotine.     Hypothyroidism  Continue PTA supplements.     Anxiety  Stable. PTA lexapro continued.     Ascending aortic dilatation.  Echo with mild aortic root dilatation. Max diameter of the visualized portion 4.3 cm.  The ascending aorta is Mildly dilated. Max diameter of the visualized portion  3.9 cm.  Blood pressure management, follow-up as outpatient per protocol.     Mild hypokalemia replaced   K 3.3 > 3.6      Class III obesity with a BMI of 46.84  Increased all cause mortality and morbidity.  Consider lifestyle modification with diet and exercise  Follow up with PCP     Physical deconditioning from medical illness.    - discharge to TCU    Consultations This Hospital Stay   THORACIC SURGERY IP CONSULT  CARE MANAGEMENT / SOCIAL WORK IP CONSULT  PHYSICAL THERAPY ADULT IP CONSULT  GASTROENTEROLOGY IP CONSULT  INTERVENTIONAL RADIOLOGY ADULT/PEDS IP CONSULT  INTERVENTIONAL RADIOLOGY ADULT/PEDS IP CONSULT    Code Status   Full Code    Time Spent on this Encounter   ITonja DO, personally saw the patient today and spent greater than 30 minutes discharging this patient.       Tonja Bazzi DO  [place] [address] [address] [place] [address]  Phone: [phone]  ______________________________________________________________________    Physical Exam   Vital Signs: Temp: 98  F (36.7  C) Temp src: Oral BP: (!) 140/75 Pulse: 68   Resp: 16 SpO2: 93 % O2 Device: None (Room air)    Weight: 230 lbs 1.6 oz    Patient seen and examined. She is feeling great, excited to leave the hospital. Denies feeling short of breath. Denies pain. Happy that she is not having large amount of fluid draining from site of chest tube any longer.      Constitutional: Awake, alert, cooperative, no apparent distress  Respiratory: decreased breath sounds overall, no crackles. Right side dressing intact without strikethrough.  Cardiovascular: Regular rate and rhythm, normal S1 and S2, and no murmur noted  GI: Normal bowel sounds, soft, non-distended, non-tender  Skin/Integumen: No rashes, no cyanosis, trace lower extremity edema  Other:         Primary Care Physician   Pawan Burns    Discharge Orders      Reason for your hospital stay    You were admitted to the hospital with shortness of breath, noted to have right-sided pleural effusion and underwent chest tube placement.  Followed by thoracic surgery, gastroenterology during hospital stay.     Follow-up and recommended labs and tests     Follow up with primary care provider, Pawan Burns, within 7 days for hospital follow- up.      The following labs/tests are recommended: BMP, Hemoglobin.   Repeat Chest Xray in 1 -2 weeks for follow up     Follow-up with [facility], liver clinic next available appointment.  Age-appropriate health maintenance on PCP visit.  Consider sleep studies as outpatient.  Follow incidental ascending aortic dilatation periodically as outpatient        .     Activity    Your activity upon discharge: activity as tolerated     Monitor and record    Monitor daily blood pressure, heart rate review on provider visit and optimize therapy.  Monitor daily blood sugars at least once a day, review on provider visit and optimize therapy.     Discharge Instructions    Aggressive incentive spirometry.  Encourage ambulation as able to tolerate.   Consider lifestyle modification with diet and exercise as able to, weight loss.  Continue lactulose 3 times daily, titrate to 2-3 loose stools per day.  Fluid restriction to 2000 mL/day.  Low-salt diet     Wound care and dressings    Instructions to care for your chest tube site: leave occlusive dressing in place until morning of  8/22/23. Then okay to remove dressing and shower. Pat dry and then place a dry gauze dressing over the site. Do not use any bacitracin/vaseline. Cover the site once daily or more often if it gets saturated. Stop covering it when it no longer has drainage. Allow the site to scab in-- this will take about 1-2 weeks to heal. .     Diet    Fluid restriction 2000 ML FLUID      Moderate Consistent Carb (60 g CHO per Meal) Diet       Significant Results and Procedures   Most Recent 3 CBC's:  Recent Labs   Lab Test 08/20/23  0554 08/17/23  0547 08/15/23  0535 08/12/23  0525   WBC 5.6  --  4.7 6.6   HGB 14.4  --  14.5 13.2   MCV 96  --  96 96    137* 125* 100*     Most Recent 3 BMP's:  Recent Labs   Lab Test 08/21/23  0740 08/21/23  0210 08/20/23  2223 08/20/23  1131 08/20/23  0554 08/19/23  1701 08/19/23  1653 08/18/23  1131 08/18/23  0600 08/17/23  0816 08/17/23  0547 08/15/23  0729 08/15/23  0535   NA  --   --   --   --  136  --   --   --  136  --   --   --  139   POTASSIUM  --   --   --   --  3.8  --  3.9  --  3.8  --  3.5   < > 3.7   CHLORIDE  --   --   --   --  100  --   --   --  101  --   --   --  103   CO2  --   --   --   --  24  --   --   --  26  --   --   --  26   BUN  --   --   --   --  12.6  --   --   --  12.1  --   --   --  10.5   CR  --   --   --   --  0.55  --   --   --  0.58  --  0.55  --  0.52   ANIONGAP  --   --   --   --  12  --   --   --  9  --   --   --  10   JOANN  --   --   --   --  8.3*  --   --   --  8.2*  --   --   --  8.6   * 118* 172*   < > 126*   < >  --    < > 127*   < >  --    < > 120*    < > = values in this interval not displayed.     Most Recent 2 LFT's:  Recent Labs   Lab Test 08/20/23  0554 08/15/23  0535   AST 46* 41   ALT 21 18   ALKPHOS 179* 173*   BILITOTAL 0.9 1.1   ,   Results for orders placed or performed during the hospital encounter of 08/11/23   XR Chest Port 1 View    Narrative    EXAM: XR CHEST PORT 1 VIEW  LOCATION: [facility]  DATE:  8/11/2023    INDICATION: possible pnuemothorax  COMPARISON: 10/28/2014.      Impression    IMPRESSION: Large right pleural effusion and near complete collapse of the right lung, new. Left lung clear. No pneumothorax. Normal heart size.   CT Chest Pulmonary Embolism w Contrast    Narrative    EXAM: CT CHEST PULMONARY EMBOLISM W CONTRAST  LOCATION: [facility]  DATE: 8/11/2023    INDICATION: sob,  fluid right side,  ?mass or pneumonia with effusion;  r o pe  COMPARISON: None.  TECHNIQUE: CT chest pulmonary angiogram during arterial phase injection of IV contrast. Multiplanar reformats and MIP reconstructions were performed. Dose reduction techniques were used.   CONTRAST: 76mL Isovue 370    FINDINGS:  ANGIOGRAM CHEST: Pulmonary arteries are normal caliber and negative for pulmonary emboli. Thoracic aorta is negative for dissection. No CT evidence of right heart strain.    LUNGS AND PLEURA: Large right effusion with atelectasis. Left lung grossly clear.    MEDIASTINUM/AXILLAE: Heart is normal in size. No mediastinal, axillary, or hilar adenopathy.    CORONARY ARTERY CALCIFICATION: None.    UPPER ABDOMEN: Normal.    MUSCULOSKELETAL: Degenerative changes of the spine.      Impression    IMPRESSION:  1.  No pulmonary embolism.  2.  Large right effusion with associated atelectasis.   XR Chest Port 1 View    Narrative    EXAM: XR CHEST PORT 1 VIEW  LOCATION: [facility]  DATE: 8/11/2023    INDICATION: post chest tube placement  COMPARISON: 08/11/2023      Impression    IMPRESSION: Right chest tube is noted at the lung bases. No evidence of pneumothorax. Heart is mildly enlarged, unchanged. Small right effusion with atelectasis. Increased airspace consolidation within the right mid and lower lung which may related to   edema or possible pneumonia. Suggestion of mild interstitial edema. Overall, effusion has decreased when compared to the prior examination.   XR Chest 1 View     Narrative    EXAM: XR CHEST 1 VIEW  LOCATION: [facility]  DATE: 8/11/2023    INDICATION: post chest tube  COMPARISON: Earlier portable AP view, 1945 hours.       Impression    IMPRESSION: Lateral view compromised by patient's size. The right-sided chest tube is seen on frontal view is seen to enter from the anterior right side of low chest with tip situated in paraspinal region.   XR Chest Port 1 View    Narrative    EXAM: XR CHEST PORT 1 VIEW  LOCATION: [facility]  DATE: 8/12/2023    INDICATION: right chest tube for large pleural effusion  COMPARISON: 08/11/2023      Impression    IMPRESSION: Stable enlargement of the cardiac silhouette. Similar appearance of bilateral infiltrates, right greater than left, with right basilar chest tube in place. No significant residual pleural effusion on the right. Trace pleural effusion seen on   the left. No visible pneumothorax.   XR Chest Port 1 View     Value    Radiologist flags (Urgent)     Small right apical pneumothorax with a chest tube in place    Narrative    EXAM: XR CHEST PORT 1 VIEW  LOCATION: [facility]  DATE: 8/13/2023    INDICATION: pleural effusion  COMPARISON: 08/12/2023      Impression    IMPRESSION: The heart is enlarged, the right-sided chest tube is unchanged in position. There is persistent central pulmonary venous congestion and interstitial edema, not significantly changed from prior exam. There is a small right-sided pneumothorax,   which has developed in the interval since the prior exam.        [Urgent Result: Small right apical pneumothorax with a chest tube in place]    Finding was identified on 8/13/2023 6:17 AM CDT.      nurse denys was contacted by me on 8/13/2023 6:19 AM CDT and verbalized understanding of the critical result.     XR Chest Port 1 View    Narrative    EXAM: XR CHEST PORT 1 VIEW  LOCATION: [facility]  DATE:  8/14/2023    INDICATION: pleural effusion  COMPARISON: None.      Impression    IMPRESSION: The heart is enlarged, similar to prior exam. The right-sided chest tube is unchanged in position. There is a trace right-sided pneumothorax, improved from prior exam, the remainder the study is unchanged   XR Chest Port 1 View    Narrative    EXAM: XR CHEST PORT 1 VIEW  LOCATION: [facility]  DATE: 8/15/2023    INDICATION: pleural effusion  COMPARISON: 8/14/2023      Impression    IMPRESSION: Right basilar chest tube. Low lung volumes. Similar appearance of a mild right pneumothorax. Pleural edge is about 8 mm from the apex. No new consolidation. No effusions. Unchanged cardiac size. Mild right chest wall subcutaneous emphysema.   US Abdomen Limited w Abd/Pelvis Duplex Complete    Narrative    EXAM: US ABDOMEN LIMITED WITH DOPPLER COMPLETE  LOCATION: [facility]  DATE: 8/15/2023    INDICATION: hx liver disease, adm with transudative pleural effusion  COMPARISON: CT of the abdomen and pelvis on 05/25/2022  TECHNIQUE: Complete abdominal ultrasound. Color flow with spectral Doppler and waveform analysis performed.     FINDINGS:    GALLBLADDER: No gallstones, gallbladder wall thickening or pericholecystic fluid. Sonographic Grayson sign is negative. Mildly distended gallbladder.     BILE DUCTS: No biliary dilatation. The common duct measures 8 mm.    LIVER: Coarsened echotexture, suggesting underlying fibrosis. Nodular contour. No focal mass. Diffuse hepatic steatosis. Patent main portal vein measuring 13 mm with normal direction of flow.    RIGHT KIDNEY: Normal size. Normal echogenicity with no hydronephrosis or mass.     SPLEEN: Normal.    PANCREAS: The visualized portions are normal.    AORTA: Normal in caliber.    IVC: Normal where visualized.    No ascites.    ABDOMINAL DUPLEX: The hepatic artery, hepatic veins, IVC, portal veins, and splenic vein are patent with flow in the  normal direction.   Peak systolic velocity in the splenic vein is 12 cm/s, main portal vein is 20 cm/s, right portal vein is 11 cm/s and left portal vein is 15 cm/s. Peak systolic velocity in the main hepatic artery is 134 cm/s and in the left hepatic artery is 89 cm/s. The   right hepatic artery is not well seen.        Impression    IMPRESSION:  1.  Hepatic steatosis and cirrhosis. No focal masses.  2.  Normal abdominal liver duplex.       US Abdomen Limited    Narrative    ULTRASOUND ABDOMEN LIMITED  8/15/2023 4:29 PM     HISTORY: Suggest removal of max 6 L, give albumin 50 g if > 4 L  removed.    COMPARISON: None.      Impression    IMPRESSION: No significant ascites demonstrated, no paracentesis  performed.    SOUTH ELI MD         SYSTEM ID:  [id number]   XR Chest Port 1 View    Narrative    EXAM: CHEST SINGLE VIEW PORTABLE  LOCATION: [facility]  DATE: 08/16/2023    INDICATION: Large pleural effusion. Chest tube.  COMPARISON: 08/15/2023 at 0632 hours.      Impression    IMPRESSION:   1.  Very tiny right apical pneumothorax measuring 0.3 cm from the visceral to parietal pleura at the apex of the right hemithorax, mildly decreased in size since the recent comparison study. A very small amount of subcutaneous gas is again noted in the   deep soft tissues of the right lateral chest wall. Of note, a right pleural drainage catheter is again in place with distal tip projecting over the inferior right hemithorax.  2.  No other significant interval change.      XR Chest Port 1 View    Narrative    EXAM: XR CHEST PORT 1 VIEW  LOCATION: [facility]  DATE: 8/17/2023    INDICATION: right chest tube for large pleural effusion  COMPARISON: 08/16/2023      Impression    IMPRESSION: stable cardiomediastinal silhouette. Right chest tube. Probable tiny right apical pneumothorax. Bibasilar atelectasis. Soft tissue emphysema right chest.   XR Chest Port 1 View    Narrative     EXAM: XR CHEST PORT 1 VIEW  LOCATION: [facility]  DATE: 2023    INDICATION: right chest tube for large pleural effusion  COMPARISON: 2023      Impression    IMPRESSION: No significant changes. Right basilar chest tube in place. No definite pneumothorax or sizable pleural effusion. Subcutaneous emphysema right chest wall.   Echocardiogram Complete     Value    LVEF  60-65%    [facility]    [id number]  [id number]  [id number]  [id number]^ALEXIS^DAVID^AMADEO     [facility]  Echocardiography Laboratory  [address] [place] [address]     Name: BHUPINDER LEES  MRN: [id number]  : 1965  Study Date: 2023 02:27 PM  Age: 58 yrs  Gender: Female  Patient Location: [facility]  Reason For Study: Pulmonary Edema  Ordering Physician: DAVID ESPINOZA  Performed By: Katlyn Arreaga RDCS     BSA: 2.0 m2  Height: 63 in  Weight: 217 lb  HR: 72  BP: 140/77 mmHg  ______________________________________________________________________________  Procedure  Complete Portable Echo Adult. Optison (NDC #1632-0924) given intravenously.  ______________________________________________________________________________  Interpretation Summary     Technically challenging study due to patient body habitus, even with the use  of contrast imaging.     Left ventricular systolic function is normal. The visual ejection fraction is  60-65%. Endocardial borders are not optimally visualized, however no clear  wall motion abnormalities are seen.  Right ventricle is not well visualized, however global systolic function is  probably normal.  No significant valvular abnormalities.  Inferior vena cava not well visualized for estimation of right atrial  pressure.  Mild aortic root dilatation. Max diameter of the visualized portion 4.3 cm.  The ascending aorta is Mildly dilated. Max diameter of the visualized portion  3.9 cm.     This study was compared to a TTE from 2022. There has been  no significant  change.  ______________________________________________________________________________  Left Ventricle  The left ventricle is normal in size. There is mild concentric left  ventricular hypertrophy. Left ventricular systolic function is normal. The  visual ejection fraction is 60-65%. Left ventricular diastolic function is  normal. Endocardial borders are not optimally visualized, however no clear  wall motion abnormalities are seen.     Right Ventricle  The right ventricle is not well visualized. Right ventricle is not well  visualized, however global systolic function is probably normal.     Atria  Normal left atrial size. Right atrial size is normal.     Mitral Valve  The mitral valve is normal in structure and function.     Tricuspid Valve  The tricuspid valve is not well visualized, but is grossly normal. Right  ventricular systolic pressure could not be approximated due to inadequate  tricuspid regurgitation.     Aortic Valve  The aortic valve is trileaflet with aortic valve sclerosis. No aortic  regurgitation is present.     Pulmonic Valve  The pulmonic valve is not well seen, but is grossly normal.     Vessels  Mild aortic root dilatation. Max diameter of the visualized portion 4.3 cm.  The ascending aorta is Mildly dilated. Max diameter of the visualized portion  3.9 cm. Inferior vena cava not well visualized for estimation of right atrial  pressure.     Pericardium  There is no pericardial effusion. Prominent pericardial/epicardial fat  present.     ______________________________________________________________________________  MMode/2D Measurements & Calculations  IVSd: 1.2 cm  LVIDd: 4.9 cm  LVIDs: 3.3 cm  LVPWd: 1.1 cm  FS: 32.9 %     LV mass(C)d: 214.6 grams  LV mass(C)dI: 107.2 grams/m2  Ao root diam: 4.3 cm  LA dimension: 3.9 cm  asc Aorta Diam: 3.9 cm  LA/Ao: 0.91  RWT: 0.45     Doppler Measurements & Calculations  MV E max lavonne: 68.7 cm/sec  MV A max lavonne: 83.0 cm/sec  MV E/A:  0.83  MV dec time: 0.25 sec  PA acc time: 0.12 sec  Lateral E/e': 6.2     ______________________________________________________________________________  Report approved by: Silvina Miller 08/12/2023 03:58 PM             Discharge Medications   Current Discharge Medication List        START taking these medications    Details   furosemide (LASIX) 40 MG tablet Take 1 tablet (40 mg) by mouth daily  Qty: 30 tablet, Refills: 0    Associated Diagnoses: 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 (H)      nicotine (COMMIT) 2 MG lozenge Place 1 lozenge (2 mg) inside cheek every hour as needed for other (nicotine withdrawal symptoms)  Qty: 30 lozenge, Refills: 0    Associated Diagnoses: Nicotine dependence with other nicotine-induced disorder, unspecified nicotine product type      spironolactone (ALDACTONE) 100 MG tablet Take 1 tablet (100 mg) by mouth daily  Qty: 30 tablet, Refills: 0    Associated Diagnoses: 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 Pawan Burns with phone number [phone].  Associated Diagnoses: Moderate pain           CONTINUE these medications which have NOT CHANGED    Details   acetaminophen (TYLENOL) 325 MG tablet Take 2 tablets (650 mg) by mouth every 8 hours as needed for mild pain or other (and adjunct with moderate or severe pain or per patient request)      ASPIRIN 81 PO Take 81 mg by mouth daily      blood glucose (NO BRAND SPECIFIED) test strip Use to test blood sugar 1 times daily or as directed. Glucocard Vital  Qty: 200 strip, Refills: 3    Associated Diagnoses: Type 2 diabetes mellitus without complication, without long-term current use of insulin (H)      Continuous Blood Gluc Sensor (FREESTYLE DIMITRIOS 3 SENSOR) MISC 1 Device continuous prn (change every 14 days)  Qty: 2 each, Refills: 5     Associated Diagnoses: Type 2 diabetes mellitus without complication, without long-term current use of insulin (H)      escitalopram (LEXAPRO) 10 MG tablet Take 1 tablet (10 mg) by mouth daily  Qty: 30 tablet, Refills: 5    Associated Diagnoses: Anxiety      insulin aspart (NOVOLOG PEN) 100 UNIT/ML pen Inject 3-6 Units Subcutaneous 3 times daily (with meals)  Qty: 15 mL, Refills: 5    Associated Diagnoses: Type 2 diabetes mellitus without complication, without long-term current use of insulin (H)      insulin glargine (LANTUS PEN) 100 UNIT/ML pen Inject 40 Units Subcutaneous daily  Qty: 15 mL, Refills: 5    Associated Diagnoses: Type 2 diabetes mellitus without complication, without long-term current use of insulin (H)      !! insulin pen needle (B-D U/F) 31G X 5 MM miscellaneous Use 4 pen needles daily or as directed.  Qty: 400 each, Refills: 3    Associated Diagnoses: Type 2 diabetes mellitus without complication, without long-term current use of insulin (H)      !! insulin pen needle (B-D U/F) 31G X 5 MM miscellaneous Use 1 pen needles daily or as directed.  Qty: 100 each, Refills: 3    Associated Diagnoses: Type 2 diabetes mellitus without complication, without long-term current use of insulin (H)      lactulose 20 GM/30ML solution Take 30 mLs (20 g) by mouth 3 times daily  Qty: 946 mL, Refills: 1    Associated Diagnoses: Hepatic encephalopathy (H)      levothyroxine (SYNTHROID/LEVOTHROID) 100 MCG tablet Take 1 tablet (100 mcg) by mouth daily  Qty: 90 tablet, Refills: 1    Associated Diagnoses: Acquired hypothyroidism      omeprazole (PRILOSEC) 10 MG DR capsule Take 10 mg by mouth daily      semaglutide (OZEMPIC) 2 MG/1.5ML SOPN pen Inject 0.5 mg weekly  Qty: 6 mL, Refills: 1    Associated Diagnoses: Type 2 diabetes mellitus without complication, without long-term current use of insulin (H)      STATIN NOT PRESCRIBED (INTENTIONAL) Please choose reason not prescribed from choices below.      !! thyroid (ARMOUR) 15  MG tablet Take 1 tablet (15 mg) by mouth daily  Qty: 90 tablet, Refills: 1    Associated Diagnoses: Acquired hypothyroidism      !! thyroid (ARMOUR) 60 MG tablet Take 1-tablet by mouth daily as directed  Qty: 90 tablet, Refills: 1    Associated Diagnoses: Acquired hypothyroidism      valACYclovir (VALTREX) 500 MG tablet Take 500 mg by mouth daily as needed      XIFAXAN 550 MG TABS tablet Take 1 tablet by mouth 2 times daily       !! - Potential duplicate medications found. Please discuss with provider.        STOP taking these medications       celecoxib (CELEBREX) 200 MG capsule Comments:   Reason for Stopping:         metFORMIN (GLUCOPHAGE-XR) 750 MG 24 hr tablet Comments:   Reason for Stopping:             Allergies   Allergies   Allergen Reactions    Cephalexin     Morphine      Other reaction(s): Other  Irritability, disorientation    Penicillins Itching     face    Amoxicillin Rash and Itching    Cefprozil Rash    Cefprozil Rash    Ceftazidime Itching and Rash    Ciprofloxacin Rash    Ciprofloxacin Itching and Rash     Tolerates levaquin     Percocet [Oxycodone-Acetaminophen] Nausea and Vomiting

## 2023-08-21 NOTE — PLAN OF CARE
Physical Therapy Discharge Summary    Reason for therapy discharge:    Discharged to transitional care facility.    Progress towards therapy goal(s). See goals on Care Plan in [facility] electronic health record for goal details.  Goals partially met.  Barriers to achieving goals:   discharge from facility.    Therapy recommendation(s):    Continued therapy is recommended.  Rationale/Recommendations:  Continued PT was recommended by treating therapist in context of OP PT. Will benefit from PT at TCU.

## 2023-08-22 ENCOUNTER — TRANSITIONAL CARE UNIT VISIT (OUTPATIENT)
Dept: GERIATRICS | Facility: CLINIC | Age: 58
End: 2023-08-22
Payer: COMMERCIAL

## 2023-08-22 ENCOUNTER — PATIENT OUTREACH (OUTPATIENT)
Dept: FAMILY MEDICINE | Facility: CLINIC | Age: 58
End: 2023-08-22

## 2023-08-22 VITALS
HEIGHT: 60 IN | HEART RATE: 76 BPM | OXYGEN SATURATION: 94 % | RESPIRATION RATE: 16 BRPM | SYSTOLIC BLOOD PRESSURE: 123 MMHG | BODY MASS INDEX: 45.45 KG/M2 | TEMPERATURE: 97.2 F | WEIGHT: 231.5 LBS | DIASTOLIC BLOOD PRESSURE: 68 MMHG

## 2023-08-22 DIAGNOSIS — E03.9 ACQUIRED HYPOTHYROIDISM: ICD-10-CM

## 2023-08-22 DIAGNOSIS — Z79.4 TYPE 2 DIABETES MELLITUS WITHOUT COMPLICATION, WITH LONG-TERM CURRENT USE OF INSULIN (H): ICD-10-CM

## 2023-08-22 DIAGNOSIS — R79.89 ELEVATED LFTS: ICD-10-CM

## 2023-08-22 DIAGNOSIS — K74.60 LIVER CIRRHOSIS SECONDARY TO NASH (H): ICD-10-CM

## 2023-08-22 DIAGNOSIS — K75.81 LIVER CIRRHOSIS SECONDARY TO NASH (H): ICD-10-CM

## 2023-08-22 DIAGNOSIS — R06.00 DYSPNEA, UNSPECIFIED TYPE: ICD-10-CM

## 2023-08-22 DIAGNOSIS — E11.9 TYPE 2 DIABETES MELLITUS WITHOUT COMPLICATION, WITH LONG-TERM CURRENT USE OF INSULIN (H): ICD-10-CM

## 2023-08-22 DIAGNOSIS — R53.81 PHYSICAL DECONDITIONING: ICD-10-CM

## 2023-08-22 DIAGNOSIS — J90 PLEURAL EFFUSION: Primary | ICD-10-CM

## 2023-08-22 DIAGNOSIS — J93.9 PNEUMOTHORAX, UNSPECIFIED TYPE: ICD-10-CM

## 2023-08-22 DIAGNOSIS — R06.03 ACUTE RESPIRATORY DISTRESS: ICD-10-CM

## 2023-08-22 DIAGNOSIS — M62.81 GENERALIZED MUSCLE WEAKNESS: ICD-10-CM

## 2023-08-22 PROCEDURE — 99309 SBSQ NF CARE MODERATE MDM 30: CPT

## 2023-08-22 NOTE — TELEPHONE ENCOUNTER
What type of discharge? Inpatient  Risk of Hospital admission or ED visit: 77%  Is a TCM episode required? Yes  When should the patient follow up with PCP? 7 days of discharge.    Dolores Lowry RN  [facility]

## 2023-08-22 NOTE — LETTER
"    8/22/2023        RE: Sudheer Montiel  [address]  [place] [address]        [facility]    PRIMARY CARE PROVIDER AND CLINIC:  Pawan Burns PA-C, [address] / [place] [address]  Chief Complaint   Patient presents with     [facility] Medical Record Number:  [id number]  Place of Service where encounter took place:  [facility] (TCU) [[id number]]    HPI:  Sudheer Montiel  is a 58 year old  (1965), admitted to the above facility from  [facility]. Hospital stay 8/11/23 through 8/21/23..       [facility]  Hospitalist Discharge Summary    Date of Admission:  8/11/2023  Date of Discharge:  8/21/2023    Hospital Course:  \"Sudheer Montiel is a 58 year old female admitted on 8/11/2023 with shortness of breath.     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 ~2800ml output after chest tube placement while patient still in ED. Effusion consistent with transudative based on fluid studies  *CT chest PE: no PE, large R effusion with associated atelectasis  *CXR: large R pleural effusion, near collapse of R Lung  Echo with LVEF of 60 to 65%.  No significant valvular abnormalities  CXR 8/18 - Right chest tube. Probable tiny right apical pneumothorax. Bibasilar atelectasis.  Subcutaneous emphysema right chest.   *8/20 CT removed  --[facility] signed off.  - IR consulted, given new right-sided hepatic hydrothorax recommend hepatology follow-up, possible TIPS, standing order for thoracocentesis.  Pleurx catheter considered if end-stage liver disease.  --Continue Lasix, spironolactone.   --Low-salt diet, fluid restriction to 2000 " "mL/day.  --Recommend repeat imaging of chest in 1 to 2 weeks for evaluation of fluid reaccumulation.     REYES cirrhosis decompensated  Elevated LFTs, variable/stable  Alk phos, tbili and AST similarly elevated as during June 2023 checks  Ultrasound liver with hepatic steatosis, no ascites.  Ultrasound Doppler Hepatic steatosis and cirrhosis. No focal masses. Normal abdominal liver duplex.  -- [facility] recommend diuresis, signed off.  Appreciate comanagement.  --Continue Lasix 40 mg oral daily with spironolactone 100 mg oral daily.  --Continue rifaximin twice daily.  --Continue lactulose 3 times daily, titrate to 2-3 loose stools per day.  --Recommended patient to follow-up with [facility], liver clinic as outpatient.     Diabetes mellitus with hemoglobin A1c of 6.9.    Follows with Dr Christianson of endocrine. Uses novolog 4U with small carb meal, 6U with large carb meal and sliding scale. Lantus in AM., metformin 1500mg in AM and ozempic weekly. Last A1C was 6.9 in June 2023.  - continue lantus 40 units daily, resume ozempic  - stop PTA metformin  - continue 3-6 units insulin aspart for carb counting with meals     Tobacco use  Smokes 4-6 cigarettes per day. Declined  - Emphasized abstinence from nicotine.     Hypothyroidism  Continue PTA supplements.     Anxiety  Stable. PTA lexapro continued.     Ascending aortic dilatation.  Echo with mild aortic root dilatation. Max diameter of the visualized portion 4.3 cm.  The ascending aorta is Mildly dilated. Max diameter of the visualized portion  3.9 cm.  Blood pressure management, follow-up as outpatient per protocol.     Mild hypokalemia replaced   K 3.3 > 3.6      Class III obesity with a BMI of 46.84  Increased all cause mortality and morbidity.  Consider lifestyle modification with diet and exercise  Follow up with PCP     Physical deconditioning from medical illness.    - discharge to TCU\".    Overall stabilized and patient discharged to the above " TCU on  in stable condition for rehab.    Today: Patient being seen for initial TCU visit.  Seen and examined in her TCU room.  Alert and oriented.  Reports doing great.  Lives with daughter at baseline and is planning on returning there.  Old chest tube site covered with pressure dressing and intact.  Appetite stable. BG in 100s. Vitals stable.  Denies headache, dizziness, chest pain, palpitations, chest pain, shortness of breath, abdominal pain or constipation.  Ambulating with staff and tolerating well.  No acute concerns at this time.    CODE STATUS/ADVANCE DIRECTIVES DISCUSSION:  CPR/Full code   ALLERGIES:   Allergies   Allergen Reactions     Cephalexin      Morphine      Other reaction(s): Other  Irritability, disorientation     Penicillins Itching     face     Amoxicillin Rash and Itching     Cefprozil Rash     Ceftazidime Itching and Rash     Ciprofloxacin Itching and Rash     Tolerates levaquin      Percocet [Oxycodone-Acetaminophen] Nausea and Vomiting      PAST MEDICAL HISTORY:   Past Medical History:   Diagnosis Date     Abnormal liver CT      Hypothyroid 80's     Necrotizing fasciitis (H)      Primary osteoarthritis of both knees      Soft tissue infection      Subcortical infarction (H)      Type 2 diabetes mellitus (H)       PAST SURGICAL HISTORY:   has a past surgical history that includes marisol/bso (); REPAIR CRUCIATE LIGAMENT,KNEE ();  section (); Colonoscopy (N/A, 10/21/2015); and Irrigation and debridement abdomen washout, combined (N/A, 10/12/2018).  FAMILY HISTORY: family history is not on file.  SOCIAL HISTORY:   reports that she has been smoking cigarettes. She has never used smokeless tobacco. She reports that she does not drink alcohol and does not use drugs.  Patient's living condition: lives with family, daughter     Post Discharge Medication Reconciliation Status:   MED REC REQUIRED  Post Medication Reconciliation Status:  Discharge medications reconciled, continue  medications without change       Current Outpatient Medications   Medication Sig     acetaminophen (TYLENOL) 325 MG tablet Take 2 tablets (650 mg) by mouth every 8 hours as needed for mild pain or other (and adjunct with moderate or severe pain or per patient request)     ASPIRIN 81 PO Take 81 mg by mouth daily     blood glucose (NO BRAND SPECIFIED) test strip Use to test blood sugar 1 times daily or as directed. Glucocard Vital     Continuous Blood Gluc Sensor (FREESTYLE DIMITRIOS 3 SENSOR) MISC 1 Device continuous prn (change every 14 days)     escitalopram (LEXAPRO) 10 MG tablet Take 1 tablet (10 mg) by mouth daily     furosemide (LASIX) 40 MG tablet Take 1 tablet (40 mg) by mouth daily     insulin aspart (NOVOLOG PEN) 100 UNIT/ML pen Inject 3-6 Units Subcutaneous 3 times daily (with meals)     insulin glargine (LANTUS PEN) 100 UNIT/ML pen Inject 40 Units Subcutaneous daily     insulin pen needle (B-D U/F) 31G X 5 MM miscellaneous Use 4 pen needles daily or as directed.     insulin pen needle (B-D U/F) 31G X 5 MM miscellaneous Use 1 pen needles daily or as directed.     lactulose 20 GM/30ML solution Take 30 mLs (20 g) by mouth 3 times daily     levothyroxine (SYNTHROID/LEVOTHROID) 100 MCG tablet Take 1 tablet (100 mcg) by mouth daily     omeprazole (PRILOSEC) 10 MG DR capsule Take 10 mg by mouth daily     semaglutide (OZEMPIC) 2 MG/1.5ML SOPN pen Inject 0.5 mg weekly (Patient taking differently: Inject 0.5 mg Subcutaneous every 7 days Wednesday)     spironolactone (ALDACTONE) 100 MG tablet Take 1 tablet (100 mg) by mouth daily     STATIN NOT PRESCRIBED (INTENTIONAL) Please choose reason not prescribed from choices below.     thyroid (ARMOUR) 15 MG tablet Take 1 tablet (15 mg) by mouth daily     thyroid (ARMOUR) 60 MG tablet Take 1-tablet by mouth daily as directed     traMADol (ULTRAM) 50 MG tablet Take 0.5-1 tablets (25-50 mg) by mouth every 6 hours as needed for moderate pain or severe pain     valACYclovir  (VALTREX) 500 MG tablet Take 500 mg by mouth daily as needed     XIFAXAN 550 MG TABS tablet Take 1 tablet by mouth 2 times daily     nicotine (COMMIT) 2 MG lozenge Place 1 lozenge (2 mg) inside cheek every hour as needed for other (nicotine withdrawal symptoms)     No current facility-administered medications for this visit.     ROS:  10 point ROS of systems including Constitutional, Eyes, Respiratory, Cardiovascular, Gastroenterology, Genitourinary, Integumentary, Musculoskeletal, Psychiatric were all negative except for pertinent positives noted in my HPI.    Vitals:  /68   Pulse 76   Temp 97.2  F (36.2  C)   Resp 16   Ht 1.524 m (5')   Wt 105 kg (231 lb 8 oz)   SpO2 94%   BMI 45.21 kg/m      Exam:  GENERAL APPEARANCE: In no acute distress, alert and oriented   HEENT-EARS: No discharge/drainage   HEENT-NECK: No lymphadenopathy  HEENT-EYES: PERRLA positive, no drainage/discharge  HEENT-NOSE/MOUTH/THROAT: No nasal drainage or erythema, mucous membranes moist   RESPIRATORY: Clear to auscultation, even and unlabored, symmetrical chest wall expansion  CARDIOVASCULAR: RRR, no peripheral edema, S1/S2 normal   GASTROINTESTINAL: Soft, nontender, nondistended, bowel sounds present x4 quadrants, large   MUSCULOSKELETAL: Moves all extremities fine  INTEGUMENTARY: Visualized areas intact  NEUROLOGICAL: Alert and oriented   PSYCHOLOGICAL: Cooperative       Lab/Diagnostic data:  Recent labs in [facility] reviewed by me today.     ASSESSMENT/PLAN:  Large right sided pleural effusion (hepatic hydrothorax) s/p chest tube 8/11  Small R apical pneumothorax 8/13 - improving  Acute hypoxic respiratory failure - improved   SOB-resolved  -No chest pain or shortness of breath, vitals stable, old chest tube site covered with pressure dressing and site intact  -Continue Lasix and spironolactone, continue low-salt diet and 2000 ml per day fluid restriction, check CMP, repeat chest x-ray as recommended, monitor for changes in  respiratory status    REYES cirrhosis decompensated  Elevated LFTs   -ALT 21, AST 46 on 8/20, imaging findings as above  -Continue Lasix, spironolactone, rifaximin, and lactulose, check CMP, GI follow-up    Type 2 diabetes mellitus  PVD  -A1c 6.9 in 6/2023, BG stable, metformin stopped inpatient per chart review  -Continue Lantus, mealtime aspart, and weekly Ozempic, continue aspirin, not on statin, monitor BG and adjust medications as indicated    Anxiety  -Mood stable since TCU admission  -Continue PTA Lexapro, monitor mood and behavior    Ascending aortic dilation, incidental findings  -BP stable  -Outpatient follow-up    GERD  -Continue PPI, monitor for heartburn    Hypothyroidism  -Continue PTA levothyroxine, periodic TSH, PCP f/u    Tobacco use  -Nicotine gum available, encourage cessation    Weakness  Deconditioning  -Continue therapies, monitor for safety      Orders:  CMP         Electronically signed by:  Bennie Wiggins DNP,TIFFANY,NILOP-BC.             The above note was completed in part using Dragon voice recognition software. Although reviewed after completion, some word and grammatical errors may occur. Please contact the author of this note with any questions.                     Sincerely,        TIFFANY Dinh CNP

## 2023-08-22 NOTE — PROGRESS NOTES
"[facility] GERIATRICS    PRIMARY CARE PROVIDER AND CLINIC:  Pawan Burns PA-C, [address] / [facility] [address]  Chief Complaint   Patient presents with    [facility] Medical Record Number:  [id number]  Place of Service where encounter took place:  [facility] (TCU) [[id number]]    HPI:  Sudheer Montiel  is a 58 year old  (1965), admitted to the above facility from  [facility]. Hospital stay 8/11/23 through 8/21/23..       [facility]  Hospitalist Discharge Summary    Date of Admission:  8/11/2023  Date of Discharge:  8/21/2023    Hospital Course:  \"Sudheer Montiel is a 58 year old female admitted on 8/11/2023 with shortness of breath.     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 ~2800ml output after chest tube placement while patient still in ED. Effusion consistent with transudative based on fluid studies  *CT chest PE: no PE, large R effusion with associated atelectasis  *CXR: large R pleural effusion, near collapse of R Lung  Echo with LVEF of 60 to 65%.  No significant valvular abnormalities  CXR 8/18 - Right chest tube. Probable tiny right apical pneumothorax. Bibasilar atelectasis.  Subcutaneous emphysema right chest.   *8/20 CT removed  --[facility] signed off.  - IR consulted, given new right-sided hepatic hydrothorax recommend hepatology follow-up, possible TIPS, standing order for thoracocentesis.  Pleurx catheter considered if end-stage liver disease.  --Continue Lasix, spironolactone.   --Low-salt diet, fluid restriction to 2000 mL/day.  --Recommend repeat imaging of chest in 1 to 2 weeks for evaluation of fluid reaccumulation.   " "  REYES cirrhosis decompensated  Elevated LFTs, variable/stable  Alk phos, tbili and AST similarly elevated as during June 2023 checks  Ultrasound liver with hepatic steatosis, no ascites.  Ultrasound Doppler Hepatic steatosis and cirrhosis. No focal masses. Normal abdominal liver duplex.  -- [facility] recommend diuresis, signed off.  Appreciate comanagement.  --Continue Lasix 40 mg oral daily with spironolactone 100 mg oral daily.  --Continue rifaximin twice daily.  --Continue lactulose 3 times daily, titrate to 2-3 loose stools per day.  --Recommended patient to follow-up with [facility], liver clinic as outpatient.     Diabetes mellitus with hemoglobin A1c of 6.9.    Follows with Dr Christianson of endocrine. Uses novolog 4U with small carb meal, 6U with large carb meal and sliding scale. Lantus in AM., metformin 1500mg in AM and ozempic weekly. Last A1C was 6.9 in June 2023.  - continue lantus 40 units daily, resume ozempic  - stop PTA metformin  - continue 3-6 units insulin aspart for carb counting with meals     Tobacco use  Smokes 4-6 cigarettes per day. Declined  - Emphasized abstinence from nicotine.     Hypothyroidism  Continue PTA supplements.     Anxiety  Stable. PTA lexapro continued.     Ascending aortic dilatation.  Echo with mild aortic root dilatation. Max diameter of the visualized portion 4.3 cm.  The ascending aorta is Mildly dilated. Max diameter of the visualized portion  3.9 cm.  Blood pressure management, follow-up as outpatient per protocol.     Mild hypokalemia replaced   K 3.3 > 3.6      Class III obesity with a BMI of 46.84  Increased all cause mortality and morbidity.  Consider lifestyle modification with diet and exercise  Follow up with PCP     Physical deconditioning from medical illness.    - discharge to TCU\".    Overall stabilized and patient discharged to the above TCU on 8/21 in stable condition for rehab.    Today: Patient being seen for initial TCU visit.  Seen and " examined in her TCU room.  Alert and oriented.  Reports doing great.  Lives with daughter at baseline and is planning on returning there.  Old chest tube site covered with pressure dressing and intact.  Appetite stable. BG in 100s. Vitals stable.  Denies headache, dizziness, chest pain, palpitations, chest pain, shortness of breath, abdominal pain or constipation.  Ambulating with staff and tolerating well.  No acute concerns at this time.    CODE STATUS/ADVANCE DIRECTIVES DISCUSSION:  CPR/Full code   ALLERGIES:   Allergies   Allergen Reactions    Cephalexin     Morphine      Other reaction(s): Other  Irritability, disorientation    Penicillins Itching     face    Amoxicillin Rash and Itching    Cefprozil Rash    Ceftazidime Itching and Rash    Ciprofloxacin Itching and Rash     Tolerates levaquin     Percocet [Oxycodone-Acetaminophen] Nausea and Vomiting      PAST MEDICAL HISTORY:   Past Medical History:   Diagnosis Date    Abnormal liver CT     Hypothyroid 80's    Necrotizing fasciitis (H)     Primary osteoarthritis of both knees     Soft tissue infection     Subcortical infarction (H)     Type 2 diabetes mellitus (H)       PAST SURGICAL HISTORY:   has a past surgical history that includes marisol/bso (); REPAIR CRUCIATE LIGAMENT,KNEE ();  section (); Colonoscopy (N/A, 10/21/2015); and Irrigation and debridement abdomen washout, combined (N/A, 10/12/2018).  FAMILY HISTORY: family history is not on file.  SOCIAL HISTORY:   reports that she has been smoking cigarettes. She has never used smokeless tobacco. She reports that she does not drink alcohol and does not use drugs.  Patient's living condition: lives with family, daughter     Post Discharge Medication Reconciliation Status:   MED REC REQUIRED  Post Medication Reconciliation Status:  Discharge medications reconciled, continue medications without change       Current Outpatient Medications   Medication Sig    acetaminophen (TYLENOL) 325 MG  tablet Take 2 tablets (650 mg) by mouth every 8 hours as needed for mild pain or other (and adjunct with moderate or severe pain or per patient request)    ASPIRIN 81 PO Take 81 mg by mouth daily    blood glucose (NO BRAND SPECIFIED) test strip Use to test blood sugar 1 times daily or as directed. Glucocard Vital    Continuous Blood Gluc Sensor (FREESTYLE DIMITRIOS 3 SENSOR) MISC 1 Device continuous prn (change every 14 days)    escitalopram (LEXAPRO) 10 MG tablet Take 1 tablet (10 mg) by mouth daily    furosemide (LASIX) 40 MG tablet Take 1 tablet (40 mg) by mouth daily    insulin aspart (NOVOLOG PEN) 100 UNIT/ML pen Inject 3-6 Units Subcutaneous 3 times daily (with meals)    insulin glargine (LANTUS PEN) 100 UNIT/ML pen Inject 40 Units Subcutaneous daily    insulin pen needle (B-D U/F) 31G X 5 MM miscellaneous Use 4 pen needles daily or as directed.    insulin pen needle (B-D U/F) 31G X 5 MM miscellaneous Use 1 pen needles daily or as directed.    lactulose 20 GM/30ML solution Take 30 mLs (20 g) by mouth 3 times daily    levothyroxine (SYNTHROID/LEVOTHROID) 100 MCG tablet Take 1 tablet (100 mcg) by mouth daily    omeprazole (PRILOSEC) 10 MG DR capsule Take 10 mg by mouth daily    semaglutide (OZEMPIC) 2 MG/1.5ML SOPN pen Inject 0.5 mg weekly (Patient taking differently: Inject 0.5 mg Subcutaneous every 7 days Wednesday)    spironolactone (ALDACTONE) 100 MG tablet Take 1 tablet (100 mg) by mouth daily    STATIN NOT PRESCRIBED (INTENTIONAL) Please choose reason not prescribed from choices below.    thyroid (ARMOUR) 15 MG tablet Take 1 tablet (15 mg) by mouth daily    thyroid (ARMOUR) 60 MG tablet Take 1-tablet by mouth daily as directed    traMADol (ULTRAM) 50 MG tablet Take 0.5-1 tablets (25-50 mg) by mouth every 6 hours as needed for moderate pain or severe pain    valACYclovir (VALTREX) 500 MG tablet Take 500 mg by mouth daily as needed    XIFAXAN 550 MG TABS tablet Take 1 tablet by mouth 2 times daily    nicotine  (COMMIT) 2 MG lozenge Place 1 lozenge (2 mg) inside cheek every hour as needed for other (nicotine withdrawal symptoms)     No current facility-administered medications for this visit.     ROS:  10 point ROS of systems including Constitutional, Eyes, Respiratory, Cardiovascular, Gastroenterology, Genitourinary, Integumentary, Musculoskeletal, Psychiatric were all negative except for pertinent positives noted in my HPI.    Vitals:  /68   Pulse 76   Temp 97.2  F (36.2  C)   Resp 16   Ht 1.524 m (5')   Wt 105 kg (231 lb 8 oz)   SpO2 94%   BMI 45.21 kg/m      Exam:  GENERAL APPEARANCE: In no acute distress, alert and oriented   HEENT-EARS: No discharge/drainage   HEENT-NECK: No lymphadenopathy  HEENT-EYES: PERRLA positive, no drainage/discharge  HEENT-NOSE/MOUTH/THROAT: No nasal drainage or erythema, mucous membranes moist   RESPIRATORY: Clear to auscultation, even and unlabored, symmetrical chest wall expansion  CARDIOVASCULAR: RRR, no peripheral edema, S1/S2 normal   GASTROINTESTINAL: Soft, nontender, nondistended, bowel sounds present x4 quadrants, large   MUSCULOSKELETAL: Moves all extremities fine  INTEGUMENTARY: Visualized areas intact  NEUROLOGICAL: Alert and oriented   PSYCHOLOGICAL: Cooperative       Lab/Diagnostic data:  Recent labs in [facility] reviewed by me today.     ASSESSMENT/PLAN:  Large right sided pleural effusion (hepatic hydrothorax) s/p chest tube 8/11  Small R apical pneumothorax 8/13 - improving  Acute hypoxic respiratory failure - improved   SOB-resolved  -No chest pain or shortness of breath, vitals stable, old chest tube site covered with pressure dressing and site intact  -Continue Lasix and spironolactone, continue low-salt diet and 2000 ml per day fluid restriction, check CMP, repeat chest x-ray as recommended, monitor for changes in respiratory status    REYES cirrhosis decompensated  Elevated LFTs   -ALT 21, AST 46 on 8/20, imaging findings as above  -Continue Lasix,  spironolactone, rifaximin, and lactulose, check CMP, GI follow-up    Type 2 diabetes mellitus  PVD  -A1c 6.9 in 6/2023, BG stable, metformin stopped inpatient per chart review  -Continue Lantus, mealtime aspart, and weekly Ozempic, continue aspirin, not on statin, monitor BG and adjust medications as indicated    Anxiety  -Mood stable since TCU admission  -Continue PTA Lexapro, monitor mood and behavior    Ascending aortic dilation, incidental findings  -BP stable  -Outpatient follow-up    GERD  -Continue PPI, monitor for heartburn    Hypothyroidism  -Continue PTA levothyroxine, periodic TSH, PCP f/u    Tobacco use  -Nicotine gum available, encourage cessation    Weakness  Deconditioning  -Continue therapies, monitor for safety      Orders:  CMP         Electronically signed by:  Bennie Wiggins,DARWIN,APRN,AGNP-BC.             The above note was completed in part using Dragon voice recognition software. Although reviewed after completion, some word and grammatical errors may occur. Please contact the author of this note with any questions.

## 2023-08-24 ENCOUNTER — TRANSITIONAL CARE UNIT VISIT (OUTPATIENT)
Dept: GERIATRICS | Facility: CLINIC | Age: 58
End: 2023-08-24
Payer: COMMERCIAL

## 2023-08-24 VITALS
SYSTOLIC BLOOD PRESSURE: 128 MMHG | HEIGHT: 63 IN | OXYGEN SATURATION: 95 % | TEMPERATURE: 97.6 F | WEIGHT: 231.8 LBS | DIASTOLIC BLOOD PRESSURE: 62 MMHG | HEART RATE: 80 BPM | BODY MASS INDEX: 41.07 KG/M2 | RESPIRATION RATE: 18 BRPM

## 2023-08-24 DIAGNOSIS — K74.60 LIVER CIRRHOSIS SECONDARY TO NASH (H): ICD-10-CM

## 2023-08-24 DIAGNOSIS — R79.89 ELEVATED LFTS: ICD-10-CM

## 2023-08-24 DIAGNOSIS — K75.81 LIVER CIRRHOSIS SECONDARY TO NASH (H): ICD-10-CM

## 2023-08-24 DIAGNOSIS — R06.03 ACUTE RESPIRATORY DISTRESS: ICD-10-CM

## 2023-08-24 DIAGNOSIS — J93.9 PNEUMOTHORAX, UNSPECIFIED TYPE: ICD-10-CM

## 2023-08-24 DIAGNOSIS — J90 PLEURAL EFFUSION: Primary | ICD-10-CM

## 2023-08-24 PROCEDURE — 99309 SBSQ NF CARE MODERATE MDM 30: CPT

## 2023-08-24 NOTE — LETTER
"    8/24/2023        RE: Sudheer Montiel  [address]  [place] [address]        [facility] GERIATRICS    Chief Complaint   Patient presents with     RECHECK     HPI:  Sudheer Montiel is a 58 year old  (1965), who is being seen today for an episodic care visit at: [facility] (U) [[id number]]. Today's concern is:  TCU Follow Up     Patient admitted to the above facility from  [facility]. Hospital stay 8/11/23 through 8/21/23.  Hospitalization as encrypted below from discharge summary.    [facility]  Hospitalist Discharge Summary    Date of Admission:  8/11/2023  Date of Discharge:  8/21/2023     Hospital Course:  \"Sudheer Montiel is a 58 year old female admitted on 8/11/2023 with shortness of breath.     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 ~2800ml output after chest tube placement while patient still in ED. Effusion consistent with transudative based on fluid studies  *CT chest PE: no PE, large R effusion with associated atelectasis  *CXR: large R pleural effusion, near collapse of R Lung  Echo with LVEF of 60 to 65%.  No significant valvular abnormalities  CXR 8/18 - Right chest tube. Probable tiny right apical pneumothorax. Bibasilar atelectasis.  Subcutaneous emphysema right chest.   *8/20 CT removed  --[facility] signed off.  - IR consulted, given new right-sided hepatic hydrothorax recommend hepatology follow-up, possible TIPS, standing order for thoracocentesis.  Pleurx catheter considered if end-stage liver disease.  --Continue Lasix, spironolactone.   --Low-salt diet, fluid restriction to 2000 mL/day.  --Recommend repeat imaging of chest in 1 to 2 " "weeks for evaluation of fluid reaccumulation.     REYES cirrhosis decompensated  Elevated LFTs, variable/stable  Alk phos, tbili and AST similarly elevated as during June 2023 checks  Ultrasound liver with hepatic steatosis, no ascites.  Ultrasound Doppler Hepatic steatosis and cirrhosis. No focal masses. Normal abdominal liver duplex.  -- [facility] recommend diuresis, signed off.  Appreciate comanagement.  --Continue Lasix 40 mg oral daily with spironolactone 100 mg oral daily.  --Continue rifaximin twice daily.  --Continue lactulose 3 times daily, titrate to 2-3 loose stools per day.  --Recommended patient to follow-up with [facility], liver clinic as outpatient.     Diabetes mellitus with hemoglobin A1c of 6.9.    Follows with Dr Christianson of endocrine. Uses novolog 4U with small carb meal, 6U with large carb meal and sliding scale. Lantus in AM., metformin 1500mg in AM and ozempic weekly. Last A1C was 6.9 in June 2023.  - continue lantus 40 units daily, resume ozempic  - stop PTA metformin  - continue 3-6 units insulin aspart for carb counting with meals     Tobacco use  Smokes 4-6 cigarettes per day. Declined  - Emphasized abstinence from nicotine.     Hypothyroidism  Continue PTA supplements.     Anxiety  Stable. PTA lexapro continued.     Ascending aortic dilatation.  Echo with mild aortic root dilatation. Max diameter of the visualized portion 4.3 cm.  The ascending aorta is Mildly dilated. Max diameter of the visualized portion  3.9 cm.  Blood pressure management, follow-up as outpatient per protocol.     Mild hypokalemia replaced   K 3.3 > 3.6      Class III obesity with a BMI of 46.84  Increased all cause mortality and morbidity.  Consider lifestyle modification with diet and exercise  Follow up with PCP     Physical deconditioning from medical illness.    - discharge to TCU\".     Overall stabilized and patient discharged to the above TCU on 8/21 in stable condition for rehab.     Today: " "Patient being seen for TCU follow-up visit.  No changes in condition since last visit.  Ambulating with therapy and tolerating well. Old chest tube site covered with pressure dressing and intact.  Appetite stable. BG stable.  Vitals stable.  Denies headache, dizziness, chest pain, palpitations, chest pain, shortness of breath, abdominal pain or constipation. No acute concerns at this time.    Allergies, and PMH/PSH reviewed in [facility] today.  REVIEW OF SYSTEMS:  4 point ROS including Respiratory, CV, GI and , other than that noted in the HPI,  is negative    Objective:   /62   Pulse 80   Temp 97.6  F (36.4  C)   Resp 18   Ht 1.6 m (5' 3\")   Wt 105.1 kg (231 lb 12.8 oz)   SpO2 95%   BMI 41.06 kg/m      Exam:  GENERAL APPEARANCE: In no acute distress, alert and awake   RESPIRATORY: Clear to auscultation, even and unlabored, symmetrical chest wall expansion  CARDIOVASCULAR: RRR, no peripheral edema, S1/S2 normal   GASTROINTESTINAL: Soft, nontender, nondistended, bowel sounds present x4 quadrants, large   NEUROLOGICAL: Alert and oriented x3  PSYCHOLOGICAL: Cooperative, calm      Recent labs in [facility] reviewed by me today.     Assessment/Plan:  Small R apical pneumothorax 8/13 - improving  Acute hypoxic respiratory failure - improved   SOB-resolved  -No chest pain or shortness of breath, room air, vitals stable, old chest tube site covered with pressure dressing and site intact  -Continue Lasix and spironolactone, continue low-salt diet and 2000 ml per day fluid restriction, follow CMP, repeat chest x-ray as recommended, monitor for changes in respiratory status     REYES cirrhosis decompensated  Elevated LFTs   -ALT 21, AST 46 on 8/20, imaging findings as above  -Continue Lasix, spironolactone, rifaximin, and lactulose, follow CMP, GI follow-up    MED REC REQUIRED  Post Medication Reconciliation Status:  Medication reconciliation previously completed during another office visit           Electronically signed " by:    Bennie Wiggins DNP,TIFFANY,NILOP-BC.           The above note was completed in part using Dragon voice recognition software. Although reviewed after completion, some word and grammatical errors may occur. Please contact the author of this note with any questions.           Sincerely,        TIFFANY Dinh CNP

## 2023-08-24 NOTE — PROGRESS NOTES
"[facility] GERIATRICS    Chief Complaint   Patient presents with    RECHECK     HPI:  Sudheer Montiel is a 58 year old  (1965), who is being seen today for an episodic care visit at: [facility] (U) [[id number]]. Today's concern is:  TCU Follow Up     Patient admitted to the above facility from  [facility]. Hospital stay 8/11/23 through 8/21/23.  Hospitalization as encrypted below from discharge summary.    [facility]  Hospitalist Discharge Summary    Date of Admission:  8/11/2023  Date of Discharge:  8/21/2023     Hospital Course:  \"Sudheer Montiel is a 58 year old female admitted on 8/11/2023 with shortness of breath.     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 ~2800ml output after chest tube placement while patient still in ED. Effusion consistent with transudative based on fluid studies  *CT chest PE: no PE, large R effusion with associated atelectasis  *CXR: large R pleural effusion, near collapse of R Lung  Echo with LVEF of 60 to 65%.  No significant valvular abnormalities  CXR 8/18 - Right chest tube. Probable tiny right apical pneumothorax. Bibasilar atelectasis.  Subcutaneous emphysema right chest.   *8/20 CT removed  --[facility] signed off.  - IR consulted, given new right-sided hepatic hydrothorax recommend hepatology follow-up, possible TIPS, standing order for thoracocentesis.  Pleurx catheter considered if end-stage liver disease.  --Continue Lasix, spironolactone.   --Low-salt diet, fluid restriction to 2000 mL/day.  --Recommend repeat imaging of chest in 1 to 2 weeks for evaluation of fluid reaccumulation.     REYES cirrhosis decompensated  Elevated LFTs, " "variable/stable  Alk phos, tbili and AST similarly elevated as during June 2023 checks  Ultrasound liver with hepatic steatosis, no ascites.  Ultrasound Doppler Hepatic steatosis and cirrhosis. No focal masses. Normal abdominal liver duplex.  -- [facility] recommend diuresis, signed off.  Appreciate comanagement.  --Continue Lasix 40 mg oral daily with spironolactone 100 mg oral daily.  --Continue rifaximin twice daily.  --Continue lactulose 3 times daily, titrate to 2-3 loose stools per day.  --Recommended patient to follow-up with [facility], liver clinic as outpatient.     Diabetes mellitus with hemoglobin A1c of 6.9.    Follows with Dr Christianson of endocrine. Uses novolog 4U with small carb meal, 6U with large carb meal and sliding scale. Lantus in AM., metformin 1500mg in AM and ozempic weekly. Last A1C was 6.9 in June 2023.  - continue lantus 40 units daily, resume ozempic  - stop PTA metformin  - continue 3-6 units insulin aspart for carb counting with meals     Tobacco use  Smokes 4-6 cigarettes per day. Declined  - Emphasized abstinence from nicotine.     Hypothyroidism  Continue PTA supplements.     Anxiety  Stable. PTA lexapro continued.     Ascending aortic dilatation.  Echo with mild aortic root dilatation. Max diameter of the visualized portion 4.3 cm.  The ascending aorta is Mildly dilated. Max diameter of the visualized portion  3.9 cm.  Blood pressure management, follow-up as outpatient per protocol.     Mild hypokalemia replaced   K 3.3 > 3.6      Class III obesity with a BMI of 46.84  Increased all cause mortality and morbidity.  Consider lifestyle modification with diet and exercise  Follow up with PCP     Physical deconditioning from medical illness.    - discharge to TCU\".     Overall stabilized and patient discharged to the above TCU on 8/21 in stable condition for rehab.     Today: Patient being seen for TCU follow-up visit.  No changes in condition since last visit.  " "Ambulating with therapy and tolerating well. Old chest tube site covered with pressure dressing and intact.  Appetite stable. BG stable.  Vitals stable.  Denies headache, dizziness, chest pain, palpitations, chest pain, shortness of breath, abdominal pain or constipation. No acute concerns at this time.    Allergies, and PMH/PSH reviewed in [facility] today.  REVIEW OF SYSTEMS:  4 point ROS including Respiratory, CV, GI and , other than that noted in the HPI,  is negative    Objective:   /62   Pulse 80   Temp 97.6  F (36.4  C)   Resp 18   Ht 1.6 m (5' 3\")   Wt 105.1 kg (231 lb 12.8 oz)   SpO2 95%   BMI 41.06 kg/m      Exam:  GENERAL APPEARANCE: In no acute distress, alert and awake   RESPIRATORY: Clear to auscultation, even and unlabored, symmetrical chest wall expansion  CARDIOVASCULAR: RRR, no peripheral edema, S1/S2 normal   GASTROINTESTINAL: Soft, nontender, nondistended, bowel sounds present x4 quadrants, large   NEUROLOGICAL: Alert and oriented x3  PSYCHOLOGICAL: Cooperative, calm      Recent labs in [facility] reviewed by me today.     Assessment/Plan:  Small R apical pneumothorax 8/13 - improving  Acute hypoxic respiratory failure - improved   SOB-resolved  -No chest pain or shortness of breath, room air, vitals stable, old chest tube site covered with pressure dressing and site intact  -Continue Lasix and spironolactone, continue low-salt diet and 2000 ml per day fluid restriction, follow CMP, repeat chest x-ray as recommended, monitor for changes in respiratory status     REYES cirrhosis decompensated  Elevated LFTs   -ALT 21, AST 46 on 8/20, imaging findings as above  -Continue Lasix, spironolactone, rifaximin, and lactulose, follow CMP, GI follow-up    MED REC REQUIRED  Post Medication Reconciliation Status:  Medication reconciliation previously completed during another office visit           Electronically signed by:    Bennie Wiggins,DARWIN,APRN,AGNP-BC.           The above note was completed in part using " Dragon voice recognition software. Although reviewed after completion, some word and grammatical errors may occur. Please contact the author of this note with any questions.

## 2023-08-25 NOTE — TELEPHONE ENCOUNTER
Pt is at TCU closing encounter    Tamara ANDERSON, Triage RN  [facility]

## 2023-08-27 ENCOUNTER — LAB REQUISITION (OUTPATIENT)
Dept: LAB | Facility: CLINIC | Age: 58
End: 2023-08-27
Payer: COMMERCIAL

## 2023-08-27 DIAGNOSIS — R94.5 ABNORMAL RESULTS OF LIVER FUNCTION STUDIES: ICD-10-CM

## 2023-08-28 ENCOUNTER — TELEPHONE (OUTPATIENT)
Dept: GERIATRICS | Facility: CLINIC | Age: 58
End: 2023-08-28

## 2023-08-28 ENCOUNTER — TRANSITIONAL CARE UNIT VISIT (OUTPATIENT)
Dept: GERIATRICS | Facility: CLINIC | Age: 58
End: 2023-08-28
Payer: COMMERCIAL

## 2023-08-28 VITALS
TEMPERATURE: 97.9 F | HEIGHT: 63 IN | BODY MASS INDEX: 40.93 KG/M2 | DIASTOLIC BLOOD PRESSURE: 74 MMHG | SYSTOLIC BLOOD PRESSURE: 118 MMHG | WEIGHT: 231 LBS | HEART RATE: 79 BPM | OXYGEN SATURATION: 94 % | RESPIRATION RATE: 16 BRPM

## 2023-08-28 DIAGNOSIS — J90 PLEURAL EFFUSION: Primary | ICD-10-CM

## 2023-08-28 DIAGNOSIS — E11.9 TYPE 2 DIABETES MELLITUS WITHOUT COMPLICATION, WITH LONG-TERM CURRENT USE OF INSULIN (H): ICD-10-CM

## 2023-08-28 DIAGNOSIS — K74.60 LIVER CIRRHOSIS SECONDARY TO NASH (H): ICD-10-CM

## 2023-08-28 DIAGNOSIS — K75.81 LIVER CIRRHOSIS SECONDARY TO NASH (H): ICD-10-CM

## 2023-08-28 DIAGNOSIS — Z79.4 TYPE 2 DIABETES MELLITUS WITHOUT COMPLICATION, WITH LONG-TERM CURRENT USE OF INSULIN (H): ICD-10-CM

## 2023-08-28 DIAGNOSIS — E03.9 ACQUIRED HYPOTHYROIDISM: ICD-10-CM

## 2023-08-28 LAB
ALBUMIN SERPL BCG-MCNC: 3 G/DL (ref 3.5–5.2)
ALP SERPL-CCNC: 187 U/L (ref 35–104)
ALT SERPL W P-5'-P-CCNC: 28 U/L (ref 0–50)
ANION GAP SERPL CALCULATED.3IONS-SCNC: 10 MMOL/L (ref 7–15)
AST SERPL W P-5'-P-CCNC: 55 U/L (ref 0–45)
BILIRUB SERPL-MCNC: 1.1 MG/DL
BUN SERPL-MCNC: 9.5 MG/DL (ref 8–23)
CALCIUM SERPL-MCNC: 8.4 MG/DL (ref 8.6–10)
CHLORIDE SERPL-SCNC: 103 MMOL/L (ref 98–107)
CREAT SERPL-MCNC: 0.69 MG/DL (ref 0.51–0.95)
DEPRECATED HCO3 PLAS-SCNC: 26 MMOL/L (ref 22–29)
GFR SERPL CREATININE-BSD FRML MDRD: >90 ML/MIN/1.73M2
GLUCOSE SERPL-MCNC: 153 MG/DL (ref 70–99)
POTASSIUM SERPL-SCNC: 3.7 MMOL/L (ref 3.4–5.3)
PROT SERPL-MCNC: 6.5 G/DL (ref 6.4–8.3)
SODIUM SERPL-SCNC: 139 MMOL/L (ref 136–145)

## 2023-08-28 PROCEDURE — 99305 1ST NF CARE MODERATE MDM 35: CPT | Performed by: INTERNAL MEDICINE

## 2023-08-28 PROCEDURE — 36415 COLL VENOUS BLD VENIPUNCTURE: CPT | Mod: ORL | Performed by: INTERNAL MEDICINE

## 2023-08-28 PROCEDURE — 80053 COMPREHEN METABOLIC PANEL: CPT | Mod: ORL | Performed by: INTERNAL MEDICINE

## 2023-08-28 PROCEDURE — P9604 ONE-WAY ALLOW PRORATED TRIP: HCPCS | Mod: ORL | Performed by: INTERNAL MEDICINE

## 2023-08-28 NOTE — TELEPHONE ENCOUNTER
[facility] Triage Nurse Telephone Encounter    Provider: Leroy Mckenzie MD  Facility: [facility] Facility Type:  TCU    Caller: Innocent  Call Back Number: [phone]    Allergies:    Allergies   Allergen Reactions    Cephalexin     Morphine      Other reaction(s): Other  Irritability, disorientation    Penicillins Itching     face    Amoxicillin Rash and Itching    Cefprozil Rash    Ceftazidime Itching and Rash    Ciprofloxacin Itching and Rash     Tolerates levaquin     Percocet [Oxycodone-Acetaminophen] Nausea and Vomiting        Reason for call: Nurse is calling with chest x-ray results.  See results below:          Verbal Order/Direction given by Provider: No new orders.      Provider giving Order:  Leroy Mckenzie MD    Verbal Order given to: Loboocent    Stiven Crawford RN

## 2023-08-28 NOTE — PROGRESS NOTES
[facility] GERIATRICS    PRIMARY CARE PROVIDER AND CLINIC:  Pawan Burns PA-C, [address] [facility] [address] / [facility] [address]  Chief Complaint   Patient presents with    Hospital F/U     MD Initial      [place] [facility] Record Number:  [id number]  Place of Service where encounter took place:  [facility] (TCU)     Sudheer Montiel  is a 58 year old  (1965), admitted to the above facility from  [facility]. Hospital stay 8/11/23 through 8/21/23..     Hospital course was reviewed by me, is as per the hospital discharge summary and nurse practitioner note.    Patient is a past medical history of REYES cirrhosis, diabetes mellitus type 2, hypothyroidism, obesity, ascending aortic dilatation, anxiety.    She was hospitalized for the management of acute hypoxic respiratory failure secondary to a large right pleural effusion.  She required chest tube placement with return of 2800 mL of fluid, consistent with transudate.  Echocardiogram unremarkable.  Chest tube was removed on 8/20/2023.  Transit felt to be secondary to chronic liver disease.  Patient was maintained on furosemide, spironolactone as well as a low-salt, fluid restriction diet.  She was seen by GI who recommended continue diuresis and continued management with rifaximin and lactulose, with outpatient liver clinic follow-up.    Blood glucoses were stable.  Prior to admission metformin was discontinued.  She remains on Lantus, aspart and Ozempic.    Patient states she feels improved, denies shortness of breath at rest or with activity.  Main complaint is that of fatigue which is chronic.  Denies cough, fevers, chills, nausea, vomiting, abdominal pain or distention.  She is having approximate 3 soft loose stools per day.  Appetite has been good.  Vital signs blood glucoses have been stable.    Patient hopes to discharge soon to home where she lives with her daughter.    CODE STATUS/ADVANCE DIRECTIVES  DISCUSSION:  CPR/Full code   ALLERGIES:   Allergies   Allergen Reactions    Cephalexin     Morphine      Other reaction(s): Other  Irritability, disorientation    Penicillins Itching     face    Amoxicillin Rash and Itching    Cefprozil Rash    Ceftazidime Itching and Rash    Ciprofloxacin Itching and Rash     Tolerates levaquin     Percocet [Oxycodone-Acetaminophen] Nausea and Vomiting      PAST MEDICAL HISTORY:   Past Medical History:   Diagnosis Date    Abnormal liver CT     Hypothyroid 80's    Necrotizing fasciitis (H)     Primary osteoarthritis of both knees     Soft tissue infection     Subcortical infarction (H)     Type 2 diabetes mellitus (H)       PAST SURGICAL HISTORY:   has a past surgical history that includes marisol/bso (); REPAIR CRUCIATE LIGAMENT,KNEE ();  section (); Colonoscopy (N/A, 10/21/2015); and Irrigation and debridement abdomen washout, combined (N/A, 10/12/2018).  FAMILY HISTORY: family history is not on file.  SOCIAL HISTORY:   reports that she has been smoking cigarettes. She has never used smokeless tobacco. She reports that she does not drink alcohol and does not use drugs.  Patient's living condition: lives with family, daughter     Current medications were reviewed by me today:    Current Outpatient Medications   Medication Sig    acetaminophen (TYLENOL) 325 MG tablet Take 2 tablets (650 mg) by mouth every 8 hours as needed for mild pain or other (and adjunct with moderate or severe pain or per patient request)    ASPIRIN 81 PO Take 81 mg by mouth daily    blood glucose (NO BRAND SPECIFIED) test strip Use to test blood sugar 1 times daily or as directed. Glucocard Vital    Continuous Blood Gluc Sensor (FREESTYLE DIMITRIOS 3 SENSOR) MISC 1 Device continuous prn (change every 14 days)    escitalopram (LEXAPRO) 10 MG tablet Take 1 tablet (10 mg) by mouth daily    furosemide (LASIX) 40 MG tablet Take 1 tablet (40 mg) by mouth daily    insulin aspart (NOVOLOG PEN) 100 UNIT/ML  "pen Inject 3-6 Units Subcutaneous 3 times daily (with meals)    insulin glargine (LANTUS PEN) 100 UNIT/ML pen Inject 40 Units Subcutaneous daily    insulin pen needle (B-D U/F) 31G X 5 MM miscellaneous Use 4 pen needles daily or as directed.    insulin pen needle (B-D U/F) 31G X 5 MM miscellaneous Use 1 pen needles daily or as directed.    lactulose 20 GM/30ML solution Take 30 mLs (20 g) by mouth 3 times daily    levothyroxine (SYNTHROID/LEVOTHROID) 100 MCG tablet Take 1 tablet (100 mcg) by mouth daily    nicotine (COMMIT) 2 MG lozenge Place 1 lozenge (2 mg) inside cheek every hour as needed for other (nicotine withdrawal symptoms)    omeprazole (PRILOSEC) 10 MG DR capsule Take 10 mg by mouth daily    semaglutide (OZEMPIC) 2 MG/1.5ML SOPN pen Inject 0.5 mg weekly (Patient taking differently: Inject 0.5 mg Subcutaneous every 7 days Wednesday)    spironolactone (ALDACTONE) 100 MG tablet Take 1 tablet (100 mg) by mouth daily    STATIN NOT PRESCRIBED (INTENTIONAL) Please choose reason not prescribed from choices below.    thyroid (ARMOUR) 15 MG tablet Take 1 tablet (15 mg) by mouth daily    thyroid (ARMOUR) 60 MG tablet Take 1-tablet by mouth daily as directed    traMADol (ULTRAM) 50 MG tablet Take 0.5-1 tablets (25-50 mg) by mouth every 6 hours as needed for moderate pain or severe pain    valACYclovir (VALTREX) 500 MG tablet Take 500 mg by mouth daily as needed    XIFAXAN 550 MG TABS tablet Take 1 tablet by mouth 2 times daily     No current facility-administered medications for this visit.       ROS:  10 point ROS of systems including Constitutional, Eyes, Respiratory, Cardiovascular, Gastroenterology, Genitourinary, Integumentary, Musculoskeletal, Psychiatric were all negative except for pertinent positives noted in my HPI.    Vitals:  /74   Pulse 79   Temp 97.9  F (36.6  C)   Resp 16   Ht 1.6 m (5' 3\")   Wt 104.8 kg (231 lb)   SpO2 94%   BMI 40.92 kg/m    Exam:  Very pleasant, obese female, sitting at " the bedside in her room.  She appears comfortable.  HEENT: Conjunctiva anicteric  Neck supple  Oral mucosa moist  Lungs decreased breath sounds right base.  Respiratory rate 12, unlabored at rest.  CV: Regular rhythm  Abdomen soft, protuberant.  No obvious ascites.  Extremities without edema  Neuro: Alert, fully oriented, quite pleasant.  No tremor or asterixis.  No focal weakness.  Gait was not assessed.    Lab/Diagnostic data:  Most Recent 3 CBC's:  Recent Labs   Lab Test 08/20/23  0554 08/17/23  0547 08/15/23  0535 08/12/23  0525   WBC 5.6  --  4.7 6.6   HGB 14.4  --  14.5 13.2   MCV 96  --  96 96    137* 125* 100*     Most Recent 3 BMP's:  Recent Labs   Lab Test 08/21/23  0740 08/21/23  0210 08/20/23  2223 08/20/23  1131 08/20/23  0554 08/19/23  1701 08/19/23  1653 08/18/23  1131 08/18/23  0600 08/17/23  0816 08/17/23  0547 08/15/23  0729 08/15/23  0535   NA  --   --   --   --  136  --   --   --  136  --   --   --  139   POTASSIUM  --   --   --   --  3.8  --  3.9  --  3.8  --  3.5   < > 3.7   CHLORIDE  --   --   --   --  100  --   --   --  101  --   --   --  103   CO2  --   --   --   --  24  --   --   --  26  --   --   --  26   BUN  --   --   --   --  12.6  --   --   --  12.1  --   --   --  10.5   CR  --   --   --   --  0.55  --   --   --  0.58  --  0.55  --  0.52   ANIONGAP  --   --   --   --  12  --   --   --  9  --   --   --  10   JOANN  --   --   --   --  8.3*  --   --   --  8.2*  --   --   --  8.6   * 118* 172*   < > 126*   < >  --    < > 127*   < >  --    < > 120*    < > = values in this interval not displayed.     Most Recent 3 INR's:  Recent Labs   Lab Test 08/11/23  1647 06/06/23  1016 05/01/23  1217   INR 1.17* 1.18* 1.20*     Most Recent Hemoglobin A1c:  Recent Labs   Lab Test 06/06/23  1016   A1C 6.9*       ASSESSMENT/PLAN:    Acute hypoxic respiratory failure secondary to large transudative right pleural effusion secondary to liver cirrhosis.  Respiratory status stable following large  volume thoracentesis during hospitalization.  Continued evidence of pleural effusion on exam today however patient is minimally if at all symptomatic  Hopefully, pleural effusion can be managed with diuretics.  Plan: Monitor respiratory status.  Repeat chest x-ray if any worsening respiratory symptoms or change in exam  Therapies    REYES cirrhosis in the setting of obesity diabetes mellitus type 2  No obvious ascites on exam  Mental status appears intact  Plan: Continue rifaximin and lactulose.  Patient is aware of the need to continue lactulose.  Continue furosemide and spironolactone  Monitor electrolytes and renal function, liver function tests closely.hepatology clinic follow-up    Diabetes mellitus type 2  Controlled on Lantus/aspart and Ozempic  Off metformin  Plan: Monitor blood glucoses    History of ascending aortic dilatation.  Mild aortic root dilatation  Plan: Monitor blood pressure.  Outpatient follow-up    Hypothyroidism  On replacement.  Plan: Monitor thyroid function tests    Obesity  Plan: Dietitian to see      Leroy Mckenzie MD

## 2023-08-28 NOTE — LETTER
8/28/2023        RE: Sudheer Montiel  [address]  [place] [address]        [facility] GERIATRICS    PRIMARY CARE PROVIDER AND CLINIC:  Pawan Burns PA-C, [address] [facility] [address] / [place] [address]  Chief Complaint   Patient presents with     Hospital F/U     MD Initial      [place] Medical Record Number:  [id number]  Place of Service where encounter took place:  [facility] (TCU)     Sudheer Montiel  is a 58 year old  (1965), admitted to the above facility from  [facility]. Hospital stay 8/11/23 through 8/21/23..     Hospital course was reviewed by me, is as per the hospital discharge summary and nurse practitioner note.    Patient is a past medical history of REYES cirrhosis, diabetes mellitus type 2, hypothyroidism, obesity, ascending aortic dilatation, anxiety.    She was hospitalized for the management of acute hypoxic respiratory failure secondary to a large right pleural effusion.  She required chest tube placement with return of 2800 mL of fluid, consistent with transudate.  Echocardiogram unremarkable.  Chest tube was removed on 8/20/2023.  Transit felt to be secondary to chronic liver disease.  Patient was maintained on furosemide, spironolactone as well as a low-salt, fluid restriction diet.  She was seen by GI who recommended continue diuresis and continued management with rifaximin and lactulose, with outpatient liver clinic follow-up.    Blood glucoses were stable.  Prior to admission metformin was discontinued.  She remains on Lantus, aspart and Ozempic.    Patient states she feels improved, denies shortness of breath at rest or with activity.  Main complaint is that of fatigue which is chronic.  Denies cough, fevers, chills, nausea, vomiting, abdominal pain or distention.  She is having approximate 3 soft loose stools per day.  Appetite has been good.  Vital signs blood glucoses have been stable.    Patient hopes to  discharge soon to home where she lives with her daughter.    CODE STATUS/ADVANCE DIRECTIVES DISCUSSION:  CPR/Full code   ALLERGIES:   Allergies   Allergen Reactions     Cephalexin      Morphine      Other reaction(s): Other  Irritability, disorientation     Penicillins Itching     face     Amoxicillin Rash and Itching     Cefprozil Rash     Ceftazidime Itching and Rash     Ciprofloxacin Itching and Rash     Tolerates levaquin      Percocet [Oxycodone-Acetaminophen] Nausea and Vomiting      PAST MEDICAL HISTORY:   Past Medical History:   Diagnosis Date     Abnormal liver CT      Hypothyroid 80's     Necrotizing fasciitis (H)      Primary osteoarthritis of both knees      Soft tissue infection      Subcortical infarction (H)      Type 2 diabetes mellitus (H)       PAST SURGICAL HISTORY:   has a past surgical history that includes marisol/bso (); REPAIR CRUCIATE LIGAMENT,KNEE ();  section (); Colonoscopy (N/A, 10/21/2015); and Irrigation and debridement abdomen washout, combined (N/A, 10/12/2018).  FAMILY HISTORY: family history is not on file.  SOCIAL HISTORY:   reports that she has been smoking cigarettes. She has never used smokeless tobacco. She reports that she does not drink alcohol and does not use drugs.  Patient's living condition: lives with family, daughter     Current medications were reviewed by me today:    Current Outpatient Medications   Medication Sig     acetaminophen (TYLENOL) 325 MG tablet Take 2 tablets (650 mg) by mouth every 8 hours as needed for mild pain or other (and adjunct with moderate or severe pain or per patient request)     ASPIRIN 81 PO Take 81 mg by mouth daily     blood glucose (NO BRAND SPECIFIED) test strip Use to test blood sugar 1 times daily or as directed. Glucocard Vital     Continuous Blood Gluc Sensor (FREESTYLE DIMITRIOS 3 SENSOR) MISC 1 Device continuous prn (change every 14 days)     escitalopram (LEXAPRO) 10 MG tablet Take 1 tablet (10 mg) by mouth daily      furosemide (LASIX) 40 MG tablet Take 1 tablet (40 mg) by mouth daily     insulin aspart (NOVOLOG PEN) 100 UNIT/ML pen Inject 3-6 Units Subcutaneous 3 times daily (with meals)     insulin glargine (LANTUS PEN) 100 UNIT/ML pen Inject 40 Units Subcutaneous daily     insulin pen needle (B-D U/F) 31G X 5 MM miscellaneous Use 4 pen needles daily or as directed.     insulin pen needle (B-D U/F) 31G X 5 MM miscellaneous Use 1 pen needles daily or as directed.     lactulose 20 GM/30ML solution Take 30 mLs (20 g) by mouth 3 times daily     levothyroxine (SYNTHROID/LEVOTHROID) 100 MCG tablet Take 1 tablet (100 mcg) by mouth daily     nicotine (COMMIT) 2 MG lozenge Place 1 lozenge (2 mg) inside cheek every hour as needed for other (nicotine withdrawal symptoms)     omeprazole (PRILOSEC) 10 MG DR capsule Take 10 mg by mouth daily     semaglutide (OZEMPIC) 2 MG/1.5ML SOPN pen Inject 0.5 mg weekly (Patient taking differently: Inject 0.5 mg Subcutaneous every 7 days Wednesday)     spironolactone (ALDACTONE) 100 MG tablet Take 1 tablet (100 mg) by mouth daily     STATIN NOT PRESCRIBED (INTENTIONAL) Please choose reason not prescribed from choices below.     thyroid (ARMOUR) 15 MG tablet Take 1 tablet (15 mg) by mouth daily     thyroid (ARMOUR) 60 MG tablet Take 1-tablet by mouth daily as directed     traMADol (ULTRAM) 50 MG tablet Take 0.5-1 tablets (25-50 mg) by mouth every 6 hours as needed for moderate pain or severe pain     valACYclovir (VALTREX) 500 MG tablet Take 500 mg by mouth daily as needed     XIFAXAN 550 MG TABS tablet Take 1 tablet by mouth 2 times daily     No current facility-administered medications for this visit.       ROS:  10 point ROS of systems including Constitutional, Eyes, Respiratory, Cardiovascular, Gastroenterology, Genitourinary, Integumentary, Musculoskeletal, Psychiatric were all negative except for pertinent positives noted in my HPI.    Vitals:  /74   Pulse 79   Temp 97.9  F (36.6  C)    "Resp 16   Ht 1.6 m (5' 3\")   Wt 104.8 kg (231 lb)   SpO2 94%   BMI 40.92 kg/m    Exam:  Very pleasant, obese female, sitting at the bedside in her room.  She appears comfortable.  HEENT: Conjunctiva anicteric  Neck supple  Oral mucosa moist  Lungs decreased breath sounds right base.  Respiratory rate 12, unlabored at rest.  CV: Regular rhythm  Abdomen soft, protuberant.  No obvious ascites.  Extremities without edema  Neuro: Alert, fully oriented, quite pleasant.  No tremor or asterixis.  No focal weakness.  Gait was not assessed.    Lab/Diagnostic data:  Most Recent 3 CBC's:  Recent Labs   Lab Test 08/20/23  0554 08/17/23  0547 08/15/23  0535 08/12/23  0525   WBC 5.6  --  4.7 6.6   HGB 14.4  --  14.5 13.2   MCV 96  --  96 96    137* 125* 100*     Most Recent 3 BMP's:  Recent Labs   Lab Test 08/21/23  0740 08/21/23  0210 08/20/23  2223 08/20/23  1131 08/20/23  0554 08/19/23  1701 08/19/23  1653 08/18/23  1131 08/18/23  0600 08/17/23  0816 08/17/23  0547 08/15/23  0729 08/15/23  0535   NA  --   --   --   --  136  --   --   --  136  --   --   --  139   POTASSIUM  --   --   --   --  3.8  --  3.9  --  3.8  --  3.5   < > 3.7   CHLORIDE  --   --   --   --  100  --   --   --  101  --   --   --  103   CO2  --   --   --   --  24  --   --   --  26  --   --   --  26   BUN  --   --   --   --  12.6  --   --   --  12.1  --   --   --  10.5   CR  --   --   --   --  0.55  --   --   --  0.58  --  0.55  --  0.52   ANIONGAP  --   --   --   --  12  --   --   --  9  --   --   --  10   JOANN  --   --   --   --  8.3*  --   --   --  8.2*  --   --   --  8.6   * 118* 172*   < > 126*   < >  --    < > 127*   < >  --    < > 120*    < > = values in this interval not displayed.     Most Recent 3 INR's:  Recent Labs   Lab Test 08/11/23  1647 06/06/23  1016 05/01/23  1217   INR 1.17* 1.18* 1.20*     Most Recent Hemoglobin A1c:  Recent Labs   Lab Test 06/06/23  1016   A1C 6.9*       ASSESSMENT/PLAN:    Acute hypoxic respiratory " failure secondary to large transudative right pleural effusion secondary to liver cirrhosis.  Respiratory status stable following large volume thoracentesis during hospitalization.  Continued evidence of pleural effusion on exam today however patient is minimally if at all symptomatic  Hopefully, pleural effusion can be managed with diuretics.  Plan: Monitor respiratory status.  Repeat chest x-ray if any worsening respiratory symptoms or change in exam  Therapies    REYES cirrhosis in the setting of obesity diabetes mellitus type 2  No obvious ascites on exam  Mental status appears intact  Plan: Continue rifaximin and lactulose.  Patient is aware of the need to continue lactulose.  Continue furosemide and spironolactone  Monitor electrolytes and renal function, liver function tests closely.hepatology clinic follow-up    Diabetes mellitus type 2  Controlled on Lantus/aspart and Ozempic  Off metformin  Plan: Monitor blood glucoses    History of ascending aortic dilatation.  Mild aortic root dilatation  Plan: Monitor blood pressure.  Outpatient follow-up    Hypothyroidism  On replacement.  Plan: Monitor thyroid function tests    Obesity  Plan: Dietitian to see      Leroy Mckenzie MD          Sincerely,        Leroy Mckenzie MD

## 2023-08-29 ENCOUNTER — TRANSITIONAL CARE UNIT VISIT (OUTPATIENT)
Dept: GERIATRICS | Facility: CLINIC | Age: 58
End: 2023-08-29
Payer: COMMERCIAL

## 2023-08-29 ENCOUNTER — VIRTUAL VISIT (OUTPATIENT)
Dept: PSYCHOLOGY | Facility: CLINIC | Age: 58
End: 2023-08-29
Payer: COMMERCIAL

## 2023-08-29 VITALS
WEIGHT: 229.9 LBS | BODY MASS INDEX: 40.73 KG/M2 | HEART RATE: 79 BPM | DIASTOLIC BLOOD PRESSURE: 74 MMHG | TEMPERATURE: 97.9 F | RESPIRATION RATE: 16 BRPM | HEIGHT: 63 IN | OXYGEN SATURATION: 94 % | SYSTOLIC BLOOD PRESSURE: 118 MMHG

## 2023-08-29 DIAGNOSIS — K74.60 LIVER CIRRHOSIS SECONDARY TO NASH (H): ICD-10-CM

## 2023-08-29 DIAGNOSIS — R41.844 IMPAIRED EXECUTIVE FUNCTIONING: ICD-10-CM

## 2023-08-29 DIAGNOSIS — K75.81 LIVER CIRRHOSIS SECONDARY TO NASH (H): ICD-10-CM

## 2023-08-29 DIAGNOSIS — F41.9 ANXIETY: Primary | ICD-10-CM

## 2023-08-29 DIAGNOSIS — J93.9 PNEUMOTHORAX, UNSPECIFIED TYPE: ICD-10-CM

## 2023-08-29 DIAGNOSIS — J90 PLEURAL EFFUSION: Primary | ICD-10-CM

## 2023-08-29 PROCEDURE — 99309 SBSQ NF CARE MODERATE MDM 30: CPT

## 2023-08-29 PROCEDURE — 90834 PSYTX W PT 45 MINUTES: CPT | Mod: 95 | Performed by: COUNSELOR

## 2023-08-29 PROCEDURE — 90785 PSYTX COMPLEX INTERACTIVE: CPT | Mod: 95 | Performed by: COUNSELOR

## 2023-08-29 NOTE — PROGRESS NOTES
"[facility] GERIATRICS    Chief Complaint   Patient presents with    RECHECK     HPI:  Sudheer Montiel is a 58 year old  (1965), who is being seen today for an episodic care visit at: [facility] (U) [[id number]]. Today's concern is: TCU Follow Up     Patient admitted to the above facility from  [facility]. Hospital stay 8/11/23 through 8/21/23.  Hospitalization as encrypted below from discharge summary.     [facility]  Hospitalist Discharge Summary    Date of Admission:  8/11/2023  Date of Discharge:  8/21/2023     Hospital Course:  \"Sudheer Montiel is a 58 year old female admitted on 8/11/2023 with shortness of breath.      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 ~2800ml output after chest tube placement while patient still in ED. Effusion consistent with transudative based on fluid studies  *CT chest PE: no PE, large R effusion with associated atelectasis  *CXR: large R pleural effusion, near collapse of R Lung  Echo with LVEF of 60 to 65%.  No significant valvular abnormalities  CXR 8/18 - Right chest tube. Probable tiny right apical pneumothorax. Bibasilar atelectasis.  Subcutaneous emphysema right chest.   *8/20 CT removed  --[facility] signed off.  - IR consulted, given new right-sided hepatic hydrothorax recommend hepatology follow-up, possible TIPS, standing order for thoracocentesis.  Pleurx catheter considered if end-stage liver disease.  --Continue Lasix, spironolactone.   --Low-salt diet, fluid restriction to 2000 mL/day.  --Recommend repeat imaging of chest in 1 to 2 weeks for evaluation of fluid reaccumulation.     REYES cirrhosis decompensated  Elevated LFTs, " "variable/stable  Alk phos, tbili and AST similarly elevated as during June 2023 checks  Ultrasound liver with hepatic steatosis, no ascites.  Ultrasound Doppler Hepatic steatosis and cirrhosis. No focal masses. Normal abdominal liver duplex.  -- [facility] recommend diuresis, signed off.  Appreciate comanagement.  --Continue Lasix 40 mg oral daily with spironolactone 100 mg oral daily.  --Continue rifaximin twice daily.  --Continue lactulose 3 times daily, titrate to 2-3 loose stools per day.  --Recommended patient to follow-up with [facility], liver clinic as outpatient.     Diabetes mellitus with hemoglobin A1c of 6.9.    Follows with Dr Christianson of endocrine. Uses novolog 4U with small carb meal, 6U with large carb meal and sliding scale. Lantus in AM., metformin 1500mg in AM and ozempic weekly. Last A1C was 6.9 in June 2023.  - continue lantus 40 units daily, resume ozempic  - stop PTA metformin  - continue 3-6 units insulin aspart for carb counting with meals     Tobacco use  Smokes 4-6 cigarettes per day. Declined  - Emphasized abstinence from nicotine.     Hypothyroidism  Continue PTA supplements.     Anxiety  Stable. PTA lexapro continued.     Ascending aortic dilatation.  Echo with mild aortic root dilatation. Max diameter of the visualized portion 4.3 cm.  The ascending aorta is Mildly dilated. Max diameter of the visualized portion  3.9 cm.  Blood pressure management, follow-up as outpatient per protocol.     Mild hypokalemia replaced   K 3.3 > 3.6      Class III obesity with a BMI of 46.84  Increased all cause mortality and morbidity.  Consider lifestyle modification with diet and exercise  Follow up with PCP     Physical deconditioning from medical illness.    - discharge to TCU\".     Overall stabilized and patient discharged to the above TCU on 8/21 in stable condition for rehab.     Today: Patient being seen for TCU follow-up visit.  No changes in condition since last visit.  " "Ambulating with therapy and tolerating well. Old chest tube site covered with pressure dressing and intact.  Appetite stable. BG stable.  Vitals stable. Denies chest pain or shortness of breath. No acute concerns at this time.     Allergies, and PMH/PSH reviewed in [facility] today.  REVIEW OF SYSTEMS:  4 point ROS including Respiratory, CV, GI and , other than that noted in the HPI,  is negative    Allergies, and PMH/PSH reviewed in [facility] today.  REVIEW OF SYSTEMS:  4 point ROS including Respiratory, CV, GI and , other than that noted in the HPI,  is negative    Objective:   /74   Pulse 79   Temp 97.9  F (36.6  C)   Resp 16   Ht 1.6 m (5' 3\")   Wt 104.3 kg (229 lb 14.4 oz)   SpO2 94%   BMI 40.72 kg/m      Exam:  GENERAL APPEARANCE: NAD  RESPIRATORY: Clear to auscultation, even and unlabored, symmetrical chest wall expansion  CARDIOVASCULAR: RRR, no peripheral edema, S1/S2 normal   GASTROINTESTINAL: Soft, nontender, nondistended, bowel sounds present x4 quadrants, large   NEUROLOGICAL: Alert and oriented  PSYCHOLOGICAL: Cooperative    Recent labs in [facility] reviewed by me today.     Assessment/Plan:  Small R apical pneumothorax 8/13 - improving  Acute hypoxic respiratory failure - improved   SOB-resolved  -No chest pain or shortness of breath, room air, vitals stable, old chest tube site covered with pressure dressing and site intact  -Continue Lasix and spironolactone, continue low-salt diet and 2000 ml per day fluid restriction, follow CMP, repeat chest x-ray as recommended, monitor for changes in respiratory status     REYES cirrhosis decompensated  Elevated LFTs   -ALT 21, AST 46 on 8/20, imaging findings as above  -Continue Lasix, spironolactone, rifaximin, and lactulose, follow CMP, GI follow-up        MED REC REQUIRED  Post Medication Reconciliation Status:  Medication reconciliation previously completed during another office visit         Electronically signed by:    Bennie Wiggins,DNP,APRN,AGNP-BC. "               The above note was completed in part using Dragon voice recognition software. Although reviewed after completion, some word and grammatical errors may occur. Please contact the author of this note with any questions.

## 2023-08-29 NOTE — LETTER
"    8/29/2023        RE: Sudheer Montiel  [address]  [place] [address]        [facility] GERIATRICS    Chief Complaint   Patient presents with     RECHECK     HPI:  Sudheer Montiel is a 58 year old  (1965), who is being seen today for an episodic care visit at: [facility] ([facility]) [[id number]]. Today's concern is: TCU Follow Up     Patient admitted to the above facility from  [facility]. Hospital stay 8/11/23 through 8/21/23.  Hospitalization as encrypted below from discharge summary.     [facility]  Hospitalist Discharge Summary    Date of Admission:  8/11/2023  Date of Discharge:  8/21/2023     Hospital Course:  \"Sudheer Montiel is a 58 year old female admitted on 8/11/2023 with shortness of breath.      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 ~2800ml output after chest tube placement while patient still in ED. Effusion consistent with transudative based on fluid studies  *CT chest PE: no PE, large R effusion with associated atelectasis  *CXR: large R pleural effusion, near collapse of R Lung  Echo with LVEF of 60 to 65%.  No significant valvular abnormalities  CXR 8/18 - Right chest tube. Probable tiny right apical pneumothorax. Bibasilar atelectasis.  Subcutaneous emphysema right chest.   *8/20 CT removed  --[facility] signed off.  - IR consulted, given new right-sided hepatic hydrothorax recommend hepatology follow-up, possible TIPS, standing order for thoracocentesis.  Pleurx catheter considered if end-stage liver disease.  --Continue Lasix, spironolactone.   --Low-salt diet, fluid restriction to 2000 mL/day.  --Recommend repeat imaging of chest in 1 to 2 " "weeks for evaluation of fluid reaccumulation.     REYES cirrhosis decompensated  Elevated LFTs, variable/stable  Alk phos, tbili and AST similarly elevated as during June 2023 checks  Ultrasound liver with hepatic steatosis, no ascites.  Ultrasound Doppler Hepatic steatosis and cirrhosis. No focal masses. Normal abdominal liver duplex.  -- [facility] recommend diuresis, signed off.  Appreciate comanagement.  --Continue Lasix 40 mg oral daily with spironolactone 100 mg oral daily.  --Continue rifaximin twice daily.  --Continue lactulose 3 times daily, titrate to 2-3 loose stools per day.  --Recommended patient to follow-up with [facility], liver clinic as outpatient.     Diabetes mellitus with hemoglobin A1c of 6.9.    Follows with Dr Christianson of endocrine. Uses novolog 4U with small carb meal, 6U with large carb meal and sliding scale. Lantus in AM., metformin 1500mg in AM and ozempic weekly. Last A1C was 6.9 in June 2023.  - continue lantus 40 units daily, resume ozempic  - stop PTA metformin  - continue 3-6 units insulin aspart for carb counting with meals     Tobacco use  Smokes 4-6 cigarettes per day. Declined  - Emphasized abstinence from nicotine.     Hypothyroidism  Continue PTA supplements.     Anxiety  Stable. PTA lexapro continued.     Ascending aortic dilatation.  Echo with mild aortic root dilatation. Max diameter of the visualized portion 4.3 cm.  The ascending aorta is Mildly dilated. Max diameter of the visualized portion  3.9 cm.  Blood pressure management, follow-up as outpatient per protocol.     Mild hypokalemia replaced   K 3.3 > 3.6      Class III obesity with a BMI of 46.84  Increased all cause mortality and morbidity.  Consider lifestyle modification with diet and exercise  Follow up with PCP     Physical deconditioning from medical illness.    - discharge to TCU\".     Overall stabilized and patient discharged to the above TCU on 8/21 in stable condition for rehab.     Today: " "Patient being seen for TCU follow-up visit.  No changes in condition since last visit.  Ambulating with therapy and tolerating well. Old chest tube site covered with pressure dressing and intact.  Appetite stable. BG stable.  Vitals stable. Denies chest pain or shortness of breath. No acute concerns at this time.     Allergies, and PMH/PSH reviewed in [facility] today.  REVIEW OF SYSTEMS:  4 point ROS including Respiratory, CV, GI and , other than that noted in the HPI,  is negative    Allergies, and PMH/PSH reviewed in [facility] today.  REVIEW OF SYSTEMS:  4 point ROS including Respiratory, CV, GI and , other than that noted in the HPI,  is negative    Objective:   /74   Pulse 79   Temp 97.9  F (36.6  C)   Resp 16   Ht 1.6 m (5' 3\")   Wt 104.3 kg (229 lb 14.4 oz)   SpO2 94%   BMI 40.72 kg/m      Exam:  GENERAL APPEARANCE: NAD  RESPIRATORY: Clear to auscultation, even and unlabored, symmetrical chest wall expansion  CARDIOVASCULAR: RRR, no peripheral edema, S1/S2 normal   GASTROINTESTINAL: Soft, nontender, nondistended, bowel sounds present x4 quadrants, large   NEUROLOGICAL: Alert and oriented  PSYCHOLOGICAL: Cooperative    Recent labs in [facility] reviewed by me today.     Assessment/Plan:  Small R apical pneumothorax 8/13 - improving  Acute hypoxic respiratory failure - improved   SOB-resolved  -No chest pain or shortness of breath, room air, vitals stable, old chest tube site covered with pressure dressing and site intact  -Continue Lasix and spironolactone, continue low-salt diet and 2000 ml per day fluid restriction, follow CMP, repeat chest x-ray as recommended, monitor for changes in respiratory status     REYES cirrhosis decompensated  Elevated LFTs   -ALT 21, AST 46 on 8/20, imaging findings as above  -Continue Lasix, spironolactone, rifaximin, and lactulose, follow CMP, GI follow-up        MED REC REQUIRED  Post Medication Reconciliation Status:  Medication reconciliation previously completed during " another office visit         Electronically signed by:    Bennie Wiggins DNP,TIFFANY,IGOR.               The above note was completed in part using Dragon voice recognition software. Although reviewed after completion, some word and grammatical errors may occur. Please contact the author of this note with any questions.           Sincerely,        TIFFANY Dinh CNP

## 2023-08-29 NOTE — PROGRESS NOTES
[facility]                                     Progress Note    Patient Name: Sudheer Montiel  Date: 8/29/2023         Service Type: Individual      Session Start Time: 9:10am  Session End Time: 9:55am     Session Length: 45 mins    Session #: 6    Attendees: Client attended alone    Service Modality:  Video Visit:      Provider verified identity through the following two step process.  Patient provided:  Patient is known previously to provider    Telemedicine Visit: The patient's condition can be safely assessed and treated via synchronous audio and visual telemedicine encounter.      Reason for Telemedicine Visit: Services only offered telehealth    Originating Site (Patient Location): Patient's other [facility]    Distant Site (Provider Location): Provider Remote Setting- Home Office    Consent:  The patient/guardian has verbally consented to: the potential risks and benefits of telemedicine (video visit) versus in person care; bill my insurance or make self-payment for services provided; and responsibility for payment of non-covered services.     Patient would like the video invitation sent by:  My Chart    Mode of Communication:  Video Conference via [employer]    Distant Location (Provider):  Off-site    As the provider I attest to compliance with applicable laws and regulations related to telemedicine.    DATA  Interactive Complexity: Yes, visit entailed Interactive Complexity evidenced by:  -The need to manage maladaptive communication (related to, e.g., high anxiety, high reactivity, repeated questions, or disagreement) among participants that complicates delivery of care (memory concerns, word finding, cognitive functioning)  Crisis: No        Progress Since Last Session (Related to Symptoms / Goals / Homework):   Symptoms: No change      Homework: Achieved / completed to satisfaction      Episode of Care Goals: Minimal progress - ACTION (Actively working towards change);  Intervened by reinforcing change plan / affirming steps taken     Current / Ongoing Stressors and Concerns:   Fluid in lungs caused hospitalization (11days) now in TCU, cognitive concerns, transition from place to place      Treatment Objective(s) Addressed in This Session:   Increase interest, engagement, and pleasure in doing things  Decrease frequency and intensity of feeling down, depressed, hopeless  Identify negative self-talk and behaviors: challenge core beliefs, myths, and actions       Intervention:   Emotion Focused Therapy: emotional processing    Assessments completed prior to visit:  The following assessments were completed by patient for this visit:  PHQ9:       5/10/2023     5:47 PM   PHQ-9 SCORE   PHQ-9 Total Score MyChart 19 (Moderately severe depression)   PHQ-9 Total Score 19     GAD7:       5/8/2023    12:50 PM   WILLIAM-7 SCORE   Total Score 10 (moderate anxiety)   Total Score 10         ASSESSMENT: Current Emotional / Mental Status (status of significant symptoms):   Risk status (Self / Other harm or suicidal ideation)   Patient denies current fears or concerns for personal safety.   Patient denies current or recent suicidal ideation or behaviors.   Patient denies current or recent homicidal ideation or behaviors.   Patient denies current or recent self injurious behavior or ideation.   Patient denies other safety concerns.   Patient reports there has been no change in risk factors since their last session.     Patient reports there has been no change in protective factors since their last session.     Recommended that patient call 911 or go to the local ED should there be a change in any of these risk factors.     Appearance:   Appropriate    Eye Contact:   Good    Psychomotor Behavior: Not assessed    Attitude:   Cooperative    Orientation:   All   Speech    Rate / Production: Slow  some word finding trouble/memory     Volume:  Normal    Mood:    Anxious  Depressed  Sad     Affect:    Tearful   Thought Content:  Rumination    Thought Form:  Coherent    Insight:    Good  and Fair      Medication Review:   No changes to current psychiatric medication(s)     Medication Compliance:   Yes     Changes in Health Issues:   Yes: fluid in lung was hospitalized, Associated Psychological Distress     Chemical Use Review:   Substance Use: Chemical use reviewed, no active concerns identified      Tobacco Use: Yes, decrease.  Patient reports frequency of use QUIT.      Diagnosis:  1. Anxiety    2. Impaired executive functioning        Collateral Reports Completed:   Communicated with: daughter lulú via [employer]     PLAN: (Patient Tasks / Therapist Tasks / Other)  Provider assigned client to reach out socially to friends  Provider assigned client to talk to professionals about symptoms (SOB)        Elaine Sidhu [facility] 8/29/2023                                                            ______________________________________________________________________     Individual Treatment Plan     Patient's Name: Sudheer Montiel              YOB: 1965     Date of Creation: 6/13/2023  Date Treatment Plan Last Reviewed/Revised: 6/13/2023     DSM5 Diagnoses:  1. Anxiety    2. Impaired executive functioning       Psychosocial / Contextual Factors: memory loss, job loss, loss of home and license   PROMIS (reviewed every 90 days): 12     Referral / Collaboration:  Was/were discussed and patient will pursue. , memory clinic      Anticipated number of session for this episode of care: 9-12 sessions  Anticipation frequency of session: Every other week  Anticipated Duration of each session: 38-52 minutes  Treatment plan will be reviewed in 90 days or when goals have been changed.         MeasurableTreatment Goal(s) related to diagnosis / functional impairment(s)  Goal 1: Patient will report     I will know I've met my goal when client has known coping skills to deal with life changes .              Objective #A (Patient Action)                          Patient will identify   fears / thoughts that contribute to feeling anxious.  Status: New - Date: 6/27/23      Intervention(s)  Therapist will teach emotional recognition/identification.  .     Objective #B  Patient will increase length and frequency of contact with others  .  Status: New - Date: 6/27/23      Intervention(s)  Therapist will assign homework rhat includes client reaching out to others to keep contact .     Objective #C  Patient will use cognitive strategies identified in therapy to challenge anxious thoughts.  Status: New - Date: 6/27/23      Intervention(s)  Therapist will teach emotional regulation skills.  .  Therapist will teach emotional regulation skills.  .        Patient has reviewed and agreed to the above plan.        Elaine Sidhu, [facility]                       June 27, 2023

## 2023-09-01 ENCOUNTER — TRANSITIONAL CARE UNIT VISIT (OUTPATIENT)
Dept: GERIATRICS | Facility: CLINIC | Age: 58
End: 2023-09-01
Payer: COMMERCIAL

## 2023-09-01 VITALS
RESPIRATION RATE: 18 BRPM | BODY MASS INDEX: 40.29 KG/M2 | SYSTOLIC BLOOD PRESSURE: 122 MMHG | OXYGEN SATURATION: 93 % | HEART RATE: 85 BPM | HEIGHT: 63 IN | TEMPERATURE: 97.9 F | DIASTOLIC BLOOD PRESSURE: 58 MMHG | WEIGHT: 227.4 LBS

## 2023-09-01 DIAGNOSIS — J90 PLEURAL EFFUSION: Primary | ICD-10-CM

## 2023-09-01 PROCEDURE — 99309 SBSQ NF CARE MODERATE MDM 30: CPT

## 2023-09-01 NOTE — PROGRESS NOTES
"[facility] GERIATRICS    Chief Complaint   Patient presents with    RECHECK     HPI:  Sudheer Montiel is a 58 year old  (1965), who is being seen today for an episodic care visit at: [facility] (U) [[id number]]. Today's concern is:  TCU Follow Up     Patient admitted to the above facility from  [facility]. Hospital stay 8/11/23 through 8/21/23.  Hospitalization as encrypted below from discharge summary.     [facility]  Hospitalist Discharge Summary    Date of Admission:  8/11/2023  Date of Discharge:  8/21/2023     Hospital Course:  \"Sudheer Montiel is a 58 year old female admitted on 8/11/2023 with shortness of breath.      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 ~2800ml output after chest tube placement while patient still in ED. Effusion consistent with transudative based on fluid studies  *CT chest PE: no PE, large R effusion with associated atelectasis  *CXR: large R pleural effusion, near collapse of R Lung  Echo with LVEF of 60 to 65%.  No significant valvular abnormalities  CXR 8/18 - Right chest tube. Probable tiny right apical pneumothorax. Bibasilar atelectasis.  Subcutaneous emphysema right chest.   *8/20 CT removed  --[facility] signed off.  - IR consulted, given new right-sided hepatic hydrothorax recommend hepatology follow-up, possible TIPS, standing order for thoracocentesis.  Pleurx catheter considered if end-stage liver disease.  --Continue Lasix, spironolactone.   --Low-salt diet, fluid restriction to 2000 mL/day.  --Recommend repeat imaging of chest in 1 to 2 weeks for evaluation of fluid reaccumulation.     REYES cirrhosis decompensated  Elevated LFTs, " "variable/stable  Alk phos, tbili and AST similarly elevated as during June 2023 checks  Ultrasound liver with hepatic steatosis, no ascites.  Ultrasound Doppler Hepatic steatosis and cirrhosis. No focal masses. Normal abdominal liver duplex.  -- [facility] recommend diuresis, signed off.  Appreciate comanagement.  --Continue Lasix 40 mg oral daily with spironolactone 100 mg oral daily.  --Continue rifaximin twice daily.  --Continue lactulose 3 times daily, titrate to 2-3 loose stools per day.  --Recommended patient to follow-up with [facility], liver clinic as outpatient.     Diabetes mellitus with hemoglobin A1c of 6.9.    Follows with Dr Christianson of endocrine. Uses novolog 4U with small carb meal, 6U with large carb meal and sliding scale. Lantus in AM., metformin 1500mg in AM and ozempic weekly. Last A1C was 6.9 in June 2023.  - continue lantus 40 units daily, resume ozempic  - stop PTA metformin  - continue 3-6 units insulin aspart for carb counting with meals     Tobacco use  Smokes 4-6 cigarettes per day. Declined  - Emphasized abstinence from nicotine.     Hypothyroidism  Continue PTA supplements.     Anxiety  Stable. PTA lexapro continued.     Ascending aortic dilatation.  Echo with mild aortic root dilatation. Max diameter of the visualized portion 4.3 cm.  The ascending aorta is Mildly dilated. Max diameter of the visualized portion  3.9 cm.  Blood pressure management, follow-up as outpatient per protocol.     Mild hypokalemia replaced   K 3.3 > 3.6      Class III obesity with a BMI of 46.84  Increased all cause mortality and morbidity.  Consider lifestyle modification with diet and exercise  Follow up with PCP     Physical deconditioning from medical illness.    - discharge to TCU\".     Overall stabilized and patient discharged to the above TCU on 8/21 in stable condition for rehab.     Today: Patient being seen for TCU follow-up visit.  C/o generalized pain. She takes Celebrex PTA and " "wondering why it was stopped in the hospital.  She would like to get it back if possible.  Ambulating with therapy and tolerating well. Old chest tube site covered with pressure dressing and intact.  Appetite stable. BG stable.  Vitals stable. Denies chest pain or shortness of breath.      Allergies, and PMH/PSH reviewed in [facility] today.  REVIEW OF SYSTEMS:  4 point ROS including Respiratory, CV, GI and , other than that noted in the HPI,  is negative       Objective:   /58   Pulse 85   Temp 97.9  F (36.6  C)   Resp 18   Ht 1.6 m (5' 3\")   Wt 103.1 kg (227 lb 6.4 oz)   SpO2 93%   BMI 40.28 kg/m       Exam:  GENERAL APPEARANCE: NAD, in bed   RESPIRATORY: Clear to auscultation, even and unlabored, symmetrical chest wall expansion  CARDIOVASCULAR: RRR, no peripheral edema, S1/S2 normal   GASTROINTESTINAL: Soft, nontender, nondistended, bowel sounds present x4 quadrants, large   NEUROLOGICAL: Alert and oriented x3  PSYCHOLOGICAL: Cooperative,calm      Recent labs in [facility] reviewed by me today.      Assessment/Plan:  Small R apical pneumothorax 8/13 - improving  SOB-resolved  -No chest pain or shortness of breath, room air, vitals stable, old chest tube site covered with pressure dressing and site intact  -Continue Lasix and spironolactone, continue low-salt diet and 2000 ml per day fluid restriction, follow CMP, monitor for changes in respiratory status     REYES cirrhosis decompensated  Elevated LFTs   -Continue Lasix, spironolactone, rifaximin, and lactulose, follow CMP, GI follow-up           MED REC REQUIRED  Post Medication Reconciliation Status:  Medication reconciliation previously completed during another office visit     Orders:  Celebrex         Electronically signed by:    Bennie Wiggins,DARWIN,APRN,AGNP-BC.                     The above note was completed in part using Dragon voice recognition software. Although reviewed after completion, some word and grammatical errors may occur. Please contact the " author of this note with any questions.

## 2023-09-01 NOTE — LETTER
"    9/1/2023        RE: Sudheer Montiel  [address]  [facility] [id number]        [facility] GERIATRICS    Chief Complaint   Patient presents with     RECHECK     HPI:  Sudheer Montiel is a 58 year old  (1965), who is being seen today for an episodic care visit at: [facility] (U) [[id number]]. Today's concern is:  TCU Follow Up     Patient admitted to the above facility from  [facility]. Hospital stay 8/11/23 through 8/21/23.  Hospitalization as encrypted below from discharge summary.     [facility]  Hospitalist Discharge Summary    Date of Admission:  8/11/2023  Date of Discharge:  8/21/2023     Hospital Course:  \"Sudheer Montiel is a 58 year old female admitted on 8/11/2023 with shortness of breath.      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 ~2800ml output after chest tube placement while patient still in ED. Effusion consistent with transudative based on fluid studies  *CT chest PE: no PE, large R effusion with associated atelectasis  *CXR: large R pleural effusion, near collapse of R Lung  Echo with LVEF of 60 to 65%.  No significant valvular abnormalities  CXR 8/18 - Right chest tube. Probable tiny right apical pneumothorax. Bibasilar atelectasis.  Subcutaneous emphysema right chest.   *8/20 CT removed  --[facility] signed off.  - IR consulted, given new right-sided hepatic hydrothorax recommend hepatology follow-up, possible TIPS, standing order for thoracocentesis.  Pleurx catheter considered if end-stage liver disease.  --Continue Lasix, spironolactone.   --Low-salt diet, fluid restriction to 2000 mL/day.  --Recommend repeat imaging of chest in 1 to 2 " "weeks for evaluation of fluid reaccumulation.     REYES cirrhosis decompensated  Elevated LFTs, variable/stable  Alk phos, tbili and AST similarly elevated as during June 2023 checks  Ultrasound liver with hepatic steatosis, no ascites.  Ultrasound Doppler Hepatic steatosis and cirrhosis. No focal masses. Normal abdominal liver duplex.  -- [facility] recommend diuresis, signed off.  Appreciate comanagement.  --Continue Lasix 40 mg oral daily with spironolactone 100 mg oral daily.  --Continue rifaximin twice daily.  --Continue lactulose 3 times daily, titrate to 2-3 loose stools per day.  --Recommended patient to follow-up with [facility], liver clinic as outpatient.     Diabetes mellitus with hemoglobin A1c of 6.9.    Follows with Dr Christianson of endocrine. Uses novolog 4U with small carb meal, 6U with large carb meal and sliding scale. Lantus in AM., metformin 1500mg in AM and ozempic weekly. Last A1C was 6.9 in June 2023.  - continue lantus 40 units daily, resume ozempic  - stop PTA metformin  - continue 3-6 units insulin aspart for carb counting with meals     Tobacco use  Smokes 4-6 cigarettes per day. Declined  - Emphasized abstinence from nicotine.     Hypothyroidism  Continue PTA supplements.     Anxiety  Stable. PTA lexapro continued.     Ascending aortic dilatation.  Echo with mild aortic root dilatation. Max diameter of the visualized portion 4.3 cm.  The ascending aorta is Mildly dilated. Max diameter of the visualized portion  3.9 cm.  Blood pressure management, follow-up as outpatient per protocol.     Mild hypokalemia replaced   K 3.3 > 3.6      Class III obesity with a BMI of 46.84  Increased all cause mortality and morbidity.  Consider lifestyle modification with diet and exercise  Follow up with PCP     Physical deconditioning from medical illness.    - discharge to TCU\".     Overall stabilized and patient discharged to the above TCU on 8/21 in stable condition for rehab.     Today: " "Patient being seen for TCU follow-up visit.  C/o generalized pain. She takes Celebrex PTA and wondering why it was stopped in the hospital.  She would like to get it back if possible.  Ambulating with therapy and tolerating well. Old chest tube site covered with pressure dressing and intact.  Appetite stable. BG stable.  Vitals stable. Denies chest pain or shortness of breath.      Allergies, and PMH/PSH reviewed in [facility] today.  REVIEW OF SYSTEMS:  4 point ROS including Respiratory, CV, GI and , other than that noted in the HPI,  is negative       Objective:   /58   Pulse 85   Temp 97.9  F (36.6  C)   Resp 18   Ht 1.6 m (5' 3\")   Wt 103.1 kg (227 lb 6.4 oz)   SpO2 93%   BMI 40.28 kg/m       Exam:  GENERAL APPEARANCE: NAD, in bed   RESPIRATORY: Clear to auscultation, even and unlabored, symmetrical chest wall expansion  CARDIOVASCULAR: RRR, no peripheral edema, S1/S2 normal   GASTROINTESTINAL: Soft, nontender, nondistended, bowel sounds present x4 quadrants, large   NEUROLOGICAL: Alert and oriented x3  PSYCHOLOGICAL: Cooperative,calm      Recent labs in [facility] reviewed by me today.      Assessment/Plan:  Small R apical pneumothorax 8/13 - improving  SOB-resolved  -No chest pain or shortness of breath, room air, vitals stable, old chest tube site covered with pressure dressing and site intact  -Continue Lasix and spironolactone, continue low-salt diet and 2000 ml per day fluid restriction, follow CMP, monitor for changes in respiratory status     REYES cirrhosis decompensated  Elevated LFTs   -Continue Lasix, spironolactone, rifaximin, and lactulose, follow CMP, GI follow-up           MED REC REQUIRED  Post Medication Reconciliation Status:  Medication reconciliation previously completed during another office visit     Orders:  Celebrex         Electronically signed by:    Bennie Wiggins,DARWIN,APRN,AGNP-BC.                     The above note was completed in part using Dragon voice recognition software. " Although reviewed after completion, some word and grammatical errors may occur. Please contact the author of this note with any questions.             Sincerely,        TIFFANY Dinh CNP

## 2023-09-05 ENCOUNTER — LAB REQUISITION (OUTPATIENT)
Dept: LAB | Facility: CLINIC | Age: 58
End: 2023-09-05
Payer: COMMERCIAL

## 2023-09-05 ENCOUNTER — VIRTUAL VISIT (OUTPATIENT)
Dept: PSYCHOLOGY | Facility: CLINIC | Age: 58
End: 2023-09-05
Payer: COMMERCIAL

## 2023-09-05 ENCOUNTER — TRANSITIONAL CARE UNIT VISIT (OUTPATIENT)
Dept: GERIATRICS | Facility: CLINIC | Age: 58
End: 2023-09-05
Payer: COMMERCIAL

## 2023-09-05 VITALS
BODY MASS INDEX: 40.26 KG/M2 | TEMPERATURE: 96.8 F | DIASTOLIC BLOOD PRESSURE: 66 MMHG | HEART RATE: 81 BPM | SYSTOLIC BLOOD PRESSURE: 120 MMHG | WEIGHT: 227.2 LBS | RESPIRATION RATE: 18 BRPM | HEIGHT: 63 IN | OXYGEN SATURATION: 93 %

## 2023-09-05 DIAGNOSIS — K74.60 LIVER CIRRHOSIS SECONDARY TO NASH (H): ICD-10-CM

## 2023-09-05 DIAGNOSIS — J90 PLEURAL EFFUSION: Primary | ICD-10-CM

## 2023-09-05 DIAGNOSIS — J90 PLEURAL EFFUSION, NOT ELSEWHERE CLASSIFIED: ICD-10-CM

## 2023-09-05 DIAGNOSIS — M62.81 GENERALIZED MUSCLE WEAKNESS: ICD-10-CM

## 2023-09-05 DIAGNOSIS — R41.844 IMPAIRED EXECUTIVE FUNCTIONING: ICD-10-CM

## 2023-09-05 DIAGNOSIS — F41.9 ANXIETY: Primary | ICD-10-CM

## 2023-09-05 DIAGNOSIS — K75.81 LIVER CIRRHOSIS SECONDARY TO NASH (H): ICD-10-CM

## 2023-09-05 PROCEDURE — 90834 PSYTX W PT 45 MINUTES: CPT | Mod: VID | Performed by: COUNSELOR

## 2023-09-05 PROCEDURE — 99309 SBSQ NF CARE MODERATE MDM 30: CPT

## 2023-09-05 NOTE — PROGRESS NOTES
"[facility] GERIATRICS    Chief Complaint   Patient presents with    RECHECK     HPI:  Sudheer Montiel is a 58 year old  (1965), who is being seen today for an episodic care visit at: [facility] (U) [[id number]]. Today's concern is: TCU Follow Up     Patient admitted to the above facility from  [facility]. Hospital stay 8/11/23 through 8/21/23.  Hospitalization as encrypted below from discharge summary.     [facility]  Hospitalist Discharge Summary    Date of Admission:  8/11/2023  Date of Discharge:  8/21/2023     Hospital Course:  \"Sudheer Montiel is a 58 year old female admitted on 8/11/2023 with shortness of breath.      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 ~2800ml output after chest tube placement while patient still in ED. Effusion consistent with transudative based on fluid studies  *CT chest PE: no PE, large R effusion with associated atelectasis  *CXR: large R pleural effusion, near collapse of R Lung  Echo with LVEF of 60 to 65%.  No significant valvular abnormalities  CXR 8/18 - Right chest tube. Probable tiny right apical pneumothorax. Bibasilar atelectasis.  Subcutaneous emphysema right chest.   *8/20 CT removed  --[facility] signed off.  - IR consulted, given new right-sided hepatic hydrothorax recommend hepatology follow-up, possible TIPS, standing order for thoracocentesis.  Pleurx catheter considered if end-stage liver disease.  --Continue Lasix, spironolactone.   --Low-salt diet, fluid restriction to 2000 mL/day.  --Recommend repeat imaging of chest in 1 to 2 weeks for evaluation of fluid reaccumulation.     REYES cirrhosis decompensated  Elevated LFTs, " "variable/stable  Alk phos, tbili and AST similarly elevated as during June 2023 checks  Ultrasound liver with hepatic steatosis, no ascites.  Ultrasound Doppler Hepatic steatosis and cirrhosis. No focal masses. Normal abdominal liver duplex.  -- [facility] recommend diuresis, signed off.  Appreciate comanagement.  --Continue Lasix 40 mg oral daily with spironolactone 100 mg oral daily.  --Continue rifaximin twice daily.  --Continue lactulose 3 times daily, titrate to 2-3 loose stools per day.  --Recommended patient to follow-up with [facility], liver clinic as outpatient.     Diabetes mellitus with hemoglobin A1c of 6.9.    Follows with Dr Christianson of endocrine. Uses novolog 4U with small carb meal, 6U with large carb meal and sliding scale. Lantus in AM., metformin 1500mg in AM and ozempic weekly. Last A1C was 6.9 in June 2023.  - continue lantus 40 units daily, resume ozempic  - stop PTA metformin  - continue 3-6 units insulin aspart for carb counting with meals     Tobacco use  Smokes 4-6 cigarettes per day. Declined  - Emphasized abstinence from nicotine.     Hypothyroidism  Continue PTA supplements.     Anxiety  Stable. PTA lexapro continued.     Ascending aortic dilatation.  Echo with mild aortic root dilatation. Max diameter of the visualized portion 4.3 cm.  The ascending aorta is Mildly dilated. Max diameter of the visualized portion  3.9 cm.  Blood pressure management, follow-up as outpatient per protocol.     Mild hypokalemia replaced   K 3.3 > 3.6      Class III obesity with a BMI of 46.84  Increased all cause mortality and morbidity.  Consider lifestyle modification with diet and exercise  Follow up with PCP     Physical deconditioning from medical illness.    - discharge to TCU\".     Overall stabilized and patient discharged to the above TCU on 8/21 in stable condition for rehab.    Today: Patient being seen for TCU follow-up visit.  No changes in condition since last visit.  Repeat " "chest x-ray showed  \"mild pulmonary vascular congestion. Large loculated right pleural effusion\". Will increase lasix to 50 mg daily from 40 mg daily.  Vitals stable.  Ambulating with therapy.  No additional concerns.    Allergies, and PMH/PSH reviewed in [facility] today.  REVIEW OF SYSTEMS:  4 point ROS including Respiratory, CV, GI and , other than that noted in the HPI,  is negative    Objective:   /66   Pulse 81   Temp 96.8  F (36  C)   Resp 18   Ht 1.6 m (5' 3\")   Wt 103.1 kg (227 lb 3.2 oz)   SpO2 93%   BMI 40.25 kg/m      Exam:  GENERAL APPEARANCE: NAD  RESPIRATORY: Clear to auscultation, even and unlabored, symmetrical chest wall expansion  CARDIOVASCULAR: RRR, no peripheral edema, S1/S2 normal   GASTROINTESTINAL: Soft, nontender, nondistended, bowel sounds present x4 quadrants, large   NEUROLOGICAL: Alert and oriented   PSYCHOLOGICAL: Calm     Recent labs in [facility] reviewed by me today.     Assessment/Plan:  Small R apical pneumothorax 8/13 - improving  SOB-resolved  Weakness  -No chest pain or shortness of breath, room air, vitals stable, old chest tube site covered with pressure dressing and site intact  -Increase Lasix as above, continue spironolactone, continue low-salt diet and 2000 ml per day fluid restriction, follow CMP, monitor for changes in respiratory status     REEYS cirrhosis decompensated  Elevated LFTs   -Increase Lasix as above, continue spironolactone, rifaximin, and lactulose, follow CMP, GI follow-up    MED REC REQUIRED  Post Medication Reconciliation Status:  Medication reconciliation previously completed during another office visit      Orders:  Increase Lasix to 50 mg daily    Electronically signed by:    Bennie Wiggins,DNP,APRN,AGNP-BC.             The above note was completed in part using Dragon voice recognition software. Although reviewed after completion, some word and grammatical errors may occur. Please contact the author of this note with any questions.         "

## 2023-09-05 NOTE — LETTER
"    9/5/2023        RE: Sudheer Montiel  [address]  [facility] [address]        [facility] GERIATRICS    Chief Complaint   Patient presents with     RECHECK     HPI:  Sudheer Montiel is a 58 year old  (1965), who is being seen today for an episodic care visit at: [facility] (U) [[id number]]. Today's concern is: TCU Follow Up     Patient admitted to the above facility from  [facility]. Hospital stay 8/11/23 through 8/21/23.  Hospitalization as encrypted below from discharge summary.     [facility]  Hospitalist Discharge Summary    Date of Admission:  8/11/2023  Date of Discharge:  8/21/2023     Hospital Course:  \"Sudheer Montiel is a 58 year old female admitted on 8/11/2023 with shortness of breath.      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 ~2800ml output after chest tube placement while patient still in ED. Effusion consistent with transudative based on fluid studies  *CT chest PE: no PE, large R effusion with associated atelectasis  *CXR: large R pleural effusion, near collapse of R Lung  Echo with LVEF of 60 to 65%.  No significant valvular abnormalities  CXR 8/18 - Right chest tube. Probable tiny right apical pneumothorax. Bibasilar atelectasis.  Subcutaneous emphysema right chest.   *8/20 CT removed  --[facility] signed off.  - IR consulted, given new right-sided hepatic hydrothorax recommend hepatology follow-up, possible TIPS, standing order for thoracocentesis.  Pleurx catheter considered if end-stage liver disease.  --Continue Lasix, spironolactone.   --Low-salt diet, fluid restriction to 2000 mL/day.  --Recommend repeat imaging of chest in 1 to 2 " "weeks for evaluation of fluid reaccumulation.     REYES cirrhosis decompensated  Elevated LFTs, variable/stable  Alk phos, tbili and AST similarly elevated as during June 2023 checks  Ultrasound liver with hepatic steatosis, no ascites.  Ultrasound Doppler Hepatic steatosis and cirrhosis. No focal masses. Normal abdominal liver duplex.  -- [facility] recommend diuresis, signed off.  Appreciate comanagement.  --Continue Lasix 40 mg oral daily with spironolactone 100 mg oral daily.  --Continue rifaximin twice daily.  --Continue lactulose 3 times daily, titrate to 2-3 loose stools per day.  --Recommended patient to follow-up with [facility], liver clinic as outpatient.     Diabetes mellitus with hemoglobin A1c of 6.9.    Follows with Dr Christianson of endocrine. Uses novolog 4U with small carb meal, 6U with large carb meal and sliding scale. Lantus in AM., metformin 1500mg in AM and ozempic weekly. Last A1C was 6.9 in June 2023.  - continue lantus 40 units daily, resume ozempic  - stop PTA metformin  - continue 3-6 units insulin aspart for carb counting with meals     Tobacco use  Smokes 4-6 cigarettes per day. Declined  - Emphasized abstinence from nicotine.     Hypothyroidism  Continue PTA supplements.     Anxiety  Stable. PTA lexapro continued.     Ascending aortic dilatation.  Echo with mild aortic root dilatation. Max diameter of the visualized portion 4.3 cm.  The ascending aorta is Mildly dilated. Max diameter of the visualized portion  3.9 cm.  Blood pressure management, follow-up as outpatient per protocol.     Mild hypokalemia replaced   K 3.3 > 3.6      Class III obesity with a BMI of 46.84  Increased all cause mortality and morbidity.  Consider lifestyle modification with diet and exercise  Follow up with PCP     Physical deconditioning from medical illness.    - discharge to TCU\".     Overall stabilized and patient discharged to the above TCU on 8/21 in stable condition for rehab.    Today: " "Patient being seen for TCU follow-up visit.  No changes in condition since last visit.  Repeat chest x-ray showed  \"mild pulmonary vascular congestion. Large loculated right pleural effusion\". Will increase lasix to 50 mg daily from 40 mg daily.  Vitals stable.  Ambulating with therapy.  No additional concerns.    Allergies, and PMH/PSH reviewed in [facility] today.  REVIEW OF SYSTEMS:  4 point ROS including Respiratory, CV, GI and , other than that noted in the HPI,  is negative    Objective:   /66   Pulse 81   Temp 96.8  F (36  C)   Resp 18   Ht 1.6 m (5' 3\")   Wt 103.1 kg (227 lb 3.2 oz)   SpO2 93%   BMI 40.25 kg/m      Exam:  GENERAL APPEARANCE: NAD  RESPIRATORY: Clear to auscultation, even and unlabored, symmetrical chest wall expansion  CARDIOVASCULAR: RRR, no peripheral edema, S1/S2 normal   GASTROINTESTINAL: Soft, nontender, nondistended, bowel sounds present x4 quadrants, large   NEUROLOGICAL: Alert and oriented   PSYCHOLOGICAL: Calm     Recent labs in [facility] reviewed by me today.     Assessment/Plan:  Small R apical pneumothorax 8/13 - improving  SOB-resolved  Weakness  -No chest pain or shortness of breath, room air, vitals stable, old chest tube site covered with pressure dressing and site intact  -Increase Lasix as above, continue spironolactone, continue low-salt diet and 2000 ml per day fluid restriction, follow CMP, monitor for changes in respiratory status     REYES cirrhosis decompensated  Elevated LFTs   -Increase Lasix as above, continue spironolactone, rifaximin, and lactulose, follow CMP, GI follow-up    MED REC REQUIRED  Post Medication Reconciliation Status:  Medication reconciliation previously completed during another office visit      Orders:  Increase Lasix to 50 mg daily    Electronically signed by:    Bennie Wiggins,DNP,APRN,AGNP-BC.             The above note was completed in part using Dragon voice recognition software. Although reviewed after completion, some word and grammatical " errors may occur. Please contact the author of this note with any questions.           Sincerely,        TIFFANY Dinh CNP

## 2023-09-05 NOTE — PROGRESS NOTES
[facility]                                     Progress Note    Patient Name: Sudheer Montiel  Date: 9/5/2023         Service Type: Individual      Session Start Time: 9:07am  Session End Time: 9:47am     Session Length: 40 mins    Session #: 7    Attendees: Client attended alone    Service Modality:  Video Visit:      Provider verified identity through the following two step process.  Patient provided:  Patient is known previously to provider    Telemedicine Visit: The patient's condition can be safely assessed and treated via synchronous audio and visual telemedicine encounter.      Reason for Telemedicine Visit: Services only offered telehealth    Originating Site (Patient Location): Patient's home    Distant Site (Provider Location): Provider Remote Setting- Home Office    Consent:  The patient/guardian has verbally consented to: the potential risks and benefits of telemedicine (video visit) versus in person care; bill my insurance or make self-payment for services provided; and responsibility for payment of non-covered services.     Patient would like the video invitation sent by:  My Chart    Mode of Communication:  Video Conference via Amwell    Distant Location (Provider):  Off-site    As the provider I attest to compliance with applicable laws and regulations related to telemedicine.    DATA  Interactive Complexity: No  Crisis: No        Progress Since Last Session (Related to Symptoms / Goals / Homework):   Symptoms: No change      Homework: Achieved / completed to satisfaction      Episode of Care Goals: Minimal progress - ACTION (Actively working towards change); Intervened by reinforcing change plan / affirming steps taken     Current / Ongoing Stressors and Concerns:   Living in transitional housing, memory concerns, social isolation     Treatment Objective(s) Addressed in This Session:   identify and use  strategies to improve memory       Intervention:   Emotion Focused Therapy:  supportive listening, emotional processing  Solution Focused: notebook for memory     Assessments completed prior to visit:  The following assessments were completed by patient for this visit:  PHQ9:       5/10/2023     5:47 PM   PHQ-9 SCORE   PHQ-9 Total Score MyChart 19 (Moderately severe depression)   PHQ-9 Total Score 19     GAD7:       5/8/2023    12:50 PM   WILLIAM-7 SCORE   Total Score 10 (moderate anxiety)   Total Score 10         ASSESSMENT: Current Emotional / Mental Status (status of significant symptoms):   Risk status (Self / Other harm or suicidal ideation)   Patient denies current fears or concerns for personal safety.   Patient denies current or recent suicidal ideation or behaviors.   Patient denies current or recent homicidal ideation or behaviors.   Patient denies current or recent self injurious behavior or ideation.   Patient denies other safety concerns.   Patient reports there has been no change in risk factors since their last session.     Patient reports there has been no change in protective factors since their last session.     Recommended that patient call 911 or go to the local ED should there be a change in any of these risk factors.     Appearance:   Appropriate    Eye Contact:   Good    Psychomotor Behavior: Normal    Attitude:   Cooperative    Orientation:   All   Speech    Rate / Production: Some word finding trouble    Volume:  Normal    Mood:    Normal Sad    Affect:    Appropriate    Thought Content:  Clear    Thought Form:  Coherent    Insight:    Good  and Fair      Medication Review:   No current psychiatric medications prescribed     Medication Compliance:   NA     Changes in Health Issues:   None reported     Chemical Use Review:   Substance Use: Chemical use reviewed, no active concerns identified      Tobacco Use: No current tobacco use.      Diagnosis:  1. Anxiety    2. Impaired executive functioning        Collateral Reports Completed:   Not Applicable    PLAN: (Patient  Tasks / Therapist Tasks / Other)  Provider assigned client to use small notebook to help with memory         Elaine Sidhu [facility] 9/5/2023                                                            ______________________________________________________________________     Individual Treatment Plan     Patient's Name: Sudheer Montiel              YOB: 1965     Date of Creation: 6/13/2023  Date Treatment Plan Last Reviewed/Revised: 6/13/2023     DSM5 Diagnoses:  1. Anxiety    2. Impaired executive functioning       Psychosocial / Contextual Factors: memory loss, job loss, loss of home and license   PROMIS (reviewed every 90 days): 12     Referral / Collaboration:  Was/were discussed and patient will pursue. , memory clinic      Anticipated number of session for this episode of care: 9-12 sessions  Anticipation frequency of session: Every other week  Anticipated Duration of each session: 38-52 minutes  Treatment plan will be reviewed in 90 days or when goals have been changed.         MeasurableTreatment Goal(s) related to diagnosis / functional impairment(s)  Goal 1: Patient will report     I will know I've met my goal when client has known coping skills to deal with life changes .             Objective #A (Patient Action)                          Patient will identify   fears / thoughts that contribute to feeling anxious.  Status: New - Date: 6/27/23      Intervention(s)  Therapist will teach emotional recognition/identification.  .     Objective #B  Patient will increase length and frequency of contact with others  .  Status: New - Date: 6/27/23      Intervention(s)  Therapist will assign homework that includes client reaching out to others to keep contact .     Objective #C  Patient will use cognitive strategies identified in therapy to challenge anxious thoughts.  Status: New - Date: 6/27/23      Intervention(s)  Therapist will teach emotional regulation skills.  .  Therapist will teach  emotional regulation skills.  .        Patient has reviewed and agreed to the above plan.        Elaine Sidhu, [facility]                       June 27, 2023

## 2023-09-06 LAB
ALBUMIN SERPL BCG-MCNC: 3.1 G/DL (ref 3.5–5.2)
ALP SERPL-CCNC: 160 U/L (ref 35–104)
ALT SERPL W P-5'-P-CCNC: 18 U/L (ref 0–50)
ANION GAP SERPL CALCULATED.3IONS-SCNC: 12 MMOL/L (ref 7–15)
AST SERPL W P-5'-P-CCNC: 48 U/L (ref 0–45)
BILIRUB SERPL-MCNC: 0.8 MG/DL
BUN SERPL-MCNC: 8.6 MG/DL (ref 8–23)
CALCIUM SERPL-MCNC: 8.7 MG/DL (ref 8.6–10)
CHLORIDE SERPL-SCNC: 103 MMOL/L (ref 98–107)
CREAT SERPL-MCNC: 0.65 MG/DL (ref 0.51–0.95)
DEPRECATED HCO3 PLAS-SCNC: 25 MMOL/L (ref 22–29)
EGFRCR SERPLBLD CKD-EPI 2021: >90 ML/MIN/1.73M2
GLUCOSE SERPL-MCNC: 169 MG/DL (ref 70–99)
POTASSIUM SERPL-SCNC: 3.8 MMOL/L (ref 3.4–5.3)
PROT SERPL-MCNC: 6.5 G/DL (ref 6.4–8.3)
SODIUM SERPL-SCNC: 140 MMOL/L (ref 136–145)

## 2023-09-06 PROCEDURE — 36415 COLL VENOUS BLD VENIPUNCTURE: CPT | Mod: ORL

## 2023-09-06 PROCEDURE — P9604 ONE-WAY ALLOW PRORATED TRIP: HCPCS | Mod: ORL

## 2023-09-06 PROCEDURE — 80053 COMPREHEN METABOLIC PANEL: CPT | Mod: ORL

## 2023-09-08 ENCOUNTER — TRANSITIONAL CARE UNIT VISIT (OUTPATIENT)
Dept: GERIATRICS | Facility: CLINIC | Age: 58
End: 2023-09-08
Payer: COMMERCIAL

## 2023-09-08 VITALS
DIASTOLIC BLOOD PRESSURE: 79 MMHG | RESPIRATION RATE: 18 BRPM | HEART RATE: 76 BPM | BODY MASS INDEX: 39.73 KG/M2 | WEIGHT: 224.2 LBS | OXYGEN SATURATION: 96 % | SYSTOLIC BLOOD PRESSURE: 118 MMHG | TEMPERATURE: 97.7 F | HEIGHT: 63 IN

## 2023-09-08 DIAGNOSIS — K74.60 LIVER CIRRHOSIS SECONDARY TO NASH (H): ICD-10-CM

## 2023-09-08 DIAGNOSIS — J90 PLEURAL EFFUSION: Primary | ICD-10-CM

## 2023-09-08 DIAGNOSIS — K75.81 LIVER CIRRHOSIS SECONDARY TO NASH (H): ICD-10-CM

## 2023-09-08 DIAGNOSIS — M62.81 GENERALIZED MUSCLE WEAKNESS: ICD-10-CM

## 2023-09-08 PROCEDURE — 99309 SBSQ NF CARE MODERATE MDM 30: CPT

## 2023-09-08 NOTE — LETTER
"    9/8/2023        RE: Sudheer Montiel  [address]  [facility] [address]        [facility] GERIATRICS    Chief Complaint   Patient presents with     RECHECK     HPI:  Sudheer Montiel is a 58 year old  (1965), who is being seen today for an episodic care visit at: [facility] (U) [[id number]]. Today's concern is: TCU Follow Up     Patient admitted to the above facility from  [facility]. Hospital stay 8/11/23 through 8/21/23.  Hospitalization as encrypted below from discharge summary.     [facility]  Hospitalist Discharge Summary    Date of Admission:  8/11/2023  Date of Discharge:  8/21/2023     Hospital Course:  \"Sudheer Montiel is a 58 year old female admitted on 8/11/2023 with shortness of breath.      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 ~2800ml output after chest tube placement while patient still in ED. Effusion consistent with transudative based on fluid studies  *CT chest PE: no PE, large R effusion with associated atelectasis  *CXR: large R pleural effusion, near collapse of R Lung  Echo with LVEF of 60 to 65%.  No significant valvular abnormalities  CXR 8/18 - Right chest tube. Probable tiny right apical pneumothorax. Bibasilar atelectasis.  Subcutaneous emphysema right chest.   *8/20 CT removed  --[facility] [facility] signed off.  - IR consulted, given new right-sided hepatic hydrothorax recommend hepatology follow-up, possible TIPS, standing order for thoracocentesis.  Pleurx catheter considered if end-stage liver disease.  --Continue Lasix, spironolactone.   --Low-salt diet, fluid restriction to 2000 mL/day.  --Recommend repeat imaging of chest in 1 to 2 " "weeks for evaluation of fluid reaccumulation.     REYES cirrhosis decompensated  Elevated LFTs, variable/stable  Alk phos, tbili and AST similarly elevated as during June 2023 checks  Ultrasound liver with hepatic steatosis, no ascites.  Ultrasound Doppler Hepatic steatosis and cirrhosis. No focal masses. Normal abdominal liver duplex.  -- [facility] recommend diuresis, signed off.  Appreciate comanagement.  --Continue Lasix 40 mg oral daily with spironolactone 100 mg oral daily.  --Continue rifaximin twice daily.  --Continue lactulose 3 times daily, titrate to 2-3 loose stools per day.  --Recommended patient to follow-up with [facility], liver clinic as outpatient.     Diabetes mellitus with hemoglobin A1c of 6.9.    Follows with Dr Christianson of endocrine. Uses novolog 4U with small carb meal, 6U with large carb meal and sliding scale. Lantus in AM., metformin 1500mg in AM and ozempic weekly. Last A1C was 6.9 in June 2023.  - continue lantus 40 units daily, resume ozempic  - stop PTA metformin  - continue 3-6 units insulin aspart for carb counting with meals     Tobacco use  Smokes 4-6 cigarettes per day. Declined  - Emphasized abstinence from nicotine.     Hypothyroidism  Continue PTA supplements.     Anxiety  Stable. PTA lexapro continued.     Ascending aortic dilatation.  Echo with mild aortic root dilatation. Max diameter of the visualized portion 4.3 cm.  The ascending aorta is Mildly dilated. Max diameter of the visualized portion  3.9 cm.  Blood pressure management, follow-up as outpatient per protocol.     Mild hypokalemia replaced   K 3.3 > 3.6      Class III obesity with a BMI of 46.84  Increased all cause mortality and morbidity.  Consider lifestyle modification with diet and exercise  Follow up with PCP     Physical deconditioning from medical illness.    - discharge to TCU\".     Overall stabilized and patient discharged to the above TCU on 8/21 in stable condition for rehab.     Today: " "Patient being seen for TCU follow-up visit.  No changes in condition since last visit.  Pain control satisfactory.  Breathing fine.  Hemodynamically stable.  Working with therapy and tolerating.  Disposition pending at this time.    Allergies, and PMH/PSH reviewed in [facility] today.  REVIEW OF SYSTEMS:  4 point ROS including Respiratory, CV, GI and , other than that noted in the HPI,  is negative    Objective:   /79   Pulse 76   Temp 97.7  F (36.5  C)   Resp 18   Ht 1.6 m (5' 3\")   Wt 101.7 kg (224 lb 3.2 oz)   SpO2 96%   BMI 39.72 kg/m      Exam:  GENERAL APPEARANCE: NAD, alert and awake  RESPIRATORY: Clear to auscultation, even and unlabored, symmetrical chest wall expansion  CARDIOVASCULAR: RRR, no peripheral edema, S1/S2 normal   GASTROINTESTINAL: Soft, nontender, nondistended, bowel sounds present x4 quadrants, large   NEUROLOGICAL: Alert and oriented   PSYCHOLOGICAL: Calm cooperative,    Recent labs in [facility] reviewed by me today.     Assessment/Plan:  Small R apical pneumothorax 8/13 - improving  SOB-resolved  Weakness  -No chest pain or shortness of breath, room air, vitals stable, old chest tube site covered with pressure dressing and site intact  -Continue Lasix and spironolactone, continue low-salt diet and 2000 ml per day fluid restriction, follow CMP, monitor for changes in respiratory status     REYES cirrhosis decompensated  Elevated LFTs   -Continue Lasix, spironolactone, rifaximin, and lactulose, follow CMP, GI follow-up    MED REC REQUIRED  Post Medication Reconciliation Status:  Medication reconciliation previously completed during another office visit         Electronically signed by:   Bennie Wiggins DNP,APRN,AGNP-BC.           The above note was completed in part using Dragon voice recognition software. Although reviewed after completion, some word and grammatical errors may occur. Please contact the author of this note with any questions.           Sincerely,        TIFFANY Dinh " CNP

## 2023-09-08 NOTE — PROGRESS NOTES
"[facility] GERIATRICS    Chief Complaint   Patient presents with    RECHECK     HPI:  Sudheer Montiel is a 58 year old  (1965), who is being seen today for an episodic care visit at: [facility] (U) [[id number]]. Today's concern is: TCU Follow Up     Patient admitted to the above facility from  [facility]. Hospital stay 8/11/23 through 8/21/23.  Hospitalization as encrypted below from discharge summary.     [facility]  Hospitalist Discharge Summary    Date of Admission:  8/11/2023  Date of Discharge:  8/21/2023     Hospital Course:  \"Sudheer Montiel is a 58 year old female admitted on 8/11/2023 with shortness of breath.      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 ~2800ml output after chest tube placement while patient still in ED. Effusion consistent with transudative based on fluid studies  *CT chest PE: no PE, large R effusion with associated atelectasis  *CXR: large R pleural effusion, near collapse of R Lung  Echo with LVEF of 60 to 65%.  No significant valvular abnormalities  CXR 8/18 - Right chest tube. Probable tiny right apical pneumothorax. Bibasilar atelectasis.  Subcutaneous emphysema right chest.   *8/20 CT removed  --[facility] signed off.  - IR consulted, given new right-sided hepatic hydrothorax recommend hepatology follow-up, possible TIPS, standing order for thoracocentesis.  Pleurx catheter considered if end-stage liver disease.  --Continue Lasix, spironolactone.   --Low-salt diet, fluid restriction to 2000 mL/day.  --Recommend repeat imaging of chest in 1 to 2 weeks for evaluation of fluid reaccumulation.     REYES cirrhosis decompensated  Elevated LFTs, " "variable/stable  Alk phos, tbili and AST similarly elevated as during June 2023 checks  Ultrasound liver with hepatic steatosis, no ascites.  Ultrasound Doppler Hepatic steatosis and cirrhosis. No focal masses. Normal abdominal liver duplex.  -- [facility] recommend diuresis, signed off.  Appreciate comanagement.  --Continue Lasix 40 mg oral daily with spironolactone 100 mg oral daily.  --Continue rifaximin twice daily.  --Continue lactulose 3 times daily, titrate to 2-3 loose stools per day.  --Recommended patient to follow-up with [facility], liver clinic as outpatient.     Diabetes mellitus with hemoglobin A1c of 6.9.    Follows with Dr Christianson of endocrine. Uses novolog 4U with small carb meal, 6U with large carb meal and sliding scale. Lantus in AM., metformin 1500mg in AM and ozempic weekly. Last A1C was 6.9 in June 2023.  - continue lantus 40 units daily, resume ozempic  - stop PTA metformin  - continue 3-6 units insulin aspart for carb counting with meals     Tobacco use  Smokes 4-6 cigarettes per day. Declined  - Emphasized abstinence from nicotine.     Hypothyroidism  Continue PTA supplements.     Anxiety  Stable. PTA lexapro continued.     Ascending aortic dilatation.  Echo with mild aortic root dilatation. Max diameter of the visualized portion 4.3 cm.  The ascending aorta is Mildly dilated. Max diameter of the visualized portion  3.9 cm.  Blood pressure management, follow-up as outpatient per protocol.     Mild hypokalemia replaced   K 3.3 > 3.6      Class III obesity with a BMI of 46.84  Increased all cause mortality and morbidity.  Consider lifestyle modification with diet and exercise  Follow up with PCP     Physical deconditioning from medical illness.    - discharge to TCU\".     Overall stabilized and patient discharged to the above TCU on 8/21 in stable condition for rehab.     Today: Patient being seen for TCU follow-up visit.  No changes in condition since last visit.  Pain " "control satisfactory.  Breathing fine.  Hemodynamically stable.  Working with therapy and tolerating.  Disposition pending at this time.    Allergies, and PMH/PSH reviewed in [facility] today.  REVIEW OF SYSTEMS:  4 point ROS including Respiratory, CV, GI and , other than that noted in the HPI,  is negative    Objective:   /79   Pulse 76   Temp 97.7  F (36.5  C)   Resp 18   Ht 1.6 m (5' 3\")   Wt 101.7 kg (224 lb 3.2 oz)   SpO2 96%   BMI 39.72 kg/m      Exam:  GENERAL APPEARANCE: NAD, alert and awake  RESPIRATORY: Clear to auscultation, even and unlabored, symmetrical chest wall expansion  CARDIOVASCULAR: RRR, no peripheral edema, S1/S2 normal   GASTROINTESTINAL: Soft, nontender, nondistended, bowel sounds present x4 quadrants, large   NEUROLOGICAL: Alert and oriented   PSYCHOLOGICAL: Calm cooperative,    Recent labs in [facility] reviewed by me today.     Assessment/Plan:  Small R apical pneumothorax 8/13 - improving  SOB-resolved  Weakness  -No chest pain or shortness of breath, room air, vitals stable, old chest tube site covered with pressure dressing and site intact  -Continue Lasix and spironolactone, continue low-salt diet and 2000 ml per day fluid restriction, follow CMP, monitor for changes in respiratory status     REYES cirrhosis decompensated  Elevated LFTs   -Continue Lasix, spironolactone, rifaximin, and lactulose, follow CMP, GI follow-up    MED REC REQUIRED  Post Medication Reconciliation Status:  Medication reconciliation previously completed during another office visit         Electronically signed by:   Bennie Wiggins,DARWIN,APRN,AGNP-BC.           The above note was completed in part using Dragon voice recognition software. Although reviewed after completion, some word and grammatical errors may occur. Please contact the author of this note with any questions.         "

## 2023-09-11 ENCOUNTER — TRANSITIONAL CARE UNIT VISIT (OUTPATIENT)
Dept: GERIATRICS | Facility: CLINIC | Age: 58
End: 2023-09-11
Payer: COMMERCIAL

## 2023-09-11 VITALS
SYSTOLIC BLOOD PRESSURE: 118 MMHG | HEIGHT: 63 IN | TEMPERATURE: 97.7 F | OXYGEN SATURATION: 96 % | WEIGHT: 223.6 LBS | HEART RATE: 76 BPM | DIASTOLIC BLOOD PRESSURE: 79 MMHG | BODY MASS INDEX: 39.62 KG/M2 | RESPIRATION RATE: 18 BRPM

## 2023-09-11 DIAGNOSIS — J90 PLEURAL EFFUSION: Primary | ICD-10-CM

## 2023-09-11 DIAGNOSIS — M62.81 GENERALIZED MUSCLE WEAKNESS: ICD-10-CM

## 2023-09-11 DIAGNOSIS — J93.9 PNEUMOTHORAX, UNSPECIFIED TYPE: ICD-10-CM

## 2023-09-11 DIAGNOSIS — K74.60 LIVER CIRRHOSIS SECONDARY TO NASH (H): ICD-10-CM

## 2023-09-11 DIAGNOSIS — K75.81 LIVER CIRRHOSIS SECONDARY TO NASH (H): ICD-10-CM

## 2023-09-11 PROCEDURE — 99309 SBSQ NF CARE MODERATE MDM 30: CPT

## 2023-09-11 NOTE — PROGRESS NOTES
"[facility] GERIATRICS    Chief Complaint   Patient presents with    RECHECK     HPI:  Sudheer Montiel is a 58 year old  (1965), who is being seen today for an episodic care visit at: [facility] (U) [[id number]]. Today's concern is: TCU Follow Up     Patient admitted to the above facility from  [facility]. Hospital stay 8/11/23 through 8/21/23.  Hospitalization as encrypted below from discharge summary.     [facility]  Hospitalist Discharge Summary    Date of Admission:  8/11/2023  Date of Discharge:  8/21/2023     Hospital Course:  \"Sudheer Montiel is a 58 year old female admitted on 8/11/2023 with shortness of breath.      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 ~2800ml output after chest tube placement while patient still in ED. Effusion consistent with transudative based on fluid studies  *CT chest PE: no PE, large R effusion with associated atelectasis  *CXR: large R pleural effusion, near collapse of R Lung  Echo with LVEF of 60 to 65%.  No significant valvular abnormalities  CXR 8/18 - Right chest tube. Probable tiny right apical pneumothorax. Bibasilar atelectasis.  Subcutaneous emphysema right chest.   *8/20 CT removed  --[facility] signed off.  - IR consulted, given new right-sided hepatic hydrothorax recommend hepatology follow-up, possible TIPS, standing order for thoracocentesis.  Pleurx catheter considered if end-stage liver disease.  --Continue Lasix, spironolactone.   --Low-salt diet, fluid restriction to 2000 mL/day.  --Recommend repeat imaging of chest in 1 to 2 weeks for evaluation of fluid reaccumulation.     REYES cirrhosis decompensated  Elevated LFTs, " "variable/stable  Alk phos, tbili and AST similarly elevated as during June 2023 checks  Ultrasound liver with hepatic steatosis, no ascites.  Ultrasound Doppler Hepatic steatosis and cirrhosis. No focal masses. Normal abdominal liver duplex.  -- [facility] recommend diuresis, signed off.  Appreciate comanagement.  --Continue Lasix 40 mg oral daily with spironolactone 100 mg oral daily.  --Continue rifaximin twice daily.  --Continue lactulose 3 times daily, titrate to 2-3 loose stools per day.  --Recommended patient to follow-up with [facility], liver clinic as outpatient.     Diabetes mellitus with hemoglobin A1c of 6.9.    Follows with Dr Christianson of endocrine. Uses novolog 4U with small carb meal, 6U with large carb meal and sliding scale. Lantus in AM., metformin 1500mg in AM and ozempic weekly. Last A1C was 6.9 in June 2023.  - continue lantus 40 units daily, resume ozempic  - stop PTA metformin  - continue 3-6 units insulin aspart for carb counting with meals     Tobacco use  Smokes 4-6 cigarettes per day. Declined  - Emphasized abstinence from nicotine.     Hypothyroidism  Continue PTA supplements.     Anxiety  Stable. PTA lexapro continued.     Ascending aortic dilatation.  Echo with mild aortic root dilatation. Max diameter of the visualized portion 4.3 cm.  The ascending aorta is Mildly dilated. Max diameter of the visualized portion  3.9 cm.  Blood pressure management, follow-up as outpatient per protocol.     Mild hypokalemia replaced   K 3.3 > 3.6      Class III obesity with a BMI of 46.84  Increased all cause mortality and morbidity.  Consider lifestyle modification with diet and exercise  Follow up with PCP     Physical deconditioning from medical illness.    - discharge to TCU\".     Overall stabilized and patient discharged to the above TCU on 8/21 in stable condition for rehab.    Today: Patient being seen for TCU follow-up visit.  Reports not getting adequate sleep.  Will initiate " "melatonin.  Otherwise she is doing fine.  Working with therapy.  Tolerating diet.  Hemodynamically stable.  Denies chest pain or shortness of breath.    Allergies, and PMH/PSH reviewed in [facility] today.  REVIEW OF SYSTEMS:  4 point ROS including Respiratory, CV, GI and , other than that noted in the HPI,  is negative    Objective:   /79   Pulse 76   Temp 97.7  F (36.5  C)   Resp 18   Ht 1.6 m (5' 3\")   Wt 101.4 kg (223 lb 9.6 oz)   SpO2 96%   BMI 39.61 kg/m      Exam:  GENERAL APPEARANCE: In no acute distress  RESPIRATORY: Clear to auscultation, even and unlabored, symmetrical chest wall expansion  CARDIOVASCULAR: RRR, no peripheral edema, S1/S2 normal   GASTROINTESTINAL: Soft, nontender, nondistended, bowel sounds present x4 quadrants, large   NEUROLOGICAL: Alert and oriented   PSYCHOLOGICAL: Calm     Recent labs in [facility] reviewed by me today.     Assessment/Plan:  Small R apical pneumothorax 8/13 - improving  SOB-resolved  Weakness  -No chest pain or shortness of breath, room air, vitals stable, old chest tube site intact  -Continue Lasix and spironolactone, continue low-salt diet and 2000 ml per day fluid restriction, follow CMP, monitor for changes in respiratory status     REYES cirrhosis decompensated  Elevated LFTs   -Continue Lasix, spironolactone, rifaximin, and lactulose, follow CMP, GI follow-up    MED REC REQUIRED  Post Medication Reconciliation Status:  Medication reconciliation previously completed during another office visit      Orders:  Melatonin 3 mg at HS        Electronically signed by:    Bennie Wiggins,DARWIN,APRN,AGNP-BC.               The above note was completed in part using Dragon voice recognition software. Although reviewed after completion, some word and grammatical errors may occur. Please contact the author of this note with any questions.         "

## 2023-09-11 NOTE — LETTER
"    9/11/2023        RE: Sudheer Montiel  [address]  [facility] [address]        [facility] GERIATRICS    Chief Complaint   Patient presents with     RECHECK     HPI:  Sudheer Montiel is a 58 year old  (1965), who is being seen today for an episodic care visit at: [facility] (U) [[id number]]. Today's concern is: TCU Follow Up     Patient admitted to the above facility from  [facility]. Hospital stay 8/11/23 through 8/21/23.  Hospitalization as encrypted below from discharge summary.     [facility]  Hospitalist Discharge Summary    Date of Admission:  8/11/2023  Date of Discharge:  8/21/2023     Hospital Course:  \"Sudheer Montiel is a 58 year old female admitted on 8/11/2023 with shortness of breath.      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 ~2800ml output after chest tube placement while patient still in ED. Effusion consistent with transudative based on fluid studies  *CT chest PE: no PE, large R effusion with associated atelectasis  *CXR: large R pleural effusion, near collapse of R Lung  Echo with LVEF of 60 to 65%.  No significant valvular abnormalities  CXR 8/18 - Right chest tube. Probable tiny right apical pneumothorax. Bibasilar atelectasis.  Subcutaneous emphysema right chest.   *8/20 CT removed  --[facility] signed off.  - IR consulted, given new right-sided hepatic hydrothorax recommend hepatology follow-up, possible TIPS, standing order for thoracocentesis.  Pleurx catheter considered if end-stage liver disease.  --Continue Lasix, spironolactone.   --Low-salt diet, fluid restriction to 2000 mL/day.  --Recommend repeat imaging of chest in 1 to 2 " "weeks for evaluation of fluid reaccumulation.     REYES cirrhosis decompensated  Elevated LFTs, variable/stable  Alk phos, tbili and AST similarly elevated as during June 2023 checks  Ultrasound liver with hepatic steatosis, no ascites.  Ultrasound Doppler Hepatic steatosis and cirrhosis. No focal masses. Normal abdominal liver duplex.  -- [facility] recommend diuresis, signed off.  Appreciate comanagement.  --Continue Lasix 40 mg oral daily with spironolactone 100 mg oral daily.  --Continue rifaximin twice daily.  --Continue lactulose 3 times daily, titrate to 2-3 loose stools per day.  --Recommended patient to follow-up with [facility], liver clinic as outpatient.     Diabetes mellitus with hemoglobin A1c of 6.9.    Follows with Dr Christianson of endocrine. Uses novolog 4U with small carb meal, 6U with large carb meal and sliding scale. Lantus in AM., metformin 1500mg in AM and ozempic weekly. Last A1C was 6.9 in June 2023.  - continue lantus 40 units daily, resume ozempic  - stop PTA metformin  - continue 3-6 units insulin aspart for carb counting with meals     Tobacco use  Smokes 4-6 cigarettes per day. Declined  - Emphasized abstinence from nicotine.     Hypothyroidism  Continue PTA supplements.     Anxiety  Stable. PTA lexapro continued.     Ascending aortic dilatation.  Echo with mild aortic root dilatation. Max diameter of the visualized portion 4.3 cm.  The ascending aorta is Mildly dilated. Max diameter of the visualized portion  3.9 cm.  Blood pressure management, follow-up as outpatient per protocol.     Mild hypokalemia replaced   K 3.3 > 3.6      Class III obesity with a BMI of 46.84  Increased all cause mortality and morbidity.  Consider lifestyle modification with diet and exercise  Follow up with PCP     Physical deconditioning from medical illness.    - discharge to TCU\".     Overall stabilized and patient discharged to the above TCU on 8/21 in stable condition for rehab.    Today: " "Patient being seen for TCU follow-up visit.  Reports not getting adequate sleep.  Will initiate melatonin.  Otherwise she is doing fine.  Working with therapy.  Tolerating diet.  Hemodynamically stable.  Denies chest pain or shortness of breath.    Allergies, and PMH/PSH reviewed in [facility] today.  REVIEW OF SYSTEMS:  4 point ROS including Respiratory, CV, GI and , other than that noted in the HPI,  is negative    Objective:   /79   Pulse 76   Temp 97.7  F (36.5  C)   Resp 18   Ht 1.6 m (5' 3\")   Wt 101.4 kg (223 lb 9.6 oz)   SpO2 96%   BMI 39.61 kg/m      Exam:  GENERAL APPEARANCE: In no acute distress  RESPIRATORY: Clear to auscultation, even and unlabored, symmetrical chest wall expansion  CARDIOVASCULAR: RRR, no peripheral edema, S1/S2 normal   GASTROINTESTINAL: Soft, nontender, nondistended, bowel sounds present x4 quadrants, large   NEUROLOGICAL: Alert and oriented   PSYCHOLOGICAL: Calm     Recent labs in [facility] reviewed by me today.     Assessment/Plan:  Small R apical pneumothorax 8/13 - improving  SOB-resolved  Weakness  -No chest pain or shortness of breath, room air, vitals stable, old chest tube site intact  -Continue Lasix and spironolactone, continue low-salt diet and 2000 ml per day fluid restriction, follow CMP, monitor for changes in respiratory status     REYES cirrhosis decompensated  Elevated LFTs   -Continue Lasix, spironolactone, rifaximin, and lactulose, follow CMP, GI follow-up    MED REC REQUIRED  Post Medication Reconciliation Status:  Medication reconciliation previously completed during another office visit      Orders:  Melatonin 3 mg at HS        Electronically signed by:    Bennie Wiggins DNP,APRN,AGNP-BC.               The above note was completed in part using Dragon voice recognition software. Although reviewed after completion, some word and grammatical errors may occur. Please contact the author of this note with any questions.           Sincerely,        TIFFANY Dinh " CNP

## 2023-09-13 ENCOUNTER — LAB (OUTPATIENT)
Dept: LAB | Facility: CLINIC | Age: 58
End: 2023-09-13
Payer: COMMERCIAL

## 2023-09-13 DIAGNOSIS — E11.9 TYPE 2 DIABETES MELLITUS WITHOUT COMPLICATION, WITHOUT LONG-TERM CURRENT USE OF INSULIN (H): ICD-10-CM

## 2023-09-13 DIAGNOSIS — E03.9 ACQUIRED HYPOTHYROIDISM: ICD-10-CM

## 2023-09-14 ENCOUNTER — TRANSITIONAL CARE UNIT VISIT (OUTPATIENT)
Dept: GERIATRICS | Facility: CLINIC | Age: 58
End: 2023-09-14
Payer: COMMERCIAL

## 2023-09-14 VITALS — BODY MASS INDEX: 39.51 KG/M2 | WEIGHT: 223 LBS | HEIGHT: 63 IN

## 2023-09-14 DIAGNOSIS — K74.60 LIVER CIRRHOSIS SECONDARY TO NASH (H): ICD-10-CM

## 2023-09-14 DIAGNOSIS — J90 PLEURAL EFFUSION: Primary | ICD-10-CM

## 2023-09-14 DIAGNOSIS — K75.81 LIVER CIRRHOSIS SECONDARY TO NASH (H): ICD-10-CM

## 2023-09-14 PROCEDURE — 99309 SBSQ NF CARE MODERATE MDM 30: CPT

## 2023-09-14 NOTE — LETTER
"    9/14/2023        RE: Sudheer Montiel  [address]  [facility] [address]        [facility] GERIATRICS    Chief Complaint   Patient presents with     RECHECK     HPI:  Sudheer Montiel is a 58 year old  (1965), who is being seen today for an episodic care visit at: [facility] (U) [[id number]]. Today's concern is: TCU Follow Up     Patient admitted to the above facility from  [facility]. Hospital stay 8/11/23 through 8/21/23.  Hospitalization as encrypted below from discharge summary.     [facility]  Hospitalist Discharge Summary    Date of Admission:  8/11/2023  Date of Discharge:  8/21/2023     Hospital Course:  \"Sudheer Montiel is a 58 year old female admitted on 8/11/2023 with shortness of breath.      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 ~2800ml output after chest tube placement while patient still in ED. Effusion consistent with transudative based on fluid studies  *CT chest PE: no PE, large R effusion with associated atelectasis  *CXR: large R pleural effusion, near collapse of R Lung  Echo with LVEF of 60 to 65%.  No significant valvular abnormalities  CXR 8/18 - Right chest tube. Probable tiny right apical pneumothorax. Bibasilar atelectasis.  Subcutaneous emphysema right chest.   *8/20 CT removed  --[facility] signed off.  - IR consulted, given new right-sided hepatic hydrothorax recommend hepatology follow-up, possible TIPS, standing order for thoracocentesis.  Pleurx catheter considered if end-stage liver disease.  --Continue Lasix, spironolactone.   --Low-salt diet, fluid restriction to 2000 mL/day.  --Recommend repeat imaging of chest in 1 to 2 " "weeks for evaluation of fluid reaccumulation.     REYES cirrhosis decompensated  Elevated LFTs, variable/stable  Alk phos, tbili and AST similarly elevated as during June 2023 checks  Ultrasound liver with hepatic steatosis, no ascites.  Ultrasound Doppler Hepatic steatosis and cirrhosis. No focal masses. Normal abdominal liver duplex.  -- [facility] recommend diuresis, signed off.  Appreciate comanagement.  --Continue Lasix 40 mg oral daily with spironolactone 100 mg oral daily.  --Continue rifaximin twice daily.  --Continue lactulose 3 times daily, titrate to 2-3 loose stools per day.  --Recommended patient to follow-up with [facility], liver clinic as outpatient.     Diabetes mellitus with hemoglobin A1c of 6.9.    Follows with Dr Christianson of endocrine. Uses novolog 4U with small carb meal, 6U with large carb meal and sliding scale. Lantus in AM., metformin 1500mg in AM and ozempic weekly. Last A1C was 6.9 in June 2023.  - continue lantus 40 units daily, resume ozempic  - stop PTA metformin  - continue 3-6 units insulin aspart for carb counting with meals     Tobacco use  Smokes 4-6 cigarettes per day. Declined  - Emphasized abstinence from nicotine.     Hypothyroidism  Continue PTA supplements.     Anxiety  Stable. PTA lexapro continued.     Ascending aortic dilatation.  Echo with mild aortic root dilatation. Max diameter of the visualized portion 4.3 cm.  The ascending aorta is Mildly dilated. Max diameter of the visualized portion  3.9 cm.  Blood pressure management, follow-up as outpatient per protocol.     Mild hypokalemia replaced   K 3.3 > 3.6      Class III obesity with a BMI of 46.84  Increased all cause mortality and morbidity.  Consider lifestyle modification with diet and exercise  Follow up with PCP     Physical deconditioning from medical illness.    - discharge to TCU\".     Overall stabilized and patient discharged to the above TCU on 8/21 in stable condition for rehab.     Today: " "Patient being seen for TCU follow-up visit. Started on melatonin at last visit and today reports \"sleeping lot\". Working with therapy.  Tolerating diet.  Hemodynamically stable.  Denies chest pain or shortness of breath.     Allergies, and PMH/PSH reviewed in [facility] today.  REVIEW OF SYSTEMS:  4 point ROS including Respiratory, CV, GI and , other than that noted in the HPI,  is negative      Objective:   Ht 1.6 m (5' 3\")   Wt 101.2 kg (223 lb)   BMI 39.50 kg/m      Exam:  GENERAL APPEARANCE: NAD, alert and awake   RESPIRATORY: Clear to auscultation, even and unlabored, symmetrical chest wall expansion  CARDIOVASCULAR: RRR, no peripheral edema, S1/S2 normal   GASTROINTESTINAL: Soft, nontender, nondistended, bowel sounds present x4 quadrants, large   NEUROLOGICAL: Alert and oriented   PSYCHOLOGICAL: Calm    Recent labs in [facility] reviewed by me today.     Assessment/Plan:  Small R apical pneumothorax   SOB-resolved  Weakness  -No chest pain or shortness of breath, room air, vitals stable, old chest tube site intact, working with therapy   -Continue Lasix and spironolactone, continue low-salt diet and 2000 ml per day fluid restriction, follow CMP, monitor for changes in respiratory status     REYES cirrhosis decompensated  Elevated LFTs   -Continue Lasix, spironolactone, rifaximin, and lactulose, follow CMP, GI follow-up, monitor for abdominal pain     MED REC REQUIRED  Post Medication Reconciliation Status:  Medication reconciliation previously completed during another office visit         Electronically signed by:   Bennie Wiggins DNP,TIFFANY,AGNP-BC.             The above note was completed in part using Dragon voice recognition software. Although reviewed after completion, some word and grammatical errors may occur. Please contact the author of this note with any questions.           Sincerely,        TIFFANY Dinh CNP      "

## 2023-09-14 NOTE — PROGRESS NOTES
"[facility] GERIATRICS    Chief Complaint   Patient presents with    RECHECK     HPI:  Sudheer Montiel is a 58 year old  (1965), who is being seen today for an episodic care visit at: [facility] (U) [[id number]]. Today's concern is: TCU Follow Up     Patient admitted to the above facility from  [facility]. Hospital stay 8/11/23 through 8/21/23.  Hospitalization as encrypted below from discharge summary.     [facility]  Hospitalist Discharge Summary    Date of Admission:  8/11/2023  Date of Discharge:  8/21/2023     Hospital Course:  \"Sudheer Montiel is a 58 year old female admitted on 8/11/2023 with shortness of breath.      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 ~2800ml output after chest tube placement while patient still in ED. Effusion consistent with transudative based on fluid studies  *CT chest PE: no PE, large R effusion with associated atelectasis  *CXR: large R pleural effusion, near collapse of R Lung  Echo with LVEF of 60 to 65%.  No significant valvular abnormalities  CXR 8/18 - Right chest tube. Probable tiny right apical pneumothorax. Bibasilar atelectasis.  Subcutaneous emphysema right chest.   *8/20 CT removed  --[facility] signed off.  - IR consulted, given new right-sided hepatic hydrothorax recommend hepatology follow-up, possible TIPS, standing order for thoracocentesis.  Pleurx catheter considered if end-stage liver disease.  --Continue Lasix, spironolactone.   --Low-salt diet, fluid restriction to 2000 mL/day.  --Recommend repeat imaging of chest in 1 to 2 weeks for evaluation of fluid reaccumulation.     REYES cirrhosis decompensated  Elevated LFTs, " "variable/stable  Alk phos, tbili and AST similarly elevated as during June 2023 checks  Ultrasound liver with hepatic steatosis, no ascites.  Ultrasound Doppler Hepatic steatosis and cirrhosis. No focal masses. Normal abdominal liver duplex.  -- [facility] recommend diuresis, signed off.  Appreciate comanagement.  --Continue Lasix 40 mg oral daily with spironolactone 100 mg oral daily.  --Continue rifaximin twice daily.  --Continue lactulose 3 times daily, titrate to 2-3 loose stools per day.  --Recommended patient to follow-up with [facility], liver clinic as outpatient.     Diabetes mellitus with hemoglobin A1c of 6.9.    Follows with Dr Christianson of endocrine. Uses novolog 4U with small carb meal, 6U with large carb meal and sliding scale. Lantus in AM., metformin 1500mg in AM and ozempic weekly. Last A1C was 6.9 in June 2023.  - continue lantus 40 units daily, resume ozempic  - stop PTA metformin  - continue 3-6 units insulin aspart for carb counting with meals     Tobacco use  Smokes 4-6 cigarettes per day. Declined  - Emphasized abstinence from nicotine.     Hypothyroidism  Continue PTA supplements.     Anxiety  Stable. PTA lexapro continued.     Ascending aortic dilatation.  Echo with mild aortic root dilatation. Max diameter of the visualized portion 4.3 cm.  The ascending aorta is Mildly dilated. Max diameter of the visualized portion  3.9 cm.  Blood pressure management, follow-up as outpatient per protocol.     Mild hypokalemia replaced   K 3.3 > 3.6      Class III obesity with a BMI of 46.84  Increased all cause mortality and morbidity.  Consider lifestyle modification with diet and exercise  Follow up with PCP     Physical deconditioning from medical illness.    - discharge to TCU\".     Overall stabilized and patient discharged to the above TCU on 8/21 in stable condition for rehab.     Today: Patient being seen for TCU follow-up visit. Started on melatonin at last visit and today reports " "\"sleeping lot\". Working with therapy.  Tolerating diet.  Hemodynamically stable.  Denies chest pain or shortness of breath.     Allergies, and PMH/PSH reviewed in [facility] today.  REVIEW OF SYSTEMS:  4 point ROS including Respiratory, CV, GI and , other than that noted in the HPI,  is negative      Objective:   Ht 1.6 m (5' 3\")   Wt 101.2 kg (223 lb)   BMI 39.50 kg/m      Exam:  GENERAL APPEARANCE: NAD, alert and awake   RESPIRATORY: Clear to auscultation, even and unlabored, symmetrical chest wall expansion  CARDIOVASCULAR: RRR, no peripheral edema, S1/S2 normal   GASTROINTESTINAL: Soft, nontender, nondistended, bowel sounds present x4 quadrants, large   NEUROLOGICAL: Alert and oriented   PSYCHOLOGICAL: Calm    Recent labs in [facility] reviewed by me today.     Assessment/Plan:  Small R apical pneumothorax   SOB-resolved  Weakness  -No chest pain or shortness of breath, room air, vitals stable, old chest tube site intact, working with therapy   -Continue Lasix and spironolactone, continue low-salt diet and 2000 ml per day fluid restriction, follow CMP, monitor for changes in respiratory status     REYES cirrhosis decompensated  Elevated LFTs   -Continue Lasix, spironolactone, rifaximin, and lactulose, follow CMP, GI follow-up, monitor for abdominal pain     MED REC REQUIRED  Post Medication Reconciliation Status:  Medication reconciliation previously completed during another office visit         Electronically signed by:   Bennie Wiggins,DARWIN,APRN,AGNP-BC.             The above note was completed in part using Dragon voice recognition software. Although reviewed after completion, some word and grammatical errors may occur. Please contact the author of this note with any questions.         "

## 2023-09-18 ENCOUNTER — TRANSITIONAL CARE UNIT VISIT (OUTPATIENT)
Dept: GERIATRICS | Facility: CLINIC | Age: 58
End: 2023-09-18
Payer: COMMERCIAL

## 2023-09-18 VITALS
SYSTOLIC BLOOD PRESSURE: 126 MMHG | DIASTOLIC BLOOD PRESSURE: 65 MMHG | HEIGHT: 63 IN | OXYGEN SATURATION: 97 % | RESPIRATION RATE: 18 BRPM | BODY MASS INDEX: 39.71 KG/M2 | TEMPERATURE: 96.1 F | WEIGHT: 224.1 LBS | HEART RATE: 73 BPM

## 2023-09-18 DIAGNOSIS — J90 PLEURAL EFFUSION: Primary | ICD-10-CM

## 2023-09-18 DIAGNOSIS — M62.81 GENERALIZED MUSCLE WEAKNESS: ICD-10-CM

## 2023-09-18 DIAGNOSIS — K75.81 LIVER CIRRHOSIS SECONDARY TO NASH (H): ICD-10-CM

## 2023-09-18 DIAGNOSIS — K74.60 LIVER CIRRHOSIS SECONDARY TO NASH (H): ICD-10-CM

## 2023-09-18 PROCEDURE — 99309 SBSQ NF CARE MODERATE MDM 30: CPT

## 2023-09-18 NOTE — PROGRESS NOTES
"[facility] GERIATRICS    Chief Complaint   Patient presents with    RECHECK     HPI:  Sudheer Montiel is a 58 year old  (1965), who is being seen today for an episodic care visit at: [facility] (U) [[id number]]. Today's concern is: TCU Follow Up     Patient admitted to the above facility from  [facility]. Hospital stay 8/11/23 through 8/21/23.  Hospitalization as encrypted below from discharge summary.     [facility]  Hospitalist Discharge Summary    Date of Admission:  8/11/2023  Date of Discharge:  8/21/2023     Hospital Course:  \"Sudheer Montiel is a 58 year old female admitted on 8/11/2023 with shortness of breath.      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 ~2800ml output after chest tube placement while patient still in ED. Effusion consistent with transudative based on fluid studies  *CT chest PE: no PE, large R effusion with associated atelectasis  *CXR: large R pleural effusion, near collapse of R Lung  Echo with LVEF of 60 to 65%.  No significant valvular abnormalities  CXR 8/18 - Right chest tube. Probable tiny right apical pneumothorax. Bibasilar atelectasis.  Subcutaneous emphysema right chest.   *8/20 CT removed  --[facility] signed off.  - IR consulted, given new right-sided hepatic hydrothorax recommend hepatology follow-up, possible TIPS, standing order for thoracocentesis.  Pleurx catheter considered if end-stage liver disease.  --Continue Lasix, spironolactone.   --Low-salt diet, fluid restriction to 2000 mL/day.  --Recommend repeat imaging of chest in 1 to 2 weeks for evaluation of fluid reaccumulation.     REYES cirrhosis decompensated  Elevated LFTs, " "variable/stable  Alk phos, tbili and AST similarly elevated as during June 2023 checks  Ultrasound liver with hepatic steatosis, no ascites.  Ultrasound Doppler Hepatic steatosis and cirrhosis. No focal masses. Normal abdominal liver duplex.  -- [facility] recommend diuresis, signed off.  Appreciate comanagement.  --Continue Lasix 40 mg oral daily with spironolactone 100 mg oral daily.  --Continue rifaximin twice daily.  --Continue lactulose 3 times daily, titrate to 2-3 loose stools per day.  --Recommended patient to follow-up with [facility], liver clinic as outpatient.     Diabetes mellitus with hemoglobin A1c of 6.9.    Follows with Dr Christianson of endocrine. Uses novolog 4U with small carb meal, 6U with large carb meal and sliding scale. Lantus in AM., metformin 1500mg in AM and ozempic weekly. Last A1C was 6.9 in June 2023.  - continue lantus 40 units daily, resume ozempic  - stop PTA metformin  - continue 3-6 units insulin aspart for carb counting with meals     Tobacco use  Smokes 4-6 cigarettes per day. Declined  - Emphasized abstinence from nicotine.     Hypothyroidism  Continue PTA supplements.     Anxiety  Stable. PTA lexapro continued.     Ascending aortic dilatation.  Echo with mild aortic root dilatation. Max diameter of the visualized portion 4.3 cm.  The ascending aorta is Mildly dilated. Max diameter of the visualized portion  3.9 cm.  Blood pressure management, follow-up as outpatient per protocol.     Mild hypokalemia replaced   K 3.3 > 3.6      Class III obesity with a BMI of 46.84  Increased all cause mortality and morbidity.  Consider lifestyle modification with diet and exercise  Follow up with PCP     Physical deconditioning from medical illness.    - discharge to TCU\".     Overall stabilized and patient discharged to the above TCU on 8/21 in stable condition for rehab.     Today: Patient being seen for TCU follow-up visit. No changes in condition since last visit. Working " "with therapy.  Tolerating diet.  Hemodynamically stable.  Denies chest pain or shortness of breath. Disposition pending at this time.     Allergies, and PMH/PSH reviewed in [facility] today.  REVIEW OF SYSTEMS:  4 point ROS including Respiratory, CV, GI and , other than that noted in the HPI,  is negative    Objective:   /65   Pulse 73   Temp (!) 96.1  F (35.6  C)   Resp 18   Ht 1.6 m (5' 3\")   Wt 101.7 kg (224 lb 1.6 oz)   SpO2 97%   BMI 39.70 kg/m      Exam:  GENERAL APPEARANCE: In no acute distress   RESPIRATORY: Clear to auscultation, even and unlabored, symmetrical chest wall expansion  CARDIOVASCULAR: RRR, no peripheral edema, S1/S2 normal   GASTROINTESTINAL: Soft, nontender, nondistended, bowel sounds present x4 quadrants, large   NEUROLOGICAL: Alert and oriented   PSYCHOLOGICAL: Calm, cooperative     Recent labs in [facility] reviewed by me today.     Assessment/Plan:  Small R apical pneumothorax   SOB-resolved  Weakness  -Denies chest pain or shortness of breath, room air, vitals stable, old chest tube site intact, working with therapy   -Continue Lasix and spironolactone, continue low-salt diet and 2000 ml per day fluid restriction, follow CMP, repeat CXR, monitor for changes in respiratory status     REYES cirrhosis decompensated  Elevated LFTs   -Continue Lasix, spironolactone, rifaximin, and lactulose, follow CMP, GI follow-up, monitor for abdominal pain           MED REC REQUIRED  Post Medication Reconciliation Status:  Medication reconciliation previously completed during another office visit         Electronically signed by:    Bennie Wiggins,DARWIN,APRN,AGNP-BC.               The above note was completed in part using Dragon voice recognition software. Although reviewed after completion, some word and grammatical errors may occur. Please contact the author of this note with any questions.         "

## 2023-09-19 ENCOUNTER — TELEPHONE (OUTPATIENT)
Dept: GERIATRICS | Facility: CLINIC | Age: 58
End: 2023-09-19
Payer: COMMERCIAL

## 2023-09-19 RX ORDER — ONDANSETRON 4 MG/1
4 TABLET, FILM COATED ORAL EVERY 8 HOURS PRN
COMMUNITY
End: 2024-04-17

## 2023-09-19 NOTE — TELEPHONE ENCOUNTER
[facility] Geriatrics Triage Nurse Telephone Encounter    Provider: TIFFANY Treadwell CNP  Facility: [facility] Facility Type:  TCU    Caller: Jazmine   Call Back Number: [phone]    Allergies:    Allergies   Allergen Reactions    Cephalexin     Morphine      Other reaction(s): Other  Irritability, disorientation    Penicillins Itching     face    Amoxicillin Rash and Itching    Cefprozil Rash    Ceftazidime Itching and Rash    Ciprofloxacin Itching and Rash     Tolerates levaquin     Percocet [Oxycodone-Acetaminophen] Nausea and Vomiting        Reason for call: The patient is c/o of nausea when she takes her lactulose. Pt's VS stable, and having 3-4 stools per day. Nursing requesting anti nausea medication.     Verbal Order/Direction given by Provider: Zofran 4mg q8h prn     Provider giving Order:  TIFFANY Treadwell CNP    Verbal Order given to: Jazmine Carver RN

## 2023-09-22 ENCOUNTER — TRANSITIONAL CARE UNIT VISIT (OUTPATIENT)
Dept: GERIATRICS | Facility: CLINIC | Age: 58
End: 2023-09-22
Payer: COMMERCIAL

## 2023-09-22 VITALS
HEART RATE: 74 BPM | RESPIRATION RATE: 18 BRPM | HEIGHT: 63 IN | BODY MASS INDEX: 39.73 KG/M2 | OXYGEN SATURATION: 95 % | WEIGHT: 224.2 LBS | SYSTOLIC BLOOD PRESSURE: 138 MMHG | DIASTOLIC BLOOD PRESSURE: 76 MMHG | TEMPERATURE: 96.8 F

## 2023-09-22 DIAGNOSIS — M62.81 GENERALIZED MUSCLE WEAKNESS: ICD-10-CM

## 2023-09-22 DIAGNOSIS — K75.81 LIVER CIRRHOSIS SECONDARY TO NASH (H): ICD-10-CM

## 2023-09-22 DIAGNOSIS — J90 PLEURAL EFFUSION: Primary | ICD-10-CM

## 2023-09-22 DIAGNOSIS — K74.60 LIVER CIRRHOSIS SECONDARY TO NASH (H): ICD-10-CM

## 2023-09-22 PROCEDURE — 99309 SBSQ NF CARE MODERATE MDM 30: CPT

## 2023-09-22 NOTE — LETTER
"    9/22/2023        RE: Sudheer Montiel  [address]  [facility] [address]        [facility] GERIATRICS    Chief Complaint   Patient presents with     RECHECK     HPI:  Sudheer Montiel is a 58 year old  (1965), who is being seen today for an episodic care visit at: [facility] (U) [[id number]]. Today's concern is: TCU Follow Up     Patient admitted to the above facility from  [facility]. Hospital stay 8/11/23 through 8/21/23.  Hospitalization as encrypted below from discharge summary.     [facility]  Hospitalist Discharge Summary    Date of Admission:  8/11/2023  Date of Discharge:  8/21/2023     Hospital Course:  \"Sudheer Montiel is a 58 year old female admitted on 8/11/2023 with shortness of breath.      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 ~2800ml output after chest tube placement while patient still in ED. Effusion consistent with transudative based on fluid studies  *CT chest PE: no PE, large R effusion with associated atelectasis  *CXR: large R pleural effusion, near collapse of R Lung  Echo with LVEF of 60 to 65%.  No significant valvular abnormalities  CXR 8/18 - Right chest tube. Probable tiny right apical pneumothorax. Bibasilar atelectasis.  Subcutaneous emphysema right chest.   *8/20 CT removed  --[facility] signed off.  - IR consulted, given new right-sided hepatic hydrothorax recommend hepatology follow-up, possible TIPS, standing order for thoracocentesis.  Pleurx catheter considered if end-stage liver disease.  --Continue Lasix, spironolactone.   --Low-salt diet, fluid restriction to 2000 mL/day.  --Recommend repeat imaging of chest in 1 to 2 " "weeks for evaluation of fluid reaccumulation.     REYES cirrhosis decompensated  Elevated LFTs, variable/stable  Alk phos, tbili and AST similarly elevated as during June 2023 checks  Ultrasound liver with hepatic steatosis, no ascites.  Ultrasound Doppler Hepatic steatosis and cirrhosis. No focal masses. Normal abdominal liver duplex.  -- [facility] recommend diuresis, signed off.  Appreciate comanagement.  --Continue Lasix 40 mg oral daily with spironolactone 100 mg oral daily.  --Continue rifaximin twice daily.  --Continue lactulose 3 times daily, titrate to 2-3 loose stools per day.  --Recommended patient to follow-up with [facility], liver clinic as outpatient.     Diabetes mellitus with hemoglobin A1c of 6.9.    Follows with Dr Christianson of endocrine. Uses novolog 4U with small carb meal, 6U with large carb meal and sliding scale. Lantus in AM., metformin 1500mg in AM and ozempic weekly. Last A1C was 6.9 in June 2023.  - continue lantus 40 units daily, resume ozempic  - stop PTA metformin  - continue 3-6 units insulin aspart for carb counting with meals     Tobacco use  Smokes 4-6 cigarettes per day. Declined  - Emphasized abstinence from nicotine.     Hypothyroidism  Continue PTA supplements.     Anxiety  Stable. PTA lexapro continued.     Ascending aortic dilatation.  Echo with mild aortic root dilatation. Max diameter of the visualized portion 4.3 cm.  The ascending aorta is Mildly dilated. Max diameter of the visualized portion  3.9 cm.  Blood pressure management, follow-up as outpatient per protocol.     Mild hypokalemia replaced   K 3.3 > 3.6      Class III obesity with a BMI of 46.84  Increased all cause mortality and morbidity.  Consider lifestyle modification with diet and exercise  Follow up with PCP     Physical deconditioning from medical illness.    - discharge to TCU\".     Overall stabilized and patient discharged to the above TCU on 8/21 in stable condition for rehab.     Today: " "Patient being seen for a follow up visit. Transferred to LTC yesterday. Clinically remained stable. Working with therapy.  Eating and sleeping fine.  Hemodynamically stable.  Denies chest pain or shortness of breath.      Allergies, and PMH/PSH reviewed in [facility] today.  REVIEW OF SYSTEMS:  4 point ROS including Respiratory, CV, GI and , other than that noted in the HPI,  is negative    Objective:   /76   Pulse 74   Temp 96.8  F (36  C)   Resp 18   Ht 1.6 m (5' 3\")   Wt 101.7 kg (224 lb 3.2 oz)   SpO2 95%   BMI 39.72 kg/m      Exam:  GENERAL APPEARANCE: NAD  RESPIRATORY: Clear to auscultation, even and unlabored, symmetrical chest wall expansion  CARDIOVASCULAR: RRR, no peripheral edema, S1/S2 normal   GASTROINTESTINAL: Soft, nontender, nondistended, bowel sounds active   NEUROLOGICAL: Alert and oriented x3  PSYCHOLOGICAL: Calm      Recent labs in [facility] reviewed by me today.      Assessment/Plan:  Small R apical pneumothorax   SOB-resolved  Weakness  -No chest pain or shortness of breath, room air, VSS, old chest tube site intact, working with therapy   -Continue Lasix and spironolactone, continue low-salt diet and 2000 ml per day fluid restriction, follow CMP, monitor for changes in respiratory status     REYES cirrhosis decompensated  Elevated LFTs   -Continue Lasix, spironolactone, rifaximin, and lactulose, follow CMP, GI follow-up, monitor for abdominal pain            MED REC REQUIRED  Post Medication Reconciliation Status:  Medication reconciliation previously completed during another office visit           Electronically signed by:    Bennie Wiggins DNP,TIFFANY,AGNP-BC.                     The above note was completed in part using Dragon voice recognition software. Although reviewed after completion, some word and grammatical errors may occur. Please contact the author of this note with any questions.                 Sincerely,        TIFFANY Dinh CNP      "

## 2023-09-22 NOTE — PROGRESS NOTES
"[facility] GERIATRICS    Chief Complaint   Patient presents with    RECHECK     HPI:  Sudheer Montiel is a 58 year old  (1965), who is being seen today for an episodic care visit at: [facility] (U) [[id number]]. Today's concern is: TCU Follow Up     Patient admitted to the above facility from  [facility]. Hospital stay 8/11/23 through 8/21/23.  Hospitalization as encrypted below from discharge summary.     [facility]  Hospitalist Discharge Summary    Date of Admission:  8/11/2023  Date of Discharge:  8/21/2023     Hospital Course:  \"Sudheer Montiel is a 58 year old female admitted on 8/11/2023 with shortness of breath.      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 ~2800ml output after chest tube placement while patient still in ED. Effusion consistent with transudative based on fluid studies  *CT chest PE: no PE, large R effusion with associated atelectasis  *CXR: large R pleural effusion, near collapse of R Lung  Echo with LVEF of 60 to 65%.  No significant valvular abnormalities  CXR 8/18 - Right chest tube. Probable tiny right apical pneumothorax. Bibasilar atelectasis.  Subcutaneous emphysema right chest.   *8/20 CT removed  --[facility] signed off.  - IR consulted, given new right-sided hepatic hydrothorax recommend hepatology follow-up, possible TIPS, standing order for thoracocentesis.  Pleurx catheter considered if end-stage liver disease.  --Continue Lasix, spironolactone.   --Low-salt diet, fluid restriction to 2000 mL/day.  --Recommend repeat imaging of chest in 1 to 2 weeks for evaluation of fluid reaccumulation.     REYES cirrhosis decompensated  Elevated LFTs, " "variable/stable  Alk phos, tbili and AST similarly elevated as during June 2023 checks  Ultrasound liver with hepatic steatosis, no ascites.  Ultrasound Doppler Hepatic steatosis and cirrhosis. No focal masses. Normal abdominal liver duplex.  -- [facility] recommend diuresis, signed off.  Appreciate comanagement.  --Continue Lasix 40 mg oral daily with spironolactone 100 mg oral daily.  --Continue rifaximin twice daily.  --Continue lactulose 3 times daily, titrate to 2-3 loose stools per day.  --Recommended patient to follow-up with [facility], liver clinic as outpatient.     Diabetes mellitus with hemoglobin A1c of 6.9.    Follows with Dr Christianson of endocrine. Uses novolog 4U with small carb meal, 6U with large carb meal and sliding scale. Lantus in AM., metformin 1500mg in AM and ozempic weekly. Last A1C was 6.9 in June 2023.  - continue lantus 40 units daily, resume ozempic  - stop PTA metformin  - continue 3-6 units insulin aspart for carb counting with meals     Tobacco use  Smokes 4-6 cigarettes per day. Declined  - Emphasized abstinence from nicotine.     Hypothyroidism  Continue PTA supplements.     Anxiety  Stable. PTA lexapro continued.     Ascending aortic dilatation.  Echo with mild aortic root dilatation. Max diameter of the visualized portion 4.3 cm.  The ascending aorta is Mildly dilated. Max diameter of the visualized portion  3.9 cm.  Blood pressure management, follow-up as outpatient per protocol.     Mild hypokalemia replaced   K 3.3 > 3.6      Class III obesity with a BMI of 46.84  Increased all cause mortality and morbidity.  Consider lifestyle modification with diet and exercise  Follow up with PCP     Physical deconditioning from medical illness.    - discharge to TCU\".     Overall stabilized and patient discharged to the above TCU on 8/21 in stable condition for rehab.     Today: Patient being seen for a follow up visit. Transferred to LTC yesterday. Clinically remained " "stable. Working with therapy.  Eating and sleeping fine.  Hemodynamically stable.  Denies chest pain or shortness of breath.      Allergies, and PMH/PSH reviewed in [facility] today.  REVIEW OF SYSTEMS:  4 point ROS including Respiratory, CV, GI and , other than that noted in the HPI,  is negative    Objective:   /76   Pulse 74   Temp 96.8  F (36  C)   Resp 18   Ht 1.6 m (5' 3\")   Wt 101.7 kg (224 lb 3.2 oz)   SpO2 95%   BMI 39.72 kg/m      Exam:  GENERAL APPEARANCE: NAD  RESPIRATORY: Clear to auscultation, even and unlabored, symmetrical chest wall expansion  CARDIOVASCULAR: RRR, no peripheral edema, S1/S2 normal   GASTROINTESTINAL: Soft, nontender, nondistended, bowel sounds active   NEUROLOGICAL: Alert and oriented x3  PSYCHOLOGICAL: Calm      Recent labs in [facility] reviewed by me today.      Assessment/Plan:  Small R apical pneumothorax   SOB-resolved  Weakness  -No chest pain or shortness of breath, room air, VSS, old chest tube site intact, working with therapy   -Continue Lasix and spironolactone, continue low-salt diet and 2000 ml per day fluid restriction, follow CMP, monitor for changes in respiratory status     REYES cirrhosis decompensated  Elevated LFTs   -Continue Lasix, spironolactone, rifaximin, and lactulose, follow CMP, GI follow-up, monitor for abdominal pain            MED REC REQUIRED  Post Medication Reconciliation Status:  Medication reconciliation previously completed during another office visit           Electronically signed by:    Bennie Wiggins,DARWIN,APRN,NILOP-BC.                     The above note was completed in part using Dragon voice recognition software. Although reviewed after completion, some word and grammatical errors may occur. Please contact the author of this note with any questions.             "

## 2023-09-26 ENCOUNTER — MEDICAL CORRESPONDENCE (OUTPATIENT)
Dept: HEALTH INFORMATION MANAGEMENT | Facility: CLINIC | Age: 58
End: 2023-09-26
Payer: COMMERCIAL

## 2023-09-27 ENCOUNTER — TELEPHONE (OUTPATIENT)
Dept: GERIATRICS | Facility: CLINIC | Age: 58
End: 2023-09-27
Payer: COMMERCIAL

## 2023-09-27 NOTE — TELEPHONE ENCOUNTER
[facility] Geriatrics Triage Nurse Telephone Encounter    Provider: TIFFANY Treadwell CNP  Facility: [facility] Facility Type:  LTC    Allergies:    Allergies   Allergen Reactions    Cephalexin     Morphine      Other reaction(s): Other  Irritability, disorientation    Penicillins Itching     face    Amoxicillin Rash and Itching    Cefprozil Rash    Ceftazidime Itching and Rash    Ciprofloxacin Itching and Rash     Tolerates levaquin     Percocet [Oxycodone-Acetaminophen] Nausea and Vomiting        Reason for call: Pt has COVID and is c/o congestion.    ANGI provided: Guaifenesin (plain) 400 mg PO q 4 hours prn (expectorant)     Provider giving Order:  TIFFANY Treadwell CNP    Verbal Order given to: facility nurse    Jacquie Castillo RN

## 2023-10-11 ENCOUNTER — TELEPHONE (OUTPATIENT)
Dept: GERIATRICS | Facility: CLINIC | Age: 58
End: 2023-10-11
Payer: COMMERCIAL

## 2023-10-11 NOTE — TELEPHONE ENCOUNTER
[facility] Geriatrics Triage Nurse Telephone Encounter    Provider: TIFFANY Treadwell CNP  Facility: [facility] Facility Type:  LTC    Caller: Tonja  Call Back Number: [phone]    Allergies:    Allergies   Allergen Reactions    Cephalexin     Morphine      Other reaction(s): Other  Irritability, disorientation    Penicillins Itching     face    Amoxicillin Rash and Itching    Cefprozil Rash    Ceftazidime Itching and Rash    Ciprofloxacin Itching and Rash     Tolerates levaquin     Percocet [Oxycodone-Acetaminophen] Nausea and Vomiting        Reason for call: Nurse called to report that care conference was held with IDT staff, patient, and family.  Patient currently is taking Lexapro 10 mg po daily and is wondering if this dose can be increased.  Patient has been feeling a bit more anxious recently.  Patient does not see ACP in facility but does talk to someone about her mental health over the phone.      Verbal Order/Direction given by Provider: NP will see patient tomorrow to discuss recent increase in anxiety.      Provider giving Order:  TIFFANY Treadwell CNP    Verbal Order given to: left  for Tonja    Tonja Hampton RN

## 2023-10-12 ENCOUNTER — NURSING HOME VISIT (OUTPATIENT)
Dept: GERIATRICS | Facility: CLINIC | Age: 58
End: 2023-10-12
Payer: COMMERCIAL

## 2023-10-12 VITALS
OXYGEN SATURATION: 96 % | BODY MASS INDEX: 39.87 KG/M2 | DIASTOLIC BLOOD PRESSURE: 67 MMHG | RESPIRATION RATE: 16 BRPM | HEART RATE: 72 BPM | SYSTOLIC BLOOD PRESSURE: 109 MMHG | WEIGHT: 225 LBS | HEIGHT: 63 IN | TEMPERATURE: 97.4 F

## 2023-10-12 DIAGNOSIS — K75.81 LIVER CIRRHOSIS SECONDARY TO NASH (H): Primary | ICD-10-CM

## 2023-10-12 DIAGNOSIS — K74.60 LIVER CIRRHOSIS SECONDARY TO NASH (H): Primary | ICD-10-CM

## 2023-10-12 DIAGNOSIS — Z79.4 TYPE 2 DIABETES MELLITUS WITHOUT COMPLICATION, WITH LONG-TERM CURRENT USE OF INSULIN (H): ICD-10-CM

## 2023-10-12 DIAGNOSIS — J90 PLEURAL EFFUSION: ICD-10-CM

## 2023-10-12 DIAGNOSIS — E11.9 TYPE 2 DIABETES MELLITUS WITHOUT COMPLICATION, WITH LONG-TERM CURRENT USE OF INSULIN (H): ICD-10-CM

## 2023-10-12 PROCEDURE — 99308 SBSQ NF CARE LOW MDM 20: CPT | Performed by: INTERNAL MEDICINE

## 2023-10-12 NOTE — LETTER
"    10/12/2023        RE: Sudheer Montiel  [address]  [place] [facility] [address]        [facility] GERIATRICS  Chief Complaint   Patient presents with     assisted [facility] Record Number:  [id number]  Place of Service where encounter took place:  [facility] ([facility])     Course reviewed with Pt    Overall status has been stable  No c/o SOB on current diuretic regimen  Pt notes ongoing depression, would like lexapro dose increased  Notes occ tremors both hands  Appetite good  No abd pain, nausea, chest pain, SOB    Vitals stable  BG 100s      ALLERGIES:Cephalexin, Morphine, Penicillins, Amoxicillin, Cefprozil, Ceftazidime, Ciprofloxacin, and Percocet [oxycodone-acetaminophen]  PAST MEDICAL HISTORY:   Past Medical History:   Diagnosis Date     Abnormal liver CT      Hypothyroid 80's     Necrotizing fasciitis (H)      Primary osteoarthritis of both knees      Soft tissue infection      Subcortical infarction (H)      Type 2 diabetes mellitus (H)      PAST SURGICAL HISTORY:   has a past surgical history that includes marisol/bso (); REPAIR CRUCIATE LIGAMENT,KNEE ();  section (); Colonoscopy (N/A, 10/21/2015); and Irrigation and debridement abdomen washout, combined (N/A, 10/12/2018).  FAMILY HISTORY: family history is not on file.  SOCIAL HISTORY:  reports that she has been smoking cigarettes. She has never used smokeless tobacco. She reports that she does not drink alcohol and does not use drugs.       Current medications were reviewed by me today      Vitals:  /67   Pulse 72   Temp 97.4  F (36.3  C)   Resp 16   Ht 1.6 m (5' 3\")   Wt 102.1 kg (225 lb)   SpO2 96%   BMI 39.86 kg/m    Body mass index is 39.86 kg/m .  Exam:  Very pleasant obese female sitting in dining area  Appears well  HEENT no conjunctival icterus  Lungs clear, sl decreased BS R base  CV rrr  Abd soft, protuberant, no obvious ascites  Trace LE edema " B      ASSESSMENT/PLAN    REYES cirrhosis, stable on current regimen  Plan continue current diuretic tx, FR, rifaximin, lactulose  Monitor vitals, exam, BMP  Hepatology f/unit(s)    R sided pleural effusion secondary to hepatic hydrothorax  Stable since hospitalization 8/2023  Plan continue diuretics, monitor lung exam    DM 2  Good control on Lantus, fixed dose aspart, semaglutide  Plan monitor BG    Depression  Plan increase lexapro to 20 mg daiy  (QTc 8/2023 was .46 seconds)  Monitor mental status      Leroy Mckenzie MD      Sincerely,        Leroy Mckenzie MD

## 2023-10-12 NOTE — PROGRESS NOTES
"[facility] GERIATRICS  Chief Complaint   Patient presents with    [facility] Medical Record Number:  [id number]  Place of Service where encounter took place:  [facility] ()     Course reviewed with Pt    Overall status has been stable  No c/o SOB on current diuretic regimen  Pt notes ongoing depression, would like lexapro dose increased  Notes occ tremors both hands  Appetite good  No abd pain, nausea, chest pain, SOB    Vitals stable  BG 100s      ALLERGIES:Cephalexin, Morphine, Penicillins, Amoxicillin, Cefprozil, Ceftazidime, Ciprofloxacin, and Percocet [oxycodone-acetaminophen]  PAST MEDICAL HISTORY:   Past Medical History:   Diagnosis Date    Abnormal liver CT     Hypothyroid 80's    Necrotizing fasciitis (H)     Primary osteoarthritis of both knees     Soft tissue infection     Subcortical infarction (H)     Type 2 diabetes mellitus (H)      PAST SURGICAL HISTORY:   has a past surgical history that includes marisol/bso (); REPAIR CRUCIATE LIGAMENT,KNEE ();  section (); Colonoscopy (N/A, 10/21/2015); and Irrigation and debridement abdomen washout, combined (N/A, 10/12/2018).  FAMILY HISTORY: family history is not on file.  SOCIAL HISTORY:  reports that she has been smoking cigarettes. She has never used smokeless tobacco. She reports that she does not drink alcohol and does not use drugs.       Current medications were reviewed by me today      Vitals:  /67   Pulse 72   Temp 97.4  F (36.3  C)   Resp 16   Ht 1.6 m (5' 3\")   Wt 102.1 kg (225 lb)   SpO2 96%   BMI 39.86 kg/m    Body mass index is 39.86 kg/m .  Exam:  Very pleasant obese female sitting in dining area  Appears well  HEENT no conjunctival icterus  Lungs clear, sl decreased BS R base  CV rrr  Abd soft, protuberant, no obvious ascites  Trace LE edema B      ASSESSMENT/PLAN    REYES cirrhosis, stable on current regimen  Plan continue current diuretic tx, FR, rifaximin, " lactulose  Monitor vitals, exam, BMP  Hepatology f/unit(s)    R sided pleural effusion secondary to hepatic hydrothorax  Stable since hospitalization 8/2023  Plan continue diuretics, monitor lung exam    DM 2  Good control on Lantus, fixed dose aspart, semaglutide  Plan monitor BG    Depression  Plan increase lexapro to 20 mg daiy  (QTc 8/2023 was .46 seconds)  Monitor mental status      Leroy Mckenzie MD

## 2023-10-19 ENCOUNTER — DOCUMENTATION ONLY (OUTPATIENT)
Dept: GERIATRICS | Facility: CLINIC | Age: 58
End: 2023-10-19
Payer: COMMERCIAL

## 2023-10-19 DIAGNOSIS — R52 PAIN: Primary | ICD-10-CM

## 2023-10-19 RX ORDER — TRAMADOL HYDROCHLORIDE 50 MG/1
50 TABLET ORAL EVERY 6 HOURS PRN
COMMUNITY
End: 2023-10-19

## 2023-10-20 RX ORDER — TRAMADOL HYDROCHLORIDE 50 MG/1
50 TABLET ORAL EVERY 6 HOURS PRN
Qty: 60 TABLET | Refills: 3 | Status: ON HOLD | OUTPATIENT
Start: 2023-10-20 | End: 2023-12-19

## 2023-10-30 ENCOUNTER — ANCILLARY PROCEDURE (OUTPATIENT)
Dept: GENERAL RADIOLOGY | Facility: CLINIC | Age: 58
End: 2023-10-30
Attending: INTERNAL MEDICINE
Payer: COMMERCIAL

## 2023-10-30 ENCOUNTER — LAB (OUTPATIENT)
Dept: LAB | Facility: CLINIC | Age: 58
End: 2023-10-30
Payer: COMMERCIAL

## 2023-10-30 ENCOUNTER — MEDICAL CORRESPONDENCE (OUTPATIENT)
Dept: HEALTH INFORMATION MANAGEMENT | Facility: CLINIC | Age: 58
End: 2023-10-30

## 2023-10-30 ENCOUNTER — OFFICE VISIT (OUTPATIENT)
Dept: ENDOCRINOLOGY | Facility: CLINIC | Age: 58
End: 2023-10-30
Payer: COMMERCIAL

## 2023-10-30 ENCOUNTER — ANCILLARY PROCEDURE (OUTPATIENT)
Dept: ULTRASOUND IMAGING | Facility: CLINIC | Age: 58
End: 2023-10-30
Attending: INTERNAL MEDICINE
Payer: COMMERCIAL

## 2023-10-30 VITALS — SYSTOLIC BLOOD PRESSURE: 121 MMHG | DIASTOLIC BLOOD PRESSURE: 67 MMHG

## 2023-10-30 DIAGNOSIS — E03.9 ACQUIRED HYPOTHYROIDISM: ICD-10-CM

## 2023-10-30 DIAGNOSIS — E66.01 MORBID OBESITY (H): ICD-10-CM

## 2023-10-30 DIAGNOSIS — K74.60 UNSPECIFIED CIRRHOSIS OF LIVER (H): ICD-10-CM

## 2023-10-30 DIAGNOSIS — J90 PLEURAL EFFUSION: ICD-10-CM

## 2023-10-30 DIAGNOSIS — E11.9 TYPE 2 DIABETES MELLITUS WITHOUT COMPLICATION, WITHOUT LONG-TERM CURRENT USE OF INSULIN (H): ICD-10-CM

## 2023-10-30 DIAGNOSIS — E11.9 TYPE 2 DIABETES MELLITUS WITHOUT COMPLICATION, WITHOUT LONG-TERM CURRENT USE OF INSULIN (H): Primary | ICD-10-CM

## 2023-10-30 LAB
ALBUMIN SERPL BCG-MCNC: 3.4 G/DL (ref 3.5–5.2)
ALP SERPL-CCNC: 111 U/L (ref 35–104)
ALT SERPL W P-5'-P-CCNC: 15 U/L (ref 0–50)
AMMONIA PLAS-SCNC: 52 UMOL/L (ref 11–51)
ANION GAP SERPL CALCULATED.3IONS-SCNC: 10 MMOL/L (ref 7–15)
AST SERPL W P-5'-P-CCNC: 39 U/L (ref 0–45)
BILIRUB SERPL-MCNC: 1.1 MG/DL
BUN SERPL-MCNC: 12.5 MG/DL (ref 8–23)
CALCIUM SERPL-MCNC: 9 MG/DL (ref 8.6–10)
CHLORIDE SERPL-SCNC: 107 MMOL/L (ref 98–107)
CREAT SERPL-MCNC: 0.66 MG/DL (ref 0.51–0.95)
DEPRECATED HCO3 PLAS-SCNC: 23 MMOL/L (ref 22–29)
EGFRCR SERPLBLD CKD-EPI 2021: >90 ML/MIN/1.73M2
GLUCOSE SERPL-MCNC: 114 MG/DL (ref 70–99)
POTASSIUM SERPL-SCNC: 4.6 MMOL/L (ref 3.4–5.3)
PROT SERPL-MCNC: 7 G/DL (ref 6.4–8.3)
SODIUM SERPL-SCNC: 140 MMOL/L (ref 135–145)
T3FREE SERPL-MCNC: 2.4 PG/ML (ref 2–4.4)
T4 FREE SERPL-MCNC: 1.25 NG/DL (ref 0.9–1.7)
TSH SERPL DL<=0.005 MIU/L-ACNC: 0.53 UIU/ML (ref 0.3–4.2)

## 2023-10-30 PROCEDURE — 84443 ASSAY THYROID STIM HORMONE: CPT

## 2023-10-30 PROCEDURE — 84481 FREE ASSAY (FT-3): CPT

## 2023-10-30 PROCEDURE — 84439 ASSAY OF FREE THYROXINE: CPT

## 2023-10-30 PROCEDURE — 36415 COLL VENOUS BLD VENIPUNCTURE: CPT

## 2023-10-30 PROCEDURE — 71046 X-RAY EXAM CHEST 2 VIEWS: CPT | Mod: TC | Performed by: RADIOLOGY

## 2023-10-30 PROCEDURE — 80053 COMPREHEN METABOLIC PANEL: CPT

## 2023-10-30 PROCEDURE — 76705 ECHO EXAM OF ABDOMEN: CPT

## 2023-10-30 PROCEDURE — 99214 OFFICE O/P EST MOD 30 MIN: CPT | Performed by: INTERNAL MEDICINE

## 2023-10-30 PROCEDURE — 82140 ASSAY OF AMMONIA: CPT

## 2023-10-30 NOTE — PROGRESS NOTES
Recent issues:  Followup diabetes and thyroid evaluation, with daughters Jessie and Cassie today  Previously used metformin, Ozempic, VGo pod device with Novolog and the Freestyle Libre3 sensor,   Taking the [place] thyroid (lower dose) and levothyroxine meds regularly  Diagnosed with collapsed lung 8/2023, apparently due to fluid buildup from liver    Had CT placement, started furosemide, other adjustment with meds, metformin discontinued    Discharged and placement to [facility] considering future AL option             Diabetes:  ~4/2010. Initial diagnosis approx age 44  ...Has taken several DM meds including metformin, Byetta, glimeparide, Invokana, and Victoza   In 2018, she had been taking metformin, Victoza, glimeparide, and Jardiance  She stopped Jardiance, also ran out of Victoza last year  Had seen Beth CASTRO/AMADO for general medical evaluations  10/2018. Abdominal abcess illness, hospitalizations at [facility] and then [facility]              2-surgeries for abcess, thought related to the hysterectomy bladder sling mesh from prior surgery     Previously taking metformin 750 mg BID, glimeparide 4 mg every day, Tresiba 40U every day   10/2020. Changed to VGo40 pods management  Continue low dose glimeparide, but low SG levels and glimeparide discontinued, then restarted   Previous DM med plan:  Novolog in VGo40   Small carb meal 2 clicks (4U)   Large carb meal 3 clicks (6U)    Novolog    sscale:       -250 1-click    -300 2-clicks  Metformin 750 mg 2-tabs in morning   Ozempic 0.5 mg  Subcutaneous weekly      8/2023. Hospitalized, pleural effusion and CT placement  Med dose changes, stopped VGo and metformin discontinued  Current DM meds:  Lantus Solostar 40U  subcutaneous in morning  Aspart Flexpen 3U  Subcutaneous premeal    Used Freestyle [place] CGM in the past, not currently  Has Glucocard Vital BG meter    Infrequent BG testing  She reports  avg 124-135 mg/dl    Recent [facility] labs include:  7/26/19 hgbA1c 9.3%, t.chol 195, , TSH 0.06, FT4 1.46, FT3 8.7, Cr 0.77  8/26/20 hgbA1c 11.4%  10/15/21 hgbA1c 6.6%, LDL 60 mg/dl     Recent  labs include:  Lab Results   Component Value Date    A1C 6.9 (H) 06/06/2023     10/30/2023    POTASSIUM 4.6 10/30/2023    CHLORIDE 107 10/30/2023    CO2 23 10/30/2023    ANIONGAP 10 10/30/2023     (H) 10/30/2023    BUN 12.5 10/30/2023    CR 0.66 10/30/2023    GFRESTIMATED >90 10/30/2023    GFRESTBLACK >90 01/05/2021    JOANN 9.0 10/30/2023    CHOL 216 (H) 01/12/2023    TRIG 159 (H) 01/12/2023    HDL 48 (L) 01/12/2023     (H) 01/12/2023    NHDL 168 (H) 01/12/2023    UCRR 168.0 06/06/2023    MICROL 18.3 06/06/2023    UMALCR 10.89 06/06/2023     Last eye exam 2018?, results not available  DM Complications:  None known        Thyroid:  Had taken Synthroid  Changed to Lanexa thyroid medication, then Lanexa + levothyroxine combo dose plan  Had taken Lanexa thyroid 2-tablets daily... Possibly 60 mg 2-tabs QD  Early-mid 3/2018. Ran out of Lanexa thyroid medication  Previously took Lanexa thyroid 90 mg 2-tabs daily and low dose levothyroxine 0.025 mg daily  8/2019. Changed to lower dose Lanexa and higher dose levothyroxine  Subsequent dose reductions with levothyroxine medication  Mid 12/2021 to early 1/2022. Off Lanexa thyroid medication, then restarted   6/2023. 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 Frantz Guillen?/[facility], Dr. Johnson/GI, Dr. YURIDIA Harrington/Neurology       PMH/PSH:  Past Medical History:   Diagnosis Date    Abnormal liver CT     Hypothyroid 80's    Necrotizing fasciitis (H)     Pleural effusion     Primary osteoarthritis of both knees     Soft tissue infection     Subcortical infarction (H)     Type 2 diabetes mellitus (H)     Unspecified cirrhosis of liver (H)      Past Surgical History:   Procedure Laterality Date     SECTION      COLONOSCOPY N/A 10/21/2015    Procedure: COLONOSCOPY;  Surgeon: Jaky Arredondo MD;  Location:  GI    IRRIGATION AND DEBRIDEMENT ABDOMEN WASHOUT, COMBINED N/A 10/12/2018    Procedure: COMBINED IRRIGATION AND DEBRIDEMENT ABDOMEN WASHOUT;  Irrigation and Debridement of Lower Abdomen, removal of piece of mesh.;  Surgeon: Darlene Hogue MD;  Location: [facility] OR    marisol/bso      due to anemia    ZZC REPAIR CRUCIATE LIGAMENT,KNEE         Family Hx:  No family history on file.      Social Hx:  Social History     Socioeconomic History    Marital status:      Spouse name: Not on file    Number of children: 2    Years of education: Not on file    Highest education level: Not on file   Occupational History    Occupation: surgery scheduler [employer]   Tobacco Use    Smoking status: Former     Types: Cigarettes     Quit date: 2023     Years since quittin.2    Smokeless tobacco: Never   Vaping Use    Vaping Use: Never used   Substance and Sexual Activity    Alcohol use: No    Drug use: Never    Sexual activity: Not on file   Other Topics Concern    Not on file   Social History Narrative    Not on file     Social Determinants of Health     Financial Resource Strain: Not on file   Food Insecurity: Not on file   Transportation Needs: Not on file   Physical Activity: Not on file   Stress: Not on file   Social Connections: Not on  file   Interpersonal Safety: Not on file   Housing Stability: Not on file          MEDICATIONS:  has a current medication list which includes the following prescription(s): acetaminophen, aspirin, blood glucose, escitalopram, furosemide, insulin aspart, insulin glargine, lactulose, levothyroxine, melatonin, omeprazole, ondansetron, semaglutide, spironolactone, tramadol, valacyclovir, xifaxan, freestyle evelyn 3 sensor, b-d u/f, b-d u/f, nicotine, statin not prescribed, thyroid, and thyroid.     ROS: 10 point ROS neg other than the symptoms noted above in the HPI.     GENERAL: some fatigue, wt stable; denies fevers, chills, night sweats.   HEENT:  no dysphagia, odonophagia, diplopia, neck pain  THYROID:  no apparent hyper or hypothyroid symptoms  CV:  some palpitations; denies chest pain, pressure  LUNGS:  denies SOB, dyspnea, cough, wheezing  ABDOMEN:  Intermittent right abdomen pains; denies diarrhea, indigestion, constipation  EXTREMITIES: no rashes, ulcers, edema  NEUROLOGY: forgetfulness; no headaches, denies changes in vision, tingling, extremitiy numbness   MSK: knee and some low back pain; no muscle aches or pains, weakness  SKIN: no rashes or lesions  : no increased thirst and urination, nocturia; no menses since hysterectomy ~age 49  PSYCH:  stable mood, no significant anxiety or depression  ENDOCRINE: no heat or cold intolerance      Physical Exam   VS: /67 (BP Location: Left arm, Patient Position: Sitting, Cuff Size: Adult Regular)   GENERAL: AXOX3, NAD, well dressed, answering questions appropriately, appears stated age.  ENT: no nose swelling or nasal discharge, mouth redness or gum changes.  EYES: eyes grossly normal to inspection, conjunctivae and sclerae normal, no exophthalmos or proptosis  THYROID:  no apparent nodules or goiter  LUNGS: no audible wheeze, cough or visible cyanosis, or increased work of breathing  ABDOMEN: abdomen obese size  EXTREMITIES: no edema noted  NEUROLOGY: CN grossly  intact, no tremors  MSK: grossly intact  SKIN:  no apparent skin lesions, rash, or edema with visualized skin appearance  PSYCH: mentation appears normal, affect normal/bright, judgement and insight intact,   normal speech and appearance well groomed       LABS:     All pertinent notes, labs, and images personally reviewed by me.        A/P:  Encounter Diagnoses   Name Primary?    Type 2 diabetes mellitus without complication, without long-term current use of insulin (H) Yes    Acquired hypothyroidism     Morbid obesity (H)              Comments:  Reviewed health issues, diabetes and thyroid management.  Difficult to assess glycemic control without BGM or CGM data  Reviewed and interpreted tests that I previously ordered.   Ordered appropriate tests for the endocrinology disease management.    Management options discussed and implemented after shared medical decision making with the patient.  T2DM and hypothyroidism problems are chronic-stable    Plan:  Discussed general issues with the diabetes diagnosis and management  We discussed the hgbA1c test which reflects previous overall glucose levels or control  Discussed the importance of blood glucose (BG) testing to assess glucose trends  Provided general overview of the diabetes medication options and medication treatment plan  Reviewed recent Chandni CGM glucose trend data, in detail.    Recommend:  Continue the current Aspart and Lantus pen MDI routine:  Lantus Solostar 40U  subcutaneous in morning  Aspart Flexpen 3U  Subcutaneous premeal  Aspart sscale  1U per 50 mg/dl >200 mg/dl    Goal target -150 mg/dl  Advised restarting the Chandni CGM, as Libre3    Gave sample Libre3, use with smartphone ramon    Libre3 Rx sent to pharmacy    Send me MyChart update after staring Libre3 CGM  Keep focus on diet, exercise, weight management.  Will review the recent lab results when resulted  Needs f/u on the mild hypercholesterolemia, consider adding statin medication for  treatment  Keep focus on diet, exercise, weight management.  Continue current [place] thyroid and levothyroxine dose plan at this time  Advise having fasting lipid panel testing and dilated eye examination, at least annually    Keep scheduled followup GI, neurology, and PCP appointments  Addressed patient questions today     There are no Patient Instructions on file for this visit.    Future labs ordered today: No orders of the defined types were placed in this encounter.    Radiology/Consults ordered today: None    Total time spent on day of encounter:  30 min    Follow-up:  2/13/24 at 1130am, Return    JOJO Christianson MD, MS  Endocrinology  [facility]    CC: Care Team

## 2023-10-30 NOTE — Clinical Note
10/30/2023         RE: Sudheer Montiel  [address]  [facility] [address]        Dear Colleague,    Thank you for referring your patient, Sudheer Montiel, to the [facility] [address]. Please see a copy of my visit note below.      Recent issues:  Followup diabetes and thyroid evaluation, with daughters Jessie and Cassie today  Using the metformin, Ozempic, VGo pod device with Novolog and the Freestyle Libre3 sensor,   Taking the [place] thyroid (lower dose) and levothyroxine meds regularly  Diagnosed with collapsed lung 8/2023, apparently due to fluid buildup from liver    Had CT placement, started furosemide, other adjustment with meds    Discharged and placement to [facility] considering future AL option             Diabetes:  ~4/2010. Initial diagnosis approx age 44  ...Has taken several DM meds including metformin, Byetta, glimeparide, Invokana, and Victoza   In 2018, she had been taking metformin, Victoza, glimeparide, and Jardiance  She stopped Jardiance, also ran out of Victoza last year  Had seen Beth CASTRO/KHARI for general medical evaluations  10/2018. Abdominal abcess illness, hospitalizations at [facility] and then [facility]              2-surgeries for abcess, thought related to the hysterectomy bladder sling mesh from prior surgery     Previously taking metformin 750 mg BID, glimeparide 4 mg every day, Tresiba 40U every day   10/2020. Changed to VGo40 pods management  Continue low dose glimeparide, but low SG levels and glimeparide discontinued, then restarted   Current DM meds:  Novolog in VGo40   Small carb meal 2 clicks (4U)   Large carb meal 3 clicks (6U)    Novolog    sscale:      -250 1-click    -300 2-clicks  Metformin 750 mg 2-tabs in morning   Ozempic 0.5 mg  Subcutaneous weekly      8/2023. Hospitalized, CT,   MF stopped    Current DM meds:  Lantus Solostar 40U  subcutaneous in morning  Aspart  Flexpen 3U  Subcutaneous premeal    Has Glucocard Vital BG meter    Infrequent BG testing    She reports avg 124-135 mg/dl      10/2020. Started Freestyle Chandni CGM with [place]  Recent Chandni data:            Recent [facility] labs include:  7/26/19 hgbA1c 9.3%, t.chol 195, , TSH 0.06, FT4 1.46, FT3 8.7, Cr 0.77  8/26/20 hgbA1c 11.4%  10/15/21 hgbA1c 6.6%, LDL 60 mg/dl     Recent [facility] labs include:  Lab Results   Component Value Date    A1C 6.9 (H) 06/06/2023     09/06/2023    POTASSIUM 3.8 09/06/2023    CHLORIDE 103 09/06/2023    CO2 25 09/06/2023    ANIONGAP 12 09/06/2023     (H) 09/06/2023    BUN 8.6 09/06/2023    CR 0.65 09/06/2023    GFRESTIMATED >90 09/06/2023    GFRESTBLACK >90 01/05/2021    JOANN 8.7 09/06/2023    CHOL 216 (H) 01/12/2023    TRIG 159 (H) 01/12/2023    HDL 48 (L) 01/12/2023     (H) 01/12/2023    NHDL 168 (H) 01/12/2023    UCRR 168.0 06/06/2023    MICROL 18.3 06/06/2023    UMALCR 10.89 06/06/2023     Last eye exam 2018?, results not available  DM Complications:  None known        Thyroid:  Had taken Synthroid  Changed to [place] thyroid medication, then [place] + levothyroxine combo dose plan  Had taken [place] thyroid 2-tablets daily... Possibly 60 mg 2-tabs QD  Early-mid 3/2018. Ran out of [place] thyroid medication  Previously took [place] thyroid 90 mg 2-tabs daily and low dose levothyroxine 0.025 mg daily  8/2019. Changed to lower dose [place] and higher dose levothyroxine  Subsequent dose reductions with levothyroxine medication  Mid 12/2021 to early 1/2022. Off [place] thyroid medication, then restarted   6/2023. 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 Frantz Guillen?/[facility], Dr. Johnson/GI, Dr. YURIDIA Harrington/Neurology       PMH/PSH:  Past Medical History:   Diagnosis Date    Abnormal liver CT     Hypothyroid 80's    Necrotizing fasciitis (H)     Primary osteoarthritis of both knees     Soft tissue infection     Subcortical infarction (H)     Type 2 diabetes mellitus (H)      Past Surgical History:   Procedure Laterality Date     SECTION      COLONOSCOPY N/A 10/21/2015    Procedure: COLONOSCOPY;  Surgeon: Jaky Arredondo MD;  Location:  GI    IRRIGATION AND DEBRIDEMENT ABDOMEN WASHOUT, COMBINED N/A 10/12/2018    Procedure: COMBINED IRRIGATION AND DEBRIDEMENT ABDOMEN WASHOUT;  Irrigation and Debridement of Lower Abdomen, removal of piece of mesh.;  Surgeon: Darlene Hogue MD;  Location: [facility] OR    marisol/bso  2004    due to anemia    ZZC REPAIR CRUCIATE LIGAMENT,KNEE  1986       Family Hx:  No family history on file.      Social Hx:  Social History     Socioeconomic History    Marital status:      Spouse name: Not on file    Number of children: 2    Years of education: Not on file    Highest education level: Not on file   Occupational History    Occupation: surgery scheduler [employer]   Tobacco Use    Smoking status: Some Days     Types: Cigarettes     Last attempt to quit: 2011     Years since quittin.6    Smokeless tobacco: Never   Vaping Use    Vaping Use: Never used   Substance and Sexual Activity    Alcohol use: No    Drug use: Never    Sexual activity: Not on file   Other Topics Concern    Not on file   Social History Narrative    Not on file     Social Determinants of Health     Financial Resource Strain: Not on file   Food  Insecurity: Not on file   Transportation Needs: Not on file   Physical Activity: Not on file   Stress: Not on file   Social Connections: Not on file   Interpersonal Safety: Not on file   Housing Stability: Not on file          MEDICATIONS:  has a current medication list which includes the following prescription(s): acetaminophen, aspirin, blood glucose, freestyle evelyn 3 sensor, escitalopram, furosemide, insulin aspart, insulin glargine, b-d u/f, b-d u/f, lactulose, levothyroxine, nicotine, omeprazole, ondansetron, semaglutide, spironolactone, statin not prescribed, thyroid, thyroid, tramadol, valacyclovir, and xifaxan.     ROS: 10 point ROS neg other than the symptoms noted above in the HPI.     GENERAL: some fatigue, wt stable; denies fevers, chills, night sweats.   HEENT:  no dysphagia, odonophagia, diplopia, neck pain  THYROID:  no apparent hyper or hypothyroid symptoms  CV:  some palpitations; denies chest pain, pressure  LUNGS:  denies SOB, dyspnea, cough, wheezing  ABDOMEN:  Intermittent right abdomen pains; denies diarrhea, indigestion, constipation  EXTREMITIES: no rashes, ulcers, edema  NEUROLOGY: forgetfulness; no headaches, denies changes in vision, tingling, extremitiy numbness   MSK: knee and some low back pain; no muscle aches or pains, weakness  SKIN: no rashes or lesions  : no increased thirst and urination, nocturia; no menses since hysterectomy ~age 49  PSYCH:  stable mood, no significant anxiety or depression  ENDOCRINE: no heat or cold intolerance      Physical Exam   VS: There were no vitals taken for this visit.  GENERAL: AXOX3, NAD, well dressed, answering questions appropriately, appears stated age.  ENT: no nose swelling or nasal discharge, mouth redness or gum changes.  EYES: eyes grossly normal to inspection, conjunctivae and sclerae normal, no exophthalmos or proptosis  THYROID:  no apparent nodules or goiter  LUNGS: no audible wheeze, cough or visible cyanosis, or increased work of  breathing  ABDOMEN: abdomen obese size  EXTREMITIES: no edema noted  NEUROLOGY: CN grossly intact, no tremors  MSK: grossly intact  SKIN:  no apparent skin lesions, rash, or edema with visualized skin appearance  PSYCH: mentation appears normal, affect normal/bright, judgement and insight intact,   normal speech and appearance well groomed       LABS:     All pertinent notes, labs, and images personally reviewed by me.        A/P:  No diagnosis found.      Comments:  Reviewed health issues, diabetes and thyroid management.  Difficult to assess glycemic control without BGM or SG data  Reviewed and interpreted tests that I previously ordered.   Ordered appropriate tests for the endocrinology disease management.    Management options discussed and implemented after shared medical decision making with the patient.  T2DM and hypothyroidism problems are chronic-stable    Plan:  Discussed general issues with the diabetes diagnosis and management  We discussed the hgbA1c test which reflects previous overall glucose levels or control  Discussed the importance of blood glucose (BG) testing to assess glucose trends  Provided general overview of the diabetes medication options and medication treatment plan  Reviewed recent Chandni CGM glucose trend data, in detail.    Recommend:  Continue the current VGo40, metformin, and Ozempic med plan at this time    They need to call  Specialty Pharmacy ASAP, clarify the insurance coverage for VGO pods    If VGO pods not available or affordable, need to change plan to MDI with insulin pens, ASAP    Since Cassie helps with her [employer] messaging, I asked them to send me message update soon  We have reviewed dosing options using the VGo clicks for small and large carb meals  Consider change to the OmniPod pump alternative, if affordable    Goal target -150 mg/dl  Continue use of the 14-day Freestyle Libre3 CGM sensor    Use SG value for final decision on mealtime VGo clicks  Keep focus  on diet, exercise, weight management.  Increase the thyroid medications as noted in previous MyChart message    Change to [place] Thyroid 75 mg daily and levothyroxine 0.1 mg daily dose plan  Plan lab appointment in mid 9/2023    Lab orders placed  Needs f/u on the mild hypercholesterolemia, consider adding statin medication for treatment  Keep focus on diet, exercise, weight management.  Advise having fasting lipid panel testing and dilated eye examination, at least annually    Keep scheduled followup GI, neurology, and PCP appointments  Addressed patient questions today     There are no Patient Instructions on file for this visit.    Future labs ordered today: No orders of the defined types were placed in this encounter.    Radiology/Consults ordered today: None    Total time spent on day of encounter:  ***    Follow-up:  2/13/24 at 1130am, Return    JOJO Christianson MD, MS  Endocrinology  [facility]    CC: Care Team                                              Recent issues:  Followup diabetes and thyroid evaluation, with daughters Jessie and Cassie today  Previously used metformin, Ozempic, VGo pod device with Novolog and the Freestyle Libre3 sensor,   Taking the [place] thyroid (lower dose) and levothyroxine meds regularly  Diagnosed with collapsed lung 8/2023, apparently due to fluid buildup from liver    Had CT placement, started furosemide, other adjustment with meds, metformin discontinued    Discharged and placement to [facility] considering future AL option             Diabetes:  ~4/2010. Initial diagnosis approx age 44  ...Has taken several DM meds including metformin, Byetta, glimeparide, Invokana, and Victoza   In 2018, she had been taking metformin, Victoza, glimeparide, and Jardiance  She stopped Jardiance, also ran out of Victoza last year  Had seen Beth CASTRO/KHARI for general medical evaluations  10/2018. Abdominal abcess illness, hospitalizations at [facility] and then [facility]              2-surgeries for abcess, thought related to the hysterectomy bladder sling mesh from prior surgery     Previously taking metformin 750 mg BID, glimeparide 4 mg every day, Tresiba 40U every day   10/2020. Changed to VGo40 pods management  Continue low dose glimeparide, but low SG levels and glimeparide discontinued, then restarted   Previous DM med plan:  Novolog in VGo40   Small carb meal 2 clicks (4U)   Large carb meal 3 clicks (6U)    Novolog    sscale:       -250 1-click    -300 2-clicks  Metformin 750 mg 2-tabs in morning   Ozempic 0.5 mg  Subcutaneous weekly      8/2023. Hospitalized, pleural effusion and CT placement  Med dose changes, stopped VGo and metformin discontinued  Current DM meds:  Lantus Solostar 40U  subcutaneous in morning  Aspart Flexpen 3U  Subcutaneous premeal    Used Freestyle Chandni CGM in the past, not currently  Has Glucocard Vital BG meter    Infrequent BG testing  She reports avg 124-135 mg/dl    Recent [facility] labs include:  7/26/19 hgbA1c 9.3%, t.chol 195, , TSH 0.06, FT4 1.46, FT3 8.7, Cr 0.77  8/26/20 hgbA1c 11.4%  10/15/21 hgbA1c 6.6%, LDL 60 mg/dl     Recent  labs include:  Lab Results   Component Value Date    A1C 6.9 (H) 06/06/2023     10/30/2023    POTASSIUM 4.6 10/30/2023    CHLORIDE 107 10/30/2023    CO2 23 10/30/2023    ANIONGAP 10 10/30/2023     (H) 10/30/2023    BUN 12.5 10/30/2023    CR 0.66 10/30/2023    GFRESTIMATED >90 10/30/2023    GFRESTBLACK >90 01/05/2021    JOANN 9.0 10/30/2023    CHOL 216 (H) 01/12/2023    TRIG 159 (H) 01/12/2023    HDL 48 (L) 01/12/2023     (H) 01/12/2023    NHDL 168 (H) 01/12/2023    UCRR 168.0 06/06/2023    MICROL 18.3 06/06/2023    UMALCR 10.89 06/06/2023     Last eye exam 2018?, results not available  DM Complications:  None known        Thyroid:  Had taken Synthroid  Changed to [place] thyroid medication, then [place] + levothyroxine combo dose plan  Had  taken [place] thyroid 2-tablets daily... Possibly 60 mg 2-tabs QD  Early-mid 3/2018. Ran out of [place] thyroid medication  Previously took [place] thyroid 90 mg 2-tabs daily and low dose levothyroxine 0.025 mg daily  2019. Changed to lower dose [place] and higher dose levothyroxine  Subsequent dose reductions with levothyroxine medication  Mid 2021 to early 2022. Off [place] thyroid medication, then restarted   2023. 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 Frantz Guillen?/[facility], Dr. Johnson/GI, Dr. YURIDIA Harrington/Neurology       PMH/PSH:  Past Medical History:   Diagnosis Date     Abnormal liver CT      Hypothyroid 80's     Necrotizing fasciitis (H)      Pleural effusion      Primary osteoarthritis of both knees      Soft tissue infection      Subcortical infarction (H)      Type 2 diabetes mellitus (H)      Unspecified cirrhosis of liver (H)      Past Surgical History:   Procedure Laterality Date      SECTION       COLONOSCOPY N/A 10/21/2015    Procedure: COLONOSCOPY;  Surgeon: Jaky Arredondo MD;  Location:  GI     IRRIGATION AND DEBRIDEMENT ABDOMEN WASHOUT, COMBINED N/A 10/12/2018    Procedure: COMBINED IRRIGATION AND DEBRIDEMENT ABDOMEN WASHOUT;  Irrigation and Debridement of Lower Abdomen, removal of piece of mesh.;  Surgeon: Darlene Hogue MD;  Location:  OR     [facility]   2004    due to anemia     ZZC REPAIR CRUCIATE LIGAMENT,KNEE  1986       Family Hx:  No family history on file.      Social Hx:  Social History     Socioeconomic History     Marital status:      Spouse name: Not on file     Number of children: 2     Years of education: Not on file     Highest education level: Not on file   Occupational History     Occupation: surgery scheduler [employer]   Tobacco Use     Smoking status: Former     Types: Cigarettes     Quit date: 2023     Years since quittin.2     Smokeless tobacco: Never   Vaping Use     Vaping Use: Never used   Substance and Sexual Activity     Alcohol use: No     Drug use: Never     Sexual activity: Not on file   Other Topics Concern     Not on file   Social History Narrative     Not on file     Social Determinants of Health     Financial Resource Strain: Not on file   Food Insecurity: Not on file   Transportation Needs: Not on file   Physical Activity: Not on file   Stress: Not on file   Social Connections: Not on file   Interpersonal Safety: Not on file   Housing Stability: Not on file          MEDICATIONS:  has a current medication list which includes the following prescription(s): acetaminophen, aspirin, blood glucose, escitalopram, furosemide, insulin aspart, insulin glargine, lactulose, levothyroxine, melatonin, omeprazole, ondansetron, semaglutide, spironolactone, tramadol, valacyclovir, xifaxan, freestyle evelyn 3 sensor, b-d u/f, b-d u/f, nicotine, statin not prescribed, thyroid, and thyroid.     ROS: 10 point ROS neg other than the symptoms noted above in the HPI.     GENERAL: some fatigue, wt stable; denies fevers, chills, night sweats.   HEENT:  no dysphagia, odonophagia, diplopia, neck pain  THYROID:  no apparent hyper or hypothyroid symptoms  CV:  some palpitations; denies chest pain, pressure  LUNGS:  denies SOB, dyspnea, cough, wheezing  ABDOMEN:  Intermittent right abdomen pains; denies diarrhea, indigestion,  constipation  EXTREMITIES: no rashes, ulcers, edema  NEUROLOGY: forgetfulness; no headaches, denies changes in vision, tingling, extremitiy numbness   MSK: knee and some low back pain; no muscle aches or pains, weakness  SKIN: no rashes or lesions  : no increased thirst and urination, nocturia; no menses since hysterectomy ~age 49  PSYCH:  stable mood, no significant anxiety or depression  ENDOCRINE: no heat or cold intolerance      Physical Exam   VS: /67 (BP Location: Left arm, Patient Position: Sitting, Cuff Size: Adult Regular)   GENERAL: AXOX3, NAD, well dressed, answering questions appropriately, appears stated age.  ENT: no nose swelling or nasal discharge, mouth redness or gum changes.  EYES: eyes grossly normal to inspection, conjunctivae and sclerae normal, no exophthalmos or proptosis  THYROID:  no apparent nodules or goiter  LUNGS: no audible wheeze, cough or visible cyanosis, or increased work of breathing  ABDOMEN: abdomen obese size  EXTREMITIES: no edema noted  NEUROLOGY: CN grossly intact, no tremors  MSK: grossly intact  SKIN:  no apparent skin lesions, rash, or edema with visualized skin appearance  PSYCH: mentation appears normal, affect normal/bright, judgement and insight intact,   normal speech and appearance well groomed       LABS:     All pertinent notes, labs, and images personally reviewed by me.        A/P:  Encounter Diagnoses   Name Primary?     Type 2 diabetes mellitus without complication, without long-term current use of insulin (H) Yes     Acquired hypothyroidism      Morbid obesity (H)              Comments:  Reviewed health issues, diabetes and thyroid management.  Difficult to assess glycemic control without BGM or CGM data  Reviewed and interpreted tests that I previously ordered.   Ordered appropriate tests for the endocrinology disease management.    Management options discussed and implemented after shared medical decision making with the patient.  T2DM and  hypothyroidism problems are chronic-stable    Plan:  Discussed general issues with the diabetes diagnosis and management  We discussed the hgbA1c test which reflects previous overall glucose levels or control  Discussed the importance of blood glucose (BG) testing to assess glucose trends  Provided general overview of the diabetes medication options and medication treatment plan  Reviewed recent Chandni CGM glucose trend data, in detail.    Recommend:  Continue the current Aspart and Lantus pen MDI routine:  Lantus Solostar 40U  subcutaneous in morning  Aspart Flexpen 3U  Subcutaneous premeal  Aspart sscale  1U per 50 mg/dl >200 mg/dl    Goal target -150 mg/dl  Advised restarting the Chandni CGM, as Libre3    Gave sample Libre3, use with smartphone ramon    Libre3 Rx sent to pharmacy    Send me MyChart update after staring Libre3 CGM  Keep focus on diet, exercise, weight management.  Will review the recent lab results when resulted  Needs f/u on the mild hypercholesterolemia, consider adding statin medication for treatment  Keep focus on diet, exercise, weight management.  Continue current [place] thyroid and levothyroxine dose plan at this time  Advise having fasting lipid panel testing and dilated eye examination, at least annually    Keep scheduled followup GI, neurology, and PCP appointments  Addressed patient questions today     There are no Patient Instructions on file for this visit.    Future labs ordered today: No orders of the defined types were placed in this encounter.    Radiology/Consults ordered today: None    Total time spent on day of encounter:  30 min    Follow-up:  2/13/24 at 1130am, Return    JOJO Christianson MD, MS  Endocrinology  [facility]    CC: Care Team                                              Again, thank you for allowing me to participate in the care of your patient.        Sincerely,        Kirk Christianson MD

## 2023-10-30 NOTE — NURSING NOTE
Chief Complaint   Patient presents with    RECHECK     Type 2 diabetes mellitus without complication, without long-term current use of insulin (H) +2 more       Vitals:    10/30/23 1148   BP: 121/67   BP Location: Left arm   Patient Position: Sitting   Cuff Size: Adult Regular       There is no height or weight on file to calculate BMI.      Ethan Galvan, ICVF

## 2023-11-02 ENCOUNTER — TELEPHONE (OUTPATIENT)
Dept: GERIATRICS | Facility: CLINIC | Age: 58
End: 2023-11-02
Payer: COMMERCIAL

## 2023-11-02 NOTE — TELEPHONE ENCOUNTER
[facility] Geriatrics Triage Nurse Telephone Encounter    Provider: TIFFANY Treadwell CNP  Facility: [facility] Facility Type:  LTC    Caller: Candis  Call Back Number: [phone]    Allergies:    Allergies   Allergen Reactions    Cephalexin     Morphine      Other reaction(s): Other  Irritability, disorientation    Penicillins Itching     face    Amoxicillin Rash and Itching    Cefprozil Rash    Ceftazidime Itching and Rash    Ciprofloxacin Itching and Rash     Tolerates levaquin     Percocet [Oxycodone-Acetaminophen] Nausea and Vomiting        Reason for call: Pt requests order for Voltaren Gel to apply to right knee for pain. Would like to self administer and keep at the bedside.    Verbal Order/Direction given by Provider:   - Voltaren Gel 1% Apply 2g to right knee topically TID PRN for pain (OK to have at bedside)    Provider giving Order:  TIFFANY Treadwell CNP    Verbal Order given to: Candis Ojeda RN

## 2023-11-07 DIAGNOSIS — E03.9 ACQUIRED HYPOTHYROIDISM: ICD-10-CM

## 2023-11-07 RX ORDER — THYROID 15 MG/1
15 TABLET ORAL DAILY
Qty: 90 TABLET | Refills: 1 | Status: SHIPPED | OUTPATIENT
Start: 2023-11-07 | End: 2024-04-05

## 2023-11-07 RX ORDER — LEVOTHYROXINE SODIUM 100 UG/1
100 TABLET ORAL DAILY
Qty: 90 TABLET | Refills: 1 | Status: SHIPPED | OUTPATIENT
Start: 2023-11-07 | End: 2024-04-05

## 2023-11-07 RX ORDER — THYROID 60 MG/1
TABLET ORAL
Qty: 90 TABLET | Refills: 1 | Status: SHIPPED | OUTPATIENT
Start: 2023-11-07 | End: 2024-05-21

## 2023-11-09 ENCOUNTER — TELEPHONE (OUTPATIENT)
Dept: ENDOCRINOLOGY | Facility: CLINIC | Age: 58
End: 2023-11-09
Payer: COMMERCIAL

## 2023-11-09 DIAGNOSIS — E03.9 ACQUIRED HYPOTHYROIDISM: ICD-10-CM

## 2023-11-09 RX ORDER — THYROID 15 MG/1
15 TABLET ORAL DAILY
Qty: 90 TABLET | Refills: 1 | Status: CANCELLED | OUTPATIENT
Start: 2023-11-09

## 2023-11-09 NOTE — TELEPHONE ENCOUNTER
Prior Authorization Specialty Medication Request    Medication/Dose: thyroid (ARMOUR) 15 MG tablet  ICD code (if different than what is on RX):  E03.9     Insurance Name: [facility]   Insurance ID: [id number]  Insurance Phone Number: n/a

## 2023-11-14 NOTE — TELEPHONE ENCOUNTER
Prior Authorization Approval    Medication: ARMOUR THYROID 15 MG PO TABS  Authorization Effective Date: 10/14/2023  Authorization Expiration Date: 11/13/2024  Approved Dose/Quantity:   Reference #:     Insurance Company: [employer] Non-Specialty PA's - Phone [phone] Fax [phone]  Expected CoPay: $    CoPay Card Available:      Financial Assistance Needed:   Which Pharmacy is filling the prescription: [id number] - [place], [place] - [address] [facility] [address]  Pharmacy Notified: Yes  Patient Notified: **Instructed pharmacy to notify patient when script is ready to /ship.**

## 2023-11-20 ENCOUNTER — TELEPHONE (OUTPATIENT)
Dept: ENDOCRINOLOGY | Facility: CLINIC | Age: 58
End: 2023-11-20
Payer: COMMERCIAL

## 2023-11-20 DIAGNOSIS — E11.9 TYPE 2 DIABETES MELLITUS WITHOUT COMPLICATION, WITHOUT LONG-TERM CURRENT USE OF INSULIN (H): Primary | ICD-10-CM

## 2023-11-20 RX ORDER — DULAGLUTIDE 0.75 MG/.5ML
0.75 INJECTION, SOLUTION SUBCUTANEOUS
Qty: 2 ML | Refills: 5 | Status: SHIPPED | OUTPATIENT
Start: 2023-11-20 | End: 2023-12-12

## 2023-11-20 NOTE — TELEPHONE ENCOUNTER
[facility] Call Center    Phone Message    May a detailed message be left on voicemail: yes     Reason for Call: Medication Question or concern regarding medication   Prescription Clarification  Name of Medication: They are not able to get her Ozempic currently due to the shortage, can you please place a new order for either Trulicity or Zictosa.    Prescribing Provider: Tameka   Pharmacy: [facility] [address]    Please discontinue the the Ozempic order and place the new order via fax to: [phone].     What on the order needs clarification? They need a new order for this due to them being unable to give the Ozempic due to it being out of stock, please call if questions.        Action Taken: Message routed to:  [facility] ([facility] Endo    Travel Screening: Not Applicable

## 2023-11-21 ENCOUNTER — MEDICAL CORRESPONDENCE (OUTPATIENT)
Dept: HEALTH INFORMATION MANAGEMENT | Facility: CLINIC | Age: 58
End: 2023-11-21
Payer: COMMERCIAL

## 2023-11-21 NOTE — TELEPHONE ENCOUNTER
Message reviewed.  If the Ozempic pen not available, I would change to the Trulicity 0.75 mg pen with weekly dosing.  I have now sent this Trulicity Rx to her [facility] pharmacy as requested.  I also spoke with patient's daughter Jessie by phone and reviewed highlights of the Trulcity pen technique (and gave her the YouTube video idea).    JOJO Christianson MD, MS  Endocrinology  [facility]

## 2023-11-26 ENCOUNTER — HEALTH MAINTENANCE LETTER (OUTPATIENT)
Age: 58
End: 2023-11-26

## 2023-12-12 ENCOUNTER — OFFICE VISIT (OUTPATIENT)
Dept: GERIATRICS | Facility: CLINIC | Age: 58
End: 2023-12-12
Payer: COMMERCIAL

## 2023-12-12 VITALS
WEIGHT: 227.1 LBS | TEMPERATURE: 97.7 F | HEIGHT: 63 IN | SYSTOLIC BLOOD PRESSURE: 138 MMHG | BODY MASS INDEX: 40.24 KG/M2 | RESPIRATION RATE: 16 BRPM | DIASTOLIC BLOOD PRESSURE: 70 MMHG | HEART RATE: 80 BPM | OXYGEN SATURATION: 97 %

## 2023-12-12 DIAGNOSIS — Z01.818 PREOP GENERAL PHYSICAL EXAM: Primary | ICD-10-CM

## 2023-12-12 DIAGNOSIS — M17.11 ARTHRITIS OF RIGHT KNEE: ICD-10-CM

## 2023-12-12 PROCEDURE — 99310 SBSQ NF CARE HIGH MDM 45: CPT

## 2023-12-12 RX ORDER — GUAIFENESIN 400 MG/1
1 TABLET ORAL EVERY 4 HOURS PRN
COMMUNITY
End: 2024-04-17

## 2023-12-12 RX ORDER — ESCITALOPRAM OXALATE 10 MG/1
20 TABLET ORAL EVERY MORNING
COMMUNITY
End: 2024-04-05

## 2023-12-12 RX ORDER — SPIRONOLACTONE 100 MG/1
100 TABLET, FILM COATED ORAL DAILY
COMMUNITY

## 2023-12-12 RX ORDER — CELECOXIB 200 MG/1
200 CAPSULE ORAL DAILY
COMMUNITY
End: 2024-04-05

## 2023-12-12 RX ORDER — INSULIN ASPART 100 [IU]/ML
INJECTION, SOLUTION INTRAVENOUS; SUBCUTANEOUS
COMMUNITY
End: 2024-04-05

## 2023-12-12 NOTE — LETTER
12/12/2023        RE: Sudheer Montiel  [address] [address]        [facility]  [address] [place] [address]  Phone: [phone]  Fax: [phone]  Primary Provider: Bennie Wiggins  Pre-op Performing Provider: BENNIE WIGGINS, DNP,APRN,CNP     Today's date: 12/12/2023    Sudheer is a 58 year old who presents for a preoperative evaluation as requested by Dr. Pawan Ewing MD. Patient requires evaluation and anesthesia risk assessment prior to undergoing surgery for treatment of right knee arthritis.  Right total knee arthroplasty scheduled for 12/18/2023.      Surgical Information:   Surgery/Procedure: Right total knee arthroplasty   Surgery Location: [facility] outpatient surgical center  Surgeon:   Pawan Ewing MD  Surgery Date: 12/18/23  Time of Surgery: 11:30AM  Where patient plans to recover: At a nursing home  Fax number for surgical facility: Note does not need to be faxed, will be available electronically in Epic.    Subjective   HPI related to upcoming procedure:   Right total knee arthroplasty was recommended for advanced right knee arthritis. Today, patient reports ongoing right knee discomfort.  She is able to ambulate independently.  Denies recent falls.  She is pleased that the surgery is happening next week. Eating and sleeping at baseline.  She is a current smoker and decided not to smoke until after the surgery. Denies fever, chills, headache, dizziness, chest pain, shortness of breath, abdominal pain or constipation. No additional acute concerns from staff and patient.     Past medical history includes type 2 diabetes mellitus, REYES cirrhosis, and depression.      Pre-op questions on 12/12/23  1. Have you ever had a heart attack or stroke?  NO   2. Have you ever had surgery on your heart or blood vessels, such as a stent placement, a coronary artery bypass, or surgery on an artery in your head, neck,  heart, or legs? NO   3. Do you have chest pain with activity?  NO   4. Do you have a history of heart failure? NO   5. Do you currently have a cold, bronchitis or symptoms of other infection? NO   6. Do you have a cough, shortness of breath, or wheezing?   NO   7. Do you or anyone in your family have previous history of blood clots? NO   8. Do you or does anyone in your family have a serious bleeding problem such as prolonged bleeding following surgeries or cuts? NO   9. Have you ever had problems with anemia or been told to take iron pills? YES    10. Have you had any abnormal blood loss such as black, tarry or bloody stools? NO   11. Have you ever had a blood transfusion? YES    12. Are you willing to have a blood transfusion if it is medically needed before, during, or after your surgery? YES   13. Have you or any of your relatives ever had problems with anesthesia? NO   14. Do you have sleep apnea, excessive snoring or daytime drowsiness? NO   14a. Do you have a CPAP machine? NO   15. Do you have any artifical heart valves or other implanted medical devices like a pacemaker, defibrillator, or continuous glucose monitor? NO   15a. What type of device do you have? NONE   15b. Name of the clinic that manages your device:      16. Do you have artificial joints? NO   17. Are you allergic to latex? NO               Health Care Directive:  Patient does not have a Health Care Directive or Living Will: Patient states has Advance Directive and will bring in a copy to clinic.    Preoperative Review of :   reviewed - controlled substances reflected in medication list.    Review of Systems  CONSTITUTIONAL: NEGATIVE for fever, chills, change in weight  ENT/MOUTH: NEGATIVE for ear, mouth and throat problems  RESP: NEGATIVE for significant cough or SOB  CV: NEGATIVE for chest pain, palpitations or peripheral edema    Patient Active Problem List    Diagnosis Date Noted     Acute respiratory distress 08/11/2023      Priority: Medium     Pleural effusion 2023     Priority: Medium     Acute cholecystitis 2022     Priority: Medium     Obesity (BMI 35.0-39.9) with comorbidity (H) 2019     Priority: Medium     Atrophic vaginitis 2018     Priority: Medium     Frequency of urination 2018     Priority: Medium     Urge incontinence of urine 2018     Priority: Medium     Urinary urgency 2018     Priority: Medium     Stress incontinence 2018     Priority: Medium     Necrotizing soft tissue infection 10/10/2018     Priority: Medium     Type 2 diabetes mellitus without complication, without long-term current use of insulin (H) 2018     Priority: Medium     Obesity 2014     Priority: Medium     Problem list name updated by automated process. Provider to review       CARDIOVASCULAR SCREENING; LDL GOAL LESS THAN 100 2012     Priority: Medium     Hypothyroid      Priority: Medium      Past Medical History:   Diagnosis Date     Abnormal liver CT      Hypothyroid 80's     Necrotizing fasciitis (H)      Pleural effusion      Primary osteoarthritis of both knees      Soft tissue infection      Subcortical infarction (H)      Type 2 diabetes mellitus (H)      Unspecified cirrhosis of liver (H)      Past Surgical History:   Procedure Laterality Date      SECTION       COLONOSCOPY N/A 10/21/2015    Procedure: COLONOSCOPY;  Surgeon: Jaky Arredondo MD;  Location:  GI     IRRIGATION AND DEBRIDEMENT ABDOMEN WASHOUT, COMBINED N/A 10/12/2018    Procedure: COMBINED IRRIGATION AND DEBRIDEMENT ABDOMEN WASHOUT;  Irrigation and Debridement of Lower Abdomen, removal of piece of mesh.;  Surgeon: Darlene Hogue MD;  Location: [facility] OR     marisol/bso  2004    due to anemia     ZZC REPAIR CRUCIATE LIGAMENT,KNEE       Current Outpatient Medications   Medication Sig Dispense Refill     acetaminophen (TYLENOL) 325 MG tablet Take 2 tablets (650 mg) by mouth every 8 hours as  needed for mild pain or other (and adjunct with moderate or severe pain or per patient request)       ASPIRIN 81 PO Take 81 mg by mouth daily       blood glucose (NO BRAND SPECIFIED) test strip Use to test blood sugar 1 times daily or as directed. Glucocard Vital 200 strip 3     Continuous Blood Gluc Sensor (FREESTYLE DIMITRIOS 3 SENSOR) MISC 1 Device continuous prn (change every 14 days) 2 each 5     diclofenac (VOLTAREN) 1 % topical gel Apply 2 g topically 3 times daily as needed for moderate pain To right knee       dulaglutide (TRULICITY) 0.75 MG/0.5ML pen Inject 0.75 mg Subcutaneous every 7 days 2 mL 5     escitalopram (LEXAPRO) 10 MG tablet Take 1 tablet (10 mg) by mouth daily (Patient taking differently: Take 10 mg by mouth 2 times daily In the morning) 30 tablet 5     furosemide (LASIX) 40 MG tablet Take 1 tablet (40 mg) by mouth daily 30 tablet 0     insulin aspart (NOVOLOG PEN) 100 UNIT/ML pen Inject 3-6 Units Subcutaneous 3 times daily (with meals) 15 mL 5     insulin glargine (LANTUS PEN) 100 UNIT/ML pen Inject 40 Units Subcutaneous daily 15 mL 5     insulin pen needle (B-D U/F) 31G X 5 MM miscellaneous Use 4 pen needles daily or as directed. 400 each 3     insulin pen needle (B-D U/F) 31G X 5 MM miscellaneous Use 1 pen needles daily or as directed. 100 each 3     lactulose 20 GM/30ML solution Take 30 mLs (20 g) by mouth 3 times daily 946 mL 1     levothyroxine (SYNTHROID/LEVOTHROID) 100 MCG tablet Take 1 tablet (100 mcg) by mouth daily 90 tablet 1     MELATONIN PO Take 2 mg by mouth 2 times daily       nicotine (COMMIT) 2 MG lozenge Place 1 lozenge (2 mg) inside cheek every hour as needed for other (nicotine withdrawal symptoms) 30 lozenge 0     omeprazole (PRILOSEC) 10 MG DR capsule Take 10 mg by mouth daily       ondansetron (ZOFRAN) 4 MG tablet Take 4 mg by mouth every 8 hours as needed for nausea       semaglutide (OZEMPIC) 2 MG/1.5ML SOPN pen Inject 0.5 mg weekly (Patient taking differently: Inject 0.5  mg Subcutaneous every 7 days Wednesday) 6 mL 1     spironolactone (ALDACTONE) 100 MG tablet Take 1 tablet (100 mg) by mouth daily (Patient taking differently: Take 100 mg by mouth 3 times daily) 30 tablet 0     STATIN NOT PRESCRIBED (INTENTIONAL) Please choose reason not prescribed from choices below.       thyroid (ARMOUR) 15 MG tablet Take 1 tablet (15 mg) by mouth daily 90 tablet 1     thyroid (ARMOUR) 60 MG tablet Take 1-tablet by mouth daily as directed 90 tablet 1     traMADol (ULTRAM) 50 MG tablet Take 1 tablet (50 mg) by mouth every 6 hours as needed for severe pain 60 tablet 3     valACYclovir (VALTREX) 500 MG tablet Take 500 mg by mouth daily as needed       XIFAXAN 550 MG TABS tablet Take 1 tablet by mouth 2 times daily         Allergies   Allergen Reactions     Cephalexin      Morphine      Other reaction(s): Other  Irritability, disorientation     Penicillins Itching     face     Amoxicillin Rash and Itching     Cefprozil Rash     Ceftazidime Itching and Rash     Ciprofloxacin Itching and Rash     Tolerates levaquin      Percocet [Oxycodone-Acetaminophen] Nausea and Vomiting        Social History     Tobacco Use     Smoking status: Former     Types: Cigarettes     Quit date: 2023     Years since quittin.3     Smokeless tobacco: Never   Substance Use Topics     Alcohol use: No       History   Drug Use Unknown         Objective     There were no vitals taken for this visit.    Physical Exam  GENERAL APPEARANCE: Healthy, alert and no distress  HENT: Ear canals and TM's normal and nose and mouth without ulcers or lesions  RESP: Lungs clear to auscultation - no rales, rhonchi or wheezes  CV: Regular rate and rhythm, normal S1 S2, no S3 or S4 and no murmur, click or rub   ABDOMEN: Soft, nontender, no HSM or masses and bowel sounds normal  SKIN: No rash  NEURO: Normal strength and tone, sensory exam grossly normal, mentation intact and speech normal  PSYCH: Mood appropriate     Recent Labs   Lab Test  10/30/23  0849 09/06/23  1009 08/28/23  1155 08/20/23  0554 08/18/23  0600 08/17/23  0547 08/16/23  0534 08/15/23  0535 08/11/23  1658 08/11/23  1647 06/06/23  1016 05/01/23  1217 01/12/23  1716   HGB  --   --   --  14.4  --   --   --  14.5   < > 13.6  --    < >  --    PLT  --   --   --  168  --  137*  --  125*   < > 99*  --    < >  --    INR  --   --   --   --   --   --   --   --   --  1.17* 1.18*   < > 1.21*    140   < > 136   < >  --   --  139   < > 141 138   < > 137   POTASSIUM 4.6 3.8   < > 3.8   < > 3.5   < > 3.7   < > 3.7 4.0   < > 3.9   CR 0.66 0.65   < > 0.55   < > 0.55  --  0.52   < > 0.46* 0.59   < > 0.55   A1C  --   --   --   --   --   --   --   --   --   --  6.9*  --  8.8*    < > = values in this interval not displayed.      Diagnostics:  CBC, CMP, A1c, EKG    Assessment and Plan:  Preop general physical exam  Advanced degenerative arthritis of right knee  Right knee pain  -Right total knee arthroplasty planned for 12/18/2023  -Continue surgery as scheduled    Medication Instructions:  -Hold aspirin, Ozempic, Celebrex, and Voltaren gel until after surgery   -Hold insulin if blood glucose less than 200 the morning of surgery       RECOMMENDATION:  APPROVAL GIVEN to proceed with proposed procedure on 12/18/23 without further diagnostic evaluation.     30 minutes spent on the date of the encounter doing chart review, history and exam, documentation and further activities per the note.     Revised Cardiac Risk Index (RCRI):  The patient has the following serious cardiovascular risks for perioperative complications:  -DMII     RCRI Interpretation: 1 point: Class II (low risk - 0.9% complication rate)           Signed Electronically by: Bennie Wiggins DNP APRN CNP         The above note was completed in part using Dragon voice recognition software. Although reviewed after completion, some word and grammatical errors may occur. Please contact the author of this note with any questions.            Sincerely,        TIFFANY Dinh CNP

## 2023-12-12 NOTE — PROGRESS NOTES
[facility] GERIATR[facility]  [address] [place] [address]  Phone: [phone]  Fax: [phone]  Primary Provider: Bennie Velasquez  Pre-op Performing Provider: BENNIE VELASQUEZ, DNP,APRN,CNP     Today's date: 12/12/2023    Sudheer is a 58 year old who presents for a preoperative evaluation as requested by Dr. Pawan Ewing MD. Patient requires evaluation and anesthesia risk assessment prior to undergoing surgery for treatment of right knee arthritis.  Right total knee arthroplasty scheduled for 12/18/2023.      Surgical Information:   Surgery/Procedure: Right total knee arthroplasty   Surgery Location: [facility] outpatient surgical center  Surgeon:   Pawan Ewing MD  Surgery Date: 12/18/23  Time of Surgery: 11:30AM  Where patient plans to recover: At a nursing home  Fax number for surgical facility: Note does not need to be faxed, will be available electronically in Epic.    Subjective   HPI related to upcoming procedure:   Right total knee arthroplasty was recommended for advanced right knee arthritis. Today, patient reports ongoing right knee discomfort.  She is able to ambulate independently.  Denies recent falls.  She is pleased that the surgery is happening next week. Eating and sleeping at baseline.  She is a current smoker and decided not to smoke until after the surgery. Denies fever, chills, headache, dizziness, chest pain, shortness of breath, abdominal pain or constipation. No additional acute concerns from staff and patient.     Past medical history includes type 2 diabetes mellitus, REYES cirrhosis, and depression.      Pre-op questions on 12/12/23  1. Have you ever had a heart attack or stroke?  NO   2. Have you ever had surgery on your heart or blood vessels, such as a stent placement, a coronary artery bypass, or surgery on an artery in your head, neck, heart, or legs? NO   3. Do you have chest pain with activity?  NO   4. Do you have a history of  heart failure? NO   5. Do you currently have a cold, bronchitis or symptoms of other infection? NO   6. Do you have a cough, shortness of breath, or wheezing?   NO   7. Do you or anyone in your family have previous history of blood clots? NO   8. Do you or does anyone in your family have a serious bleeding problem such as prolonged bleeding following surgeries or cuts? NO   9. Have you ever had problems with anemia or been told to take iron pills? YES    10. Have you had any abnormal blood loss such as black, tarry or bloody stools? NO   11. Have you ever had a blood transfusion? YES    12. Are you willing to have a blood transfusion if it is medically needed before, during, or after your surgery? YES   13. Have you or any of your relatives ever had problems with anesthesia? NO   14. Do you have sleep apnea, excessive snoring or daytime drowsiness? NO   14a. Do you have a CPAP machine? NO   15. Do you have any artifical heart valves or other implanted medical devices like a pacemaker, defibrillator, or continuous glucose monitor? NO   15a. What type of device do you have? NONE   15b. Name of the clinic that manages your device:      16. Do you have artificial joints? NO   17. Are you allergic to latex? NO               Health Care Directive:  Patient does not have a Health Care Directive or Living Will: Patient states has Advance Directive and will bring in a copy to clinic.    Preoperative Review of :   reviewed - controlled substances reflected in medication list.    Review of Systems  CONSTITUTIONAL: NEGATIVE for fever, chills, change in weight  ENT/MOUTH: NEGATIVE for ear, mouth and throat problems  RESP: NEGATIVE for significant cough or SOB  CV: NEGATIVE for chest pain, palpitations or peripheral edema    Patient Active Problem List    Diagnosis Date Noted    Acute respiratory distress 08/11/2023     Priority: Medium    Pleural effusion 08/11/2023     Priority: Medium    Acute cholecystitis 05/25/2022      Priority: Medium    Obesity (BMI 35.0-39.9) with comorbidity (H) 2019     Priority: Medium    Atrophic vaginitis 2018     Priority: Medium    Frequency of urination 2018     Priority: Medium    Urge incontinence of urine 2018     Priority: Medium    Urinary urgency 2018     Priority: Medium    Stress incontinence 2018     Priority: Medium    Necrotizing soft tissue infection 10/10/2018     Priority: Medium    Type 2 diabetes mellitus without complication, without long-term current use of insulin (H) 2018     Priority: Medium    Obesity 2014     Priority: Medium     Problem list name updated by automated process. Provider to review      CARDIOVASCULAR SCREENING; LDL GOAL LESS THAN 100 2012     Priority: Medium    Hypothyroid      Priority: Medium      Past Medical History:   Diagnosis Date    Abnormal liver CT     Hypothyroid 80's    Necrotizing fasciitis (H)     Pleural effusion     Primary osteoarthritis of both knees     Soft tissue infection     Subcortical infarction (H)     Type 2 diabetes mellitus (H)     Unspecified cirrhosis of liver (H)      Past Surgical History:   Procedure Laterality Date     SECTION      COLONOSCOPY N/A 10/21/2015    Procedure: COLONOSCOPY;  Surgeon: Jaky Arredondo MD;  Location:  GI    IRRIGATION AND DEBRIDEMENT ABDOMEN WASHOUT, COMBINED N/A 10/12/2018    Procedure: COMBINED IRRIGATION AND DEBRIDEMENT ABDOMEN WASHOUT;  Irrigation and Debridement of Lower Abdomen, removal of piece of mesh.;  Surgeon: Darlene Hogue MD;  Location:  OR    marisol/bso  2004    due to anemia    ZZC REPAIR CRUCIATE LIGAMENT,KNEE  1986     Current Outpatient Medications   Medication Sig Dispense Refill    acetaminophen (TYLENOL) 325 MG tablet Take 2 tablets (650 mg) by mouth every 8 hours as needed for mild pain or other (and adjunct with moderate or severe pain or per patient request)      ASPIRIN 81 PO Take 81 mg by  mouth daily      blood glucose (NO BRAND SPECIFIED) test strip Use to test blood sugar 1 times daily or as directed. Glucocard Vital 200 strip 3    Continuous Blood Gluc Sensor (FREESTYLE DIMITRIOS 3 SENSOR) MISC 1 Device continuous prn (change every 14 days) 2 each 5    diclofenac (VOLTAREN) 1 % topical gel Apply 2 g topically 3 times daily as needed for moderate pain To right knee      dulaglutide (TRULICITY) 0.75 MG/0.5ML pen Inject 0.75 mg Subcutaneous every 7 days 2 mL 5    escitalopram (LEXAPRO) 10 MG tablet Take 1 tablet (10 mg) by mouth daily (Patient taking differently: Take 10 mg by mouth 2 times daily In the morning) 30 tablet 5    furosemide (LASIX) 40 MG tablet Take 1 tablet (40 mg) by mouth daily 30 tablet 0    insulin aspart (NOVOLOG PEN) 100 UNIT/ML pen Inject 3-6 Units Subcutaneous 3 times daily (with meals) 15 mL 5    insulin glargine (LANTUS PEN) 100 UNIT/ML pen Inject 40 Units Subcutaneous daily 15 mL 5    insulin pen needle (B-D U/F) 31G X 5 MM miscellaneous Use 4 pen needles daily or as directed. 400 each 3    insulin pen needle (B-D U/F) 31G X 5 MM miscellaneous Use 1 pen needles daily or as directed. 100 each 3    lactulose 20 GM/30ML solution Take 30 mLs (20 g) by mouth 3 times daily 946 mL 1    levothyroxine (SYNTHROID/LEVOTHROID) 100 MCG tablet Take 1 tablet (100 mcg) by mouth daily 90 tablet 1    MELATONIN PO Take 2 mg by mouth 2 times daily      nicotine (COMMIT) 2 MG lozenge Place 1 lozenge (2 mg) inside cheek every hour as needed for other (nicotine withdrawal symptoms) 30 lozenge 0    omeprazole (PRILOSEC) 10 MG DR capsule Take 10 mg by mouth daily      ondansetron (ZOFRAN) 4 MG tablet Take 4 mg by mouth every 8 hours as needed for nausea      semaglutide (OZEMPIC) 2 MG/1.5ML SOPN pen Inject 0.5 mg weekly (Patient taking differently: Inject 0.5 mg Subcutaneous every 7 days Wednesday) 6 mL 1    spironolactone (ALDACTONE) 100 MG tablet Take 1 tablet (100 mg) by mouth daily (Patient taking  differently: Take 100 mg by mouth 3 times daily) 30 tablet 0    STATIN NOT PRESCRIBED (INTENTIONAL) Please choose reason not prescribed from choices below.      thyroid (ARMOUR) 15 MG tablet Take 1 tablet (15 mg) by mouth daily 90 tablet 1    thyroid (ARMOUR) 60 MG tablet Take 1-tablet by mouth daily as directed 90 tablet 1    traMADol (ULTRAM) 50 MG tablet Take 1 tablet (50 mg) by mouth every 6 hours as needed for severe pain 60 tablet 3    valACYclovir (VALTREX) 500 MG tablet Take 500 mg by mouth daily as needed      XIFAXAN 550 MG TABS tablet Take 1 tablet by mouth 2 times daily         Allergies   Allergen Reactions    Cephalexin     Morphine      Other reaction(s): Other  Irritability, disorientation    Penicillins Itching     face    Amoxicillin Rash and Itching    Cefprozil Rash    Ceftazidime Itching and Rash    Ciprofloxacin Itching and Rash     Tolerates levaquin     Percocet [Oxycodone-Acetaminophen] Nausea and Vomiting        Social History     Tobacco Use    Smoking status: Former     Types: Cigarettes     Quit date: 2023     Years since quittin.3    Smokeless tobacco: Never   Substance Use Topics    Alcohol use: No       History   Drug Use Unknown         Objective     There were no vitals taken for this visit.    Physical Exam  GENERAL APPEARANCE: Healthy, alert and no distress  HENT: Ear canals and TM's normal and nose and mouth without ulcers or lesions  RESP: Lungs clear to auscultation - no rales, rhonchi or wheezes  CV: Regular rate and rhythm, normal S1 S2, no S3 or S4 and no murmur, click or rub   ABDOMEN: Soft, nontender, no HSM or masses and bowel sounds normal  SKIN: No rash  NEURO: Normal strength and tone, sensory exam grossly normal, mentation intact and speech normal  PSYCH: Mood appropriate     Recent Labs   Lab Test 10/30/23  0849 23  1009 23  1155 23  0554 23  0600 23  0547 23  0534 08/15/23  0535 23  1658 23  1647  06/06/23  1016 05/01/23  1217 01/12/23  1716   HGB  --   --   --  14.4  --   --   --  14.5   < > 13.6  --    < >  --    PLT  --   --   --  168  --  137*  --  125*   < > 99*  --    < >  --    INR  --   --   --   --   --   --   --   --   --  1.17* 1.18*   < > 1.21*    140   < > 136   < >  --   --  139   < > 141 138   < > 137   POTASSIUM 4.6 3.8   < > 3.8   < > 3.5   < > 3.7   < > 3.7 4.0   < > 3.9   CR 0.66 0.65   < > 0.55   < > 0.55  --  0.52   < > 0.46* 0.59   < > 0.55   A1C  --   --   --   --   --   --   --   --   --   --  6.9*  --  8.8*    < > = values in this interval not displayed.      Diagnostics:  CBC, CMP, A1c, EKG    Assessment and Plan:  Preop general physical exam  Advanced degenerative arthritis of right knee  Right knee pain  -Right total knee arthroplasty planned for 12/18/2023  -Continue surgery as scheduled    Medication Instructions:  -Hold aspirin, Ozempic, Celebrex, and Voltaren gel until after surgery   -Hold insulin if blood glucose less than 200 the morning of surgery       RECOMMENDATION:  APPROVAL GIVEN to proceed with proposed procedure on 12/18/23 without further diagnostic evaluation.     30 minutes spent on the date of the encounter doing chart review, history and exam, documentation and further activities per the note.     Revised Cardiac Risk Index (RCRI):  The patient has the following serious cardiovascular risks for perioperative complications:  -DMII     RCRI Interpretation: 1 point: Class II (low risk - 0.9% complication rate)           Signed Electronically by: Bennie Wiggins DNP APRN CNP         The above note was completed in part using Dragon voice recognition software. Although reviewed after completion, some word and grammatical errors may occur. Please contact the author of this note with any questions.

## 2023-12-14 ENCOUNTER — LAB REQUISITION (OUTPATIENT)
Dept: LAB | Facility: CLINIC | Age: 58
End: 2023-12-14
Payer: COMMERCIAL

## 2023-12-14 ENCOUNTER — TELEPHONE (OUTPATIENT)
Dept: GERIATRICS | Facility: CLINIC | Age: 58
End: 2023-12-14
Payer: COMMERCIAL

## 2023-12-14 DIAGNOSIS — E08.21 DIABETES MELLITUS DUE TO UNDERLYING CONDITION WITH DIABETIC NEPHROPATHY (H): ICD-10-CM

## 2023-12-14 NOTE — TELEPHONE ENCOUNTER
"Received a call this morning as staff were trying to arrange labs and EKG prior to Monday before surgery.    She was softly stating things and could figure out she did not know where to order the EKG from.    \"We can do labs tomorrow but not sure if the EKG will get done\"  \"can it be done at surgery?\"    Informed her ok to get labs tomorrow but [facility] (portable xray) will do the EKG and most likely today ro tomorrow.      Told her she could ask her regular NP during business hours as well but then asked if this NP could send him a note too.    Will pass this to the primary NP.    Rahel Barlow, TIFFANY CNP    "

## 2023-12-15 LAB
ALBUMIN SERPL BCG-MCNC: 3.1 G/DL (ref 3.5–5.2)
ALP SERPL-CCNC: 104 U/L (ref 40–150)
ALT SERPL W P-5'-P-CCNC: 19 U/L (ref 0–50)
ANION GAP SERPL CALCULATED.3IONS-SCNC: 10 MMOL/L (ref 7–15)
AST SERPL W P-5'-P-CCNC: 39 U/L (ref 0–45)
BILIRUB SERPL-MCNC: 1 MG/DL
BUN SERPL-MCNC: 15.8 MG/DL (ref 8–23)
CALCIUM SERPL-MCNC: 8.6 MG/DL (ref 8.6–10)
CHLORIDE SERPL-SCNC: 107 MMOL/L (ref 98–107)
CREAT SERPL-MCNC: 0.56 MG/DL (ref 0.51–0.95)
DEPRECATED HCO3 PLAS-SCNC: 24 MMOL/L (ref 22–29)
EGFRCR SERPLBLD CKD-EPI 2021: >90 ML/MIN/1.73M2
ERYTHROCYTE [DISTWIDTH] IN BLOOD BY AUTOMATED COUNT: 14 % (ref 10–15)
GLUCOSE SERPL-MCNC: 98 MG/DL (ref 70–99)
HBA1C MFR BLD: 6.1 %
HCT VFR BLD AUTO: 39.5 % (ref 35–47)
HGB BLD-MCNC: 13.2 G/DL (ref 11.7–15.7)
MCH RBC QN AUTO: 32 PG (ref 26.5–33)
MCHC RBC AUTO-ENTMCNC: 33.4 G/DL (ref 31.5–36.5)
MCV RBC AUTO: 96 FL (ref 78–100)
PLATELET # BLD AUTO: 91 10E3/UL (ref 150–450)
POTASSIUM SERPL-SCNC: 3.8 MMOL/L (ref 3.4–5.3)
PROT SERPL-MCNC: 6.2 G/DL (ref 6.4–8.3)
RBC # BLD AUTO: 4.12 10E6/UL (ref 3.8–5.2)
SODIUM SERPL-SCNC: 141 MMOL/L (ref 135–145)
WBC # BLD AUTO: 4.5 10E3/UL (ref 4–11)

## 2023-12-15 PROCEDURE — 36415 COLL VENOUS BLD VENIPUNCTURE: CPT | Mod: ORL

## 2023-12-15 PROCEDURE — 80053 COMPREHEN METABOLIC PANEL: CPT | Mod: ORL

## 2023-12-15 PROCEDURE — 85027 COMPLETE CBC AUTOMATED: CPT | Mod: ORL

## 2023-12-15 PROCEDURE — 83036 HEMOGLOBIN GLYCOSYLATED A1C: CPT | Mod: ORL

## 2023-12-15 PROCEDURE — P9604 ONE-WAY ALLOW PRORATED TRIP: HCPCS | Mod: ORL

## 2023-12-18 ENCOUNTER — ANESTHESIA EVENT (OUTPATIENT)
Dept: SURGERY | Facility: CLINIC | Age: 58
End: 2023-12-18
Payer: COMMERCIAL

## 2023-12-18 ENCOUNTER — MEDICAL CORRESPONDENCE (OUTPATIENT)
Dept: HEALTH INFORMATION MANAGEMENT | Facility: CLINIC | Age: 58
End: 2023-12-18

## 2023-12-18 ENCOUNTER — HOSPITAL ENCOUNTER (OUTPATIENT)
Facility: CLINIC | Age: 58
Discharge: NURSING, REHAB OR LONG-TERM CARE | End: 2023-12-19
Attending: ORTHOPAEDIC SURGERY | Admitting: ORTHOPAEDIC SURGERY
Payer: COMMERCIAL

## 2023-12-18 ENCOUNTER — APPOINTMENT (OUTPATIENT)
Dept: PHYSICAL THERAPY | Facility: CLINIC | Age: 58
End: 2023-12-18
Attending: ORTHOPAEDIC SURGERY
Payer: COMMERCIAL

## 2023-12-18 ENCOUNTER — ANESTHESIA (OUTPATIENT)
Dept: SURGERY | Facility: CLINIC | Age: 58
End: 2023-12-18
Payer: COMMERCIAL

## 2023-12-18 ENCOUNTER — APPOINTMENT (OUTPATIENT)
Dept: GENERAL RADIOLOGY | Facility: CLINIC | Age: 58
End: 2023-12-18
Attending: ORTHOPAEDIC SURGERY
Payer: COMMERCIAL

## 2023-12-18 DIAGNOSIS — Z96.651 STATUS POST TOTAL RIGHT KNEE REPLACEMENT: Primary | ICD-10-CM

## 2023-12-18 DIAGNOSIS — Z96.651 S/P TOTAL KNEE ARTHROPLASTY, RIGHT: ICD-10-CM

## 2023-12-18 LAB
FASTING STATUS PATIENT QL REPORTED: YES
GLUCOSE BLDC GLUCOMTR-MCNC: 125 MG/DL (ref 70–99)
GLUCOSE BLDC GLUCOMTR-MCNC: 133 MG/DL (ref 70–99)
GLUCOSE SERPL-MCNC: 147 MG/DL (ref 70–99)
POTASSIUM SERPL-SCNC: 4.1 MMOL/L (ref 3.4–5.3)

## 2023-12-18 PROCEDURE — 250N000011 HC RX IP 250 OP 636: Performed by: ORTHOPAEDIC SURGERY

## 2023-12-18 PROCEDURE — 97116 GAIT TRAINING THERAPY: CPT | Mod: GP

## 2023-12-18 PROCEDURE — 250N000009 HC RX 250: Performed by: ANESTHESIOLOGY

## 2023-12-18 PROCEDURE — 258N000003 HC RX IP 258 OP 636: Performed by: ANESTHESIOLOGY

## 2023-12-18 PROCEDURE — 82947 ASSAY GLUCOSE BLOOD QUANT: CPT | Performed by: ANESTHESIOLOGY

## 2023-12-18 PROCEDURE — 370N000017 HC ANESTHESIA TECHNICAL FEE, PER MIN: Performed by: ORTHOPAEDIC SURGERY

## 2023-12-18 PROCEDURE — 710N000009 HC RECOVERY PHASE 1, LEVEL 1, PER MIN: Performed by: ORTHOPAEDIC SURGERY

## 2023-12-18 PROCEDURE — 250N000013 HC RX MED GY IP 250 OP 250 PS 637: Performed by: ORTHOPAEDIC SURGERY

## 2023-12-18 PROCEDURE — 84132 ASSAY OF SERUM POTASSIUM: CPT | Performed by: ANESTHESIOLOGY

## 2023-12-18 PROCEDURE — 258N000003 HC RX IP 258 OP 636: Performed by: NURSE ANESTHETIST, CERTIFIED REGISTERED

## 2023-12-18 PROCEDURE — 82962 GLUCOSE BLOOD TEST: CPT

## 2023-12-18 PROCEDURE — C1713 ANCHOR/SCREW BN/BN,TIS/BN: HCPCS | Performed by: ORTHOPAEDIC SURGERY

## 2023-12-18 PROCEDURE — 36415 COLL VENOUS BLD VENIPUNCTURE: CPT | Performed by: ANESTHESIOLOGY

## 2023-12-18 PROCEDURE — 250N000011 HC RX IP 250 OP 636: Performed by: NURSE ANESTHETIST, CERTIFIED REGISTERED

## 2023-12-18 PROCEDURE — 272N000001 HC OR GENERAL SUPPLY STERILE: Performed by: ORTHOPAEDIC SURGERY

## 2023-12-18 PROCEDURE — 250N000011 HC RX IP 250 OP 636: Mod: JZ | Performed by: ANESTHESIOLOGY

## 2023-12-18 PROCEDURE — 258N000001 HC RX 258: Performed by: ORTHOPAEDIC SURGERY

## 2023-12-18 PROCEDURE — C1776 JOINT DEVICE (IMPLANTABLE): HCPCS | Performed by: ORTHOPAEDIC SURGERY

## 2023-12-18 PROCEDURE — 999N000141 HC STATISTIC PRE-PROCEDURE NURSING ASSESSMENT: Performed by: ORTHOPAEDIC SURGERY

## 2023-12-18 PROCEDURE — 97530 THERAPEUTIC ACTIVITIES: CPT | Mod: GP

## 2023-12-18 PROCEDURE — 250N000013 HC RX MED GY IP 250 OP 250 PS 637

## 2023-12-18 PROCEDURE — 99255 IP/OBS CONSLTJ NEW/EST HI 80: CPT

## 2023-12-18 PROCEDURE — 97161 PT EVAL LOW COMPLEX 20 MIN: CPT | Mod: GP

## 2023-12-18 PROCEDURE — 258N000003 HC RX IP 258 OP 636: Performed by: ORTHOPAEDIC SURGERY

## 2023-12-18 PROCEDURE — 999N000065 XR KNEE PORT RIGHT 1/2 VIEWS: Mod: RT

## 2023-12-18 PROCEDURE — 360N000077 HC SURGERY LEVEL 4, PER MIN: Performed by: ORTHOPAEDIC SURGERY

## 2023-12-18 PROCEDURE — 250N000009 HC RX 250: Performed by: ORTHOPAEDIC SURGERY

## 2023-12-18 DEVICE — IMPLANTABLE DEVICE
Type: IMPLANTABLE DEVICE | Site: KNEE | Status: FUNCTIONAL
Brand: PERSONA® NATURAL TIBIA®

## 2023-12-18 DEVICE — BONE CEMENT SIMPLEX FULL DOSE 6191-1-001: Type: IMPLANTABLE DEVICE | Site: KNEE | Status: FUNCTIONAL

## 2023-12-18 DEVICE — IMPLANTABLE DEVICE
Type: IMPLANTABLE DEVICE | Site: KNEE | Status: FUNCTIONAL
Brand: PERSONA®

## 2023-12-18 DEVICE — IMPLANTABLE DEVICE
Type: IMPLANTABLE DEVICE | Site: KNEE | Status: FUNCTIONAL
Brand: PERSONA® VIVACIT-E®

## 2023-12-18 RX ORDER — PROPOFOL 10 MG/ML
INJECTION, EMULSION INTRAVENOUS CONTINUOUS PRN
Status: DISCONTINUED | OUTPATIENT
Start: 2023-12-18 | End: 2023-12-18

## 2023-12-18 RX ORDER — NALOXONE HYDROCHLORIDE 0.4 MG/ML
0.2 INJECTION, SOLUTION INTRAMUSCULAR; INTRAVENOUS; SUBCUTANEOUS
Status: DISCONTINUED | OUTPATIENT
Start: 2023-12-18 | End: 2023-12-19 | Stop reason: HOSPADM

## 2023-12-18 RX ORDER — NALOXONE HYDROCHLORIDE 0.4 MG/ML
0.4 INJECTION, SOLUTION INTRAMUSCULAR; INTRAVENOUS; SUBCUTANEOUS
Status: DISCONTINUED | OUTPATIENT
Start: 2023-12-18 | End: 2023-12-19 | Stop reason: HOSPADM

## 2023-12-18 RX ORDER — NICOTINE POLACRILEX 4 MG
15-30 LOZENGE BUCCAL
Status: DISCONTINUED | OUTPATIENT
Start: 2023-12-18 | End: 2023-12-19 | Stop reason: HOSPADM

## 2023-12-18 RX ORDER — OXYCODONE HYDROCHLORIDE 5 MG/1
10 TABLET ORAL EVERY 4 HOURS PRN
Status: DISCONTINUED | OUTPATIENT
Start: 2023-12-18 | End: 2023-12-19 | Stop reason: HOSPADM

## 2023-12-18 RX ORDER — DEXTROSE MONOHYDRATE 25 G/50ML
25-50 INJECTION, SOLUTION INTRAVENOUS
Status: DISCONTINUED | OUTPATIENT
Start: 2023-12-18 | End: 2023-12-19 | Stop reason: HOSPADM

## 2023-12-18 RX ORDER — ONDANSETRON 2 MG/ML
INJECTION INTRAMUSCULAR; INTRAVENOUS PRN
Status: DISCONTINUED | OUTPATIENT
Start: 2023-12-18 | End: 2023-12-18

## 2023-12-18 RX ORDER — ASPIRIN 325 MG
325 TABLET, DELAYED RELEASE (ENTERIC COATED) ORAL DAILY
Qty: 30 TABLET | Refills: 0 | Status: SHIPPED | OUTPATIENT
Start: 2023-12-18 | End: 2024-04-05

## 2023-12-18 RX ORDER — ONDANSETRON 4 MG/1
4 TABLET, ORALLY DISINTEGRATING ORAL EVERY 30 MIN PRN
Status: DISCONTINUED | OUTPATIENT
Start: 2023-12-18 | End: 2023-12-18 | Stop reason: HOSPADM

## 2023-12-18 RX ORDER — LEVOTHYROXINE SODIUM 100 UG/1
100 TABLET ORAL DAILY
Status: DISCONTINUED | OUTPATIENT
Start: 2023-12-19 | End: 2023-12-19 | Stop reason: HOSPADM

## 2023-12-18 RX ORDER — AMOXICILLIN 250 MG
1-2 CAPSULE ORAL 2 TIMES DAILY
Qty: 30 TABLET | Refills: 0 | Status: SHIPPED | OUTPATIENT
Start: 2023-12-18 | End: 2024-01-17

## 2023-12-18 RX ORDER — LIDOCAINE 40 MG/G
CREAM TOPICAL
Status: DISCONTINUED | OUTPATIENT
Start: 2023-12-18 | End: 2023-12-19 | Stop reason: HOSPADM

## 2023-12-18 RX ORDER — HYDROMORPHONE HCL IN WATER/PF 6 MG/30 ML
0.4 PATIENT CONTROLLED ANALGESIA SYRINGE INTRAVENOUS EVERY 5 MIN PRN
Status: DISCONTINUED | OUTPATIENT
Start: 2023-12-18 | End: 2023-12-18 | Stop reason: HOSPADM

## 2023-12-18 RX ORDER — ONDANSETRON 2 MG/ML
4 INJECTION INTRAMUSCULAR; INTRAVENOUS EVERY 6 HOURS PRN
Status: DISCONTINUED | OUTPATIENT
Start: 2023-12-18 | End: 2023-12-19 | Stop reason: HOSPADM

## 2023-12-18 RX ORDER — POLYETHYLENE GLYCOL 3350 17 G
2 POWDER IN PACKET (EA) ORAL
COMMUNITY
End: 2024-04-17

## 2023-12-18 RX ORDER — OXYCODONE HYDROCHLORIDE 5 MG/1
5-10 TABLET ORAL EVERY 4 HOURS PRN
Qty: 30 TABLET | Refills: 0 | Status: SHIPPED | OUTPATIENT
Start: 2023-12-18 | End: 2023-12-19

## 2023-12-18 RX ORDER — LACTULOSE 10 G/15ML
20 SOLUTION ORAL 3 TIMES DAILY
Status: DISCONTINUED | OUTPATIENT
Start: 2023-12-18 | End: 2023-12-19 | Stop reason: HOSPADM

## 2023-12-18 RX ORDER — HYDROMORPHONE HCL IN WATER/PF 6 MG/30 ML
0.2 PATIENT CONTROLLED ANALGESIA SYRINGE INTRAVENOUS
Status: DISCONTINUED | OUTPATIENT
Start: 2023-12-18 | End: 2023-12-19 | Stop reason: HOSPADM

## 2023-12-18 RX ORDER — THYROID 60 MG/1
60 TABLET ORAL DAILY
Status: DISCONTINUED | OUTPATIENT
Start: 2023-12-19 | End: 2023-12-19 | Stop reason: HOSPADM

## 2023-12-18 RX ORDER — FENTANYL CITRATE 0.05 MG/ML
25 INJECTION, SOLUTION INTRAMUSCULAR; INTRAVENOUS EVERY 5 MIN PRN
Status: DISCONTINUED | OUTPATIENT
Start: 2023-12-18 | End: 2023-12-18 | Stop reason: HOSPADM

## 2023-12-18 RX ORDER — MAGNESIUM HYDROXIDE 1200 MG/15ML
LIQUID ORAL PRN
Status: DISCONTINUED | OUTPATIENT
Start: 2023-12-18 | End: 2023-12-18 | Stop reason: HOSPADM

## 2023-12-18 RX ORDER — HYDROMORPHONE HCL IN WATER/PF 6 MG/30 ML
0.4 PATIENT CONTROLLED ANALGESIA SYRINGE INTRAVENOUS
Status: DISCONTINUED | OUTPATIENT
Start: 2023-12-18 | End: 2023-12-19 | Stop reason: HOSPADM

## 2023-12-18 RX ORDER — ONDANSETRON 2 MG/ML
4 INJECTION INTRAMUSCULAR; INTRAVENOUS EVERY 30 MIN PRN
Status: DISCONTINUED | OUTPATIENT
Start: 2023-12-18 | End: 2023-12-18 | Stop reason: HOSPADM

## 2023-12-18 RX ORDER — HYDROXYZINE HYDROCHLORIDE 25 MG/1
25 TABLET, FILM COATED ORAL EVERY 6 HOURS PRN
Status: DISCONTINUED | OUTPATIENT
Start: 2023-12-18 | End: 2023-12-19 | Stop reason: HOSPADM

## 2023-12-18 RX ORDER — ONDANSETRON 4 MG/1
4 TABLET, ORALLY DISINTEGRATING ORAL EVERY 6 HOURS PRN
Status: DISCONTINUED | OUTPATIENT
Start: 2023-12-18 | End: 2023-12-19 | Stop reason: HOSPADM

## 2023-12-18 RX ORDER — SODIUM CHLORIDE, SODIUM LACTATE, POTASSIUM CHLORIDE, CALCIUM CHLORIDE 600; 310; 30; 20 MG/100ML; MG/100ML; MG/100ML; MG/100ML
INJECTION, SOLUTION INTRAVENOUS CONTINUOUS
Status: DISCONTINUED | OUTPATIENT
Start: 2023-12-18 | End: 2023-12-18 | Stop reason: HOSPADM

## 2023-12-18 RX ORDER — FENTANYL CITRATE 50 UG/ML
INJECTION, SOLUTION INTRAMUSCULAR; INTRAVENOUS PRN
Status: DISCONTINUED | OUTPATIENT
Start: 2023-12-18 | End: 2023-12-18

## 2023-12-18 RX ORDER — CELECOXIB 200 MG/1
200 CAPSULE ORAL DAILY
Status: DISCONTINUED | OUTPATIENT
Start: 2023-12-18 | End: 2023-12-19 | Stop reason: HOSPADM

## 2023-12-18 RX ORDER — BISACODYL 10 MG
10 SUPPOSITORY, RECTAL RECTAL DAILY PRN
Status: DISCONTINUED | OUTPATIENT
Start: 2023-12-18 | End: 2023-12-19 | Stop reason: HOSPADM

## 2023-12-18 RX ORDER — DIPHENHYDRAMINE HCL 25 MG
25 CAPSULE ORAL EVERY 6 HOURS PRN
Status: DISCONTINUED | OUTPATIENT
Start: 2023-12-18 | End: 2023-12-19 | Stop reason: HOSPADM

## 2023-12-18 RX ORDER — AMOXICILLIN 250 MG
1 CAPSULE ORAL 2 TIMES DAILY
Status: DISCONTINUED | OUTPATIENT
Start: 2023-12-18 | End: 2023-12-19 | Stop reason: HOSPADM

## 2023-12-18 RX ORDER — ASPIRIN 325 MG
325 TABLET, DELAYED RELEASE (ENTERIC COATED) ORAL DAILY
Status: DISCONTINUED | OUTPATIENT
Start: 2023-12-18 | End: 2023-12-19 | Stop reason: HOSPADM

## 2023-12-18 RX ORDER — THYROID 15 MG/1
15 TABLET ORAL DAILY
Status: DISCONTINUED | OUTPATIENT
Start: 2023-12-19 | End: 2023-12-19 | Stop reason: HOSPADM

## 2023-12-18 RX ORDER — FENTANYL CITRATE 0.05 MG/ML
50 INJECTION, SOLUTION INTRAMUSCULAR; INTRAVENOUS EVERY 5 MIN PRN
Status: DISCONTINUED | OUTPATIENT
Start: 2023-12-18 | End: 2023-12-18 | Stop reason: HOSPADM

## 2023-12-18 RX ORDER — METHOCARBAMOL 750 MG/1
750 TABLET, FILM COATED ORAL EVERY 6 HOURS PRN
Status: DISCONTINUED | OUTPATIENT
Start: 2023-12-18 | End: 2023-12-19 | Stop reason: HOSPADM

## 2023-12-18 RX ORDER — SPIRONOLACTONE 25 MG/1
100 TABLET ORAL DAILY
Status: DISCONTINUED | OUTPATIENT
Start: 2023-12-19 | End: 2023-12-19 | Stop reason: HOSPADM

## 2023-12-18 RX ORDER — SODIUM CHLORIDE, SODIUM LACTATE, POTASSIUM CHLORIDE, CALCIUM CHLORIDE 600; 310; 30; 20 MG/100ML; MG/100ML; MG/100ML; MG/100ML
INJECTION, SOLUTION INTRAVENOUS CONTINUOUS PRN
Status: DISCONTINUED | OUTPATIENT
Start: 2023-12-18 | End: 2023-12-18

## 2023-12-18 RX ORDER — ESCITALOPRAM OXALATE 10 MG/1
20 TABLET ORAL EVERY MORNING
Status: DISCONTINUED | OUTPATIENT
Start: 2023-12-19 | End: 2023-12-19 | Stop reason: HOSPADM

## 2023-12-18 RX ORDER — PROCHLORPERAZINE MALEATE 10 MG
10 TABLET ORAL EVERY 6 HOURS PRN
Status: DISCONTINUED | OUTPATIENT
Start: 2023-12-18 | End: 2023-12-19 | Stop reason: HOSPADM

## 2023-12-18 RX ORDER — HYDROXYZINE HYDROCHLORIDE 25 MG/1
25 TABLET, FILM COATED ORAL EVERY 6 HOURS PRN
Qty: 30 TABLET | Refills: 0 | Status: SHIPPED | OUTPATIENT
Start: 2023-12-18 | End: 2024-04-17

## 2023-12-18 RX ORDER — HYDROMORPHONE HCL IN WATER/PF 6 MG/30 ML
0.2 PATIENT CONTROLLED ANALGESIA SYRINGE INTRAVENOUS EVERY 5 MIN PRN
Status: DISCONTINUED | OUTPATIENT
Start: 2023-12-18 | End: 2023-12-18 | Stop reason: HOSPADM

## 2023-12-18 RX ORDER — POLYETHYLENE GLYCOL 3350 17 G/17G
17 POWDER, FOR SOLUTION ORAL DAILY
Status: DISCONTINUED | OUTPATIENT
Start: 2023-12-19 | End: 2023-12-19 | Stop reason: HOSPADM

## 2023-12-18 RX ORDER — PANTOPRAZOLE SODIUM 20 MG/1
20 TABLET, DELAYED RELEASE ORAL DAILY
Status: DISCONTINUED | OUTPATIENT
Start: 2023-12-19 | End: 2023-12-19 | Stop reason: HOSPADM

## 2023-12-18 RX ORDER — OXYCODONE HYDROCHLORIDE 5 MG/1
5 TABLET ORAL EVERY 4 HOURS PRN
Status: DISCONTINUED | OUTPATIENT
Start: 2023-12-18 | End: 2023-12-19 | Stop reason: HOSPADM

## 2023-12-18 RX ORDER — TRANEXAMIC ACID 650 MG/1
1950 TABLET ORAL ONCE
Status: COMPLETED | OUTPATIENT
Start: 2023-12-18 | End: 2023-12-18

## 2023-12-18 RX ORDER — SODIUM CHLORIDE 9 MG/ML
INJECTION, SOLUTION INTRAVENOUS CONTINUOUS
Status: DISCONTINUED | OUTPATIENT
Start: 2023-12-18 | End: 2023-12-19 | Stop reason: HOSPADM

## 2023-12-18 RX ADMIN — MEPIVACAINE HYDROCHLORIDE 3 ML: 20 INJECTION, SOLUTION EPIDURAL; INFILTRATION at 11:05

## 2023-12-18 RX ADMIN — OXYCODONE HYDROCHLORIDE 10 MG: 5 TABLET ORAL at 22:11

## 2023-12-18 RX ADMIN — OXYCODONE HYDROCHLORIDE 10 MG: 5 TABLET ORAL at 17:56

## 2023-12-18 RX ADMIN — VANCOMYCIN HYDROCHLORIDE 1500 MG: 10 INJECTION, POWDER, LYOPHILIZED, FOR SOLUTION INTRAVENOUS at 10:06

## 2023-12-18 RX ADMIN — RIFAXIMIN 550 MG: 550 TABLET ORAL at 20:24

## 2023-12-18 RX ADMIN — SODIUM CHLORIDE, POTASSIUM CHLORIDE, SODIUM LACTATE AND CALCIUM CHLORIDE: 600; 310; 30; 20 INJECTION, SOLUTION INTRAVENOUS at 10:58

## 2023-12-18 RX ADMIN — TRANEXAMIC ACID 1950 MG: 650 TABLET ORAL at 09:48

## 2023-12-18 RX ADMIN — METHOCARBAMOL 750 MG: 750 TABLET ORAL at 17:55

## 2023-12-18 RX ADMIN — MIDAZOLAM 2 MG: 1 INJECTION INTRAMUSCULAR; INTRAVENOUS at 10:58

## 2023-12-18 RX ADMIN — ONDANSETRON 4 MG: 2 INJECTION INTRAMUSCULAR; INTRAVENOUS at 12:54

## 2023-12-18 RX ADMIN — DOCUSATE SODIUM 50 MG AND SENNOSIDES 8.6 MG 1 TABLET: 8.6; 5 TABLET, FILM COATED ORAL at 20:23

## 2023-12-18 RX ADMIN — HYDROXYZINE HYDROCHLORIDE 25 MG: 25 TABLET, FILM COATED ORAL at 22:12

## 2023-12-18 RX ADMIN — FENTANYL CITRATE 50 MCG: 50 INJECTION INTRAMUSCULAR; INTRAVENOUS at 11:34

## 2023-12-18 RX ADMIN — VANCOMYCIN HYDROCHLORIDE 1500 MG: 10 INJECTION, POWDER, LYOPHILIZED, FOR SOLUTION INTRAVENOUS at 22:27

## 2023-12-18 RX ADMIN — PHENYLEPHRINE HYDROCHLORIDE 0.4 MCG/KG/MIN: 10 INJECTION INTRAVENOUS at 11:07

## 2023-12-18 RX ADMIN — ROPIVACAINE HYDROCHLORIDE 20 ML: 5 INJECTION, SOLUTION EPIDURAL; INFILTRATION; PERINEURAL at 10:27

## 2023-12-18 RX ADMIN — FENTANYL CITRATE 50 MCG: 50 INJECTION INTRAMUSCULAR; INTRAVENOUS at 11:04

## 2023-12-18 RX ADMIN — SODIUM CHLORIDE: 9 INJECTION, SOLUTION INTRAVENOUS at 15:09

## 2023-12-18 RX ADMIN — PROPOFOL 75 MCG/KG/MIN: 10 INJECTION, EMULSION INTRAVENOUS at 11:08

## 2023-12-18 RX ADMIN — ASPIRIN 325 MG: 325 TABLET, COATED ORAL at 15:50

## 2023-12-18 RX ADMIN — CELECOXIB 200 MG: 200 CAPSULE ORAL at 17:56

## 2023-12-18 RX ADMIN — SODIUM CHLORIDE, POTASSIUM CHLORIDE, SODIUM LACTATE AND CALCIUM CHLORIDE: 600; 310; 30; 20 INJECTION, SOLUTION INTRAVENOUS at 10:04

## 2023-12-18 ASSESSMENT — ACTIVITIES OF DAILY LIVING (ADL)
ADLS_ACUITY_SCORE: 22
ADLS_ACUITY_SCORE: 24
ADLS_ACUITY_SCORE: 24
ADLS_ACUITY_SCORE: 22
ADLS_ACUITY_SCORE: 25
ADLS_ACUITY_SCORE: 35
ADLS_ACUITY_SCORE: 22
ADLS_ACUITY_SCORE: 25

## 2023-12-18 NOTE — CONSULTS
[facility]  Consult Note - Hospitalist Service  Date of Admission:  12/18/2023  Consult Requested by: Pawan Ewing M.D.   Reason for Consult: Medical co-management     Assessment & Plan   Sudheer Montiel is a 58 year old female with past medical history significant for type II DM, REYES cirrhosis, hypothyroidism and depression admitted on 12/18/2023 following a right total knee arthroplasty. POD #0.      S/p Procedure, right total knee arthoplasty  * Procedure completed under general anesthesia by Dr. Ewing. No complications noted. 100 ml EBL.   - Hgb 13.2 pre-operatively.  - Post-operative hgb pending.  - Defer post-operative surgical management to primary service including pain management, disposition and therapies.    - AM CBC and CMP.     REYES Cirrhosis   - Hold PTA furosemide and reevaluate patient clinical status tomorrow.   - Continue PTA rifaximin, spironolactone.   - AM CMP.     Hypothyroidism   - Continue thyroid armour and levothyroxine.     DM II, controlled   Recent Labs   Lab Test 12/15/23  0543   A1C 6.1*   - Hold PTA lantus 40 units daily.   - Hold ozempic and novolog.   - Start medium ISS.   - Glucose checks QID.    - Hypoglycemic protocol in place.     GERD   - Continue omeprazole.     Depression   - Continue escitalopram.      The patient's care was discussed with the Attending Physician, Dr. Margo Owens .    Nallely Piper PA-C  Hospitalist Service  Securely message with [employer] (more info)  Text page via [facility] Paging/Directory   ______________________________________________________________________    Chief Complaint   Right total knee arthroplasty     History is obtained from the patient    History of Present Illness   Sudheer Montiel is a 58 year old female with past medical history significant for type II DM, REYES cirrhosis, hypothyroidism and depression admitted on 12/18/2023 following a right total knee arthroplasty.     Patient is POD #0 s/p right  total knee arthroplasty. Procedure completed under general anesthesia by Dr. Ewing. No complications noted. 100 ml EBL.      I evaluated the patient post-operatively. She is awake, alert, in no distress. She is sitting in bed finishing dinner upon my examination. Patient denies CP, SOB, palpitations, PONV. Patient states she is feeling well and reports her pain is being well managed.     Past Medical History    Past Medical History:   Diagnosis Date    Abnormal liver CT     Hypothyroid 80's    Necrotizing fasciitis (H)     Pleural effusion     Primary osteoarthritis of both knees     Soft tissue infection     Subcortical infarction (H)     Type 2 diabetes mellitus (H)     Unspecified cirrhosis of liver (H)        Past Surgical History   Past Surgical History:   Procedure Laterality Date     SECTION      COLONOSCOPY N/A 10/21/2015    Procedure: COLONOSCOPY;  Surgeon: Jaky Arredondo MD;  Location:  GI    IRRIGATION AND DEBRIDEMENT ABDOMEN WASHOUT, COMBINED N/A 10/12/2018    Procedure: COMBINED IRRIGATION AND DEBRIDEMENT ABDOMEN WASHOUT;  Irrigation and Debridement of Lower Abdomen, removal of piece of mesh.;  Surgeon: Darlene Hogue MD;  Location: [facility] OR    marisol/bso  2004    due to anemia    ZZC REPAIR CRUCIATE LIGAMENT,KNEE  1986       Medications   I have reviewed this patient's current medications       Allergies   Allergies   Allergen Reactions    Cephalexin     Morphine      Other reaction(s): Other  Irritability, disorientation    Penicillins Itching     face    Amoxicillin Rash and Itching    Cefprozil Rash    Ceftazidime Itching and Rash    Ciprofloxacin Itching and Rash     Tolerates levaquin     Percocet [Oxycodone-Acetaminophen] Nausea and Vomiting        Physical Exam   Vital Signs: Temp: 98.9  F (37.2  C) Temp src: Oral BP: 122/64 Pulse: 71   Resp: 16 SpO2: 93 % O2 Device: None (Room air)    Weight: 222 lbs 0 oz  GENERAL:  Pleasant, cooperative, alert. Sitting in bed  upon examination watching TV. NC in place.   HEENT: Normocephalic, atraumatic.  Extra occular mm intact.  Sclera clear. PERRL.  Mucous membranes moist.    PULMONOLOGY: Clear to auscultation bilaterally.   CARDIAC: Regular rate and rhythm.  No appreciated murmur.   ABDOMEN: Soft, nontender non distended.  MUSCULOSKELETAL:  Moving x 4 spontaneously.  Normal bulk and tone.  No LE edema. Right leg wrapped and in immobilizer upon examination. Sensation intact.   NEURO: Alert and oriented x3.  CN II-XII grossly intact and symmetric.  No ataxia or tremor.  Nonfocal exam.    Medical Decision Making       35 MINUTES SPENT BY ME on the date of service doing chart review, history, exam, documentation & further activities per the note.      Data     I have personally reviewed the following data over the past 24 hrs:    N/A  \   N/A   / N/A     N/A N/A N/A /  125 (H)   4.1 N/A N/A \       Imaging results reviewed over the past 24 hrs:   Recent Results (from the past 24 hour(s))   XR Knee Port Right 1/2 Views    Narrative    KNEE PORTABLE RIGHT ONE TO TWO VIEWS   12/18/2023 1:56 PM     HISTORY:  Postop total knee.    COMPARISON: Radiographs from 10/2/2015.      Impression    IMPRESSION: Recent total knee arthroplasty with adjacent gas.  Excellent position and alignment of components. There is no evidence  of complication or periprosthetic fracture.     ORI DICKERSON MD         SYSTEM ID:  [id number]

## 2023-12-18 NOTE — OP NOTE
Procedure Date: 12/18/23     PREOPERATIVE DIAGNOSIS:  Degenerative arthritis of the right knee.    POSTOPERATIVE DIAGNOSIS:  Degenerative arthritis of the right knee.     PROCEDURE PERFORMED:  right total knee arthroplasty.     SURGEON:  Pawan Ewing MD     FIRST ASSISTANT:  ROBER Sun     SECOND ASSISTANT: Dede Wilks ATC     DESCRIPTION OF PROCEDURE:  The patient was brought to the operating room and given an adductor canal block.  They were then given a Spinal anesthetic.  Their right knee and right lower extremity were then prepped and draped in the usual sterile fashion.  The right lower extremity was exsanguinated and the tourniquet was inflated.  An incision was made over the anterior aspect of the knee.  This was carried down through the subcutaneous tissues, down to the fascia.  A standard median parapatellar approach was taken to the joint, which revealed advanced degenerative changes consistent with the pre-operative imaging studies.  The fat pad was excised.  Menisci were excised.  The ACL was excised.  A drill hole was then made in the distal femur and using the intramedullary guide set to 5 degrees from the anatomic axis, the femur was cut to accept a size 8 narrow Persona cruciate retaining femoral component.  The tibia was cut using the external alignment guide to accept a size E Persona tibial component and the patella was cut to accept a size 32 patella.  The knee was then trialed.  The alignment appeared satisfactory.  Ligaments were stable and balanced.  Trial components were removed.  The tibia was punched.  We then injected the posterior capsule with the periarticular analgesic cocktail.  The tourniquet was let down and hemostasis was obtained.  The knee was then thoroughly irrigated and dried and the real components were cemented into place.  While the cement was hardening, we irrigated the knee with a dilute solution of Betadine.  It was allowed to sit within the knee for 3  minutes and was thoroughly irrigated from the knee with a liter of normal saline.  Once the cement had hardened, all extraneous cement was removed.  We trialed the knee and it appeared that a 16 mm insert gave the best fit.  A real 16 mm medial congruent tibial polyethylene insert was then snapped into the tibial component.  The knee had full range of motion.  The patella tracked well.  The wound was then irrigated once again with saline.  We sprinkled a gram of Vancomycin powder in the knee because of the patient's history of MRSA infection in the past.  The fascia was closed with interrupted 0 Vicryl sutures.  The skin was closed with 2-0 Vicryl subcutaneous sutures and staples.  A sterile compressive dressing was applied to the knee.  The patient was then awakened from anesthesia and transferred to postanesthesia recovery in satisfactory condition.     It should be noted that my assistants were necessary throughout the entire procedure to assist with retraction and positioning.     Pawan Ewing MD

## 2023-12-18 NOTE — ANESTHESIA POSTPROCEDURE EVALUATION
Patient: Sudheer Montiel    Procedure: Procedure(s):  Right Total Knee Arthroplasty       Anesthesia Type:  Spinal    Note:  Disposition: Admission   Postop Pain Control: Uneventful            Sign Out: Well controlled pain   PONV: No   Neuro/Psych: Uneventful            Sign Out: Acceptable/Baseline neuro status   Airway/Respiratory: Uneventful            Sign Out: Acceptable/Baseline resp. status   CV/Hemodynamics: Uneventful            Sign Out: Acceptable CV status; No obvious hypovolemia; No obvious fluid overload   Other NRE: NONE   DID A NON-ROUTINE EVENT OCCUR?            Last vitals:  Vitals Value Taken Time   /56 12/18/23 1400   Temp 37.1  C (98.8  F) 12/18/23 1319   Pulse 83 12/18/23 1414   Resp 20 12/18/23 1414   SpO2 91 % 12/18/23 1414   Vitals shown include unfiled device data.    Electronically Signed By: Ezequiel Encarnacion MD  December 18, 2023  2:21 PM

## 2023-12-18 NOTE — ANESTHESIA PREPROCEDURE EVALUATION
Prior to Admission medications    Medication Sig Start Date End Date Taking? Authorizing Provider   acetaminophen (TYLENOL) 325 MG tablet Take 2 tablets (650 mg) by mouth every 8 hours as needed for mild pain or other (and adjunct with moderate or severe pain or per patient request) 5/26/22   Sameer Briggs MD   ASPIRIN 81 PO Take 81 mg by mouth daily ON HOLD    Reported, Patient   blood glucose (NO BRAND SPECIFIED) test strip Use to test blood sugar 1 times daily or as directed. Glucocard Vital 4/7/20   Kirk Christianson MD   celecoxib (CELEBREX) 200 MG capsule Take 200 mg by mouth daily    Reported, Patient   Continuous Blood Gluc Sensor (FREESTYLE DIMITRIOS 3 SENSOR) MISC 1 Device continuous prn (change every 14 days) 7/17/23   Kirk Christianson MD   diclofenac (VOLTAREN) 1 % topical gel Apply 2 g topically 3 times daily as needed for moderate pain To right knee    Reported, Patient   escitalopram (LEXAPRO) 10 MG tablet Take 20 mg by mouth every morning    Reported, Patient   furosemide (LASIX) 40 MG tablet Take 1 tablet (40 mg) by mouth daily 8/21/23   Harjeet Negron MD   guaiFENesin 400 MG TABS Take 1 tablet by mouth every 4 hours as needed    Reported, Patient   insulin aspart (NOVOLOG VIAL) 100 UNITS/ML vial Inject Subcutaneous 3 times daily (before meals) Inject as per sliding scale: if 200 - 250 = 1 unit; 251 - 300 = 2 units; 301 - 350 = 3 units;   351 - 999 = 4 if greater than 350 give 4 units    Reported, Patient   insulin glargine (LANTUS PEN) 100 UNIT/ML pen Inject 40 Units Subcutaneous daily 7/17/23   Kirk Christianson MD   insulin pen needle (B-D U/F) 31G X 5 MM miscellaneous Use 4 pen needles daily or as directed. 7/17/23   Kirk Christianson MD   insulin pen needle (B-D U/F) 31G X 5 MM miscellaneous Use 1 pen needles daily or as directed. 4/7/20   Kirk Christianson MD   lactulose 20 GM/30ML solution Take 30 mLs (20 g) by mouth 3 times daily 4/28/23   Nic Harrington MD   levothyroxine  (SYNTHROID/LEVOTHROID) 100 MCG tablet Take 1 tablet (100 mcg) by mouth daily 11/7/23   Kirk Christianson MD   MELATONIN PO Take 2 mg by mouth at bedtime    Reported, Patient   omeprazole (PRILOSEC) 10 MG DR capsule Take 10 mg by mouth daily 3/19/16   Reported, Patient   ondansetron (ZOFRAN) 4 MG tablet Take 4 mg by mouth every 8 hours as needed for nausea    Reported, Patient   semaglutide (OZEMPIC) 2 MG/1.5ML SOPN pen Inject 0.5 mg weekly  Patient taking differently: Inject 0.5 mg Subcutaneous every 7 days Wednesday 7/17/23   Kirk Christianson MD   spironolactone (ALDACTONE) 100 MG tablet Take 100 mg by mouth daily    Reported, Patient   STATIN NOT PRESCRIBED (INTENTIONAL) Please choose reason not prescribed from choices below.    Pawan Burns PA-C   thyroid (ARMOUR) 15 MG tablet Take 1 tablet (15 mg) by mouth daily 11/7/23   Kirk Christianson MD   thyroid (ARMOUR) 60 MG tablet Take 1-tablet by mouth daily as directed 11/7/23   Kirk Christianson MD   traMADol (ULTRAM) 50 MG tablet Take 1 tablet (50 mg) by mouth every 6 hours as needed for severe pain 10/20/23   Bennie Wiggins, APRN CNP   valACYclovir (VALTREX) 500 MG tablet Take 500 mg by mouth daily as needed 1/16/18   Reported, Patient   XIFAXAN 550 MG TABS tablet Take 1 tablet by mouth 2 times daily 5/27/23   Reported, Patient     Current Facility-Administered Medications Ordered in Epic   Medication Dose Route Frequency Last Rate Last Admin    lactated ringers infusion   Intravenous Continuous        lidocaine 1 % 0.1-1 mL  0.1-1 mL Other Q1H PRN        ROPivacaine (NAROPIN) 5 MG/ mg, ketorolac (TORADOL) 30 mg, EPINEPHrine (ADRENALIN) 0.6 mg in sodium chloride 0.9 % 50 mL (ORTHO DIAMOND LOW DOSE)   INTRA-ARTICULAR On Call to OR        sodium chloride (PF) 0.9% PF flush 3 mL  3 mL Intracatheter Q8H        tranexamic acid (LYSTEDA) tablet 1,950 mg  1,950 mg Oral Once        vancomycin (VANCOCIN) 1,500 mg in 0.9% NaCl 250 mL intermittent infusion   "1,500 mg Intravenous Pre-Op/Pre-procedure x 1 dose         No current [facility]-ordered outpatient medications on file.      lactated ringers       No results for input(s): \"ABO\", \"RH\" in the last 41811 hours.  No results for input(s): \"HCG\" in the last 88561 hours.  No results found for this or any previous visit (from the past 744 hour(s)). Anesthesia Pre-Procedure Evaluation    Patient: Sudheer Montiel   MRN: [id number] : 1965        Procedure : Procedure(s):  Right Total Knee Arthroplasty          Past Medical History:   Diagnosis Date    Abnormal liver CT     Hypothyroid 80's    Necrotizing fasciitis (H)     Pleural effusion     Primary osteoarthritis of both knees     Soft tissue infection     Subcortical infarction (H)     Type 2 diabetes mellitus (H)     Unspecified cirrhosis of liver (H)       Past Surgical History:   Procedure Laterality Date     SECTION      COLONOSCOPY N/A 10/21/2015    Procedure: COLONOSCOPY;  Surgeon: Jaky Arredondo MD;  Location:  GI    IRRIGATION AND DEBRIDEMENT ABDOMEN WASHOUT, COMBINED N/A 10/12/2018    Procedure: COMBINED IRRIGATION AND DEBRIDEMENT ABDOMEN WASHOUT;  Irrigation and Debridement of Lower Abdomen, removal of piece of mesh.;  Surgeon: Darlene Hogue MD;  Location: [facility] OR    marisol/bso      due to anemia    ZZC REPAIR CRUCIATE LIGAMENT,KNEE  1986      Allergies   Allergen Reactions    Cephalexin     Morphine      Other reaction(s): Other  Irritability, disorientation    Penicillins Itching     face    Amoxicillin Rash and Itching    Cefprozil Rash    Ceftazidime Itching and Rash    Ciprofloxacin Itching and Rash     Tolerates levaquin     Percocet [Oxycodone-Acetaminophen] Nausea and Vomiting      Social History     Tobacco Use    Smoking status: Former     Types: Cigarettes     Quit date: 2023     Years since quittin.3    Smokeless tobacco: Never   Substance Use Topics    Alcohol use: No      Wt Readings from Last 1 " Encounters:   12/12/23 103 kg (227 lb 1.6 oz)        Anesthesia Evaluation   Pt has had prior anesthetic.     No history of anesthetic complications       ROS/MED HX  ENT/Pulmonary:       Neurologic:       Cardiovascular:       METS/Exercise Tolerance:     Hematologic:       Musculoskeletal:   (+)  arthritis,             GI/Hepatic:     (+)             liver disease,       Renal/Genitourinary:       Endo:     (+)  type II DM,        thyroid problem, hypothyroidism,    Obesity,    (-) Type I DM   Psychiatric/Substance Use:       Infectious Disease:       Malignancy:       Other:            Physical Exam    Airway        Mallampati: I    Neck ROM: full     Respiratory Devices and Support         Dental       (+) Minor Abnormalities - some fillings, tiny chips      Cardiovascular   cardiovascular exam normal          Pulmonary   pulmonary exam normal                OUTSIDE LABS:  CBC:   Lab Results   Component Value Date    WBC 4.5 12/15/2023    WBC 5.6 08/20/2023    HGB 13.2 12/15/2023    HGB 14.4 08/20/2023    HCT 39.5 12/15/2023    HCT 43.2 08/20/2023    PLT 91 (L) 12/15/2023     08/20/2023     BMP:   Lab Results   Component Value Date     12/15/2023     10/30/2023    POTASSIUM 3.8 12/15/2023    POTASSIUM 4.6 10/30/2023    CHLORIDE 107 12/15/2023    CHLORIDE 107 10/30/2023    CO2 24 12/15/2023    CO2 23 10/30/2023    BUN 15.8 12/15/2023    BUN 12.5 10/30/2023    CR 0.56 12/15/2023    CR 0.66 10/30/2023    GLC 98 12/15/2023     (H) 10/30/2023     COAGS:   Lab Results   Component Value Date    INR 1.17 (H) 08/11/2023     POC:   Lab Results   Component Value Date     (H) 10/15/2018     HEPATIC:   Lab Results   Component Value Date    ALBUMIN 3.1 (L) 12/15/2023    PROTTOTAL 6.2 (L) 12/15/2023    ALT 19 12/15/2023    AST 39 12/15/2023    ALKPHOS 104 12/15/2023    BILITOTAL 1.0 12/15/2023    EVANGELISTA 52 (H) 10/30/2023     OTHER:   Lab Results   Component Value Date    A1C 6.1 (H) 12/15/2023  "   JOANN 8.6 12/15/2023    PHOS 3.3 08/12/2023    MAG 1.9 08/20/2023    LIPASE 237 05/25/2022    TSH 0.53 10/30/2023    T4 1.25 10/30/2023    T3 196 (H) 01/05/2021    CRP 17.2 (H) 04/17/2012    SED 11 04/17/2012       Anesthesia Plan    ASA Status:  2    NPO Status:  NPO Appropriate    Anesthesia Type: Spinal.   Induction: Intravenous.   Maintenance: Balanced.        Consents    Anesthesia Plan(s) and associated risks, benefits, and realistic alternatives discussed. Questions answered and patient/representative(s) expressed understanding.     - Discussed:     - Discussed with:  Patient            Postoperative Care    Pain management: IV analgesics, Peripheral nerve block (Single Shot).   PONV prophylaxis: Ondansetron (or other 5HT-3)     Comments:               Ezequiel Encarnacion MD    I have reviewed the pertinent notes and labs in the chart from the past 30 days and (re)examined the patient.  Any updates or changes from those notes are reflected in this note.          # Hypoalbuminemia: Lowest albumin = 3.1 g/dL in the past 30 days , will monitor as appropriate    # Drug Induced Platelet Defect: home medication list includes an antiplatelet medication  # Severe Obesity: Estimated body mass index is 40.23 kg/m  as calculated from the following:    Height as of 12/12/23: 1.6 m (5' 3\").    Weight as of 12/12/23: 103 kg (227 lb 1.6 oz).      "

## 2023-12-18 NOTE — ANESTHESIA PROCEDURE NOTES
"Intrathecal injection Procedure Note    Pre-Procedure   Staff -        Anesthesiologist:  Ezequiel Encarnacion MD       Performed By: anesthesiologist       Location: OR       Pre-Anesthestic Checklist: patient identified, IV checked, risks and benefits discussed, informed consent, monitors and equipment checked, pre-op evaluation and at physician/surgeon's request  Timeout:       Correct Patient: Yes        Correct Procedure: Yes        Correct Site: Yes        Correct Position: Yes   Procedure Documentation  Procedure: intrathecal injection       Patient Position: sitting       Patient Prep/Sterile Barriers: sterile gloves, mask, patient draped       Skin prep: Betadine       Insertion Site: L3-4. (midline approach).       Needle Gauge: 24.        Needle Length (Inches): 4        Spinal Needle Type: Pencan       Introducer used       Introducer: 20 G       # of attempts: 1 and  # of redirects:  0    Assessment/Narrative         Paresthesias: No.       CSF fluid: clear.    Medication(s) Administered   2% Mepivacaine PF (Intrathecal) - Intrathecal   3 mL - 12/18/2023 11:05:00 AM    FOR [facility] ([facility]) ONLY:   Pain Team Contact information: please page the Pain Team Via [employer]. Search \"Pain\". During daytime hours, please page the attending first. At night please page the resident first.      "

## 2023-12-18 NOTE — BRIEF OP NOTE
[facility]    Brief Operative Note    Pre-operative diagnosis: Osteoarthritis of right knee [M17.11]  Post-operative diagnosis Same as pre-operative diagnosis    Procedure: Right Total Knee Arthroplasty, Right - Knee    Surgeon: Surgeon(s) and Role:     * Pawan Ewing MD - Primary  Anesthesia: General   Estimated Blood Loss: Less than 100 ml    Drains: None  Specimens: * No specimens in log *  Findings:   Advanced DJD right knee .  Complications: None.  Implants:   Implant Name Type Inv. Item Serial No.  Lot No. LRB No. Used Action   BONE CEMENT SIMPLEX FULL DOSE 6191-1-001 - [id number] Cement, Bone BONE CEMENT SIMPLEX FULL DOSE 6191-1-001  [employer] [id number] Right 1 Implanted   BONE CEMENT SIMPLEX FULL DOSE 6191-1-001 - [id number] Cement, Bone BONE CEMENT SIMPLEX FULL DOSE 6191-1-001  [employer] [id number] Right 1 Implanted   IMP TIBIAL ZIM PSN NP STM 5DEG SZ ER -795-02 - [id number] Total Joint Component/Insert IMP TIBIAL ZIM PSN NP STM 5DEG SZ ER -043-02  [employer] [id number] Right 1 Implanted   KNEE FEMUR CR CEMENT CCR ISAIAH SZ 8 R - [id number] Total Joint Component/Insert KNEE FEMUR CR CEMENT CCR ISAIAH SZ 8 R  [employer] [id number] Right 1 Implanted   IMP PATELLA ZIM KNEE ALL ANDREW 32MM -510-32 - [id number] Total Joint Component/Insert IMP PATELLA ZIM KNEE ALL ANDREW 32MM -748-32  [employer] [id number] Right 1 Implanted   VIVACIT-E HIGHLY CROSSLINKED POLYETHYLENE ARTICULAR SURFACE MEDIAL CONGRUENT RIGHT 16 MM HEIGHT    [employer] [id number] Right 1 Implanted

## 2023-12-18 NOTE — PLAN OF CARE
Goal Outcome Evaluation:      Plan of Care Reviewed With: patient        Pt alert and oriented X4. Able to make needs known. Denied SOB, CP, N/V. CMS WNL. Pivoted to [facility] with A1 GB and walker. On RA. Denied pain.     Patient vital signs are at baseline: Yes  Patient able to ambulate as they were prior to admission or with assist devices provided by therapies during their stay:  Yes  Patient MUST void prior to discharge:  Yes  Patient able to tolerate oral intake:  Yes  Pain has adequate pain control using Oral analgesics:  Yes  Does patient have an identified :  Yes  Has goal D/C date and time been discussed with patient:  Yes

## 2023-12-18 NOTE — ANESTHESIA PROCEDURE NOTES
Adductor canal and Femoral Procedure Note    Pre-Procedure   Staff -        Anesthesiologist:  Ezequiel Encarnacion MD       Performed By: Anesthesiologist       Location: pre-op       Pre-Anesthestic Checklist: patient identified, IV checked, site marked, risks and benefits discussed, informed consent, monitors and equipment checked, pre-op evaluation, at physician/surgeon's request and post-op pain management  Timeout:       Correct Patient: Yes        Correct Procedure: Yes        Correct Site: Yes        Correct Position: Yes        Correct Laterality: Yes        Site Marked: Yes  Procedure Documentation  Procedure: Adductor canal, Femoral       Patient Position: supine       Patient Prep/Sterile Barriers: mask, no-touch technique utilized       Skin prep: Chloraprep       Local skin infiltrated with 3 mL of 1% lidocaine.        Needle Type: insulated and short bevel       Needle Gauge: 20.        Needle Length (millimeters): 100        Ultrasound guided       1. Ultrasound was used to identify targeted nerve, plexus, vascular marker, or fascial plane and place a needle adjacent to it in real-time.       2. Ultrasound was used to visualize the spread of anesthetic in close proximity to the above referenced structure.       3. A permanent image is entered into the patient's record.       4. The visualized anatomic structures appeared normal.       5. There were no apparent abnormal pathologic findings.    Assessment/Narrative         The placement was negative for: blood aspirated, painful injection and site bleeding       Paresthesias: No.       Test dose of mL at.         Test dose negative, 3 minutes after injection, for signs of intravascular, subdural, or intrathecal injection.       Bolus given via needle..        Secured via.        Insertion/Infusion Method: Single Shot       Complications: none    Medication(s) Administered   Ropivacaine 0.5% w/ 1:400K Epi (Injection) - Injection   20 mL - 12/18/2023 10:27:00  "AM   Comments:  Ropivacaine injected on the anterior side of the femoral artery, in close proximity to the femoral nerve branches via the adductor canal approach.      Pt tolerated well.    No complications.      The surgeon has given a verbal order transferring care of this patient to me for the performance of a regional analgesia block for post-op pain control. It is requested of me because I am uniquely trained and qualified to perform this block and the surgeon is neither trained nor qualified to perform this procedure.      FOR [facility] ([facility]) ONLY:   Pain Team Contact information: please page the Pain Team Via [employer]. Search \"Pain\". During daytime hours, please page the attending first. At night please page the resident first.      "

## 2023-12-18 NOTE — ANESTHESIA CARE TRANSFER NOTE
Patient: Sudheer Montiel    Procedure: Procedure(s):  Right Total Knee Arthroplasty       Diagnosis: Osteoarthritis of right knee [M17.11]  Diagnosis Additional Information: No value filed.    Anesthesia Type:   Spinal     Note:    Oropharynx: oropharynx clear of all foreign objects and spontaneously breathing  Level of Consciousness: awake  Oxygen Supplementation: room air    Independent Airway: airway patency satisfactory and stable  Dentition: dentition unchanged  Vital Signs Stable: post-procedure vital signs reviewed and stable  Report to RN Given: handoff report given  Patient transferred to: PACU    Handoff Report: Identifed the Patient, Identified the Reponsible Provider, Reviewed the pertinent medical history, Discussed the surgical course, Reviewed Intra-OP anesthesia mangement and issues during anesthesia, Set expectations for post-procedure period and Allowed opportunity for questions and acknowledgement of understanding      Vitals:  Vitals Value Taken Time   /58 12/18/23 1318   Temp     Pulse 73 12/18/23 1321   Resp 14 12/18/23 1321   SpO2 96 % 12/18/23 1321   Vitals shown include unfiled device data.    Electronically Signed By: TIFFANY Larson CRNA  December 18, 2023  1:22 PM

## 2023-12-18 NOTE — PROGRESS NOTES
PTA medications updated by Medication Scribe prior to surgery via phone call with patient (last doses completed by Nurse)     Medication history sources: Patient, Phylicia, H&P, and MAR ([facility])  In the past week, patient estimated taking medication this percent of the time: Greater than 90%      Significant changes made to the medication list:  None      Additional medication history information:   None    Medication reconciliation completed by provider prior to medication history? No    Time spent in this activity: 45 Minutes    The information provided in this note is only as accurate as the sources available at the time of update(s)      Prior to Admission medications    Medication Sig Last Dose Taking? Auth Provider Long Term End Date   acetaminophen (TYLENOL) 325 MG tablet Take 2 tablets (650 mg) by mouth every 8 hours as needed for mild pain or other (and adjunct with moderate or severe pain or per patient request) 12/17/2023 at PRN Yes Sameer Briggs MD     ASPIRIN 81 PO Take 81 mg by mouth daily ON HOLD 12/11/2023 Yes Reported, Patient     diclofenac (VOLTAREN) 1 % topical gel Apply 2 g topically 3 times daily as needed for moderate pain To right knee Unknown at PRN Yes Reported, Patient     escitalopram (LEXAPRO) 10 MG tablet Take 20 mg by mouth every morning 12/18/2023 at AM Yes Reported, Patient Yes    Furosemide (LASIX PO) Take 50 mg by mouth daily 12/17/2023 at AM Yes Reported, Patient No    guaiFENesin 400 MG TABS Take 1 tablet by mouth every 4 hours as needed (COVID) Unknown at PRN Yes Reported, Patient     insulin aspart (NOVOLOG VIAL) 100 UNITS/ML vial Inject Subcutaneous 3 times daily (before meals) Inject as per sliding scale: if 200 - 250 = 1 unit; 251 - 300 = 2 units; 301 - 350 = 3 units;   351 - 999 = 4 if greater than 350 give 4 units 12/17/2023 at PM Yes Reported, Patient Yes    insulin glargine (LANTUS PEN) 100 UNIT/ML pen Inject 40 Units Subcutaneous  daily 12/17/2023 at AM Yes Kirk Christianson MD Yes    lactulose 20 GM/30ML solution Take 30 mLs (20 g) by mouth 3 times daily 12/17/2023 at PM Yes Nic Harrington MD     levothyroxine (SYNTHROID/LEVOTHROID) 100 MCG tablet Take 1 tablet (100 mcg) by mouth daily 12/18/2023 at AM Yes Kirk Christianson MD Yes    MELATONIN PO Take 2 mg by mouth at bedtime 12/17/2023 at PM Yes Reported, Patient     nicotine (COMMIT) 2 MG lozenge Place 2 mg inside cheek every hour as needed for smoking cessation Unknown at PRN Yes Reported, Patient No    omeprazole (PRILOSEC) 10 MG DR capsule Take 10 mg by mouth daily 12/18/2023 at AM Yes Reported, Patient     ondansetron (ZOFRAN) 4 MG tablet Take 4 mg by mouth every 8 hours as needed for nausea Unknown at PRN Yes Reported, Patient     spironolactone (ALDACTONE) 100 MG tablet Take 100 mg by mouth daily 12/17/2023 at AM Yes Reported, Patient Yes    thyroid (ARMOUR) 15 MG tablet Take 1 tablet (15 mg) by mouth daily 12/18/2023 at AM Yes Kirk Christianson MD Yes    thyroid (ARMOUR) 60 MG tablet Take 1-tablet by mouth daily as directed 12/18/2023 at AM Yes Kirk Christianson MD Yes    traMADol (ULTRAM) 50 MG tablet Take 1 tablet (50 mg) by mouth every 6 hours as needed for severe pain 12/17/2023 at PRN Yes Bennie Wiggins, TIFFANY CNP No    valACYclovir (VALTREX) 500 MG tablet Take 500 mg by mouth daily as needed Unknown at PRN Yes Reported, Patient Yes    XIFAXAN 550 MG TABS tablet Take 1 tablet by mouth 2 times daily 12/17/2023 at PM Yes Reported, Patient     blood glucose (NO BRAND SPECIFIED) test strip Use to test blood sugar 1 times daily or as directed. Glucocard Vital   Kirk Christianson MD     celecoxib (CELEBREX) 200 MG capsule Take 200 mg by mouth daily 12/14/2023 at AM  Reported, Patient No    Continuous Blood Gluc Sensor (FREESTYLE DIMITRIOS 3 SENSOR) MISC 1 Device continuous prn (change every 14 days)   Kirk Christianson MD     insulin pen needle (B-D U/F) 31G X 5 MM  miscellaneous Use 4 pen needles daily or as directed.   Kirk Christianson MD     insulin pen needle (B-D U/F) 31G X 5 MM miscellaneous Use 1 pen needles daily or as directed.   Kirk Christianson MD     semaglutide (OZEMPIC) 2 MG/1.5ML SOPN pen Inject 0.5 mg weekly  Patient taking differently: Inject 0.5 mg Subcutaneous every 7 days Monday 12/11/2023  Kirk Christianson MD     STATIN NOT PRESCRIBED (INTENTIONAL) Please choose reason not prescribed from choices below.   Pawan Burns PA-C Yes        Medication history completed by: Lauren Lr

## 2023-12-19 ENCOUNTER — APPOINTMENT (OUTPATIENT)
Dept: PHYSICAL THERAPY | Facility: CLINIC | Age: 58
End: 2023-12-19
Attending: ORTHOPAEDIC SURGERY
Payer: COMMERCIAL

## 2023-12-19 VITALS
HEIGHT: 64 IN | OXYGEN SATURATION: 93 % | BODY MASS INDEX: 37.9 KG/M2 | TEMPERATURE: 99.3 F | HEART RATE: 77 BPM | WEIGHT: 222 LBS | RESPIRATION RATE: 16 BRPM | SYSTOLIC BLOOD PRESSURE: 102 MMHG | DIASTOLIC BLOOD PRESSURE: 64 MMHG

## 2023-12-19 LAB
ALBUMIN SERPL BCG-MCNC: 3.1 G/DL (ref 3.5–5.2)
ALP SERPL-CCNC: 118 U/L (ref 40–150)
ALT SERPL W P-5'-P-CCNC: 20 U/L (ref 0–50)
ANION GAP SERPL CALCULATED.3IONS-SCNC: 7 MMOL/L (ref 7–15)
AST SERPL W P-5'-P-CCNC: 36 U/L (ref 0–45)
BILIRUB SERPL-MCNC: 2 MG/DL
BUN SERPL-MCNC: 15.1 MG/DL (ref 8–23)
CALCIUM SERPL-MCNC: 8.7 MG/DL (ref 8.6–10)
CHLORIDE SERPL-SCNC: 104 MMOL/L (ref 98–107)
CREAT SERPL-MCNC: 0.69 MG/DL (ref 0.51–0.95)
DEPRECATED HCO3 PLAS-SCNC: 27 MMOL/L (ref 22–29)
EGFRCR SERPLBLD CKD-EPI 2021: >90 ML/MIN/1.73M2
ERYTHROCYTE [DISTWIDTH] IN BLOOD BY AUTOMATED COUNT: 13.7 % (ref 10–15)
GLUCOSE BLDC GLUCOMTR-MCNC: 169 MG/DL (ref 70–99)
GLUCOSE BLDC GLUCOMTR-MCNC: 202 MG/DL (ref 70–99)
GLUCOSE SERPL-MCNC: 168 MG/DL (ref 70–99)
HCT VFR BLD AUTO: 37.5 % (ref 35–47)
HGB BLD-MCNC: 12.4 G/DL (ref 11.7–15.7)
MCH RBC QN AUTO: 32.6 PG (ref 26.5–33)
MCHC RBC AUTO-ENTMCNC: 33.1 G/DL (ref 31.5–36.5)
MCV RBC AUTO: 99 FL (ref 78–100)
PLATELET # BLD AUTO: 79 10E3/UL (ref 150–450)
POTASSIUM SERPL-SCNC: 4.2 MMOL/L (ref 3.4–5.3)
PROT SERPL-MCNC: 6.3 G/DL (ref 6.4–8.3)
RBC # BLD AUTO: 3.8 10E6/UL (ref 3.8–5.2)
SODIUM SERPL-SCNC: 138 MMOL/L (ref 135–145)
WBC # BLD AUTO: 5.8 10E3/UL (ref 4–11)

## 2023-12-19 PROCEDURE — 82040 ASSAY OF SERUM ALBUMIN: CPT

## 2023-12-19 PROCEDURE — 250N000013 HC RX MED GY IP 250 OP 250 PS 637: Performed by: ORTHOPAEDIC SURGERY

## 2023-12-19 PROCEDURE — 85027 COMPLETE CBC AUTOMATED: CPT

## 2023-12-19 PROCEDURE — 97530 THERAPEUTIC ACTIVITIES: CPT | Mod: GP | Performed by: PHYSICAL THERAPY ASSISTANT

## 2023-12-19 PROCEDURE — 97116 GAIT TRAINING THERAPY: CPT | Mod: GP | Performed by: PHYSICAL THERAPY ASSISTANT

## 2023-12-19 PROCEDURE — 97110 THERAPEUTIC EXERCISES: CPT | Mod: GP | Performed by: PHYSICAL THERAPY ASSISTANT

## 2023-12-19 PROCEDURE — 36415 COLL VENOUS BLD VENIPUNCTURE: CPT

## 2023-12-19 PROCEDURE — 250N000012 HC RX MED GY IP 250 OP 636 PS 637

## 2023-12-19 PROCEDURE — 999N000111 HC STATISTIC OT IP EVAL DEFER: Performed by: OCCUPATIONAL THERAPIST

## 2023-12-19 PROCEDURE — 82962 GLUCOSE BLOOD TEST: CPT

## 2023-12-19 PROCEDURE — 99233 SBSQ HOSP IP/OBS HIGH 50: CPT

## 2023-12-19 PROCEDURE — 250N000013 HC RX MED GY IP 250 OP 250 PS 637

## 2023-12-19 RX ORDER — OXYCODONE HYDROCHLORIDE 5 MG/1
5 TABLET ORAL EVERY 4 HOURS PRN
Qty: 30 TABLET | Refills: 0 | Status: SHIPPED | OUTPATIENT
Start: 2023-12-19 | End: 2024-01-13

## 2023-12-19 RX ORDER — METHOCARBAMOL 750 MG/1
750 TABLET, FILM COATED ORAL EVERY 6 HOURS PRN
Qty: 30 TABLET | Refills: 0 | Status: SHIPPED | OUTPATIENT
Start: 2023-12-19 | End: 2024-04-05

## 2023-12-19 RX ADMIN — METHOCARBAMOL 750 MG: 750 TABLET ORAL at 13:05

## 2023-12-19 RX ADMIN — PANTOPRAZOLE SODIUM 20 MG: 20 TABLET, DELAYED RELEASE ORAL at 08:27

## 2023-12-19 RX ADMIN — LEVOTHYROXINE, LIOTHYRONINE 15 MG: 9.5; 2.25 TABLET ORAL at 08:28

## 2023-12-19 RX ADMIN — ASPIRIN 325 MG: 325 TABLET, COATED ORAL at 08:28

## 2023-12-19 RX ADMIN — INSULIN ASPART 2 UNITS: 100 INJECTION, SOLUTION INTRAVENOUS; SUBCUTANEOUS at 13:05

## 2023-12-19 RX ADMIN — SPIRONOLACTONE 100 MG: 25 TABLET ORAL at 08:29

## 2023-12-19 RX ADMIN — LEVOTHYROXINE, LIOTHYRONINE 60 MG: 38; 9 TABLET ORAL at 08:27

## 2023-12-19 RX ADMIN — RIFAXIMIN 550 MG: 550 TABLET ORAL at 08:26

## 2023-12-19 RX ADMIN — OXYCODONE HYDROCHLORIDE 10 MG: 5 TABLET ORAL at 08:27

## 2023-12-19 RX ADMIN — POLYETHYLENE GLYCOL 3350 17 G: 17 POWDER, FOR SOLUTION ORAL at 08:28

## 2023-12-19 RX ADMIN — LEVOTHYROXINE SODIUM 100 MCG: 100 TABLET ORAL at 05:41

## 2023-12-19 RX ADMIN — ESCITALOPRAM OXALATE 20 MG: 10 TABLET ORAL at 08:27

## 2023-12-19 RX ADMIN — LACTULOSE 20 G: 20 SOLUTION ORAL at 08:28

## 2023-12-19 RX ADMIN — DOCUSATE SODIUM 50 MG AND SENNOSIDES 8.6 MG 1 TABLET: 8.6; 5 TABLET, FILM COATED ORAL at 08:28

## 2023-12-19 RX ADMIN — CELECOXIB 200 MG: 200 CAPSULE ORAL at 08:26

## 2023-12-19 RX ADMIN — INSULIN ASPART 1 UNITS: 100 INJECTION, SOLUTION INTRAVENOUS; SUBCUTANEOUS at 08:29

## 2023-12-19 ASSESSMENT — ACTIVITIES OF DAILY LIVING (ADL)
ADLS_ACUITY_SCORE: 25
ADLS_ACUITY_SCORE: 28
ADLS_ACUITY_SCORE: 28
ADLS_ACUITY_SCORE: 27
ADLS_ACUITY_SCORE: 28

## 2023-12-19 NOTE — PROGRESS NOTES
Care Management Discharge Note    Discharge Date: 12/20/2023       Discharge Disposition: Long Term Care    Discharge Services: None    Discharge DME: None    Discharge Transportation: family or friend will provide    Private pay costs discussed: Not applicable    Does the patient's insurance plan have a 3 day qualifying hospital stay waiver?  Yes     Which insurance plan 3 day waiver is available? Alternative insurance waiver    Will the waiver be used for post-acute placement? No    PAS Confirmation Code:    Patient/family educated on Medicare website which has current facility and service quality ratings: no    Education Provided on the Discharge Plan:    Persons Notified of Discharge Plans: Patient, LTC, and [facility] nursing staff  Patient/Family in Agreement with the Plan: yes    Handoff Referral Completed: No    Additional Information:  Patient will discharge from [facility] to [facility] with Daughter transporting  at 1530.  Please refer to  progress notes for further details.    Addendum 1641:  Writer faxed over new orders with adjusted oxy meds.     Bedside nurse to send scripts to fax number given.       AGA Swan  [facility]  Social Work

## 2023-12-19 NOTE — PROGRESS NOTES
[facility]    Medicine Progress Note - Hospitalist Service    Date of Admission:  12/18/2023    Assessment & Plan   Sudheer Montiel is a 58 year old female with past medical history significant for type II DM, REYES cirrhosis, hypothyroidism and depression admitted on 12/18/2023 following a right total knee arthroplasty. POD #1.      S/p Procedure, right total knee arthoplasty  * Procedure completed under general anesthesia by Dr. Ewing. No complications noted. 100 ml EBL.   - Hgb 13.2 pre-operatively.   - Post-operative Hgb 12.4 12/19.   - Defer post-operative surgical management to primary service including pain management, disposition and therapies.   - Patient appropriate for discharge pending primary service disposition. Patient to follow-up with primary service in two weeks.      REYES Cirrhosis   - Continue PTA rifaximin, spironolactone and furosemide.      Hypothyroidism   - Continue PTA thyroid armour and levothyroxine.      DM II, controlled       Recent Labs   Lab Test 12/15/23  0543   A1C 6.1*   - Continue PTA lantus 40 units daily.   - Hold ozempic and novolog. Can continue upon discharge.   - Continue medium ISS. Discontinue upon discharge.   - Glucose checks QID.    - Hypoglycemic protocol in place.      GERD   - Continue PTA omeprazole.      Depression   - Continue PTA escitalopram.         Diet: Advance Diet as Tolerated: Regular Diet Adult  Diet    DVT Prophylaxis: Pneumatic Compression Devices  Crystal Catheter: Not present  Lines: None     Cardiac Monitoring: None  Code Status: Full Code      Disposition Plan      Expected Discharge Date: 12/20/2023      Destination: home;nursing home          The patient's care was discussed with the Attending Physician, Dr. Margo Owens .    Nallely Piper PA-C  Hospitalist Service  [facility]  Securely message with [employer] (more info)  Text page via [employer] Paging/Directory    ______________________________________________________________________    Interval History   Sudheer Montiel is a 58 year old female with past medical history significant for type II DM, REYES cirrhosis, hypothyroidism and depression admitted on 12/18/2023 following a right total knee arthroplasty.      Patient is POD #1 s/p right total knee arthroplasty. I evaluated the patient today. She is awake, alert, in no distress. She is sitting in the chair finishing breakfast upon my examination. Patient denies CP, SOB, palpitations, PONV. Patient states she is feeling well and reports her pain is being well managed. She has been ambulating with PT. She does note she hasn't had a bowel movement. She has been passing flatus. She is taking her lactulose. No associated symptoms of changes in mental status or confusion, abdominal distention, abdominal pain, pruritus, fever or chills. Patient states her daughter will be transporting her to her [facility] this afternoon. No other concerns or complaints at this time.      Physical Exam   Vital Signs: Temp: 99.3  F (37.4  C) Temp src: Oral BP: 102/64 Pulse: 77   Resp: 16 SpO2: 93 % O2 Device: None (Room air)    Weight: 222 lbs 0 oz  GENERAL:  Pleasant, cooperative, alert. Sitting in chair upon examination in no acute distress.   HEENT: Normocephalic, atraumatic.  Extra occular mm intact.  Sclera clear. PERRL.  Mucous membranes moist.    PULMONOLOGY: Clear to auscultation bilaterally.   CARDIAC: Regular rate and rhythm.  No appreciated murmur.   ABDOMEN: Soft, nontender non distended.  MUSCULOSKELETAL:  Moving x 4 spontaneously.  Normal bulk and tone. No LE edema. Right leg wrapped upon examination. Sensation intact.   NEURO: Alert and oriented x3.  CN II-XII grossly intact and symmetric.  No ataxia or tremor.  Nonfocal exam.    Medical Decision Making       35 MINUTES SPENT BY ME on the date of service doing chart review, history, exam, documentation & further activities per the note.       Data     I have personally reviewed the following data over the past 24 hrs:    5.8  \   12.4   / 79 (L)     138 104 15.1 /  202 (H)   4.2 27 0.69 \     ALT: 20 AST: 36 AP: 118 TBILI: 2.0 (H)   ALB: 3.1 (L) TOT PROTEIN: 6.3 (L) LIPASE: N/A       Imaging results reviewed over the past 24 hrs:   No results found for this or any previous visit (from the past 24 hour(s)).

## 2023-12-19 NOTE — PROVIDER NOTIFICATION
LVM for Dr. Ewing, patient is having more sedation when using oxycodone but pain is uncontrolled without it. Can we try something else for discharge?

## 2023-12-19 NOTE — CONSULTS
Care Management Initial Consult    General Information  Assessment completed with: Patient, Patient  Type of CM/SW Visit: Initial Assessment    Primary Care Provider verified and updated as needed: Yes (Juan Francisco provider at [facility], last seen this past Friday.)   Readmission within the last 30 days: no previous admission in last 30 days      Reason for Consult: discharge planning  Advance Care Planning:            Communication Assessment  Patient's communication style: spoken language (English or Bilingual)    Hearing Difficulty or Deaf: no   Wear Glasses or Blind: yes    Cognitive  Cognitive/Neuro/Behavioral: WDL                      Living Environment:   People in home: alone     Current living Arrangements: assisted living  Name of Facility: [facility]   Able to return to prior arrangements: yes       Family/Social Support:  Care provided by: self  Provides care for: no one  Marital Status:   Facility resident(s)/Staff          Description of Support System: Supportive, Involved         Current Resources:   Patient receiving home care services: No     Community Resources: None  Equipment currently used at home: walker, rolling  Supplies currently used at home: None    Employment/Financial:  Employment Status:          Financial Concerns: none   Referral to Financial Worker: No       Does the patient's insurance plan have a 3 day qualifying hospital stay waiver?  Yes     Which insurance plan 3 day waiver is available? Alternative insurance waiver    Will the waiver be used for post-acute placement? No    Lifestyle & Psychosocial Needs:  Social Determinants of Health     Food Insecurity: Not on file   Depression: Not at risk (7/12/2023)    PHQ-2     PHQ-2 Score: 0   Recent Concern: Depression - At risk (5/11/2023)    PHQ-2     PHQ-2 Score: 4   Housing Stability: Not on file   Tobacco Use: Medium Risk (12/19/2023)    Patient History     Smoking Tobacco Use: Former     Smokeless Tobacco Use: Never     Passive  Exposure: Not on file   Financial Resource Strain: Not on file   Alcohol Use: Not on file   Transportation Needs: Not on file   Physical Activity: Not on file   Interpersonal Safety: Not on file   Stress: Not on file   Social Connections: Not on file       Functional Status:  Prior to admission patient needed assistance:              Mental Health Status:  Mental Health Status: No Current Concerns       Chemical Dependency Status:  Chemical Dependency Status: No Current Concerns             Values/Beliefs:  Spiritual, Cultural Beliefs, Faith Practices, Values that affect care: no               Additional Information:  CM Initial assessment    Per H&P, Sudheer RAFIQ Montiel is a 58 year old female with past medical history significant for type II DM, REYES cirrhosis, hypothyroidism and depression admitted on 12/18/2023 following a right total knee arthroplasty.    Per chart review, discharge orders were placed as of 0500 planning on having the patient go back to their nursing home. Care management was consulted for discharge planning/disposition.  Writer met with patient to complete consult.    Patient stated they come from [facility] and were planning on going back there today with their daughter providing transportation. Writer stated he would send orders immediately and confirm that they are able to accept her back today. Patient stated her and her daughter have been communicating with [facility] and they know that she is coming back today.     Writer called [facility] ([phone]), and spoke with Laura in Admissions who stated they are able to take the patient back today. Laura gave writer fax # to send orders too. [phone]. Writer sent over orders.     Writer called [facility] and spoke with Hannah who confirmed that the patient is all set to go.               AGA Swan  [facility]  Social Work

## 2023-12-19 NOTE — PROGRESS NOTES
12/18/23 [id number]  Summary: R Total knee arthroplasty    Patient vital signs are at baseline: Yes  Patient able to ambulate as they were prior to admission or with assist devices provided by therapies during their stay:  Yes  Patient MUST void prior to discharge:  Yes  Patient able to tolerate oral intake:  Yes  Pain has adequate pain control using Oral analgesics:  No,  Reason:  Denied having any pain.  Does patient have an identified :  Yes  Has goal D/C date and time been discussed with patient:  Yes     Patient is alert and oriented x 4, SBA with walker and gait belt, Denied having any pain or discomfort. Dressing is CDI, Ice was used overnight to for incisional pain. Discharging home today.

## 2023-12-19 NOTE — PROGRESS NOTES
"POD# 1    SUBJECTIVE  Pain well managed  Mobility improving    OBJECTIVE:   /57 (BP Location: Right arm)   Pulse 77   Temp 99.2  F (37.3  C) (Oral)   Resp 18   Ht 1.626 m (5' 4\")   Wt 100.7 kg (222 lb)   SpO2 93%   BMI 38.11 kg/m     Hemoglobin   Date Value Ref Range Status   12/15/2023 13.2 11.7 - 15.7 g/dL Final   10/15/2018 12.1 11.7 - 15.7 g/dL Final   ]   Wound: Dressing dry  Hgb pending    ASSESSMENT   Stable    PLAN   Mobilize in PT  Discharge back to nursing home later today  Follow up 2 weeks post-op    Pawan Ewing MD   "

## 2023-12-19 NOTE — PLAN OF CARE
Occupational Therapy: Orders received. Chart reviewed and discussed with care team.? Occupational Therapy not indicated due to receiving assist w/ ADLs at [facility] at baseline at [facility]. Has HH OT as well at [facility]. No skilled IP OT needs indicated at this time.? Defer discharge recommendations to ortho team.? Will complete orders.

## 2023-12-19 NOTE — PROGRESS NOTES
"   12/18/23 1800   Appointment Info   Signing Clinician's Name / Credentials (PT) Iman Jiménez DPT   Living Environment   People in Home facility resident   Current Living Arrangements assisted living   Home Accessibility wheelchair accessible;no concerns   Transportation Anticipated family or friend will provide   Living Environment Comments Pt lives in a [facility] where she has assist for all ADL's and mobility if needed.   Self-Care   Usual Activity Tolerance moderate   Current Activity Tolerance fair   Regular Exercise No   Equipment Currently Used at Home walker, rolling   Fall history within last six months no   Activity/Exercise/Self-Care Comment Pt is typically Mod I with mobility w/ 4WW at baseline, recieves asssit with bathing, dressing, etc.   General Information   Onset of Illness/Injury or Date of Surgery 12/18/23   Referring Physician Pawan Ewing MD   Patient/Family Therapy Goals Statement (PT) \"To go back home\"   Pertinent History of Current Problem (include personal factors and/or comorbidities that impact the POC) Pt is a 57 yo female who presents s/p R TKA on 12/18/23.   Existing Precautions/Restrictions fall;weight bearing   Weight-Bearing Status - LUE full weight-bearing   Weight-Bearing Status - RUE full weight-bearing   Weight-Bearing Status - LLE full weight-bearing   Weight-Bearing Status - RLE weight-bearing as tolerated   Cognition   Affect/Mental Status (Cognition) WFL   Orientation Status (Cognition) oriented x 4   Follows Commands (Cognition) WFL   Pain Assessment   Patient Currently in Pain Yes, see Vital Sign flowsheet  (Pt is having pain in R knee)   Integumentary/Edema   Integumentary/Edema other (describe)   Integumentary/Edema Comments Pt has ACE wrap present on R LE   Posture    Posture Not impaired   Range of Motion (ROM)   Range of Motion ROM deficits secondary to surgical procedure;ROM deficits secondary to pain;ROM deficits secondary to swelling;ROM deficits secondary " to weakness   Strength (Manual Muscle Testing)   Strength (Manual Muscle Testing) Able to perform R SLR;Able to perform L SLR;Deficits observed during functional mobility   Bed Mobility   Comment, (Bed Mobility) Supine>sitting EOB completed w/ CGA   Transfers   Comment, (Transfers) Sit>stand completed w/ CGA   Gait/Stairs (Locomotion)   Comment, (Gait/Stairs) Pt ambulated w/ 4WW and CGA   Balance   Balance other (describe)   Balance Comments Pt demonstrated good sitting and static standing balance, required 4WW for all functional mobility   Sensory Examination   Sensory Perception patient reports no sensory changes   Coordination   Coordination no deficits were identified   Muscle Tone   Muscle Tone no deficits were identified   Clinical Impression   Criteria for Skilled Therapeutic Intervention Yes, treatment indicated   PT Diagnosis (PT) Impaired functional mobility   Influenced by the following impairments Post-surgical pain and precautions, weakness, impaired activity tolerance, decreased balance   Functional limitations due to impairments Impaired gait and inability to complete ADL's   Clinical Presentation (PT Evaluation Complexity) stable   Clinical Presentation Rationale Clinical judgment   Clinical Decision Making (Complexity) low complexity   Planned Therapy Interventions (PT) balance training;bed mobility training;gait training;home exercise program;motor coordination training;neuromuscular re-education;patient/family education;postural re-education;ROM (range of motion);stair training;strengthening;stretching;transfer training;progressive activity/exercise;risk factor education;home program guidelines   Risk & Benefits of therapy have been explained evaluation/treatment results reviewed;care plan/treatment goals reviewed;risks/benefits reviewed;current/potential barriers reviewed;participants voiced agreement with care plan;participants included;patient;daughter   PT Total Evaluation Time   PT Eval, Low  Complexity Minutes (35322) 10   Physical Therapy Goals   PT Frequency Daily   PT Predicted Duration/Target Date for Goal Attainment 12/21/23   PT Goals Bed Mobility;Transfers;Gait   PT: Bed Mobility Independent;Supine to/from sit;Rolling;Bridging;Within precautions   PT: Transfers Modified independent;Sit to/from stand;Bed to/from chair;Assistive device;Within precautions   PT: Gait Modified independent;Rolling walker;Greater than 200 feet;Within precautions   Interventions   Interventions Quick Adds Gait Training;Therapeutic Activity;Therapeutic Procedure   Therapeutic Procedure/Exercise   Ther. Procedure: strength, endurance, ROM, flexibillity Minutes (23949) 5   Treatment Detail/Skilled Intervention Pt instructed in and completed ankle pumps and heel slides x 5 reps on R and L LE in order to promote circulaiton and promote knee ROM. Pt cued for proper technique throughout.   Therapeutic Activity   Therapeutic Activities: dynamic activities to improve functional performance Minutes (09536) 15   Treatment Detail/Skilled Intervention Evaluation completed and treatment indicated. Pt educated on WBAT status. Supine>sitting EOB completed w/ CGA, cues given to use L LE to assist at R LE as needed throughout. Sit>stand completed w/ CGA. Post-ambulation, pt cued to reach back for chair and lock 4WW brakes prior to sittting. Pt also cued to kick out R LE in order to decrease pain with transfers. Pt and daughter educated on pain managment, walking program, icing, and to avoid elevation under the knee. Pt left sitting up in recliner with all needs in reach and daughter present in room, RN notified.   Gait Training   Gait Training Minutes (81426) 15   Symptoms Noted During/After Treatment (Gait Training) fatigue   Treatment Detail/Skilled Intervention Pt ambulated ~100 ft w/ 4WW and CGA. Cues given for upright posture, increased step length, and pacing throughout. No overt LOB occurred, pt required multiple standing rest  breaks to rest UE due to increased reliance on UE support to offload R LE.   Distance in Feet 100 ft   [place] Level (Gait Training) contact guard   Physical Assistance Level (Gait Training) 1 person assist   Weight Bearing (Gait Training) weight-bearing as tolerated   Assistive Device (Gait Training) rolling walker   PT Discharge Planning   PT Plan Progress gait distance w/ 4WW, review LE exercises   PT Discharge Recommendation (DC Rec)   (Defer to Ortho)   PT Rationale for DC Rec Pt is moving below baseline mobility, currently limited by post surgical pain and precautions at this time. Anticipate following one additional IP PT session that pt will be safe to return home to [facility] with usage of 4WW for all functional mobility. Recommend continued HCPT in order to continue to address ROM and strength deficits. Pt has no IP OT needs at this time, has assist with all ADL's and cares at [facility] as needed and reports having OT at her [facility] already.   PT Brief overview of current status CGA for all functional mobility   Total Session Time   Timed Code Treatment Minutes 35   Total Session Time (sum of timed and untimed services) 45                                                                                 [facility]      OUTPATIENT PHYSICAL THERAPY EVALUATION  PLAN OF TREATMENT FOR OUTPATIENT REHABILITATION  (COMPLETE FOR INITIAL CLAIMS ONLY)  Patient's Last Name, First Name, M.I.  YOB: 1965  Sudheer Montiel                        Provider's Name  [facility] Medical Record No.  [id number]                               Onset Date:  12/18/23   Start of Care Date:         Type:     _X_PT   ___OT   ___SLP Medical Diagnosis:                           PT Diagnosis:  Impaired functional mobility   Visits from SOC:  1   _________________________________________________________________________________  Plan of Treatment/Functional Goals    Planned  Interventions: balance training, bed mobility training, gait training, home exercise program, motor coordination training, neuromuscular re-education, patient/family education, postural re-education, ROM (range of motion), stair training, strengthening, stretching, transfer training, progressive activity/exercise, risk factor education, home program guidelines     Goals: See Physical Therapy Goals on Care Plan in [facility] electronic health record.    Therapy Frequency: Daily  Predicted Duration of Therapy Intervention: 12/21/23  _________________________________________________________________________________    I CERTIFY THE NEED FOR THESE SERVICES FURNISHED UNDER        THIS PLAN OF TREATMENT AND WHILE UNDER MY CARE     (Physician attestation of this document indicates review and certification of the therapy plan).               ,      Referring Physician: Pawan Ewing MD            Initial Assessment        See Physical Therapy evaluation dated   in Epic electronic health record.

## 2023-12-19 NOTE — CARE PLAN
.Patient vital signs are at baseline: Yes  Patient able to ambulate as they were prior to admission or with assist devices provided by therapies during their stay:  Yes  Patient MUST void prior to discharge:  Yes  Patient able to tolerate oral intake:  Yes  Pain has adequate pain control using Oral analgesics:  Yes  Does patient have an identified :  Yes  Has goal D/C date and time been discussed with patient:  Yes  Pt is alert and oriented x4 , VSS ,on RA , up with one assist with personal walker and gait belt , pain was managed with Oxy , Robaxin, Vacon is infusing presently.Discharge pending

## 2023-12-20 NOTE — PLAN OF CARE
Patient A/Ox4, VSS,RA. Incision CDI, CMS intact. Good intake and output.   Educated regarding incision care,  medication & pain management, and follow up. Daughter was present and both stated of understanding. Patient discharge was delayed. According to the patient she felt groggy after taking oxycodone. Patient barely can walk with PT this morning. She had a hard time finding words when we asked questions. She would just stare at me and feel back to sleep while in the couch. I have closely monitor the patient before discharging her. MD. Aware. After 5 hours of oxy administration was back to normal conversation and pleasant    Patient was discharge around 1730 in the afternoon. Patient and daughter agreed that they will only take 5mg when needed. All prescription that DR. Ewing signed such as robaxin, atarax, oxy, aspirin and senokot was sent through Fax.   Discharge paper, Hard copy of oxy, robaxin and atarax was send with the patient       Discharge to LTC with daughter.

## 2023-12-21 ENCOUNTER — DOCUMENTATION ONLY (OUTPATIENT)
Dept: GERIATRICS | Facility: CLINIC | Age: 58
End: 2023-12-21
Payer: COMMERCIAL

## 2024-01-12 ENCOUNTER — TELEPHONE (OUTPATIENT)
Dept: ENDOCRINOLOGY | Facility: CLINIC | Age: 59
End: 2024-01-12
Payer: COMMERCIAL

## 2024-01-12 NOTE — TELEPHONE ENCOUNTER
Tonja was calling about the sliding scale insulin, this month she's only needed to get 1 dose, she planning on discharging to an assisted living. However, they won't accept her due to being on the sliding scale insulin, Tonja wanted to know if it's ok to discontinue this 1 medication.

## 2024-01-12 NOTE — TELEPHONE ENCOUNTER
"Message reviewed.  Yes, OK to discontinue the rapid acting insulin \"sliding scale\" use.  Please relay message.    JOJO Christianson MD, MS  Endocrinology  [facility]      "

## 2024-01-13 DIAGNOSIS — Z96.651 S/P TOTAL KNEE ARTHROPLASTY, RIGHT: ICD-10-CM

## 2024-01-13 RX ORDER — OXYCODONE HYDROCHLORIDE 5 MG/1
5 TABLET ORAL EVERY 4 HOURS PRN
Qty: 30 TABLET | Refills: 0 | Status: SHIPPED | OUTPATIENT
Start: 2024-01-13 | End: 2024-01-26

## 2024-01-15 NOTE — TELEPHONE ENCOUNTER
Called and lm on self identified vm with provider msg.  Also faxed order to [phone]. Samantha Marie RN

## 2024-01-17 ENCOUNTER — TELEPHONE (OUTPATIENT)
Dept: GERIATRICS | Facility: CLINIC | Age: 59
End: 2024-01-17
Payer: COMMERCIAL

## 2024-01-17 RX ORDER — AMOXICILLIN 250 MG
1 CAPSULE ORAL 2 TIMES DAILY PRN
COMMUNITY
End: 2024-04-17

## 2024-01-17 NOTE — TELEPHONE ENCOUNTER
[facility] Triage Nurse Telephone Encounter    Provider: TIFFANY Treadwell CNP  Facility: [facility] Facility Type:  LTC    Caller: Tonja  Call Back Number: [phone]    Allergies:    Allergies   Allergen Reactions    Cephalexin     Morphine      Other reaction(s): Other  Irritability, disorientation    Penicillins Itching     face    Amoxicillin Rash and Itching    Cefprozil Rash    Ceftazidime Itching and Rash    Ciprofloxacin Itching and Rash     Tolerates levaquin     Percocet [Oxycodone-Acetaminophen] Nausea and Vomiting        Reason for call:   Pt had knee replacement on 12/18 and was started on Senna S 1 tab bid d/t taking oxycodone. Still taking oxy about 1 time a day. The pt is requesting to change the senna s to 1 tab bid prn as she feels she is going too much.     Verbal Order/Direction given by Provider: Change Senna S to 1 tab po BID    Provider giving Order:  TIFFANY Treadwell CNP    Verbal Order given to: Adán Carver RN

## 2024-01-26 ENCOUNTER — NURSING HOME VISIT (OUTPATIENT)
Dept: GERIATRICS | Facility: CLINIC | Age: 59
End: 2024-01-26
Payer: COMMERCIAL

## 2024-01-26 VITALS — HEIGHT: 64 IN | WEIGHT: 222 LBS | BODY MASS INDEX: 37.9 KG/M2

## 2024-01-26 DIAGNOSIS — M17.11 ARTHRITIS OF RIGHT KNEE: ICD-10-CM

## 2024-01-26 DIAGNOSIS — Z96.651 S/P TOTAL KNEE ARTHROPLASTY, RIGHT: ICD-10-CM

## 2024-01-26 DIAGNOSIS — Z96.651 S/P TOTAL KNEE ARTHROPLASTY, RIGHT: Primary | ICD-10-CM

## 2024-01-26 PROCEDURE — 99309 SBSQ NF CARE MODERATE MDM 30: CPT

## 2024-01-26 RX ORDER — OXYCODONE HYDROCHLORIDE 5 MG/1
5 TABLET ORAL EVERY 4 HOURS PRN
Qty: 60 TABLET | Refills: 0 | Status: SHIPPED | OUTPATIENT
Start: 2024-01-26 | End: 2024-03-19

## 2024-01-26 NOTE — PROGRESS NOTES
"[facility] GERIATRICS    Chief Complaint   Patient presents with    RECHECK     R knee pain     HPI:  Sudheer Montiel is a 58 year old  (1965), who is being seen today for an episodic care visit at: [facility] () [[id number]]. Today's concern is: S/p right total knee arthroplasty    Patient underwent right total knee arthroplasty on 12/18/23 with Dr. Ewing. Was treated with Keflex postoperatively for concern for cellulitis.  Today, right knee with mild swelling and skin warm to touch.  Afebrile.  Pain well-controlled.  She is ambulating independently using walker and also doing steps with PT.    Eating and sleeping well.  Alert and oriented.  Pleasant mood.  Hemodynamically stable.  Plan is nursing home.     Allergies, and PMH/PSH reviewed in [facility] today.  REVIEW OF SYSTEMS:  4 point ROS including Respiratory, CV, GI and , other than that noted in the HPI,  is negative    Objective:   Ht 1.626 m (5' 4\")   Wt 100.7 kg (222 lb)   BMI 38.11 kg/m      Exam:  GENERAL APPEARANCE: NAD, sitting in bed  RESPIRATORY: Clear to auscultation, even and unlabored, symmetrical chest wall expansion  CARDIOVASCULAR: RRR, nonpitting peripheral edema, S1/S2 normal   GASTROINTESTINAL: Soft, nontender, nondistended, bowel sounds active all quadrants  Extremities: Right knee with mild swelling, skin warm to touch, no open skin or drainage  NEUROLOGICAL: Alert and oriented  PSYCHOLOGICAL: Calm, cooperative    Recent labs in [facility] reviewed by me today.     Assessment/Plan:  S/p right total knee arthroplasty   Degenerative arthritis of the right knee   Right knee pain  Right knee swelling   -Pain well-controlled with PRN xycodone (wean off as able ) and Tylenol, ambulating independently using walker, doing steps with PT as above, overall incision stable with some swelling, skin warm to touch, afebrile  -Continue current care, Ortho follow-up, monitor for signs of complications       MED REC REQUIRED  Post Medication " Reconciliation Status: discharge medications reconciled, continue medications without change         Electronically signed by:  Bennie Wiggins,DARWIN,APRN,AGNP-BC.               The above note was completed in part using Dragon voice recognition software. Although reviewed after completion, some word and grammatical errors may occur. Please contact the author of this note with any questions.

## 2024-01-26 NOTE — LETTER
"    1/26/2024        RE: Sudheer Montiel  [address] [address]        [facility] GERIATRICS    Chief Complaint   Patient presents with     RECHECK     R knee pain     HPI:  Sudheer Montiel is a 58 year old  (1965), who is being seen today for an episodic care visit at: [facility] () [[id number]]. Today's concern is: S/p right total knee arthroplasty    Patient underwent right total knee arthroplasty on 12/18/23 with Dr. Ewing. Was treated with Keflex postoperatively for concern for cellulitis.  Today, right knee with mild swelling and skin warm to touch.  Afebrile.  Pain well-controlled.  She is ambulating independently using walker and also doing steps with PT.    Eating and sleeping well.  Alert and oriented.  Pleasant mood.  Hemodynamically stable.  Plan is [facility].     Allergies, and PMH/PSH reviewed in [facility] today.  REVIEW OF SYSTEMS:  4 point ROS including Respiratory, CV, GI and , other than that noted in the HPI,  is negative    Objective:   Ht 1.626 m (5' 4\")   Wt 100.7 kg (222 lb)   BMI 38.11 kg/m      Exam:  GENERAL APPEARANCE: NAD, sitting in bed  RESPIRATORY: Clear to auscultation, even and unlabored, symmetrical chest wall expansion  CARDIOVASCULAR: RRR, nonpitting peripheral edema, S1/S2 normal   GASTROINTESTINAL: Soft, nontender, nondistended, bowel sounds active all quadrants  Extremities: Right knee with mild swelling, skin warm to touch, no open skin or drainage  NEUROLOGICAL: Alert and oriented  PSYCHOLOGICAL: Calm, cooperative    Recent labs in [facility] reviewed by me today.     Assessment/Plan:  S/p right total knee arthroplasty   Degenerative arthritis of the right knee   Right knee pain  Right knee swelling   -Pain well-controlled with PRN xycodone (wean off as able ) and Tylenol, ambulating independently using walker, doing steps with PT as above, overall incision stable with some swelling, skin warm to touch, afebrile  -Continue current " care, Ortho follow-up, monitor for signs of complications       MED REC REQUIRED  Post Medication Reconciliation Status: discharge medications reconciled, continue medications without change         Electronically signed by:  Bennie Wiggins DNP,TIFFANY,NILOP-BC.               The above note was completed in part using Dragon voice recognition software. Although reviewed after completion, some word and grammatical errors may occur. Please contact the author of this note with any questions.           Sincerely,        TIFFANY Dinh CNP

## 2024-02-08 ENCOUNTER — TRANSFERRED RECORDS (OUTPATIENT)
Dept: HEALTH INFORMATION MANAGEMENT | Facility: CLINIC | Age: 59
End: 2024-02-08
Payer: COMMERCIAL

## 2024-02-09 ENCOUNTER — NURSING HOME VISIT (OUTPATIENT)
Dept: GERIATRICS | Facility: CLINIC | Age: 59
End: 2024-02-09
Payer: COMMERCIAL

## 2024-02-09 VITALS
HEIGHT: 63 IN | WEIGHT: 218 LBS | BODY MASS INDEX: 38.62 KG/M2 | SYSTOLIC BLOOD PRESSURE: 138 MMHG | TEMPERATURE: 97.7 F | OXYGEN SATURATION: 97 % | HEART RATE: 80 BPM | DIASTOLIC BLOOD PRESSURE: 70 MMHG | RESPIRATION RATE: 16 BRPM

## 2024-02-09 DIAGNOSIS — J01.90 ACUTE BACTERIAL SINUSITIS: Primary | ICD-10-CM

## 2024-02-09 DIAGNOSIS — B96.89 ACUTE BACTERIAL SINUSITIS: Primary | ICD-10-CM

## 2024-02-09 DIAGNOSIS — J34.89 RHINORRHEA: ICD-10-CM

## 2024-02-09 DIAGNOSIS — J34.89 FRONTAL SINUS PAIN: ICD-10-CM

## 2024-02-09 DIAGNOSIS — R05.2 SUBACUTE COUGH: ICD-10-CM

## 2024-02-09 PROCEDURE — 99309 SBSQ NF CARE MODERATE MDM 30: CPT

## 2024-02-09 NOTE — LETTER
"    2/9/2024        RE: Sudheer Montiel  [address] [address]        [facility] GERIATRICS    Chief Complaint   Patient presents with     Nursing Home Acute     HPI:  Sudheer Montiel is a 58 year old  (1965), who is being seen today for an episodic care visit at: [facility] () [[id number]]. Today's concern is: Sinus pain, cough, congestion    Patient reports she has been coughing for sometime now and it doesn't seem to get better. She is congested and reports greenish color mucus.\"  Sinus pain\" bothers her the most.  COVID and influenza testing were negative.  Chest x-ray on 2/8 showed \"Mild pulmonary vascular congestion\" without infiltrates or focal consolidations.  No chest pain or shortness of breath.  Hemodynamically stable.  Appetite at baseline.    Allergies, and PMH/PSH reviewed in [facility] today.  REVIEW OF SYSTEMS:  4 point ROS including Respiratory, CV, GI and , other than that noted in the HPI,  is negative    Objective:   /70   Pulse 80   Temp 97.7  F (36.5  C)   Resp 16   Ht 1.6 m (5' 3\")   Wt 98.9 kg (218 lb)   SpO2 97%   BMI 38.62 kg/m      Exam:  GENERAL APPEARANCE: NAD  RESPIRATORY: Diminished, even and unlabored, symmetrical chest wall expansion  CARDIOVASCULAR: RRR, nonpitting peripheral edema, S1/S2 normal   GASTROINTESTINAL: Soft, nontender, nondistended, bowel sounds active all quadrants  NEUROLOGICAL: Alert and oriented  PSYCHOLOGICAL: Calm, cooperative      Recent labs in [facility] reviewed by me today.     Assessment/Plan:  Acute bacterial sinus infection  Cough  Headache  Rhinorrhea  -Afebrile, room air, hemodynamically stable, no chest pain, no shortness of breath, cough with green-colored output consistent with acute bacterial sinus infection  -Doxycycline 100 mg every 12 hours x 5 days, Flonase nasal spray, continue cough therapy, monitor for symptoms improvement    Mild pulmonary congestion  -No chest pain, no shortness of " breath, room air, patient already on Lasix  -Continue current care, monitor for changes in respiratory status          MED REC REQUIRED  Post Medication Reconciliation Status: patient was not discharged from an inpatient facility or TCU        Orders:  Doxycycline 100 mg every 12 hours x 5 days  Flonase nasal spray          Electronically signed by:   Bennie Wiggins DNP,TIFFANY,NILOP-BC.                 The above note was completed in part using Dragon voice recognition software. Although reviewed after completion, some word and grammatical errors may occur. Please contact the author of this note with any questions.           Sincerely,        TIFFANY Dinh CNP

## 2024-02-09 NOTE — PROGRESS NOTES
"[facility] GERIATRICS    Chief Complaint   Patient presents with    Nursing Home Acute     HPI:  Sudheer Montiel is a 58 year old  (1965), who is being seen today for an episodic care visit at: [facility] () [[id number]]. Today's concern is: Sinus pain, cough, congestion    Patient reports she has been coughing for sometime now and it doesn't seem to get better. She is congested and reports greenish color mucus.\"  Sinus pain\" bothers her the most.  COVID and influenza testing were negative.  Chest x-ray on 2/8 showed \"Mild pulmonary vascular congestion\" without infiltrates or focal consolidations.  No chest pain or shortness of breath.  Hemodynamically stable.  Appetite at baseline.    Allergies, and PMH/PSH reviewed in [facility] today.  REVIEW OF SYSTEMS:  4 point ROS including Respiratory, CV, GI and , other than that noted in the HPI,  is negative    Objective:   /70   Pulse 80   Temp 97.7  F (36.5  C)   Resp 16   Ht 1.6 m (5' 3\")   Wt 98.9 kg (218 lb)   SpO2 97%   BMI 38.62 kg/m      Exam:  GENERAL APPEARANCE: NAD  RESPIRATORY: Diminished, even and unlabored, symmetrical chest wall expansion  CARDIOVASCULAR: RRR, nonpitting peripheral edema, S1/S2 normal   GASTROINTESTINAL: Soft, nontender, nondistended, bowel sounds active all quadrants  NEUROLOGICAL: Alert and oriented  PSYCHOLOGICAL: Calm, cooperative      Recent labs in [facility] reviewed by me today.     Assessment/Plan:  Acute bacterial sinus infection  Cough  Headache  Rhinorrhea  -Afebrile, room air, hemodynamically stable, no chest pain, no shortness of breath, cough with green-colored output consistent with acute bacterial sinus infection  -Doxycycline 100 mg every 12 hours x 5 days, Flonase nasal spray, continue cough therapy, monitor for symptoms improvement    Mild pulmonary congestion  -No chest pain, no shortness of breath, room air, patient already on Lasix  -Continue current care, monitor for changes in " respiratory status          MED REC REQUIRED  Post Medication Reconciliation Status: patient was not discharged from an inpatient facility or TCU        Orders:  Doxycycline 100 mg every 12 hours x 5 days  Flonase nasal spray          Electronically signed by:   Bennie Wiggins DNP,APRN,NILOP-BC.                 The above note was completed in part using Dragon voice recognition software. Although reviewed after completion, some word and grammatical errors may occur. Please contact the author of this note with any questions.

## 2024-02-13 ENCOUNTER — OFFICE VISIT (OUTPATIENT)
Dept: ENDOCRINOLOGY | Facility: CLINIC | Age: 59
End: 2024-02-13
Payer: COMMERCIAL

## 2024-02-13 VITALS
DIASTOLIC BLOOD PRESSURE: 74 MMHG | WEIGHT: 218.4 LBS | HEART RATE: 80 BPM | HEIGHT: 64 IN | BODY MASS INDEX: 37.28 KG/M2 | SYSTOLIC BLOOD PRESSURE: 112 MMHG

## 2024-02-13 DIAGNOSIS — E11.9 TYPE 2 DIABETES MELLITUS WITHOUT COMPLICATION, WITHOUT LONG-TERM CURRENT USE OF INSULIN (H): Primary | ICD-10-CM

## 2024-02-13 DIAGNOSIS — E03.9 ACQUIRED HYPOTHYROIDISM: ICD-10-CM

## 2024-02-13 PROCEDURE — 95251 CONT GLUC MNTR ANALYSIS I&R: CPT | Performed by: INTERNAL MEDICINE

## 2024-02-13 PROCEDURE — G2211 COMPLEX E/M VISIT ADD ON: HCPCS | Performed by: INTERNAL MEDICINE

## 2024-02-13 PROCEDURE — 99214 OFFICE O/P EST MOD 30 MIN: CPT | Performed by: INTERNAL MEDICINE

## 2024-02-13 NOTE — Clinical Note
2/13/2024         RE: Sudheer Montiel  [address] [address] [facility] [address]        Dear Colleague,    Thank you for referring your patient, Sudheer Montiel, to the [facility] [place]. Please see a copy of my visit note below.      Recent issues:  Followup diabetes and thyroid evaluation, with daughter Linda today  Taking the diabetes and thyroid medications  Still living at the [facility] though (family) considering future AL option  Right knee surgery 12/18/23, went well          Diabetes:  ~4/2010. Initial diagnosis approx age 44  ...Has taken several DM meds including metformin, Byetta, glimeparide, Invokana, and Victoza   In 2018, she had been taking metformin, Victoza, glimeparide, and Jardiance  She stopped Jardiance, also ran out of Victoza last year  Had seen Beth CASTRO/KHARI for general medical evaluations  10/2018. Abdominal abcess illness, hospitalizations at [facility] and then [facility] hospital              2-surgeries for abcess, thought related to the hysterectomy bladder sling mesh from prior surgery     Previously taking metformin 750 mg BID, glimeparide 4 mg every day, Tresiba 40U every day   10/2020. Changed to VGo40 pods management  Continue low dose glimeparide, but low SG levels and glimeparide discontinued, then restarted   Previous DM med plan:  Novolog in VGo40   Small carb meal 2 clicks (4U)   Large carb meal 3 clicks (6U)    Novolog    sscale:       -250 1-click    -300 2-clicks  Metformin 750 mg 2-tabs in morning   Ozempic 0.5 mg  Subcutaneous weekly      8/2023. Hospitalized, pleural effusion and CT placement  Med dose changes, stopped VGo and metformin discontinued  Current DM meds:  Lantus Solostar 40U  subcutaneous in morning  Aspart Flexpen 3U  Subcutaneous premeal  Ozempic 0.5 mg  Subcutaneous weekly    Used Freestyle [place] CGM in the past, not currently  Has Glucocard Vital BG  meter    Infrequent BG testing    Recent Libre3 data:             Recent [facility] labs include:  7/26/19 hgbA1c 9.3%, t.chol 195, , TSH 0.06, FT4 1.46, FT3 8.7, Cr 0.77  8/26/20 hgbA1c 11.4%  10/15/21 hgbA1c 6.6%, LDL 60 mg/dl     Recent  labs include:  Lab Results   Component Value Date    A1C 6.1 (H) 12/15/2023     12/19/2023    POTASSIUM 4.2 12/19/2023    CHLORIDE 104 12/19/2023    CO2 27 12/19/2023    ANIONGAP 7 12/19/2023     (H) 12/19/2023    BUN 15.1 12/19/2023    CR 0.69 12/19/2023    GFRESTIMATED >90 12/19/2023    GFRESTBLACK >90 01/05/2021    JOANN 8.7 12/19/2023    CHOL 216 (H) 01/12/2023    TRIG 159 (H) 01/12/2023    HDL 48 (L) 01/12/2023     (H) 01/12/2023    NHDL 168 (H) 01/12/2023    UCRR 168.0 06/06/2023    MICROL 18.3 06/06/2023    UMALCR 10.89 06/06/2023     Last eye exam 2018?, results not available  DM Complications:  None known        Thyroid:  Had taken Synthroid  Changed to [place] thyroid medication, then [place] + levothyroxine combo dose plan  Had taken [place] thyroid 2-tablets daily... Possibly 60 mg 2-tabs QD  Early-mid 3/2018. Ran out of [place] thyroid medication  Previously took [place] thyroid 90 mg 2-tabs daily and low dose levothyroxine 0.025 mg daily  8/2019. Changed to lower dose [place] and higher dose levothyroxine  Subsequent dose reductions with levothyroxine medication  Mid 12/2021 to early 1/2022. Off [place] thyroid medication, then restarted   6/2023. Labs showed elevated TSH level and I advised dose increase of [place] thyroid, but not done     Previous \[facility]\"" labs include:  6/15/22 TSH 0.01, FT4 1.9, FT3 4.3, Cr 0.55, hgbA1c 7.6%    Recent [facility] labs include:  Lab Results   Component Value Date    TSH 0.53 10/30/2023    T4 1.25 10/30/2023    FT3 2.4 10/30/2023     Current med doses:  [place] thyroid 60 mg              1-tab daily  [place] thyroid 15 mg  1-tab daily  Levothyroxine 0.1 mg  1-tab daily daily        Single, previously worked HE/FV triage   at [employer]  Sees Pawan Burns PAC/[facility] for gen med evaluations  Also sees Dr. Frantz Guillen?/[facility], Dr. Johnson/GI, Dr. YURIDIA Harrington/Neurology       PMH/PSH:  Past Medical History:   Diagnosis Date    Abnormal liver CT     Hypothyroid 80's    Necrotizing fasciitis (H)     Pleural effusion     Primary osteoarthritis of both knees     Soft tissue infection     Subcortical infarction (H)     Type 2 diabetes mellitus (H)     Unspecified cirrhosis of liver (H)      Past Surgical History:   Procedure Laterality Date    ARTHROPLASTY KNEE Right 2023    Procedure: Right Total Knee Arthroplasty;  Surgeon: Pawan Ewing MD;  Location:  OR     SECTION      COLONOSCOPY N/A 10/21/2015    Procedure: COLONOSCOPY;  Surgeon: Jaky Arredondo MD;  Location:  GI    IRRIGATION AND DEBRIDEMENT ABDOMEN WASHOUT, COMBINED N/A 10/12/2018    Procedure: COMBINED IRRIGATION AND DEBRIDEMENT ABDOMEN WASHOUT;  Irrigation and Debridement of Lower Abdomen, removal of piece of mesh.;  Surgeon: Darlene Hogue MD;  Location: [facility] OR    marisol/bso  2004    due to anemia    ZZC REPAIR CRUCIATE LIGAMENT,KNEE  1986       Family Hx:  No family history on file.      Social Hx:  Social History     Socioeconomic History    Marital status:      Spouse name: Not on file    Number of children: 2    Years of education: Not on file    Highest education level: Not on file   Occupational History    Occupation: surgery scheduler [employer]   Tobacco Use    Smoking status: Former     Packs/day: .25     Types: Cigarettes    Smokeless tobacco: Never   Vaping Use    Vaping Use: Never used   Substance and Sexual Activity    Alcohol use: No    Drug use: Never    Sexual activity: Not on file   Other Topics Concern    Not on file   Social History Narrative    Not on file     Social Determinants of Health     Financial Resource Strain: Not on file  "  Food Insecurity: Not on file   Transportation Needs: Not on file   Physical Activity: Not on file   Stress: Not on file   Social Connections: Not on file   Interpersonal Safety: Not on file   Housing Stability: Not on file          MEDICATIONS:  has a current medication list which includes the following prescription(s): acetaminophen, aspirin, celecoxib, freestyle evelyn 3 sensor, escitalopram, furosemide, hydroxyzine hcl, insulin aspart, insulin glargine, levothyroxine, melatonin, omeprazole, ondansetron, oxycodone, semaglutide, spironolactone, thyroid, thyroid, valacyclovir, xifaxan, blood glucose, guaifenesin, b-d u/f, b-d u/f, lactulose, methocarbamol, nicotine, senna-docusate, and statin not prescribed.     ROS: 10 point ROS neg other than the symptoms noted above in the HPI.     GENERAL: some fatigue, wt stable; denies fevers, chills, night sweats.   HEENT:  no dysphagia, odonophagia, diplopia, neck pain  THYROID:  no apparent hyper or hypothyroid symptoms  CV:  some palpitations; denies chest pain, pressure  LUNGS:  denies SOB, dyspnea, cough, wheezing  ABDOMEN:  Intermittent right abdomen pains; denies diarrhea, indigestion, constipation  EXTREMITIES: no rashes, ulcers, edema  NEUROLOGY: forgetfulness; no headaches, denies changes in vision, tingling, extremitiy numbness   MSK: knee and some low back pain; no muscle aches or pains, weakness  SKIN: no rashes or lesions  : no increased thirst and urination, nocturia; no menses since hysterectomy ~age 49  PSYCH:  stable mood, no significant anxiety or depression  ENDOCRINE: no heat or cold intolerance      Physical Exam   VS: /74   Pulse 80   Ht 1.626 m (5' 4\")   Wt 99.1 kg (218 lb 6.4 oz)   BMI 37.49 kg/m    GENERAL: AXOX3, NAD, well dressed, answering questions appropriately, appears stated age.  ENT: no nose swelling or nasal discharge, mouth redness or gum changes.  EYES: eyes grossly normal to inspection, conjunctivae and sclerae normal, no " exophthalmos or proptosis  THYROID:  no apparent nodules or goiter  LUNGS: no audible wheeze, cough or visible cyanosis, or increased work of breathing  ABDOMEN: abdomen obese size  EXTREMITIES: no edema noted  NEUROLOGY: CN grossly intact, no tremors  MSK: grossly intact  SKIN:  no apparent skin lesions, rash, or edema with visualized skin appearance  PSYCH: mentation appears normal, affect normal/bright, judgement and insight intact,   normal speech and appearance well groomed       LABS:     All pertinent notes, labs, and images personally reviewed by me.        A/P:  Encounter Diagnoses   Name Primary?    Type 2 diabetes mellitus without complication, without long-term current use of insulin (H) Yes    Acquired hypothyroidism                Comments:  Reviewed health issues, diabetes and thyroid management.  Difficult to assess glycemic control without BGM or CGM data  Reviewed and interpreted tests that I previously ordered.   Ordered appropriate tests for the endocrinology disease management.    Management options discussed and implemented after shared medical decision making with the patient.  T2DM and hypothyroidism problems are chronic-stable    Plan:  Discussed general issues with the diabetes diagnosis and management  We discussed the hgbA1c test which reflects previous overall glucose levels or control  Discussed the importance of blood glucose (BG) testing to assess glucose trends  Provided general overview of the diabetes medication options and medication treatment plan  Reviewed recent Chandni CGM glucose trend data, in detail.    Recommend:  Continue the current Aspart and Lantus pen MDI routine:  Lantus Solostar 40U  subcutaneous in morning  Aspart Flexpen 3U  Subcutaneous premeal  Aspart sscale  1U per 50 mg/dl >200 mg/dl    Goal target -150 mg/dl  Advised restarting the Chandni CGM, as Libre3    Gave sample Libre3, use with smartphone ramon    Libre3 Rx sent to pharmacy    Send me Amy update  after staring Libre3 CGM  Keep focus on diet, exercise, weight management.  Will review the recent lab results when resulted  Needs f/u on the mild hypercholesterolemia, consider adding statin medication for treatment  Keep focus on diet, exercise, weight management.  Continue current [place] thyroid and levothyroxine dose plan at this time  Advise having fasting lipid panel testing and dilated eye examination, at least annually    Keep scheduled followup GI, neurology, and PCP appointments  Addressed patient questions today     The longitudinal plan of care for the endocrine problem(s) were addressed during this visit.  Due to added complexity of care,   we will continue to support the patient and the subsequent management of this condition with ongoing continuity of care.    Patient Instructions   Needs to lower the Lantus Solostar to 36U daily    Diabetes management plan:  Lantus Solostar 36U  subcutaneous in morning  Aspart Flexpen 3U  Subcutaneous premeal    Thyroid medication plan:  [place] thyroid 60 mg 1-tab daily  [place] thyroid 15 mg 1-tab daily  Levothyroxine 0.1 mg 1-tab daily    Verify the doses of the thyroid medications and message Dr. Christianson  Continue use of the Freestyle Libre3 glucose sensor  Future plan to resume the Novolog correction scale when transition out of [facility]  Contact endocrinology clinic if questions    Next office appointment with Dr. Christianson 6/18/24 at 11:30 am    Future labs ordered today: No orders of the defined types were placed in this encounter.    Radiology/Consults ordered today: None    Total time spent on day of encounter:  ***    Follow-up:  ***    JOJO Christianson MD, MS  Endocrinology  [facility]      CC: Care Team                                              Recent issues:  Followup diabetes and thyroid evaluation, with daughter Linda today  Taking the diabetes and thyroid medications  Still living at the [facility] ([facility] though  (family) considering future AL option  Right knee surgery 12/18/23, went well          Diabetes:  ~4/2010. Initial diagnosis approx age 44  ...Has taken several DM meds including metformin, Byetta, glimeparide, Invokana, and Victoza   In 2018, she had been taking metformin, Victoza, glimeparide, and Jardiance  She stopped Jardiance, also ran out of Victoza last year  Had seen Beth CASTRO/[facility] for general medical evaluations  10/2018. Abdominal abcess illness, hospitalizations at [facility] and then [facility]              2-surgeries for abcess, thought related to the hysterectomy bladder sling mesh from prior surgery     Previously taking metformin 750 mg BID, glimeparide 4 mg every day, Tresiba 40U every day   10/2020. Changed to VGo40 pods management  Continue low dose glimeparide, but low SG levels and glimeparide discontinued, then restarted   Previous DM med plan:  Novolog in VGo40   Small carb meal 2 clicks (4U)   Large carb meal 3 clicks (6U)    Novolog    sscale:       -250 1-click    -300 2-clicks  Metformin 750 mg 2-tabs in morning   Ozempic 0.5 mg  Subcutaneous weekly      8/2023. Hospitalized, pleural effusion and CT placement  Med dose changes, stopped VGo and metformin discontinued  Current DM meds:  Lantus Solostar 40U  subcutaneous in morning  Aspart Flexpen 3U  Subcutaneous (postmeal?)  Ozempic 0.5 mg  Subcutaneous weekly    Used Freestyle Chandni CGM in the past, not currently  Has Glucocard Vital BG meter    Infrequent BG testing    Recent Libre3 data:             Recent [facility] labs include:  7/26/19 hgbA1c 9.3%, t.chol 195, , TSH 0.06, FT4 1.46, FT3 8.7, Cr 0.77  8/26/20 hgbA1c 11.4%  10/15/21 hgbA1c 6.6%, LDL 60 mg/dl     Recent  labs include:  Lab Results   Component Value Date    A1C 6.1 (H) 12/15/2023     12/19/2023    POTASSIUM 4.2 12/19/2023    CHLORIDE 104 12/19/2023    CO2 27 12/19/2023    ANIONGAP 7 12/19/2023     (H) 12/19/2023     BUN 15.1 12/19/2023    CR 0.69 12/19/2023    GFRESTIMATED >90 12/19/2023    GFRESTBLACK >90 01/05/2021    JOANN 8.7 12/19/2023    CHOL 216 (H) 01/12/2023    TRIG 159 (H) 01/12/2023    HDL 48 (L) 01/12/2023     (H) 01/12/2023    NHDL 168 (H) 01/12/2023    UCRR 168.0 06/06/2023    MICROL 18.3 06/06/2023    UMALCR 10.89 06/06/2023     Last eye exam 2018?, results not available  DM Complications:  None known        Thyroid:  Had taken Synthroid  Changed to [place] thyroid medication, then [place] + levothyroxine combo dose plan  Had taken [place] thyroid 2-tablets daily... Possibly 60 mg 2-tabs QD  Early-mid 3/2018. Ran out of [place] thyroid medication  Previously took [place] thyroid 90 mg 2-tabs daily and low dose levothyroxine 0.025 mg daily  8/2019. Changed to lower dose [place] and higher dose levothyroxine  Subsequent dose reductions with levothyroxine medication  Mid 12/2021 to early 1/2022. Off [place] thyroid medication, then restarted   6/2023. 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 Frantz Guillen?/[facility], Dr. Johnson/GI, Dr. YURIDIA Harrington/Neurology       PMH/PSH:  Past Medical History:   Diagnosis Date     Abnormal liver CT      Collapsed lung 08/2023     Hypothyroid 80's     Necrotizing fasciitis (H)      Pleural effusion      Primary osteoarthritis of both knees      Soft tissue infection      Subcortical  infarction (H)      Type 2 diabetes mellitus (H)      Unspecified cirrhosis of liver (H)      Past Surgical History:   Procedure Laterality Date     ARTHROPLASTY KNEE Right 2023    Procedure: Right Total Knee Arthroplasty;  Surgeon: Pawan Ewing MD;  Location:  OR      SECTION       COLONOSCOPY N/A 10/21/2015    Procedure: COLONOSCOPY;  Surgeon: Jaky Arredondo MD;  Location:  GI     IRRIGATION AND DEBRIDEMENT ABDOMEN WASHOUT, COMBINED N/A 10/12/2018    Procedure: COMBINED IRRIGATION AND DEBRIDEMENT ABDOMEN WASHOUT;  Irrigation and Debridement of Lower Abdomen, removal of piece of mesh.;  Surgeon: Darlene Hogue MD;  Location: [facility] OR     marisol/bso      due to anemia     ZZC REPAIR CRUCIATE LIGAMENT,KNEE         Family Hx:  No family history on file.      Social Hx:  Social History     Socioeconomic History     Marital status:      Spouse name: Not on file     Number of children: 2     Years of education: Not on file     Highest education level: Not on file   Occupational History     Occupation: surgery scheduler [employer]   Tobacco Use     Smoking status: Former     Packs/day: .25     Types: Cigarettes     Smokeless tobacco: Never   Vaping Use     Vaping Use: Never used   Substance and Sexual Activity     Alcohol use: No     Drug use: Never     Sexual activity: Not on file   Other Topics Concern     Not on file   Social History Narrative     Not on file     Social Determinants of Health     Financial Resource Strain: Not on file   Food Insecurity: Not on file   Transportation Needs: Not on file   Physical Activity: Not on file   Stress: Not on file   Social Connections: Not on file   Interpersonal Safety: Not on file   Housing Stability: Not on file          MEDICATIONS:  has a current medication list which includes the following prescription(s): acetaminophen, aspirin, celecoxib, freestyle evelyn 3 sensor, escitalopram, furosemide, hydroxyzine hcl,  "insulin aspart, insulin glargine, levothyroxine, melatonin, omeprazole, ondansetron, oxycodone, semaglutide, spironolactone, thyroid, thyroid, valacyclovir, xifaxan, blood glucose, guaifenesin, b-d u/f, b-d u/f, lactulose, methocarbamol, nicotine, senna-docusate, and statin not prescribed.     ROS: 10 point ROS neg other than the symptoms noted above in the HPI.     GENERAL: some fatigue, wt loss; denies fevers, chills, night sweats.   HEENT:  no dysphagia, odonophagia, diplopia, neck pain  THYROID:  no apparent hyper or hypothyroid symptoms  CV:  some palpitations; denies chest pain, pressure  LUNGS:  denies SOB, dyspnea, cough, wheezing  ABDOMEN:  intermittent right abdomen pains; denies diarrhea, indigestion, constipation  EXTREMITIES: no rashes, ulcers, edema  NEUROLOGY: forgetfulness; no headaches, denies changes in vision, tingling, extremitiy numbness   MSK: knee and some low back pain; no muscle aches or pains, weakness  SKIN: no rashes or lesions  : no increased thirst and urination, nocturia; no menses since hysterectomy ~age 49  PSYCH:  stable mood, no significant anxiety or depression  ENDOCRINE: no heat or cold intolerance      Physical Exam   VS: /74   Pulse 80   Ht 1.626 m (5' 4\")   Wt 99.1 kg (218 lb 6.4 oz)   BMI 37.49 kg/m    GENERAL: AXOX3, NAD, well dressed, answering questions appropriately, appears stated age.  ENT: no nose swelling or nasal discharge, mouth redness or gum changes.  EYES: eyes grossly normal to inspection, conjunctivae and sclerae normal, no exophthalmos or proptosis  THYROID:  no apparent nodules or goiter  LUNGS: no audible wheeze, cough or visible cyanosis, or increased work of breathing  ABDOMEN: abdomen obese size  EXTREMITIES: no edema noted  NEUROLOGY: CN grossly intact, no tremors  MSK: grossly intact  SKIN:  no apparent skin lesions, rash, or edema with visualized skin appearance  PSYCH: mentation appears normal, affect normal/bright, judgement and insight " intact,   normal speech and appearance well groomed    LABS:     All pertinent notes, labs, and images personally reviewed by me.        A/P:  Encounter Diagnoses   Name Primary?     Type 2 diabetes mellitus without complication, without long-term current use of insulin (H) Yes     Acquired hypothyroidism                Comments:  Reviewed health issues, diabetes and thyroid management.  Difficult to assess glycemic control without BGM or CGM data  Reviewed and interpreted tests that I previously ordered.   Ordered appropriate tests for the endocrinology disease management.    Management options discussed and implemented after shared medical decision making with the patient.  T2DM and hypothyroidism problems are chronic-stable    Plan:  Discussed general issues with the diabetes diagnosis and management  We discussed the hgbA1c test which reflects previous overall glucose levels or control  Discussed the importance of blood glucose (BG) testing to assess glucose trends  Provided general overview of the diabetes medication options and medication treatment plan  Reviewed recent Libre3 CGM glucose trend data, in detail.    Recommend:  Continue the current Novolog (Aspart), Lantus, and Ozempic medications, as noted  Lower the Lantus to 36U daily   Goal target -150 mg/dl  Continue use of the Libre3 CGM glucose sensor  Keep focus on diet, exercise, weight management.  Needs f/u on the mild hypercholesterolemia, consider adding statin medication for treatment  Keep focus on diet, exercise, weight management.  Continue current [place] thyroid and levothyroxine dose plan at this time  Advise having fasting lipid panel testing and dilated eye examination, at least annually    Keep scheduled followup GI, neurology, and PCP appointments  Addressed patient questions today     The longitudinal plan of care for the endocrine problem(s) were addressed during this visit.  Due to added complexity of care,   we will continue to  support the patient and the subsequent management of this condition with ongoing continuity of care.    Patient Instructions   Needs to lower the Lantus Solostar to 36U daily    Diabetes management plan:  Lantus Solostar 36U  subcutaneous in morning  Aspart Flexpen 3U  Subcutaneous premeal  Ozempic 0.5 mg Subcutaneous weekly    Thyroid medication plan:  [place] thyroid 60 mg 1-tab daily  [place] thyroid 15 mg 1-tab daily  Levothyroxine 0.1 mg 1-tab daily    Verify the doses of the thyroid medications and message Dr. Christianson  Continue use of the Freestyle Libre3 glucose sensor  Future plan to resume the Novolog correction scale when transition out of [facility] [place]  Contact endocrinology clinic if questions    Next office appointment with Dr. Christianson 6/18/24 at 11:30 am    Future labs ordered today:   Orders Placed This Encounter   Procedures     GLUCOSE MONITOR, 72 HOUR, PHYS INTERP     Radiology/Consults ordered today: None    Total time spent on day of encounter:  36 min    Follow-up:  6/18/24 at 12 noon, Return    JOJO Christianson MD, MS  Endocrinology  [facility]                                                    Again, thank you for allowing me to participate in the care of your patient.        Sincerely,        Kirk Christianson MD

## 2024-02-13 NOTE — PATIENT INSTRUCTIONS
Needs to lower the Lantus Solostar to 36U daily    Diabetes management plan:  Lantus Solostar 36U  subcutaneous in morning  Aspart Flexpen 3U  Subcutaneous premeal  Ozempic 0.5 mg Subcutaneous weekly    Thyroid medication plan:  [place] thyroid 60 mg 1-tab daily  [place] thyroid 15 mg 1-tab daily  Levothyroxine 0.1 mg 1-tab daily    Verify the doses of the thyroid medications and message Dr. Christianson  Continue use of the Freestyle Libre3 glucose sensor  Future plan to resume the Novolog correction scale when transition out of [facility]  Contact endocrinology clinic if questions    Next office appointment with Dr. Christianson 6/18/24 at 11:30 am

## 2024-02-13 NOTE — PROGRESS NOTES
Recent issues:  Followup diabetes and thyroid evaluation, with daughter Linda today  Taking the diabetes and thyroid medications  Still living at the [facility] ([facility] though (family) considering future AL option  Right knee surgery 12/18/23, went well          Diabetes:  ~4/2010. Initial diagnosis approx age 44  ...Has taken several DM meds including metformin, Byetta, glimeparide, Invokana, and Victoza   In 2018, she had been taking metformin, Victoza, glimeparide, and Jardiance  She stopped Jardiance, also ran out of Victoza last year  Had seen Beth CASTRO/[facility] for general medical evaluations  10/2018. Abdominal abcess illness, hospitalizations at [facility] [place] and then [facility]              2-surgeries for abcess, thought related to the hysterectomy bladder sling mesh from prior surgery     Previously taking metformin 750 mg BID, glimeparide 4 mg every day, Tresiba 40U every day   10/2020. Changed to VGo40 pods management  Continue low dose glimeparide, but low SG levels and glimeparide discontinued, then restarted   Previous DM med plan:  Novolog in VGo40   Small carb meal 2 clicks (4U)   Large carb meal 3 clicks (6U)    Novolog    sscale:       -250 1-click    -300 2-clicks  Metformin 750 mg 2-tabs in morning   Ozempic 0.5 mg  Subcutaneous weekly      8/2023. Hospitalized, pleural effusion and CT placement  Med dose changes, stopped VGo and metformin discontinued  Current DM meds:  Lantus Solostar 40U  subcutaneous in morning  Aspart Flexpen 3U  Subcutaneous (postmeal?)  Ozempic 0.5 mg  Subcutaneous weekly    Used Freestyle [place] CGM in the past, not currently  Has Glucocard Vital BG meter    Infrequent BG testing    Recent Libre3 data:             Recent [facility] labs include:  7/26/19 hgbA1c 9.3%, t.chol 195, , TSH 0.06, FT4 1.46, FT3 8.7, Cr 0.77  8/26/20 hgbA1c 11.4%  10/15/21 hgbA1c 6.6%, LDL 60 mg/dl     Recent  labs include:  Lab  Results   Component Value Date    A1C 6.1 (H) 12/15/2023     12/19/2023    POTASSIUM 4.2 12/19/2023    CHLORIDE 104 12/19/2023    CO2 27 12/19/2023    ANIONGAP 7 12/19/2023     (H) 12/19/2023    BUN 15.1 12/19/2023    CR 0.69 12/19/2023    GFRESTIMATED >90 12/19/2023    GFRESTBLACK >90 01/05/2021    JOANN 8.7 12/19/2023    CHOL 216 (H) 01/12/2023    TRIG 159 (H) 01/12/2023    HDL 48 (L) 01/12/2023     (H) 01/12/2023    NHDL 168 (H) 01/12/2023    UCRR 168.0 06/06/2023    MICROL 18.3 06/06/2023    UMALCR 10.89 06/06/2023     Last eye exam 2018?, results not available  DM Complications:  None known        Thyroid:  Had taken Synthroid  Changed to [place] thyroid medication, then [place] + levothyroxine combo dose plan  Had taken [place] thyroid 2-tablets daily... Possibly 60 mg 2-tabs QD  Early-mid 3/2018. Ran out of [place] thyroid medication  Previously took [place] thyroid 90 mg 2-tabs daily and low dose levothyroxine 0.025 mg daily  8/2019. Changed to lower dose [place] and higher dose levothyroxine  Subsequent dose reductions with levothyroxine medication  Mid 12/2021 to early 1/2022. Off [place] thyroid medication, then restarted   6/2023. 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 Frantz Guillen?/[facility], Dr. Johnson/GI, Dr. YURIDIA Harrington/Neurology       PMH/PSH:  Past Medical History:   Diagnosis Date     Abnormal liver CT     Collapsed lung 2023    Hypothyroid 80's    Necrotizing fasciitis (H)     Pleural effusion     Primary osteoarthritis of both knees     Soft tissue infection     Subcortical infarction (H)     Type 2 diabetes mellitus (H)     Unspecified cirrhosis of liver (H)      Past Surgical History:   Procedure Laterality Date    ARTHROPLASTY KNEE Right 2023    Procedure: Right Total Knee Arthroplasty;  Surgeon: Pawan Ewing MD;  Location:  OR     SECTION      COLONOSCOPY N/A 10/21/2015    Procedure: COLONOSCOPY;  Surgeon: Jaky Arredondo MD;  Location:  GI    IRRIGATION AND DEBRIDEMENT ABDOMEN WASHOUT, COMBINED N/A 10/12/2018    Procedure: COMBINED IRRIGATION AND DEBRIDEMENT ABDOMEN WASHOUT;  Irrigation and Debridement of Lower Abdomen, removal of piece of mesh.;  Surgeon: Darlene Hogue MD;  Location: [facility] OR    marisol/bso      due to anemia    ZZC REPAIR CRUCIATE LIGAMENT,KNEE  1986       Family Hx:  No family history on file.      Social Hx:  Social History     Socioeconomic History    Marital status:      Spouse name: Not on file    Number of children: 2    Years of education: Not on file    Highest education level: Not on file   Occupational History    Occupation: surgery scheduler [employer]   Tobacco Use    Smoking status: Former     Packs/day: .25     Types: Cigarettes    Smokeless tobacco: Never   Vaping Use    Vaping Use: Never used   Substance and Sexual Activity    Alcohol use: No    Drug use: Never    Sexual activity: Not on file   Other Topics Concern    Not on file   Social History Narrative    Not on file     Social Determinants of Health     Financial Resource Strain: Not on file   Food Insecurity: Not on file   Transportation Needs: Not on file   Physical Activity: Not on file   Stress: Not on file   Social Connections: Not on file   Interpersonal Safety: Not on file   Housing Stability: Not on file         "  MEDICATIONS:  has a current medication list which includes the following prescription(s): acetaminophen, aspirin, celecoxib, freestyle evelyn 3 sensor, escitalopram, furosemide, hydroxyzine hcl, insulin aspart, insulin glargine, levothyroxine, melatonin, omeprazole, ondansetron, oxycodone, semaglutide, spironolactone, thyroid, thyroid, valacyclovir, xifaxan, blood glucose, guaifenesin, b-d u/f, b-d u/f, lactulose, methocarbamol, nicotine, senna-docusate, and statin not prescribed.     ROS: 10 point ROS neg other than the symptoms noted above in the HPI.     GENERAL: some fatigue, wt loss; denies fevers, chills, night sweats.   HEENT:  no dysphagia, odonophagia, diplopia, neck pain  THYROID:  no apparent hyper or hypothyroid symptoms  CV:  some palpitations; denies chest pain, pressure  LUNGS:  denies SOB, dyspnea, cough, wheezing  ABDOMEN:  intermittent right abdomen pains; denies diarrhea, indigestion, constipation  EXTREMITIES: no rashes, ulcers, edema  NEUROLOGY: forgetfulness; no headaches, denies changes in vision, tingling, extremitiy numbness   MSK: knee and some low back pain; no muscle aches or pains, weakness  SKIN: no rashes or lesions  : no increased thirst and urination, nocturia; no menses since hysterectomy ~age 49  PSYCH:  stable mood, no significant anxiety or depression  ENDOCRINE: no heat or cold intolerance      Physical Exam   VS: /74   Pulse 80   Ht 1.626 m (5' 4\")   Wt 99.1 kg (218 lb 6.4 oz)   BMI 37.49 kg/m    GENERAL: AXOX3, NAD, well dressed, answering questions appropriately, appears stated age.  ENT: no nose swelling or nasal discharge, mouth redness or gum changes.  EYES: eyes grossly normal to inspection, conjunctivae and sclerae normal, no exophthalmos or proptosis  THYROID:  no apparent nodules or goiter  LUNGS: no audible wheeze, cough or visible cyanosis, or increased work of breathing  ABDOMEN: abdomen obese size  EXTREMITIES: no edema noted  NEUROLOGY: CN grossly " intact, no tremors  MSK: grossly intact  SKIN:  no apparent skin lesions, rash, or edema with visualized skin appearance  PSYCH: mentation appears normal, affect normal/bright, judgement and insight intact,   normal speech and appearance well groomed    LABS:     All pertinent notes, labs, and images personally reviewed by me.        A/P:  Encounter Diagnoses   Name Primary?    Type 2 diabetes mellitus without complication, without long-term current use of insulin (H) Yes    Acquired hypothyroidism                Comments:  Reviewed health issues, diabetes and thyroid management.  Difficult to assess glycemic control without BGM or CGM data  Reviewed and interpreted tests that I previously ordered.   Ordered appropriate tests for the endocrinology disease management.    Management options discussed and implemented after shared medical decision making with the patient.  T2DM and hypothyroidism problems are chronic-stable    Plan:  Discussed general issues with the diabetes diagnosis and management  We discussed the hgbA1c test which reflects previous overall glucose levels or control  Discussed the importance of blood glucose (BG) testing to assess glucose trends  Provided general overview of the diabetes medication options and medication treatment plan  Reviewed recent Libre3 CGM glucose trend data, in detail.    Recommend:  Continue the current Novolog (Aspart), Lantus, and Ozempic medications, as noted  Lower the Lantus to 36U daily   Goal target -150 mg/dl  Continue use of the Libre3 CGM glucose sensor  Keep focus on diet, exercise, weight management.  Needs f/u on the mild hypercholesterolemia, consider adding statin medication for treatment  Keep focus on diet, exercise, weight management.  Continue current [place] thyroid and levothyroxine dose plan at this time  Advise having fasting lipid panel testing and dilated eye examination, at least annually    Keep scheduled followup GI, neurology, and PCP  appointments  Addressed patient questions today     The longitudinal plan of care for the endocrine problem(s) were addressed during this visit.  Due to added complexity of care,   we will continue to support the patient and the subsequent management of this condition with ongoing continuity of care.    Patient Instructions   Needs to lower the Lantus Solostar to 36U daily    Diabetes management plan:  Lantus Solostar 36U  subcutaneous in morning  Aspart Flexpen 3U  Subcutaneous premeal  Ozempic 0.5 mg Subcutaneous weekly    Thyroid medication plan:  [place] thyroid 60 mg 1-tab daily  [place] thyroid 15 mg 1-tab daily  Levothyroxine 0.1 mg 1-tab daily    Verify the doses of the thyroid medications and message Dr. Christianson  Continue use of the Freestyle Libre3 glucose sensor  Future plan to resume the Novolog correction scale when transition out of [facility]  Contact endocrinology clinic if questions    Next office appointment with Dr. Christianson 6/18/24 at 11:30 am    Future labs ordered today:   Orders Placed This Encounter   Procedures    GLUCOSE MONITOR, 72 HOUR, PHYS INTERP     Radiology/Consults ordered today: None    Total time spent on day of encounter:  36 min    Follow-up:  6/18/24 at 12 noon, Return    JOJO Christianson MD, MS  Endocrinology  [facility]    CC:  CARRI Wiggins

## 2024-02-20 VITALS
TEMPERATURE: 97.7 F | WEIGHT: 215 LBS | OXYGEN SATURATION: 97 % | SYSTOLIC BLOOD PRESSURE: 138 MMHG | BODY MASS INDEX: 36.7 KG/M2 | HEART RATE: 80 BPM | HEIGHT: 64 IN | RESPIRATION RATE: 16 BRPM | DIASTOLIC BLOOD PRESSURE: 70 MMHG

## 2024-02-20 NOTE — PROGRESS NOTES
[facility] GERIATRICS  Chief Complaint   Patient presents with    [facility] Medical Record Number:  [id number]  Place of Service where encounter took place:  [facility] ()     HPI:    Sudheer Montiel  is 58 year old (1965), who is being seen today for a federally mandated E/M visit.     Patient completed course of antibiotics for possible sinusitis.  She notes improvement in nasal drainage and cough.  She attributes much of her symptoms to smoking.  Appetite has been good.  Overall she is feeling well  Blood glucoses have been generally been in the 100s on Lantus, scheduled mealtime aspart and Ozempic.  Patient is investigating assisted living options, states she is taking a tour later today.    Patient was most recently hospitalized in December after undergoing an elective right total knee arthroplasty.  Postoperative course was uncomplicated.  She is walking well, denies pain in her right knee.  She is compliant with lactulose, states she is having 2-3 loose stools per day        ALLERGIES:Cephalexin, Morphine, Penicillins, Amoxicillin, Cefprozil, Ceftazidime, Ciprofloxacin, and Percocet [oxycodone-acetaminophen]  PAST MEDICAL HISTORY:   Past Medical History:   Diagnosis Date    Abnormal liver CT     Collapsed lung 2023    Hypothyroid 80's    Necrotizing fasciitis (H)     Pleural effusion     Primary osteoarthritis of both knees     Soft tissue infection     Subcortical infarction (H)     Type 2 diabetes mellitus (H)     Unspecified cirrhosis of liver (H)      PAST SURGICAL HISTORY:   has a past surgical history that includes marisol/bso (); REPAIR CRUCIATE LIGAMENT,KNEE ();  section (); Colonoscopy (N/A, 10/21/2015); Irrigation and debridement abdomen washout, combined (N/A, 10/12/2018); and Arthroplasty knee (Right, 2023).  FAMILY HISTORY: family history is not on file.  SOCIAL HISTORY:  reports that she has quit smoking. Her  "smoking use included cigarettes. She smoked an average of 0.3 packs per day. She has never used smokeless tobacco. She reports that she does not drink alcohol and does not use drugs.    Current medications were reviewed by me today    Vitals:  /70   Pulse 80   Temp 97.7  F (36.5  C)   Resp 16   Ht 1.626 m (5' 4\")   Wt 97.5 kg (215 lb)   SpO2 97%   BMI 36.90 kg/m    Body mass index is 36.9 kg/m .  Exam:  Very pleasant woman, walking easily about the unit  HEENT: No conjunctival icterus  No nasal drainage  Lungs clear, occasional dry cough  CV regular rate and rhythm  Abdomen soft, protuberant, no obvious ascites  No lower extremity edema  Right knee surgical incision intact  No right calf edema or tenderness  Neuro: Fully oriented.  No tremors, no asterixis.        ASSESSMENT/PLAN      Status post right total knee arthroplasty.  Patient doing quite well  Plan: Continue ambulation as tolerated    Cough, head congestion, possible sinusitis, clinically improving per patient  She has completed course of antibiotics  Plan: Monitor respiratory status.  Patient strongly encouraged to reduce smoking    REYES cirrhosis  Stable on current diuretic regimen, lactulose, rifaximin  Hepatology follow-up    Diabetes mellitus type 2  Well-controlled based on hemoglobin A1c 6.1 December 2023  Plan: Continue Lantus, prandial aspart Ozempic    History of GERD  Asymptomatic, on PPI    Disposition: Patient appears appropriate for discharge to less restrictive setting.      Leroy Mckenzie MD      "

## 2024-02-21 ENCOUNTER — NURSING HOME VISIT (OUTPATIENT)
Dept: GERIATRICS | Facility: CLINIC | Age: 59
End: 2024-02-21
Payer: COMMERCIAL

## 2024-02-21 DIAGNOSIS — E11.9 TYPE 2 DIABETES MELLITUS WITHOUT COMPLICATION, WITH LONG-TERM CURRENT USE OF INSULIN (H): ICD-10-CM

## 2024-02-21 DIAGNOSIS — Z96.651 S/P TOTAL KNEE ARTHROPLASTY, RIGHT: Primary | ICD-10-CM

## 2024-02-21 DIAGNOSIS — Z79.4 TYPE 2 DIABETES MELLITUS WITHOUT COMPLICATION, WITH LONG-TERM CURRENT USE OF INSULIN (H): ICD-10-CM

## 2024-02-21 PROCEDURE — 99308 SBSQ NF CARE LOW MDM 20: CPT | Performed by: INTERNAL MEDICINE

## 2024-02-21 NOTE — LETTER
December 15, 2017  ERICA PARHAM ( 1948)  OFFICE VISIT   PCP: VIK WILLIAM  PROBLEM LIST:  Dx Date Dx Code Description Stage T N M     C91.10 Chronic lymphocytic leukemia of B-cell type not having achieved remission             HISTORY OF PRESENT ILLNESS: Patient is here today in follow-up.  She is feeling well.  She denies any constitutional symptoms.  Her biggest complaint is of right hip pain and she is undergoing a hip replacement in 2018.  PAST MEDICAL HISTORY:   Reviewed and unchanged.  PAST SURGICAL HISTORY:   Reviewed and unchanged.  CURRENT MEDICATIONS:    1. Aspirin 81 mg daily.  2. Calcium carbonate 600 mg daily.  3. Centrum daily.  4. Sertraline 10 mg daily.  5. Losartan 100 mg daily.  6. Tolterodine 4 mg daily.  7. Triamterene/hydrochlorothiazide 37.5/25 mg daily.    DISTRESS SCORE: Not done.         PSYCH/EMOTIONAL: Good spirits.  REVIEW OF SYSTEMS:  All systems reviewed and within normal limits.  PERFORMANCE STATUS: ECOG 0.  PHYSICAL EXAMINATION  VITAL SIGNS: Weight 205 pounds, /70.  GENERAL: Well developed, well nourished, alert and cooperative, and in no acute distress.  HEENT: Normocephalic; PERRL, EOMI, vision is grossly intact; no scleral icterus; teeth and gingiva in good general condition.  NECK: Supple, non-tender without lymphadenopathy, masses or thyromegaly.  LUNGS: Clear to auscultation and percussion without rales, rhonchi, wheezing or diminished breath sounds.  CARDIAC: Normal S1 and S2. No S3, S4 or murmurs. Rhythm is regular. There is no peripheral edema, cyanosis or pallor.   ABDOMEN: Positive bowel sounds. No tenderness.  BREAST: Deferred.  EXTREMITIES: No significant deformity or joint abnormality. No edema. Peripheral pulses intact. Extremities are warm and well perfused.  NEURO: CN II-XII intact. Strength and sensation symmetric and intact throughout.   SKIN: Normal color, texture and turgor with no lesions or eruptions.  PAIN ASSESSMENT:      2024        RE: Sudheer Montiel  [address] [address]        [facility] GERIATRICS  Chief Complaint   Patient presents with     [facility] [place]:  [id number]  Place of Service where encounter took place:  [facility]     HPI:    Sudheer Montiel  is 58 year old (1965), who is being seen today for a federally mandated E/M visit.     Patient completed course of antibiotics for possible sinusitis.  She notes improvement in nasal drainage and cough.  She attributes much of her symptoms to smoking.  Appetite has been good.  Overall she is feeling well  Blood glucoses have been generally been in the 100s on Lantus, scheduled mealtime aspart and Ozempic.  Patient is investigating assisted living options, states she is taking a tour later today.    Patient was most recently hospitalized in December after undergoing an elective right total knee arthroplasty.  Postoperative course was uncomplicated.  She is walking well, denies pain in her right knee.  She is compliant with lactulose, states she is having 2-3 loose stools per day        ALLERGIES:Cephalexin, Morphine, Penicillins, Amoxicillin, Cefprozil, Ceftazidime, Ciprofloxacin, and Percocet [oxycodone-acetaminophen]  PAST MEDICAL HISTORY:   Past Medical History:   Diagnosis Date     Abnormal liver CT      Collapsed lung 2023     Hypothyroid 80's     Necrotizing fasciitis (H)      Pleural effusion      Primary osteoarthritis of both knees      Soft tissue infection      Subcortical infarction (H)      Type 2 diabetes mellitus (H)      Unspecified cirrhosis of liver (H)      PAST SURGICAL HISTORY:   has a past surgical history that includes marisol/bso (); REPAIR CRUCIATE LIGAMENT,KNEE ();  section (); Colonoscopy (N/A, 10/21/2015); Irrigation and debridement abdomen washout, combined (N/A, 10/12/2018); and Arthroplasty knee (Right,  "12/18/2023).  FAMILY HISTORY: family history is not on file.  SOCIAL HISTORY:  reports that she has quit smoking. Her smoking use included cigarettes. She smoked an average of 0.3 packs per day. She has never used smokeless tobacco. She reports that she does not drink alcohol and does not use drugs.    Current medications were reviewed by me today    Vitals:  /70   Pulse 80   Temp 97.7  F (36.5  C)   Resp 16   Ht 1.626 m (5' 4\")   Wt 97.5 kg (215 lb)   SpO2 97%   BMI 36.90 kg/m    Body mass index is 36.9 kg/m .  Exam:  Very pleasant woman, walking easily about the unit  HEENT: No conjunctival icterus  No nasal drainage  Lungs clear, occasional dry cough  CV regular rate and rhythm  Abdomen soft, protuberant, no obvious ascites  No lower extremity edema  Right knee surgical incision intact  No right calf edema or tenderness  Neuro: Fully oriented.  No tremors, no asterixis.        ASSESSMENT/PLAN      Status post right total knee arthroplasty.  Patient doing quite well  Plan: Continue ambulation as tolerated    Cough, head congestion, possible sinusitis, clinically improving per patient  She has completed course of antibiotics  Plan: Monitor respiratory status.  Patient strongly encouraged to reduce smoking    REYES cirrhosis  Stable on current diuretic regimen, lactulose, rifaximin  Hepatology follow-up    Diabetes mellitus type 2  Well-controlled based on hemoglobin A1c 6.1 December 2023  Plan: Continue Lantus, prandial aspart Ozempic    History of GERD  Asymptomatic, on PPI    Disposition: Patient appears appropriate for discharge to less restrictive setting.      Leroy Mckenzie MD        Sincerely,        Leroy Mckenzie MD      " 0    LABORATORY DATA:    Date 12/15/2017   CBC       WBC (10^3/uL) 5.6     NEUT% (%) 49.4     NEUT (10^3/uL) 2.8     HGB (g/dL) 13.9     HCT (%) 40.7     PLT (10^3/uL) 182.0   Other Labs       RBC (10^6/uL) 4.52     LYMPH (10^3/uL) 1.9     LYMPH% (%) 34.1     MONO% (%) 14.1 H     EOS (10^3/uL) 0.1     MONO (10^3/uL) 0.8     EOS% (%) 2.0     BASO% (%) 0.4     BASO (10^3/uL) 0.0     MCV (fml) 90.0     MCH (pg) 30.8     MCHC (g/dL) 34.2     RDW-SD (fml) 42.1     RDW-CV (%) 13.0     MPV (fml) 10.0     PATHOLOGY/RADIOLOGY:   Flow cytometry is consistent with chronic lymphocytic leukemia.    IMPRESSION:   Chronic lymphocytic leukemia.  She has [place] stage 0 disease.  There is no indication at this time for treatment and we will continue observation.    PLAN:     1. CBC today, as above.   2. Return to clinic in 6 months.    The patient voiced understanding of these issues and the plan, and expresses consent to proceed.  All of the patient's concerns were addressed and questions were answered.  Electronically signed  Shelton Valdez DO  12/15/17; 11:27 AM  CC: Dr. Brenda Jean ([phone])

## 2024-03-13 ENCOUNTER — MEDICAL CORRESPONDENCE (OUTPATIENT)
Dept: SCHEDULING | Facility: CLINIC | Age: 59
End: 2024-03-13
Payer: MEDICAID

## 2024-03-13 DIAGNOSIS — K75.81 NASH (NONALCOHOLIC STEATOHEPATITIS): Primary | ICD-10-CM

## 2024-03-13 DIAGNOSIS — K74.60 CIRRHOSIS OF LIVER (H): ICD-10-CM

## 2024-03-19 DIAGNOSIS — Z96.651 S/P TOTAL KNEE ARTHROPLASTY, RIGHT: ICD-10-CM

## 2024-03-19 RX ORDER — OXYCODONE HYDROCHLORIDE 5 MG/1
5 TABLET ORAL EVERY 4 HOURS PRN
Qty: 186 TABLET | Refills: 0 | Status: SHIPPED | OUTPATIENT
Start: 2024-03-19 | End: 2024-04-17

## 2024-04-02 ENCOUNTER — TELEPHONE (OUTPATIENT)
Dept: GERIATRICS | Facility: CLINIC | Age: 59
End: 2024-04-02
Payer: MEDICAID

## 2024-04-02 ENCOUNTER — LAB REQUISITION (OUTPATIENT)
Dept: LAB | Facility: CLINIC | Age: 59
End: 2024-04-02
Payer: COMMERCIAL

## 2024-04-02 ENCOUNTER — NURSING HOME VISIT (OUTPATIENT)
Dept: GERIATRICS | Facility: CLINIC | Age: 59
End: 2024-04-02
Payer: COMMERCIAL

## 2024-04-02 VITALS
RESPIRATION RATE: 18 BRPM | DIASTOLIC BLOOD PRESSURE: 67 MMHG | BODY MASS INDEX: 37.92 KG/M2 | SYSTOLIC BLOOD PRESSURE: 128 MMHG | OXYGEN SATURATION: 95 % | TEMPERATURE: 97.9 F | HEART RATE: 68 BPM | HEIGHT: 63 IN | WEIGHT: 214 LBS

## 2024-04-02 DIAGNOSIS — R30.0 DYSURIA: Primary | ICD-10-CM

## 2024-04-02 DIAGNOSIS — R35.0 FREQUENCY OF URINATION: ICD-10-CM

## 2024-04-02 DIAGNOSIS — R30.0 DYSURIA: ICD-10-CM

## 2024-04-02 PROCEDURE — 99309 SBSQ NF CARE MODERATE MDM 30: CPT

## 2024-04-02 RX ORDER — PHENAZOPYRIDINE HYDROCHLORIDE 100 MG/1
100 TABLET, FILM COATED ORAL 3 TIMES DAILY PRN
Status: CANCELLED
Start: 2024-04-02

## 2024-04-02 NOTE — LETTER
"    4/2/2024        RE: Sudheer Montiel  [address] [address]        [facility] GERIATRICS    Chief Complaint   Patient presents with     Nursing Home Acute     HPI:  Sudheer Montiel is a 58 year old  (1965), who is being seen today for an episodic care visit at: [facility] () [[id number]]. Today's concern is: Bladder spasms    Patient is being seen per staff request for reports of bladder spasms.  She is in bed and resting at the time of this visit.  She complained of dysuria and mild frequency.  Has had UTI in the past with similar symptoms per patient report.  No back pain.  No hematuria.  Afebrile.  Hemodynamically stable.    Bladder spams uti s/s for her 21 last uti, fruearcy, stable  Allergies, and PMH/PSH reviewed in [facility] today.  REVIEW OF SYSTEMS:  4 point ROS including Respiratory, CV, GI and , other than that noted in the HPI,  is negative    Objective:   /67   Pulse 68   Temp 97.9  F (36.6  C)   Resp 18   Ht 1.6 m (5' 3\")   Wt 97.1 kg (214 lb)   SpO2 95%   BMI 37.91 kg/m      Exam:  GENERAL APPEARANCE: NAD, in bed resting   RESPIRATORY: Diminished, even and unlabored, symmetrical chest wall expansion  CARDIOVASCULAR: RRR, nonpitting peripheral edema, S1/S2 normal   GASTROINTESTINAL: Soft, nontender, nondistended, bowel sounds active all quadrants  : Positive for dysuria and frequency  NEUROLOGICAL: Alert and oriented  PSYCHOLOGICAL: Calm, cooperative    Recent labs in [facility] reviewed by me today.     Assessment/Plan:  Dysuria  -Positive for frequency, afebrile, no back pain  -UA/UC STAT, Pyridium x2 days, CMP, CBCMonitor      MED REC REQUIRED  Post Medication Reconciliation Status: discharge medications reconciled and changed, per note/orders      Orders:  UA/UC   Pyridieum  Cmp cbc      Electronically signed by:   Bennie Wiggins,DARWIN,APRN,AGNP-BC.             The above note was completed in part using Dragon voice recognition software. " Although reviewed after completion, some word and grammatical errors may occur. Please contact the author of this note with any questions.           Sincerely,        TIFFANY Dinh CNP

## 2024-04-02 NOTE — PROGRESS NOTES
"[facility] GERIATRICS    Chief Complaint   Patient presents with    Nursing Home Acute     HPI:  Sudheer Montiel is a 58 year old  (1965), who is being seen today for an episodic care visit at: [facility] () [[id number]]. Today's concern is: Bladder spasms    Patient is being seen per staff request for reports of bladder spasms.  She is in bed and resting at the time of this visit.  She complained of dysuria and mild frequency.  Has had UTI in the past with similar symptoms per patient report.  No back pain.  No hematuria.  Afebrile.  Hemodynamically stable.    Bladder spams uti s/s for her 21 last uti, fruearcy, stable  Allergies, and PMH/PSH reviewed in [facility] today.  REVIEW OF SYSTEMS:  4 point ROS including Respiratory, CV, GI and , other than that noted in the HPI,  is negative    Objective:   /67   Pulse 68   Temp 97.9  F (36.6  C)   Resp 18   Ht 1.6 m (5' 3\")   Wt 97.1 kg (214 lb)   SpO2 95%   BMI 37.91 kg/m      Exam:  GENERAL APPEARANCE: NAD, in bed resting   RESPIRATORY: Diminished, even and unlabored, symmetrical chest wall expansion  CARDIOVASCULAR: RRR, nonpitting peripheral edema, S1/S2 normal   GASTROINTESTINAL: Soft, nontender, nondistended, bowel sounds active all quadrants  : Positive for dysuria and frequency  NEUROLOGICAL: Alert and oriented  PSYCHOLOGICAL: Calm, cooperative    Recent labs in [facility] reviewed by me today.     Assessment/Plan:  Dysuria  -Positive for frequency, afebrile, no back pain  -UA/UC STAT, Pyridium x2 days, CMP, CBCMonitor      MED REC REQUIRED  Post Medication Reconciliation Status: discharge medications reconciled and changed, per note/orders      Orders:  UA/UC   Pyridieum  Cmp cbc      Electronically signed by:   Bennie Wiggins,DNP,APRN,AGNP-BC.             The above note was completed in part using Dragon voice recognition software. Although reviewed after completion, some word and grammatical errors may occur. Please contact the " author of this note with any questions.

## 2024-04-02 NOTE — TELEPHONE ENCOUNTER
[facility] Triage Nurse Telephone Encounter    Provider: TIFFANY Treadwell CNP  Facility: [facility] Facility Type:  LTC    Caller: Lucinda   Call Back Number: [phone]    Allergies:    Allergies   Allergen Reactions    Cephalexin     Morphine      Other reaction(s): Other  Irritability, disorientation    Penicillins Itching     face    Amoxicillin Rash and Itching    Cefprozil Rash    Ceftazidime Itching and Rash    Ciprofloxacin Itching and Rash     Tolerates levaquin     Percocet [Oxycodone-Acetaminophen] Nausea and Vomiting        Reason for call: Today the patient is c/o of bladder spasms and thinks that she might have a UTI. No other symptoms such as frequency, urgency or dysuria at this time. Vitals: BP:  128/62  P:: 68  R:: 12  SPO2: 95% R/A Temp.:  97.9.      The pt has a prn methocarbamol order but the nurse is unsure if that would help with the bladder spasms.     Verbal Order/Direction given by Provider: NP in the building and will visit the patient       Ivett Carver RN

## 2024-04-03 ENCOUNTER — LAB REQUISITION (OUTPATIENT)
Dept: LAB | Facility: CLINIC | Age: 59
End: 2024-04-03
Payer: COMMERCIAL

## 2024-04-03 DIAGNOSIS — N39.0 URINARY TRACT INFECTION, SITE NOT SPECIFIED: ICD-10-CM

## 2024-04-03 LAB
BACTERIA #/AREA URNS HPF: ABNORMAL /HPF
ERYTHROCYTE [DISTWIDTH] IN BLOOD BY AUTOMATED COUNT: 16.3 % (ref 10–15)
HCT VFR BLD AUTO: 42.9 % (ref 35–47)
HGB BLD-MCNC: 13.8 G/DL (ref 11.7–15.7)
MCH RBC QN AUTO: 30.1 PG (ref 26.5–33)
MCHC RBC AUTO-ENTMCNC: 32.2 G/DL (ref 31.5–36.5)
MCV RBC AUTO: 94 FL (ref 78–100)
PLATELET # BLD AUTO: 94 10E3/UL (ref 150–450)
RBC # BLD AUTO: 4.59 10E6/UL (ref 3.8–5.2)
RBC URINE: 1 /HPF
SQUAMOUS EPITHELIAL: 2 /HPF
WBC # BLD AUTO: 4.9 10E3/UL (ref 4–11)
WBC URINE: 1 /HPF

## 2024-04-03 PROCEDURE — 36415 COLL VENOUS BLD VENIPUNCTURE: CPT | Mod: ORL

## 2024-04-03 PROCEDURE — 81015 MICROSCOPIC EXAM OF URINE: CPT | Mod: ORL

## 2024-04-03 PROCEDURE — 85027 COMPLETE CBC AUTOMATED: CPT | Mod: ORL

## 2024-04-03 PROCEDURE — P9604 ONE-WAY ALLOW PRORATED TRIP: HCPCS | Mod: ORL

## 2024-04-03 PROCEDURE — 80053 COMPREHEN METABOLIC PANEL: CPT | Mod: ORL

## 2024-04-04 ENCOUNTER — DISCHARGE SUMMARY NURSING HOME (OUTPATIENT)
Dept: GERIATRICS | Facility: CLINIC | Age: 59
End: 2024-04-04
Payer: COMMERCIAL

## 2024-04-04 VITALS
BODY MASS INDEX: 37.74 KG/M2 | TEMPERATURE: 97.9 F | DIASTOLIC BLOOD PRESSURE: 67 MMHG | RESPIRATION RATE: 18 BRPM | WEIGHT: 213 LBS | OXYGEN SATURATION: 95 % | SYSTOLIC BLOOD PRESSURE: 128 MMHG | HEIGHT: 63 IN | HEART RATE: 68 BPM

## 2024-04-04 DIAGNOSIS — E11.9 TYPE 2 DIABETES MELLITUS WITHOUT COMPLICATION, WITH LONG-TERM CURRENT USE OF INSULIN (H): ICD-10-CM

## 2024-04-04 DIAGNOSIS — Z96.651 S/P TOTAL KNEE ARTHROPLASTY, RIGHT: ICD-10-CM

## 2024-04-04 DIAGNOSIS — K75.81 LIVER CIRRHOSIS SECONDARY TO NASH (H): ICD-10-CM

## 2024-04-04 DIAGNOSIS — M17.11 ARTHRITIS OF RIGHT KNEE: ICD-10-CM

## 2024-04-04 DIAGNOSIS — Z79.4 TYPE 2 DIABETES MELLITUS WITHOUT COMPLICATION, WITH LONG-TERM CURRENT USE OF INSULIN (H): ICD-10-CM

## 2024-04-04 DIAGNOSIS — R79.89 ELEVATED LFTS: ICD-10-CM

## 2024-04-04 DIAGNOSIS — E03.9 ACQUIRED HYPOTHYROIDISM: ICD-10-CM

## 2024-04-04 DIAGNOSIS — K74.60 LIVER CIRRHOSIS SECONDARY TO NASH (H): ICD-10-CM

## 2024-04-04 DIAGNOSIS — J01.90 ACUTE BACTERIAL SINUSITIS: Primary | ICD-10-CM

## 2024-04-04 DIAGNOSIS — Z96.651 STATUS POST TOTAL RIGHT KNEE REPLACEMENT: ICD-10-CM

## 2024-04-04 DIAGNOSIS — B96.89 ACUTE BACTERIAL SINUSITIS: Primary | ICD-10-CM

## 2024-04-04 DIAGNOSIS — E11.9 TYPE 2 DIABETES MELLITUS WITHOUT COMPLICATION, WITHOUT LONG-TERM CURRENT USE OF INSULIN (H): ICD-10-CM

## 2024-04-04 DIAGNOSIS — K76.82 HEPATIC ENCEPHALOPATHY (H): ICD-10-CM

## 2024-04-04 LAB
ALBUMIN SERPL BCG-MCNC: 3.5 G/DL (ref 3.5–5.2)
ALP SERPL-CCNC: 169 U/L (ref 40–150)
ALT SERPL W P-5'-P-CCNC: 17 U/L (ref 0–50)
ANION GAP SERPL CALCULATED.3IONS-SCNC: 9 MMOL/L (ref 7–15)
AST SERPL W P-5'-P-CCNC: 44 U/L (ref 0–45)
BILIRUB SERPL-MCNC: 1.5 MG/DL
BUN SERPL-MCNC: 7.8 MG/DL (ref 8–23)
CALCIUM SERPL-MCNC: 9.1 MG/DL (ref 8.6–10)
CHLORIDE SERPL-SCNC: 105 MMOL/L (ref 98–107)
CREAT SERPL-MCNC: 0.58 MG/DL (ref 0.51–0.95)
DEPRECATED HCO3 PLAS-SCNC: 26 MMOL/L (ref 22–29)
EGFRCR SERPLBLD CKD-EPI 2021: >90 ML/MIN/1.73M2
GLUCOSE SERPL-MCNC: 128 MG/DL (ref 70–99)
POTASSIUM SERPL-SCNC: 4.2 MMOL/L (ref 3.4–5.3)
PROT SERPL-MCNC: 7.4 G/DL (ref 6.4–8.3)
SODIUM SERPL-SCNC: 140 MMOL/L (ref 135–145)

## 2024-04-04 PROCEDURE — 99316 NF DSCHRG MGMT 30 MIN+: CPT

## 2024-04-04 NOTE — LETTER
4/4/2024        RE: Sudheer Montiel  [address] [address]        [facility] GERIATRICS DISCHARGE SUMMARY  PATIENT'S NAME: Sudheer Montiel  YOB: 1965  MEDICAL RECORD NUMBER:  [id number]  Place of Service where encounter took place[facility]  [facility] [[id number]]    PRIMARY CARE PROVIDER AND CLINIC RESPONSIBLE AFTER TRANSFER:   DARWIN Dinh CNP, [address] / [place] [address]    [facility] [place]     Transferring providers: DARWIN Dinh CNP, Leroy Mckenzie MD  Recent Hospitalization/ED:  [facility] stay 8/11/23 to 8/21/23.  Date of SNF Admission: August 21, 2023  Date of SNF (anticipated) Discharge: April 05, 2024  Discharged to: new assisted living for patient   Cognitive Scores:  Alert and oriented   Physical Function:  Ambulates independently  DME: No DME needed    CODE STATUS/ADVANCE DIRECTIVES DISCUSSION:  Full code  ALLERGIES: Cephalexin, Morphine, Penicillins, Amoxicillin, Cefprozil, Ceftazidime, Ciprofloxacin, and Percocet [oxycodone-acetaminophen]    NURSING FACILITY COURSE   Medication Changes/Rationale:   Refer to notes    Summary of nursing facility stay:   Patient admitted to [facility] on 8/21/2023 following hospitalization with large right-sided pleural effusion (hepatic hydrothorax) s/p chest tube on 8/11/2023, small right apical pneumothorax and acute hypoxic respiratory failure, resolved now.  She subsequently transferred to LTC for ongoing care.  Past medical history significant for George cirrhosis, type 2 diabetes mellitus, hypothyroidism, s/p right total knee arthroplasty on 12/18/23, among others.    Today: Patient being seen for discharge orders from the facility.  She is discharging to an assisted living facility.  Patient doing well post recent right total knee arthroplasty in December 2023.  Independently ambulating without assistive devices.   Alert and oriented.  Recently treated for possible acute bacterial sinus infection.  Stable respiratory status today.  She is pleased that finally discharging to [facility].  All questions and concerns addressed.  She knows that she will follow-up with her new PCP at the [facility].  Staff without concerns.    Assessment and Plan:  Acute bacterial sinus infection, stable  -Treated with doxycycline x 5 days in 2/2024, CXR showed mild pulmonary vascular congestion on 2/8,  stable respiratory status today, no chest pain or shortness of breath, hemodynamically stable  -PCP follow up    S/p right total knee arthroplasty   Degenerative arthritis of the right knee   -Incision healed, no pain  -Continue current care, Ortho follow-up      REYES cirrhosis decompensated  Elevated LFTs , stable  -ALT 17, AST 44,  on 4/3   -Continue Lasix, spironolactone, rifaximin, and lactulose, follow CMP, hepatology follow-up     Type 2 diabetes mellitus  PVD  -A1c 6.1 in 12/2023, BG stable  -Continue Lantus, mealtime aspart, and weekly Ozempic, continue aspirin, not on statin, monitor BG and adjust medications as indicated     Anxiety  Depression   Insomnia  -Mood stable  -Continue Lexapro and melatonin, monitor mood and behavior, monitor sleep quality      Ascending aortic dilation, incidental findings  -BP stable  -Outpatient follow-up     GERD  -Continue PPI, monitor for heartburn     Hypothyroidism  -TSH 0.53 in 10/2023  -Continue PTA levothyroxine, periodic TSH      Tobacco use  -Encourage smoking cessation        Discharge Medications:  MED REC REQUIRED  Post Medication Reconciliation Status: discharge medications reconciled and changed, per note/orders     Current Outpatient Medications   Medication Sig Dispense Refill     aspirin (ASA) 325 MG EC tablet Take 1 tablet (325 mg) by mouth daily for 30 days 30 tablet 0     celecoxib (CELEBREX) 200 MG capsule Take 1 capsule (200 mg) by mouth daily for 30 days 30 capsule 0     escitalopram  (LEXAPRO) 10 MG tablet Take 2 tablets (20 mg) by mouth every morning for 30 days 60 tablet 0     furosemide (LASIX) 20 MG tablet Take 2.5 tablets (50 mg) by mouth daily for 30 days 75 tablet 0     lactulose 20 GM/30ML solution Take 30 mLs (20 g) by mouth 3 times daily for 30 days 2700 mL 0     levothyroxine (SYNTHROID/LEVOTHROID) 100 MCG tablet Take 1 tablet (100 mcg) by mouth daily for 30 days 30 tablet 0     methocarbamol (ROBAXIN) 750 MG tablet Take 1 tablet (750 mg) by mouth every 6 hours as needed for muscle spasms 30 tablet 0     omeprazole (PRILOSEC) 10 MG DR capsule Take 1 capsule (10 mg) by mouth daily for 30 days 30 capsule 0     thyroid (ARMOUR) 15 MG tablet Take 1 tablet (15 mg) by mouth daily for 30 days 30 tablet 0     valACYclovir (VALTREX) 500 MG tablet Take 1 tablet (500 mg) by mouth daily as needed (antiviral) 30 tablet 0     XIFAXAN 550 MG TABS tablet Take 1 tablet (550 mg) by mouth 2 times daily for 30 days 60 tablet 0     acetaminophen (TYLENOL) 325 MG tablet Take 2 tablets (650 mg) by mouth every 8 hours as needed for mild pain or other (and adjunct with moderate or severe pain or per patient request)       blood glucose (NO BRAND SPECIFIED) test strip Use to test blood sugar 1 times daily or as directed. Glucocard Vital 200 strip 3     Continuous Blood Gluc Sensor (FREESTYLE DIMITRIOS 3 SENSOR) MISC 1 Device continuous prn (change every 14 days) 2 each 5     guaiFENesin 400 MG TABS Take 1 tablet by mouth every 4 hours as needed (COVID)       hydrOXYzine HCl (ATARAX) 25 MG tablet Take 1 tablet (25 mg) by mouth every 6 hours as needed for itching or anxiety (with pain, moderate pain) 30 tablet 0     insulin aspart (NOVOLOG PEN) 100 UNIT/ML pen Inject 3 Units Subcutaneous 3 times daily (with meals) Hold for blood sugar of less than 200, 3 mL 0     insulin glargine (LANTUS PEN) 100 UNIT/ML pen Inject 36 Units Subcutaneous daily 10.8 mL 0     insulin pen needle (B-D U/F) 31G X 5 MM miscellaneous Use 4  "pen needles daily or as directed. 400 each 3     insulin pen needle (B-D U/F) 31G X 5 MM miscellaneous Use 1 pen needles daily or as directed. 100 each 3     MELATONIN PO Take 2 mg by mouth at bedtime       nicotine (COMMIT) 2 MG lozenge Place 2 mg inside cheek every hour as needed for smoking cessation (Patient not taking: Reported on 2/13/2024)       ondansetron (ZOFRAN) 4 MG tablet Take 4 mg by mouth every 8 hours as needed for nausea       oxyCODONE (ROXICODONE) 5 MG tablet Take 1 tablet (5 mg) by mouth every 4 hours as needed 186 tablet 0     semaglutide (OZEMPIC) 2 MG/1.5ML SOPN pen Inject 0.5 mg weekly 6 mL 1     senna-docusate (SENOKOT-S/PERICOLACE) 8.6-50 MG tablet Take 1 tablet by mouth 2 times daily as needed for constipation (Patient not taking: Reported on 2/13/2024)       spironolactone (ALDACTONE) 100 MG tablet Take 100 mg by mouth daily       STATIN NOT PRESCRIBED (INTENTIONAL) Please choose reason not prescribed from choices below.       thyroid (ARMOUR) 60 MG tablet Take 1-tablet by mouth daily as directed 90 tablet 1      Controlled medications:   not applicable/none     Past Medical History:   Past Medical History:   Diagnosis Date     Abnormal liver CT      Collapsed lung 08/2023     Hypothyroid 80's     Necrotizing fasciitis (H)      Pleural effusion      Primary osteoarthritis of both knees      Soft tissue infection      Subcortical infarction (H)      Type 2 diabetes mellitus (H)      Unspecified cirrhosis of liver (H)      Physical Exam:   Vitals: /67   Pulse 68   Temp 97.9  F (36.6  C)   Resp 18   Ht 1.6 m (5' 3\")   Wt 96.6 kg (213 lb)   SpO2 95%   BMI 37.73 kg/m    BMI: Body mass index is 37.73 kg/m .    Exam:  GENERAL APPEARANCE: NAD, alert and awake  RESPIRATORY: Diminished at bases, even and unlabored, symmetrical chest wall expansion  CARDIOVASCULAR: RRR, nonpitting peripheral edema, S1/S2 normal   GASTROINTESTINAL: Soft, nontender, nondistended, bowel sounds active all " quadrants  NEUROLOGICAL: Alert and oriented   PSYCHOLOGICAL: Calm, cooperative    SNF labs: Labs done in SNF are in [place] EPIC. Please refer to them using EPIC/Care Everywhere.    DISCHARGE PLAN:  Follow up labs: Defer to PCP  Medical Follow Up:     Follow up with primary care provider in 1 week  Current [place] scheduled appointments:  Appointments in Next Year      Apr 04, 2024 12:30 PM  Discharge Summary with DARWIN Dinh CNP  [facility] ([facility] ) [phone]     May 10, 2024  9:30 AM  (Arrive by 9:15 AM)  US ABDOMEN LIMITED with EICUS1  [facility] ([facility] [place]) [phone]     Jun 18, 2024 11:30 AM  (Arrive by 11:15 AM)  RETURN DIABETES with Kirk Christianson MD  [facility] [place] ([facility] ) [phone]      Discharge Services: No therapy or home care recommended.   Discharge Instructions Verbalized to Patient at Discharge:   Monitor blood glucose 4 times a day. Keep a record and bring it to your next primary provider visit.   Continue to follow your diet:  no added salt  Carbohydrate Controlled Diet  2000ml fluid restriction.     TOTAL DISCHARGE TIME:   Greater than 30 minutes  Electronically signed by:  Bennie Wiggins DNP,TIFFANY,NILOP-BC.             The above note was completed in part using Dragon voice recognition software. Although reviewed after completion, some word and grammatical errors may occur. Please contact the author of this note with any questions.                   Sincerely,        TIFFANY Dinh CNP

## 2024-04-04 NOTE — PROGRESS NOTES
[facility] GERIATRICS DISCHARGE SUMMARY  PATIENT'S NAME: Sudheer Montiel  YOB: 1965  MEDICAL RECORD NUMBER:  [id number]  Place of Service where encounter took place:  [facility] () [[id number]]    PRIMARY CARE PROVIDER AND CLINIC RESPONSIBLE AFTER TRANSFER:   DARWIN Dinh CNP, [address] / [place] [address]    [facility] Provider     Transferring providers: DARWIN Dinh CNP, Leroy Mckenzie MD  Recent Hospitalization/ED:  [facility] stay 8/11/23 to 8/21/23.  Date of SNF Admission: August 21, 2023  Date of SNF (anticipated) Discharge: April 05, 2024  Discharged to: new assisted living for patient   Cognitive Scores:  Alert and oriented   Physical Function:  Ambulates independently  DME: No DME needed    CODE STATUS/ADVANCE DIRECTIVES DISCUSSION:  Full code  ALLERGIES: Cephalexin, Morphine, Penicillins, Amoxicillin, Cefprozil, Ceftazidime, Ciprofloxacin, and Percocet [oxycodone-acetaminophen]    NURSING FACILITY COURSE   Medication Changes/Rationale:   Refer to notes    Summary of nursing facility stay:   [facility] following hospitalization with large right-sided pleural effusion (hepatic hydrothorax) s/p chest tube on 8/11/2023, small right apical pneumothorax and acute hypoxic respiratory failure, resolved now.  She subsequently transferred to LTC for ongoing care.  Past medical history significant for George cirrhosis, type 2 diabetes mellitus, hypothyroidism, s/p right total knee arthroplasty on 12/18/23, among others.    Today: Patient being seen for discharge orders from the facility.  She is discharging to an assisted living facility.  Patient doing well post recent right total knee arthroplasty in December 2023.  Independently ambulating without assistive devices.  Alert and oriented.  Recently treated for possible acute bacterial sinus infection.  Stable  respiratory status today.  She is pleased that finally discharging to [facility].  All questions and concerns addressed.  She knows that she will follow-up with her new PCP at the [facility].  Staff without concerns.    Assessment and Plan:  Acute bacterial sinus infection, stable  -Treated with doxycycline x 5 days in 2/2024, CXR showed mild pulmonary vascular congestion on 2/8,  stable respiratory status today, no chest pain or shortness of breath, hemodynamically stable  -PCP follow up    S/p right total knee arthroplasty   Degenerative arthritis of the right knee   -Incision healed, no pain  -Continue current care, Ortho follow-up      REYES cirrhosis decompensated  Elevated LFTs , stable  -ALT 17, AST 44,  on 4/3   -Continue Lasix, spironolactone, rifaximin, and lactulose, follow CMP, hepatology follow-up     Type 2 diabetes mellitus  PVD  -A1c 6.1 in 12/2023, BG stable  -Continue Lantus, mealtime aspart, and weekly Ozempic, continue aspirin, not on statin, monitor BG and adjust medications as indicated     Anxiety  Depression   Insomnia  -Mood stable  -Continue Lexapro and melatonin, monitor mood and behavior, monitor sleep quality      Ascending aortic dilation, incidental findings  -BP stable  -Outpatient follow-up     GERD  -Continue PPI, monitor for heartburn     Hypothyroidism  -TSH 0.53 in 10/2023  -Continue PTA levothyroxine, periodic TSH      Tobacco use  -Encourage smoking cessation        Discharge Medications:  MED REC REQUIRED  Post Medication Reconciliation Status: discharge medications reconciled and changed, per note/orders     Current Outpatient Medications   Medication Sig Dispense Refill    aspirin (ASA) 325 MG EC tablet Take 1 tablet (325 mg) by mouth daily for 30 days 30 tablet 0    celecoxib (CELEBREX) 200 MG capsule Take 1 capsule (200 mg) by mouth daily for 30 days 30 capsule 0    escitalopram (LEXAPRO) 10 MG tablet Take 2 tablets (20 mg) by mouth every morning for 30 days 60 tablet 0     furosemide (LASIX) 20 MG tablet Take 2.5 tablets (50 mg) by mouth daily for 30 days 75 tablet 0    lactulose 20 GM/30ML solution Take 30 mLs (20 g) by mouth 3 times daily for 30 days 2700 mL 0    levothyroxine (SYNTHROID/LEVOTHROID) 100 MCG tablet Take 1 tablet (100 mcg) by mouth daily for 30 days 30 tablet 0    methocarbamol (ROBAXIN) 750 MG tablet Take 1 tablet (750 mg) by mouth every 6 hours as needed for muscle spasms 30 tablet 0    omeprazole (PRILOSEC) 10 MG DR capsule Take 1 capsule (10 mg) by mouth daily for 30 days 30 capsule 0    thyroid (ARMOUR) 15 MG tablet Take 1 tablet (15 mg) by mouth daily for 30 days 30 tablet 0    valACYclovir (VALTREX) 500 MG tablet Take 1 tablet (500 mg) by mouth daily as needed (antiviral) 30 tablet 0    XIFAXAN 550 MG TABS tablet Take 1 tablet (550 mg) by mouth 2 times daily for 30 days 60 tablet 0    acetaminophen (TYLENOL) 325 MG tablet Take 2 tablets (650 mg) by mouth every 8 hours as needed for mild pain or other (and adjunct with moderate or severe pain or per patient request)      blood glucose (NO BRAND SPECIFIED) test strip Use to test blood sugar 1 times daily or as directed. Glucocard Vital 200 strip 3    Continuous Blood Gluc Sensor (FREESTYLE DIMITRIOS 3 SENSOR) [facility] 1 Device continuous prn (change every 14 days) 2 each 5    guaiFENesin 400 MG TABS Take 1 tablet by mouth every 4 hours as needed (COVID)      hydrOXYzine HCl (ATARAX) 25 MG tablet Take 1 tablet (25 mg) by mouth every 6 hours as needed for itching or anxiety (with pain, moderate pain) 30 tablet 0    insulin aspart (NOVOLOG PEN) 100 UNIT/ML pen Inject 3 Units Subcutaneous 3 times daily (with meals) Hold for blood sugar of less than 200, 3 mL 0    insulin glargine (LANTUS PEN) 100 UNIT/ML pen Inject 36 Units Subcutaneous daily 10.8 mL 0    insulin pen needle (B-D U/F) 31G X 5 MM miscellaneous Use 4 pen needles daily or as directed. 400 each 3    insulin pen needle (B-D U/F) 31G X 5 MM miscellaneous Use 1 pen  "needles daily or as directed. 100 each 3    MELATONIN PO Take 2 mg by mouth at bedtime      nicotine (COMMIT) 2 MG lozenge Place 2 mg inside cheek every hour as needed for smoking cessation (Patient not taking: Reported on 2/13/2024)      ondansetron (ZOFRAN) 4 MG tablet Take 4 mg by mouth every 8 hours as needed for nausea      oxyCODONE (ROXICODONE) 5 MG tablet Take 1 tablet (5 mg) by mouth every 4 hours as needed 186 tablet 0    semaglutide (OZEMPIC) 2 MG/1.5ML SOPN pen Inject 0.5 mg weekly 6 mL 1    senna-docusate (SENOKOT-S/PERICOLACE) 8.6-50 MG tablet Take 1 tablet by mouth 2 times daily as needed for constipation (Patient not taking: Reported on 2/13/2024)      spironolactone (ALDACTONE) 100 MG tablet Take 100 mg by mouth daily      STATIN NOT PRESCRIBED (INTENTIONAL) Please choose reason not prescribed from choices below.      thyroid (ARMOUR) 60 MG tablet Take 1-tablet by mouth daily as directed 90 tablet 1      Controlled medications:   not applicable/none     Past Medical History:   Past Medical History:   Diagnosis Date    Abnormal liver CT     Collapsed lung 08/2023    Hypothyroid 80's    Necrotizing fasciitis (H)     Pleural effusion     Primary osteoarthritis of both knees     Soft tissue infection     Subcortical infarction (H)     Type 2 diabetes mellitus (H)     Unspecified cirrhosis of liver (H)      Physical Exam:   Vitals: /67   Pulse 68   Temp 97.9  F (36.6  C)   Resp 18   Ht 1.6 m (5' 3\")   Wt 96.6 kg (213 lb)   SpO2 95%   BMI 37.73 kg/m    BMI: Body mass index is 37.73 kg/m .    Exam:  GENERAL APPEARANCE: NAD, alert and awake  RESPIRATORY: Diminished at bases, even and unlabored, symmetrical chest wall expansion  CARDIOVASCULAR: RRR, nonpitting peripheral edema, S1/S2 normal   GASTROINTESTINAL: Soft, nontender, nondistended, bowel sounds active all quadrants  NEUROLOGICAL: Alert and oriented   PSYCHOLOGICAL: Calm, cooperative    SNF labs: Labs done in SNF are in [place] EPIC. " Please refer to them using [facility]/Care Everywhere.    DISCHARGE PLAN:  Follow up labs: Defer to PCP  Medical Follow Up:     Follow up with primary care provider in 1 week  [facility] scheduled appointments:  Appointments in Next Year      Apr 04, 2024 12:30 PM  Discharge Summary with DARWIN Dinh CNP  [facility] ([facility] ) [phone]     May 10, 2024  9:30 AM  (Arrive by 9:15 AM)  US ABDOMEN LIMITED with EICUS1  [facility] ([facility]) [phone]     Jun 18, 2024 11:30 AM  (Arrive by 11:15 AM)  RETURN DIABETES with Kirk Christianson MD  [facility] [place] ([facility] ) [phone]      Discharge Services: No therapy or home care recommended.   Discharge Instructions Verbalized to Patient at Discharge:   Monitor blood glucose 4 times a day. Keep a record and bring it to your next primary provider visit.   Continue to follow your diet:  no added salt  Carbohydrate Controlled Diet  2000ml fluid restriction.     TOTAL DISCHARGE TIME:   Greater than 30 minutes  Electronically signed by:  Bennie Wiggins DNP,TIFFANY,NILOP-BC.             The above note was completed in part using Dragon voice recognition software. Although reviewed after completion, some word and grammatical errors may occur. Please contact the author of this note with any questions.

## 2024-04-05 ENCOUNTER — MEDICAL CORRESPONDENCE (OUTPATIENT)
Dept: HEALTH INFORMATION MANAGEMENT | Facility: CLINIC | Age: 59
End: 2024-04-05
Payer: MEDICAID

## 2024-04-05 RX ORDER — METHOCARBAMOL 750 MG/1
750 TABLET, FILM COATED ORAL EVERY 6 HOURS PRN
Qty: 30 TABLET | Refills: 0 | Status: SHIPPED | OUTPATIENT
Start: 2024-04-05 | End: 2024-05-05

## 2024-04-05 RX ORDER — THYROID 15 MG/1
15 TABLET ORAL DAILY
Qty: 30 TABLET | Refills: 0 | Status: SHIPPED | OUTPATIENT
Start: 2024-04-05 | End: 2024-04-26

## 2024-04-05 RX ORDER — LEVOTHYROXINE SODIUM 100 UG/1
100 TABLET ORAL DAILY
Qty: 30 TABLET | Refills: 0 | Status: SHIPPED | OUTPATIENT
Start: 2024-04-05 | End: 2024-04-26

## 2024-04-05 RX ORDER — INSULIN ASPART 100 [IU]/ML
3 INJECTION, SOLUTION INTRAVENOUS; SUBCUTANEOUS
Qty: 2.7 ML | Refills: 0 | Status: SHIPPED | OUTPATIENT
Start: 2024-04-05 | End: 2024-04-11

## 2024-04-05 RX ORDER — ESCITALOPRAM OXALATE 10 MG/1
20 TABLET ORAL EVERY MORNING
Qty: 60 TABLET | Refills: 0 | Status: SHIPPED | OUTPATIENT
Start: 2024-04-05 | End: 2024-05-05

## 2024-04-05 RX ORDER — FUROSEMIDE 20 MG
50 TABLET ORAL DAILY
Qty: 75 TABLET | Refills: 0 | Status: ON HOLD | OUTPATIENT
Start: 2024-04-05 | End: 2024-04-20

## 2024-04-05 RX ORDER — ASPIRIN 325 MG
325 TABLET, DELAYED RELEASE (ENTERIC COATED) ORAL DAILY
Qty: 30 TABLET | Refills: 0 | Status: SHIPPED | OUTPATIENT
Start: 2024-04-05 | End: 2024-05-05

## 2024-04-05 RX ORDER — RIFAXIMIN 550 MG/1
550 TABLET ORAL
Qty: 60 TABLET | Refills: 0 | Status: SHIPPED | OUTPATIENT
Start: 2024-04-05 | End: 2024-05-05

## 2024-04-05 RX ORDER — VALACYCLOVIR HYDROCHLORIDE 500 MG/1
500 TABLET, FILM COATED ORAL DAILY PRN
Qty: 30 TABLET | Refills: 0 | Status: SHIPPED | OUTPATIENT
Start: 2024-04-05 | End: 2024-05-05

## 2024-04-05 RX ORDER — LACTULOSE 20 G/30ML
20 SOLUTION ORAL 3 TIMES DAILY
Qty: 2700 ML | Refills: 0 | Status: SHIPPED | OUTPATIENT
Start: 2024-04-05 | End: 2024-05-05

## 2024-04-05 RX ORDER — OMEPRAZOLE 10 MG/1
10 CAPSULE, DELAYED RELEASE ORAL DAILY
Qty: 30 CAPSULE | Refills: 0 | Status: SHIPPED | OUTPATIENT
Start: 2024-04-05 | End: 2024-05-05

## 2024-04-05 RX ORDER — CELECOXIB 200 MG/1
200 CAPSULE ORAL DAILY
Qty: 30 CAPSULE | Refills: 0 | Status: SHIPPED | OUTPATIENT
Start: 2024-04-05 | End: 2024-05-05

## 2024-04-08 ENCOUNTER — TELEPHONE (OUTPATIENT)
Dept: GERIATRICS | Facility: CLINIC | Age: 59
End: 2024-04-08
Payer: MEDICAID

## 2024-04-08 NOTE — TELEPHONE ENCOUNTER
Prior Authorization Retail Medication Request    Medication/Dose: Xifaxan 550mg tablet  ICD code (if different than what is on RX):  K75.81, K74.60  Previously Tried and Failed:  Lactulose  Rationale:  Take 550mg by mouth twice daily    Insurance Name:  [employer] [facility]  Insurance ID:  [id number]      Pharmacy Information (if different than what is on RX)  Name:   [facility], [address] [place] [address]  Phone:  [phone]

## 2024-04-08 NOTE — TELEPHONE ENCOUNTER
PA Initiation    Medication: XIFAXAN 550 MG PO TABS  Insurance Company: [employer] - Phone [phone] Fax [phone]  Pharmacy Filling the Rx: [id number] - [place], [place] - [address] [facility] [address]  Filling Pharmacy Phone: [phone]  Filling Pharmacy Fax:    Start Date: 4/8/2024  Retail Pharmacy Prior Authorization Team   Phone: [phone]

## 2024-04-09 NOTE — TELEPHONE ENCOUNTER
Prior Authorization Approval    Medication: XIFAXAN 550 MG PO TABS  Authorization Effective Date: 4/8/2024  Authorization Expiration Date: 4/8/2025  Approved Dose/Quantity:   Reference #:     Insurance Company: Luis - Phone [phone] Fax [phone]  Expected CoPay: $    CoPay Card Available:      Financial Assistance Needed: No  Which Pharmacy is filling the prescription: [id number] - [place], [place] - [address] AT [facility] [address]  Pharmacy Notified: Yes  Patient Notified: Pharmacy will notify patient.

## 2024-04-11 ENCOUNTER — TELEPHONE (OUTPATIENT)
Dept: ENDOCRINOLOGY | Facility: CLINIC | Age: 59
End: 2024-04-11
Payer: MEDICAID

## 2024-04-11 DIAGNOSIS — E11.9 TYPE 2 DIABETES MELLITUS WITHOUT COMPLICATION, WITHOUT LONG-TERM CURRENT USE OF INSULIN (H): ICD-10-CM

## 2024-04-11 DIAGNOSIS — K76.82 HEPATIC ENCEPHALOPATHY (H): ICD-10-CM

## 2024-04-11 RX ORDER — BLOOD-GLUCOSE SENSOR
1 EACH MISCELLANEOUS CONTINUOUS PRN
Qty: 2 EACH | Refills: 5 | Status: SHIPPED | OUTPATIENT
Start: 2024-04-11 | End: 2024-04-26

## 2024-04-11 NOTE — TELEPHONE ENCOUNTER
Last Written Prescription Date:  23  Last Fill Quantity: 2,  # refills: 5   Last office visit: 2024   Future Office Visit: 24     Requested Prescriptions   Pending Prescriptions Disp Refills    Continuous Blood Gluc Sensor (FREESTYLE DIMITRIOS 3 SENSOR) MISC 2 each 5     Si Device continuous prn (change every 14 days)       There is no refill protocol information for this order        Refills sent  Samantha Marie RN

## 2024-04-11 NOTE — TELEPHONE ENCOUNTER
Medication Refill    What medication are you calling about (include dose and sig)?:   insulin aspart (NOVOLOG VIAL) 100 UNITS/ML vial   insulin glargine (LANTUS PEN) 100 UNIT/ML pen  semaglutide (OZEMPIC) 2 MG/1.5ML SOPN pen      Preferred Pharmacy:  [facility] - [place], [place] - [address] [facility]  [address] [facility] [address]  Phone: [phone] Fax: [phone]    Controlled Substance Agreement on file:   CSA -- Patient Level:    CSA: None found at the patient level.     Who prescribed the medication?:   Bennie Wiggins APRN CNP     Do you need a refill? Yes    When did you use the medication last?   Sounds like the pt hasn't been taking her insulin.  May need to have a nurse call for more information.     Okay to leave a detailed message?: Yes  Facility phone #[phone]    Cami Coleman on 4/11/2024 at 4:04 PM  [facility]

## 2024-04-11 NOTE — TELEPHONE ENCOUNTER
Medication Refill    What medication are you calling about (include dose and sig)?:   blood glucose (NO BRAND SPECIFIED) test strip     NOT PENDED - Need Lancets ordered and sent to pharmacy pended.     Preferred Pharmacy:  [id number] - [place], [place] - [address] AT [facility] [address]  P: [phone]  F: [phone]  Address    [address]   [place] [address]   Store number: [id number]   Near the intersection of: [facility] [address]    Operation       Hours: Not open 24 hours   E-Prescribing   E-Prescribing controlled substances   Mode: Retail   Type: External      Controlled Substance Agreement on file:   CSA -- Patient Level:    CSA: None found at the patient level.       Who prescribed the medication?:   Kirk Christianson MD     PCP is Bennie Wiggins    Do you need a refill? Yes    When did you use the medication last?   OUT OF SUPPLIES    Patient offered an appointment? No    Do you have any questions or concerns?  No    Could we send this information to you in [facility] or would you prefer to receive a phone call?:   Patient would prefer a phone call     Okay to leave a detailed message?: Yes  Facility phone #[phone]    Cami Coleman on 4/11/2024 at 3:57 PM

## 2024-04-11 NOTE — TELEPHONE ENCOUNTER
Health Call Center    Phone Message    May a detailed message be left on voicemail: yes     Reason for Call: Medication Refill Request    Has the patient contacted the pharmacy for the refill? Yes   Name of medication being requested: Chandni 3 sensors  Provider who prescribed the medication: Sammy  Pharmacy: [facility] on [address] and [facility] [address]  Phone number: [phone]  Date medication is needed: ASAP  Patient moved facilities and needs her Chandni 3 sensors sent to this new pharmacy. Please send new prescription and call patient to advise.

## 2024-04-12 DIAGNOSIS — E11.9 TYPE 2 DIABETES MELLITUS WITHOUT COMPLICATION, WITHOUT LONG-TERM CURRENT USE OF INSULIN (H): Primary | ICD-10-CM

## 2024-04-12 RX ORDER — INSULIN ASPART 100 [IU]/ML
3 INJECTION, SOLUTION INTRAVENOUS; SUBCUTANEOUS
Qty: 2.7 ML | Refills: 0 | Status: SHIPPED | OUTPATIENT
Start: 2024-04-12 | End: 2024-04-12

## 2024-04-16 ENCOUNTER — APPOINTMENT (OUTPATIENT)
Dept: CT IMAGING | Facility: CLINIC | Age: 59
End: 2024-04-16
Attending: STUDENT IN AN ORGANIZED HEALTH CARE EDUCATION/TRAINING PROGRAM
Payer: COMMERCIAL

## 2024-04-16 ENCOUNTER — APPOINTMENT (OUTPATIENT)
Dept: GENERAL RADIOLOGY | Facility: CLINIC | Age: 59
End: 2024-04-16
Attending: STUDENT IN AN ORGANIZED HEALTH CARE EDUCATION/TRAINING PROGRAM
Payer: COMMERCIAL

## 2024-04-16 ENCOUNTER — HOSPITAL ENCOUNTER (INPATIENT)
Facility: CLINIC | Age: 59
LOS: 2 days | Discharge: HOME | End: 2024-04-18
Attending: EMERGENCY MEDICINE | Admitting: INTERNAL MEDICINE
Payer: COMMERCIAL

## 2024-04-16 DIAGNOSIS — R06.00 DYSPNEA, UNSPECIFIED TYPE: ICD-10-CM

## 2024-04-16 DIAGNOSIS — E87.6 HYPOKALEMIA: ICD-10-CM

## 2024-04-16 DIAGNOSIS — J90 LARGE PLEURAL EFFUSION: ICD-10-CM

## 2024-04-16 LAB
ALBUMIN SERPL BCG-MCNC: 3 G/DL (ref 3.5–5.2)
ALP SERPL-CCNC: 180 U/L (ref 40–150)
ALT SERPL W P-5'-P-CCNC: 20 U/L (ref 0–50)
ANION GAP SERPL CALCULATED.3IONS-SCNC: 14 MMOL/L (ref 7–15)
AST SERPL W P-5'-P-CCNC: 36 U/L (ref 0–45)
ATRIAL RATE - MUSE: 70 BPM
BASE EXCESS BLDV CALC-SCNC: 2.1 MMOL/L (ref -3–3)
BASOPHILS # BLD AUTO: 0 10E3/UL (ref 0–0.2)
BASOPHILS NFR BLD AUTO: 0 %
BILIRUB SERPL-MCNC: 1.4 MG/DL
BUN SERPL-MCNC: 10.3 MG/DL (ref 8–23)
CALCIUM SERPL-MCNC: 8.5 MG/DL (ref 8.6–10)
CHLORIDE SERPL-SCNC: 104 MMOL/L (ref 98–107)
CREAT SERPL-MCNC: 0.55 MG/DL (ref 0.51–0.95)
D DIMER PPP FEU-MCNC: 2.01 UG/ML FEU (ref 0–0.5)
DEPRECATED HCO3 PLAS-SCNC: 21 MMOL/L (ref 22–29)
DIASTOLIC BLOOD PRESSURE - MUSE: NORMAL MMHG
EGFRCR SERPLBLD CKD-EPI 2021: >90 ML/MIN/1.73M2
EOSINOPHIL # BLD AUTO: 0.1 10E3/UL (ref 0–0.7)
EOSINOPHIL NFR BLD AUTO: 1 %
ERYTHROCYTE [DISTWIDTH] IN BLOOD BY AUTOMATED COUNT: 15.9 % (ref 10–15)
FLUAV RNA SPEC QL NAA+PROBE: NEGATIVE
FLUBV RNA RESP QL NAA+PROBE: NEGATIVE
GLUCOSE SERPL-MCNC: 267 MG/DL (ref 70–99)
HCO3 BLDV-SCNC: 28 MMOL/L (ref 21–28)
HCT VFR BLD AUTO: 41.7 % (ref 35–47)
HGB BLD-MCNC: 13.3 G/DL (ref 11.7–15.7)
IMM GRANULOCYTES # BLD: 0 10E3/UL
IMM GRANULOCYTES NFR BLD: 0 %
INTERPRETATION ECG - MUSE: NORMAL
LACTATE SERPL-SCNC: 1.9 MMOL/L (ref 0.7–2)
LACTATE SERPL-SCNC: 3 MMOL/L (ref 0.7–2)
LYMPHOCYTES # BLD AUTO: 0.7 10E3/UL (ref 0.8–5.3)
LYMPHOCYTES NFR BLD AUTO: 12 %
MCH RBC QN AUTO: 30.2 PG (ref 26.5–33)
MCHC RBC AUTO-ENTMCNC: 31.9 G/DL (ref 31.5–36.5)
MCV RBC AUTO: 95 FL (ref 78–100)
MONOCYTES # BLD AUTO: 0.5 10E3/UL (ref 0–1.3)
MONOCYTES NFR BLD AUTO: 9 %
NEUTROPHILS # BLD AUTO: 4.6 10E3/UL (ref 1.6–8.3)
NEUTROPHILS NFR BLD AUTO: 78 %
NRBC # BLD AUTO: 0 10E3/UL
NRBC BLD AUTO-RTO: 0 /100
NT-PROBNP SERPL-MCNC: 41 PG/ML (ref 0–900)
O2/TOTAL GAS SETTING VFR VENT: 2 %
OXYHGB MFR BLDV: 59 % (ref 70–75)
P AXIS - MUSE: 21 DEGREES
PCO2 BLDV: 45 MM HG (ref 40–50)
PH BLDV: 7.39 [PH] (ref 7.32–7.43)
PLATELET # BLD AUTO: 89 10E3/UL (ref 150–450)
PO2 BLDV: 33 MM HG (ref 25–47)
POTASSIUM SERPL-SCNC: 2.9 MMOL/L (ref 3.4–5.3)
PR INTERVAL - MUSE: 154 MS
PROT SERPL-MCNC: 6.5 G/DL (ref 6.4–8.3)
QRS DURATION - MUSE: 94 MS
QT - MUSE: 428 MS
QTC - MUSE: 462 MS
R AXIS - MUSE: -7 DEGREES
RBC # BLD AUTO: 4.41 10E6/UL (ref 3.8–5.2)
RSV RNA SPEC NAA+PROBE: NEGATIVE
SAO2 % BLDV: 61 % (ref 70–75)
SARS-COV-2 RNA RESP QL NAA+PROBE: NEGATIVE
SODIUM SERPL-SCNC: 139 MMOL/L (ref 135–145)
SYSTOLIC BLOOD PRESSURE - MUSE: NORMAL MMHG
T AXIS - MUSE: 42 DEGREES
VENTRICULAR RATE- MUSE: 70 BPM
WBC # BLD AUTO: 6 10E3/UL (ref 4–11)

## 2024-04-16 PROCEDURE — 82247 BILIRUBIN TOTAL: CPT | Performed by: PHYSICIAN ASSISTANT

## 2024-04-16 PROCEDURE — 250N000011 HC RX IP 250 OP 636: Performed by: STUDENT IN AN ORGANIZED HEALTH CARE EDUCATION/TRAINING PROGRAM

## 2024-04-16 PROCEDURE — 96374 THER/PROPH/DIAG INJ IV PUSH: CPT | Mod: 59

## 2024-04-16 PROCEDURE — 71275 CT ANGIOGRAPHY CHEST: CPT

## 2024-04-16 PROCEDURE — 250N000013 HC RX MED GY IP 250 OP 250 PS 637: Performed by: STUDENT IN AN ORGANIZED HEALTH CARE EDUCATION/TRAINING PROGRAM

## 2024-04-16 PROCEDURE — 85025 COMPLETE CBC W/AUTO DIFF WBC: CPT | Performed by: EMERGENCY MEDICINE

## 2024-04-16 PROCEDURE — 99223 1ST HOSP IP/OBS HIGH 75: CPT | Performed by: INTERNAL MEDICINE

## 2024-04-16 PROCEDURE — 84075 ASSAY ALKALINE PHOSPHATASE: CPT | Performed by: PHYSICIAN ASSISTANT

## 2024-04-16 PROCEDURE — 36415 COLL VENOUS BLD VENIPUNCTURE: CPT | Performed by: STUDENT IN AN ORGANIZED HEALTH CARE EDUCATION/TRAINING PROGRAM

## 2024-04-16 PROCEDURE — 120N000001 HC R&B MED SURG/OB

## 2024-04-16 PROCEDURE — 96376 TX/PRO/DX INJ SAME DRUG ADON: CPT

## 2024-04-16 PROCEDURE — 82040 ASSAY OF SERUM ALBUMIN: CPT | Performed by: EMERGENCY MEDICINE

## 2024-04-16 PROCEDURE — 94640 AIRWAY INHALATION TREATMENT: CPT

## 2024-04-16 PROCEDURE — 80048 BASIC METABOLIC PNL TOTAL CA: CPT | Performed by: PHYSICIAN ASSISTANT

## 2024-04-16 PROCEDURE — 83036 HEMOGLOBIN GLYCOSYLATED A1C: CPT | Performed by: INTERNAL MEDICINE

## 2024-04-16 PROCEDURE — 99285 EMERGENCY DEPT VISIT HI MDM: CPT | Mod: 25

## 2024-04-16 PROCEDURE — 83605 ASSAY OF LACTIC ACID: CPT | Performed by: STUDENT IN AN ORGANIZED HEALTH CARE EDUCATION/TRAINING PROGRAM

## 2024-04-16 PROCEDURE — 0W993ZZ DRAINAGE OF RIGHT PLEURAL CAVITY, PERCUTANEOUS APPROACH: ICD-10-PCS | Performed by: STUDENT IN AN ORGANIZED HEALTH CARE EDUCATION/TRAINING PROGRAM

## 2024-04-16 PROCEDURE — 83880 ASSAY OF NATRIURETIC PEPTIDE: CPT | Performed by: EMERGENCY MEDICINE

## 2024-04-16 PROCEDURE — 36415 COLL VENOUS BLD VENIPUNCTURE: CPT | Performed by: EMERGENCY MEDICINE

## 2024-04-16 PROCEDURE — 71045 X-RAY EXAM CHEST 1 VIEW: CPT

## 2024-04-16 PROCEDURE — 82805 BLOOD GASES W/O2 SATURATION: CPT | Performed by: EMERGENCY MEDICINE

## 2024-04-16 PROCEDURE — 250N000009 HC RX 250: Performed by: STUDENT IN AN ORGANIZED HEALTH CARE EDUCATION/TRAINING PROGRAM

## 2024-04-16 PROCEDURE — 84460 ALANINE AMINO (ALT) (SGPT): CPT | Performed by: PHYSICIAN ASSISTANT

## 2024-04-16 PROCEDURE — 84155 ASSAY OF PROTEIN SERUM: CPT | Performed by: STUDENT IN AN ORGANIZED HEALTH CARE EDUCATION/TRAINING PROGRAM

## 2024-04-16 PROCEDURE — 87637 SARSCOV2&INF A&B&RSV AMP PRB: CPT | Performed by: EMERGENCY MEDICINE

## 2024-04-16 PROCEDURE — 93005 ELECTROCARDIOGRAM TRACING: CPT

## 2024-04-16 PROCEDURE — 85379 FIBRIN DEGRADATION QUANT: CPT | Performed by: STUDENT IN AN ORGANIZED HEALTH CARE EDUCATION/TRAINING PROGRAM

## 2024-04-16 RX ORDER — IOPAMIDOL 755 MG/ML
76 INJECTION, SOLUTION INTRAVASCULAR ONCE
Status: COMPLETED | OUTPATIENT
Start: 2024-04-16 | End: 2024-04-16

## 2024-04-16 RX ORDER — FUROSEMIDE 10 MG/ML
40 INJECTION INTRAMUSCULAR; INTRAVENOUS ONCE
Status: COMPLETED | OUTPATIENT
Start: 2024-04-16 | End: 2024-04-16

## 2024-04-16 RX ORDER — MAGNESIUM SULFATE HEPTAHYDRATE 40 MG/ML
2 INJECTION, SOLUTION INTRAVENOUS ONCE
Status: COMPLETED | OUTPATIENT
Start: 2024-04-16 | End: 2024-04-17

## 2024-04-16 RX ORDER — POTASSIUM CHLORIDE 7.45 MG/ML
10 INJECTION INTRAVENOUS ONCE
Status: COMPLETED | OUTPATIENT
Start: 2024-04-16 | End: 2024-04-17

## 2024-04-16 RX ORDER — POTASSIUM CHLORIDE 1.5 G/1.58G
40 POWDER, FOR SOLUTION ORAL ONCE
Status: COMPLETED | OUTPATIENT
Start: 2024-04-16 | End: 2024-04-16

## 2024-04-16 RX ORDER — LIDOCAINE 40 MG/G
CREAM TOPICAL
Status: CANCELLED | OUTPATIENT
Start: 2024-04-16

## 2024-04-16 RX ORDER — IPRATROPIUM BROMIDE AND ALBUTEROL SULFATE 2.5; .5 MG/3ML; MG/3ML
3 SOLUTION RESPIRATORY (INHALATION) ONCE
Status: COMPLETED | OUTPATIENT
Start: 2024-04-16 | End: 2024-04-16

## 2024-04-16 RX ADMIN — POTASSIUM CHLORIDE 40 MEQ: 1.5 POWDER, FOR SOLUTION ORAL at 20:55

## 2024-04-16 RX ADMIN — IOPAMIDOL 76 ML: 755 INJECTION, SOLUTION INTRAVENOUS at 21:10

## 2024-04-16 RX ADMIN — IPRATROPIUM BROMIDE AND ALBUTEROL SULFATE 3 ML: .5; 3 SOLUTION RESPIRATORY (INHALATION) at 20:28

## 2024-04-16 RX ADMIN — FUROSEMIDE 40 MG: 10 INJECTION, SOLUTION INTRAVENOUS at 20:23

## 2024-04-16 RX ADMIN — FUROSEMIDE 40 MG: 10 INJECTION, SOLUTION INTRAMUSCULAR; INTRAVENOUS at 21:49

## 2024-04-16 RX ADMIN — SODIUM CHLORIDE 90 ML: 9 INJECTION, SOLUTION INTRAVENOUS at 21:10

## 2024-04-16 ASSESSMENT — ACTIVITIES OF DAILY LIVING (ADL)
ADLS_ACUITY_SCORE: 38

## 2024-04-16 ASSESSMENT — COLUMBIA-SUICIDE SEVERITY RATING SCALE - C-SSRS
2. HAVE YOU ACTUALLY HAD ANY THOUGHTS OF KILLING YOURSELF IN THE PAST MONTH?: NO
1. IN THE PAST MONTH, HAVE YOU WISHED YOU WERE DEAD OR WISHED YOU COULD GO TO SLEEP AND NOT WAKE UP?: NO
6. HAVE YOU EVER DONE ANYTHING, STARTED TO DO ANYTHING, OR PREPARED TO DO ANYTHING TO END YOUR LIFE?: NO

## 2024-04-16 NOTE — ED TRIAGE NOTES
Patient BIBA from apartment after markedly increased SOB, dyspnea on exertion, and has felt febrile which initially started 3 days ago.     Triage Assessment (Adult)       Row Name 04/16/24 0156          Triage Assessment    Airway WDL WDL        Respiratory WDL    Respiratory WDL X;rhythm/pattern;cough     Rhythm/Pattern, Respiratory tachypneic;shortness of breath     Cough Frequency infrequent        Skin Circulation/Temperature WDL    Skin Circulation/Temperature WDL WDL        Cardiac WDL    Cardiac WDL WDL        Peripheral/Neurovascular WDL    Peripheral Neurovascular WDL WDL        Cognitive/Neuro/Behavioral WDL    Cognitive/Neuro/Behavioral WDL WDL

## 2024-04-16 NOTE — ED NOTES
Bed: ED10  Expected date:   Expected time:   Means of arrival:   Comments:  442  58 F sob/poss pneumonia/fever/cough  1830

## 2024-04-16 NOTE — ED PROVIDER NOTES
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 Per EMS, the patient has been short of breath and weak for three days. They report that she is short of breath and has a dry cough. En route, her oxygen saturation was 92% on room air so she was put on 2 L NC. Her heart rate was 79, respiratory rate was 18, and her blood pressure was 146/78. Her blood glucose was 225 but the patient has not taken any insulin this evening. The patient reports that she has had orthopnea and wheezing. She adds that her left lower extremity is more edematous than normal and notes that she has not taken her furosemide in a few days. She reports that her symptoms now are different than when she was admitted for a hepatic hydrothorax last year. No known sick exposures. Denies history of COPD, use of supplemental oxygen at baseline, recent surgeries, and use of hormonal medication. Denies vomiting.    Independent Historian:    EMS - They report supplemented information above    Review of External Notes:  I reviewed her discharge summary from 8/21/2023 for her large right side hepatic hydrothorax.    Allergies:  Cephalexin  Morphine  Penicillins  Amoxicillin  Cefprozil  Ceftazidime  Ciprofloxacin  Percocet [Oxycodone-Acetaminophen]     Physical Exam   Patient Vitals for the past 24 hrs:   BP Temp Temp src Pulse Resp SpO2   04/16/24 2311 121/70 -- -- 83 -- --   04/16/24 2143 -- -- -- 76 12 94 %   04/16/24 2128 -- -- -- 89 25 90 %   04/16/24 2058 129/70 -- -- 77 21 --   04/16/24 1957 -- -- -- 70 19 96 %   04/16/24 1927 -- 98.7  F (37.1  C) Temporal -- 28 --   04/16/24 1921 (!) 147/69 -- -- 74 24 96 %   04/16/24 1836 (!) 159/71 98.3  F (36.8  C) Oral 74 22 95 %      Physical Exam  GENERAL: Patient well-appearing. Patient becomes winded when  talking.  HEAD: Atraumatic.  NECK: No rigidity  CV: RRR, no murmurs rubs or gallops  PULM: Diminished breath sounds on the right lower base, but clear in the upper lobe. Ronchi on the right side.  Mild to moderate tachypnea.  ABD: Soft, nontender, nondistended, no guarding  DERM: No rash. Skin warm and dry  EXTREMITY: Moving all extremities without difficulty. No calf tenderness. Left leg is more swollen than her right leg. Left lower extremity edema.  No calf tenderness or erythema.  VASCULAR: Symmetric pulses bilaterally    Emergency Department Course   ECG  ECG interpreted by me.  Time 1907  NSR at 70. No ST elevation or depression. Normal intervals. Normal axis.   No evidence of WPW, Brugada, HOCM, ARVD, ASD, or Wellen's.    Laboratory: Imaging:   Labs Ordered and Resulted from Time of ED Arrival to Time of ED Departure   COMPREHENSIVE METABOLIC PANEL - Abnormal       Result Value    Sodium 139      Potassium 2.9 (*)     Carbon Dioxide (CO2) 21 (*)     Anion Gap 14      Urea Nitrogen 10.3      Creatinine 0.55      GFR Estimate >90      Calcium 8.5 (*)     Chloride 104      Glucose 267 (*)     Alkaline Phosphatase 180 (*)     AST 36      ALT 20      Protein Total 6.5      Albumin 3.0 (*)     Bilirubin Total 1.4 (*)    BLOOD GAS VENOUS - Abnormal    pH Venous 7.39      pCO2 Venous 45      pO2 Venous 33      Bicarbonate Venous 28      Base Excess/Deficit Venous 2.1      FIO2 2      Oxyhemoglobin Venous 59 (*)     O2 Sat, Venous 61.0 (*)    LACTIC ACID WHOLE BLOOD - Abnormal    Lactic Acid 3.0 (*)    D DIMER QUANTITATIVE - Abnormal    D-Dimer Quantitative 2.01 (*)    CBC WITH PLATELETS AND DIFFERENTIAL - Abnormal    WBC Count 6.0      RBC Count 4.41      Hemoglobin 13.3      Hematocrit 41.7      MCV 95      MCH 30.2      MCHC 31.9      RDW 15.9 (*)     Platelet Count 89 (*)     % Neutrophils 78      % Lymphocytes 12      % Monocytes 9      % Eosinophils 1      % Basophils 0      % Immature Granulocytes 0      NRBCs  per 100 WBC 0      Absolute Neutrophils 4.6      Absolute Lymphocytes 0.7 (*)     Absolute Monocytes 0.5      Absolute Eosinophils 0.1      Absolute Basophils 0.0      Absolute Immature Granulocytes 0.0      Absolute NRBCs 0.0     INFLUENZA A/B, RSV, & SARS-COV2 PCR - Normal    Influenza A PCR Negative      Influenza B PCR Negative      RSV PCR Negative      SARS CoV2 PCR Negative     NT PROBNP INPATIENT - Normal    N terminal Pro BNP Inpatient 41     LACTIC ACID WHOLE BLOOD - Normal    Lactic Acid 1.9       CT Chest Pulmonary Embolism w Contrast   Final Result   IMPRESSION:   1.  No PE, dissection, or aneurysm.      2.  Large right pleural effusion with associated atelectasis.      3.  Mild to moderate scattered nonspecific groundglass infiltrate seen in both lungs and these are likely inflammatory in nature.      4.  Since 08/11/2023 there is less marked atelectasis in the right lung and the bilateral groundglass infiltrates are new.      XR Chest Port 1 View   Final Result   IMPRESSION: Cardiac size is obscured by large right effusion. Increased consolidation within the right mid lung which may be related to atelectasis and effusion. Pneumonia not excluded. Slight interstitial edema. No left effusion.              Procedures:   Thoracentesis       Procedure: Thoracentesis     Indication: Right Pleural effusion  Therapeutic    Consent: Written from Patient     Universal protocol: Universal protocol was followed and time out conducted just prior to starting procedure, confirming patient identity, site/side, procedure, patient position, and availability of correct equipment and implants.     Anesthesia/Sedation: Lidocaine - 1% -10 cc 1% lidocaine utilized.    Preparation: Chlorhexidine    Ultrasound guidance: Yes, see separate procedural guidance POCUS note     Procedure detail: A thoracentesis angiocatheter was inserted into the right pleural space.  Ultrasound was utilized to localize this pocket of fluid in the  right posterior midline.    Results: There was drainage of approximately 1500 mL. The fluid was Clear yellow. The catheter was removed and an appropriate dressing applied.     Patient status: The patient tolerated the procedure well: Yes. There were no complications.    Soft Tissue Ultrasound     Procedure Name: POC Ultrasound Soft Tissue Exam    Indication: Right pleural effusion.    Views: Right posterior lung window.    Findings: Large right-sided pleural fluid collection.    Impression: Large right-sided pleural fluid collection.    Study performed by: Dung Reina MD     Images archived: No    Emergency Department Course & Assessments:     Interventions:  Medications   potassium chloride 10 mEq in 100 mL sterile water infusion (has no administration in time range)   potassium chloride 10 mEq in 100 mL sterile water infusion (has no administration in time range)   magnesium sulfate 2 g in 50 mL sterile water intermittent infusion (has no administration in time range)   ipratropium - albuterol 0.5 mg/2.5 mg/3 mL (DUONEB) neb solution 3 mL (3 mLs Nebulization $Given 4/16/24 2028)   furosemide (LASIX) injection 40 mg (40 mg Intravenous $Given 4/16/24 2023)   iopamidol (ISOVUE-370) solution 76 mL (76 mLs Intravenous $Given 4/16/24 2110)   Saline (90 mLs Intravenous $Given 4/16/24 2110)   potassium chloride (KLOR-CON) Packet 40 mEq (40 mEq Oral $Given 4/16/24 2055)   furosemide (LASIX) injection 40 mg (40 mg Intravenous $Given 4/16/24 2149)        Assessments, Independent Interpretation, Consult/Discussion of ManagementTests:  ED Course as of 04/16/24 2330   Tue Apr 16, 2024   1840 I obtained history and examined the patient as noted above     2212 I spoke with Dr. Dickey, hospitalist regarding admission   2221 I rechecked the patient and explained findings.         Social Determinants of Health affecting care:  None    Disposition:  The patient was admitted to the hospital under the care of Dr. Dickey.      Impression & Plan         MIPS (If applicable):  CT for PE was ordered because the patient had an abnormal d-dimer.     Medical Decision Making:  Patient presenting with shortness of breath.  Chronic conditions complicating-hepatic hydrothorax.  Differential diagnosis-considered pneumonia, PE, exudative effusion.  Patient requiring minimal supplemental oxygen but does appear very labored with any type of movement and sounds very labored with talking.  I gave patient 80mg of IV Lasix.  She had urine output but was persistently dyspneic.  Did try contacting IR but they were in a procedure.  Due to her persistent dyspnea and not wanting her to worsen overnight and having history of a rather abrupt onset rapid fluid accumulation prior requiring chest tube, elected to perform bedside thoracentesis.  1.5 L of fluid removed and patient did have much improvement in symptoms.  Reassessed multiple times and she was much improved.  During her workup she had an elevated D-dimer which was obtained due to her reported left lower extremity swelling which is not present in the right side.  D-dimer was elevated.  CT PE negative for PE, but does show the large fluid collection.  Initial lactate was elevated but repeat within normal limits.  Does not appear to be sepsis.  Will hold off on antibiotics.  Repleted electrolytes.  Discussed with hospitalist and patient admitted.      Critical Care:  None.    Diagnosis:    ICD-10-CM    1. Large pleural effusion  J90       2. Dyspnea, unspecified type  R06.00       3. Hypokalemia  E87.6             Scribe Disclosure:  IAlexandrea, am serving as a scribe at 6:57 PM on 4/16/2024 to document services personally performed by Dung Reina MD based on my observations and the provider's statements to me.    Scribe Disclosure:  Yudy SAINI, am serving as a scribe  at 8:00 PM on 4/16/2024 to document services personally performed by Dung Reina MD based on my observations  and the provider's statements to me.    4/16/2024   Dung Reina MD Foss, Kevin, MD  04/17/24 0006

## 2024-04-17 ENCOUNTER — APPOINTMENT (OUTPATIENT)
Dept: GENERAL RADIOLOGY | Facility: CLINIC | Age: 59
End: 2024-04-17
Attending: INTERNAL MEDICINE
Payer: COMMERCIAL

## 2024-04-17 ENCOUNTER — APPOINTMENT (OUTPATIENT)
Dept: ULTRASOUND IMAGING | Facility: CLINIC | Age: 59
End: 2024-04-17
Attending: PHYSICIAN ASSISTANT
Payer: COMMERCIAL

## 2024-04-17 LAB
% LINING CELLS, BODY FLUID: 22 %
ALBUMIN SERPL BCG-MCNC: 2.9 G/DL (ref 3.5–5.2)
ALP SERPL-CCNC: 166 U/L (ref 40–150)
ALT SERPL W P-5'-P-CCNC: 20 U/L (ref 0–50)
ANION GAP SERPL CALCULATED.3IONS-SCNC: 17 MMOL/L (ref 7–15)
ANION GAP SERPL CALCULATED.3IONS-SCNC: 17 MMOL/L (ref 7–15)
APPEARANCE FLD: CLEAR
AST SERPL W P-5'-P-CCNC: ABNORMAL U/L
BILIRUB SERPL-MCNC: 1.3 MG/DL
BUN SERPL-MCNC: 9 MG/DL (ref 8–23)
BUN SERPL-MCNC: 9 MG/DL (ref 8–23)
CALCIUM SERPL-MCNC: 8.2 MG/DL (ref 8.6–10)
CALCIUM SERPL-MCNC: 8.2 MG/DL (ref 8.6–10)
CELL COUNT BODY FLUID SOURCE: NORMAL
CHLORIDE SERPL-SCNC: 103 MMOL/L (ref 98–107)
CHLORIDE SERPL-SCNC: 103 MMOL/L (ref 98–107)
COLOR FLD: YELLOW
CREAT SERPL-MCNC: 0.49 MG/DL (ref 0.51–0.95)
CREAT SERPL-MCNC: 0.49 MG/DL (ref 0.51–0.95)
DEPRECATED HCO3 PLAS-SCNC: 17 MMOL/L (ref 22–29)
DEPRECATED HCO3 PLAS-SCNC: 17 MMOL/L (ref 22–29)
EGFRCR SERPLBLD CKD-EPI 2021: >90 ML/MIN/1.73M2
EGFRCR SERPLBLD CKD-EPI 2021: >90 ML/MIN/1.73M2
ERYTHROCYTE [DISTWIDTH] IN BLOOD BY AUTOMATED COUNT: 15.9 % (ref 10–15)
GLUCOSE BLDC GLUCOMTR-MCNC: 105 MG/DL (ref 70–99)
GLUCOSE BLDC GLUCOMTR-MCNC: 133 MG/DL (ref 70–99)
GLUCOSE BLDC GLUCOMTR-MCNC: 144 MG/DL (ref 70–99)
GLUCOSE BLDC GLUCOMTR-MCNC: 156 MG/DL (ref 70–99)
GLUCOSE BLDC GLUCOMTR-MCNC: 157 MG/DL (ref 70–99)
GLUCOSE BODY FLUID SOURCE: NORMAL
GLUCOSE FLD-MCNC: 210 MG/DL
GLUCOSE SERPL-MCNC: 131 MG/DL (ref 70–99)
GLUCOSE SERPL-MCNC: 131 MG/DL (ref 70–99)
HBA1C MFR BLD: 5.8 %
HCT VFR BLD AUTO: 39.4 % (ref 35–47)
HGB BLD-MCNC: 12.6 G/DL (ref 11.7–15.7)
INR PPP: 1.3 (ref 0.85–1.15)
LD BODY BODY FLUID SOURCE: NORMAL
LDH FLD L TO P-CCNC: 48 U/L
LDH SERPL L TO P-CCNC: 218 U/L (ref 0–250)
LYMPHOCYTES NFR FLD MANUAL: 33 %
MCH RBC QN AUTO: 30.2 PG (ref 26.5–33)
MCHC RBC AUTO-ENTMCNC: 32 G/DL (ref 31.5–36.5)
MCV RBC AUTO: 95 FL (ref 78–100)
MONOS+MACROS NFR FLD MANUAL: 34 %
NEUTS BAND NFR FLD MANUAL: 11 %
PLATELET # BLD AUTO: 84 10E3/UL (ref 150–450)
POTASSIUM SERPL-SCNC: 3 MMOL/L (ref 3.4–5.3)
POTASSIUM SERPL-SCNC: 3.4 MMOL/L (ref 3.4–5.3)
PROT FLD-MCNC: 0.8 G/DL
PROT SERPL-MCNC: 6.3 G/DL (ref 6.4–8.3)
PROT SERPL-MCNC: 6.4 G/DL (ref 6.4–8.3)
PROTEIN BODY FLUID SOURCE: NORMAL
RBC # BLD AUTO: 4.17 10E6/UL (ref 3.8–5.2)
SODIUM SERPL-SCNC: 137 MMOL/L (ref 135–145)
SODIUM SERPL-SCNC: 137 MMOL/L (ref 135–145)
WBC # BLD AUTO: 7.8 10E3/UL (ref 4–11)
WBC # FLD AUTO: 259 /UL

## 2024-04-17 PROCEDURE — 99233 SBSQ HOSP IP/OBS HIGH 50: CPT | Performed by: PHYSICIAN ASSISTANT

## 2024-04-17 PROCEDURE — 120N000001 HC R&B MED SURG/OB

## 2024-04-17 PROCEDURE — 82962 GLUCOSE BLOOD TEST: CPT

## 2024-04-17 PROCEDURE — 250N000012 HC RX MED GY IP 250 OP 636 PS 637: Performed by: INTERNAL MEDICINE

## 2024-04-17 PROCEDURE — 84157 ASSAY OF PROTEIN OTHER: CPT | Performed by: STUDENT IN AN ORGANIZED HEALTH CARE EDUCATION/TRAINING PROGRAM

## 2024-04-17 PROCEDURE — 76705 ECHO EXAM OF ABDOMEN: CPT

## 2024-04-17 PROCEDURE — 82945 GLUCOSE OTHER FLUID: CPT | Performed by: STUDENT IN AN ORGANIZED HEALTH CARE EDUCATION/TRAINING PROGRAM

## 2024-04-17 PROCEDURE — 250N000013 HC RX MED GY IP 250 OP 250 PS 637: Performed by: PHYSICIAN ASSISTANT

## 2024-04-17 PROCEDURE — 250N000013 HC RX MED GY IP 250 OP 250 PS 637: Performed by: INTERNAL MEDICINE

## 2024-04-17 PROCEDURE — 36415 COLL VENOUS BLD VENIPUNCTURE: CPT | Performed by: PHYSICIAN ASSISTANT

## 2024-04-17 PROCEDURE — 83615 LACTATE (LD) (LDH) ENZYME: CPT | Performed by: STUDENT IN AN ORGANIZED HEALTH CARE EDUCATION/TRAINING PROGRAM

## 2024-04-17 PROCEDURE — 89050 BODY FLUID CELL COUNT: CPT | Performed by: STUDENT IN AN ORGANIZED HEALTH CARE EDUCATION/TRAINING PROGRAM

## 2024-04-17 PROCEDURE — 36415 COLL VENOUS BLD VENIPUNCTURE: CPT | Performed by: STUDENT IN AN ORGANIZED HEALTH CARE EDUCATION/TRAINING PROGRAM

## 2024-04-17 PROCEDURE — 250N000011 HC RX IP 250 OP 636: Performed by: STUDENT IN AN ORGANIZED HEALTH CARE EDUCATION/TRAINING PROGRAM

## 2024-04-17 PROCEDURE — 84132 ASSAY OF SERUM POTASSIUM: CPT | Performed by: PHYSICIAN ASSISTANT

## 2024-04-17 PROCEDURE — 71045 X-RAY EXAM CHEST 1 VIEW: CPT

## 2024-04-17 PROCEDURE — 85610 PROTHROMBIN TIME: CPT | Performed by: STUDENT IN AN ORGANIZED HEALTH CARE EDUCATION/TRAINING PROGRAM

## 2024-04-17 PROCEDURE — 85027 COMPLETE CBC AUTOMATED: CPT | Performed by: PHYSICIAN ASSISTANT

## 2024-04-17 PROCEDURE — 87205 SMEAR GRAM STAIN: CPT | Performed by: STUDENT IN AN ORGANIZED HEALTH CARE EDUCATION/TRAINING PROGRAM

## 2024-04-17 PROCEDURE — 87070 CULTURE OTHR SPECIMN AEROBIC: CPT | Performed by: STUDENT IN AN ORGANIZED HEALTH CARE EDUCATION/TRAINING PROGRAM

## 2024-04-17 RX ORDER — LIDOCAINE 40 MG/G
CREAM TOPICAL
Status: DISCONTINUED | OUTPATIENT
Start: 2024-04-17 | End: 2024-04-18 | Stop reason: HOSPADM

## 2024-04-17 RX ORDER — NICOTINE POLACRILEX 4 MG
15-30 LOZENGE BUCCAL
Status: DISCONTINUED | OUTPATIENT
Start: 2024-04-17 | End: 2024-04-17

## 2024-04-17 RX ORDER — SEMAGLUTIDE 0.68 MG/ML
0.5 INJECTION, SOLUTION SUBCUTANEOUS
Status: ON HOLD | COMMUNITY
End: 2024-04-19

## 2024-04-17 RX ORDER — POTASSIUM CHLORIDE 1500 MG/1
40 TABLET, EXTENDED RELEASE ORAL ONCE
Status: COMPLETED | OUTPATIENT
Start: 2024-04-17 | End: 2024-04-17

## 2024-04-17 RX ORDER — DEXTROSE MONOHYDRATE 25 G/50ML
25-50 INJECTION, SOLUTION INTRAVENOUS
Status: DISCONTINUED | OUTPATIENT
Start: 2024-04-17 | End: 2024-04-17

## 2024-04-17 RX ORDER — THYROID 15 MG/1
15 TABLET ORAL DAILY
COMMUNITY
End: 2024-04-17

## 2024-04-17 RX ORDER — AMOXICILLIN 250 MG
1 CAPSULE ORAL 2 TIMES DAILY PRN
Status: DISCONTINUED | OUTPATIENT
Start: 2024-04-17 | End: 2024-04-18 | Stop reason: HOSPADM

## 2024-04-17 RX ORDER — LACTULOSE 20 G/30ML
20 SOLUTION ORAL 3 TIMES DAILY
Status: DISCONTINUED | OUTPATIENT
Start: 2024-04-17 | End: 2024-04-17

## 2024-04-17 RX ORDER — SPIRONOLACTONE 25 MG/1
100 TABLET ORAL DAILY
Status: DISCONTINUED | OUTPATIENT
Start: 2024-04-17 | End: 2024-04-18 | Stop reason: HOSPADM

## 2024-04-17 RX ORDER — DEXTROSE MONOHYDRATE 25 G/50ML
25-50 INJECTION, SOLUTION INTRAVENOUS
Status: DISCONTINUED | OUTPATIENT
Start: 2024-04-17 | End: 2024-04-18 | Stop reason: HOSPADM

## 2024-04-17 RX ORDER — ESCITALOPRAM OXALATE 10 MG/1
20 TABLET ORAL EVERY MORNING
Status: DISCONTINUED | OUTPATIENT
Start: 2024-04-17 | End: 2024-04-18 | Stop reason: HOSPADM

## 2024-04-17 RX ORDER — ACETAMINOPHEN 650 MG/1
650 SUPPOSITORY RECTAL EVERY 4 HOURS PRN
Status: DISCONTINUED | OUTPATIENT
Start: 2024-04-17 | End: 2024-04-18 | Stop reason: HOSPADM

## 2024-04-17 RX ORDER — LEVOTHYROXINE SODIUM 100 UG/1
100 TABLET ORAL DAILY
Status: DISCONTINUED | OUTPATIENT
Start: 2024-04-18 | End: 2024-04-18 | Stop reason: HOSPADM

## 2024-04-17 RX ORDER — NICOTINE POLACRILEX 4 MG
15-30 LOZENGE BUCCAL
Status: DISCONTINUED | OUTPATIENT
Start: 2024-04-17 | End: 2024-04-18 | Stop reason: HOSPADM

## 2024-04-17 RX ORDER — CALCIUM CARBONATE 500 MG/1
1000 TABLET, CHEWABLE ORAL 4 TIMES DAILY PRN
Status: DISCONTINUED | OUTPATIENT
Start: 2024-04-17 | End: 2024-04-18 | Stop reason: HOSPADM

## 2024-04-17 RX ORDER — ONDANSETRON 4 MG/1
4 TABLET, ORALLY DISINTEGRATING ORAL EVERY 6 HOURS PRN
Status: DISCONTINUED | OUTPATIENT
Start: 2024-04-17 | End: 2024-04-18 | Stop reason: HOSPADM

## 2024-04-17 RX ORDER — ACETAMINOPHEN 325 MG/1
650 TABLET ORAL EVERY 4 HOURS PRN
Status: DISCONTINUED | OUTPATIENT
Start: 2024-04-17 | End: 2024-04-18 | Stop reason: HOSPADM

## 2024-04-17 RX ORDER — ASPIRIN 325 MG
325 TABLET, DELAYED RELEASE (ENTERIC COATED) ORAL DAILY
Status: DISCONTINUED | OUTPATIENT
Start: 2024-04-18 | End: 2024-04-18 | Stop reason: HOSPADM

## 2024-04-17 RX ORDER — LACTULOSE 10 G/15ML
20 SOLUTION ORAL 3 TIMES DAILY PRN
Status: DISCONTINUED | OUTPATIENT
Start: 2024-04-17 | End: 2024-04-17

## 2024-04-17 RX ORDER — AMOXICILLIN 250 MG
2 CAPSULE ORAL 2 TIMES DAILY PRN
Status: DISCONTINUED | OUTPATIENT
Start: 2024-04-17 | End: 2024-04-18 | Stop reason: HOSPADM

## 2024-04-17 RX ORDER — PANTOPRAZOLE SODIUM 40 MG/1
40 TABLET, DELAYED RELEASE ORAL DAILY
Status: DISCONTINUED | OUTPATIENT
Start: 2024-04-17 | End: 2024-04-18 | Stop reason: HOSPADM

## 2024-04-17 RX ORDER — THYROID 15 MG/1
15 TABLET ORAL DAILY
Status: DISCONTINUED | OUTPATIENT
Start: 2024-04-18 | End: 2024-04-18 | Stop reason: HOSPADM

## 2024-04-17 RX ORDER — POTASSIUM CHLORIDE 1500 MG/1
20 TABLET, EXTENDED RELEASE ORAL ONCE
Status: COMPLETED | OUTPATIENT
Start: 2024-04-17 | End: 2024-04-17

## 2024-04-17 RX ORDER — LACTULOSE 10 G/15ML
20 SOLUTION ORAL 3 TIMES DAILY
Status: DISCONTINUED | OUTPATIENT
Start: 2024-04-17 | End: 2024-04-18 | Stop reason: HOSPADM

## 2024-04-17 RX ORDER — ONDANSETRON 2 MG/ML
4 INJECTION INTRAMUSCULAR; INTRAVENOUS EVERY 6 HOURS PRN
Status: DISCONTINUED | OUTPATIENT
Start: 2024-04-17 | End: 2024-04-18 | Stop reason: HOSPADM

## 2024-04-17 RX ADMIN — ESCITALOPRAM OXALATE 20 MG: 10 TABLET ORAL at 10:39

## 2024-04-17 RX ADMIN — MAGNESIUM SULFATE HEPTAHYDRATE 2 G: 40 INJECTION, SOLUTION INTRAVENOUS at 00:10

## 2024-04-17 RX ADMIN — POTASSIUM CHLORIDE 10 MEQ: 7.46 INJECTION, SOLUTION INTRAVENOUS at 00:57

## 2024-04-17 RX ADMIN — INSULIN GLARGINE 15 UNITS: 100 INJECTION, SOLUTION SUBCUTANEOUS at 12:08

## 2024-04-17 RX ADMIN — POTASSIUM CHLORIDE 40 MEQ: 1500 TABLET, EXTENDED RELEASE ORAL at 16:03

## 2024-04-17 RX ADMIN — LACTULOSE 20 G: 20 SOLUTION ORAL at 11:39

## 2024-04-17 RX ADMIN — ACETAMINOPHEN 650 MG: 325 TABLET, FILM COATED ORAL at 20:36

## 2024-04-17 RX ADMIN — POTASSIUM CHLORIDE 10 MEQ: 7.46 INJECTION, SOLUTION INTRAVENOUS at 01:46

## 2024-04-17 RX ADMIN — PANTOPRAZOLE SODIUM 40 MG: 40 TABLET, DELAYED RELEASE ORAL at 10:39

## 2024-04-17 RX ADMIN — RIFAXIMIN 550 MG: 550 TABLET ORAL at 19:01

## 2024-04-17 RX ADMIN — SPIRONOLACTONE 100 MG: 25 TABLET ORAL at 12:47

## 2024-04-17 RX ADMIN — LACTULOSE 20 G: 20 SOLUTION ORAL at 21:52

## 2024-04-17 RX ADMIN — INSULIN ASPART 1 UNITS: 100 INJECTION, SOLUTION INTRAVENOUS; SUBCUTANEOUS at 16:07

## 2024-04-17 RX ADMIN — POTASSIUM CHLORIDE 40 MEQ: 1500 TABLET, EXTENDED RELEASE ORAL at 22:54

## 2024-04-17 RX ADMIN — LACTULOSE 20 G: 20 SOLUTION ORAL at 16:03

## 2024-04-17 ASSESSMENT — ACTIVITIES OF DAILY LIVING (ADL)
ADLS_ACUITY_SCORE: 38
ADLS_ACUITY_SCORE: 32
ADLS_ACUITY_SCORE: 38
ADLS_ACUITY_SCORE: 38
ADLS_ACUITY_SCORE: 32
ADLS_ACUITY_SCORE: 38
ADLS_ACUITY_SCORE: 32
ADLS_ACUITY_SCORE: 38
ADLS_ACUITY_SCORE: 32
ADLS_ACUITY_SCORE: 38
ADLS_ACUITY_SCORE: 32
ADLS_ACUITY_SCORE: 38
ADLS_ACUITY_SCORE: 38

## 2024-04-17 NOTE — UTILIZATION REVIEW
Ur        Admission Status; Secondary Review Determination       Under the authority of the Utilization Management Committee, the utilization review process indicated a secondary review on the above patient. The review outcome is based on review of the medical records, discussions with staff, and applying clinical experience noted on the date of the review.     (x) Inpatient Status Appropriate - This patient's medical care is consistent with medical management for inpatient care and reasonable inpatient medical practice.     RATIONALE FOR DETERMINATION     Sudheer Montiel is a 58 year old female with history of nonalcoholic steatohepatitis with cirrhosis, hypothyroidism, type 2 diabetes, stroke, and necrotizing fasciitis. She was hospitalized last August 2023 with hepatic hydrothorax. She needed a chest tube at the time and almost 3 L of fluid was drained. The fluid was transudative. Her echo was normal. She has been doing well since then until 3 days before presentation when she began to develop worsening shortness of breath and dyspnea on exertion. She also had some cough. In the emergency department she was placed on supplemental oxygen. Laboratory evaluation showed potassium 2.9, bicarb 21, total bilirubin 1.4, alkaline phosphatase 180, lactic acid 3 (1.9 on repeat, platelets 89, D-dimer 2.01, and negative testing for COVID/influenza/RSV. Chest x-ray showed right pleural effusion with associated consolidation. CT of chest showed the same. Thoracentesis was done in the ED with removal of 1.5 L of fluid. Fluid analysis showed 259 nucleated cells 11% of which were neutrophils. Chest x-ray the next morning showed only small right pleural effusion. Abdominal ultrasound showed no ascites. Patient has weaned off of supplemental oxygen. Gastroenterology was consulted and made no further recommendations. Avoiding chest tube placement was recommended. Patient is staying a bit longer for diuresis in hopes of keeping fluid  off. Inpatient admission is appropriate for ongoing diuresis and monitoring of volume status, renal function, and electrolytes.       At the time of admission with the information available to the attending physician more than 2 nights Hospital complex care was anticipated, based on patient risk of adverse outcome if treated as outpatient and complex care required. Inpatient admission is appropriate based on the Medicare guidelines.     This document was produced using voice recognition software       The information on this document is developed by the utilization review team in order for the business office to ensure compliance. This only denotes the appropriateness of proper admission status and does not reflect the quality of care rendered.   The definitions of Inpatient Status and Observation Status used in making the determination above are those provided in the CMS Coverage Manual, Chapter 1 and Chapter 6, section 70.4.   Sincerely,     Leroy Baker MD    Utilization Review  Physician Advisor  [facility].

## 2024-04-17 NOTE — H&P
[facility]    History and Physical - Hospitalist Service       Date of Admission:  4/16/2024    Assessment & Plan      Sudheer Montiel is a 58 year old female admitted on 4/16/2024. She presents with shortness of breath    Large R sided pleural effusion  Hepatic hydrothorax  *admission 8/11-21 with large R pleural effusion/ hepatic hydrothorax s/p chest tube. 2.8L drained, transudative in nature. Echo EF 60-65%.   *c/o SOB and weakness x 3 days. Some orthopnea and LE edema, shoulder/ chest pain with breathing. In ED AFVSS. O2 sats low normal off O2. BNP normal. CXR/ CT with large R effusion, mild-mod ground glass bilaterally likely inflammatory in nature.   *underwent thoracentesis of ~1.5 L in ED  - supplemental O2 prn  - resume PTA diuretics as below (lasix when K corrected)  - repeat CXR in am, may need repeat thoracentesis    Hypokalemia  K 2.9 on presentation  - replace per protocol    REYES cirrhosis  Chronic hepatic encephalopathy  GERD  [Needs rec- lasix 50 mg daily, lactulose 20 g TID, omeprazole 10 mg daily, spironolactone 100 mg daily, rifaximin 500 mg BID]  AST/ALT normal, T bili 1.4 (previous 1.5 earlier 4/2024).   - resume spironolactone  - hold lasix until K corrected  - resume other meds with rec  - [facility]    Thrombocytopenia  Platelets 89. Baseline 70-90s previous.   - monitor    DM II  [Needs rec-  mg daily, lantus 36U daily, ozempic weekly, aspart 3U TID with meals]  Follows outpatient with endocrinology. Hgb A1c 6.1% 12/2023  - resume lantus 15 units per pt (poor po so reduced dose)  - resume aspart 3 units TID  - med sliding scale insulin   - TID and HS blood sugars    MDD  Anxiety  Insomnia  - resume escitalopram 20 mg daily with rec    Ascending aorta dilatation  Noted on echo 8/2023, max 4.3.   - outpatient follow up    Hypothyroidism  - resume levothyroxine, thyroid armour with rec    Tobacco use disorder  Smokes 5 cigs/ day  - declines NRT  - encourage  "cessation    Morbid obesity  On ozempic, encourage weight loss and healthy lifestyle        Diet:  regular  DVT Prophylaxis: Ambulate every shift  Crystal Catheter: Not present  Lines: None     Cardiac Monitoring: None  Code Status:  Full    Clinically Significant Risk Factors Present on Admission        # Hypokalemia: Lowest K = 2.9 mmol/L in last 2 days, will replace as needed       # Hypoalbuminemia: Lowest albumin = 3 g/dL at 4/16/2024  6:55 PM, will monitor as appropriate   # Drug Induced Platelet Defect: home medication list includes an antiplatelet medication        # Obesity: Estimated body mass index is 37.73 kg/m  as calculated from the following:    Height as of 4/4/24: 1.6 m (5' 3\").    Weight as of 4/4/24: 96.6 kg (213 lb).       # Financial/Environmental Concerns:           Disposition Plan     Medically Ready for Discharge: Anticipated in 2-4 Days           Leland Dickey MD  Hospitalist Service  [facility]  Securely message with [employer] (more info)  Text page via [facility] Paging/Directory     ______________________________________________________________________    Chief Complaint   Shortness of breath    History is obtained from the patient, electronic health record, and emergency department physician    History of Present Illness   Sudheer Montiel is a 58 year old female who presents with SOB.  She has a history of nonalcoholic steatohepatitis and was hospitalized last August 2023 with hepatic hydrothorax.  She needed a chest tube at the time and almost 3 L was drained.  The fluid was transudative.  Her echo was normal.  She has been doing well since then until last 3 days.  She states she has had worsening shortness of breath.  She has had dyspnea on exertion.  She also notes right shoulder and chest pain with breathing.  She has noted a pretty significant cough.  She had no other chest pain.  She noted some trace edema in her legs.  She has not noticed any new abdominal " swelling.  She has no nausea or vomiting.  She states her stool is loose but this is chronic with her lactulose.  There is no fevers or chills.  In the emergency department she had a bedside thoracentesis done with 1.5 L removed.  She felt significantly better after this was done and her pain in her chest was markedly improved.  Of note she does have what appears to be some chronic hepatic encephalopathy.  She states her mental status waxes and wanes.  She does take lactulose and rifaximin.      Past Medical History    Past Medical History:   Diagnosis Date    Abnormal liver CT     Collapsed lung 2023    Hypothyroid 80's    Necrotizing fasciitis (H)     Pleural effusion     Primary osteoarthritis of both knees     Soft tissue infection     Subcortical infarction (H)     Type 2 diabetes mellitus (H)     Unspecified cirrhosis of liver (H)        Past Surgical History   Past Surgical History:   Procedure Laterality Date    ARTHROPLASTY KNEE Right 2023    Procedure: Right Total Knee Arthroplasty;  Surgeon: Pawan Ewing MD;  Location:  OR     SECTION      COLONOSCOPY N/A 10/21/2015    Procedure: COLONOSCOPY;  Surgeon: Jaky Arredondo MD;  Location:  GI    IRRIGATION AND DEBRIDEMENT ABDOMEN WASHOUT, COMBINED N/A 10/12/2018    Procedure: COMBINED IRRIGATION AND DEBRIDEMENT ABDOMEN WASHOUT;  Irrigation and Debridement of Lower Abdomen, removal of piece of mesh.;  Surgeon: Darlene Hogue MD;  Location: [facility] OR    marisol/bso      due to anemia    ZZC REPAIR CRUCIATE LIGAMENT,KNEE         Prior to Admission Medications   Prior to Admission Medications   Prescriptions Last Dose Informant Patient Reported? Taking?   Continuous Blood Gluc Sensor (FREESTYLE DIMITRIOS 3 SENSOR) MISC   No No   Si Device continuous prn (change every 14 days)   MELATONIN PO  Nursing Home Yes No   Sig: Take 2 mg by mouth at bedtime   STATIN NOT PRESCRIBED (INTENTIONAL)  Nursing Home Yes No   Sig:  Please choose reason not prescribed from choices below.   XIFAXAN 550 MG TABS tablet   No No   Sig: Take 1 tablet (550 mg) by mouth 2 times daily for 30 days   acetaminophen (TYLENOL) 325 MG tablet  Nursing Home Yes No   Sig: Take 2 tablets (650 mg) by mouth every 8 hours as needed for mild pain or other (and adjunct with moderate or severe pain or per patient request)   aspirin (ASA) 325 MG EC tablet   No No   Sig: Take 1 tablet (325 mg) by mouth daily for 30 days   blood glucose (NO BRAND SPECIFIED) test strip   No No   Sig: Use to test blood sugar 1 times daily or as directed. Glucocard Vital   celecoxib (CELEBREX) 200 MG capsule   No No   Sig: Take 1 capsule (200 mg) by mouth daily for 30 days   escitalopram (LEXAPRO) 10 MG tablet   No No   Sig: Take 2 tablets (20 mg) by mouth every morning for 30 days   furosemide (LASIX) 20 MG tablet   No No   Sig: Take 2.5 tablets (50 mg) by mouth daily for 30 days   guaiFENesin 400 MG TABS  Nursing Home Yes No   Sig: Take 1 tablet by mouth every 4 hours as needed (COVID)   hydrOXYzine HCl (ATARAX) 25 MG tablet   No No   Sig: Take 1 tablet (25 mg) by mouth every 6 hours as needed for itching or anxiety (with pain, moderate pain)   insulin aspart (NOVOLOG PEN) 100 UNIT/ML pen   No No   Sig: Inject 3 Units Subcutaneous 3 times daily (with meals) Hold for blood sugar of less than 200,   insulin glargine (LANTUS PEN) 100 UNIT/ML pen   No No   Sig: Inject 36 Units Subcutaneous daily   insulin pen needle (B-D U/F) 31G X 5 MM [facility] No No   Sig: Use 1 pen needles daily or as directed.   insulin pen needle (B-D U/F) 31G X 5 MM [facility] No No   Sig: Use 4 pen needles daily or as directed.   lactulose 20 GM/30ML solution   No No   Sig: Take 30 mLs (20 g) by mouth 3 times daily for 30 days   levothyroxine (SYNTHROID/LEVOTHROID) 100 MCG tablet   No No   Sig: Take 1 tablet (100 mcg) by mouth daily for 30 days   methocarbamol (ROBAXIN) 750 MG tablet    No No   Sig: Take 1 tablet (750 mg) by mouth every 6 hours as needed for muscle spasms   nicotine (COMMIT) 2 MG lozenge  Nursing Home Yes No   Sig: Place 2 mg inside cheek every hour as needed for smoking cessation   Patient not taking: Reported on 2/13/2024   omeprazole (PRILOSEC) 10 MG DR capsule   No No   Sig: Take 1 capsule (10 mg) by mouth daily for 30 days   ondansetron (ZOFRAN) 4 MG tablet  Nursing Home Yes No   Sig: Take 4 mg by mouth every 8 hours as needed for nausea   oxyCODONE (ROXICODONE) 5 MG tablet   No No   Sig: Take 1 tablet (5 mg) by mouth every 4 hours as needed   semaglutide (OZEMPIC) 2 MG/1.5ML SOPN pen   No No   Sig: Inject 0.5 mg weekly   senna-docusate (SENOKOT-S/PERICOLACE) 8.6-50 MG tablet   Yes No   Sig: Take 1 tablet by mouth 2 times daily as needed for constipation   Patient not taking: Reported on 2/13/2024   spironolactone (ALDACTONE) 100 MG tablet  Nursing Home Yes No   Sig: Take 100 mg by mouth daily   thyroid (ARMOUR) 15 MG tablet   No No   Sig: Take 1 tablet (15 mg) by mouth daily for 30 days   thyroid (ARMOUR) 60 MG tablet  Nursing Home No No   Sig: Take 1-tablet by mouth daily as directed   valACYclovir (VALTREX) 500 MG tablet   No No   Sig: Take 1 tablet (500 mg) by mouth daily as needed (antiviral)      Facility-Administered Medications: None        Review of Systems    The 10 point Review of Systems is negative other than noted in the HPI or here.     Social History   I have reviewed this patient's social history and updated it with pertinent information if needed.  Social History     Tobacco Use    Smoking status: Former     Current packs/day: 0.25     Types: Cigarettes    Smokeless tobacco: Never   Vaping Use    Vaping status: Never Used   Substance Use Topics    Alcohol use: No    Drug use: Never        Physical Exam   Vital Signs: Temp: 98.7  F (37.1  C) Temp src: Temporal BP: 129/70 Pulse: 76   Resp: 12 SpO2: 94 % O2 Device: Nasal cannula    Weight: 0 lbs 0 oz    General  Appearance: Alert, very pleasant, no distress  Respiratory: diminished BS in bottom 1/2 of R lung. L lung clear  Cardiovascular: RRR, no murmur. Trace edema  GI: soft, nt/nd  Skin: no rashes or lesions grossly    Other: CN grossly intact, LARSON      Medical Decision Making       70 MINUTES SPENT BY ME on the date of service doing chart review, history, exam, documentation & further activities per the note.      Data   ------------------------- PAST 24 HR DATA REVIEWED -----------------------------------------------    I have personally reviewed the following data over the past 24 hrs:    6.0  \   13.3   / 89 (L)     139 104 10.3 /  267 (H)   2.9 (L) 21 (L) 0.55 \     ALT: 20 AST: 36 AP: 180 (H) TBILI: 1.4 (H)   ALB: 3.0 (L) TOT PROTEIN: 6.5 LIPASE: N/A     Trop: N/A BNP: 41     Procal: N/A CRP: N/A Lactic Acid: 1.9       INR:  N/A PTT:  N/A   D-dimer:  2.01 (H) Fibrinogen:  N/A       Imaging results reviewed over the past 24 hrs:   Recent Results (from the past 24 hour(s))   XR Chest Port 1 View    Narrative    EXAM: XR CHEST PORT 1 VIEW  LOCATION: [facility]  DATE: 4/16/2024    INDICATION: sob, concern pleural effusion  COMPARISON: 10/30/2023      Impression    IMPRESSION: Cardiac size is obscured by large right effusion. Increased consolidation within the right mid lung which may be related to atelectasis and effusion. Pneumonia not excluded. Slight interstitial edema. No left effusion.   CT Chest Pulmonary Embolism w Contrast    Narrative    EXAM: CT CHEST PULMONARY EMBOLISM W CONTRAST  LOCATION: [facility]  DATE: 4/16/2024    INDICATION: elevated ddimer, left leg swelling, SOB  COMPARISON: CT 08/11/2023  TECHNIQUE: CT chest pulmonary angiogram during arterial phase injection of IV contrast. Multiplanar reformats and MIP reconstructions were performed. Dose reduction techniques were used.   CONTRAST: 76mL Isovue 370    FINDINGS: There is some loss of detail given  motion artifact.  ANGIOGRAM CHEST: Pulmonary arteries are normal caliber and negative for pulmonary emboli. Thoracic aorta is negative for dissection. No CT evidence of right heart strain.    LUNGS AND PLEURA: There is a large right pleural effusion with significant associated atelectasis, but no central airway obstruction. There is mild to moderate scattered nonspecific groundglass infiltrates seen in both lungs and these are likely   inflammatory in nature.    MEDIASTINUM/AXILLAE: Normal.    CORONARY ARTERY CALCIFICATION: Moderate.    UPPER ABDOMEN: Cirrhotic configuration liver with secondary findings of portal venous hypertension which would include varicosities and splenomegaly.    MUSCULOSKELETAL: Mild thoracolumbar spinal curvature with moderate scattered hypertrophic changes.      Impression    IMPRESSION:  1.  No PE, dissection, or aneurysm.    2.  Large right pleural effusion with associated atelectasis.    3.  Mild to moderate scattered nonspecific groundglass infiltrate seen in both lungs and these are likely inflammatory in nature.    4.  Since 08/11/2023 there is less marked atelectasis in the right lung and the bilateral groundglass infiltrates are new.

## 2024-04-17 NOTE — PROGRESS NOTES
[facility]    Medicine Progress Note - Hospitalist Service    Date of Admission:  4/16/2024    Assessment & Plan      Sudheer Montiel is a 58 year old female with a history off REYES cirrhosis, prior hepatic hydrothorax, T2DM, tobacco use, hypothyroidism, obesity, anxiety admitted on 4/16/2024 after presenting with shortness of breath    STILL AWAITING PHARMACY MED REC    Large R sided pleural effusion  Hepatic hydrothorax  Pulmonary edema   *prior admission 8/2023 with large R pleural effusion/ hepatic hydrothorax s/p chest tube. 2.8L drained, transudative in nature. Echo at that time with EF 60-65%.   *She has not had recurrence of fluid until present admission   *on current admission presents with SOB and weakness x 3 days. Some orthopnea and LE edema, shoulder/ chest pain with breathing. In ED AFVSS.   *O2 sats low normal off O2. BNP normal.   *CXR/ CT with large R effusion, mild-mod ground glass bilaterally likely inflammatory in nature.   *underwent thoracentesis of ~1.5 L in ED, fluid transudate   *repeat CXR 4/17 shows small residual R effusion, pulmonary vascular congestion with increased interstitial markings suggesting pulmonary edema. RLL infiltrate or atelectasis improved from prior   *wt 217 (213 on 4/4)  *Currently on 2L NC  - supplemental O2 prn  - continue PTA spironolactone, plan to give additional 60mg IV lasix 4/17, however K continues to be low   --repeat CXR in the am   --I&Os, follow weights   --add cytology to pleural fluid analysis     Hypokalemia  K 2.9 on presentation, remains low at 3 on recheck. She received 60mg IV lasix 4/16  - replace per protocol    REYES cirrhosis  Chronic hepatic encephalopathy  Esophageal varices   GERD  [Needs rec- lasix 50 mg daily, lactulose 20 g TID, omeprazole 10 mg daily, spironolactone 100 mg daily, rifaximin 500 mg BID]  AST/ALT normal, T bili 1.4 (previous 1.5 earlier 4/2024).   Follows with [facility]   - resume spironolactone  - hold  "lasix until K corrected  - resume other meds with rec  - [facility] consult, recommending continuing diuretic, consider abdominal US to eval for ascites. Signed off 4/17  --abdominal US obtained showing no ascites, gallbladder wall thickened but no clinical symptoms of cholecystitis     Thrombocytopenia  Platelets 89. Baseline 70-90s previous.   - monitor    DM II  [Needs rec-  mg daily, lantus 36U daily, ozempic weekly, aspart 3U TID with meals]  Follows outpatient with endocrinology. Hgb A1c 6.1% 12/2023  - continue reduced dose lantus 15 units   - hold scheduled mealtime aspart, can add carb correction if needed  - med sliding scale insulin   - TID and HS blood sugars    MDD  Anxiety  Insomnia  - resume escitalopram 20 mg daily with rec    Ascending aorta dilatation  Noted on echo 8/2023, max 4.3.   - outpatient follow up    Hypothyroidism  - resume levothyroxine, thyroid armour with rec    Tobacco use disorder  Smokes 5 cigs/ day  - declines NRT  - encourage cessation    Morbid obesity  On ozempic, encourage weight loss and healthy lifestyle          Diet: Combination Diet Regular Diet Adult    DVT Prophylaxis: Pneumatic Compression Devices  Crystal Catheter: Not present  Lines: None     Cardiac Monitoring: None  Code Status:  FULL CODE    Clinically Significant Risk Factors Present on Admission        # Hypokalemia: Lowest K = 2.9 mmol/L in last 2 days, will replace as needed       # Hypoalbuminemia: Lowest albumin = 3 g/dL at 4/16/2024  6:55 PM, will monitor as appropriate   # Drug Induced Platelet Defect: home medication list includes an antiplatelet medication        # Obesity: Estimated body mass index is 37.73 kg/m  as calculated from the following:    Height as of 4/4/24: 1.6 m (5' 3\").    Weight as of 4/4/24: 96.6 kg (213 lb).       # Financial/Environmental Concerns:           Disposition Plan     Medically Ready for Discharge: Anticipated Tomorrow         The patient's care was discussed with  " Anand who agrees with the above plan     Alla Lopez PA-C  Hospitalist Service  [facility]  Securely message with [employer] (more info)  Text page via [employer] Paging/Directory   ______________________________________________________________________    Interval History   Patient reports feeling much better after thoracentesis. Denies dyspnea, orthopnea, CP. No dizziness. Tolerating po intake. She does endorse some abdominal fullness over the past few weeks. Edema stable. No cough.     Physical Exam   Temp: 99.3  F (37.4  C) Temp src: Temporal BP: (!) 151/66 Pulse: 68   Resp: 21 SpO2: 90 % O2 Device: None (Room air) Oxygen Delivery: 2 LPM  Vitals:    04/17/24 1100   Weight: 98.5 kg (217 lb 3.2 oz)     Vital Signs with Ranges  Temp:  [98.3  F (36.8  C)-99.3  F (37.4  C)] 99.3  F (37.4  C)  Pulse:  [68-89] 68  Resp:  [12-28] 21  BP: (108-159)/(60-78) 151/66  SpO2:  [90 %-98 %] 90 %  I/O last 3 completed shifts:  In: -   Out: 700 [Urine:700]    Constitutional: Alert and oriented, sitting up in bed. Appears comfortable and is appropriately conversant   ENT:  moist mucous membranes  Respiratory: Lungs with crackles in right base otherwise clear without increased work of breathing   Cardiovascular: Regular rate and rhythm, trace bilateral LE edema   GI: positive bowel sounds, abdomen soft, non-tender, non-distended. Neg smith sign   MSK:   Moves all four extremities.   Neuro:  speech is clear. Face symmetric. Grossly intact       Medical Decision Making       50 MINUTES SPENT BY ME on the date of service doing chart review, history, exam, documentation & further activities per the note.      Data     I have personally reviewed the following data over the past 24 hrs:    7.8  \   12.6   / 84 (L)     137; 137 103; 103 9.0; 9.0 /  133 (H)   3.0 (L) 17 (L); 17 (L) 0.49 (L); 0.49 (L) \     ALT: 20 AST: 36 AP: 166 (H) TBILI: 1.3 (H)   ALB: 2.9 (L) TOT PROTEIN: 6.3 (L); 6.4 LIPASE: N/A     Trop:  N/A BNP: 41     TSH: N/A T4: N/A A1C: 5.8 (H)     Procal: N/A CRP: N/A Lactic Acid: 1.9       INR:  1.30 (H) PTT:  N/A   D-dimer:  2.01 (H) Fibrinogen:  N/A     Ferritin:  N/A % Retic:  N/A LDH:  218       Imaging results reviewed over the past 24 hrs:   Recent Results (from the past 24 hour(s))   XR Chest Port 1 View    Narrative    EXAM: XR CHEST PORT 1 VIEW  LOCATION: [facility]  DATE: 4/16/2024    INDICATION: sob, concern pleural effusion  COMPARISON: 10/30/2023      Impression    IMPRESSION: Cardiac size is obscured by large right effusion. Increased consolidation within the right mid lung which may be related to atelectasis and effusion. Pneumonia not excluded. Slight interstitial edema. No left effusion.   CT Chest Pulmonary Embolism w Contrast    Narrative    EXAM: CT CHEST PULMONARY EMBOLISM W CONTRAST  LOCATION: [facility]  DATE: 4/16/2024    INDICATION: elevated ddimer, left leg swelling, SOB  COMPARISON: CT 08/11/2023  TECHNIQUE: CT chest pulmonary angiogram during arterial phase injection of IV contrast. Multiplanar reformats and MIP reconstructions were performed. Dose reduction techniques were used.   CONTRAST: 76mL Isovue 370    FINDINGS: There is some loss of detail given motion artifact.  ANGIOGRAM CHEST: Pulmonary arteries are normal caliber and negative for pulmonary emboli. Thoracic aorta is negative for dissection. No CT evidence of right heart strain.    LUNGS AND PLEURA: There is a large right pleural effusion with significant associated atelectasis, but no central airway obstruction. There is mild to moderate scattered nonspecific groundglass infiltrates seen in both lungs and these are likely   inflammatory in nature.    MEDIASTINUM/AXILLAE: Normal.    CORONARY ARTERY CALCIFICATION: Moderate.    UPPER ABDOMEN: Cirrhotic configuration liver with secondary findings of portal venous hypertension which would include varicosities and  splenomegaly.    MUSCULOSKELETAL: Mild thoracolumbar spinal curvature with moderate scattered hypertrophic changes.      Impression    IMPRESSION:  1.  No PE, dissection, or aneurysm.    2.  Large right pleural effusion with associated atelectasis.    3.  Mild to moderate scattered nonspecific groundglass infiltrate seen in both lungs and these are likely inflammatory in nature.    4.  Since 08/11/2023 there is less marked atelectasis in the right lung and the bilateral groundglass infiltrates are new.   XR Chest Port 1 View    Narrative    CHEST ONE VIEW PORTABLE  4/17/2024 8:24 AM       INDICATION: Right pleural effusion (status post thoracentesis 4/16 1.5  L).    COMPARISON: 4/16/2024       Impression    IMPRESSION: Small residual right pleural effusion, significantly  decreased from yesterday. No pneumothorax. Cardiomegaly with pulmonary  vascular congestion and diffusely increased interstitial markings  suggest pulmonary edema. Right lower lobe infiltrate or atelectasis,  improved from yesterday.    MIREILLE FARMER MD         SYSTEM ID:  [id number]   US Abdomen Limited    Narrative    ULTRASOUND ABDOMEN LIMITED 4/17/2024 10:36 AM    CLINICAL HISTORY: Evaluate for ascites.    TECHNIQUE: Limited abdominal ultrasound.    COMPARISON: None.      Impression    IMPRESSION:  No ascites identified. Gallbladder wall appears  thickened. Correlate with gallbladder symptoms.    JOSEPH LOWERY MD         SYSTEM ID:  [id number]

## 2024-04-17 NOTE — ED NOTES
[facility]  ED Nurse Handoff Report    ED Chief complaint: Shortness of Breath      ED Diagnosis:   Final diagnoses:   Large pleural effusion   Dyspnea, unspecified type   Hypokalemia       Code Status: Refer to Hospitalist    Allergies:   Allergies   Allergen Reactions    Cephalexin     Morphine      Other reaction(s): Other  Irritability, disorientation    Penicillins Itching     face    Amoxicillin Rash and Itching    Cefprozil Rash    Ceftazidime Itching and Rash    Ciprofloxacin Itching and Rash     Tolerates levaquin     Percocet [Oxycodone-Acetaminophen] Nausea and Vomiting       Patient Story: Pt presents from home with c/o shortness of breath and markedly increasing dyspnea on exertion.  Focused Assessment:  Airway/Neuros/CV - intact  Respiraory - tachypneic with marked HAWKINS     Treatments and/or interventions provided: neb/thoracentesis/oxygen -   Patient's response to treatments and/or interventions: relief of S/S    To be done/followed up on inpatient unit:  pleural effusion management    Does this patient have any cognitive concerns?: n/a    Activity level - Baseline/Home:  Independent  Activity Level - Current:   Independent    Patient's Preferred language: English   Needed?: No    Isolation: None  Infection: Not Applicable  MRSA  Patient tested for COVID 19 prior to admission: YES  Bariatric?: No    Vital Signs:   Vitals:    04/16/24 2058 04/16/24 2128 04/16/24 2143 04/16/24 2311   BP: 129/70   121/70   Pulse: 77 89 76 83   Resp: 21 25 12    Temp:       TempSrc:       SpO2:  90% 94%        Cardiac Rhythm:     Was the PSS-3 completed:   Yes  What interventions are required if any?               Family Comments:   OBS brochure/video discussed/provided to patient/family:               Name of person given brochure if not patient:               Relationship to patient:     For the majority of the shift this patient's behavior was Green.   Behavioral interventions performed were  .    ED NURSE PHONE NUMBER: [phone]

## 2024-04-17 NOTE — CONSULTS
[facility] Consultation     Sudheer Montiel  [address] [place] [address]  58 year old female     Admission Date/Time: 4/16/2024  Primary Care Provider: Bennie Wiggins  Referring / Attending Physician:  Anand     We were asked to see the patient in consultation by Dr. Michel for evaluation of Rt hepatic hydrothorax.    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  She is improved after thoracentesis.  We would strongly recommend against placing a chest tube at any time.  We will defer further evaluation of pulmonary findings on CT to medicine.    -Ultrasound of her abdomen may be appropriate to look for further evidence of ascites.  -Would encourage low-sodium diet less than 2 g/day.  -Continue diuretics which renal function seems to be tolerating.  -Continue to follow potassium and creatinine at least once per month if diuretic dosing is adjusted, more frequently if potassium supplementation is needed.  -Continue lactulose.    Please call with questions.  Will sign off.     Thank you for involving us in this patient's care.  Please call me with any questions.    Total time spent in chart review, direct medical discussion, examination, and documentation was 30 minutes    Jose Case DO   [facility]  Office [phone]    ________________________________________________________________________        CC: Difficulty breathing     HPI:  Sudheer Montiel is a 58 year old female who is followed in our liver clinic for GEORGE cirrhosis complicated by ascites/hepatic hydrothorax, encephalopathy.  Hydrothorax was particularly an issue last year, was able to recover from a chest tube being placed.  She has remained on diuretics according to medical documentation of 50 mg of Lasix daily and spironolactone 100 mg daily.  She had worsening shortness of breath and was found to have large  accumulation of the right sided effusion once again.  Thoracentesis was performed last night and again no protein was seen (0.8d/dl), fluid albumin was not drawn.  She is feeling significantly improved this morning.  No additional imaging has been performed on her abdomen.    She states her bowel habits are soft, frequent, but not diarrheal stools.  She has no melena or hematochezia.     ROS: A comprehensive ten point review of systems was negative aside from those in mentioned in the HPI.      PAST MEDICAL HISTORY:  Patient Active Problem List    Diagnosis Date Noted    Hypokalemia 04/16/2024     Priority: Medium    Dyspnea, unspecified type 04/16/2024     Priority: Medium    Large pleural effusion 04/16/2024     Priority: Medium    S/P total knee arthroplasty, right 12/18/2023     Priority: Medium    Acute respiratory distress 08/11/2023     Priority: Medium    Pleural effusion 08/11/2023     Priority: Medium    Acute cholecystitis 05/25/2022     Priority: Medium    Obesity (BMI 35.0-39.9) with comorbidity (H) 02/01/2019     Priority: Medium    Atrophic vaginitis 11/07/2018     Priority: Medium    Frequency of urination 11/07/2018     Priority: Medium    Urge incontinence of urine 11/07/2018     Priority: Medium    Urinary urgency 11/07/2018     Priority: Medium    Stress incontinence 11/07/2018     Priority: Medium    Necrotizing soft tissue infection 10/10/2018     Priority: Medium    Type 2 diabetes mellitus without complication, without long-term current use of insulin (H) 03/26/2018     Priority: Medium    Obesity 09/02/2014     Priority: Medium     Problem list name updated by automated process. Provider to review      CARDIOVASCULAR SCREENING; LDL GOAL LESS THAN 100 01/21/2012     Priority: Medium    Hypothyroid      Priority: Medium       SOCIAL HISTORY:  Social History     Tobacco Use    Smoking status: Former     Current packs/day: 0.25     Types: Cigarettes    Smokeless tobacco: Never   Vaping Use     Vaping status: Never Used   Substance Use Topics    Alcohol use: No    Drug use: Never       FAMILY HISTORY:  No family history on file.    ALLERGIES:   Allergies   Allergen Reactions    Cephalexin     Morphine      Other reaction(s): Other  Irritability, disorientation    Penicillins Itching     face    Amoxicillin Rash and Itching    Cefprozil Rash    Ceftazidime Itching and Rash    Ciprofloxacin Itching and Rash     Tolerates levaquin     Percocet [Oxycodone-Acetaminophen] Nausea and Vomiting     MEDICATIONS:   Current Facility-Administered Medications   Medication Dose Route Frequency Provider Last Rate Last Admin    calcium carbonate (TUMS) chewable tablet 1,000 mg  1,000 mg Oral 4x Daily PRN Leland Dickey MD        glucose gel 15-30 g  15-30 g Oral Q15 Min PRN Alla Lopez PA-C        Or    dextrose 50 % injection 25-50 mL  25-50 mL Intravenous Q15 Min PRN Alla Lopez PA-C        Or    glucagon injection 1 mg  1 mg Subcutaneous Q15 Min PRN Alla Lopez PA-C        escitalopram (LEXAPRO) tablet 20 mg  20 mg Oral QAM Leland Dickey MD        insulin aspart (NovoLOG) injection (RAPID ACTING)  1-7 Units Subcutaneous TID AC Leland Dickey MD        insulin aspart (NovoLOG) injection (RAPID ACTING)  1-5 Units Subcutaneous At Bedtime Leland Dickey MD        insulin glargine (LANTUS PEN) injection 15 Units  15 Units Subcutaneous Daily Leland Dickey MD        lactulose solution 20 g  20 g Oral TID Leland Dickey MD        omeprazole (PriLOSEC) CR capsule 10 mg  10 mg Oral Daily Leland Dickey MD        ondansetron (ZOFRAN ODT) ODT tab 4 mg  4 mg Oral Q6H PRN Leland Dickey MD        Or    ondansetron (ZOFRAN) injection 4 mg  4 mg Intravenous Q6H PRN Leland Dickey MD        rifaximin (XIFAXAN) tablet 550 mg  550 mg Oral BID Leland Dickey MD        spironolactone (ALDACTONE) tablet 100 mg   100 mg Oral Daily Leland Dickey MD         Current Outpatient Medications   Medication Sig Dispense Refill    acetaminophen (TYLENOL) 325 MG tablet Take 2 tablets (650 mg) by mouth every 8 hours as needed for mild pain or other (and adjunct with moderate or severe pain or per patient request)      aspirin (ASA) 325 MG EC tablet Take 1 tablet (325 mg) by mouth daily for 30 days 30 tablet 0    blood glucose (NO BRAND SPECIFIED) test strip Use to test blood sugar 1 times daily or as directed. Glucocard Vital 200 strip 3    celecoxib (CELEBREX) 200 MG capsule Take 1 capsule (200 mg) by mouth daily for 30 days 30 capsule 0    Continuous Blood Gluc Sensor (FREESTYLE DIMITRIOS 3 SENSOR) MISC 1 Device continuous prn (change every 14 days) 2 each 5    escitalopram (LEXAPRO) 10 MG tablet Take 2 tablets (20 mg) by mouth every morning for 30 days 60 tablet 0    furosemide (LASIX) 20 MG tablet Take 2.5 tablets (50 mg) by mouth daily for 30 days 75 tablet 0    guaiFENesin 400 MG TABS Take 1 tablet by mouth every 4 hours as needed (COVID)      hydrOXYzine HCl (ATARAX) 25 MG tablet Take 1 tablet (25 mg) by mouth every 6 hours as needed for itching or anxiety (with pain, moderate pain) 30 tablet 0    insulin aspart (NOVOLOG PEN) 100 UNIT/ML pen Inject 3 Units Subcutaneous 3 times daily (with meals) Hold for blood sugar of less than 200, 3 mL 0    insulin glargine (LANTUS PEN) 100 UNIT/ML pen Inject 36 Units Subcutaneous daily 10.8 mL 0    insulin pen needle (B-D U/F) 31G X 5 MM miscellaneous Use 4 pen needles daily or as directed. 400 each 3    insulin pen needle (B-D U/F) 31G X 5 MM miscellaneous Use 1 pen needles daily or as directed. 100 each 3    lactulose 20 GM/30ML solution Take 30 mLs (20 g) by mouth 3 times daily for 30 days 2700 mL 0    levothyroxine (SYNTHROID/LEVOTHROID) 100 MCG tablet Take 1 tablet (100 mcg) by mouth daily for 30 days 30 tablet 0    MELATONIN PO Take 2 mg by mouth at bedtime      methocarbamol  (ROBAXIN) 750 MG tablet Take 1 tablet (750 mg) by mouth every 6 hours as needed for muscle spasms 30 tablet 0    nicotine (COMMIT) 2 MG lozenge Place 2 mg inside cheek every hour as needed for smoking cessation (Patient not taking: Reported on 2/13/2024)      omeprazole (PRILOSEC) 10 MG DR capsule Take 1 capsule (10 mg) by mouth daily for 30 days 30 capsule 0    ondansetron (ZOFRAN) 4 MG tablet Take 4 mg by mouth every 8 hours as needed for nausea      oxyCODONE (ROXICODONE) 5 MG tablet Take 1 tablet (5 mg) by mouth every 4 hours as needed 186 tablet 0    semaglutide (OZEMPIC) 2 MG/1.5ML SOPN pen Inject 0.5 mg weekly 6 mL 1    senna-docusate (SENOKOT-S/PERICOLACE) 8.6-50 MG tablet Take 1 tablet by mouth 2 times daily as needed for constipation (Patient not taking: Reported on 2/13/2024)      spironolactone (ALDACTONE) 100 MG tablet Take 100 mg by mouth daily      STATIN NOT PRESCRIBED (INTENTIONAL) Please choose reason not prescribed from choices below.      thyroid (ARMOUR) 15 MG tablet Take 1 tablet (15 mg) by mouth daily for 30 days 30 tablet 0    thyroid (ARMOUR) 60 MG tablet Take 1-tablet by mouth daily as directed 90 tablet 1    valACYclovir (VALTREX) 500 MG tablet Take 1 tablet (500 mg) by mouth daily as needed (antiviral) 30 tablet 0    XIFAXAN 550 MG TABS tablet Take 1 tablet (550 mg) by mouth 2 times daily for 30 days 60 tablet 0       PHYSICAL EXAM:   /78   Pulse 76   Temp 99.3  F (37.4  C)   Resp 12   SpO2 95%    GEN: Alert, oriented x3, communicative and in NAD.  ELIZABET: AT, anicteric, OP without erythema, exudate, or ulcers.    NECK: Supple.    LYMPH: No LAD noted.  ABD: ND, +BS, minimally distended.       ADDITIONAL DATA:   I reviewed the patient's new clinical lab test results.   Recent Labs   Lab Test 04/16/24  1855 04/03/24  1056 12/19/23  0847 08/12/23  0525 08/11/23  1647 06/06/23  1016 05/01/23  1217   WBC 6.0 4.9 5.8   < > 4.0  --  4.5   HGB 13.3 13.8 12.4   < > 13.6  --  15.5   MCV 95  94 99   < > 97  --  94   PLT 89* 94* 79*   < > 99*  --  92*   INR  --   --   --   --  1.17* 1.18* 1.20*    < > = values in this interval not displayed.     Recent Labs   Lab Test 04/16/24  1855 04/03/24  1056 12/19/23  0847   POTASSIUM 2.9* 4.2 4.2   CHLORIDE 104 105 104   CO2 21* 26 27   BUN 10.3 7.8* 15.1   ANIONGAP 14 9 7     Recent Labs   Lab Test 04/16/24  1855 04/03/24  1056 12/19/23  0847 08/11/23  1647 07/13/23  1048 05/26/22  0658 05/25/22  1545   ALBUMIN 3.0* 3.5 3.1*   < >  --    < > 3.0*   BILITOTAL 1.4* 1.5* 2.0*   < >  --    < > 0.9   ALT 20 17 20   < >  --    < > 33   AST 36 44 36   < >  --    < > 54*   PROTEIN  --   --   --   --  Negative  --   --    LIPASE  --   --   --   --   --   --  237    < > = values in this interval not displayed.        I reviewed the patient's new imaging results.

## 2024-04-17 NOTE — PROGRESS NOTES
Admission    Patient arrives to room 610 via wheelchair from ED.    Care plan note: Patient is A&Ox4 with some difficulty word-finding (hx of strokes). Up independently in room. LS diminished with fine rales. VSS on RA. Patient denies pain/N/V/SOB. K+ 3 - replaced in ED.    Inpatient nursing criteria listed below were met:    Did you put disposition on whiteboard and in sticky note: Yes  Full skin assessment done (add LDA if skin issue present). Initials of 2nd RN :Yes/TE, RN  Isolation education started/completed Yes  Patient allergies verified with patient: Yes  Fall Risk? (Care plan updated, Education given and documented) NA  Primary Care Plan initiated: Yes  Home medications documented in belongings flowsheet: NA  Patient belongings documented in belongings flowsheet: Yes  Reminder note (belongings/ medications) placed in discharge instructions:NA  Admission profile/ required documentation complete: Yes  If patient is a 72 hour hold/Commitment are belongings removed from room and locked up? NA

## 2024-04-17 NOTE — PROGRESS NOTES
Patient is AO X 4; sleeping; abd US and Xray were completed; SL; independent; in no acute distress.

## 2024-04-17 NOTE — PROGRESS NOTES
RECEIVING UNIT ED HANDOFF REVIEW    ED Nurse Handoff Report was reviewed by: Julia Alberto RN on April 17, 2024 at 5:05 PM

## 2024-04-18 ENCOUNTER — APPOINTMENT (OUTPATIENT)
Dept: GENERAL RADIOLOGY | Facility: CLINIC | Age: 59
End: 2024-04-18
Attending: PHYSICIAN ASSISTANT
Payer: COMMERCIAL

## 2024-04-18 ENCOUNTER — APPOINTMENT (OUTPATIENT)
Dept: GENERAL RADIOLOGY | Facility: CLINIC | Age: 59
End: 2024-04-18
Attending: EMERGENCY MEDICINE
Payer: MEDICAID

## 2024-04-18 ENCOUNTER — HOSPITAL ENCOUNTER (OUTPATIENT)
Facility: CLINIC | Age: 59
Setting detail: OBSERVATION
Discharge: HOME | End: 2024-04-20
Attending: EMERGENCY MEDICINE | Admitting: INTERNAL MEDICINE
Payer: MEDICAID

## 2024-04-18 VITALS
WEIGHT: 215.8 LBS | DIASTOLIC BLOOD PRESSURE: 65 MMHG | TEMPERATURE: 98.4 F | RESPIRATION RATE: 16 BRPM | HEIGHT: 64 IN | BODY MASS INDEX: 36.84 KG/M2 | SYSTOLIC BLOOD PRESSURE: 125 MMHG | HEART RATE: 84 BPM | OXYGEN SATURATION: 93 %

## 2024-04-18 DIAGNOSIS — R06.03 ACUTE RESPIRATORY DISTRESS: ICD-10-CM

## 2024-04-18 DIAGNOSIS — J90 PLEURAL EFFUSION ON RIGHT: ICD-10-CM

## 2024-04-18 DIAGNOSIS — J96.01 ACUTE RESPIRATORY FAILURE WITH HYPOXIA (H): Primary | ICD-10-CM

## 2024-04-18 DIAGNOSIS — T17.908A ASPIRATION INTO AIRWAY, INITIAL ENCOUNTER: ICD-10-CM

## 2024-04-18 LAB
ALBUMIN SERPL BCG-MCNC: 2.9 G/DL (ref 3.5–5.2)
ALP SERPL-CCNC: 168 U/L (ref 40–150)
ALT SERPL W P-5'-P-CCNC: 18 U/L (ref 0–50)
ANION GAP SERPL CALCULATED.3IONS-SCNC: 5 MMOL/L (ref 7–15)
ANION GAP SERPL CALCULATED.3IONS-SCNC: 9 MMOL/L (ref 7–15)
AST SERPL W P-5'-P-CCNC: 34 U/L (ref 0–45)
ATRIAL RATE - MUSE: 81 BPM
BASE EXCESS BLDV CALC-SCNC: 2 MMOL/L (ref -3–3)
BASOPHILS # BLD AUTO: 0 10E3/UL (ref 0–0.2)
BASOPHILS NFR BLD AUTO: 0 %
BILIRUB SERPL-MCNC: 2 MG/DL
BUN SERPL-MCNC: 8.2 MG/DL (ref 8–23)
BUN SERPL-MCNC: 9.4 MG/DL (ref 8–23)
CALCIUM SERPL-MCNC: 8.2 MG/DL (ref 8.6–10)
CALCIUM SERPL-MCNC: 8.3 MG/DL (ref 8.6–10)
CHLORIDE SERPL-SCNC: 100 MMOL/L (ref 98–107)
CHLORIDE SERPL-SCNC: 105 MMOL/L (ref 98–107)
CREAT SERPL-MCNC: 0.51 MG/DL (ref 0.51–0.95)
CREAT SERPL-MCNC: 0.53 MG/DL (ref 0.51–0.95)
DEPRECATED HCO3 PLAS-SCNC: 25 MMOL/L (ref 22–29)
DEPRECATED HCO3 PLAS-SCNC: 27 MMOL/L (ref 22–29)
DIASTOLIC BLOOD PRESSURE - MUSE: NORMAL MMHG
EGFRCR SERPLBLD CKD-EPI 2021: >90 ML/MIN/1.73M2
EGFRCR SERPLBLD CKD-EPI 2021: >90 ML/MIN/1.73M2
EOSINOPHIL # BLD AUTO: 0 10E3/UL (ref 0–0.7)
EOSINOPHIL NFR BLD AUTO: 0 %
ERYTHROCYTE [DISTWIDTH] IN BLOOD BY AUTOMATED COUNT: 15.2 % (ref 10–15)
FLUAV RNA SPEC QL NAA+PROBE: NEGATIVE
FLUBV RNA RESP QL NAA+PROBE: NEGATIVE
GLUCOSE BLDC GLUCOMTR-MCNC: 155 MG/DL (ref 70–99)
GLUCOSE BLDC GLUCOMTR-MCNC: 182 MG/DL (ref 70–99)
GLUCOSE SERPL-MCNC: 140 MG/DL (ref 70–99)
GLUCOSE SERPL-MCNC: 148 MG/DL (ref 70–99)
HCO3 BLDV-SCNC: 25 MMOL/L (ref 21–28)
HCT VFR BLD AUTO: 39.6 % (ref 35–47)
HGB BLD-MCNC: 13.2 G/DL (ref 11.7–15.7)
HOLD SPECIMEN: NORMAL
HOLD SPECIMEN: NORMAL
IMM GRANULOCYTES # BLD: 0 10E3/UL
IMM GRANULOCYTES NFR BLD: 0 %
INTERPRETATION ECG - MUSE: NORMAL
LACTATE BLD-SCNC: 1.3 MMOL/L
LYMPHOCYTES # BLD AUTO: 1.4 10E3/UL (ref 0.8–5.3)
LYMPHOCYTES NFR BLD AUTO: 14 %
MCH RBC QN AUTO: 30.6 PG (ref 26.5–33)
MCHC RBC AUTO-ENTMCNC: 33.3 G/DL (ref 31.5–36.5)
MCV RBC AUTO: 92 FL (ref 78–100)
MONOCYTES # BLD AUTO: 1.1 10E3/UL (ref 0–1.3)
MONOCYTES NFR BLD AUTO: 11 %
NEUTROPHILS # BLD AUTO: 7.6 10E3/UL (ref 1.6–8.3)
NEUTROPHILS NFR BLD AUTO: 75 %
NRBC # BLD AUTO: 0 10E3/UL
NRBC BLD AUTO-RTO: 0 /100
NT-PROBNP SERPL-MCNC: 143 PG/ML (ref 0–900)
P AXIS - MUSE: 34 DEGREES
PCO2 BLDV: 34 MM HG (ref 40–50)
PH BLDV: 7.48 [PH] (ref 7.32–7.43)
PLATELET # BLD AUTO: 101 10E3/UL (ref 150–450)
PO2 BLDV: 48 MM HG (ref 25–47)
POTASSIUM SERPL-SCNC: 3.5 MMOL/L (ref 3.4–5.3)
POTASSIUM SERPL-SCNC: 3.7 MMOL/L (ref 3.4–5.3)
POTASSIUM SERPL-SCNC: 3.8 MMOL/L (ref 3.4–5.3)
PR INTERVAL - MUSE: 148 MS
PROT SERPL-MCNC: 6.5 G/DL (ref 6.4–8.3)
QRS DURATION - MUSE: 90 MS
QT - MUSE: 406 MS
QTC - MUSE: 471 MS
R AXIS - MUSE: -19 DEGREES
RBC # BLD AUTO: 4.31 10E6/UL (ref 3.8–5.2)
RSV RNA SPEC NAA+PROBE: NEGATIVE
SAO2 % BLDV: 87 % (ref 70–75)
SARS-COV-2 RNA RESP QL NAA+PROBE: NEGATIVE
SODIUM SERPL-SCNC: 134 MMOL/L (ref 135–145)
SODIUM SERPL-SCNC: 137 MMOL/L (ref 135–145)
SYSTOLIC BLOOD PRESSURE - MUSE: NORMAL MMHG
T AXIS - MUSE: 44 DEGREES
TROPONIN T SERPL HS-MCNC: 10 NG/L
VENTRICULAR RATE- MUSE: 81 BPM
WBC # BLD AUTO: 10.2 10E3/UL (ref 4–11)

## 2024-04-18 PROCEDURE — 80048 BASIC METABOLIC PNL TOTAL CA: CPT | Performed by: INTERNAL MEDICINE

## 2024-04-18 PROCEDURE — 99285 EMERGENCY DEPT VISIT HI MDM: CPT

## 2024-04-18 PROCEDURE — 87637 SARSCOV2&INF A&B&RSV AMP PRB: CPT | Performed by: EMERGENCY MEDICINE

## 2024-04-18 PROCEDURE — 84484 ASSAY OF TROPONIN QUANT: CPT | Performed by: EMERGENCY MEDICINE

## 2024-04-18 PROCEDURE — 250N000011 HC RX IP 250 OP 636: Performed by: INTERNAL MEDICINE

## 2024-04-18 PROCEDURE — 250N000013 HC RX MED GY IP 250 OP 250 PS 637: Performed by: INTERNAL MEDICINE

## 2024-04-18 PROCEDURE — 36415 COLL VENOUS BLD VENIPUNCTURE: CPT | Performed by: INTERNAL MEDICINE

## 2024-04-18 PROCEDURE — 87040 BLOOD CULTURE FOR BACTERIA: CPT | Mod: 91 | Performed by: EMERGENCY MEDICINE

## 2024-04-18 PROCEDURE — 93005 ELECTROCARDIOGRAM TRACING: CPT

## 2024-04-18 PROCEDURE — 36415 COLL VENOUS BLD VENIPUNCTURE: CPT | Performed by: EMERGENCY MEDICINE

## 2024-04-18 PROCEDURE — 83880 ASSAY OF NATRIURETIC PEPTIDE: CPT | Performed by: EMERGENCY MEDICINE

## 2024-04-18 PROCEDURE — 99239 HOSP IP/OBS DSCHRG MGMT >30: CPT | Performed by: INTERNAL MEDICINE

## 2024-04-18 PROCEDURE — 85025 COMPLETE CBC W/AUTO DIFF WBC: CPT | Performed by: EMERGENCY MEDICINE

## 2024-04-18 PROCEDURE — 84132 ASSAY OF SERUM POTASSIUM: CPT | Performed by: INTERNAL MEDICINE

## 2024-04-18 PROCEDURE — 84132 ASSAY OF SERUM POTASSIUM: CPT | Performed by: PHYSICIAN ASSISTANT

## 2024-04-18 PROCEDURE — 94640 AIRWAY INHALATION TREATMENT: CPT

## 2024-04-18 PROCEDURE — 36415 COLL VENOUS BLD VENIPUNCTURE: CPT | Performed by: PHYSICIAN ASSISTANT

## 2024-04-18 PROCEDURE — 82803 BLOOD GASES ANY COMBINATION: CPT

## 2024-04-18 PROCEDURE — 250N000009 HC RX 250: Performed by: EMERGENCY MEDICINE

## 2024-04-18 PROCEDURE — 250N000013 HC RX MED GY IP 250 OP 250 PS 637: Performed by: PHYSICIAN ASSISTANT

## 2024-04-18 PROCEDURE — 71045 X-RAY EXAM CHEST 1 VIEW: CPT

## 2024-04-18 PROCEDURE — 71046 X-RAY EXAM CHEST 2 VIEWS: CPT

## 2024-04-18 PROCEDURE — 84132 ASSAY OF SERUM POTASSIUM: CPT | Performed by: EMERGENCY MEDICINE

## 2024-04-18 RX ORDER — FUROSEMIDE 10 MG/ML
40 INJECTION INTRAMUSCULAR; INTRAVENOUS ONCE
Status: COMPLETED | OUTPATIENT
Start: 2024-04-18 | End: 2024-04-19

## 2024-04-18 RX ORDER — POTASSIUM CHLORIDE 1500 MG/1
40 TABLET, EXTENDED RELEASE ORAL ONCE
Status: COMPLETED | OUTPATIENT
Start: 2024-04-18 | End: 2024-04-18

## 2024-04-18 RX ORDER — FUROSEMIDE 10 MG/ML
60 INJECTION INTRAMUSCULAR; INTRAVENOUS ONCE
Status: COMPLETED | OUTPATIENT
Start: 2024-04-18 | End: 2024-04-18

## 2024-04-18 RX ORDER — IPRATROPIUM BROMIDE AND ALBUTEROL SULFATE 2.5; .5 MG/3ML; MG/3ML
3 SOLUTION RESPIRATORY (INHALATION) ONCE
Status: COMPLETED | OUTPATIENT
Start: 2024-04-18 | End: 2024-04-18

## 2024-04-18 RX ADMIN — IPRATROPIUM BROMIDE AND ALBUTEROL SULFATE 3 ML: .5; 3 SOLUTION RESPIRATORY (INHALATION) at 22:11

## 2024-04-18 RX ADMIN — POTASSIUM CHLORIDE 40 MEQ: 1500 TABLET, EXTENDED RELEASE ORAL at 06:58

## 2024-04-18 RX ADMIN — LACTULOSE 20 G: 20 SOLUTION ORAL at 08:16

## 2024-04-18 RX ADMIN — ESCITALOPRAM OXALATE 20 MG: 10 TABLET ORAL at 08:16

## 2024-04-18 RX ADMIN — PANTOPRAZOLE SODIUM 40 MG: 40 TABLET, DELAYED RELEASE ORAL at 08:15

## 2024-04-18 RX ADMIN — FUROSEMIDE 60 MG: 10 INJECTION, SOLUTION INTRAMUSCULAR; INTRAVENOUS at 06:59

## 2024-04-18 RX ADMIN — LEVOTHYROXINE, LIOTHYRONINE 15 MG: 9.5; 2.25 TABLET ORAL at 08:16

## 2024-04-18 RX ADMIN — SPIRONOLACTONE 100 MG: 25 TABLET ORAL at 08:15

## 2024-04-18 RX ADMIN — RIFAXIMIN 550 MG: 550 TABLET ORAL at 08:15

## 2024-04-18 RX ADMIN — INSULIN GLARGINE 15 UNITS: 100 INJECTION, SOLUTION SUBCUTANEOUS at 08:17

## 2024-04-18 RX ADMIN — ASPIRIN 325 MG: 325 TABLET, COATED ORAL at 08:16

## 2024-04-18 RX ADMIN — INSULIN ASPART 1 UNITS: 100 INJECTION, SOLUTION INTRAVENOUS; SUBCUTANEOUS at 08:16

## 2024-04-18 RX ADMIN — LEVOTHYROXINE SODIUM 100 MCG: 100 TABLET ORAL at 08:16

## 2024-04-18 ASSESSMENT — ACTIVITIES OF DAILY LIVING (ADL)
ADLS_ACUITY_SCORE: 32
ADLS_ACUITY_SCORE: 31
ADLS_ACUITY_SCORE: 37
ADLS_ACUITY_SCORE: 32
ADLS_ACUITY_SCORE: 37

## 2024-04-18 ASSESSMENT — COLUMBIA-SUICIDE SEVERITY RATING SCALE - C-SSRS
2. HAVE YOU ACTUALLY HAD ANY THOUGHTS OF KILLING YOURSELF IN THE PAST MONTH?: NO
6. HAVE YOU EVER DONE ANYTHING, STARTED TO DO ANYTHING, OR PREPARED TO DO ANYTHING TO END YOUR LIFE?: NO
1. IN THE PAST MONTH, HAVE YOU WISHED YOU WERE DEAD OR WISHED YOU COULD GO TO SLEEP AND NOT WAKE UP?: NO

## 2024-04-18 NOTE — PLAN OF CARE
Discharge    Patient discharged to home via private transportation with her daughter.    Care plan note: K+ 3.5. LS diminished with fine rales and occasional n/p cough; HAWKINS but O2sats were maintained in 90's on RA. VSS. Patient denied pain/N/V. No new complaints. Discharge instructions were provided. Patient verbalized understanding of all information received.    Listed belongings gathered and given to patient (including from security/pharmacy). Yes  Care Plan and Patient education resolved: Yes  Prescriptions if needed, hard copies sent with patient  NA  Medication Bin checked and emptied on discharge  Yes  SW/care coordinator/charge RN aware of discharge: Yes

## 2024-04-18 NOTE — PROGRESS NOTES
Summary: Hx includes REYES cirrhosis, DM2, obesity, anxiety, hepatic hydrothorax - now with c/o SOB, right sided pleural effusion, thrombocytopenia, morbid obesity,hepatic encephalopathy.    DATE & TIME: 4/18/24 [id number]      Cognitive Concerns/ Orientation : A&Ox4 with difficulty word-finding   BEHAVIOR & AGGRESSION TOOL COLOR: Green  CIWA SCORE: NA   ABNL VS/O2: VSS on RA  MOBILITY: Independent  PAIN MANAGMENT: Denies  DIET: 2gm Na+   BOWEL/BLADDER: Incontinent of bladder; last BM 4/16  ABNL LAB/BG: K recheck  3.5,   DRAIN/DEVICES: Right AC PIV SL; Right wrist PIV SL  TELEMETRY RHYTHM: NSR  SKIN: Dry with scattered bruising  TESTS/PROCEDURES: Potassium was rechecked @ 0400  D/C DAY/GOALS/PLACE: Likely home tomorrow pending progress  OTHER IMPORTANT INFO: Contact Isolation (MRSA); PT to see. GI signed off.

## 2024-04-18 NOTE — PLAN OF CARE
"Summary: Hx includes REYES cirrhosis, DM2, obesity, anxiety, hepatic hydrothorax - now with c/o SOB, right sided pleural effusion, thrombocytopenia, morbid obesity,hepatic encephalopathy.    DATE & TIME: 4/17/24 [id number]      Cognitive Concerns/ Orientation : A&Ox4 with difficulty word-finding (I've had strokes before.\"  BEHAVIOR & AGGRESSION TOOL COLOR: Green  CIWA SCORE: NA   ABNL VS/O2: Tmax 99.2 otherwise VSS on RA  MOBILITY: Independent  PAIN MANAGMENT: Denies  DIET: 2gm Na+   BOWEL/BLADDER: Incontinent of bladder; last BM 4/16  ABNL LAB/BG: K+ 3 - replaced in ED; recheck at 2122;   DRAIN/DEVICES: Right AC PIV SL; Right wrist PIV SL  TELEMETRY RHYTHM: NSR  SKIN: Dry with scattered bruising  TESTS/PROCEDURES: Right thoracentesis done yesterday 4/16 - site is c/d/I; repeat CXR 4/18  D/C DAY/GOALS/PLACE: Likely home tomorrow pending progress  OTHER IMPORTANT INFO: Contact Isolation (MRSA); PT to see. GI signed off.                            "

## 2024-04-18 NOTE — DISCHARGE SUMMARY
[facility]  Hospitalist Discharge Summary      Date of Admission:  4/16/2024  Date of Discharge:  4/18/2024  Discharging Provider: Gretta Michel MD    Discharge Diagnoses   Acute hypoxic respiratory failure due to pulmonary edema and large R sided pleural effusion due to hepatic hydrothorax s/p R thoracentesis (4/16/24)  Hypokalemia  REYES cirrhosis  Chronic hepatic encephalopathy  Esophageal varices   Thrombocytopenia  GERD  DM2 without complications, long-term insulin use  Ascending aorta dilatation  Hypothyroidism  Major recurrent depressive disorder with anxiety  Insomnia    Follow-ups Needed After Discharge   Follow-up Appointments     Follow-up and recommended labs and tests       Go to the lab early this next week to have your potassium drawn  Follow up with your liver specialist to discuss potential adjustments in   your diuretics          Discharge Disposition   Discharged to home  Condition at discharge: Stable    Hospital Course   Sudheer Montiel is a 58 year old female with a history off REYES cirrhosis, prior hepatic hydrothorax, DM2, and tobacco use who was admitted on 4/16/2024 for acute hypoxic respiratory failure due to recurrent hepatic hydrothorax with large right pleural effusion s/p right thoracentesis (1.5L removed) on 4/16. She required up to 2L per NC this admission though was gradually weaned to room air following thoracentesis, and was also treated with IV Lasix. Potassium decreased to 3.0 in the setting of IV diuresis, but was repleted to 3.7 at the time of discharge.    Repeat CXR prior to discharge shows small to moderate right pleural effusion increased from admission, but she remains comfortable on room air. She was given an additional dose of IV Lasix and oral potassium prior to discharge. She was advised to make a lab appointment within 1 week to recheck her potassium    We discussed potential adjustments in her diuretics, but patient prefers to follow  up with her hepatologist for adjustments in medications. As such, she continues on her home meds as previously prescribed with no changes.       Consultations This Hospital Stay   INTERVENTIONAL RADIOLOGY ADULT/PEDS IP CONSULT  GASTROENTEROLOGY IP CONSULT  PHYSICAL THERAPY ADULT IP CONSULT    Code Status   Prior    Time Spent on this Encounter   I, Gretta Michel MD, personally saw the patient today and spent 35 minutes discharging this patient.       Gretta Michel MD  [place] [address] [facility]  [address] [place] [address]  Phone: [phone]  ______________________________________________________________________    Physical Exam   Vital Signs: Temp: 98.4  F (36.9  C) Temp src: Oral BP: 125/65 Pulse: 84   Resp: 16 SpO2: 93 % O2 Device: Nasal cannula Oxygen Delivery: 1 LPM  Weight: 215 lbs 12.8 oz    Constitutional: Resting comfortably, NAD  HEENT: Sclera white, conjunctiva clear, EOMI, MMM  Respiratory: Breathing non-labored. +crackles at right lung base  Cardiovascular: Heart RRR, no m/r/g. Trace pedal edema.   GI: +BS. Abd soft/NT  Skin: Warm and dry. No rash.  Musculoskeletal: Normal muscle bulk and tone  Neurologic: Alert and appropriate. LARSON  Psychiatric: Calm and cooperative    Primary Care Physician   Bennie Wiggins    Discharge Orders      Potassium     Reason for your hospital stay    Fluid in the right lung related to underlying liver disease. This was drained, and you were given additional diuretics with improvement     Follow-up and recommended labs and tests     Go to the lab early this next week to have your potassium drawn  Follow up with your liver specialist to discuss potential adjustments in your diuretics     Activity    Your activity upon discharge: activity as tolerated     Diet    Follow this diet upon discharge: Low sodium diet       Significant Results and Procedures   Most Recent 3 CBC's:  Recent Labs   Lab Test 04/17/24  0939  04/16/24  1855 04/03/24  1056   WBC 7.8 6.0 4.9   HGB 12.6 13.3 13.8   MCV 95 95 94   PLT 84* 89* 94*     Most Recent 3 BMP's:  Recent Labs   Lab Test 04/18/24  0800 04/18/24  0708 04/18/24  0413 04/18/24  0214 04/17/24  2147 04/17/24  0219 04/16/24  2211 04/16/24  1855   NA  --  137  --   --   --   --  137  137 139   POTASSIUM  --  3.7 3.5  --  3.4   < > 3.4  3.4 2.9*   CHLORIDE  --  105  --   --   --   --  103  103 104   CO2  --  27  --   --   --   --  17*  17* 21*   BUN  --  9.4  --   --   --   --  9.0  9.0 10.3   CR  --  0.53  --   --   --   --  0.49*  0.49* 0.55   ANIONGAP  --  5*  --   --   --   --  17*  17* 14   JOANN  --  8.2*  --   --   --   --  8.2*  8.2* 8.5*   * 148*  --  182*  --    < > 131*  131* 267*    < > = values in this interval not displayed.   ,   Results for orders placed or performed during the hospital encounter of 04/16/24   XR Chest Port 1 View    Narrative    EXAM: XR CHEST PORT 1 VIEW  LOCATION: [facility]  DATE: 4/16/2024    INDICATION: sob, concern pleural effusion  COMPARISON: 10/30/2023      Impression    IMPRESSION: Cardiac size is obscured by large right effusion. Increased consolidation within the right mid lung which may be related to atelectasis and effusion. Pneumonia not excluded. Slight interstitial edema. No left effusion.   CT Chest Pulmonary Embolism w Contrast    Narrative    EXAM: CT CHEST PULMONARY EMBOLISM W CONTRAST  LOCATION: [facility]  DATE: 4/16/2024    INDICATION: elevated ddimer, left leg swelling, SOB  COMPARISON: CT 08/11/2023  TECHNIQUE: CT chest pulmonary angiogram during arterial phase injection of IV contrast. Multiplanar reformats and MIP reconstructions were performed. Dose reduction techniques were used.   CONTRAST: 76mL Isovue 370    FINDINGS: There is some loss of detail given motion artifact.  ANGIOGRAM CHEST: Pulmonary arteries are normal caliber and negative for pulmonary emboli.  Thoracic aorta is negative for dissection. No CT evidence of right heart strain.    LUNGS AND PLEURA: There is a large right pleural effusion with significant associated atelectasis, but no central airway obstruction. There is mild to moderate scattered nonspecific groundglass infiltrates seen in both lungs and these are likely   inflammatory in nature.    MEDIASTINUM/AXILLAE: Normal.    CORONARY ARTERY CALCIFICATION: Moderate.    UPPER ABDOMEN: Cirrhotic configuration liver with secondary findings of portal venous hypertension which would include varicosities and splenomegaly.    MUSCULOSKELETAL: Mild thoracolumbar spinal curvature with moderate scattered hypertrophic changes.      Impression    IMPRESSION:  1.  No PE, dissection, or aneurysm.    2.  Large right pleural effusion with associated atelectasis.    3.  Mild to moderate scattered nonspecific groundglass infiltrate seen in both lungs and these are likely inflammatory in nature.    4.  Since 08/11/2023 there is less marked atelectasis in the right lung and the bilateral groundglass infiltrates are new.   XR Chest Port 1 View    Narrative    CHEST ONE VIEW PORTABLE  4/17/2024 8:24 AM       INDICATION: Right pleural effusion (status post thoracentesis 4/16 1.5  L).    COMPARISON: 4/16/2024       Impression    IMPRESSION: Small residual right pleural effusion, significantly  decreased from yesterday. No pneumothorax. Cardiomegaly with pulmonary  vascular congestion and diffusely increased interstitial markings  suggest pulmonary edema. Right lower lobe infiltrate or atelectasis,  improved from yesterday.    MIREILLE FARMER MD         SYSTEM ID:  [id number]   US Abdomen Limited    Narrative    ULTRASOUND ABDOMEN LIMITED 4/17/2024 10:36 AM    CLINICAL HISTORY: Evaluate for ascites.    TECHNIQUE: Limited abdominal ultrasound.    COMPARISON: None.      Impression    IMPRESSION:  No ascites identified. Gallbladder wall appears  thickened. Correlate with gallbladder  symptoms.    JOSEPH LOWERY MD         SYSTEM ID:  [id number]   XR Chest Port 1 View    Narrative    XR CHEST PORT 1 VIEW  4/18/2024 9:04 AM       INDICATION: follow up pleural effusion  COMPARISON: 4/17/2024       Impression    IMPRESSION: Small to moderate layering right pleural effusion, appears  increased from yesterday. Similar infiltrate in right mid and lower  lung. No pneumothorax. The left lung is clear.     MIREILLE FARMER MD         SYSTEM ID:  [id number]       Discharge Medications   Discharge Medication List as of 4/18/2024  9:55 AM        CONTINUE these medications which have NOT CHANGED    Details   aspirin (ASA) 325 MG EC tablet Take 1 tablet (325 mg) by mouth daily for 30 days, Disp-30 tablet, R-0, E-Prescribe      celecoxib (CELEBREX) 200 MG capsule Take 1 capsule (200 mg) by mouth daily for 30 days, Disp-30 capsule, R-0, E-Prescribe      escitalopram (LEXAPRO) 10 MG tablet Take 2 tablets (20 mg) by mouth every morning for 30 days, Disp-60 tablet, R-0, E-Prescribe      furosemide (LASIX) 20 MG tablet Take 2.5 tablets (50 mg) by mouth daily for 30 days, Disp-75 tablet, R-0, E-Prescribe      insulin aspart (NOVOLOG PEN) 100 UNIT/ML pen Inject 3 Units Subcutaneous 3 times daily (with meals) Hold for blood sugar of less than 200,, Disp-3 mL, R-0, E-PrescribePlease disregard vial      insulin glargine (LANTUS PEN) 100 UNIT/ML pen Inject 36 Units Subcutaneous daily, Disp-10.8 mL, R-0, E-PrescribeIf Lantus is not covered by insurance, may substitute Basaglar or Semglee or other insulin glargine product per insurance preference at same dose and frequency.        lactulose 20 GM/30ML solution Take 30 mLs (20 g) by mouth 3 times daily for 30 days, Disp-2700 mL, R-0, E-Prescribe      levothyroxine (SYNTHROID/LEVOTHROID) 100 MCG tablet Take 1 tablet (100 mcg) by mouth daily for 30 days, Disp-30 tablet, R-0, E-Prescribe      methocarbamol (ROBAXIN) 750 MG tablet Take 1 tablet (750 mg) by mouth every 6 hours as needed  for muscle spasms, Disp-30 tablet, R-0, E-Prescribe      omeprazole (PRILOSEC) 10 MG DR capsule Take 1 capsule (10 mg) by mouth daily for 30 days, Disp-30 capsule, R-0, E-Prescribe      semaglutide (OZEMPIC, 0.25 OR 0.5 MG/DOSE,) 2 MG/3ML pen Inject 0.5 mg Subcutaneous every 7 days On Tuesdays, Historical      valACYclovir (VALTREX) 500 MG tablet Take 1 tablet (500 mg) by mouth daily as needed (antiviral), Disp-30 tablet, R-0, E-Prescribe      blood glucose (NO BRAND SPECIFIED) test strip Use to test blood sugar 1 times daily or as directed. Glucocard Vital, Disp-200 strip, R-3, E-Prescribe      Continuous Blood Gluc Sensor (FREESTYLE DIMITRIOS 3 SENSOR) MISC 1 Device continuous prn (change every 14 days), Disp-2 each, R-5, E-Prescribe      !! insulin pen needle (B-D U/F) 31G X 5 MM miscellaneous Use 4 pen needles daily or as directed.Disp-400 each, [id number]      !! insulin pen needle (B-D U/F) 31G X 5 MM miscellaneous Use 1 pen needles daily or as directed.Disp-100 each, [id number]      MELATONIN PO Take 2 mg by mouth at bedtime, Historical      semaglutide (OZEMPIC) 2 MG/1.5ML SOPN pen Inject 0.5 mg weekly, Disp-6 mL, R-1, E-Prescribe      spironolactone (ALDACTONE) 100 MG tablet Take 100 mg by mouth daily, Historical      STATIN NOT PRESCRIBED (INTENTIONAL) Historical      !! thyroid (ARMOUR) 15 MG tablet Take 1 tablet (15 mg) by mouth daily for 30 days, Disp-30 tablet, R-0, E-Prescribe      !! thyroid (ARMOUR) 60 MG tablet Take 1-tablet by mouth daily as directed, Disp-90 tablet, R-1, E-Prescribe      XIFAXAN 550 MG TABS tablet Take 1 tablet (550 mg) by mouth 2 times daily for 30 days, Disp-60 tablet, R-0, COURTNEY, E-Prescribe       !! - Potential duplicate medications found. Please discuss with provider.        Allergies   Allergies   Allergen Reactions    Cephalexin     Morphine      Other reaction(s): Other  Irritability, disorientation    Penicillins Itching     face    Amoxicillin Rash and Itching     Cefprozil Rash    Ceftazidime Itching and Rash    Ciprofloxacin Itching and Rash     Tolerates levaquin     Percocet [Oxycodone-Acetaminophen] Nausea and Vomiting

## 2024-04-18 NOTE — PLAN OF CARE
Goal Outcome Evaluation:     Summary: Hx includes REYES cirrhosis, DM2, hepatic hydrothorax - now with c/o SOB, right sided pleural effusion.    DATE & TIME: 4/17/24 6963-5526    Cognitive Concerns/ Orientation : A&Ox4 with difficulty word-finding at times  BEHAVIOR & AGGRESSION TOOL COLOR: Green  CIWA SCORE: NA   ABNL VS/O2: VSS on RA while awake, placed on 1 L NC while asleep d/t sat. Dropping to 88%. 92-93% on 1L.  MOBILITY: Independent  PAIN MANAGMENT: Denies  DIET: 2gm Na+   BOWEL/BLADDER: Incontinent of bladder; last BM 4/16.  ABNL LAB/BG: K recheck  3.4 - replaced,   DRAIN/DEVICES: Right AC PIV SL; Right wrist PIV SL  TELEMETRY RHYTHM: NSR  SKIN: Dry with scattered bruising  TESTS/PROCEDURES: Potassium was rechecked @ 0400  D/C DAY/GOALS/PLACE: Likely home tomorrow pending progress  OTHER IMPORTANT INFO: Contact Isolation (MRSA); PT to see. GI signed off.

## 2024-04-19 ENCOUNTER — APPOINTMENT (OUTPATIENT)
Dept: CARDIOLOGY | Facility: CLINIC | Age: 59
End: 2024-04-19
Attending: INTERNAL MEDICINE
Payer: MEDICAID

## 2024-04-19 ENCOUNTER — APPOINTMENT (OUTPATIENT)
Dept: PHYSICAL THERAPY | Facility: CLINIC | Age: 59
End: 2024-04-19
Attending: INTERNAL MEDICINE
Payer: MEDICAID

## 2024-04-19 LAB
GLUCOSE BLDC GLUCOMTR-MCNC: 153 MG/DL (ref 70–99)
GLUCOSE BLDC GLUCOMTR-MCNC: 160 MG/DL (ref 70–99)
GLUCOSE BLDC GLUCOMTR-MCNC: 162 MG/DL (ref 70–99)
GLUCOSE BLDC GLUCOMTR-MCNC: 248 MG/DL (ref 70–99)
LVEF ECHO: NORMAL

## 2024-04-19 PROCEDURE — 250N000011 HC RX IP 250 OP 636: Performed by: INTERNAL MEDICINE

## 2024-04-19 PROCEDURE — 255N000002 HC RX 255 OP 636: Performed by: INTERNAL MEDICINE

## 2024-04-19 PROCEDURE — 99207 PR APP CREDIT; MD BILLING SHARED VISIT: CPT | Performed by: INTERNAL MEDICINE

## 2024-04-19 PROCEDURE — 96376 TX/PRO/DX INJ SAME DRUG ADON: CPT | Mod: 59

## 2024-04-19 PROCEDURE — 97116 GAIT TRAINING THERAPY: CPT | Mod: GP

## 2024-04-19 PROCEDURE — G0378 HOSPITAL OBSERVATION PER HR: HCPCS

## 2024-04-19 PROCEDURE — 250N000012 HC RX MED GY IP 250 OP 636 PS 637: Performed by: INTERNAL MEDICINE

## 2024-04-19 PROCEDURE — 97161 PT EVAL LOW COMPLEX 20 MIN: CPT | Mod: GP

## 2024-04-19 PROCEDURE — 93306 TTE W/DOPPLER COMPLETE: CPT | Mod: 26 | Performed by: INTERNAL MEDICINE

## 2024-04-19 PROCEDURE — 96374 THER/PROPH/DIAG INJ IV PUSH: CPT | Mod: 59

## 2024-04-19 PROCEDURE — 250N000013 HC RX MED GY IP 250 OP 250 PS 637: Performed by: INTERNAL MEDICINE

## 2024-04-19 PROCEDURE — 250N000011 HC RX IP 250 OP 636: Mod: JZ | Performed by: EMERGENCY MEDICINE

## 2024-04-19 PROCEDURE — 82962 GLUCOSE BLOOD TEST: CPT

## 2024-04-19 PROCEDURE — 99223 1ST HOSP IP/OBS HIGH 75: CPT | Performed by: INTERNAL MEDICINE

## 2024-04-19 PROCEDURE — C8929 TTE W OR WO FOL WCON,DOPPLER: HCPCS

## 2024-04-19 RX ORDER — ACETAMINOPHEN 325 MG/1
650 TABLET ORAL EVERY 4 HOURS PRN
Status: DISCONTINUED | OUTPATIENT
Start: 2024-04-19 | End: 2024-04-20 | Stop reason: HOSPADM

## 2024-04-19 RX ORDER — AMOXICILLIN 250 MG
1 CAPSULE ORAL 2 TIMES DAILY PRN
Status: DISCONTINUED | OUTPATIENT
Start: 2024-04-19 | End: 2024-04-20 | Stop reason: HOSPADM

## 2024-04-19 RX ORDER — DEXTROSE MONOHYDRATE 25 G/50ML
25-50 INJECTION, SOLUTION INTRAVENOUS
Status: DISCONTINUED | OUTPATIENT
Start: 2024-04-19 | End: 2024-04-20 | Stop reason: HOSPADM

## 2024-04-19 RX ORDER — THYROID 15 MG/1
15 TABLET ORAL DAILY
Status: DISCONTINUED | OUTPATIENT
Start: 2024-04-19 | End: 2024-04-20 | Stop reason: HOSPADM

## 2024-04-19 RX ORDER — NICOTINE POLACRILEX 4 MG
15-30 LOZENGE BUCCAL
Status: DISCONTINUED | OUTPATIENT
Start: 2024-04-19 | End: 2024-04-20 | Stop reason: HOSPADM

## 2024-04-19 RX ORDER — LEVOTHYROXINE SODIUM 100 UG/1
100 TABLET ORAL DAILY
Status: DISCONTINUED | OUTPATIENT
Start: 2024-04-19 | End: 2024-04-20 | Stop reason: HOSPADM

## 2024-04-19 RX ORDER — ASPIRIN 325 MG
325 TABLET, DELAYED RELEASE (ENTERIC COATED) ORAL DAILY
Status: DISCONTINUED | OUTPATIENT
Start: 2024-04-19 | End: 2024-04-20 | Stop reason: HOSPADM

## 2024-04-19 RX ORDER — ESCITALOPRAM OXALATE 10 MG/1
20 TABLET ORAL EVERY MORNING
Status: DISCONTINUED | OUTPATIENT
Start: 2024-04-19 | End: 2024-04-20 | Stop reason: HOSPADM

## 2024-04-19 RX ORDER — AMOXICILLIN 250 MG
2 CAPSULE ORAL 2 TIMES DAILY PRN
Status: DISCONTINUED | OUTPATIENT
Start: 2024-04-19 | End: 2024-04-20 | Stop reason: HOSPADM

## 2024-04-19 RX ORDER — POTASSIUM CHLORIDE 1500 MG/1
40 TABLET, EXTENDED RELEASE ORAL ONCE
Status: COMPLETED | OUTPATIENT
Start: 2024-04-19 | End: 2024-04-19

## 2024-04-19 RX ORDER — ONDANSETRON 4 MG/1
4 TABLET, ORALLY DISINTEGRATING ORAL EVERY 6 HOURS PRN
Status: DISCONTINUED | OUTPATIENT
Start: 2024-04-19 | End: 2024-04-20 | Stop reason: HOSPADM

## 2024-04-19 RX ORDER — FUROSEMIDE 40 MG
40 TABLET ORAL DAILY
Status: DISCONTINUED | OUTPATIENT
Start: 2024-04-19 | End: 2024-04-20

## 2024-04-19 RX ORDER — FUROSEMIDE 10 MG/ML
40 INJECTION INTRAMUSCULAR; INTRAVENOUS EVERY 8 HOURS
Status: COMPLETED | OUTPATIENT
Start: 2024-04-19 | End: 2024-04-19

## 2024-04-19 RX ORDER — PANTOPRAZOLE SODIUM 20 MG/1
20 TABLET, DELAYED RELEASE ORAL DAILY
Status: DISCONTINUED | OUTPATIENT
Start: 2024-04-19 | End: 2024-04-20 | Stop reason: HOSPADM

## 2024-04-19 RX ORDER — SPIRONOLACTONE 25 MG/1
100 TABLET ORAL DAILY
Status: DISCONTINUED | OUTPATIENT
Start: 2024-04-19 | End: 2024-04-20 | Stop reason: HOSPADM

## 2024-04-19 RX ORDER — FUROSEMIDE 20 MG
20 TABLET ORAL SEE ADMIN INSTRUCTIONS
Status: DISCONTINUED | OUTPATIENT
Start: 2024-04-19 | End: 2024-04-20

## 2024-04-19 RX ORDER — LACTULOSE 10 G/15ML
20 SOLUTION ORAL 3 TIMES DAILY
Status: DISCONTINUED | OUTPATIENT
Start: 2024-04-19 | End: 2024-04-20 | Stop reason: HOSPADM

## 2024-04-19 RX ORDER — FUROSEMIDE 10 MG/ML
40 INJECTION INTRAMUSCULAR; INTRAVENOUS ONCE
Status: COMPLETED | OUTPATIENT
Start: 2024-04-19 | End: 2024-04-19

## 2024-04-19 RX ORDER — ONDANSETRON 2 MG/ML
4 INJECTION INTRAMUSCULAR; INTRAVENOUS EVERY 6 HOURS PRN
Status: DISCONTINUED | OUTPATIENT
Start: 2024-04-19 | End: 2024-04-20 | Stop reason: HOSPADM

## 2024-04-19 RX ORDER — ACETAMINOPHEN 650 MG/1
650 SUPPOSITORY RECTAL EVERY 4 HOURS PRN
Status: DISCONTINUED | OUTPATIENT
Start: 2024-04-19 | End: 2024-04-20 | Stop reason: HOSPADM

## 2024-04-19 RX ADMIN — HUMAN ALBUMIN MICROSPHERES AND PERFLUTREN 9 ML: 10; .22 INJECTION, SOLUTION INTRAVENOUS at 14:45

## 2024-04-19 RX ADMIN — FUROSEMIDE 40 MG: 40 TABLET ORAL at 09:54

## 2024-04-19 RX ADMIN — ASPIRIN 325 MG: 325 TABLET, COATED ORAL at 08:46

## 2024-04-19 RX ADMIN — FUROSEMIDE 40 MG: 10 INJECTION, SOLUTION INTRAMUSCULAR; INTRAVENOUS at 13:13

## 2024-04-19 RX ADMIN — INSULIN ASPART 3 UNITS: 100 INJECTION, SOLUTION INTRAVENOUS; SUBCUTANEOUS at 17:57

## 2024-04-19 RX ADMIN — PANTOPRAZOLE SODIUM 20 MG: 20 TABLET, DELAYED RELEASE ORAL at 08:46

## 2024-04-19 RX ADMIN — INSULIN ASPART 3 UNITS: 100 INJECTION, SOLUTION INTRAVENOUS; SUBCUTANEOUS at 11:53

## 2024-04-19 RX ADMIN — SPIRONOLACTONE 100 MG: 25 TABLET ORAL at 08:46

## 2024-04-19 RX ADMIN — ESCITALOPRAM OXALATE 20 MG: 10 TABLET ORAL at 08:47

## 2024-04-19 RX ADMIN — FUROSEMIDE 40 MG: 10 INJECTION, SOLUTION INTRAMUSCULAR; INTRAVENOUS at 00:02

## 2024-04-19 RX ADMIN — LEVOTHYROXINE, LIOTHYRONINE 15 MG: 9.5; 2.25 TABLET ORAL at 08:46

## 2024-04-19 RX ADMIN — LACTULOSE 20 G: 20 SOLUTION ORAL at 08:46

## 2024-04-19 RX ADMIN — LACTULOSE 20 G: 20 SOLUTION ORAL at 13:13

## 2024-04-19 RX ADMIN — RIFAXIMIN 550 MG: 550 TABLET ORAL at 08:46

## 2024-04-19 RX ADMIN — POTASSIUM CHLORIDE 40 MEQ: 1500 TABLET, EXTENDED RELEASE ORAL at 13:13

## 2024-04-19 RX ADMIN — RIFAXIMIN 550 MG: 550 TABLET ORAL at 17:56

## 2024-04-19 RX ADMIN — FUROSEMIDE 40 MG: 10 INJECTION, SOLUTION INTRAMUSCULAR; INTRAVENOUS at 08:54

## 2024-04-19 RX ADMIN — LEVOTHYROXINE SODIUM 100 MCG: 100 TABLET ORAL at 08:47

## 2024-04-19 RX ADMIN — INSULIN GLARGINE 15 UNITS: 100 INJECTION, SOLUTION SUBCUTANEOUS at 08:49

## 2024-04-19 RX ADMIN — FUROSEMIDE 40 MG: 10 INJECTION, SOLUTION INTRAMUSCULAR; INTRAVENOUS at 19:53

## 2024-04-19 RX ADMIN — LACTULOSE 20 G: 20 SOLUTION ORAL at 19:53

## 2024-04-19 RX ADMIN — INSULIN ASPART 3 UNITS: 100 INJECTION, SOLUTION INTRAVENOUS; SUBCUTANEOUS at 08:49

## 2024-04-19 ASSESSMENT — ENCOUNTER SYMPTOMS
VOMITING: 0
BACK PAIN: 0
HEMATURIA: 0
NECK PAIN: 0
HEADACHES: 0
COUGH: 1
SHORTNESS OF BREATH: 1
NAUSEA: 0
ABDOMINAL PAIN: 0
DIZZINESS: 0
DYSURIA: 0

## 2024-04-19 ASSESSMENT — ACTIVITIES OF DAILY LIVING (ADL)
ADLS_ACUITY_SCORE: 40
ADLS_ACUITY_SCORE: 37
ADLS_ACUITY_SCORE: 40
ADLS_ACUITY_SCORE: 37
ADLS_ACUITY_SCORE: 40

## 2024-04-19 NOTE — H&P
[facility]    History and Physical - Hospitalist Service       Date of Admission:  4/18/2024    Assessment & Plan      Sudheer Montiel is a 58 year old female admitted on 4/18/2024. She presents with shortness of breath    Acute hypoxic respiratory failure  R effusion- improved  Hepatic hydrothorax  *hospitalized 4/16-18 with large R effusion, s/p thoracentesis of 1.5L. was on O2 but weaned. Given furosemide as well.   *discharged this am, returns with SOB, cough. No CP. Found by EMS to have O2 sats 87%, improved with 2L. BNP and troponin normal. In ED AFVSS. CXR w/ minimal change, findings c/w CHF, improved R lung base aeration with r hemidiaphragm now visible.   - supplemental O2, wean as able  - will repeat echo  - change furosemide from 50 mg daily to 40 mg am/ 20 mg afternoon (pt in agreement)    REYES cirrhosis  Chronic hepatic encephalopathy  GERD  [Needs rec- lasix 50 mg daily, lactulose 20 g TID, omeprazole 10 mg daily, spironolactone 100 mg daily, rifaximin 500 mg BID]   T bili at 2.0, up slightly from previous 1.4. AST/ALT normal.   - lasix 40 mg am/ 20 mg pm  - continue spironolactone  - continue lactulose, rifaximin  - resume omeprazole    DM II  [Needs rec-  mg daily, lantus 36U daily, ozempic weekly, aspart 3U TID with meals]  Follows outpatient with endocrinology. Hgb A1c 5.8% 4/16/24.   - TID and HS blood sugars  - A1c is 5.8% and , suspect she may not need 36 units of lantus. Appears she was getting 15 units during recent hospital, will continue  - aspart 3 units TID with meals    Thrombocytopenia  Baseline 70-90s. At 101 on presentation.   - monitor    MDD  Anxiety  Insomnia  - resume escitalopram 20 mg daily    Hypothyroidism  - resume levothyroxine, thyroid armour    Tobacco use disorder  Smokes 5 cigs/ day  - declines NRT  - encourage cessation    Morbid obesity  On ozempic, encourage weight loss and healthy lifestyle         Diet:  low Na  DVT Prophylaxis:  "Pneumatic Compression Devices  Crystal Catheter: Not present  Lines: None     Cardiac Monitoring: None  Code Status:  Full    Clinically Significant Risk Factors Present on Admission        # Hypokalemia: Lowest K = 3 mmol/L in last 2 days, will replace as needed       # Hypoalbuminemia: Lowest albumin = 2.9 g/dL at 4/18/2024 10:43 PM, will monitor as appropriate  # Coagulation Defect: INR = 1.30 (Ref range: 0.85 - 1.15) and/or PTT = N/A, will monitor for bleeding  # Drug Induced Platelet Defect: home medication list includes an antiplatelet medication        # Obesity: Estimated body mass index is 36.73 kg/m  as calculated from the following:    Height as of this encounter: 1.626 m (5' 4\").    Weight as of this encounter: 97.1 kg (214 lb).       # Financial/Environmental Concerns:           Disposition Plan     Medically Ready for Discharge: Anticipated Tomorrow           Leland Dickey MD  Hospitalist Service  [facility]  Securely message with e[employer] (more info)  Text page via [facility] Paging/Directory     ______________________________________________________________________    Chief Complaint   Shortness of breath    History is obtained from the patient, electronic health record, and emergency department physician    History of Present Illness   Sudheer Montiel is a 58 year old female who presents with shortness of breath.  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  Depression among others.  She was just hospitalized from April 16th through the 18th with a large right effusion from hepatic hydrothorax.  She underwent thoracentesis of 1.5 L with improvement.  At the time of discharge yesterday she was feeling improved and declined changes in her diuretics.  However, shortly after she got home today she started to get short of breath again.  Shortness of breath was worse lying down.  She had no chest " pain.  She had cough associated.  No abdominal pain.  No nausea or vomiting.  No edema.  And EMS found her O2 sats of 87%.      Past Medical History    Past Medical History:   Diagnosis Date    Abnormal liver CT     Collapsed lung 2023    Hypothyroid 80's    Necrotizing fasciitis (H)     Pleural effusion     Primary osteoarthritis of both knees     Soft tissue infection     Subcortical infarction (H)     Type 2 diabetes mellitus (H)     Unspecified cirrhosis of liver (H)        Past Surgical History   Past Surgical History:   Procedure Laterality Date    ARTHROPLASTY KNEE Right 2023    Procedure: Right Total Knee Arthroplasty;  Surgeon: Pawan Ewing MD;  Location:  OR     SECTION      COLONOSCOPY N/A 10/21/2015    Procedure: COLONOSCOPY;  Surgeon: Jaky Arredondo MD;  Location:  GI    IRRIGATION AND DEBRIDEMENT ABDOMEN WASHOUT, COMBINED N/A 10/12/2018    Procedure: COMBINED IRRIGATION AND DEBRIDEMENT ABDOMEN WASHOUT;  Irrigation and Debridement of Lower Abdomen, removal of piece of mesh.;  Surgeon: Darlene Hogue MD;  Location: [facility] OR    marisol/bso      due to anemia    ZZC REPAIR CRUCIATE LIGAMENT,KNEE         Prior to Admission Medications   Prior to Admission Medications   Prescriptions Last Dose Informant Patient Reported? Taking?   Continuous Blood Gluc Sensor (FREESTYLE DIMITRIOS 3 SENSOR) [facility]   No No   Si Device continuous prn (change every 14 days)   MELATONIN PO  Nursing Home Yes No   Sig: Take 2 mg by mouth at bedtime   STATIN NOT PRESCRIBED (INTENTIONAL)  Nursing Home Yes No   Sig: Please choose reason not prescribed from choices below.   XIFAXAN 550 MG TABS tablet   No No   Sig: Take 1 tablet (550 mg) by mouth 2 times daily for 30 days   aspirin (ASA) 325 MG EC tablet   No No   Sig: Take 1 tablet (325 mg) by mouth daily for 30 days   blood glucose (NO BRAND SPECIFIED) test strip   No No   Sig: Use to test blood sugar 1 times daily or as directed.  Glucocard Vital   celecoxib (CELEBREX) 200 MG capsule   No No   Sig: Take 1 capsule (200 mg) by mouth daily for 30 days   escitalopram (LEXAPRO) 10 MG tablet   No No   Sig: Take 2 tablets (20 mg) by mouth every morning for 30 days   furosemide (LASIX) 20 MG tablet   No No   Sig: Take 2.5 tablets (50 mg) by mouth daily for 30 days   insulin aspart (NOVOLOG PEN) 100 UNIT/ML pen   No No   Sig: Inject 3 Units Subcutaneous 3 times daily (with meals) Hold for blood sugar of less than 200,   insulin glargine (LANTUS PEN) 100 UNIT/ML pen   No No   Sig: Inject 36 Units Subcutaneous daily   insulin pen needle (B-D U/F) 31G X 5 MM miscellaneous  Nursing Home No No   Sig: Use 1 pen needles daily or as directed.   insulin pen needle (B-D U/F) 31G X 5 MM [facility] [place]  Nursing Home No No   Sig: Use 4 pen needles daily or as directed.   lactulose 20 GM/30ML solution   No No   Sig: Take 30 mLs (20 g) by mouth 3 times daily for 30 days   levothyroxine (SYNTHROID/LEVOTHROID) 100 MCG tablet   No No   Sig: Take 1 tablet (100 mcg) by mouth daily for 30 days   methocarbamol (ROBAXIN) 750 MG tablet   No No   Sig: Take 1 tablet (750 mg) by mouth every 6 hours as needed for muscle spasms   omeprazole (PRILOSEC) 10 MG DR capsule   No No   Sig: Take 1 capsule (10 mg) by mouth daily for 30 days   semaglutide (OZEMPIC) 2 MG/1.5ML SOPN pen   No No   Sig: Inject 0.5 mg weekly   semaglutide (OZEMPIC, 0.25 OR 0.5 MG/DOSE,) 2 MG/3ML pen   Yes No   Sig: Inject 0.5 mg Subcutaneous every 7 days On Tuesdays   spironolactone (ALDACTONE) 100 MG tablet  Nursing Home Yes No   Sig: Take 100 mg by mouth daily   thyroid (ARMOUR) 15 MG tablet   No No   Sig: Take 1 tablet (15 mg) by mouth daily for 30 days   thyroid (ARMOUR) 60 MG tablet  Nursing Home No No   Sig: Take 1-tablet by mouth daily as directed   valACYclovir (VALTREX) 500 MG tablet   No No   Sig: Take 1 tablet (500 mg) by mouth daily as needed (antiviral)      Facility-Administered Medications: None         Review of Systems    The 10 point Review of Systems is negative other than noted in the HPI or here.     Social History   I have reviewed this patient's social history and updated it with pertinent information if needed.  Social History     Tobacco Use    Smoking status: Former     Current packs/day: 0.25     Types: Cigarettes    Smokeless tobacco: Never   Vaping Use    Vaping status: Never Used   Substance Use Topics    Alcohol use: No    Drug use: Never        Physical Exam   Vital Signs: Temp: 98.3  F (36.8  C) Temp src: Oral BP: 125/64 Pulse: 83   Resp: 18 SpO2: 95 % O2 Device: None (Room air)    Weight: 214 lbs 0 oz    General Appearance: Alert, very pleasant, no distress  Respiratory: somewhat diminished R base but BS present. L lung clear  Cardiovascular: RRR, no murmur. No edema  GI: obese, soft, nt/nd  Skin: no rashes or lesions grossly    Other: CN grossly intact, LARSON     Medical Decision Making       60 MINUTES SPENT BY ME on the date of service doing chart review, history, exam, documentation & further activities per the note.      Data   ------------------------- PAST 24 HR DATA REVIEWED -----------------------------------------------    I have personally reviewed the following data over the past 24 hrs:    10.2  \   13.2   / 101 (L)     134 (L) 100 8.2 /  140 (H)   3.8 25 0.51 \     ALT: 18 AST: 34 AP: 168 (H) TBILI: 2.0 (H)   ALB: 2.9 (L) TOT PROTEIN: 6.5 LIPASE: N/A     Trop: 10 BNP: 143     Procal: N/A CRP: N/A Lactic Acid: 1.3         Imaging results reviewed over the past 24 hrs:   Recent Results (from the past 24 hour(s))   XR Chest Port 1 View    Narrative    XR CHEST PORT 1 VIEW  4/18/2024 9:04 AM       INDICATION: follow up pleural effusion  COMPARISON: 4/17/2024       Impression    IMPRESSION: Small to moderate layering right pleural effusion, appears  increased from yesterday. Similar infiltrate in right mid and lower  lung. No pneumothorax. The left lung is clear.     MIREILLE FARMER,  MD         SYSTEM ID:  [id number]   XR Chest 2 Views    Narrative    EXAM: XR CHEST 2 VIEWS  LOCATION: [facility]  DATE: 4/18/2024    INDICATION: shortness of breath  COMPARISON: 04/17/2024      Impression    IMPRESSION: Minimal change. Mild cardiomegaly. Central hilar congestion and diffuse interstitial prominence appears slightly worse consistent with CHF. However there has been some improved aeration at right lung base with right hemidiaphragm now visible.   Likely there is a small right pleural effusion.

## 2024-04-19 NOTE — PROGRESS NOTES
Patient admitted today morning by my colleague, still with her in the room, she has ongoing shortness of breath, bilateral basilar crackles noted on exam, on 2 L of oxygen, will change Lasix to IV for today, echocardiogram pending.  Her last echo done in August indicates EF of 65 percentage.  Possibly this pulmonary edema/fluid overload secondary to cirrhosis, pending results of the echo.  PT to evaluate patient.  Tentatively discharge home on 4/20, will wean oxygen down as possible.  Total time spent 26 minutes.

## 2024-04-19 NOTE — ED NOTES
"[facility]  ED Nurse Handoff Report    ED Chief complaint: Shortness of Breath      ED Diagnosis:   Final diagnoses:   Acute respiratory failure with hypoxia (H)   Pleural effusion on right       Code Status: to be addressed by admitting doctor    Allergies:   Allergies   Allergen Reactions    Cephalexin     Morphine      Other reaction(s): Other  Irritability, disorientation    Penicillins Itching     face    Amoxicillin Rash and Itching    Cefprozil Rash    Ceftazidime Itching and Rash    Ciprofloxacin Itching and Rash     Tolerates levaquin     Percocet [Oxycodone-Acetaminophen] Nausea and Vomiting       Patient Story: Pt recently in hospital. States has hed increasing SOB. Initial SpO2 by ems was upper 80% arrived on 2 LPM O2 per NC. Since in ED has been 92-93% on RA RR 20.   Focused Assessment:  Pt alert, reports baseline uses cane for distances. VSS, SpO2 93% RA at rest. Pt SpO2 drops to 88% while ambulating    Treatments and/or interventions provided: Labs, X ray IV Lasix ordered   Patient's response to treatments and/or interventions: tolerating well    To be done/followed up on inpatient unit:  Doctor orders    Does this patient have any cognitive concerns?:  none    Activity level - Baseline/Home:  Cane for distances otherwise independent  Activity Level - Current:   Independent    Patient's Preferred language: English   Needed?: No    Isolation: None and Contact   Infection: Not Applicable  MRSA  Patient tested for COVID 19 prior to admission: YES  Bariatric?: No    Vital Signs:   Vitals:    04/18/24 2134 04/18/24 2137 04/18/24 2215 04/18/24 2227   BP: 125/64 125/64     Pulse: 81 83     Resp: 18      Temp: 98.3  F (36.8  C)      TempSrc: Oral      SpO2: 92% 94% 98% 95%   Weight: 97.1 kg (214 lb)      Height: 1.626 m (5' 4\")          Cardiac Rhythm:     Was the PSS-3 completed:   Yes  What interventions are required if any?               Family Comments: none bedside  OBS " brochure/video discussed/provided to patient/family: Yes              Name of person given brochure if not patient: n/a              Relationship to patient: n/a    For the majority of the shift this patient's behavior was Green.   Behavioral interventions performed were none.    ED NURSE PHONE NUMBER: *[phone]

## 2024-04-19 NOTE — PROGRESS NOTES
PRIMARY Concern: SOB.Right plural Effusion   SAFETY RISK Concerns (fall risk, behaviors, etc.): Fall      Isolation/Type: Contact  Tests/Procedures for NEXT shift: None  Consults? (Pending/following, signed-off?) None  Where is patient from? (Home, TCU, etc.): senior care  Other Important info for NEXT shift: Hx REYES cirrhosis, smokes 5 cigs/day, DM2  Anticipated DC date & active delays: Likely tomorrow, wean o2 as able. Diuresing w/ IV lasix.   ____________________________________________________  SUMMARY NOTE:   Orientation/Cognitive: A&OX4  Observation Goals (Met/ Not Met): Not Met  Mobility Level/Assist Equipment: SBA  Antibiotics & Plan (IV/po, length of tx left): None  Pain Management: Denies  Tele/VS/O2: VSS on 2LO2( Does not use o2 at home)  ABNL Lab/BG: Na 134, [place] 2.0, ECHO today EF 60-65%  Diet:2g NA  Bowel/Bladder: Incontinent at Times ( Urgency)  Skin Concerns: Redness under Abdominal Folds   Drains/Devices: PIV/SL  Patient Stated Goal for Today: Rest.

## 2024-04-19 NOTE — PROGRESS NOTES
Patient has been assessed for Home Oxygen needs. Oxygen readings:    *Pulse oximetry (SpO2) = 91% on room air at rest while awake.    *SpO2 improved to 95% on 2liters/minute at rest.    *SpO2 = 85% on room air during activity/with exercise.    *SpO2 improved to 92% on 2liters/minute during activity/with exercise.

## 2024-04-19 NOTE — PHARMACY-ADMISSION MEDICATION HISTORY
Pharmacist Admission Medication History    Admission medication history is complete. The information provided in this note is only as accurate as the sources available at the time of the update.    Information Source(s): Facility (U/[facility]/) medication list/MAR and CareEverywhere/SureScripts via N/A    Pertinent Information:   [facility] ([phone]) sent a current medication list w/pt. This list is missing Xifaxan, spironolactone and [place] Thyroid 60 mg.   Multiple attempts made to clarify w/facility when pt was here 4/16-4/18. I left a message again this morning, but still awaiting response.   Patient has been consistently filling [place] Thyroid 60 mg and 15 mg tablets (believe TDD 75 mg daily), spironolactone filled 3/25 for 30 day supply, Xifaxan filled 3/7 for 30 day supply  Pharmacy will addend medication list if additional info provided from facility    ADDENDUM:  Clarified facility meds with [facility] ([phone]). Per discussion, it sounds like spironolactone and [place] Thyroid 60 mg not received by [facility] when pt transferred from [facility]. RN states Xifaxan is not being given d/t pending prior authorization. Left Sticky for providers, but need new Rxs sent to [facility] pharmacy at discharge so facility can administer. YADIEL HEATON RPH on 4/19/2024 at 9:41 AM    Changes made to PTA medication list:  Added: None  Deleted: None  Changed: None    Allergies reviewed with patient and updates made in EHR: no    Medication History Completed By: YADIEL HEATON RPH 4/19/2024 9:14 AM    PTA Med List   Medication Sig Last Dose    aspirin (ASA) 325 MG EC tablet Take 1 tablet (325 mg) by mouth daily for 30 days Past Week    celecoxib (CELEBREX) 200 MG capsule Take 1 capsule (200 mg) by mouth daily for 30 days Past Week    escitalopram (LEXAPRO) 10 MG tablet Take 2 tablets (20 mg) by mouth every morning for 30 days Past Week    furosemide (LASIX) 20 MG tablet Take 2.5 tablets (50  mg) by mouth daily for 30 days Past Week    insulin aspart (NOVOLOG PEN) 100 UNIT/ML pen Inject 3 Units Subcutaneous 3 times daily (with meals) Hold for blood sugar of less than 200, Past Week    insulin glargine (LANTUS PEN) 100 UNIT/ML pen Inject 36 Units Subcutaneous daily Past Week    lactulose 20 GM/30ML solution Take 30 mLs (20 g) by mouth 3 times daily for 30 days Past Week    levothyroxine (SYNTHROID/LEVOTHROID) 100 MCG tablet Take 1 tablet (100 mcg) by mouth daily for 30 days Past Week    methocarbamol (ROBAXIN) 750 MG tablet Take 1 tablet (750 mg) by mouth every 6 hours as needed for muscle spasms Unknown    omeprazole (PRILOSEC) 10 MG DR capsule Take 1 capsule (10 mg) by mouth daily for 30 days Past Week    semaglutide (OZEMPIC) 2 MG/1.5ML SOPN pen Inject 0.5 mg weekly (Patient taking differently: Inject 0.5 mg weekly on Tuesdays) Past Month    spironolactone (ALDACTONE) 100 MG tablet Take 100 mg by mouth daily     thyroid (ARMOUR) 15 MG tablet Take 1 tablet (15 mg) by mouth daily for 30 days Past Week    thyroid (ARMOUR) 60 MG tablet Take 1-tablet by mouth daily as directed     valACYclovir (VALTREX) 500 MG tablet Take 1 tablet (500 mg) by mouth daily as needed (antiviral) Unknown    XIFAXAN 550 MG TABS tablet Take 1 tablet (550 mg) by mouth 2 times daily for 30 days

## 2024-04-19 NOTE — ED NOTES
Bed: ED07  Expected date:   Expected time:   Means of arrival:   Comments:  419 [id number] pulmonary edema/ SOB

## 2024-04-19 NOTE — ED PROVIDER NOTES
"  History     Chief Complaint:  Shortness of Breath       HPI   Sudheer Montiel is a 58 year old female with history of George cirrhosis and recent admission on 4/16/2024 for acute hypoxic respiratory failure due to pulmonary edema and large right-sided pleural effusion due to hepatic hydrothorax s/p R thoracentesis returns back to the ER today due to shortness of breath and hypoxia.  Patient states that throughout the day today, she has had some feverish sensation and worsening shortness of breath.  She has also been coughing.  Denies any chest pain component with this.  EMS was called and when they arrived, her initial oxygenation was 87% requiring 2 L nasal cannula.  Patient was discharged today.    Review of Systems   Respiratory:  Positive for cough and shortness of breath.    Cardiovascular:  Negative for chest pain.   Gastrointestinal:  Negative for abdominal pain, nausea and vomiting.   Genitourinary:  Negative for dysuria and hematuria.   Musculoskeletal:  Negative for back pain and neck pain.   Neurological:  Negative for dizziness and headaches.       Independent Historian:   None - Patient Only    Review of External Notes:   Discharge summary from today:  \"Repeat CXR prior to discharge shows small to moderate right pleural effusion increased from admission, but she remains comfortable on room air. She was given an additional dose of IV Lasix and oral potassium prior to discharge. She was advised to make a lab appointment within 1 week to recheck her potassium \"      Medications:    aspirin (ASA) 325 MG EC tablet  blood glucose (NO BRAND SPECIFIED) test strip  celecoxib (CELEBREX) 200 MG capsule  Continuous Blood Gluc Sensor (FREESTYLE DIMITRIOS 3 SENSOR) MISC  escitalopram (LEXAPRO) 10 MG tablet  furosemide (LASIX) 20 MG tablet  insulin aspart (NOVOLOG PEN) 100 UNIT/ML pen  insulin glargine (LANTUS PEN) 100 UNIT/ML pen  insulin pen needle (B-D U/F) 31G X 5 MM miscellaneous  insulin pen needle (B-D U/F) 31G X 5 " "MM miscellaneous  lactulose 20 GM/30ML solution  levothyroxine (SYNTHROID/LEVOTHROID) 100 MCG tablet  MELATONIN PO  methocarbamol (ROBAXIN) 750 MG tablet  omeprazole (PRILOSEC) 10 MG DR capsule  semaglutide (OZEMPIC) 2 MG/1.5ML SOPN pen  semaglutide (OZEMPIC, 0.25 OR 0.5 MG/DOSE,) 2 MG/3ML pen  spironolactone (ALDACTONE) 100 MG tablet  STATIN NOT PRESCRIBED (INTENTIONAL)  thyroid (ARMOUR) 15 MG tablet  thyroid (ARMOUR) 60 MG tablet  valACYclovir (VALTREX) 500 MG tablet  XIFAXAN 550 MG TABS tablet        Past Medical History:    Past Medical History:   Diagnosis Date    Abnormal liver CT     Collapsed lung 2023    Hypothyroid 80's    Necrotizing fasciitis (H)     Pleural effusion     Primary osteoarthritis of both knees     Soft tissue infection     Subcortical infarction (H)     Type 2 diabetes mellitus (H)     Unspecified cirrhosis of liver (H)        Past Surgical History:    Past Surgical History:   Procedure Laterality Date    ARTHROPLASTY KNEE Right 2023    Procedure: Right Total Knee Arthroplasty;  Surgeon: Pawan Ewing MD;  Location:  OR     SECTION      COLONOSCOPY N/A 10/21/2015    Procedure: COLONOSCOPY;  Surgeon: Jaky Arredondo MD;  Location:  GI    IRRIGATION AND DEBRIDEMENT ABDOMEN WASHOUT, COMBINED N/A 10/12/2018    Procedure: COMBINED IRRIGATION AND DEBRIDEMENT ABDOMEN WASHOUT;  Irrigation and Debridement of Lower Abdomen, removal of piece of mesh.;  Surgeon: Darlene Hogue MD;  Location: [facility] OR    marisol/bso      due to anemia    ZZC REPAIR CRUCIATE LIGAMENT,KNEE          Physical Exam   Patient Vitals for the past 24 hrs:   BP Temp Temp src Pulse Resp SpO2 Height Weight   24 0100 137/68 -- -- 82 27 -- -- --   247 -- -- -- -- -- 95 % -- --   24 2215 -- -- -- -- -- 98 % -- --   247 125/64 -- -- 83 -- 94 % -- --   24 125/64 98.3  F (36.8  C) Oral 81 18 92 % 1.626 m (5' 4\") 97.1 kg (214 lb)    "     Constitutional:       Appearance: Normal appearance.   HENT:      Head: Normocephalic and atraumatic.   Eyes:      Extraocular Movements: Extraocular movements intact.      Conjunctiva/sclera: Conjunctivae normal.   Cardiovascular:      Rate and Rhythm: Normal rate and regular rhythm.   Pulmonary:      Effort: Pulmonary effort is mildly tachypneic.  No respiratory distress.      Breath sounds: Diffuse wheezing in bilateral lung fields  Abdominal:      General: Abdomen is flat. There is no distension.      Palpations: Abdomen is soft.      Tenderness: There is no abdominal tenderness.   Musculoskeletal:      Cervical back: Normal range of motion. No rigidity.       Right lower leg: Mild edema.      Left lower leg: Mild edema.   Skin:     General: Skin is warm and dry.   Neurological:      General: No focal deficit present.      Mental Status: Alert and oriented to person, place, and time.   Psychiatric:         Mood and Affect: Mood normal.         Behavior: Behavior normal.    Emergency Department Course   ECG  ECG results from 04/18/24   EKG 12-lead, tracing only     Value    Systolic Blood Pressure     Diastolic Blood Pressure     Ventricular Rate 81    Atrial Rate 81    MA Interval 148    QRS Duration 90        QTc 471    P Axis 34    R AXIS -19    T Axis 44    Interpretation ECG      Sinus rhythm  Anterior infarct , age undetermined  Abnormal ECG  When compared with ECG of 16-APR-2024 19:07,  No significant change was found  Confirmed by GENERATED REPORT, COMPUTER ([id number]),  Juma Eldridge ([id number]) on 4/18/2024 10:37:38 PM        See ED course for independent interpretation.     Imaging:  XR Chest 2 Views   Final Result   IMPRESSION: Minimal change. Mild cardiomegaly. Central hilar congestion and diffuse interstitial prominence appears slightly worse consistent with CHF. However there has been some improved aeration at right lung base with right hemidiaphragm now visible.    Likely there is a small  right pleural effusion.         Report per radiology    Laboratory:  Labs Ordered and Resulted from Time of ED Arrival to Time of ED Departure   COMPREHENSIVE METABOLIC PANEL - Abnormal       Result Value    Sodium 134 (*)     Potassium 3.8      Carbon Dioxide (CO2) 25      Anion Gap 9      Urea Nitrogen 8.2      Creatinine 0.51      GFR Estimate >90      Calcium 8.3 (*)     Chloride 100      Glucose 140 (*)     Alkaline Phosphatase 168 (*)     AST 34      ALT 18      Protein Total 6.5      Albumin 2.9 (*)     Bilirubin Total 2.0 (*)    CBC WITH PLATELETS AND DIFFERENTIAL - Abnormal    WBC Count 10.2      RBC Count 4.31      Hemoglobin 13.2      Hematocrit 39.6      MCV 92      MCH 30.6      MCHC 33.3      RDW 15.2 (*)     Platelet Count 101 (*)     % Neutrophils 75      % Lymphocytes 14      % Monocytes 11      % Eosinophils 0      % Basophils 0      % Immature Granulocytes 0      NRBCs per 100 WBC 0      Absolute Neutrophils 7.6      Absolute Lymphocytes 1.4      Absolute Monocytes 1.1      Absolute Eosinophils 0.0      Absolute Basophils 0.0      Absolute Immature Granulocytes 0.0      Absolute NRBCs 0.0     ISTAT GASES LACTATE VENOUS POCT - Abnormal    Lactic Acid POCT 1.3      Bicarbonate Venous POCT 25      O2 Sat, Venous POCT 87 (*)     pCO2 Venous POCT 34 (*)     pH Venous POCT 7.48 (*)     pO2 Venous POCT 48 (*)     Base Excess/Deficit (+/-) POCT 2.0     TROPONIN T, HIGH SENSITIVITY - Normal    Troponin T, High Sensitivity 10     NT PROBNP INPATIENT - Normal    N terminal Pro BNP Inpatient 143     INFLUENZA A/B, RSV, & SARS-COV2 PCR - Normal    Influenza A PCR Negative      Influenza B PCR Negative      RSV PCR Negative      SARS CoV2 PCR Negative     BLOOD CULTURE   BLOOD CULTURE        Emergency Department Course & Assessments:       Interventions:  Medications   ipratropium - albuterol 0.5 mg/2.5 mg/3 mL (DUONEB) neb solution 3 mL (3 mLs Nebulization $Given 4/18/24 9923)   furosemide (LASIX) injection 40  mg (40 mg Intravenous $Given 24 0002)        Independent Interpretation (X-rays, CTs, rhythm strip):  See ED course    Assessments/Consultations/Discussion of Management or Tests:  ED Course as of 24 0203   u 2024   215 EKG 12-lead, tracing only  Normal sinus rhythm.  Rate of 81.  Normal NM and QRS.  QTc 471.  No acute ST elevation or depression as compared with 2024 EKG   2324 On ambulation trial, patient had desaturation down to 88% with significant increased work of breathing.  Patient did take a while to recover.  Upon return to bed, patient still maintaining around 89%.  Placed patient on 2 L nasal cannula.  Will plan for readmission.  Patient will likely benefit from further diuretics and treatment.   2330 XR Chest 2 Views  On my independent interpretation of chest x-ray, there is increased right hemidiaphragm elevation.  Worsening interstitial markings in the left lung.  No focal opacity.  No mediastinal widening.  No pneumothorax.   2331 Discussed patient with hospitalist Dr. Dickey.  Recommend giving additional dose of Lasix 40 mg.  He accepts patient for admission.     Social Determinants of Health affecting care:   None    Disposition:  The patient was admitted to the hospital under the care of Dr. Dickey.     Impression & Plan    CMS Diagnoses: None    Medical Decision Makin-year-old female as described above with REYES cirrhosis and recent large right-sided pleural effusion due to recurrent hepatic hydrothorax requiring thoracentesis return to the ER today due to worsening shortness of breath.  Repeat chest x-ray this morning just prior to discharge demonstrates increased right-sided pleural effusion as compared to the day prior.  Nonetheless, as patient was saturating well on room air, decision was made to hospital service to discharge after treating with additional dose of IV Lasix.  However, throughout the day, patient has had worsening shortness of breath and  returns back to the ER.  Suspect symptoms likely secondary to recurrence of right-sided pleural effusion.  Nonetheless, on examination, patient does have bilateral expiratory wheezing.  Suspect component of reactive airway as well.  Will treat with DuoNeb.  Viral swab for URI and repeat chest x-ray for evaluation for pleural effusion in addition to the pneumonia development.  Dyspnea workup.  Low suspicion for pulmonary embolism at this time especially given no pleuritic type chest pain, no tachycardia, and symptoms more likely secondary to right-sided pleural effusion.  Nonetheless, if vital sign changes or worsening hypoxia, may consider obtaining D-dimer versus CT PE for further evaluation.  Discussed care plan with patient who voiced understanding and agreement with plan.  Answered all questions.  Additional workup and orders as listed in chart.    Please refer to ED course above as part of continuation of MDM for details on the patient's treatment course and any changes or updates in care plan beyond my initial evaluation and MDM creation.    Diagnosis:    ICD-10-CM    1. Acute respiratory failure with hypoxia (H)  J96.01       2. Pleural effusion on right  J90            Discharge Medications:  New Prescriptions    No medications on file          LUIZ NICOLE DO  4/18/2024   Luiz Nicole DO Yeh, [place], DO  04/19/24 0204

## 2024-04-19 NOTE — PLAN OF CARE
PRIMARY Concern: Right plural Effusion   SAFETY RISK Concerns (fall risk, behaviors, etc.): Fall      Isolation/Type: Contact  Tests/Procedures for NEXT shift: Echo.  Consults? (Pending/following, signed-off?) PT  Where is patient from? (Home, TCU, etc.): Home  Other Important info for NEXT shift:   Anticipated DC date & active delays: TBD  _____________________________________________________________________________  SUMMARY NOTE:     )  Orientation/Cognitive: A&OX4  Observation Goals (Met/ Not Met): Not Met  Mobility Level/Assist Equipment: SBA  Antibiotics & Plan (IV/po, length of tx left): None  Pain Management: Denies  Tele/VS/O2: VSS/2l O2  ABNL Lab/BG: See Chart  Diet: Regular Diet  Bowel/Bladder: Incontinent at Times ( Urgency)  Skin Concerns: Redness under Abdominal Folds   Drains/Devices: PIV/SL  Patient Stated Goal for Today: Sleep        Observation goals  PRIOR TO DISCHARGE       Comments: -diagnostic tests and consults completed and resulted  -vital signs normal or at patient baseline  -dyspnea improved and O2 sats greater than 88% on room air or prior home oxygen levels  -returns to baseline functional status  Nurse to notify provider when observation goals have been met and patient is ready for discharge.

## 2024-04-19 NOTE — PROGRESS NOTES
04/19/24 0900   Appointment Info   Signing Clinician's Name / Credentials (PT) Michell Manjarrez, VELIA   Quick Adds   Quick Adds Certification   Living Environment   People in Home facility resident   Current Living Arrangements assisted living   Home Accessibility no concerns   Transportation Anticipated agency   Living Environment Comments Lives in [place], dtrs live in the area   Self-Care   Usual Activity Tolerance excellent   Current Activity Tolerance good   Regular Exercise No   Equipment Currently Used at Home cane, straight;grab bar, toilet;grab bar, tub/shower;raised toilet seat   Fall history within last six months no   Activity/Exercise/Self-Care Comment IND with dressing and bathing, gets assists with cleaning, laundry, meals   General Information   Onset of Illness/Injury or Date of Surgery 04/18/24   Referring Physician Leland Dickey MD   Patient/Family Therapy Goals Statement (PT) Go home   Pertinent History of Current Problem (include personal factors and/or comorbidities that impact the POC) Sudheer Montiel is a 58 year old female who presents with shortness of breath.  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  Depression among others.  She was just hospitalized from April 16th through the 18th with a large right effusion from hepatic hydrothorax.  She underwent thoracentesis of 1.5 L with improvement.  At the time of discharge yesterday she was feeling improved and declined changes in her diuretics.  However, shortly after she got home today she started to get short of breath again.  Shortness of breath was worse lying down.  She had no chest pain.  She had cough associated.  No abdominal pain.  No nausea or vomiting.  No edema.  And EMS found her O2 sats of 87%.   Existing Precautions/Restrictions fall;oxygen therapy device and L/min   Cognition   Affect/Mental Status (Cognition) WFL   Orientation Status  (Cognition) oriented x 4   Follows Commands (Cognition) WFL   Pain Assessment   Patient Currently in Pain No   Integumentary/Edema   Integumentary/Edema Comments Mild jaundice   Posture    Posture Forward head position   Range of Motion (ROM)   Range of Motion ROM is WFL   Strength (Manual Muscle Testing)   Strength (Manual Muscle Testing) strength is WFL   Bed Mobility   Comment, (Bed Mobility) IND   Transfers   Comment, (Transfers) IND no AD   Gait/Stairs (Locomotion)   Comment, (Gait/Stairs) IND no AD   Balance   Balance Comments IND   Clinical Impression   Criteria for Skilled Therapeutic Intervention Yes, treatment indicated   PT Diagnosis (PT) dereased activity tolerance   Influenced by the following impairments SOB   Functional limitations due to impairments impaired mobility   Clinical Presentation (PT Evaluation Complexity) stable   Clinical Presentation Rationale clinical judgement   Clinical Decision Making (Complexity) low complexity   Planned Therapy Interventions (PT) balance training;bed mobility training;cryotherapy;gait training;home exercise program;ROM (range of motion);stair training;strengthening;stretching;transfer training   Risk & Benefits of therapy have been explained evaluation/treatment results reviewed;care plan/treatment goals reviewed;risks/benefits reviewed;current/potential barriers reviewed;participants voiced agreement with care plan;participants included;patient   PT Total Evaluation Time   PT Eval, Low Complexity Minutes (33747) 15   Therapy Certification   Start of care date 04/19/24   Certification date from 04/19/24   Certification date to 04/26/24   Physical Therapy Goals   PT Frequency 5x/week   PT Predicted Duration/Target Date for Goal Attainment 04/26/24   PT Goals Gait   PT: Gait Independent  (on RA)   Interventions   Interventions Quick Adds Gait Training;Therapeutic Activity   Therapeutic Activity   Therapeutic Activities: dynamic activities to improve functional  performance Minutes (73283) 4   Symptoms Noted During/After Treatment Shortness of breath   Treatment Detail/Skilled Intervention Pt bedside, declined gait belt. IND transfers, amb IND to toilet. Sats in the mid 80's on RA. Seated at EOB trialed PLB on 1L and increased to 90. After amb, left in recliner alarms off, RN updated. 1L at rest.   Gait Training   Gait Training Minutes (15295) 15   Symptoms Noted During/After Treatment (Gait Training) significant change in vital signs   Treatment Detail/Skilled Intervention Amb 450ft with intermittent standing breaks d/t desating in the 80's. Trialed weaning O2 but pt consistently required 2L to maintain sats in the 90s. Cues for PLB, standin gbreaks. Edu on amb goals to maintain strength.   PT Discharge Planning   PT Plan Trial amb on RA, monitor O2 and likely DC   PT Discharge Recommendation (DC Rec) home with assist;home with home care physical therapy   PT Rationale for DC Rec Pt moving at baseline IND with intermittent use of SEC though requires 2L to sat in the 90s during mobiltiy. Anticpate with further medical management and improvement of SOB pt can DC home. Rec home with assist and HCPT to progress endurance.   PT Brief overview of current status IND in room, 2L   Total Session Time   Timed Code Treatment Minutes 19   Total Session Time (sum of timed and untimed services) 34   [facility]  OUTPATIENT PHYSICAL THERAPY EVALUATION  PLAN OF TREATMENT FOR OUTPATIENT REHABILITATION  (COMPLETE FOR INITIAL CLAIMS ONLY)  Patient's Last Name, First Name, M.I.  YOB: 1965  Sudheer Montiel                        Provider's Name  [facility] Medical Record No.  [id number]                             Onset Date:  04/18/24   Start of Care Date:  04/19/24   Type:     _X_PT   ___OT   ___SLP Medical Diagnosis:                 PT Diagnosis:  dereased activity tolerance Visits from SOC:  1     See note for  plan of treatment, functional goals and certification details    I CERTIFY THE NEED FOR THESE SERVICES FURNISHED UNDER        THIS PLAN OF TREATMENT AND WHILE UNDER MY CARE     (Physician co-signature of this document indicates review and certification of the therapy plan).

## 2024-04-19 NOTE — PROGRESS NOTES
Observation goals  PRIOR TO DISCHARGE       Comments: -  diagnostic tests and consults completed and resulted: Not met    -vital signs normal or at patient baseline: Not met, continues to need o2 @ 2L nc.     -dyspnea improved and O2 sats greater than 88% on room air or prior home oxygen level: Not met    -returns to baseline functional status    Nurse to notify provider when observation goals have been met and patient is ready for discharge.

## 2024-04-19 NOTE — ED NOTES
Pt road tested. Pt ambulated independently however was short of breath and unbalanced. Pt SpO2 dipped to 88%.

## 2024-04-19 NOTE — PROGRESS NOTES
RECEIVING UNIT ED HANDOFF REVIEW    ED Nurse Handoff Report was reviewed by: Devan Chaney RN on April 19, 2024 at 1:30 AM

## 2024-04-19 NOTE — ED TRIAGE NOTES
Pt called EMS for concerns of worsening SOB especially withen trying to lay flat. Initial EMS SpO2 was 87%, placed on 2LPM  PTA. Here on RA lying in bed 93% on RA. Patient reports recent  2L drainage of fluid for similar symptoms.    Triage Assessment (Adult)       Row Name 04/18/24 5341          Triage Assessment    Airway WDL WDL        Respiratory WDL    Respiratory WDL X  SOB worse when lying flat        Cardiac WDL    Cardiac WDL WDL        Peripheral/Neurovascular WDL    Peripheral Neurovascular WDL WDL        Cognitive/Neuro/Behavioral WDL    Cognitive/Neuro/Behavioral WDL WDL

## 2024-04-20 ENCOUNTER — APPOINTMENT (OUTPATIENT)
Dept: SPEECH THERAPY | Facility: CLINIC | Age: 59
End: 2024-04-20
Attending: INTERNAL MEDICINE
Payer: MEDICAID

## 2024-04-20 VITALS
HEIGHT: 65 IN | DIASTOLIC BLOOD PRESSURE: 62 MMHG | TEMPERATURE: 98.2 F | RESPIRATION RATE: 18 BRPM | OXYGEN SATURATION: 93 % | SYSTOLIC BLOOD PRESSURE: 116 MMHG | BODY MASS INDEX: 35.49 KG/M2 | WEIGHT: 213 LBS | HEART RATE: 78 BPM

## 2024-04-20 LAB
ANION GAP SERPL CALCULATED.3IONS-SCNC: 10 MMOL/L (ref 7–15)
BUN SERPL-MCNC: 8.5 MG/DL (ref 8–23)
CALCIUM SERPL-MCNC: 8.2 MG/DL (ref 8.6–10)
CHLORIDE SERPL-SCNC: 98 MMOL/L (ref 98–107)
CREAT SERPL-MCNC: 0.53 MG/DL (ref 0.51–0.95)
DEPRECATED HCO3 PLAS-SCNC: 26 MMOL/L (ref 22–29)
EGFRCR SERPLBLD CKD-EPI 2021: >90 ML/MIN/1.73M2
GLUCOSE BLDC GLUCOMTR-MCNC: 142 MG/DL (ref 70–99)
GLUCOSE BLDC GLUCOMTR-MCNC: 200 MG/DL (ref 70–99)
GLUCOSE SERPL-MCNC: 128 MG/DL (ref 70–99)
POTASSIUM SERPL-SCNC: 3.5 MMOL/L (ref 3.4–5.3)
SODIUM SERPL-SCNC: 134 MMOL/L (ref 135–145)

## 2024-04-20 PROCEDURE — 96376 TX/PRO/DX INJ SAME DRUG ADON: CPT

## 2024-04-20 PROCEDURE — 250N000013 HC RX MED GY IP 250 OP 250 PS 637: Performed by: INTERNAL MEDICINE

## 2024-04-20 PROCEDURE — 82962 GLUCOSE BLOOD TEST: CPT

## 2024-04-20 PROCEDURE — G0378 HOSPITAL OBSERVATION PER HR: HCPCS

## 2024-04-20 PROCEDURE — 36415 COLL VENOUS BLD VENIPUNCTURE: CPT | Performed by: INTERNAL MEDICINE

## 2024-04-20 PROCEDURE — 99239 HOSP IP/OBS DSCHRG MGMT >30: CPT | Performed by: INTERNAL MEDICINE

## 2024-04-20 PROCEDURE — 80048 BASIC METABOLIC PNL TOTAL CA: CPT | Performed by: INTERNAL MEDICINE

## 2024-04-20 PROCEDURE — 92610 EVALUATE SWALLOWING FUNCTION: CPT | Mod: GN | Performed by: SPEECH-LANGUAGE PATHOLOGIST

## 2024-04-20 PROCEDURE — 250N000011 HC RX IP 250 OP 636: Mod: JZ | Performed by: INTERNAL MEDICINE

## 2024-04-20 RX ORDER — FUROSEMIDE 10 MG/ML
40 INJECTION INTRAMUSCULAR; INTRAVENOUS ONCE
Status: COMPLETED | OUTPATIENT
Start: 2024-04-20 | End: 2024-04-20

## 2024-04-20 RX ORDER — ACETAMINOPHEN 325 MG/1
650 TABLET ORAL EVERY 4 HOURS PRN
DISCHARGE
Start: 2024-04-20 | End: 2024-04-20

## 2024-04-20 RX ORDER — POTASSIUM CHLORIDE 1500 MG/1
40 TABLET, EXTENDED RELEASE ORAL DAILY
Status: DISCONTINUED | OUTPATIENT
Start: 2024-04-20 | End: 2024-04-20 | Stop reason: HOSPADM

## 2024-04-20 RX ORDER — ONDANSETRON 4 MG/1
4 TABLET, ORALLY DISINTEGRATING ORAL EVERY 6 HOURS PRN
DISCHARGE
Start: 2024-04-20 | End: 2024-04-20

## 2024-04-20 RX ORDER — POTASSIUM CHLORIDE 1500 MG/1
20 TABLET, EXTENDED RELEASE ORAL DAILY
DISCHARGE
Start: 2024-04-21 | End: 2024-04-20

## 2024-04-20 RX ORDER — FUROSEMIDE 40 MG
40 TABLET ORAL
Qty: 60 TABLET | Refills: 0 | Status: SHIPPED | OUTPATIENT
Start: 2024-04-20 | End: 2024-05-20

## 2024-04-20 RX ORDER — POTASSIUM CHLORIDE 1500 MG/1
40 TABLET, EXTENDED RELEASE ORAL DAILY
DISCHARGE
Start: 2024-04-21 | End: 2024-04-20

## 2024-04-20 RX ORDER — AMOXICILLIN 250 MG
1 CAPSULE ORAL 2 TIMES DAILY PRN
DISCHARGE
Start: 2024-04-20 | End: 2024-04-20

## 2024-04-20 RX ORDER — POTASSIUM CHLORIDE 1500 MG/1
20 TABLET, EXTENDED RELEASE ORAL DAILY
Qty: 30 TABLET | Refills: 0 | Status: SHIPPED | OUTPATIENT
Start: 2024-04-21 | End: 2024-05-21

## 2024-04-20 RX ORDER — FUROSEMIDE 40 MG
40 TABLET ORAL
Status: DISCONTINUED | OUTPATIENT
Start: 2024-04-20 | End: 2024-04-20 | Stop reason: HOSPADM

## 2024-04-20 RX ORDER — FUROSEMIDE 40 MG
40 TABLET ORAL
DISCHARGE
Start: 2024-04-20 | End: 2024-04-20

## 2024-04-20 RX ADMIN — SPIRONOLACTONE 100 MG: 25 TABLET ORAL at 08:17

## 2024-04-20 RX ADMIN — LEVOTHYROXINE SODIUM 100 MCG: 100 TABLET ORAL at 08:18

## 2024-04-20 RX ADMIN — ASPIRIN 325 MG: 325 TABLET, COATED ORAL at 08:18

## 2024-04-20 RX ADMIN — LACTULOSE 20 G: 20 SOLUTION ORAL at 14:26

## 2024-04-20 RX ADMIN — POTASSIUM CHLORIDE 40 MEQ: 1500 TABLET, EXTENDED RELEASE ORAL at 09:51

## 2024-04-20 RX ADMIN — INSULIN ASPART 3 UNITS: 100 INJECTION, SOLUTION INTRAVENOUS; SUBCUTANEOUS at 08:18

## 2024-04-20 RX ADMIN — PANTOPRAZOLE SODIUM 20 MG: 20 TABLET, DELAYED RELEASE ORAL at 08:17

## 2024-04-20 RX ADMIN — INSULIN GLARGINE 15 UNITS: 100 INJECTION, SOLUTION SUBCUTANEOUS at 08:18

## 2024-04-20 RX ADMIN — LEVOTHYROXINE, LIOTHYRONINE 15 MG: 9.5; 2.25 TABLET ORAL at 08:17

## 2024-04-20 RX ADMIN — LACTULOSE 20 G: 20 SOLUTION ORAL at 08:17

## 2024-04-20 RX ADMIN — ESCITALOPRAM OXALATE 20 MG: 10 TABLET ORAL at 08:17

## 2024-04-20 RX ADMIN — INSULIN ASPART 3 UNITS: 100 INJECTION, SOLUTION INTRAVENOUS; SUBCUTANEOUS at 12:53

## 2024-04-20 RX ADMIN — RIFAXIMIN 550 MG: 550 TABLET ORAL at 16:15

## 2024-04-20 RX ADMIN — RIFAXIMIN 550 MG: 550 TABLET ORAL at 08:17

## 2024-04-20 RX ADMIN — FUROSEMIDE 40 MG: 10 INJECTION, SOLUTION INTRAMUSCULAR; INTRAVENOUS at 09:51

## 2024-04-20 ASSESSMENT — ACTIVITIES OF DAILY LIVING (ADL)
ADLS_ACUITY_SCORE: 40

## 2024-04-20 NOTE — PROGRESS NOTES
Patient has been assessed for Home Oxygen needs. Oxygen readings:    *Pulse oximetry (SpO2) = 93% on room air at rest while awake.    *SpO2 improved to 94% on 2liters/minute at rest.    *SpO2 = 91% on room air during activity/with exercise.    *SpO2 improved to 94% on 2liters/minute during activity/with exercise.

## 2024-04-20 NOTE — PROGRESS NOTES
Observation goals  PRIOR TO DISCHARGE       Comments: -  diagnostic tests and consults completed and resulted: Met     -vital signs normal or at patient baseline: Met     -dyspnea improved and O2 sats greater than 88% on room air or prior home oxygen level: Met     -returns to baseline functional status: Partially met     Nurse to notify provider when observation goals have been met and patient is ready for discharge.

## 2024-04-20 NOTE — DISCHARGE SUMMARY
[facility]    Discharge Summary  Hospitalist    Date of Admission:  4/18/2024  Date of Discharge:  4/20/2024  4:47 PM  Discharging Provider: Gisselle Bradley MD    Discharge Diagnoses   Right-sided pleural effusion  Acute respiratory failure with hypoxia    History of Present Illness   Please review admission history and physical.    Hospital Course   Sudheer Montiel was admitted on 4/18/2024.  The following problems were addressed during her hospitalization:    Active Problems:    Pleural effusion on right    Acute respiratory failure with hypoxia (H)  Sudheer Montiel is a 58 year old female admitted on 4/18/2024. She presents with shortness of breath     Acute hypoxic respiratory failure  R effusion- improved  Hepatic hydrothorax  *hospitalized 4/16-18 with large R effusion, s/p thoracentesis of 1.5L. was on O2 but weaned. Given furosemide as well.   *discharged this am, returns with SOB, cough. No CP. Found by EMS to have O2 sats 87%, improved with 2L. BNP and troponin normal. In ED AFVSS. CXR w/ minimal change, findings c/w CHF, improved R lung base aeration with r hemidiaphragm now visible.  Received 3 doses of IV Lasix, shortness of breath improved, oxygen evaluation was done, patient did not qualify for oxygen, plan of care was discussed with patient daughter in the near bedside.  Repeat echocardiogram results did not show significant changes from prior, her diuretic dose was adjusted to 40 mg twice daily, repeat labs ordered in 4 to 5 days to evaluate potassium levels along with a creatinine.  K. Dur ordered upon discharge.       REYES cirrhosis  Chronic hepatic encephalopathy  GERD  [Needs rec- lasix 50 mg daily, lactulose 20 g TID, omeprazole 10 mg daily, spironolactone 100 mg daily, rifaximin 500 mg BID]   T bili at 2.0, up slightly from previous 1.4. AST/ALT normal.  He is on 40 mg of Lasix in a.m. and 20 in p.m., changed to 40 twice daily upon discharge, continue rest of the  medication including spironolactone, lactulose, rifaximin and omeprazole.       DM II  [Needs rec-  mg daily, lantus 36U daily, ozempic weekly, aspart 3U TID with meals]  Follows outpatient with endocrinology. Hgb A1c 5.8% 4/16/24.   - TID and HS blood sugars  - A1c is 5.8% and , suspect she may not need 36 units of lantus. Appears she was getting 15 units during recent hospital, will continue  - aspart 3 units TID with meals     Thrombocytopenia  Baseline 70-90s. At 101 on presentation.   - monitor     MDD  Anxiety  Insomnia  - resume escitalopram 20 mg daily     Hypothyroidism  - resume levothyroxine, thyroid armour     Tobacco use disorder  Smokes 5 cigs/ day  - declines NRT  - encourage cessation     Morbid obesity  On ozempic, encourage weight loss and healthy lifestyle        Gisselle Bradley MD    Significant Results and Procedures       Pending Results   These results will be followed up by mngi  Unresulted Labs Ordered in the Past 30 Days of this Admission       Date and Time Order Name Status Description    4/18/2024  9:48 PM Blood Culture Peripheral Blood Preliminary     4/18/2024  9:48 PM Blood Culture Peripheral Blood Preliminary     4/17/2024 12:03 AM Pleural fluid Aerobic Bacterial Culture Routine With Gram Stain Preliminary             Code Status   Full Code       Primary Care Physician   Bennie Wiggins    Physical Exam   Temp: 98.5  F (36.9  C) Temp src: Oral BP: 109/48 Pulse: 76   Resp: 16 SpO2: 90 % O2 Device: None (Room air) Oxygen Delivery: 1/2 LPM  Vitals:    04/18/24 2134 04/19/24 0224   Weight: 97.1 kg (214 lb) 96.6 kg (213 lb)     Vital Signs with Ranges  Temp:  [97.3  F (36.3  C)-99.1  F (37.3  C)] 98.5  F (36.9  C)  Pulse:  [69-78] 76  Resp:  [16-20] 16  BP: (108-119)/(48-67) 109/48  SpO2:  [90 %-96 %] 90 %  I/O last 3 completed shifts:  In: 600 [P.O.:600]  Out: -     The patient was examined on the day of discharge.    Discharge Disposition   Discharged to home  Condition at  discharge: Stable    Consultations This Hospital Stay   PHYSICAL THERAPY ADULT IP CONSULT  SPEECH LANGUAGE PATH ADULT IP CONSULT    Time Spent on this Encounter   I, Gisselle Bradley MD, personally saw the patient today and spent greater than 30 minutes discharging this patient.    Discharge Orders      XR Video Swallow with SLP or OT - Order with Speech Therapy Referral     Reason for your hospital stay    Fluid overload     Follow-up and recommended labs and tests     Follow up with primary care provider, Bennie Wiggins, within 7 days for hospital follow- up.  The following labs/tests are recommended: basic metabolic panel in 4-5 days,lasix dose increased and potassium added, follow up with GI as recommended..     Activity    Your activity upon discharge: activity as tolerated     Oxygen Adult/Peds    Oxygen Documentation  I certify that this patient, Sudheer Montiel has been under my care (or a nurse practitioner or physican's assistant working with me). This is the face-to-face encounter for oxygen medical necessity.      At the time of this encounter, I have reviewed the qualifying testing and have determined that supplemental oxygen is reasonable and necessary and is expected to improve the patient's condition in a home setting.         Patient has continued oxygen desaturation due to pulmonary edema from cirrhosis.    If portability is ordered, is the patient mobile within the home? yes    Was this visit performed as a telehealth visit: no     Diet    Follow this diet upon discharge: Orders Placed This Encounter      Combination Diet Regular Diet; 2 gm NA Diet     Discharge Medications   Current Discharge Medication List        START taking these medications    Details   potassium chloride evan ER (KLOR-CON M20) 20 MEQ CR tablet Take 1 tablet (20 mEq) by mouth daily for 30 days    Associated Diagnoses: Acute respiratory failure with hypoxia (H)           CONTINUE these medications which have CHANGED    Details    furosemide (LASIX) 40 MG tablet Take 1 tablet (40 mg) by mouth 2 times daily for 30 days  Qty: 60 tablet, Refills: 0    Associated Diagnoses: Acute respiratory failure with hypoxia (H)           CONTINUE these medications which have NOT CHANGED    Details   aspirin (ASA) 325 MG EC tablet Take 1 tablet (325 mg) by mouth daily for 30 days  Qty: 30 tablet, Refills: 0    Associated Diagnoses: S/P total knee arthroplasty, right      celecoxib (CELEBREX) 200 MG capsule Take 1 capsule (200 mg) by mouth daily for 30 days  Qty: 30 capsule, Refills: 0    Associated Diagnoses: Hepatic encephalopathy (H)      escitalopram (LEXAPRO) 10 MG tablet Take 2 tablets (20 mg) by mouth every morning for 30 days  Qty: 60 tablet, Refills: 0    Associated Diagnoses: Hepatic encephalopathy (H)      insulin aspart (NOVOLOG PEN) 100 UNIT/ML pen Inject 3 Units Subcutaneous 3 times daily (with meals) Hold for blood sugar of less than 200,  Qty: 3 mL, Refills: 0    Comments: Please disregard vial  Associated Diagnoses: Type 2 diabetes mellitus without complication, without long-term current use of insulin (H)      insulin glargine (LANTUS PEN) 100 UNIT/ML pen Inject 36 Units Subcutaneous daily  Qty: 10.8 mL, Refills: 0    Comments: If Lantus is not covered by insurance, may substitute Basaglar or Semglee or other insulin glargine product per insurance preference at same dose and frequency.    Associated Diagnoses: Type 2 diabetes mellitus without complication, without long-term current use of insulin (H)      lactulose 20 GM/30ML solution Take 30 mLs (20 g) by mouth 3 times daily for 30 days  Qty: 2700 mL, Refills: 0    Associated Diagnoses: Hepatic encephalopathy (H)      levothyroxine (SYNTHROID/LEVOTHROID) 100 MCG tablet Take 1 tablet (100 mcg) by mouth daily for 30 days  Qty: 30 tablet, Refills: 0    Associated Diagnoses: Acquired hypothyroidism      methocarbamol (ROBAXIN) 750 MG tablet Take 1 tablet (750 mg) by mouth every 6 hours as  needed for muscle spasms  Qty: 30 tablet, Refills: 0    Associated Diagnoses: Status post total right knee replacement      omeprazole (PRILOSEC) 10 MG DR capsule Take 1 capsule (10 mg) by mouth daily for 30 days  Qty: 30 capsule, Refills: 0    Associated Diagnoses: Hepatic encephalopathy (H)      semaglutide (OZEMPIC) 2 MG/1.5ML SOPN pen Inject 0.5 mg weekly  Qty: 6 mL, Refills: 1    Associated Diagnoses: Type 2 diabetes mellitus without complication, without long-term current use of insulin (H)      spironolactone (ALDACTONE) 100 MG tablet Take 100 mg by mouth daily      !! thyroid (ARMOUR) 15 MG tablet Take 1 tablet (15 mg) by mouth daily for 30 days  Qty: 30 tablet, Refills: 0    Associated Diagnoses: Acquired hypothyroidism      !! thyroid (ARMOUR) 60 MG tablet Take 1-tablet by mouth daily as directed  Qty: 90 tablet, Refills: 1    Associated Diagnoses: Acquired hypothyroidism      valACYclovir (VALTREX) 500 MG tablet Take 1 tablet (500 mg) by mouth daily as needed (antiviral)  Qty: 30 tablet, Refills: 0    Associated Diagnoses: Hepatic encephalopathy (H)      XIFAXAN 550 MG TABS tablet Take 1 tablet (550 mg) by mouth 2 times daily for 30 days  Qty: 60 tablet, Refills: 0    Associated Diagnoses: Hepatic encephalopathy (H)      blood glucose (NO BRAND SPECIFIED) test strip Use to test blood sugar 1 times daily or as directed. Glucocard Vital  Qty: 200 strip, Refills: 3    Associated Diagnoses: Type 2 diabetes mellitus without complication, without long-term current use of insulin (H)      Continuous Blood Gluc Sensor (FREESTYLE DIMITRIOS 3 SENSOR) MISC 1 Device continuous prn (change every 14 days)  Qty: 2 each, Refills: 5    Associated Diagnoses: Type 2 diabetes mellitus without complication, without long-term current use of insulin (H)      !! insulin pen needle (B-D U/F) 31G X 5 MM miscellaneous Use 4 pen needles daily or as directed.  Qty: 400 each, Refills: 3    Associated Diagnoses: Type 2 diabetes mellitus  without complication, without long-term current use of insulin (H)      !! insulin pen needle (B-D U/F) 31G X 5 MM miscellaneous Use 1 pen needles daily or as directed.  Qty: 100 each, Refills: 3    Associated Diagnoses: Type 2 diabetes mellitus without complication, without long-term current use of insulin (H)      STATIN NOT PRESCRIBED (INTENTIONAL) Please choose reason not prescribed from choices below.       !! - Potential duplicate medications found. Please discuss with provider.        Allergies   Allergies   Allergen Reactions    Cephalexin     Morphine      Other reaction(s): Other  Irritability, disorientation    Penicillins Itching     face    Amoxicillin Rash and Itching    Cefprozil Rash    Ceftazidime Itching and Rash    Ciprofloxacin Itching and Rash     Tolerates levaquin     Percocet [Oxycodone-Acetaminophen] Nausea and Vomiting     Data   Most Recent 3 CBC's:  Recent Labs   Lab Test 04/18/24 2226 04/17/24  0953 04/16/24  1855   WBC 10.2 7.8 6.0   HGB 13.2 12.6 13.3   MCV 92 95 95   * 84* 89*      Most Recent 3 BMP's:  Recent Labs   Lab Test 04/20/24  0743 04/20/24  0731 04/19/24  1717 04/19/24  0249 04/18/24  2243 04/18/24  0800 04/18/24  0708   *  --   --   --  134*  --  137   POTASSIUM 3.5  --   --   --  3.8  --  3.7   CHLORIDE 98  --   --   --  100  --  105   CO2 26  --   --   --  25  --  27   BUN 8.5  --   --   --  8.2  --  9.4   CR 0.53  --   --   --  0.51  --  0.53   ANIONGAP 10  --   --   --  9  --  5*   JOANN 8.2*  --   --   --  8.3*  --  8.2*   * 142* 153*   < > 140*   < > 148*    < > = values in this interval not displayed.     Most Recent 2 LFT's:  Recent Labs   Lab Test 04/18/24  2243 04/16/24  2211 04/16/24  1855   AST 34  --  36   ALT 18 20 20   ALKPHOS 168* 166* 180*   BILITOTAL 2.0* 1.3* 1.4*     Most Recent INR's and Anticoagulation Dosing History:  Anticoagulation Dose History  More data may exist         Latest Ref Rng & Units 10/8/2018 5/26/2022 1/12/2023  2023   Recent Dosing and Labs   INR 0.85 - 1.15 1.5     1.22  1.21  1.20  1.18  1.17  1.30       Details          This result is from an external source.             Most Recent 3 Troponin's:No lab results found.  Most Recent Cholesterol Panel:  Recent Labs   Lab Test 23  1716   CHOL 216*   *   HDL 48*   TRIG 159*     Most Recent 6 Bacteria Isolates From Any Culture (See EPIC Reports for Culture Details):  Recent Labs   Lab Test 10/12/18  1242   CULT No anaerobes isolated  Light growth  Coagulase negative Staphylococcus  Susceptibility testing not routinely done  *  Moderate growth  Streptococcus intermedius  *  Light growth  Strain 2  Streptococcus intermedius  *     Most Recent TSH, T4 and A1c Labs:  Recent Labs   Lab Test 24  1855 12/15/23  0543 10/30/23  0849   TSH  --   --  0.53   T4  --   --  1.25   A1C 5.8*   < >  --     < > = values in this interval not displayed.     Results for orders placed or performed during the hospital encounter of 24   XR Chest 2 Views    Narrative    EXAM: XR CHEST 2 VIEWS  LOCATION: [facility]  DATE: 2024    INDICATION: shortness of breath  COMPARISON: 2024      Impression    IMPRESSION: Minimal change. Mild cardiomegaly. Central hilar congestion and diffuse interstitial prominence appears slightly worse consistent with CHF. However there has been some improved aeration at right lung base with right hemidiaphragm now visible.   Likely there is a small right pleural effusion.   Echocardiogram Complete     Value    LVEF  60-65%    Narrative    [id number]  [id number]  [id number]  [id number]^SHANDA^THOMAS^JASIEL     [facility]  Echocardiography Laboratory  [address] [place] [id number]     Name: BHUPINDER LEES  MRN: [id number]  : 1965  Study Date: 2024 02:08 PM  Age: 58 yrs  Gender: Female  Patient Location: [facility]  Reason For Study: CHF  Ordering  Physician: THOMAS LAND  Referring Physician: Bennie Wiggins  Performed By: Gregory Mccollum     BSA: 2.0 m2  Height: 64 in  Weight: 214 lb  HR: 72  BP: 137/68 mmHg  ______________________________________________________________________________  Procedure  Complete Portable Echo Adult. Optison (NDC #8308-9957) given intravenously.  ______________________________________________________________________________  Interpretation Summary     The visual ejection fraction is 60-65%.  Left ventricular systolic function is normal.  The study was technically difficult.  ______________________________________________________________________________  Left Ventricle  The left ventricle is normal in size. There is mild to moderate concentric  left ventricular hypertrophy. Diastolic Doppler findings (E/E' ratio and/or  other parameters) suggest left ventricular filling pressures are  indeterminate. The visual ejection fraction is 60-65%. Left ventricular  systolic function is normal.     Right Ventricle  The right ventricle is normal in size and function.     Atria  Normal left atrial size. Right atrial size is normal. There is no color  Doppler evidence of an atrial shunt.     Mitral Valve  There is trace mitral regurgitation.     Tricuspid Valve  There is trace tricuspid regurgitation. The right ventricular systolic  pressure is approximated at 19.1 mmHg plus the right atrial pressure.     Aortic Valve  The aortic valve is not well visualized. No aortic regurgitation is present.  No hemodynamically significant valvular aortic stenosis.     Pulmonic Valve  There is trace pulmonic valvular regurgitation. Normal pulmonic valve  velocity.     Vessels  Aortic root dilatation is present. (4.2 cm). Normal size ascending aorta. Mild  atherosclerotic plaque(s) in the ascending aorta. IVC diameter <2.1 cm  collapsing >50% with sniff suggests a normal RA pressure of 3 mmHg.     Pericardium  There is no pericardial effusion.      Rhythm  Sinus rhythm was noted.  ______________________________________________________________________________  MMode/2D Measurements & Calculations  IVSd: 1.2 cm     LVIDd: 4.9 cm  LVIDs: 2.7 cm  LVPWd: 1.4 cm  FS: 44.3 %  LV mass(C)d: 253.5 grams  LV mass(C)dI: 125.9 grams/m2  Ao root diam: 4.2 cm  LA dimension: 3.5 cm  asc Aorta Diam: 3.8 cm  LA/Ao: 0.82  LVOT diam: 2.2 cm  LVOT area: 3.9 cm2  Ao root diam index Ht(cm/m): 2.6  Ao root diam index BSA (cm/m2): 2.1  Asc Ao diam index BSA (cm/m2): 1.9  Asc Ao diam index Ht(cm/m): 2.4  LA Volume (BP): 64.7 ml     LA Volume Index (BP): 32.2 ml/m2  RWT: 0.56  TAPSE: 3.0 cm     Doppler Measurements & Calculations  MV E max aki: 59.6 cm/sec  MV A max aki: 51.8 cm/sec  MV E/A: 1.1  MV dec slope: 208.8 cm/sec2  MV dec time: 0.29 sec  PA acc time: 0.17 sec  TR max aki: 218.5 cm/sec  TR max P.1 mmHg  E/E' avg: 10.1  Lateral E/e': 10.5  Medial E/e': 9.6  RV S Aki: 24.5 cm/sec     ______________________________________________________________________________  Report approved by: Silvina Parisi 2024 03:55 PM

## 2024-04-20 NOTE — PROGRESS NOTES
"   Clinical Swallow Evaluation  04/20/24 9265   Appointment Info   Signing Clinician's Name / Credentials (SLP) Ju Elizondo MS CCC-SLP   General Information   Onset of Illness/Injury or Date of Surgery 04/19/24   Referring Physician Gisselle Bradley MD   Patient/Family Therapy Goal Statement (SLP) Pt agreeable to evaluation; reported goal to return to prison today   Pertinent History of Current Problem \"Sudheer Montiel is a 58 year old female admitted on 4/18/2024. She presents with shortness of breath. *hospitalized 4/16-18 with large R effusion, s/p thoracentesis of 1.5L. was on O2 but weaned. Given furosemide as well.\"   General Observations Pt alert and recalled couging episode this am. Pt reported that hot coffee while inhaling caused a coughing episode. Pt denied any dysphagia symptoms at home. Pt did recall hx of esophageal varices and reflux as well as several TIAs per report. Pt denied any neuro deficits or esophageal symptoms. Pt acknowleged that aspiration can cause pulmonary symptoms, though pt attribuates to liver fx.   Type of Evaluation   Type of Evaluation Swallow Evaluation   Oral Motor   Oral Musculature generally intact   Dentition (Oral Motor)   Dentition (Oral Motor) natural dentition   Facial Symmetry (Oral Motor)   Facial Symmetry (Oral Motor) WNL   Lip Function (Oral Motor)   Lip Range of Motion (Oral Motor) WNL   Tongue Function (Oral Motor)   Tongue ROM (Oral Motor) WNL   Facial Sensation   Facial Sensation WNL   Cough/Swallow/Gag Reflex (Oral Motor)   Volitional Throat Clear/Cough (Oral Motor) WNL   Volitional Swallow (Oral Motor) WNL   Vocal Quality/Secretion Management (Oral Motor)   Vocal Quality (Oral Motor) WNL   General Swallowing Observations   Past History of Dysphagia None per EMR or pt report   Respiratory Support room air   Current Diet/Method of Nutritional Intake (General Swallowing Observations, NIS) thin liquids (level 0);regular diet   Swallowing Evaluation Clinical swallow " evaluation   Clinical Swallow Evaluation   Feeding Assistance no assistance needed   Clinical Swallow Evaluation Textures Trialed thin liquids;pureed;solid foods   Clinical Swallow Eval: Thin Liquid Texture Trial   Mode of Presentation, Thin Liquids straw;cup;self-fed   Oral Phase of Swallow WFL   Pharyngeal Phase of Swallow intact   Diagnostic Statement WFL; no overt s/sx of aspiration   Clinical Swallow Evaluation: Puree Solid Texture Trial   Mode of Presentation, Puree spoon;self-fed   Oral Phase, Puree WFL   Pharyngeal Phase, Puree intact   Diagnostic Statement WFL; no overt s/sx of aspiration or oral residue   Clinical Swallow Evaluation: Solid Food Texture Trial   Mode of Presentation self-fed   Oral Phase WFL   Pharyngeal Phase intact   Diagnostic Statement WFL; cleared with no difficulty   Esophageal Phase of Swallow   Patient reports or presents with symptoms of esophageal dysphagia No   Swallowing Recommendations   Diet Consistency Recommendations regular diet;thin liquids (level 0)   Supervision Level for Intake patient independent   Mode of Delivery Recommendations bolus size, small;food moistened;slow rate of intake   Swallowing Maneuver Recommendations alternate food and liquid intake   Recommended Feeding/Eating Techniques (Swallow Eval) maintain upright sitting position for eating;maintain upright posture during/after eating for 30 minutes;minimize distractions during oral intake   Medication Administration Recommendations, Swallowing (SLP) per pt preference   Instrumental Assessment Recommendations VFSS (videofluoroscopic swallowing study)  (pt agreeable to consider Outpatient Video Swallow Study if symptoms return)   General Therapy Interventions   Planned Therapy Interventions Dysphagia Treatment   Clinical Impression   SLP Diagnosis No appreciable dysphagia at this time   Risks & Benefits of therapy have been explained evaluation/treatment results reviewed;care plan/treatment goals  reviewed;risks/benefits reviewed;current/potential barriers reviewed;participants voiced agreement with care plan;participants included;patient   Clinical Impression Comments Pt presents with no appreciable dysphagia on clinical swallow evaluation. Cannot rule out aspiration at bedside. Pt agreeable to consider Outpatient Video Swallow Study and Esophagram if symptoms return.   SLP Total Evaluation Time   Eval: oral/pharyngeal swallow function, clinical swallow Minutes ([id number]) 35   SLP Discharge Planning   SLP Plan DC   SLP Discharge Recommendation home   SLP Rationale for DC Rec Evaluation only; consider OP VFSS   SLP Brief overview of current status  Okay to continue a regular diet and thin liquids with standard aspiration and reflux precautions   Total Session Time   Total Session Time (sum of timed and untimed services) 35

## 2024-04-20 NOTE — PLAN OF CARE
Pt is discharge back to her detention at this time w/ all pt's belongings. AVS, meds and education given. Questions answered. Pt's Daughter will transport.

## 2024-04-20 NOTE — PLAN OF CARE
PRIMARY Concern: Right plural Effusion   SAFETY RISK Concerns (fall risk, behaviors, etc.): Fall      Isolation/Type: Contact  Tests/Procedures for NEXT shift: AM Labs  Consults? (Pending/following, signed-off?) PT   Where is patient from? (Home, TCU, etc.): Home  Other Important info for NEXT shift: Weaning O2 successfully     Anticipated DC date & active delays: TBD  _____________________________________________________________________________  SUMMARY NOTE:     )  Orientation/Cognitive: A&O x4  Observation Goals (Met/ Not Met): Not Met  Mobility Level/Assist Equipment: SBA  Antibiotics & Plan (IV/po, length of tx left): None  Pain Management: Denies  Tele/VS/O2: VSS on 0.5L NC  ABNL Lab/BG: See Chart  Diet: Regular Diet  Bowel/Bladder: Incontinent at Times   Skin Concerns: Redness under Abdominal Folds   Drains/Devices: PIV SL  Patient Stated Goal for Today: Rest      Observation goals  PRIOR TO DISCHARGE       Comments: -diagnostic tests and consults completed and resulted  -vital signs normal or at patient baseline  -dyspnea improved and O2 sats greater than 88% on room air or prior home oxygen levels  -returns to baseline functional status  Nurse to notify provider when observation goals have been met and patient is ready for discharge.

## 2024-04-20 NOTE — PROGRESS NOTES
Observation goals  PRIOR TO DISCHARGE       Comments: -  diagnostic tests and consults completed and resulted: Partially  met     -vital signs normal or at patient baseline: Not met, continues to need o2 @ 0.5L nc.      -dyspnea improved and O2 sats greater than 88% on room air or prior home oxygen level: Not met     -returns to baseline functional status: Not met     Nurse to notify provider when observation goals have been met and patient is ready for discharge.

## 2024-04-20 NOTE — PROGRESS NOTES
Observation goals  PRIOR TO DISCHARGE        Comments: -diagnostic tests and consults completed and resulted  -vital signs normal or at patient baseline  -dyspnea improved and O2 sats greater than 88% on room air or prior home oxygen levels  -returns to baseline functional status  Nurse to notify provider when observation goals have been met and patient is ready for discharge.

## 2024-04-22 LAB
BACTERIA PLR CULT: NO GROWTH
GRAM STAIN RESULT: NORMAL
GRAM STAIN RESULT: NORMAL

## 2024-04-22 NOTE — PLAN OF CARE
"Physical Therapy Discharge Summary    Reason for therapy discharge:    Discharged to home with home therapy.    Progress towards therapy goal(s). See goals on Care Plan in [facility] electronic health record for goal details.  Goals partially met.  Barriers to achieving goals:   discharge from facility.    Therapy recommendation(s):    Continued therapy is recommended.  Rationale/Recommendations:  Per last treating therapist note: \"Pt moving at baseline IND with intermittent use of SEC though requires 2L to sat in the 90s during mobiltiy. Anticpate with further medical management and improvement of SOB pt can DC home. Rec home with assist and HCPT to progress endurance\".          "

## 2024-04-23 ENCOUNTER — MYC MEDICAL ADVICE (OUTPATIENT)
Dept: ENDOCRINOLOGY | Facility: CLINIC | Age: 59
End: 2024-04-23
Payer: MEDICAID

## 2024-04-23 ENCOUNTER — PATIENT OUTREACH (OUTPATIENT)
Dept: CARE COORDINATION | Facility: CLINIC | Age: 59
End: 2024-04-23
Payer: MEDICAID

## 2024-04-23 DIAGNOSIS — E03.9 ACQUIRED HYPOTHYROIDISM: ICD-10-CM

## 2024-04-23 DIAGNOSIS — E11.9 TYPE 2 DIABETES MELLITUS WITHOUT COMPLICATION, WITHOUT LONG-TERM CURRENT USE OF INSULIN (H): ICD-10-CM

## 2024-04-23 NOTE — PROGRESS NOTES
Connected [facility]: [facility] [place]    Background: Transitional Care Management program identified per system criteria and reviewed by [facility] team for possible outreach.    Assessment: Upon chart review, CCRC Team member will not proceed with patient outreach related to this episode of Transitional Care Management program due to reason below:    Patient has discharged to a Memory Care, Long-term Care, Assisted Living or Group Home where patient is receiving on-site support with their daily cares, including support with hospital follow up plan.    Plan: Transitional Care Management episode addressed appropriately per reason noted above.      LB Markham  Connected Care Resource [facility]    *Connected Care Resource Team does NOT follow patient ongoing. Referrals are identified based on internal discharge reports and the outreach is to ensure patient has an understanding of their discharge instructions.

## 2024-04-24 LAB
BACTERIA BLD CULT: NO GROWTH
BACTERIA BLD CULT: NO GROWTH

## 2024-04-24 RX ORDER — THYROID 60 MG/1
TABLET ORAL
Qty: 30 TABLET | Refills: 2 | Status: CANCELLED | OUTPATIENT
Start: 2024-04-24

## 2024-04-24 RX ORDER — THYROID 15 MG/1
15 TABLET ORAL DAILY
Qty: 30 TABLET | Refills: 2 | Status: CANCELLED | OUTPATIENT
Start: 2024-04-24

## 2024-04-24 RX ORDER — LEVOTHYROXINE SODIUM 100 UG/1
TABLET ORAL
Qty: 30 TABLET | Refills: 2 | Status: CANCELLED | OUTPATIENT
Start: 2024-04-24

## 2024-04-26 ENCOUNTER — TELEPHONE (OUTPATIENT)
Dept: ENDOCRINOLOGY | Facility: CLINIC | Age: 59
End: 2024-04-26
Payer: MEDICAID

## 2024-04-26 DIAGNOSIS — E03.9 ACQUIRED HYPOTHYROIDISM: ICD-10-CM

## 2024-04-26 DIAGNOSIS — E11.9 TYPE 2 DIABETES MELLITUS WITHOUT COMPLICATION, WITHOUT LONG-TERM CURRENT USE OF INSULIN (H): ICD-10-CM

## 2024-04-26 RX ORDER — LEVOTHYROXINE SODIUM 100 UG/1
100 TABLET ORAL DAILY
Qty: 30 TABLET | Refills: 5 | Status: SHIPPED | OUTPATIENT
Start: 2024-04-26

## 2024-04-26 RX ORDER — THYROID 15 MG/1
15 TABLET ORAL DAILY
Qty: 30 TABLET | Refills: 5 | Status: SHIPPED | OUTPATIENT
Start: 2024-04-26

## 2024-04-26 RX ORDER — BLOOD-GLUCOSE SENSOR
1 EACH MISCELLANEOUS CONTINUOUS PRN
Qty: 2 EACH | Refills: 5 | Status: SHIPPED | OUTPATIENT
Start: 2024-04-26 | End: 2024-06-04

## 2024-04-26 RX ORDER — FLURBIPROFEN SODIUM 0.3 MG/ML
SOLUTION/ DROPS OPHTHALMIC
Qty: 400 EACH | Refills: 3 | Status: SHIPPED | OUTPATIENT
Start: 2024-04-26

## 2024-04-26 NOTE — TELEPHONE ENCOUNTER
Health Call Center    Phone Message    May a detailed message be left on voicemail: yes     Reason for Call: Medication Question or concern regarding medication   Prescription Clarification  Name of Medication:   insulin aspart (NOVOLOG PEN) 100 UNIT/ML pen   Prescribing Provider: Dr. faustin   Pharmacy: [facility], [facility]. - [facility] [address]    What on the order needs clarification? Requesting if script can be changed to 15ml, with 5 refills, stated that is the minimal that they can dispense. Thank you      Action Taken: Message routed to:  [facility] ([facility]):      Travel Screening: Not Applicable

## 2024-04-29 DIAGNOSIS — E11.9 TYPE 2 DIABETES MELLITUS WITHOUT COMPLICATION, WITHOUT LONG-TERM CURRENT USE OF INSULIN (H): ICD-10-CM

## 2024-04-29 NOTE — TELEPHONE ENCOUNTER
Message noted, revised Novolog Flexpen Rx sent to [facility] pharmacy, as requested.    JOJO Christianson MD, MS  Endocrinology  [facility]

## 2024-04-30 DIAGNOSIS — K76.82 HEPATIC ENCEPHALOPATHY (H): ICD-10-CM

## 2024-04-30 RX ORDER — RIFAXIMIN 550 MG/1
TABLET ORAL
Qty: 60 TABLET | Refills: 11 | OUTPATIENT
Start: 2024-04-30

## 2024-05-02 DIAGNOSIS — E11.9 TYPE 2 DIABETES MELLITUS WITHOUT COMPLICATION, WITHOUT LONG-TERM CURRENT USE OF INSULIN (H): Primary | ICD-10-CM

## 2024-05-02 NOTE — TELEPHONE ENCOUNTER
Rec'd fax from pharmacy that Glucocard test strips are not covered, so pt needs rx for Accu Check Guide test strips and new meter.  Samantha Marie RN

## 2024-05-06 ENCOUNTER — MEDICAL CORRESPONDENCE (OUTPATIENT)
Dept: HEALTH INFORMATION MANAGEMENT | Facility: CLINIC | Age: 59
End: 2024-05-06

## 2024-05-10 RX ORDER — LACTULOSE 10 G/15ML
SOLUTION ORAL
Refills: 11 | OUTPATIENT
Start: 2024-05-10

## 2024-05-21 ENCOUNTER — MYC REFILL (OUTPATIENT)
Dept: ENDOCRINOLOGY | Facility: CLINIC | Age: 59
End: 2024-05-21
Payer: MEDICAID

## 2024-05-21 DIAGNOSIS — E03.9 ACQUIRED HYPOTHYROIDISM: ICD-10-CM

## 2024-05-22 RX ORDER — THYROID 60 MG/1
TABLET ORAL
Qty: 30 TABLET | Refills: 4 | Status: SHIPPED | OUTPATIENT
Start: 2024-05-22

## 2024-05-22 NOTE — TELEPHONE ENCOUNTER
Per LOV with Dr Christianson 2/13/24      Refill was not received by pharmacy for [place] thyroid 60mg. Rx sent for 60mg to [facility] pharmacy.    Pradeep Huber RN

## 2024-06-04 ENCOUNTER — OFFICE VISIT (OUTPATIENT)
Dept: ENDOCRINOLOGY | Facility: CLINIC | Age: 59
End: 2024-06-04
Payer: COMMERCIAL

## 2024-06-04 VITALS
HEART RATE: 69 BPM | SYSTOLIC BLOOD PRESSURE: 108 MMHG | BODY MASS INDEX: 35.93 KG/M2 | WEIGHT: 214.6 LBS | DIASTOLIC BLOOD PRESSURE: 69 MMHG

## 2024-06-04 DIAGNOSIS — E03.9 ACQUIRED HYPOTHYROIDISM: ICD-10-CM

## 2024-06-04 DIAGNOSIS — E11.9 TYPE 2 DIABETES MELLITUS WITHOUT COMPLICATION, WITHOUT LONG-TERM CURRENT USE OF INSULIN (H): Primary | ICD-10-CM

## 2024-06-04 DIAGNOSIS — E11.9 TYPE 2 DIABETES MELLITUS WITHOUT COMPLICATION, WITHOUT LONG-TERM CURRENT USE OF INSULIN (H): ICD-10-CM

## 2024-06-04 PROCEDURE — 95251 CONT GLUC MNTR ANALYSIS I&R: CPT | Performed by: INTERNAL MEDICINE

## 2024-06-04 PROCEDURE — G2211 COMPLEX E/M VISIT ADD ON: HCPCS | Performed by: INTERNAL MEDICINE

## 2024-06-04 PROCEDURE — 99214 OFFICE O/P EST MOD 30 MIN: CPT | Performed by: INTERNAL MEDICINE

## 2024-06-04 RX ORDER — BLOOD-GLUCOSE SENSOR
1 EACH MISCELLANEOUS CONTINUOUS PRN
Qty: 2 EACH | Refills: 5 | Status: SHIPPED | OUTPATIENT
Start: 2024-06-04

## 2024-06-04 NOTE — PATIENT INSTRUCTIONS
Plan to increase Ozempic to the 1.0 mg weekly dose   Updated pen Rx to [facility] pharmacy today    Diabetes medications:  Lantus Solostar 36U  subcutaneous in morning  Aspart Flexpen   Breakfast 4U mid or postmeal  Lunch  4U mid or postmeal  Supper  3U mid or postmeal  Ozempic 1.0 mg  Subcutaneous weekly (Mondays)    Continue same thyroid medications:  [place] thyroid 60 mg              1-tab daily  [place] thyroid 15 mg  1-tab daily  Levothyroxine 0.1 mg  1-tab daily daily    Continue to use the Freestyle Libre3 glucose sensor  Goal target premeal glucose  mg/dl  Future appointments:   Nonfasting lab tests in mid 10/2024 at [facility] clinic   Office appt with Dr. Christianson 10/29/24 at 10:45am

## 2024-06-04 NOTE — PROGRESS NOTES
Recent issues:  Followup diabetes and thyroid evaluation  Taking the diabetes and thyroid medications  Hospitalized at [facility] and also [facility] several times past month for dyspnea   Taking Lactulose 30 ml TID, also right sided thoracentesis weekly at [facility]  Now living at [facility] in [place] since 4/5/24         Diabetes:  ~4/2010. Initial diagnosis approx age 44  ...Has taken several DM meds including metformin, Byetta, glimeparide, Invokana, and Victoza   In 2018, she had been taking metformin, Victoza, glimeparide, and Jardiance  She stopped Jardiance, also ran out of Victoza last year  Had seen Beth CASTRO/[facility] for general medical evaluations  10/2018. Abdominal abcess illness, hospitalizations at [facility] and then [facility]              2-surgeries for abcess, thought related to the hysterectomy bladder sling mesh from prior surgery     Previously taking metformin 750 mg BID, glimeparide 4 mg every day, Tresiba 40U every day   10/2020. Changed to VGo40 pods management  Continue low dose glimeparide, but low SG levels and glimeparide discontinued, then restarted   Previous DM med plan:  Novolog in VGo40   Small carb meal 2 clicks (4U)   Large carb meal 3 clicks (6U)    Novolog    sscale:       -250 1-click    -300 2-clicks  Metformin 750 mg 2-tabs in morning   Ozempic 0.5 mg  Subcutaneous weekly      8/2023. Hospitalized, pleural effusion and CT placement  Med dose changes, stopped VGo and metformin discontinued    Current DM meds:  Lantus Solostar 36U  subcutaneous in morning  Aspart Flexpen   Breakfast 4U mid or postmeal  Lunch  4U mid or postmeal  Supper  3U mid or postmeal  Ozempic 0.5 mg  Subcutaneous weekly (Mondays)    Used Freestyle [place] CGM in the past, not currently  Has Glucocard Vital BG meter    Infrequent BG testing    Recent Libre3 data:        Recent [facility] labs include:  7/26/19 hgbA1c 9.3%, t.chol 195,  , TSH 0.06, FT4 1.46, FT3 8.7, Cr 0.77  8/26/20 hgbA1c 11.4%  10/15/21 hgbA1c 6.6%, LDL 60 mg/dl    Previous [facility] hgbA1c trends include:  Lab Results   Component Value Date    A1C 5.8 (H) 04/16/2024    A1C 6.1 (H) 12/15/2023    A1C 6.9 (H) 06/06/2023    A1C 8.8 (H) 01/12/2023    A1C 7.2 (H) 01/05/2021        Recent [facility] labs include:  Lab Results   Component Value Date    A1C 5.8 (H) 04/16/2024     (L) 04/20/2024    POTASSIUM 3.5 04/20/2024    CHLORIDE 98 04/20/2024    CO2 26 04/20/2024    ANIONGAP 10 04/20/2024     (H) 04/20/2024    BUN 8.5 04/20/2024    CR 0.53 04/20/2024    GFRESTIMATED >90 04/20/2024    GFRESTBLACK >90 01/05/2021    JOANN 8.2 (L) 04/20/2024    CHOL 216 (H) 01/12/2023    TRIG 159 (H) 01/12/2023    HDL 48 (L) 01/12/2023     (H) 01/12/2023    NHDL 168 (H) 01/12/2023    UCRR 168.0 06/06/2023    MICROL 18.3 06/06/2023    UMALCR 10.89 06/06/2023     Last eye exam 2018?, results not available  DM Complications:  None known        Thyroid:  Had taken Synthroid  Changed to [place] thyroid medication, then [place] + levothyroxine combo dose plan  Had taken [place] thyroid 2-tablets daily... Possibly 60 mg 2-tabs QD  Early-mid 3/2018. Ran out of [place] thyroid medication  Previously took [place] thyroid 90 mg 2-tabs daily and low dose levothyroxine 0.025 mg daily  8/2019. Changed to lower dose [place] and higher dose levothyroxine  Subsequent dose reductions with levothyroxine medication  Mid 12/2021 to early 1/2022. Off [place] thyroid medication, then restarted   6/2023. Labs showed elevated TSH level and I advised dose increase of [place] thyroid, but not done     Previous [facility] labs include:  6/15/22 TSH 0.01, FT4 1.9, FT3 4.3, Cr 0.55, hgbA1c 7.6%    Recent  labs include:  Lab Results   Component Value Date    TSH 0.53 10/30/2023    T4 1.25 10/30/2023    FT3 2.4 10/30/2023     Current med doses:  [place] thyroid 60 mg              1-tab daily  [place] thyroid 15 mg  1-tab  daily  Levothyroxine 0.1 mg  1-tab daily daily          Single, previously worked HE/FV triage  at [employer]  Now living at [facility] in [place] since 24  Sees Pawan Burns PAC/[facility] for gen med evaluations  Also sees Dr. Frantz Guillen?/[facility], Dr. Johnson/GI, Dr. YURIDIA Harrington/Neurology       PMH/PSH:  Past Medical History:   Diagnosis Date    Abnormal liver CT     Collapsed lung 2023    Hypothyroid 80's    Necrotizing fasciitis (H)     Pleural effusion     Primary osteoarthritis of both knees     Soft tissue infection     Subcortical infarction (H)     Type 2 diabetes mellitus (H)     Unspecified cirrhosis of liver (H)      Past Surgical History:   Procedure Laterality Date    ARTHROPLASTY KNEE Right 2023    Procedure: Right Total Knee Arthroplasty;  Surgeon: Pawan Ewing MD;  Location:  OR     SECTION  1983    COLONOSCOPY N/A 10/21/2015    Procedure: COLONOSCOPY;  Surgeon: Jaky Arredondo MD;  Location:  GI    IR THORACENTESIS  2024    IR THORACENTESIS  2024    IR THORACENTESIS  2024    IRRIGATION AND DEBRIDEMENT ABDOMEN WASHOUT, COMBINED N/A 10/12/2018    Procedure: COMBINED IRRIGATION AND DEBRIDEMENT ABDOMEN WASHOUT;  Irrigation and Debridement of Lower Abdomen, removal of piece of mesh.;  Surgeon: Darlene Hogue MD;  Location: [facility] OR    marisol/bso  2004    due to anemia    ZZC REPAIR CRUCIATE LIGAMENT,KNEE  1986       Family Hx:  No family history on file.      Social Hx:  Social History     Socioeconomic History    Marital status:      Spouse name: Not on file    Number of children: 2    Years of education: Not on file    Highest education level: Not on file   Occupational History    Occupation: surgery scheduler [employer]   Tobacco Use    Smoking status: Former     Current packs/day: 0.25     Types: Cigarettes    Smokeless tobacco: Never    Vaping Use    Vaping status: Never Used   Substance and Sexual Activity    Alcohol use: No    Drug use: Never    Sexual activity: Not on file   Other Topics Concern    Not on file   Social History Narrative    Not on file     Social Determinants of Health     Financial Resource Strain: Not on file   Food Insecurity: Not on file   Transportation Needs: Not on file   Physical Activity: Not on file   Stress: Not on file   Social Connections: Not on file   Interpersonal Safety: Unknown (4/30/2024)    [facility]    Humiliation, Afraid, Rape, and Kick questionnaire     Fear of Current or Ex-Partner: Not on file     Emotionally Abused: Not on file     Physically Abused: No     Sexually Abused: No   Housing Stability: Low Risk  (4/30/2024)    Received from [employer]    Housing Stability Vital Sign     Unable to Pay for Housing in the Last Year: No     Number of Places Lived in the Last Year: 1     In the last 12 months, was there a time when you did not have a steady place to sleep or slept in a shelter (including now)?: No          MEDICATIONS:  has a current medication list which includes the following prescription(s): freestyle evelyn 3 sensor, insulin aspart, insulin glargine, levothyroxine, semaglutide, spironolactone, thyroid, thyroid, blood glucose, blood glucose, blood glucose monitoring, celecoxib, escitalopram, furosemide, b-d u/f, b-d u/f, statin not prescribed, and valacyclovir.     ROS: 10 point ROS neg other than the symptoms noted above in the HPI.     GENERAL: some fatigue, wt loss; denies fevers, chills, night sweats.   HEENT:  no dysphagia, odonophagia, diplopia, neck pain  THYROID:  no apparent hyper or hypothyroid symptoms  CV:  some palpitations; denies chest pain, pressure  LUNGS:  denies SOB, dyspnea, cough, wheezing  ABDOMEN:   BM urgency related to lactulose TID dosing; no apparent indigestion, constipation  EXTREMITIES: no rashes, ulcers, edema  NEUROLOGY: forgetfulness; no  headaches, denies changes in vision, tingling, extremitiy numbness   MSK: knee and some low back pain; no muscle aches or pains, weakness  SKIN: no rashes or lesions  : no increased thirst and urination, nocturia; no menses since hysterectomy ~age 49  PSYCH:  stable mood, no significant anxiety or depression  ENDOCRINE: no heat or cold intolerance      Physical Exam   VS: /69   Pulse 69   Wt 97.3 kg (214 lb 9.6 oz)   BMI 35.93 kg/m    GENERAL: AXOX3, NAD, well dressed, answering questions appropriately, appears stated age.  ENT: no nose swelling or nasal discharge, mouth redness or gum changes.  EYES: eyes grossly normal to inspection, conjunctivae and sclerae normal, no exophthalmos or proptosis  THYROID:  no apparent nodules or goiter  LUNGS: no audible wheeze, cough or visible cyanosis, or increased work of breathing  ABDOMEN: abdomen obese size  EXTREMITIES: no edema noted  NEUROLOGY: CN grossly intact, no tremors  MSK: grossly intact  SKIN:  no apparent skin lesions, rash, or edema with visualized skin appearance  PSYCH: mentation appears normal, affect normal/bright, judgement and insight intact,   normal speech and appearance well groomed    LABS:     All pertinent notes, labs, and images personally reviewed by me.        A/P:  Encounter Diagnoses   Name Primary?    Type 2 diabetes mellitus without complication, without long-term current use of insulin (H) Yes    Acquired hypothyroidism              Comments:  Reviewed health issues, diabetes and thyroid management.  Recent postmeal hyperglycemia relates to delayed timing of mealtime insulin relative to start of meal  Reviewed and interpreted tests that I previously ordered.   Ordered appropriate tests for the endocrinology disease management.    Management options discussed and implemented after shared medical decision making with the patient.  T2DM and hypothyroidism problems are chronic-stable    Plan:  Discussed general issues with the diabetes  diagnosis and management  We discussed the hgbA1c test which reflects previous overall glucose levels or control  Discussed the importance of blood glucose (BG) testing to assess glucose trends  Provided general overview of the diabetes medication options and medication treatment plan  Reviewed recent Libre3 CGM glucose trend data, in detail.    Recommend:  Continue the current Novolog (Aspart), Lantus, and Ozempic medications, as noted  Goal target -150 mg/dl  Continue use of the Libre3 CGM glucose sensor  Keep focus on diet, exercise, weight management.  Needs f/u on the mild hypercholesterolemia, consider adding statin medication for treatment  Keep focus on diet, exercise, weight management.  Continue current [place] thyroid and levothyroxine dose plan at this time  Advise having fasting lipid panel testing and dilated eye examination, at least annually    Keep scheduled followup GI, neurology, and PCP appointments  Addressed patient questions today     The longitudinal plan of care for the endocrine problem(s) were addressed during this visit.  Due to added complexity of care,   we will continue to support the patient and the subsequent management of this condition with ongoing continuity of care.    Patient Instructions   Plan to increase Ozempic to the 1.0 mg weekly dose   Updated pen Rx to [facility] pharmacy today    Diabetes medications:  Lantus Solostar 36U  subcutaneous in morning  Aspart Flexpen   Breakfast 4U mid or postmeal  Lunch  4U mid or postmeal  Supper  3U mid or postmeal  Ozempic 1.0 mg  Subcutaneous weekly (Mondays)    Continue same thyroid medications:  [place] thyroid 60 mg              1-tab daily  [place] thyroid 15 mg  1-tab daily  Levothyroxine 0.1 mg  1-tab daily daily    Continue to use the Freestyle Libre3 glucose sensor  Goal target premeal glucose  mg/dl  Future appointments:   Nonfasting lab tests in mid 10/2024 at [facility]   Office appt with Dr. Christianson 10/29/24 at  10:45am    Future labs ordered today:   Orders Placed This Encounter   Procedures    GLUCOSE MONITOR, 72 HOUR, PHYS INTERP    Hemoglobin A1c    Basic metabolic panel    TSH    T4 free    T3 Free     Radiology/Consults ordered today: None    Total time spent on day of encounter:  26 min    Follow-up:  10/29/24 Return    JOJO Christianson MD, MS  Endocrinology  [facility]

## 2024-06-18 ENCOUNTER — TELEPHONE (OUTPATIENT)
Dept: ENDOCRINOLOGY | Facility: CLINIC | Age: 59
End: 2024-06-18

## 2024-06-18 NOTE — TELEPHONE ENCOUNTER
Prior Authorization Specialty Medication Request    Medication/Dose: thyroid (ARMOUR) 60 MG tablet  Diagnosis and ICD code (if different than what is on RX):  E03.9     Insurance   Primary: [employer] - [facility]   Insurance ID:  [id number]      [place] (if applicable[place]   Ins[place] ID:  [id number]

## 2024-06-22 ENCOUNTER — HEALTH MAINTENANCE LETTER (OUTPATIENT)
Age: 59
End: 2024-06-22

## 2024-06-25 NOTE — TELEPHONE ENCOUNTER
Prior Authorization Approval    Medication: ARMOUR THYROID 15 MG PO TABS  Authorization Effective Date: 6/1/2024  Authorization Expiration Date: 6/25/2025  Approved Dose/Quantity:       Reference #:     Insurance Company: [employer] - Phone [phone] Fax [phone]  Expected CoPay: $    CoPay Card Available:      Financial Assistance Needed:   Which Pharmacy is filling the prescription: [facility] - [facility] [address]  Pharmacy Notified: YES  Patient Notified: Instructed pharmacy to notify patient once order is ready.

## 2024-06-25 NOTE — TELEPHONE ENCOUNTER
PA Initiation    Medication: ARMOUR THYROID 15 MG PO TABS  Insurance Company: [employer] - Phone [phone] Fax [phone]  [facility]. - [place], [place] - [address]  Filling Pharmacy Phone: [phone]  Filling Pharmacy Fax: [phone]  Start Date: 6/24/2024

## 2024-08-26 ENCOUNTER — TELEPHONE (OUTPATIENT)
Dept: ENDOCRINOLOGY | Facility: CLINIC | Age: 59
End: 2024-08-26
Payer: COMMERCIAL

## 2024-08-26 DIAGNOSIS — E11.9 TYPE 2 DIABETES MELLITUS WITHOUT COMPLICATION, WITHOUT LONG-TERM CURRENT USE OF INSULIN (H): ICD-10-CM

## 2024-08-26 NOTE — TELEPHONE ENCOUNTER
Health Call Center    Phone Message    May a detailed message be left on voicemail: yes     Reason for Call: Medication Question or concern regarding medication   Prescription Clarification  Name of Medication:   semaglutide (OZEMPIC) 2 MG/1.5ML SOPN pen       Prescribing Provider:   Kirk Christianson MD        Pharmacy:   "[facility]. [facility] [address]      What on the order needs clarification? Ceasar was calling to speak with the care team to understand the direction or this medication because their copy on file is conflicting with the orders from the provider, please review and follow up thank you.      Action Taken: Message routed to:  [facility] ([facility] Endo    Travel Screening: Not Applicable     Date of Service:

## 2024-08-28 NOTE — TELEPHONE ENCOUNTER
Called and lm on vm to call back if still having questions about pt medication. Samantha Marie RN

## 2024-08-30 NOTE — TELEPHONE ENCOUNTER
Ceasar at pt's care center called and is requesting that the new dosing for the Pt's ozempic script be faxed to [facility] at [phone]

## 2024-08-30 NOTE — TELEPHONE ENCOUNTER
Called Ceasar to notify that there is nothing in pt chart indicating that a dose change took place with the ozempic.  She states pt told her about it.  Will route to provider to advise.Samantha Marie RN

## 2024-09-04 NOTE — TELEPHONE ENCOUNTER
[facility] calling stating the below message that was on patients after visit summary,     Patient Instructions   Plan to increase Ozempic to the 1.0 mg weekly dose       Updated pen Rx to [facility] pharmacy today

## 2024-09-06 ENCOUNTER — TELEPHONE (OUTPATIENT)
Dept: ENDOCRINOLOGY | Facility: CLINIC | Age: 59
End: 2024-09-06
Payer: COMMERCIAL

## 2024-09-06 DIAGNOSIS — E11.9 TYPE 2 DIABETES MELLITUS WITHOUT COMPLICATION, WITHOUT LONG-TERM CURRENT USE OF INSULIN (H): Primary | ICD-10-CM

## 2024-09-06 NOTE — TELEPHONE ENCOUNTER
"care home calling to clarify to clarify ozempic dose.  Per notes on 6/4/24, \"Plan to increase Ozempic to the 1.0 mg weekly dose.\"  They state they did not know about the dose change.  Patient currently receiving 0.5mg weekly on Mondays.  RX change pended.     Jessica Denis RN    "

## 2024-09-07 NOTE — TELEPHONE ENCOUNTER
Message reviewed.  I have now sent the updated Ozempic 1 mg pen Rx to her [facility] mail order pharmacy, as requested.  I assume patient will message me if questions or other concerns.    JOJO Christianson MD, MS  Endocrinology  [facility]

## 2024-09-11 NOTE — TELEPHONE ENCOUNTER
Abdiaziz Mcdonough would like a call back from the team to confirm a few things for this medication instruction. Please and thank you.

## 2024-09-12 NOTE — TELEPHONE ENCOUNTER
Ceasar from [facility] called.  States pharmacy still hasn't rec'd ozempic rx.  Called pharmacy and they haven't rec'd the rx sent on 9/6 and today, so verbal order given to pharmD for the ozempic 1 mg dose. Samantha Marie RN

## 2024-09-12 NOTE — TELEPHONE ENCOUNTER
Called [facility] [place] pt keeps asking for the 1 mg dose of ozempic.  Notified [facility] that it was sent to [facility] on 9/6/24.  He states they haven't rec'd rx, so will re-send Rx now to [facility]. He will check with pharmacy in a little bit to make sure they get it. Samantha Marie RN

## 2024-10-10 DIAGNOSIS — E03.9 ACQUIRED HYPOTHYROIDISM: ICD-10-CM

## 2024-10-10 RX ORDER — LEVOTHYROXINE SODIUM 100 UG/1
100 TABLET ORAL DAILY
Qty: 30 TABLET | Refills: 5 | Status: SHIPPED | OUTPATIENT
Start: 2024-10-10

## 2024-10-10 NOTE — TELEPHONE ENCOUNTER
Last Written Prescription Date:  4/26/24  Last Fill Quantity: 30,  # refills: 5   Last office visit: 6/4/2024 ; last virtual visit: 11/7/2022 with prescribing provider:  Dr. Christianson   Future Office Visit: 10/29/24      Requested Prescriptions   Pending Prescriptions Disp Refills    levothyroxine (SYNTHROID/LEVOTHROID) 100 MCG tablet [Pharmacy Med Name: Levothyroxine Sodium 100 MCG Tablet] 30 tablet 11     Sig: TAKE 1 TABLET BY MOUTH ONCE DAILY       Thyroid Protocol Passed - 10/10/2024  2:17 PM        Passed - Patient is 12 years or older        Passed - Recent (12 mo) or future (30 days) visit within the authorizing provider's specialty     The patient must have completed an in-person or virtual visit within the past 12 months or has a future visit scheduled within the next 90 days with the authorizing provider s specialty.  Urgent care and e-visits do not quality as an office visit for this protocol.          Passed - Medication is active on med list        Passed - Medication indicated for associated diagnosis     Medication is associated with one or more of the following diagnoses:  Hypothyroidism  Thyroid stimulating hormone suppression therapy  Thyroid cancer  Acquired atrophy of thyroid          Passed - Normal TSH on file in past 12 months     Recent Labs   Lab Test 10/30/23  0849   TSH 0.53              Passed - No active pregnancy on record     If patient is pregnant or has had a positive pregnancy test, please check TSH.          Passed - No positive pregnancy test in past 12 months     If patient is pregnant or has had a positive pregnancy test, please check TSH.             Refills sent  Samantha Marie RN

## 2024-10-23 ENCOUNTER — TELEPHONE (OUTPATIENT)
Dept: ENDOCRINOLOGY | Facility: CLINIC | Age: 59
End: 2024-10-23
Payer: COMMERCIAL

## 2024-10-23 DIAGNOSIS — E03.9 ACQUIRED HYPOTHYROIDISM: ICD-10-CM

## 2024-10-23 RX ORDER — THYROID 60 MG/1
TABLET ORAL
Qty: 30 TABLET | Refills: 2 | Status: SHIPPED | OUTPATIENT
Start: 2024-10-23

## 2024-10-23 RX ORDER — THYROID 15 MG/1
15 TABLET ORAL DAILY
Qty: 30 TABLET | Refills: 2 | Status: SHIPPED | OUTPATIENT
Start: 2024-10-23

## 2024-10-23 NOTE — TELEPHONE ENCOUNTER
[facility] Call [facility]    Phone Message    May a detailed message be left on voicemail: yes     Reason for Call: Medication Refill Request    Has the patient contacted the pharmacy for the refill? Yes   Name of medication being requested: thyroid (ARMOUR) 60 MG tablet, thyroid (ARMOUR) 15 MG tablet    Provider who prescribed the medication: Dr. Christianson  Pharmacy:   [facility]. [address]      Date medication is needed: asap       Sadaq, from facility requesting a gap order for the following medications until her appt with  on 10/29/24. thyroid (ARMOUR) 60 MG tablet, thyroid (ARMOUR) 15 MG tablet  thank you        Action Taken: Message routed to:  [facility]      Travel Screening: Not Applicable     Date of Service:

## 2024-10-23 NOTE — TELEPHONE ENCOUNTER
Requested Prescriptions   Pending Prescriptions Disp Refills    thyroid (ARMOUR) 60 MG tablet 30 tablet 2     Sig: Take 1-tablet by mouth daily as directed       There is no refill protocol information for this order       thyroid (ARMOUR) 15 MG tablet 30 tablet 2     Sig: Take 1 tablet (15 mg) by mouth daily.       There is no refill protocol information for this order      [place] 60mg  Last Written Prescription Date:  5/22/24  Last Fill Quantity: 30 tab,  # refills: 4   Last office visit: 6/4/2024 ; last virtual visit: 11/7/2022 with prescribing provider:  Dr Christianson   Future Office Visit:  10/29/24    [place] 15mg  Last Written Prescription Date:  4/26/24  Last Fill Quantity: 30 tab,  # refills: 5  Last office visit: 6/4/2024 ; last virtual visit: 11/7/2022 with prescribing provider:  Dr Christianson   Future Office Visit:  10/29/24    [facility] Care facility updated.  Pradeep Huber RN

## 2024-10-24 ENCOUNTER — LAB (OUTPATIENT)
Dept: LAB | Facility: CLINIC | Age: 59
End: 2024-10-24
Payer: COMMERCIAL

## 2024-10-24 DIAGNOSIS — K74.60 CIRRHOSIS OF LIVER (H): ICD-10-CM

## 2024-10-24 DIAGNOSIS — E03.9 ACQUIRED HYPOTHYROIDISM: ICD-10-CM

## 2024-10-24 DIAGNOSIS — K75.81 NASH (NONALCOHOLIC STEATOHEPATITIS): ICD-10-CM

## 2024-10-24 DIAGNOSIS — E11.9 TYPE 2 DIABETES MELLITUS WITHOUT COMPLICATION, WITHOUT LONG-TERM CURRENT USE OF INSULIN (H): ICD-10-CM

## 2024-10-24 LAB
ALBUMIN SERPL BCG-MCNC: 3.2 G/DL (ref 3.5–5.2)
ALP SERPL-CCNC: 150 U/L (ref 40–150)
ALT SERPL W P-5'-P-CCNC: 17 U/L (ref 0–50)
ANION GAP SERPL CALCULATED.3IONS-SCNC: 6 MMOL/L (ref 7–15)
AST SERPL W P-5'-P-CCNC: 38 U/L (ref 0–45)
BILIRUB SERPL-MCNC: 1.1 MG/DL
BUN SERPL-MCNC: 11.9 MG/DL (ref 8–23)
CALCIUM SERPL-MCNC: 9 MG/DL (ref 8.8–10.4)
CHLORIDE SERPL-SCNC: 103 MMOL/L (ref 98–107)
CREAT SERPL-MCNC: 0.78 MG/DL (ref 0.51–0.95)
EGFRCR SERPLBLD CKD-EPI 2021: 86 ML/MIN/1.73M2
ERYTHROCYTE [DISTWIDTH] IN BLOOD BY AUTOMATED COUNT: 14.3 % (ref 10–15)
EST. AVERAGE GLUCOSE BLD GHB EST-MCNC: 177 MG/DL
GLUCOSE SERPL-MCNC: 209 MG/DL (ref 70–99)
HBA1C MFR BLD: 7.8 % (ref 0–5.6)
HCO3 SERPL-SCNC: 27 MMOL/L (ref 22–29)
HCT VFR BLD AUTO: 42 % (ref 35–47)
HGB BLD-MCNC: 13.4 G/DL (ref 11.7–15.7)
MCH RBC QN AUTO: 30.1 PG (ref 26.5–33)
MCHC RBC AUTO-ENTMCNC: 31.9 G/DL (ref 31.5–36.5)
MCV RBC AUTO: 94 FL (ref 78–100)
PLATELET # BLD AUTO: 76 10E3/UL (ref 150–450)
POTASSIUM SERPL-SCNC: 4.4 MMOL/L (ref 3.4–5.3)
PROT SERPL-MCNC: 6.7 G/DL (ref 6.4–8.3)
RBC # BLD AUTO: 4.45 10E6/UL (ref 3.8–5.2)
SODIUM SERPL-SCNC: 136 MMOL/L (ref 135–145)
T3FREE SERPL-MCNC: 2.2 PG/ML (ref 2–4.4)
T4 FREE SERPL-MCNC: 1.59 NG/DL (ref 0.9–1.7)
TSH SERPL DL<=0.005 MIU/L-ACNC: 0.02 UIU/ML (ref 0.3–4.2)
WBC # BLD AUTO: 4 10E3/UL (ref 4–11)

## 2024-10-24 PROCEDURE — 84481 FREE ASSAY (FT-3): CPT

## 2024-10-24 PROCEDURE — 83036 HEMOGLOBIN GLYCOSYLATED A1C: CPT

## 2024-10-24 PROCEDURE — 36415 COLL VENOUS BLD VENIPUNCTURE: CPT

## 2024-10-24 PROCEDURE — 80053 COMPREHEN METABOLIC PANEL: CPT

## 2024-10-24 PROCEDURE — 85027 COMPLETE CBC AUTOMATED: CPT

## 2024-10-24 PROCEDURE — 84443 ASSAY THYROID STIM HORMONE: CPT

## 2024-10-24 PROCEDURE — 84439 ASSAY OF FREE THYROXINE: CPT

## 2024-10-29 ENCOUNTER — OFFICE VISIT (OUTPATIENT)
Dept: ENDOCRINOLOGY | Facility: CLINIC | Age: 59
End: 2024-10-29
Payer: COMMERCIAL

## 2024-10-29 ENCOUNTER — MEDICAL CORRESPONDENCE (OUTPATIENT)
Dept: HEALTH INFORMATION MANAGEMENT | Facility: CLINIC | Age: 59
End: 2024-10-29

## 2024-10-29 VITALS
DIASTOLIC BLOOD PRESSURE: 74 MMHG | BODY MASS INDEX: 36.84 KG/M2 | WEIGHT: 220 LBS | HEART RATE: 64 BPM | SYSTOLIC BLOOD PRESSURE: 116 MMHG

## 2024-10-29 DIAGNOSIS — E11.9 TYPE 2 DIABETES MELLITUS WITHOUT COMPLICATION, WITHOUT LONG-TERM CURRENT USE OF INSULIN (H): ICD-10-CM

## 2024-10-29 DIAGNOSIS — E03.9 ACQUIRED HYPOTHYROIDISM: Primary | ICD-10-CM

## 2024-10-29 PROCEDURE — G2211 COMPLEX E/M VISIT ADD ON: HCPCS | Performed by: INTERNAL MEDICINE

## 2024-10-29 PROCEDURE — 99214 OFFICE O/P EST MOD 30 MIN: CPT | Performed by: INTERNAL MEDICINE

## 2024-10-29 PROCEDURE — 95251 CONT GLUC MNTR ANALYSIS I&R: CPT | Performed by: INTERNAL MEDICINE

## 2024-10-29 NOTE — PROGRESS NOTES
Recent issues:  Followup diabetes and thyroid evaluation  Taking the diabetes and thyroid medications  Hospitalized at [facility] and also [facility] several times past month for dyspnea   Taking Lactulose 30 ml TID, also right sided thoracentesis weekly at [facility]  Now living at [facility] in [place] since 4/5/24    Left hand tremors...          Diabetes:  ~4/2010. Initial diagnosis approx age 44  ...Has taken several DM meds including metformin, Byetta, glimeparide, Invokana, and Victoza   In 2018, she had been taking metformin, Victoza, glimeparide, and Jardiance  She stopped Jardiance, also ran out of Victoza last year  Had seen Beth CASTRO/[facility] for general medical evaluations  10/2018. Abdominal abcess illness, hospitalizations at [facility] and then [facility]              2-surgeries for abcess, thought related to the hysterectomy bladder sling mesh from prior surgery     Previously taking metformin 750 mg BID, glimeparide 4 mg every day, Tresiba 40U every day   10/2020. Changed to VGo40 pods management  Continue low dose glimeparide, but low SG levels and glimeparide discontinued, then restarted   Previous DM med plan:  Novolog in VGo40   Small carb meal 2 clicks (4U)   Large carb meal 3 clicks (6U)    Novolog    sscale:       -250 1-click    -300 2-clicks  Metformin 750 mg 2-tabs in morning   Ozempic 0.5 mg  Subcutaneous weekly      8/2023. Hospitalized, pleural effusion and CT placement  Med dose changes, stopped VGo and metformin discontinued    Current DM meds:  Lantus Solostar 36U  subcutaneous in morning  Aspart Flexpen   Mealtime  4U (premeal vs postmeal)  Ozempic 1.0 mg  Subcutaneous weekly (Mondays)    Used Freestyle [place] CGM in the past, not currently  Has Glucocard Vital BG meter    Infrequent BG testing  Recent Libre3 data:          Recent [facility] labs include:  7/26/19 hgbA1c 9.3%, t.chol 195, , TSH 0.06, FT4  1.46, FT3 8.7, Cr 0.77  8/26/20 hgbA1c 11.4%  10/15/21 hgbA1c 6.6%, LDL 60 mg/dl    Previous [facility] hgbA1c trends include:  Lab Results   Component Value Date    A1C 7.8 (H) 10/24/2024    A1C 5.8 (H) 04/16/2024    A1C 6.1 (H) 12/15/2023    A1C 6.9 (H) 06/06/2023    A1C 8.8 (H) 01/12/2023        Recent [facility] labs include:  Lab Results   Component Value Date    A1C 7.8 (H) 10/24/2024     10/24/2024    POTASSIUM 4.4 10/24/2024    CHLORIDE 103 10/24/2024    CO2 27 10/24/2024    ANIONGAP 6 (L) 10/24/2024     (H) 10/24/2024    BUN 11.9 10/24/2024    CR 0.78 10/24/2024    GFRESTIMATED 86 10/24/2024    GFRESTBLACK >90 01/05/2021    JOANN 9.0 10/24/2024    CHOL 216 (H) 01/12/2023    TRIG 159 (H) 01/12/2023    HDL 48 (L) 01/12/2023     (H) 01/12/2023    NHDL 168 (H) 01/12/2023    UCRR 168.0 06/06/2023    MICROL 18.3 06/06/2023    UMALCR 10.89 06/06/2023     Last eye exam 2018?, results not available  DM Complications:  None known        Thyroid:  Had taken Synthroid  Changed to [place] thyroid medication, then [place] + levothyroxine combo dose plan  Had taken [place] thyroid 2-tablets daily... Possibly 60 mg 2-tabs QD  Early-mid 3/2018. Ran out of [place] thyroid medication  Previously took [place] thyroid 90 mg 2-tabs daily and low dose levothyroxine 0.025 mg daily  8/2019. Changed to lower dose [place] and higher dose levothyroxine  Subsequent dose reductions with levothyroxine medication  Mid 12/2021 to early 1/2022. Off [place] thyroid medication, then restarted   6/2023. 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 Frantz Guillen?/[facility], Dr. Johnson/GI, Dr. YURIDIA Harrington/Neurology       PMH/PSH:  Past Medical History:   Diagnosis Date    Abnormal liver CT     Collapsed lung 2023    Hypothyroid 80's    Necrotizing fasciitis (H)     Pleural effusion     Primary osteoarthritis of both knees     Soft tissue infection     Subcortical infarction (H)     Type 2 diabetes mellitus (H)     Unspecified cirrhosis of liver (H)      Past Surgical History:   Procedure Laterality Date    ARTHROPLASTY KNEE Right 2023    Procedure: Right Total Knee Arthroplasty;  Surgeon: Pawan Ewing MD;  Location:  OR     SECTION      COLONOSCOPY N/A 10/21/2015    Procedure: COLONOSCOPY;  Surgeon: Jaky Arredondo MD;  Location:  GI    IR THORACENTESIS  2024    IR THORACENTESIS  2024    IRRIGATION AND DEBRIDEMENT ABDOMEN WASHOUT, COMBINED N/A 10/12/2018    Procedure: COMBINED IRRIGATION AND DEBRIDEMENT ABDOMEN WASHOUT;  Irrigation and Debridement of Lower Abdomen, removal of piece of mesh.;  Surgeon: Darlene Hogue MD;  Location: [facility] OR    marisol/bso  2004    due to anemia    ZZC REPAIR CRUCIATE LIGAMENT,KNEE  1986       Family Hx:  No family history on file.      Social Hx:  Social History     Socioeconomic History    Marital status:      Spouse name: Not on file    Number of children: 2    Years of education: Not on file    Highest education level: Not on file   Occupational History    Occupation: surgery scheduler [employer]   Tobacco Use    Smoking status: Every Day     Current packs/day: 0.25     Types: Cigarettes    Smokeless tobacco: Never   Vaping Use    Vaping status: Never Used   Substance and Sexual  Activity    Alcohol use: No    Drug use: Never    Sexual activity: Not on file   Other Topics Concern    Not on file   Social History Narrative    Not on file     Social Drivers of Health     Financial Resource Strain: Not on file   Food Insecurity: No Food Insecurity (4/30/2024)    Received from [employer]    Hunger Vital Sign     Worried About Running Out of Food in the Last Year: Never true     Ran Out of Food in the Last Year: Never true   Transportation Needs: No Transportation Needs (4/30/2024)    [facility]    PRAPARE - Transportation     Lack of Transportation (Medical): No     Lack of Transportation (Non-Medical): No   Physical Activity: Not on file   Stress: Not on file   Social Connections: Not on file   Interpersonal Safety: Unknown (4/30/2024)    [facility]    Humiliation, Afraid, Rape, and Kick questionnaire     Fear of Current or Ex-Partner: Not on file     Emotionally Abused: Not on file     Physically Abused: No     Sexually Abused: No   Housing Stability: Low Risk  (4/30/2024)    Received from [employer]    Housing Stability Vital Sign     Unable to Pay for Housing in the Last Year: No     Number of Places Lived in the Last Year: 1     Unstable Housing in the Last Year: No          MEDICATIONS:  has a current medication list which includes the following prescription(s): celecoxib, freestyle evelyn 3 sensor, escitalopram, furosemide, insulin glargine, levothyroxine, semaglutide (1 mg/dose), thyroid, thyroid, blood glucose, blood glucose, blood glucose monitoring, insulin aspart, b-d u/f, b-d u/f, semaglutide, spironolactone, statin not prescribed, and valacyclovir.     ROS: 10 point ROS neg other than the symptoms noted above in the HPI.     GENERAL: some fatigue, wt loss; denies fevers, chills, night sweats.   HEENT:  no dysphagia, odonophagia, diplopia, neck pain  THYROID:  no apparent hyper or hypothyroid symptoms  CV:  some palpitations; denies chest  pain, pressure  LUNGS:  denies SOB, dyspnea, cough, wheezing  ABDOMEN:   BM urgency related to lactulose TID dosing; no apparent indigestion, constipation  EXTREMITIES: no rashes, ulcers, edema  NEUROLOGY: forgetfulness; no headaches, denies changes in vision, tingling, extremitiy numbness   MSK: knee and some low back pain; no muscle aches or pains, weakness  SKIN: no rashes or lesions  : no increased thirst and urination, nocturia; no menses since hysterectomy ~age 49  PSYCH:  stable mood, no significant anxiety or depression  ENDOCRINE: no heat or cold intolerance      Physical Exam   VS: /74   Pulse 64   Wt 99.8 kg (220 lb)   BMI 36.84 kg/m    GENERAL: AXOX3, NAD, well dressed, answering questions appropriately, appears stated age.  ENT: no nose swelling or nasal discharge, mouth redness or gum changes.  EYES: eyes grossly normal to inspection, conjunctivae and sclerae normal, no exophthalmos or proptosis  THYROID:  no apparent nodules or goiter  LUNGS: no audible wheeze, cough or visible cyanosis, or increased work of breathing  ABDOMEN: abdomen obese size  EXTREMITIES: mild left hand tremor; normal appearance of feet, pulses R/L DP3/3; no pedal edema noted  NEUROLOGY: CN grossly intact, no tremors  MSK: grossly intact  SKIN:  no apparent skin lesions, rash, or edema with visualized skin appearance  PSYCH: mentation appears normal, affect normal/bright, judgement and insight intact,   normal speech and appearance well groomed    LABS:     All pertinent notes, labs, and images personally reviewed by me.        A/P:  Encounter Diagnoses   Name Primary?    Acquired hypothyroidism Yes    Type 2 diabetes mellitus without complication, without long-term current use of insulin (H)                Comments:  Reviewed health issues, diabetes and thyroid management.  Recent postmeal hyperglycemia relates to delayed timing of mealtime insulin relative to start of meal  Cause of her left hand tremors  unclear  Reviewed and interpreted tests that I previously ordered.   Ordered appropriate tests for the endocrinology disease management.    Management options discussed and implemented after shared medical decision making with the patient.  T2DM and hypothyroidism problems are chronic-stable    Plan:  Discussed general issues with the diabetes diagnosis and management  We discussed the hgbA1c test which reflects previous overall glucose levels or control  Discussed the importance of blood glucose (BG) testing to assess glucose trends  Provided general overview of the diabetes medication options and medication treatment plan  Reviewed recent Libre3 CGM glucose trend data, in detail.    Recommend:  Continue the current Novolog (Aspart), Lantus, and Ozempic medications, as noted  Add Aspart correction scale as 1U per 50 mg/dl over 200 mg/dl  Goal target -150 mg/dl  Continue use of the Libre3 CGM glucose sensor  Keep focus on diet, exercise, weight management.  Needs f/u on the mild hypercholesterolemia, consider adding statin medication for treatment  Keep focus on diet, exercise, weight management.  Continue current [place] thyroid and levothyroxine medications but lower levothyroxine as 0.088 mg daily  Advise having fasting lipid panel testing and dilated eye examination, at least annually  Completed new Assisted Living orders today, 2 copies given to patient    Keep scheduled followup GI, neurology, and PCP appointments  Addressed patient questions today     The longitudinal plan of care for the endocrine problem(s) were addressed during this visit.  Due to added complexity of care,   we will continue to support the patient and the subsequent management of this condition with ongoing continuity of care.    There are no Patient Instructions on file for this visit.    Future labs ordered today:   Orders Placed This Encounter   Procedures    GLUCOSE MONITOR, 72 HOUR, PHYS INTERP    Hemoglobin A1c    Basic  metabolic panel    Albumin Random Urine Quantitative with Creat Ratio    TSH    T4 free    T3 Free     Radiology/Consults ordered today: None    Total time spent on day of encounter:  28 min    Follow-up:  1/7/25 Return    JOJO Christianson MD, MS  Endocrinology  [facility]

## 2024-12-18 NOTE — TELEPHONE ENCOUNTER
Central Prior Authorization Team  Phone: [phone]    PA Initiation    Medication: ARMOUR THYROID 15 MG PO TABS  Insurance Company: [employer] Non-Specialty PA's - Phone [phone] Fax [phone]  Pharmacy Filling the Rx: [id number] - [place], [place] - [address] [facility] [address]  Filling Pharmacy Phone: [phone]  Filling Pharmacy Fax:    Start Date: 11/13/2023     N/A

## 2025-01-06 DIAGNOSIS — E11.9 TYPE 2 DIABETES MELLITUS WITHOUT COMPLICATION, WITHOUT LONG-TERM CURRENT USE OF INSULIN (H): ICD-10-CM

## 2025-01-06 RX ORDER — ACYCLOVIR 800 MG/1
TABLET ORAL
Qty: 2 EACH | Refills: 5 | Status: SHIPPED | OUTPATIENT
Start: 2025-01-06

## 2025-01-06 NOTE — TELEPHONE ENCOUNTER
Last Written Prescription Date:  6/4/24  Last Fill Quantity: 2,  # refills: 5   Last office visit: 10/29/2024 ; last virtual visit: Visit date not found with prescribing provider:  Dr. Christianson   Future Office Visit: 1/7/25    Requested Prescriptions   Pending Prescriptions Disp Refills    Continuous Glucose Sensor (FREESTYLE DIMITRIOS 3 SENSOR) MISC [Pharmacy Med Name: FreeStyle Dimitrios 3 Sensor Miscellaneous] 2 each 5     Sig: USE TO MONITOR BLOOD GLUCOSE AS NEEDED. CHANGE EVERY 14 DAYS       There is no refill protocol information for this order        Refills sent  Samantha Marie RN

## 2025-01-07 ENCOUNTER — OFFICE VISIT (OUTPATIENT)
Dept: ENDOCRINOLOGY | Facility: CLINIC | Age: 60
End: 2025-01-07
Payer: MEDICARE

## 2025-01-07 ENCOUNTER — LAB (OUTPATIENT)
Dept: LAB | Facility: CLINIC | Age: 60
End: 2025-01-07
Payer: MEDICARE

## 2025-01-07 VITALS
DIASTOLIC BLOOD PRESSURE: 64 MMHG | BODY MASS INDEX: 36.17 KG/M2 | SYSTOLIC BLOOD PRESSURE: 100 MMHG | HEART RATE: 76 BPM | WEIGHT: 216 LBS

## 2025-01-07 DIAGNOSIS — E03.9 ACQUIRED HYPOTHYROIDISM: ICD-10-CM

## 2025-01-07 DIAGNOSIS — E11.9 TYPE 2 DIABETES MELLITUS WITHOUT COMPLICATION, WITHOUT LONG-TERM CURRENT USE OF INSULIN (H): ICD-10-CM

## 2025-01-07 DIAGNOSIS — E11.9 TYPE 2 DIABETES MELLITUS WITHOUT COMPLICATION, WITHOUT LONG-TERM CURRENT USE OF INSULIN (H): Primary | ICD-10-CM

## 2025-01-07 LAB
ANION GAP SERPL CALCULATED.3IONS-SCNC: 9 MMOL/L (ref 7–15)
BUN SERPL-MCNC: 12.9 MG/DL (ref 8–23)
CALCIUM SERPL-MCNC: 9.3 MG/DL (ref 8.8–10.4)
CHLORIDE SERPL-SCNC: 104 MMOL/L (ref 98–107)
CREAT SERPL-MCNC: 0.82 MG/DL (ref 0.51–0.95)
CREAT UR-MCNC: 30.4 MG/DL
EGFRCR SERPLBLD CKD-EPI 2021: 81 ML/MIN/1.73M2
EST. AVERAGE GLUCOSE BLD GHB EST-MCNC: 169 MG/DL
GLUCOSE SERPL-MCNC: 178 MG/DL (ref 70–99)
HBA1C MFR BLD: 7.5 % (ref 0–5.6)
HCO3 SERPL-SCNC: 24 MMOL/L (ref 22–29)
MICROALBUMIN UR-MCNC: <12 MG/L
MICROALBUMIN/CREAT UR: NORMAL MG/G{CREAT}
POTASSIUM SERPL-SCNC: 4.9 MMOL/L (ref 3.4–5.3)
SODIUM SERPL-SCNC: 137 MMOL/L (ref 135–145)
T3FREE SERPL-MCNC: 3.8 PG/ML (ref 2–4.4)
T4 FREE SERPL-MCNC: 2.07 NG/DL (ref 0.9–1.7)
TSH SERPL DL<=0.005 MIU/L-ACNC: 0.01 UIU/ML (ref 0.3–4.2)

## 2025-01-07 PROCEDURE — 80048 BASIC METABOLIC PNL TOTAL CA: CPT

## 2025-01-07 PROCEDURE — 84443 ASSAY THYROID STIM HORMONE: CPT

## 2025-01-07 PROCEDURE — 36415 COLL VENOUS BLD VENIPUNCTURE: CPT

## 2025-01-07 PROCEDURE — 99214 OFFICE O/P EST MOD 30 MIN: CPT | Performed by: INTERNAL MEDICINE

## 2025-01-07 PROCEDURE — 84481 FREE ASSAY (FT-3): CPT

## 2025-01-07 PROCEDURE — 82570 ASSAY OF URINE CREATININE: CPT

## 2025-01-07 PROCEDURE — 82043 UR ALBUMIN QUANTITATIVE: CPT

## 2025-01-07 PROCEDURE — G2211 COMPLEX E/M VISIT ADD ON: HCPCS | Performed by: INTERNAL MEDICINE

## 2025-01-07 PROCEDURE — 95251 CONT GLUC MNTR ANALYSIS I&R: CPT | Performed by: INTERNAL MEDICINE

## 2025-01-07 PROCEDURE — 83036 HEMOGLOBIN GLYCOSYLATED A1C: CPT

## 2025-01-07 PROCEDURE — 84439 ASSAY OF FREE THYROXINE: CPT

## 2025-01-07 RX ORDER — OMEPRAZOLE 10 MG/1
CAPSULE, DELAYED RELEASE ORAL
COMMUNITY
Start: 2024-12-16

## 2025-01-07 NOTE — PROGRESS NOTES
Recent issues:  Followup diabetes and thyroid evaluation, with daughter Cassie   Taking the diabetes and thyroid medications  Reports no hospitalizations for the dyspnea (and thoracentesis) problem, still taking lactulose TID         Diabetes:  ~4/2010. Initial diagnosis approx age 44  ...Has taken several DM meds including metformin, Byetta, glimeparide, Invokana, and Victoza   In 2018, she had been taking metformin, Victoza, glimeparide, and Jardiance  She stopped Jardiance, also ran out of Victoza last year  Had seen Beth CASTRO/[facility] for general medical evaluations  10/2018. Abdominal abcess illness, hospitalizations at [facility] and then [facility]              2-surgeries for abcess, thought related to the hysterectomy bladder sling mesh from prior surgery     Previously taking metformin 750 mg BID, glimeparide 4 mg every day, Tresiba 40U every day   10/2020. Changed to VGo40 pods management  Continue low dose glimeparide, but low SG levels and glimeparide discontinued, then restarted   Previous DM med plan:  Novolog in VGo40    Small carb meal 2 clicks (4U)    Large carb meal 3 clicks (6U)  Novolog    sscale:       -250 1-click    -300 2-clicks  Metformin 750 mg 2-tabs in morning   Ozempic 0.5 mg  Subcutaneous weekly      8/2023. Hospitalized, pleural effusion and CT placement  Med dose changes, stopped VGo and metformin discontinued  Current DM medications:  Lantus Solostar 36U  subcutaneous in morning  Aspart Flexpen   Mealtime  4U (premeal vs postmeal)  Ozempic 1.0 mg  Subcutaneous weekly (Mondays)    Used Didascostyle Chandni CGM in the past, not currently  Has Glucocard Vital BG meter    Infrequent BG testing  Recent Libre3 data:          Recent [facility] labs include:  7/26/19 hgbA1c 9.3%, t.chol 195, , TSH 0.06, FT4 1.46, FT3 8.7, Cr 0.77  8/26/20 hgbA1c 11.4%  10/15/21 hgbA1c 6.6%, LDL 60 mg/dl  Previous [facility] hgbA1c trends include:  Lab Results   Component Value Date     A1C 7.5 (H) 01/07/2025    A1C 7.8 (H) 10/24/2024    A1C 5.8 (H) 04/16/2024    A1C 6.1 (H) 12/15/2023    A1C 6.9 (H) 06/06/2023        Recent [facility] labs include:  Lab Results   Component Value Date    A1C 7.5 (H) 01/07/2025     10/24/2024    POTASSIUM 4.4 10/24/2024    CHLORIDE 103 10/24/2024    CO2 27 10/24/2024    ANIONGAP 6 (L) 10/24/2024     (H) 10/24/2024    BUN 11.9 10/24/2024    CR 0.78 10/24/2024    GFRESTIMATED 86 10/24/2024    GFRESTBLACK >90 01/05/2021    JOANN 9.0 10/24/2024    CHOL 216 (H) 01/12/2023    TRIG 159 (H) 01/12/2023    HDL 48 (L) 01/12/2023     (H) 01/12/2023    NHDL 168 (H) 01/12/2023    UCRR 168.0 06/06/2023    MICROL 18.3 06/06/2023    UMALCR 10.89 06/06/2023     Last eye exam 2018?, results not available  DM Complications:  None known        Thyroid:  Had taken Synthroid  Changed to [place] thyroid medication, then [place] + levothyroxine combo dose plan  Had taken [place] thyroid 2-tablets daily... Possibly 60 mg 2-tabs QD  Early-mid 3/2018. Ran out of [place] thyroid medication  Previously took [place] thyroid 90 mg 2-tabs daily and low dose levothyroxine 0.025 mg daily  8/2019. Changed to lower dose [place] and higher dose levothyroxine  Subsequent dose reductions with levothyroxine medication  Mid 12/2021 to early 1/2022. Off [place] thyroid medication, then restarted   6/2023. 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 Frantz Guillen?/[facility], Dr. Johnson/GI, Dr. YURIDIA Harrington/Neurology       PMH/PSH:  Past Medical History:   Diagnosis Date    Abnormal liver CT     Collapsed lung 2023    Hypothyroid 80's    Necrotizing fasciitis (H)     Pleural effusion     Primary osteoarthritis of both knees     Soft tissue infection     Subcortical infarction (H)     Type 2 diabetes mellitus (H)     Unspecified cirrhosis of liver (H)      Past Surgical History:   Procedure Laterality Date    ARTHROPLASTY KNEE Right 2023    Procedure: Right Total Knee Arthroplasty;  Surgeon: Pawan Ewing MD;  Location:  OR     SECTION      COLONOSCOPY N/A 10/21/2015    Procedure: COLONOSCOPY;  Surgeon: Jaky Arredondo MD;  Location:  GI    IR THORACENTESIS  2024    IR THORACENTESIS  2024    IRRIGATION AND DEBRIDEMENT ABDOMEN WASHOUT, COMBINED N/A 10/12/2018    Procedure: COMBINED IRRIGATION AND DEBRIDEMENT ABDOMEN WASHOUT;  Irrigation and Debridement of Lower Abdomen, removal of piece of mesh.;  Surgeon: Darlene Hogue MD;  Location: [facility] OR    marisol/bso      due to anemia    ZZC REPAIR CRUCIATE LIGAMENT,KNEE  1986       Family Hx:  No family history on file.      Social Hx:  Social History     Socioeconomic History    Marital status:      Spouse name: Not on file    Number of children: 2    Years of education: Not on file    Highest education level: Not on file   Occupational History    Occupation: surgery scheduler [employer]   Tobacco Use    Smoking status: Every Day     Current packs/day: 0.25     Types: Cigarettes    Smokeless tobacco: Never   Vaping Use    Vaping status: Never Used   Substance and Sexual Activity    Alcohol use: No    Drug use: Never    Sexual activity: Not on file   Other Topics Concern    Not on file   Social History Narrative    Not on file      Social Drivers of Health     Financial Resource Strain: Not on file   Food Insecurity: No Food Insecurity (4/30/2024)    Received from [facility]    Hunger Vital Sign     Worried About Running Out of Food in the Last Year: Never true     Ran Out of Food in the Last Year: Never true   Transportation Needs: No Transportation Needs (4/30/2024)    Received from [facility]    PRAPARE - Transportation     Lack of Transportation (Medical): No     Lack of Transportation (Non-Medical): No   Physical Activity: Not on file   Stress: Not on file   Social Connections: Not on file   Interpersonal Safety: Unknown (4/30/2024)    Received from [facility]    Humiliation, Afraid, Rape, and Kick questionnaire     Fear of Current or Ex-Partner: Not on file     Emotionally Abused: Not on file     Physically Abused: No     Sexually Abused: No   Housing Stability: Low Risk  (4/30/2024)    Received from [facility]    Housing Stability Vital Sign     Unable to Pay for Housing in the Last Year: No     Number of Places Lived in the Last Year: 1     Unstable Housing in the Last Year: No          MEDICATIONS:  has a current medication list which includes the following prescription(s): freestyle evelyn 3 sensor, insulin glargine, levothyroxine, omeprazole, semaglutide (1 mg/dose), spironolactone, thyroid, thyroid, blood glucose, blood glucose, blood glucose monitoring, celecoxib, escitalopram, furosemide, insulin aspart, b-d u/f, b-d u/f, semaglutide, statin not prescribed, and valacyclovir.     ROS: 10 point ROS neg other than the symptoms noted above in the HPI.     GENERAL: some fatigue, wt loss; denies fevers, chills, night sweats.   HEENT:  no dysphagia, odonophagia, diplopia, neck pain  THYROID:  no apparent hyper or hypothyroid symptoms  CV:  some palpitations; denies chest pain, pressure  LUNGS:  denies SOB, dyspnea, cough, wheezing  ABDOMEN:   BM urgency related to lactulose TID dosing; no apparent indigestion,  constipation  EXTREMITIES: no rashes, ulcers, edema  NEUROLOGY: forgetfulness; no headaches, denies changes in vision, tingling, extremitiy numbness   MSK: knee and some low back pain; no muscle aches or pains, weakness  SKIN: no rashes or lesions  : no increased thirst and urination, nocturia; no menses since hysterectomy ~age 49  PSYCH:  stable mood, no significant anxiety or depression  ENDOCRINE: no heat or cold intolerance      Physical Exam   VS: /64   Pulse 76   Wt 98 kg (216 lb)   BMI 36.17 kg/m    GENERAL: AXOX3, NAD, well dressed, answering questions appropriately, appears stated age.  ENT: no nose swelling or nasal discharge, mouth redness or gum changes.  EYES: eyes grossly normal to inspection, conjunctivae and sclerae normal, no exophthalmos or proptosis  THYROID:  no apparent nodules or goiter  LUNGS: no audible wheeze, cough or visible cyanosis, or increased work of breathing  ABDOMEN: abdomen obese size  EXTREMITIES: mild left hand tremor; normal appearance of feet, pulses R/L DP3/3; no pedal edema noted  NEUROLOGY: CN grossly intact, no tremors  MSK: grossly intact  SKIN:  no apparent skin lesions, rash, or edema with visualized skin appearance  PSYCH: mentation appears normal, affect normal/bright, judgement and insight intact,   normal speech and appearance well groomed    LABS:     All pertinent notes, labs, and images personally reviewed by me.        A/P:  Encounter Diagnoses   Name Primary?    Type 2 diabetes mellitus without complication, without long-term current use of insulin (H) Yes    Acquired hypothyroidism                Comments:  Reviewed complicated health issues, diabetes and thyroid management.  Recent postmeal hyperglycemia relates to delayed timing of mealtime insulin relative to start of meal  Reviewed and interpreted tests that I previously ordered.   Ordered appropriate tests for the endocrinology disease management.    Management options discussed and implemented  after shared medical decision making with the patient.  T2DM and hypothyroidism problems are chronic-stable    Plan:  Discussed general issues with the diabetes diagnosis and management  We discussed the hgbA1c test which reflects previous overall glucose levels or control  Discussed the importance of blood glucose (BG) testing to assess glucose trends  Provided general overview of the diabetes medication options and medication treatment plan  Reviewed recent Libre3 CGM glucose trend data, in detail.    Recommend:  Continue the current Novolog (Aspart), Lantus, and Ozempic medications, as noted  Add Aspart correction scale as 1U per 50 mg/dl over 200 mg/dl  Goal target -150 mg/dl  Continue use of the Libre3 CGM glucose sensor  Keep focus on diet, exercise, weight management.  Plan lab testing today, lab appt already scheduled  Needs f/u on the mild hypercholesterolemia, consider adding statin medication for treatment  Continue current [place] thyroid and levothyroxine medications but lower levothyroxine as 0.088 mg daily  Advise having fasting lipid panel testing and dilated eye examination, at least annually    Keep scheduled followup GI, neurology, and PCP appointments  Addressed patient questions today     The longitudinal plan of care for the endocrine problem(s) were addressed during this visit.  Due to added complexity of care,   we will continue to support the patient and the subsequent management of this condition with ongoing continuity of care.    Patient Instructions   Change mealtime Aspart insulin dosing to lunch and supper meals  Use Aspart correction scale at all meals and bedtime if glucose >200 mg/dl    New diabetes medication plan:  Lantus Solostar 36U  subcutaneous in morning  Aspart Flexpen   Lunch..............4 units subcutaneous before meal  Supper............5 units subcutaneous before meal  Aspart correction scale:   mg/dl..............No correction  151-200 mg/dl............No  correction  201-250 mg/dl............+2 units  251-300 mg/dl............+3 units  301-350 mg/dl............+4 units  350-400 mg/dl............+5 units  401-450 mg/dl............+6 units and call physician     Ozempic 1.0 mg  Subcutaneous weekly     Continue use of Freestyle Libre3 glucose sensor  Review sensor glucose result at mealtime and bedtime  Contact Dr. Christianson/Endocrinology if questions about diabetes or thyroid plan  Next office appointment 5/7/25 at 12 noon    Future labs ordered today:   Orders Placed This Encounter   Procedures    GLUCOSE MONITOR, 72 HOUR, PHYS INTERP     Radiology/Consults ordered today: None    Total time spent on day of encounter:  30 min    Follow-up:  5/7/25 Return    JOJO Christianson MD, MS  Endocrinology  [facility]    CC:  RAFIQ Burns

## 2025-01-07 NOTE — PATIENT INSTRUCTIONS
Change mealtime Aspart insulin dosing to lunch and supper meals  Use Aspart correction scale at all meals and bedtime if glucose >200 mg/dl    New diabetes medication plan:  Lantus Solostar 36U  subcutaneous in morning  Aspart Flexpen   Lunch..............4 units subcutaneous before meal  Supper............5 units subcutaneous before meal  Aspart correction scale:   mg/dl..............No correction  151-200 mg/dl............No correction  201-250 mg/dl............+2 units  251-300 mg/dl............+3 units  301-350 mg/dl............+4 units  350-400 mg/dl............+5 units  401-450 mg/dl............+6 units and call physician     Ozempic 1.0 mg  Subcutaneous weekly     Continue use of Freestyle Libre3 glucose sensor  Review sensor glucose result at mealtime and bedtime  Contact Dr. Christianson/Endocrinology if questions about diabetes or thyroid plan  Next office appointment 5/7/25 at 12 noon

## 2025-01-12 DIAGNOSIS — E11.9 TYPE 2 DIABETES MELLITUS WITHOUT COMPLICATION, WITHOUT LONG-TERM CURRENT USE OF INSULIN (H): ICD-10-CM

## 2025-01-12 DIAGNOSIS — E03.9 ACQUIRED HYPOTHYROIDISM: ICD-10-CM

## 2025-01-12 DIAGNOSIS — E03.9 ACQUIRED HYPOTHYROIDISM: Primary | ICD-10-CM

## 2025-01-12 RX ORDER — LEVOTHYROXINE SODIUM 88 UG/1
88 TABLET ORAL DAILY
Qty: 30 TABLET | Refills: 5 | Status: SHIPPED | OUTPATIENT
Start: 2025-01-12

## 2025-01-13 ENCOUNTER — TELEPHONE (OUTPATIENT)
Dept: ENDOCRINOLOGY | Facility: CLINIC | Age: 60
End: 2025-01-13
Payer: MEDICARE

## 2025-01-13 DIAGNOSIS — E11.9 TYPE 2 DIABETES MELLITUS WITHOUT COMPLICATION, WITHOUT LONG-TERM CURRENT USE OF INSULIN (H): ICD-10-CM

## 2025-01-13 NOTE — TELEPHONE ENCOUNTER
[facility] Call Center    Phone Message    May a detailed message be left on voicemail: yes     Reason for Call: Other: [facility] stating paperwork needs to be sent to pharmacy stating the increase in patient's insulin. Patient states at her last appt, Dr. Christianson increased her insulin and pharmacy does not have that information. Please send paperwork or call them to get update their system of the increase. Please call with questions. Keya stated this has happened before and it took a long time to fix. Patient keeps asking for something that [facility] can't give her. Please call to discuss with questions. Jered asked this to be high priority message.

## 2025-01-14 ENCOUNTER — MYC MEDICAL ADVICE (OUTPATIENT)
Dept: PHARMACY | Facility: CLINIC | Age: 60
End: 2025-01-14
Payer: MEDICARE

## 2025-01-14 ENCOUNTER — TELEPHONE (OUTPATIENT)
Dept: ENDOCRINOLOGY | Facility: CLINIC | Age: 60
End: 2025-01-14
Payer: MEDICARE

## 2025-01-14 ENCOUNTER — MYC MEDICAL ADVICE (OUTPATIENT)
Dept: ENDOCRINOLOGY | Facility: CLINIC | Age: 60
End: 2025-01-14
Payer: MEDICARE

## 2025-01-14 DIAGNOSIS — E11.9 TYPE 2 DIABETES MELLITUS WITHOUT COMPLICATION, WITHOUT LONG-TERM CURRENT USE OF INSULIN (H): ICD-10-CM

## 2025-01-14 NOTE — TELEPHONE ENCOUNTER
[facility] Call Center    Phone Message    May a detailed message be left on voicemail: no     Reason for Call: Other: Pharmacy would like to get clarification on the insulin aspart (NOVOLOG PEN). Please advise.     Action Taken: Other: Endo    Travel Screening: Not Applicable     Date of Service:

## 2025-01-14 NOTE — TELEPHONE ENCOUNTER
Called the [facility].  States they are needing Aspart rx updated with changes from 1/7/25 ov.  RN reviewed chart, and adjusted Rx to reflect:  Aspart Flexpen   Lunch..............4 units subcutaneous before meal  Supper............5 units subcutaneous before meal  Aspart correction scale:   mg/dl..............No correction  151-200 mg/dl............No correction  201-250 mg/dl............+2 units  251-300 mg/dl............+3 units  301-350 mg/dl............+4 units  350-400 mg/dl............+5 units  401-450 mg/dl............+6 units and call physician     Samantha Marie RN

## 2025-01-14 NOTE — TELEPHONE ENCOUNTER
Called pharmacy.  They wanted to double check that pt doesn't take a morning dose of novolog, and what the max daily dose is.  Will route to provider. Samantha Marie RN

## 2025-01-14 NOTE — TELEPHONE ENCOUNTER
"Nurse from patient's assisted living (AL) facility called and requested a return call from Endo team regarding patient's visit on 1/7/25. Per nurse \"Ellen\" facility is needing clarification on Diabetes Medication Plan. RN provided the following updates from LOV.      AL nurse is stating that [facility] pharmacy does not have Ozempic order active and needs Rx resent. RN resent Ozempic Rx and clarified both Lantus, Insulin Aspart and Ozempic orders with nurse.    Pradeep Huber RN    "

## 2025-01-15 NOTE — TELEPHONE ENCOUNTER
The sliding scale correction range for 350-400 was corrected to 351-400 and resent to pharmacy.  AL also wanted to know how often sugars should be checked and when they should be using the sliding scale.  Orders can be faxed to AL at [phone].  Samantha Marie RN

## 2025-01-15 NOTE — TELEPHONE ENCOUNTER
Message noted.  I have now sent a revised Novolog pen Rx to [facility], with clarification on the TDD and no breakfast meals.    JOJO Christianson MD, MS  Endocrinology  [facility]

## 2025-01-16 ENCOUNTER — MEDICAL CORRESPONDENCE (OUTPATIENT)
Dept: HEALTH INFORMATION MANAGEMENT | Facility: CLINIC | Age: 60
End: 2025-01-16

## 2025-01-16 ENCOUNTER — TELEPHONE (OUTPATIENT)
Dept: ENDOCRINOLOGY | Facility: CLINIC | Age: 60
End: 2025-01-16
Payer: MEDICARE

## 2025-01-16 NOTE — TELEPHONE ENCOUNTER
Message reviewed.  I have printed the diabetes med orders from the recent 1/2025 visit AVS and added additional physician orders to a form... which will be faxed back to her assisted living facility today as requested.    JOJO Christianson MD, MS  Endocrinology  [facility]

## 2025-01-16 NOTE — TELEPHONE ENCOUNTER
[facility] Call Center    Phone Message    May a detailed message be left on voicemail: yes     Reason for Call: Medication Question or concern regarding medication   Prescription Clarification  Name of Medication: insulin aspart (NOVOLOG PEN) 100 UNIT/ML pen  Prescribing Provider:    Pharmacy: [facility], [facility]. [facility] [address]     What on the order needs clarification? Ellen called back and she is requesting that we fax an order over to them at # [phone] that states how many times they should be checking the Pt blood sugar levels along with what times of the day she should be taking her insulin. Please advise.       Action Taken: Other: Endo    Travel Screening: Not Applicable     Date of Service: 1/16/25

## 2025-01-16 NOTE — TELEPHONE ENCOUNTER
Message reviewed, also separate message from earlier today.  I have written new Aspart correction scale orders and the new order form info will be faxed back to [facility] in [place] today.    JOJO Christianson MD, MS  [facility]

## 2025-02-02 ENCOUNTER — HEALTH MAINTENANCE LETTER (OUTPATIENT)
Age: 60
End: 2025-02-02

## 2025-02-17 ENCOUNTER — TELEPHONE (OUTPATIENT)
Dept: ENDOCRINOLOGY | Facility: CLINIC | Age: 60
End: 2025-02-17
Payer: MEDICARE

## 2025-02-17 NOTE — TELEPHONE ENCOUNTER
Prior Authorization Specialty Medication Request    Medication/Dose: Semaglutide, 1 MG/DOSE, (OZEMPIC) 4 MG/3ML pen  Diagnosis and ICD code (if different than what is on RX):  E11.9     Insurance   Primary: MEDICARE - MEDICARE   Insurance ID:  [id number]      Secondary (if applicable):[facility]   Insurance ID:  [id number]

## 2025-02-18 ENCOUNTER — HOSPITAL ENCOUNTER (OUTPATIENT)
Dept: ULTRASOUND IMAGING | Facility: CLINIC | Age: 60
Discharge: HOME | End: 2025-02-18
Attending: INTERNAL MEDICINE
Payer: MEDICARE

## 2025-02-18 DIAGNOSIS — K74.60 UNSPECIFIED CIRRHOSIS OF LIVER (H): ICD-10-CM

## 2025-02-18 PROCEDURE — 76705 ECHO EXAM OF ABDOMEN: CPT

## 2025-02-19 NOTE — TELEPHONE ENCOUNTER
Messages reviewed.  I sent the Ozempic 1.0 mg pen Rx to [facility] on 1/14/25 for the Type 2 diabetes mellitus (E11.9) management.  Yes, we should pursue a Prior Authorization for this medication if required by insurance.    JOJO Christianson MD, MS  Endocrinology  [facility]

## 2025-02-20 NOTE — TELEPHONE ENCOUNTER
PA Initiation    Medication: OZEMPIC (1 MG/DOSE) 4 MG/3ML SC SOPN  Insurance Company: [employer] - Phone [phone] Fax [phone]  Pharmacy Filling the Rx:    Filling Pharmacy Phone:    Filling Pharmacy Fax:    Start Date: 2/20/2025     Key: [id number]

## 2025-02-20 NOTE — TELEPHONE ENCOUNTER
Prior Authorization Approval    Medication: OZEMPIC (1 MG/DOSE) 4 MG/3ML SC SOPN  Authorization Effective Date:    Authorization Expiration Date: This approval is for 02/06/2025 until further notice.     Approved Dose/Quantity: uud  Reference #: Key: [id number]   Risk Management Solution Company: [employer] - Phone [phone] Fax [phone]  Expected CoPay: $    CoPay Card Available:      Financial Assistance Needed:    Which Pharmacy is filling the prescription:

## 2025-04-04 ENCOUNTER — TELEPHONE (OUTPATIENT)
Dept: ENDOCRINOLOGY | Facility: CLINIC | Age: 60
End: 2025-04-04
Payer: MEDICARE

## 2025-04-04 NOTE — TELEPHONE ENCOUNTER
Prior Authorization Retail Medication Request    Medication/Dose: insulin aspart (NOVOLOG PEN) 100 UNIT/ML pen   Diagnosis and ICD code (if different than what is on RX):    New/renewal/insurance change PA/secondary ins. PA:  Previously Tried and Failed:    Rationale:          KEY: [id number]

## 2025-04-07 NOTE — TELEPHONE ENCOUNTER
Retail Pharmacy Prior Authorization Team   Phone: [phone]    PA Initiation    Medication: LANTUS SOLOSTAR   UNIT/ML SC SOPN  Insurance Company: [employer] - Phone [phone] Fax [phone]  Pharmacy Filling the Rx: [facility], [facility] [place], [place] [facility] [address] [address]  Filling Pharmacy Phone: [phone]  Filling Pharmacy Fax:    Start Date: 4/7/2025    [facility] KEY: [id number] is for Lantus Solorstar Pen

## 2025-04-09 NOTE — TELEPHONE ENCOUNTER
PRIOR AUTHORIZATION DENIED    Medication: LANTUS SOLOSTAR   UNIT/ML SC SOPN  Insurance Company: [employer] - Phone [phone] Fax [phone]  Denial Date: 4/8/2025  Denial Reason(s):           Appeal Information:         Patient Notified: No

## 2025-04-16 ENCOUNTER — LAB (OUTPATIENT)
Dept: LAB | Facility: CLINIC | Age: 60
End: 2025-04-16
Payer: MEDICARE

## 2025-04-16 DIAGNOSIS — E03.9 ACQUIRED HYPOTHYROIDISM: ICD-10-CM

## 2025-04-16 DIAGNOSIS — K74.60 CIRRHOSIS (H): Primary | ICD-10-CM

## 2025-04-16 DIAGNOSIS — E11.9 TYPE 2 DIABETES MELLITUS WITHOUT COMPLICATION, WITHOUT LONG-TERM CURRENT USE OF INSULIN (H): ICD-10-CM

## 2025-04-16 LAB
AFP SERPL-MCNC: 4 NG/ML
ALBUMIN SERPL BCG-MCNC: 3.2 G/DL (ref 3.5–5.2)
ALP SERPL-CCNC: 239 U/L (ref 40–150)
ALT SERPL W P-5'-P-CCNC: 21 U/L (ref 0–50)
ANION GAP SERPL CALCULATED.3IONS-SCNC: 8 MMOL/L (ref 7–15)
AST SERPL W P-5'-P-CCNC: 43 U/L (ref 0–45)
BILIRUB SERPL-MCNC: 1.9 MG/DL
BUN SERPL-MCNC: 11.7 MG/DL (ref 8–23)
CALCIUM SERPL-MCNC: 8.8 MG/DL (ref 8.8–10.4)
CHLORIDE SERPL-SCNC: 102 MMOL/L (ref 98–107)
CREAT SERPL-MCNC: 0.73 MG/DL (ref 0.51–0.95)
EGFRCR SERPLBLD CKD-EPI 2021: >90 ML/MIN/1.73M2
EST. AVERAGE GLUCOSE BLD GHB EST-MCNC: 148 MG/DL
GLUCOSE SERPL-MCNC: 186 MG/DL (ref 70–99)
HBA1C MFR BLD: 6.8 % (ref 0–5.6)
HCO3 SERPL-SCNC: 27 MMOL/L (ref 22–29)
INR PPP: 1.22 (ref 0.85–1.15)
POTASSIUM SERPL-SCNC: 4.5 MMOL/L (ref 3.4–5.3)
PROT SERPL-MCNC: 7.2 G/DL (ref 6.4–8.3)
SODIUM SERPL-SCNC: 137 MMOL/L (ref 135–145)
T3FREE SERPL-MCNC: 3.8 PG/ML (ref 2–4.4)
T4 FREE SERPL-MCNC: 1.75 NG/DL (ref 0.9–1.7)
TSH SERPL DL<=0.005 MIU/L-ACNC: 0.02 UIU/ML (ref 0.3–4.2)

## 2025-04-16 PROCEDURE — 84481 FREE ASSAY (FT-3): CPT

## 2025-04-16 PROCEDURE — 82105 ALPHA-FETOPROTEIN SERUM: CPT

## 2025-04-16 PROCEDURE — 36415 COLL VENOUS BLD VENIPUNCTURE: CPT

## 2025-04-16 PROCEDURE — 85610 PROTHROMBIN TIME: CPT

## 2025-04-16 PROCEDURE — 80053 COMPREHEN METABOLIC PANEL: CPT

## 2025-04-16 PROCEDURE — 84443 ASSAY THYROID STIM HORMONE: CPT

## 2025-04-16 PROCEDURE — 84439 ASSAY OF FREE THYROXINE: CPT

## 2025-04-16 PROCEDURE — 83036 HEMOGLOBIN GLYCOSYLATED A1C: CPT

## 2025-04-20 DIAGNOSIS — E11.9 TYPE 2 DIABETES MELLITUS WITHOUT COMPLICATION, WITHOUT LONG-TERM CURRENT USE OF INSULIN (H): Primary | ICD-10-CM

## 2025-04-20 RX ORDER — INSULIN GLARGINE-YFGN 100 [IU]/ML
36 INJECTION, SOLUTION SUBCUTANEOUS DAILY
Qty: 15 ML | Refills: 5 | Status: SHIPPED | OUTPATIENT
Start: 2025-04-20

## 2025-04-21 NOTE — TELEPHONE ENCOUNTER
Message noted.  Will change to Insulin Glargine-YFGN pen, which is formulary option.  New eRx sent today.    JOJO Christianson MD, MS  Endocrinology  [facility]

## 2025-05-07 ENCOUNTER — OFFICE VISIT (OUTPATIENT)
Dept: ENDOCRINOLOGY | Facility: CLINIC | Age: 60
End: 2025-05-07
Payer: MEDICARE

## 2025-05-07 VITALS
BODY MASS INDEX: 36.17 KG/M2 | DIASTOLIC BLOOD PRESSURE: 60 MMHG | HEART RATE: 74 BPM | SYSTOLIC BLOOD PRESSURE: 96 MMHG | WEIGHT: 216 LBS

## 2025-05-07 DIAGNOSIS — E11.9 TYPE 2 DIABETES MELLITUS WITHOUT COMPLICATION, WITHOUT LONG-TERM CURRENT USE OF INSULIN (H): Primary | ICD-10-CM

## 2025-05-07 DIAGNOSIS — E03.9 ACQUIRED HYPOTHYROIDISM: ICD-10-CM

## 2025-05-07 PROCEDURE — 3078F DIAST BP <80 MM HG: CPT | Performed by: INTERNAL MEDICINE

## 2025-05-07 PROCEDURE — 3074F SYST BP LT 130 MM HG: CPT | Performed by: INTERNAL MEDICINE

## 2025-05-07 PROCEDURE — 99214 OFFICE O/P EST MOD 30 MIN: CPT | Performed by: INTERNAL MEDICINE

## 2025-05-07 PROCEDURE — G2211 COMPLEX E/M VISIT ADD ON: HCPCS | Performed by: INTERNAL MEDICINE

## 2025-05-07 NOTE — PROGRESS NOTES
Recent issues:  Followup diabetes and thyroid evaluation, with daughter Ramona   Taking the diabetes and thyroid medications  She reports FBG today 165 mg/dl, recent trends  mg/dl  Reports no recent hospitalizations for the dyspnea (and thoracentesis) problem, still taking lactulose TID  No recent med list, BGM or CGM data available         Diabetes:  ~4/2010. Initial diagnosis approx age 44  ...Has taken several DM meds including metformin, Byetta, glimeparide, Invokana, and Victoza   In 2018, she had been taking metformin, Victoza, glimeparide, and Jardiance  She stopped Jardiance, also ran out of Victoza last year  Had seen Beth CASTRO/[facility] for general medical evaluations  10/2018. Abdominal abcess illness, hospitalizations at [facility] and then [facility]              2-surgeries for abcess, thought related to the hysterectomy bladder sling mesh from prior surgery     Previously taking metformin 750 mg BID, glimeparide 4 mg every day, Tresiba 40U every day   10/2020. Changed to VGo40 pods management  Continue low dose glimeparide, but low SG levels and glimeparide discontinued, then restarted   Previous DM med plan:  Novolog in VGo40    Small carb meal 2 clicks (4U)    Large carb meal 3 clicks (6U)  Novolog    sscale:       -250 1-click    -300 2-clicks  Metformin 750 mg 2-tabs in morning   Ozempic 0.5 mg  Subcutaneous weekly      8/2023. Hospitalized, pleural effusion and CT placement  Med dose changes, stopped VGo and metformin discontinued  Current DM medications:  Lantus Solostar 36U  subcutaneous in morning  Aspart Flexpen   Lunch  4U (usually 15 min premeal)  Supper 5U  Ozempic 1.0 mg  Subcutaneous weekly (Mondays)    Used Freestyle [place] CGM in the past, not currently  Has Glucocard Vital BG meter    Infrequent BG testing  Recent Libre3 data:  none available today    Recent [facility] labs include:  7/26/19 hgbA1c 9.3%, t.chol 195, , TSH 0.06, FT4  1.46, FT3 8.7, Cr 0.77  8/26/20 hgbA1c 11.4%  10/15/21 hgbA1c 6.6%, LDL 60 mg/dl    Previous [facility] hgbA1c trends include:  Lab Results   Component Value Date    A1C 6.8 (H) 04/16/2025    A1C 7.5 (H) 01/07/2025    A1C 7.8 (H) 10/24/2024    A1C 5.8 (H) 04/16/2024    A1C 6.1 (H) 12/15/2023        Recent [facility] labs include:  Lab Results   Component Value Date    A1C 6.8 (H) 04/16/2025     04/16/2025    POTASSIUM 4.5 04/16/2025    CHLORIDE 102 04/16/2025    CO2 27 04/16/2025    ANIONGAP 8 04/16/2025     (H) 04/16/2025    BUN 11.7 04/16/2025    CR 0.73 04/16/2025    GFRESTIMATED >90 04/16/2025    GFRESTBLACK >90 01/05/2021    JOANN 8.8 04/16/2025    CHOL 216 (H) 01/12/2023    TRIG 159 (H) 01/12/2023    HDL 48 (L) 01/12/2023     (H) 01/12/2023    NHDL 168 (H) 01/12/2023    UCRR 30.4 01/07/2025    MICROL <12.0 01/07/2025    UMALCR  01/07/2025      Comment:      Unable to calculate, urine albumin and/or urine creatinine is outside detectable limits.  Microalbuminuria is defined as an albumin:creatinine ratio of 17 to 299 for males and 25 to 299 for females. A ratio of albumin:creatinine of 300 or higher is indicative of overt proteinuria.  Due to biologic variability, positive results should be confirmed by a second, first-morning random or 24-hour timed urine specimen. If there is discrepancy, a third specimen is recommended. When 2 out of 3 results are in the microalbuminuria range, this is evidence for incipient nephropathy and warrants increased efforts at glucose control, blood pressure control, and institution of therapy with an angiotensin-converting-enzyme (ACE) inhibitor (if the patient can tolerate it).       Last eye exam 2018?, results not available  DM Complications:  None known        Thyroid:  Had taken Synthroid  Changed to Vendor thyroid medication, then Vendor + levothyroxine combo dose plan  Had taken Vendor thyroid 2-tablets daily... Possibly 60 mg 2-tabs QD  Early-mid 3/2018. Ran out of  [place]ers thyroid medication  Previously took [place]ers thyroid 90 mg 2-tabs daily and low dose levothyroxine 0.025 mg daily  2019. Changed to lower dose Warners and higher dose levothyroxine  Subsequent dose reductions with levothyroxine medication  Mid 2021 to early 2022. Off Warners thyroid medication, then restarted   2023. 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 Frantz Guillen?/[facility], Dr. Nevin Chavez/GI, Dr. YURIDIA Harrington/Neurology       PMH/PSH:  Past Medical History:   Diagnosis Date    Abnormal liver CT     Collapsed lung 2023    Hypothyroid 80's    Necrotizing fasciitis (H)     Pleural effusion     Primary osteoarthritis of both knees     Soft tissue infection     Subcortical infarction (H)     Type 2 diabetes mellitus (H)     Unspecified cirrhosis of liver (H)      Past Surgical History:   Procedure Laterality Date    ARTHROPLASTY KNEE Right 2023    Procedure: Right Total Knee Arthroplasty;  Surgeon: Pawan Ewing MD;  Location:  OR     SECTION  1983    COLONOSCOPY N/A 10/21/2015    Procedure: COLONOSCOPY;  Surgeon: Jaky Arredondo MD;  Location:  GI    IR THORACENTESIS  2024    IR THORACENTESIS  2024    IRRIGATION AND DEBRIDEMENT ABDOMEN WASHOUT, COMBINED N/A 10/12/2018     Procedure: COMBINED IRRIGATION AND DEBRIDEMENT ABDOMEN WASHOUT;  Irrigation and Debridement of Lower Abdomen, removal of piece of mesh.;  Surgeon: Darlene Hogue MD;  Location:  OR    [facility]/o  2004    due to anemia    ZZC REPAIR CRUCIATE LIGAMENT,KNEE  1986       Family Hx:  No family history on file.      Social Hx:  Social History     Socioeconomic History    Marital status:      Spouse name: Not on file    Number of children: 2    Years of education: Not on file    Highest education level: Not on file   Occupational History    Occupation: surgery scheduler [employer]   Tobacco Use    Smoking status: Every Day     Current packs/day: 0.25     Types: Cigarettes    Smokeless tobacco: Never   Vaping Use    Vaping status: Never Used   Substance and Sexual Activity    Alcohol use: No    Drug use: Never    Sexual activity: Not on file   Other Topics Concern    Not on file   Social History Narrative    Not on file     Social Drivers of Health     Financial Resource Strain: Not on file   Food Insecurity: No Food Insecurity (4/30/2024)    Received from [facility]    Hunger Vital Sign     Worried About Running Out of Food in the Last Year: Never true     Ran Out of Food in the Last Year: Never true   Transportation Needs: No Transportation Needs (4/30/2024)    Received from [facility]    PRAPARE - Transportation     Lack of Transportation (Medical): No     Lack of Transportation (Non-Medical): No   Physical Activity: Not on file   Stress: Not on file   Social Connections: Not on file   Interpersonal Safety: Unknown (4/30/2024)    Received from [facility]    Humiliation, Afraid, Rape, and Kick questionnaire     Fear of Current or Ex-Partner: Not on file     Emotionally Abused: Not on file     Physically Abused: No     Sexually Abused: No   Housing Stability: Low Risk  (4/30/2024)    Received from [facility]    Housing Stability Vital Sign     Unable to Pay for Housing in the Last  Year: No     Number of Places Lived in the Last Year: 1     Unstable Housing in the Last Year: No          MEDICATIONS:  has a current medication list which includes the following prescription(s): blood glucose, blood glucose, blood glucose monitoring, celecoxib, escitalopram, furosemide, insulin aspart, insulin glargine-yfgn, b-d u/f, b-d u/f, levothyroxine, omeprazole, semaglutide (1 mg/dose), spironolactone, statin not prescribed, thyroid, np thyroid, valacyclovir, and freestyle evelyn 3 sensor.     ROS: 10 point ROS neg other than the symptoms noted above in the HPI.     GENERAL: some fatigue, wt loss; denies fevers, chills, night sweats.   HEENT:  no dysphagia, odonophagia, diplopia, neck pain  THYROID:  no apparent hyper or hypothyroid symptoms  CV:  some palpitations; denies chest pain, pressure  LUNGS:  denies SOB, dyspnea, cough, wheezing  ABDOMEN:   BM urgency related to lactulose TID dosing; no apparent indigestion, constipation  EXTREMITIES: no rashes, ulcers, edema  NEUROLOGY: forgetfulness; no headaches, denies changes in vision, tingling, extremitiy numbness   MSK: knee and some low back pain; no muscle aches or pains, weakness  SKIN: no rashes or lesions  : no increased thirst and urination, nocturia; no menses since hysterectomy ~age 49  PSYCH:  stable mood, no significant anxiety or depression  ENDOCRINE: no heat or cold intolerance      Physical Exam   VS: BP 96/60   Pulse 74   Wt 98 kg (216 lb)   BMI 36.17 kg/m    GENERAL: AXOX3, NAD, well dressed, answering questions appropriately, appears stated age.  ENT: no nose swelling or nasal discharge, mouth redness or gum changes.  EYES: eyes grossly normal to inspection, conjunctivae and sclerae normal, no exophthalmos or proptosis  THYROID:  no apparent nodules or goiter  LUNGS: no audible wheeze, cough or visible cyanosis, or increased work of breathing  ABDOMEN: abdomen obese size  EXTREMITIES: mild left hand tremor; normal appearance of feet,  pulses R/L DP3/3; no pedal edema noted  NEUROLOGY: CN grossly intact, no tremors  MSK: grossly intact  SKIN:  no apparent skin lesions, rash, or edema with visualized skin appearance  PSYCH: mentation appears normal, affect normal/bright, judgement and insight intact,   normal speech and appearance well groomed    LABS:     All pertinent notes, labs, and images personally reviewed by me.        A/P:  Encounter Diagnoses   Name Primary?    Type 2 diabetes mellitus without complication, without long-term current use of insulin (H) Yes    Acquired hypothyroidism                Comments:  Reviewed complicated health issues, diabetes and thyroid management.  Need medical records and recent CGM information  Reviewed and interpreted tests that I previously ordered.   Ordered appropriate tests for the endocrinology disease management.    Management options discussed and implemented after shared medical decision making with the patient.  T2DM and hypothyroidism problems are chronic-stable    Plan:  Discussed general issues with the diabetes diagnosis and management  We discussed the hgbA1c test which reflects previous overall glucose levels or control  Discussed the importance of blood glucose (BG) testing to assess glucose trends  Provided general overview of the diabetes medication options and medication treatment plan  Reviewed recent Libre3 CGM glucose trend data, in detail.    Recommend:  Continue the current Novolog (Aspart), Lantus, and Ozempic medications  Add Aspart correction scale as 1U per 50 mg/dl over 200 mg/dl  Goal target -150 mg/dl  Resume use of the Freestyle Libre3 or Libre3 PlusCGM glucose sensor    Gave her a sample Libre3 Plus CGM sensor  Keep focus on diet, exercise, weight management.  Needs f/u on the mild hypercholesterolemia, consider adding statin medication for treatment  Continue current [place] thyroid and levothyroxine medications  Advise having fasting lipid panel testing and dilated eye  examination, at least annually  Will review the med list and updated CGM data when available soon    Keep scheduled followup GI, neurology, and PCP appointments  Addressed patient questions today     The longitudinal plan of care for the endocrine problem(s) were addressed during this visit.  Due to added complexity of care,   we will continue to support the patient and the subsequent management of this condition with ongoing continuity of care.    There are no Patient Instructions on file for this visit.    Future labs ordered today:   No orders of the defined types were placed in this encounter.    Radiology/Consults ordered today: None    Total time spent on day of encounter:  21 min    Follow-up:  10/16/25 at 11:30 am, Return    JOJO Christianson MD, MS  Endocrinology  [facility]    CC:  RAFIQ Burns

## 2025-05-27 ENCOUNTER — TELEPHONE (OUTPATIENT)
Dept: ENDOCRINOLOGY | Facility: CLINIC | Age: 60
End: 2025-05-27
Payer: MEDICARE

## 2025-05-27 DIAGNOSIS — E11.9 TYPE 2 DIABETES MELLITUS WITHOUT COMPLICATION, WITHOUT LONG-TERM CURRENT USE OF INSULIN (H): ICD-10-CM

## 2025-05-27 RX ORDER — ACYCLOVIR 800 MG/1
TABLET ORAL
Qty: 2 EACH | Refills: 5 | Status: SHIPPED | OUTPATIENT
Start: 2025-05-27

## 2025-05-27 NOTE — TELEPHONE ENCOUNTER
Last Written Prescription Date:  1/6/25  Last Fill Quantity: 2,  # refills: 5   Last office visit: 5/7/2025 ; last virtual visit: Visit date not found with prescribing provider:  Dr. Christianson   Future Office Visit: 10/16/25    Requested Prescriptions   Pending Prescriptions Disp Refills    Continuous Glucose Sensor (FREESTYLE DIMITRIOS 3 SENSOR) MISC 2 each 5     Sig: Change every 14 days.       There is no refill protocol information for this order        Refills sent  Samantha Marie RN

## 2025-05-27 NOTE — TELEPHONE ENCOUNTER
[facility] Call Center    Phone Message    May a detailed message be left on voicemail: yes     Reason for Call: Medication Refill Request    Has the patient contacted the pharmacy for the refill? Yes   Name of medication being requested: Continuous Glucose Sensor (FREESTYLE DIMITRIOS 3 SENSOR) [facility][id number][place]   Provider who prescribed the medication: Sammy  Pharmacy: [facility] - [place], [place] - [id number] [place] [address] [address]  Date medication is needed: asap       Action Taken: Other: endo    Travel Screening: Not Applicable     Date of Service:

## 2025-06-13 NOTE — TELEPHONE ENCOUNTER
Called pharmacy for clarification.      Needing RX refill Levothyroxine.     4-7-2020 Virtual Visit notes:   Current med doses:  [place] thyroid 90 mg             1-tab po QAM  Levothyroxine 0.15 mg            1-tab QAM    Continue the [place] thyroid + levothyroxine dose plan  F/u evaluation 5/13/20, and non-fasting preappt labs 1-week before    RX approved:    Levothyroxine 150 mcg tabs    Will be reviewed at upcoming OV with Dr Christianson on 5/13/2020.       Kelley MIRELES RN,BSN               <--- Click to Launch [employer] for PreOp, PostOp and Procedure

## 2025-06-30 ENCOUNTER — ANESTHESIA (OUTPATIENT)
Dept: GASTROENTEROLOGY | Facility: CLINIC | Age: 60
End: 2025-06-30
Payer: MEDICARE

## 2025-06-30 ENCOUNTER — HOSPITAL ENCOUNTER (OUTPATIENT)
Facility: CLINIC | Age: 60
Discharge: HOME | End: 2025-06-30
Attending: INTERNAL MEDICINE | Admitting: INTERNAL MEDICINE
Payer: MEDICARE

## 2025-06-30 ENCOUNTER — ANESTHESIA EVENT (OUTPATIENT)
Dept: GASTROENTEROLOGY | Facility: CLINIC | Age: 60
End: 2025-06-30
Payer: MEDICARE

## 2025-06-30 VITALS
HEIGHT: 65 IN | OXYGEN SATURATION: 97 % | HEART RATE: 68 BPM | WEIGHT: 216 LBS | RESPIRATION RATE: 15 BRPM | SYSTOLIC BLOOD PRESSURE: 117 MMHG | BODY MASS INDEX: 35.99 KG/M2 | DIASTOLIC BLOOD PRESSURE: 67 MMHG

## 2025-06-30 LAB — UPPER GI ENDOSCOPY: NORMAL

## 2025-06-30 PROCEDURE — 999N000010 HC STATISTIC ANES STAT CODE-CRNA PER MINUTE: Performed by: INTERNAL MEDICINE

## 2025-06-30 PROCEDURE — 250N000009 HC RX 250: Performed by: NURSE ANESTHETIST, CERTIFIED REGISTERED

## 2025-06-30 PROCEDURE — 250N000011 HC RX IP 250 OP 636: Performed by: NURSE ANESTHETIST, CERTIFIED REGISTERED

## 2025-06-30 PROCEDURE — 258N000003 HC RX IP 258 OP 636: Performed by: NURSE ANESTHETIST, CERTIFIED REGISTERED

## 2025-06-30 PROCEDURE — 88305 TISSUE EXAM BY PATHOLOGIST: CPT | Mod: TC | Performed by: INTERNAL MEDICINE

## 2025-06-30 PROCEDURE — 43239 EGD BIOPSY SINGLE/MULTIPLE: CPT | Performed by: INTERNAL MEDICINE

## 2025-06-30 PROCEDURE — 370N000017 HC ANESTHESIA TECHNICAL FEE, PER MIN: Performed by: INTERNAL MEDICINE

## 2025-06-30 RX ORDER — NALOXONE HYDROCHLORIDE 0.4 MG/ML
0.4 INJECTION, SOLUTION INTRAMUSCULAR; INTRAVENOUS; SUBCUTANEOUS
Status: DISCONTINUED | OUTPATIENT
Start: 2025-06-30 | End: 2025-06-30 | Stop reason: HOSPADM

## 2025-06-30 RX ORDER — NALOXONE HYDROCHLORIDE 0.4 MG/ML
0.2 INJECTION, SOLUTION INTRAMUSCULAR; INTRAVENOUS; SUBCUTANEOUS
Status: DISCONTINUED | OUTPATIENT
Start: 2025-06-30 | End: 2025-06-30 | Stop reason: HOSPADM

## 2025-06-30 RX ORDER — FLUMAZENIL 0.1 MG/ML
0.2 INJECTION, SOLUTION INTRAVENOUS
Status: DISCONTINUED | OUTPATIENT
Start: 2025-06-30 | End: 2025-06-30 | Stop reason: HOSPADM

## 2025-06-30 RX ORDER — SODIUM CHLORIDE, SODIUM LACTATE, POTASSIUM CHLORIDE, CALCIUM CHLORIDE 600; 310; 30; 20 MG/100ML; MG/100ML; MG/100ML; MG/100ML
INJECTION, SOLUTION INTRAVENOUS CONTINUOUS PRN
Status: DISCONTINUED | OUTPATIENT
Start: 2025-06-30 | End: 2025-06-30

## 2025-06-30 RX ORDER — LIDOCAINE HYDROCHLORIDE 20 MG/ML
INJECTION, SOLUTION INFILTRATION; PERINEURAL PRN
Status: DISCONTINUED | OUTPATIENT
Start: 2025-06-30 | End: 2025-06-30

## 2025-06-30 RX ORDER — LIDOCAINE 40 MG/G
CREAM TOPICAL
Status: DISCONTINUED | OUTPATIENT
Start: 2025-06-30 | End: 2025-06-30 | Stop reason: HOSPADM

## 2025-06-30 RX ORDER — PROPOFOL 10 MG/ML
INJECTION, EMULSION INTRAVENOUS CONTINUOUS PRN
Status: DISCONTINUED | OUTPATIENT
Start: 2025-06-30 | End: 2025-06-30

## 2025-06-30 RX ORDER — ONDANSETRON 2 MG/ML
INJECTION INTRAMUSCULAR; INTRAVENOUS PRN
Status: DISCONTINUED | OUTPATIENT
Start: 2025-06-30 | End: 2025-06-30

## 2025-06-30 RX ADMIN — PROPOFOL 150 MCG/KG/MIN: 10 INJECTION, EMULSION INTRAVENOUS at 09:09

## 2025-06-30 RX ADMIN — ONDANSETRON 4 MG: 2 INJECTION INTRAMUSCULAR; INTRAVENOUS at 09:08

## 2025-06-30 RX ADMIN — LIDOCAINE HYDROCHLORIDE 50 MG: 20 INJECTION, SOLUTION INFILTRATION; PERINEURAL at 09:08

## 2025-06-30 RX ADMIN — PHENYLEPHRINE HYDROCHLORIDE 100 MCG: 10 INJECTION INTRAVENOUS at 09:16

## 2025-06-30 RX ADMIN — SODIUM CHLORIDE, SODIUM LACTATE, POTASSIUM CHLORIDE, AND CALCIUM CHLORIDE: .6; .31; .03; .02 INJECTION, SOLUTION INTRAVENOUS at 09:06

## 2025-06-30 ASSESSMENT — LIFESTYLE VARIABLES: TOBACCO_USE: 1

## 2025-06-30 ASSESSMENT — COPD QUESTIONNAIRES: COPD: 1

## 2025-06-30 ASSESSMENT — ACTIVITIES OF DAILY LIVING (ADL): ADLS_ACUITY_SCORE: 59

## 2025-06-30 NOTE — ANESTHESIA CARE TRANSFER NOTE
Patient: Sudheer Montiel    Procedure: Procedure(s):  ESOPHAGOGASTRODUODENOSCOPY, WITH BIOPSY       Diagnosis: Cirrhosis (H) [K74.60]  Diagnosis Additional Information: No value filed.    Anesthesia Type:   MAC     Note:    Oropharynx: oropharynx clear of all foreign objects and spontaneously breathing  Level of Consciousness: drowsy  Oxygen Supplementation: face mask  Level of Supplemental Oxygen (L/min / FiO2): 6  Independent Airway: airway patency satisfactory and stable  Dentition: dentition unchanged  Vital Signs Stable: post-procedure vital signs reviewed and stable  Report to RN Given: handoff report given  Patient transferred to: PACU    Handoff Report: Identifed the Patient, Identified the Reponsible Provider, Reviewed the pertinent medical history, Discussed the surgical course, Reviewed Intra-OP anesthesia mangement and issues during anesthesia, Set expectations for post-procedure period and Allowed opportunity for questions and acknowledgement of understanding      Vitals:  Vitals Value Taken Time   /68    Temp     Pulse 65 06/30/25 09:27   Resp 20 06/30/25 09:27   SpO2 99 % 06/30/25 09:27   Vitals shown include unfiled device data.    Electronically Signed By: TIFFANY Chavez CRNA  June 30, 2025  9:29 AM   no

## 2025-06-30 NOTE — ANESTHESIA POSTPROCEDURE EVALUATION
Patient: Sudheer Montiel    Procedure: Procedure(s):  ESOPHAGOGASTRODUODENOSCOPY, WITH BIOPSY       Anesthesia Type:  MAC    Note:     Postop Pain Control: Uneventful            Sign Out: Well controlled pain   PONV: No   Neuro/Psych: Uneventful            Sign Out: Acceptable/Baseline neuro status   Airway/Respiratory: Uneventful            Sign Out: Acceptable/Baseline resp. status   CV/Hemodynamics: Uneventful            Sign Out: Acceptable CV status; No obvious hypovolemia; No obvious fluid overload   Other NRE: NONE   DID A NON-ROUTINE EVENT OCCUR? No           Last vitals:  Vitals Value Taken Time   /67 06/30/25 09:40   Temp     Pulse 64 06/30/25 09:43   Resp 15 06/30/25 09:43   SpO2 96 % 06/30/25 09:43   Vitals shown include unfiled device data.    Electronically Signed By: Camden Lawrence MD  June 30, 2025  9:55 AM

## 2025-06-30 NOTE — ANESTHESIA PREPROCEDURE EVALUATION
Anesthesia Pre-Procedure Evaluation    Patient: Sudheer Montiel   MRN: [id number] : 1965          Procedure : Procedure(s):  ESOPHAGOGASTRODUODENOSCOPY         Past Medical History:   Diagnosis Date    Abnormal liver CT     Collapsed lung 2023    Hypothyroid 80's    Necrotizing fasciitis (H)     Pleural effusion     Primary osteoarthritis of both knees     Soft tissue infection     Subcortical infarction (H)     Type 2 diabetes mellitus (H)     Unspecified cirrhosis of liver (H)       Past Surgical History:   Procedure Laterality Date    ARTHROPLASTY KNEE Right 2023    Procedure: Right Total Knee Arthroplasty;  Surgeon: Pawan Ewing MD;  Location:  OR     SECTION      COLONOSCOPY N/A 10/21/2015    Procedure: COLONOSCOPY;  Surgeon: Jaky Arredondo MD;  Location:  GI    IR THORACENTESIS  2024    IR THORACENTESIS  2024    IRRIGATION AND DEBRIDEMENT ABDOMEN WASHOUT, COMBINED N/A 10/12/2018    Procedure: COMBINED IRRIGATION AND DEBRIDEMENT ABDOMEN WASHOUT;  Irrigation and Debridement of Lower Abdomen, removal of piece of mesh.;  Surgeon: Darlene Hogue MD;  Location: [facility] OR    marisol/bso  2004    due to anemia    ZZC REPAIR CRUCIATE LIGAMENT,KNEE  1986      Allergies   Allergen Reactions    Cephalexin     Morphine      Other reaction(s): Other  Irritability, disorientation    Penicillins Itching     face    Amoxicillin Rash and Itching    Cefprozil Rash    Ceftazidime Itching and Rash    Ciprofloxacin Itching and Rash     Tolerates levaquin     Percocet [Oxycodone-Acetaminophen] Nausea and Vomiting      Social History     Tobacco Use    Smoking status: Every Day     Current packs/day: 0.25     Types: Cigarettes    Smokeless tobacco: Never   Substance Use Topics    Alcohol use: No      Wt Readings from Last 1 Encounters:   25 98 kg (216 lb)        Anesthesia Evaluation   Pt has had prior anesthetic.     History of anesthetic complications  - .  slow  to wake up.    ROS/MED HX  ENT/Pulmonary: Comment: H/o pleural effusion s/p thoracentesis    (+)                tobacco use, Current use,         COPD,           (-) sleep apnea   Neurologic: Comment: H/o encephalopathy      Cardiovascular:    (-) hypertension   METS/Exercise Tolerance:     Hematologic: Comments: Low platelets      Musculoskeletal:   (+)  arthritis,             GI/Hepatic: Comment: Cirrhosis, varices    (+) GERD,            liver disease,       Renal/Genitourinary:       Endo:     (+)  type II DM,        thyroid problem, hypothyroidism,    Obesity,    (-) Type I DM   Psychiatric/Substance Use:       Infectious Disease:       Malignancy:       Other:              Physical Exam  Airway  Mallampati: II  TM distance: >3 FB  Neck ROM: full  Mouth opening: >= 4 cm    Cardiovascular - normal exam   Dental   (+) Minor Abnormalities - some fillings, tiny chips      Pulmonary - normal exam      Neurological - normal exam  She appears awake, alert and oriented x3.    Other Findings       OUTSIDE LABS:  CBC:   Lab Results   Component Value Date    WBC 4.0 10/24/2024    WBC 10.2 04/18/2024    HGB 13.4 10/24/2024    HGB 13.2 04/18/2024    HCT 42.0 10/24/2024    HCT 39.6 04/18/2024    PLT 76 (L) 10/24/2024     (L) 04/18/2024     BMP:   Lab Results   Component Value Date     04/16/2025     01/07/2025    POTASSIUM 4.5 04/16/2025    POTASSIUM 4.9 01/07/2025    CHLORIDE 102 04/16/2025    CHLORIDE 104 01/07/2025    CO2 27 04/16/2025    CO2 24 01/07/2025    BUN 11.7 04/16/2025    BUN 12.9 01/07/2025    CR 0.73 04/16/2025    CR 0.82 01/07/2025     (H) 04/16/2025     (H) 01/07/2025     COAGS:   Lab Results   Component Value Date    INR 1.22 (H) 04/16/2025     POC:   Lab Results   Component Value Date     (H) 10/15/2018     HEPATIC:   Lab Results   Component Value Date    ALBUMIN 3.2 (L) 04/16/2025    PROTTOTAL 7.2 04/16/2025    ALT 21 04/16/2025    AST 43 04/16/2025    ALKPHOS 239  "(H) 04/16/2025    BILITOTAL 1.9 (H) 04/16/2025    EVANGELISTA 52 (H) 10/30/2023     OTHER:   Lab Results   Component Value Date    LACT 1.3 04/18/2024    A1C 6.8 (H) 04/16/2025    JOANN 8.8 04/16/2025    PHOS 3.3 08/12/2023    MAG 1.9 08/20/2023    LIPASE 237 05/25/2022    TSH 0.02 (L) 04/16/2025    T4 1.75 (H) 04/16/2025    T3 196 (H) 01/05/2021    CRP 17.2 (H) 04/17/2012    SED 11 04/17/2012       Anesthesia Plan    ASA Status:  3      NPO Status: NPO Appropriate   Anesthesia Type: MAC.   Techniques and Equipment:       - Monitoring Plan: standard ASA monitoring     Consents    Anesthesia Plan(s) and associated risks, benefits, and realistic alternatives discussed. Questions answered and patient/representative(s) expressed understanding.     - Discussed:     - Discussed with:  Patient               Postoperative Care         Comments:                   Camden Lawrence MD    I have reviewed the pertinent notes and labs in the chart from the past 30 days and (re)examined the patient.  Any updates or changes from those notes are reflected in this note.    Clinically Significant Risk Factors Present on Admission                            # DMII: A1C = 6.8 % (Ref range: 0.0 - 5.6 %) within past 6 months    # Obesity: Estimated body mass index is 35.94 kg/m  as calculated from the following:    Height as of this encounter: 1.651 m (5' 5\").    Weight as of this encounter: 98 kg (216 lb).       # Financial/Environmental Concerns:                 "

## 2025-07-01 LAB
PATH REPORT.COMMENTS IMP SPEC: NORMAL
PATH REPORT.COMMENTS IMP SPEC: NORMAL
PATH REPORT.FINAL DX SPEC: NORMAL
PATH REPORT.GROSS SPEC: NORMAL
PATH REPORT.MICROSCOPIC SPEC OTHER STN: NORMAL
PATH REPORT.RELEVANT HX SPEC: NORMAL
PHOTO IMAGE: NORMAL

## 2025-07-01 PROCEDURE — 88305 TISSUE EXAM BY PATHOLOGIST: CPT | Mod: 26 | Performed by: PATHOLOGY

## 2025-07-08 DIAGNOSIS — E03.9 ACQUIRED HYPOTHYROIDISM: ICD-10-CM

## 2025-07-09 RX ORDER — LEVOTHYROXINE SODIUM 88 UG/1
88 TABLET ORAL DAILY
Qty: 30 TABLET | Refills: 5 | Status: SHIPPED | OUTPATIENT
Start: 2025-07-09

## 2025-07-09 RX ORDER — THYROID 60 MG/1
TABLET ORAL
Qty: 30 TABLET | Refills: 5 | Status: SHIPPED | OUTPATIENT
Start: 2025-07-09

## 2025-07-09 RX ORDER — LEVOTHYROXINE, LIOTHYRONINE 9.5; 2.25 UG/1; UG/1
15 TABLET ORAL DAILY
Qty: 30 TABLET | Refills: 5 | Status: SHIPPED | OUTPATIENT
Start: 2025-07-09

## 2025-07-09 NOTE — TELEPHONE ENCOUNTER
Last Written Prescription Date:  1/2025  Last Fill Quantity: 30,  # refills: 5   Last office visit: 5/7/2025 ; last virtual visit: Visit date not found with prescribing provider:  Dr. Christianson   Future Office Visit: 10/16/25    Requested Prescriptions   Pending Prescriptions Disp Refills    levothyroxine (SYNTHROID/LEVOTHROID) 88 MCG tablet [Pharmacy Med Name: Levothyroxine Sodium 88 MCG Tablet] 31 tablet 11     Sig: TAKE 1 TABLET BY MOUTH ONCE DAILY       Thyroid Protocol Failed - 7/9/2025  9:17 AM        Failed - Normal TSH on file in past 12 months     Recent Labs   Lab Test 04/16/25  1220   TSH 0.02*              Passed - Patient is 12 years or older        Passed - Medication is active on med list and the sig matches. RN to manually verify dose and sig if red X/fail.     If the protocol passes (green check), you do not need to verify med dose and sig.    A prescription matches if they are the same clinical intention.    For Example: once daily and every morning are the same.    The protocol can not identify upper and lower case letters as matching and will fail.     For Example: Take 1 tablet (50 mg) by mouth daily     TAKE 1 TABLET (50 MG) BY MOUTH DAILY    For all fails (red x), verify dose and sig.    If the refill does match what is on file, the RN can still proceed to approve the refill request.       If they do not match, route to the appropriate provider.             Passed - Recent (12 month) or future (90 days) visit with authorizing provider's specialty (provided they have been seen in the past 15 months)     The patient must have completed an in-person or virtual visit within the past 12 months or has a future visit scheduled within the next 90 days with the authorizing provider s specialty.  Urgent care and e-visits do not qualify as an office visit for this protocol.          Passed - Medication indicated for associated diagnosis     Medication is associated with one or more of the following  diagnoses:  Hypothyroidism  Thyroid stimulating hormone suppression therapy  Thyroid cancer  Acquired atrophy of thyroid          Passed - No active pregnancy on record     If patient is pregnant or has had a positive pregnancy test, please check TSH.          Passed - No positive pregnancy test in past 12 months     If patient is pregnant or has had a positive pregnancy test, please check TSH.            NP THYROID 15 MG tablet [Pharmacy Med Name: NP Thyroid 15 MG Tablet] 30 tablet 11     Sig: TAKE 1 TABLET BY MOUTH ONCE DAILY       Thyroid Protocol Failed - 7/9/2025  9:17 AM        Failed - Normal TSH on file in past 12 months     Recent Labs   Lab Test 04/16/25  1220   TSH 0.02*              Passed - Patient is 12 years or older        Passed - Medication is active on med list and the sig matches. RN to manually verify dose and sig if red X/fail.     If the protocol passes (green check), you do not need to verify med dose and sig.    A prescription matches if they are the same clinical intention.    For Example: once daily and every morning are the same.    The protocol can not identify upper and lower case letters as matching and will fail.     For Example: Take 1 tablet (50 mg) by mouth daily     TAKE 1 TABLET (50 MG) BY MOUTH DAILY    For all fails (red x), verify dose and sig.    If the refill does match what is on file, the RN can still proceed to approve the refill request.       If they do not match, route to the appropriate provider.             Passed - Recent (12 month) or future (90 days) visit with authorizing provider's specialty (provided they have been seen in the past 15 months)     The patient must have completed an in-person or virtual visit within the past 12 months or has a future visit scheduled within the next 90 days with the authorizing provider s specialty.  Urgent care and e-visits do not qualify as an office visit for this protocol.          Passed - Medication indicated for associated  diagnosis     Medication is associated with one or more of the following diagnoses:  Hypothyroidism  Thyroid stimulating hormone suppression therapy  Thyroid cancer  Acquired atrophy of thyroid          Passed - No active pregnancy on record     If patient is pregnant or has had a positive pregnancy test, please check TSH.          Passed - No positive pregnancy test in past 12 months     If patient is pregnant or has had a positive pregnancy test, please check TSH.            thyroid (NP THYROID) 60 MG tablet [Pharmacy Med Name: NP Thyroid 60 MG Tablet] 30 tablet 11     Sig: TAKE 1 TABLET BY MOUTH ONCE DAILY AS DIRECTED       Thyroid Protocol Failed - 7/9/2025  9:17 AM        Failed - Normal TSH on file in past 12 months     Recent Labs   Lab Test 04/16/25  1220   TSH 0.02*              Passed - Patient is 12 years or older        Passed - Medication is active on med list and the sig matches. RN to manually verify dose and sig if red X/fail.     If the protocol passes (green check), you do not need to verify med dose and sig.    A prescription matches if they are the same clinical intention.    For Example: once daily and every morning are the same.    The protocol can not identify upper and lower case letters as matching and will fail.     For Example: Take 1 tablet (50 mg) by mouth daily     TAKE 1 TABLET (50 MG) BY MOUTH DAILY    For all fails (red x), verify dose and sig.    If the refill does match what is on file, the RN can still proceed to approve the refill request.       If they do not match, route to the appropriate provider.             Passed - Recent (12 month) or future (90 days) visit with authorizing provider's specialty (provided they have been seen in the past 15 months)     The patient must have completed an in-person or virtual visit within the past 12 months or has a future visit scheduled within the next 90 days with the authorizing provider s specialty.  Urgent care and e-visits do not  qualify as an office visit for this protocol.          Passed - Medication indicated for associated diagnosis     Medication is associated with one or more of the following diagnoses:  Hypothyroidism  Thyroid stimulating hormone suppression therapy  Thyroid cancer  Acquired atrophy of thyroid          Passed - No active pregnancy on record     If patient is pregnant or has had a positive pregnancy test, please check TSH.          Passed - No positive pregnancy test in past 12 months     If patient is pregnant or has had a positive pregnancy test, please check TSH.           Refills sent  Samantha Marie RN

## 2025-07-29 ENCOUNTER — TELEPHONE (OUTPATIENT)
Dept: ENDOCRINOLOGY | Facility: CLINIC | Age: 60
End: 2025-07-29
Payer: MEDICARE

## 2025-07-29 DIAGNOSIS — E11.9 TYPE 2 DIABETES MELLITUS WITHOUT COMPLICATION, WITHOUT LONG-TERM CURRENT USE OF INSULIN (H): Primary | ICD-10-CM

## 2025-07-29 RX ORDER — HYDROCHLOROTHIAZIDE 12.5 MG/1
CAPSULE ORAL
Qty: 6 EACH | Refills: 1 | Status: SHIPPED | OUTPATIENT
Start: 2025-07-29

## 2025-07-29 NOTE — TELEPHONE ENCOUNTER
Hello,     This is [facility] requesting a few things for this patient's Medicare order of the chandni 3+ sensors.     The first is requesting an addendum to the clinic notes from 5/7 to include pts switching to chandni 3+ sensors per Medicare's guidelines below:     VISIT NOTE REQUIREMENTS: (must state the following)  Patient must be seen/clinical notes must be written in the last 6 months by the prescribing provider.  Must state that the patient is injecting insulin 1 time daily or more (Can be written that patient is injecting with insulin pump) We cannot accept medication list as insulin regimen.  Must state that the patient is a type 1 or type 2 diabetic.  Must state that patient is using CGM     The second request is for new prescriptions for chandni 3+ sensors . The prescription needs to be written by the provider in which the patient was seen for their diabetes.      Prescription must be written by: Kirk Christianson  Must include full supply name: Freestyle Chandni 3+ Sensors  Quantity: 6  Refills: 2  SIG: Change every 15 days    And a new rx for the chandni 3 reader.     Prescription must be written by: Kirk Christianson  Must include full supply name: Freestyle Chandni 3 reader  Quantity: 1  Refills: 0  SIG: Use to read blood sugars per manufacturers instructions.     As a reminder, Medicare requires patients to be seen every 3 months for insulin supplies and every 6 months for CGMs. The provider who sees the patient must be the provider on the prescription, must be written on the day of or after office visit date and office visit must include notes about diabetes and insulin regimen. The Diabetes Care Services team at [facility] may request new prescriptions before renewal dates of the prescription is filled over the allowable amount. Writing the order to match what is above will help ensure we will only need to request every 3 or 6 months.    If you have any questions  regarding these requests please call us at [phone] or send a message to the [email or web address] pool     Thank you!     [facility]  Diabetes Care Services Team  [address] [place] [address]  Provider Phone: [phone]  Patient Line: [phone]  Fax: [phone]  E-mail: [email or web address]

## 2025-07-30 NOTE — TELEPHONE ENCOUNTER
Message reviewed, updated CGM (Freestyle Libre3 Plus) Rx sent.     JOJO Christianson MD, MS  Endocrinology  [facility]

## 2025-08-05 ENCOUNTER — ANCILLARY PROCEDURE (OUTPATIENT)
Dept: ULTRASOUND IMAGING | Facility: CLINIC | Age: 60
End: 2025-08-05
Attending: INTERNAL MEDICINE
Payer: MEDICARE

## 2025-08-05 DIAGNOSIS — R18.8 OTHER ASCITES: ICD-10-CM

## 2025-08-05 DIAGNOSIS — K74.60 UNSPECIFIED CIRRHOSIS OF LIVER (H): ICD-10-CM

## 2025-08-05 DIAGNOSIS — E11.9 TYPE 2 DIABETES MELLITUS WITHOUT COMPLICATION, WITHOUT LONG-TERM CURRENT USE OF INSULIN (H): ICD-10-CM

## 2025-08-05 PROCEDURE — 76705 ECHO EXAM OF ABDOMEN: CPT

## 2025-08-05 RX ORDER — SEMAGLUTIDE 1.34 MG/ML
INJECTION, SOLUTION SUBCUTANEOUS
Qty: 3 ML | Refills: 2 | Status: SHIPPED | OUTPATIENT
Start: 2025-08-05

## 2025-08-22 ENCOUNTER — LAB REQUISITION (OUTPATIENT)
Dept: LAB | Facility: CLINIC | Age: 60
End: 2025-08-22
Payer: MEDICARE

## 2025-08-22 DIAGNOSIS — D69.6 THROMBOCYTOPENIA, UNSPECIFIED: ICD-10-CM

## 2025-08-22 DIAGNOSIS — E66.9 OBESITY, UNSPECIFIED: ICD-10-CM

## 2025-08-22 DIAGNOSIS — Z79.4 LONG TERM (CURRENT) USE OF INSULIN (H): ICD-10-CM

## 2025-08-22 DIAGNOSIS — E11.9 TYPE 2 DIABETES MELLITUS WITHOUT COMPLICATIONS (H): ICD-10-CM

## 2025-08-22 DIAGNOSIS — K76.82 HEPATIC ENCEPHALOPATHY (H): ICD-10-CM

## 2025-08-22 DIAGNOSIS — E87.6 HYPOKALEMIA: ICD-10-CM

## 2025-08-22 DIAGNOSIS — Z00.00 ENCOUNTER FOR GENERAL ADULT MEDICAL EXAMINATION WITHOUT ABNORMAL FINDINGS: ICD-10-CM

## 2025-08-22 DIAGNOSIS — E03.9 HYPOTHYROIDISM, UNSPECIFIED: ICD-10-CM

## 2025-08-22 DIAGNOSIS — K75.81 NONALCOHOLIC STEATOHEPATITIS (NASH): ICD-10-CM

## 2025-08-27 ENCOUNTER — HOSPITAL ENCOUNTER (INPATIENT)
Facility: CLINIC | Age: 60
End: 2025-08-27
Attending: EMERGENCY MEDICINE | Admitting: STUDENT IN AN ORGANIZED HEALTH CARE EDUCATION/TRAINING PROGRAM
Payer: MEDICARE

## 2025-08-27 PROBLEM — G93.40 ENCEPHALOPATHY: Status: ACTIVE | Noted: 2025-08-27

## 2025-08-27 PROBLEM — K76.82 HEPATIC ENCEPHALOPATHY (H): Status: ACTIVE | Noted: 2025-08-27

## 2025-08-27 ASSESSMENT — ACTIVITIES OF DAILY LIVING (ADL)
ADLS_ACUITY_SCORE: 66
ADLS_ACUITY_SCORE: 59
ADLS_ACUITY_SCORE: 66
ADLS_ACUITY_SCORE: 59
ADLS_ACUITY_SCORE: 66
ADLS_ACUITY_SCORE: 59

## 2025-08-27 ASSESSMENT — COLUMBIA-SUICIDE SEVERITY RATING SCALE - C-SSRS
1. IN THE PAST MONTH, HAVE YOU WISHED YOU WERE DEAD OR WISHED YOU COULD GO TO SLEEP AND NOT WAKE UP?: NO
6. HAVE YOU EVER DONE ANYTHING, STARTED TO DO ANYTHING, OR PREPARED TO DO ANYTHING TO END YOUR LIFE?: NO
2. HAVE YOU ACTUALLY HAD ANY THOUGHTS OF KILLING YOURSELF IN THE PAST MONTH?: NO

## 2025-08-28 ENCOUNTER — APPOINTMENT (OUTPATIENT)
Dept: SPEECH THERAPY | Facility: CLINIC | Age: 60
End: 2025-08-28
Payer: MEDICARE

## 2025-08-28 ASSESSMENT — ACTIVITIES OF DAILY LIVING (ADL)
ADLS_ACUITY_SCORE: 66
PREVIOUS_RESPONSIBILITIES: HOUSEKEEPING;LAUNDRY
ADLS_ACUITY_SCORE: 66

## 2025-08-29 ENCOUNTER — APPOINTMENT (OUTPATIENT)
Dept: PHYSICAL THERAPY | Facility: CLINIC | Age: 60
End: 2025-08-29
Payer: MEDICARE

## 2025-08-29 ASSESSMENT — ACTIVITIES OF DAILY LIVING (ADL)
ADLS_ACUITY_SCORE: 70
ADLS_ACUITY_SCORE: 70
ADLS_ACUITY_SCORE: 68
ADLS_ACUITY_SCORE: 70
ADLS_ACUITY_SCORE: 70
ADLS_ACUITY_SCORE: 68
ADLS_ACUITY_SCORE: 70
ADLS_ACUITY_SCORE: 66
ADLS_ACUITY_SCORE: 70

## 2025-08-30 ASSESSMENT — ACTIVITIES OF DAILY LIVING (ADL)
ADLS_ACUITY_SCORE: 68
ADLS_ACUITY_SCORE: 65
ADLS_ACUITY_SCORE: 68

## 2025-08-31 ASSESSMENT — ACTIVITIES OF DAILY LIVING (ADL)
ADLS_ACUITY_SCORE: 65
ADLS_ACUITY_SCORE: 65
ADLS_ACUITY_SCORE: 64
ADLS_ACUITY_SCORE: 65
ADLS_ACUITY_SCORE: 64
ADLS_ACUITY_SCORE: 64
ADLS_ACUITY_SCORE: 65
ADLS_ACUITY_SCORE: 64
ADLS_ACUITY_SCORE: 65
ADLS_ACUITY_SCORE: 64
ADLS_ACUITY_SCORE: 65
ADLS_ACUITY_SCORE: 64

## 2025-09-01 ASSESSMENT — ACTIVITIES OF DAILY LIVING (ADL)
ADLS_ACUITY_SCORE: 64

## 2025-09-02 ASSESSMENT — ACTIVITIES OF DAILY LIVING (ADL)
ADLS_ACUITY_SCORE: 64

## 2025-09-03 ASSESSMENT — ACTIVITIES OF DAILY LIVING (ADL)
ADLS_ACUITY_SCORE: 64
ADLS_ACUITY_SCORE: 65
ADLS_ACUITY_SCORE: 64
ADLS_ACUITY_SCORE: 65
ADLS_ACUITY_SCORE: 64
ADLS_ACUITY_SCORE: 65
ADLS_ACUITY_SCORE: 65
ADLS_ACUITY_SCORE: 64
ADLS_ACUITY_SCORE: 65
ADLS_ACUITY_SCORE: 64
ADLS_ACUITY_SCORE: 65
ADLS_ACUITY_SCORE: 64
ADLS_ACUITY_SCORE: 64
ADLS_ACUITY_SCORE: 65
ADLS_ACUITY_SCORE: 64
ADLS_ACUITY_SCORE: 65
ADLS_ACUITY_SCORE: 64
ADLS_ACUITY_SCORE: 65

## 2025-09-04 ASSESSMENT — ACTIVITIES OF DAILY LIVING (ADL)
ADLS_ACUITY_SCORE: 64

## (undated) DEVICE — PREP SKIN SCRUB TRAY 4461A

## (undated) DEVICE — MANIFOLD NEPTUNE 4 PORT 700-20

## (undated) DEVICE — GLOVE BIOGEL PI MICRO INDICATOR UNDERGLOVE SZ 7.5 48975

## (undated) DEVICE — SUCTION IRR SYSTEM W/O TIP INTERPULSE HANDPIECE 0210-100-000

## (undated) DEVICE — SOL WATER IRRIG 1000ML BOTTLE 2F7114

## (undated) DEVICE — BONE CLEANING TIP INTERPULSE  0210-010-000

## (undated) DEVICE — PACK TOTAL KNEE SOP15TKFSD

## (undated) DEVICE — STPL SKIN 35W 6.9MM  PXW35

## (undated) DEVICE — GLOVE BIOGEL PI SZ 7.5 40875

## (undated) DEVICE — PAD CHUX UNDERPAD 23X24" 7136

## (undated) DEVICE — BLADE SAW SAGITTAL STRK 18X90X1.27MM HD SYS 6 6118-127-090

## (undated) DEVICE — SOL NACL 0.9% IRRIG 1000ML BOTTLE 2F7124

## (undated) DEVICE — SUCTION MANIFOLD DORNOCH ULTRA CART UL-CL500

## (undated) DEVICE — PREP TECHNI-CARE CHLOROXYLENOL 3% 4OZ BOTTLE C222-4ZWO

## (undated) DEVICE — SU VICRYL 0 CT-1 CR 8X18" J740D

## (undated) DEVICE — NDL SPINAL 18GA 3.5" 405184

## (undated) DEVICE — DRSG ADAPTIC 3X8" 6113

## (undated) DEVICE — GLOVE PROTEXIS POWDER FREE SMT 7.5  2D72PT75X

## (undated) DEVICE — SYR 50ML LL W/O NDL 309653

## (undated) DEVICE — DRAPE SHEET REV FOLD 3/4 9349

## (undated) DEVICE — SU VICRYL 2-0 CT-1 27" UND J259H

## (undated) DEVICE — LINEN TOWEL PACK X5 5464

## (undated) DEVICE — CAST PADDING 6" STERILE 9046S

## (undated) DEVICE — BLADE SAW SAGITTAL STRK MED WIDE 25X73X0.89MM 2108-105-000

## (undated) DEVICE — WRAP EZY KNEE 1213PP

## (undated) DEVICE — LINEN TOWEL PACK X6 WHITE 5487

## (undated) DEVICE — DRSG ABDOMINAL 07 1/2X8" 7197D

## (undated) DEVICE — ESU GROUND PAD UNIVERSAL W/O CORD

## (undated) DEVICE — GLOVE BIOGEL PI MICRO INDICATOR UNDERGLOVE SZ 8.0 48980

## (undated) DEVICE — SOLUTION WOUND CLEANSING 3/4OZ 10% PVP EA-L3011FB-50

## (undated) DEVICE — GLOVE PROTEXIS BLUE W/NEU-THERA 8.0  2D73EB80

## (undated) DEVICE — BONE CEMENT MIXEVAC III HI VAC KIT  0206-015-000

## (undated) DEVICE — DRSG AQUACEL AG HYDROFIBER  3.5X10" 422605

## (undated) DEVICE — PACK AB HYST II

## (undated) DEVICE — BLADE SAW RECIP STRK 70X12.5X0.80MM 0277-096-277

## (undated) DEVICE — DRSG KERLIX 4 1/2"X4YDS ROLL 6715

## (undated) RX ORDER — FENTANYL CITRATE 50 UG/ML
INJECTION, SOLUTION INTRAMUSCULAR; INTRAVENOUS
Status: DISPENSED
Start: 2023-12-18

## (undated) RX ORDER — TRANEXAMIC ACID 650 MG/1
TABLET ORAL
Status: DISPENSED
Start: 2023-12-18

## (undated) RX ORDER — FENTANYL CITRATE 50 UG/ML
INJECTION, SOLUTION INTRAMUSCULAR; INTRAVENOUS
Status: DISPENSED
Start: 2018-10-12

## (undated) RX ORDER — VANCOMYCIN HYDROCHLORIDE 1 G/20ML
INJECTION, POWDER, LYOPHILIZED, FOR SOLUTION INTRAVENOUS
Status: DISPENSED
Start: 2023-12-18